# Patient Record
Sex: MALE | Race: WHITE | Employment: UNEMPLOYED | URBAN - METROPOLITAN AREA
[De-identification: names, ages, dates, MRNs, and addresses within clinical notes are randomized per-mention and may not be internally consistent; named-entity substitution may affect disease eponyms.]

---

## 2020-06-19 ENCOUNTER — OFFICE VISIT (OUTPATIENT)
Dept: UROLOGY | Age: 60
End: 2020-06-19
Payer: COMMERCIAL

## 2020-06-19 VITALS
WEIGHT: 178 LBS | SYSTOLIC BLOOD PRESSURE: 129 MMHG | DIASTOLIC BLOOD PRESSURE: 83 MMHG | HEART RATE: 95 BPM | TEMPERATURE: 98 F

## 2020-06-19 LAB
APPEARANCE FLUID: CLEAR
BILIRUBIN, POC: ABNORMAL
BLOOD URINE, POC: ABNORMAL
CLARITY, POC: CLEAR
COLOR, POC: YELLOW
GLUCOSE URINE, POC: ABNORMAL
KETONES, POC: ABNORMAL
LEUKOCYTE EST, POC: ABNORMAL
NITRITE, POC: ABNORMAL
PH, POC: 6
PROTEIN, POC: ABNORMAL
SPECIFIC GRAVITY, POC: 1.02
UROBILINOGEN, POC: 0.2

## 2020-06-19 PROCEDURE — 99204 OFFICE O/P NEW MOD 45 MIN: CPT | Performed by: UROLOGY

## 2020-06-19 PROCEDURE — 81002 URINALYSIS NONAUTO W/O SCOPE: CPT | Performed by: UROLOGY

## 2020-06-19 PROCEDURE — 99202 OFFICE O/P NEW SF 15 MIN: CPT | Performed by: UROLOGY

## 2020-06-19 RX ORDER — LEVOFLOXACIN 500 MG/1
500 TABLET, FILM COATED ORAL PRN
COMMUNITY
End: 2020-07-28 | Stop reason: ALTCHOICE

## 2020-06-19 RX ORDER — TAMSULOSIN HYDROCHLORIDE 0.4 MG/1
0.4 CAPSULE ORAL DAILY
COMMUNITY
End: 2020-06-19 | Stop reason: SDUPTHER

## 2020-06-19 RX ORDER — TAMSULOSIN HYDROCHLORIDE 0.4 MG/1
0.4 CAPSULE ORAL 2 TIMES DAILY
Qty: 60 CAPSULE | Refills: 5 | Status: ON HOLD | OUTPATIENT
Start: 2020-06-19 | End: 2020-09-15

## 2020-06-19 SDOH — HEALTH STABILITY: MENTAL HEALTH: HOW OFTEN DO YOU HAVE A DRINK CONTAINING ALCOHOL?: NEVER

## 2020-06-19 ASSESSMENT — ENCOUNTER SYMPTOMS
BACK PAIN: 1
SHORTNESS OF BREATH: 0
WHEEZING: 0
DIARRHEA: 0
ABDOMINAL PAIN: 0
EYE PAIN: 0
COUGH: 0
NAUSEA: 0
CONSTIPATION: 0
VOMITING: 0
EYE REDNESS: 0

## 2020-06-19 NOTE — PROGRESS NOTES
[facility] Doctor [address]  [address]  [address]  Dept: [phone]  Dept Fax: [phone] [facility], [address] [place] [address]  Urology Office Note - New Patient    Patient:  Payal Gonzalez  YOB: 1960  Date: 6/22/2020    The patient is a 63 y.o. male who presents today for evaluation of the following problems:   Chief Complaint   Patient presents with    New Patient     enlarged prostate     referred/consultation requested by No primary care provider on file. Ana Maria Yung HISTORY OF PRESENT ILLNESS:     BPH  Onset was  Years ago  Overall, the problem(s) are worse. Severity is described as moderate. Associated Symptoms: No dysuria, no gross hematuria. Current Pain Severity: [de-identified]     67-year-old male with obstructive lower urinary tract symptoms-thin stream, straining, incomplete emptying for the past 10 years. He was using a combination of dutasteride with tamsulosin and silodosin over the past 10 years. He is now taking tamsulosin 0.8 mg daily and levofloxacin for the past 10 days which helped improve his symptoms, after he had worsening difficulty with emptying the bladder. Wife is physician in [place], brother is urologist  [place] and reviewed today. Ultrasound from 2017-75 g prostate with prominent median lobe  He is agreeable to greenlight PVP of prostate  He takes aspirin 75 mg once daily  Prescribe today tamsulosin 0.4 mg twice daily        Requested/reviewed records from No primary care provider on file.  office and/or outside [de-identified]    (Patient's old records have been requested, reviewed and pertinent findings summarized in today's note.)    Last several PSA's:  No results found for: PSA    Last total testosterone:  No results found for: TESTOSTERONE    Urinalysis today:  Results for POC orders placed in visit on 06/19/20   POCT Urine Dipstick   Result Value Ref Range    Color, UA yellow     Clarity, UA clear     Glucose, UA POC neg     Bilirubin, UA neg     Ketones, UA neg     Spec Grav, UA 1.025     Blood, UA POC trace     pH, UA 6.0     Protein, UA POC neg     Urobilinogen, UA 0.2     Leukocytes, UA neg     Nitrite, UA neg     Appearance, Fluid Clear Clear, Slightly Cloudy       Last BUN and creatinine:  No results found for: BUN  No results found for: CREATININE    Additional Lab/Culture results: none    Imaging Reviewed during this Office Visit:   Mikey Kearney MD independently reviewed the images and verified the radiology reports from:    Patient was never admitted. PAST MEDICAL, FAMILY AND SOCIAL HISTORY:  No past medical history on file. No past surgical history on file. No family history on file. Outpatient Medications Marked as Taking for the 6/19/20 encounter (Office Visit) with Xavier Pal MD   Medication Sig Dispense Refill    levoFLOXacin (LEVAQUIN) 500 MG tablet Take 500 mg by mouth as needed      tamsulosin (FLOMAX) 0.4 MG capsule Take 1 capsule by mouth 2 times daily 60 capsule 5       Patient has no known allergies. Social History     Tobacco Use   Smoking Status Never Smoker   Smokeless Tobacco Never Used      (If patient a smoker, smoking cessation counseling offered)   Social History     Substance and Sexual Activity   Alcohol Use Never    Frequency: Never       REVIEW OF SYSTEMS:  Review of Systems   Constitutional: Negative for appetite change, chills and fever. Eyes: Negative for pain, redness and visual disturbance. Respiratory: Negative for cough, shortness of breath and wheezing. Cardiovascular: Negative for chest pain and leg swelling. Gastrointestinal: Negative for abdominal pain, constipation, diarrhea, nausea and vomiting. Genitourinary: Positive for difficulty urinating, dysuria, flank pain, frequency (mostly at night) and urgency. Negative for hematuria. Musculoskeletal: Positive for back pain (left side).  Negative for joint swelling and

## 2020-07-17 ENCOUNTER — TELEPHONE (OUTPATIENT)
Dept: UROLOGY | Age: 60
End: 2020-07-17

## 2020-07-27 ENCOUNTER — HOSPITAL ENCOUNTER (OUTPATIENT)
Dept: PREADMISSION TESTING | Age: 60
Discharge: HOME | End: 2020-07-31
Payer: COMMERCIAL

## 2020-07-27 PROCEDURE — U0003 INFECTIOUS AGENT DETECTION BY NUCLEIC ACID (DNA OR RNA); SEVERE ACUTE RESPIRATORY SYNDROME CORONAVIRUS 2 (SARS-COV-2) (CORONAVIRUS DISEASE [COVID-19]), AMPLIFIED PROBE TECHNIQUE, MAKING USE OF HIGH THROUGHPUT TECHNOLOGIES AS DESCRIBED BY CMS-2020-01-R: HCPCS

## 2020-07-28 RX ORDER — RAMIPRIL 5 MG/1
5 CAPSULE ORAL DAILY
COMMUNITY
End: 2020-08-28 | Stop reason: SDUPTHER

## 2020-07-28 RX ORDER — ROSUVASTATIN CALCIUM 10 MG/1
10 TABLET, COATED ORAL DAILY
COMMUNITY
End: 2020-08-28 | Stop reason: SDUPTHER

## 2020-07-30 ENCOUNTER — HOSPITAL ENCOUNTER (OUTPATIENT)
Age: 60
Setting detail: OUTPATIENT SURGERY
Discharge: HOME | End: 2020-07-30
Attending: UROLOGY | Admitting: UROLOGY
Payer: COMMERCIAL

## 2020-07-30 VITALS
DIASTOLIC BLOOD PRESSURE: 88 MMHG | RESPIRATION RATE: 16 BRPM | OXYGEN SATURATION: 100 % | HEART RATE: 65 BPM | SYSTOLIC BLOOD PRESSURE: 128 MMHG | TEMPERATURE: 97.9 F | WEIGHT: 178 LBS | HEIGHT: 66 IN | BODY MASS INDEX: 28.61 KG/M2

## 2020-07-30 LAB
SARS-COV-2, NAA: NOT DETECTED
SARS-COV-2, PCR: NORMAL
SARS-COV-2, RAPID: NOT DETECTED
SARS-COV-2: NORMAL
SOURCE: NORMAL

## 2020-07-30 PROCEDURE — 3600000002 HC SURGERY LEVEL 2 BASE: Performed by: UROLOGY

## 2020-07-30 PROCEDURE — 2709999900 HC NON-CHARGEABLE SUPPLY: Performed by: UROLOGY

## 2020-07-30 PROCEDURE — 2580000003 HC RX 258: Performed by: UROLOGY

## 2020-07-30 PROCEDURE — 7100000040 HC SPAR PHASE II RECOVERY - FIRST 15 MIN: Performed by: UROLOGY

## 2020-07-30 PROCEDURE — U0002 COVID-19 LAB TEST NON-CDC: HCPCS

## 2020-07-30 PROCEDURE — 3600000012 HC SURGERY LEVEL 2 ADDTL 15MIN: Performed by: UROLOGY

## 2020-07-30 RX ORDER — MAGNESIUM HYDROXIDE 1200 MG/15ML
LIQUID ORAL PRN
Status: DISCONTINUED | OUTPATIENT
Start: 2020-07-30 | End: 2020-07-30 | Stop reason: ALTCHOICE

## 2020-07-30 ASSESSMENT — PAIN SCALES - GENERAL: PAINLEVEL_OUTOF10: 0

## 2020-07-30 ASSESSMENT — PAIN - FUNCTIONAL ASSESSMENT: PAIN_FUNCTIONAL_ASSESSMENT: 0-10

## 2020-07-30 NOTE — H&P
Pre-op History and Physical  [address] Samra Oneil PA-C    Patient:  Melony Diehl  MRN: [id number]  YOB: 1960    HISTORY OF PRESENT ILLNESS:     The patient is a 63 y.o. male who presents with BPH/LUTS. Here for local cysto to evaluate need for greenlight. Patient's old records, notes and chart reviewed and summarized above. 5560 Samra Oneil PA-C independently reviewed the images and verified the radiology reports from:    No results found. Past Medical History:    Past Medical History:   Diagnosis Date    Hyperlipidemia     Hypertension     Wellness examination     patient states no PCP       Past Surgical History:    Past Surgical History:   Procedure Laterality Date    VASCULAR SURGERY      varicose vein of right leg       Medications Prior to Admission:    Prior to Admission medications    Medication Sig Start Date End Date Taking? Authorizing Provider   ramipril (ALTACE) 5 MG capsule Take 5 mg by mouth daily   Yes Historical Provider, MD   rosuvastatin (CRESTOR) 10 MG tablet Take 10 mg by mouth daily   Yes Historical Provider, MD   tamsulosin (FLOMAX) 0.4 MG capsule Take 1 capsule by mouth 2 times daily 6/19/20 7/28/20 Yes Kirt Quintero MD       Allergies:  Patient has no known allergies.     Social History:    Social History     Socioeconomic History    Marital status:      Spouse name: Not on file    Number of children: Not on file    Years of education: Not on file    Highest education level: Not on file   Occupational History    Not on file   Social Needs    Financial resource strain: Not on file    Food insecurity     Worry: Not on file     Inability: Not on file   [employer] needs     Medical: Not on file     Non-medical: Not on file   Tobacco Use    Smoking status: Never Smoker    Smokeless tobacco: Never Used   Substance and Sexual Activity    Alcohol use: Never     Frequency: Never    Drug use: Never    Sexual activity: Yes   Lifestyle    Physical activity     Days per week: Not on file     Minutes per session: Not on file    Stress: Not on file   Relationships    Social connections     Talks on phone: Not on file     Gets together: Not on file     Attends Roman Catholic service: Not on file     Active member of club or organization: Not on file     Attends meetings of clubs or organizations: Not on file     Relationship status: Not on file    Intimate partner violence     Fear of current or ex partner: Not on file     Emotionally abused: Not on file     Physically abused: Not on file     Forced sexual activity: Not on file   Other Topics Concern    Not on file   Social History Narrative    Not on file       Family History:  History reviewed. No pertinent family history. REVIEW OF SYSTEMS:  Constitutional: negative  Eyes: negative  Respiratory: negative  Cardiovascular: negative  Gastrointestinal: negative  Genitourinary: no acute issues  Musculoskeletal: negative  Skin: negative   Neurological: negative  Hematological/Lymphatic: negative  Psychological: negative    PHYSICAL EXAM:    No data found. Constitutional: Patient in NAD  Neuro: Alert and oriented to person, place, and time  Psych: Mood and affect normal  Skin: Clean, dry, intact   Lungs: Respiratory effort normal, CTA  Cardiovascular:  Normal peripheral pulses; no murmur. Normal rhythm  Abdomen: Soft, non-tender, non-distended, no hepatosplenomegaly or hernia, CVA tenderness none  Bladder: Non-tender and non-disdended   : Non-tender, skin intact, no lesions       LABS:   No results for input(s): WBC, HGB, HCT, MCV, PLT in the last 72 hours. No results for input(s): NA, K, CL, CO2, PHOS, BUN, CREATININE in the last 72 hours.     Invalid input(s): CA  No results found for: PSA      Urinalysis: No results for input(s): COLORU, PHUR, LABCAST, WBCUA, RBCUA, MUCUS, TRICHOMONAS, YEAST, BACTERIA, CLARITYU, SPECGRAV, LEUKOCYTESUR, UROBILINOGEN, Suzie Chani in the last 72

## 2020-08-04 ENCOUNTER — TELEPHONE (OUTPATIENT)
Dept: UROLOGY | Age: 60
End: 2020-08-04

## 2020-08-04 NOTE — TELEPHONE ENCOUNTER
----- Message from Brenda Emanuel PA-C sent at 7/30/2020  3:26 PM EDT -----  Hi ladies! Dr Tabitha Munoz would let to get this pt set up for cysto/greenlight on Aug 14th please! Thanks!   Sanjay Villalpando

## 2020-08-07 ENCOUNTER — TELEPHONE (OUTPATIENT)
Dept: UROLOGY | Age: 60
End: 2020-08-07

## 2020-08-07 NOTE — TELEPHONE ENCOUNTER
Patient is scheduled for surgery on 8/14 at 8:45AM and for covid testing at [facility] on 8/10 at 8:45AM.  Talked to patient and gave him the dates, times and instructions. Patient confirmed and verbalized understanding. Letter mailed out today.

## 2020-08-10 ENCOUNTER — HOSPITAL ENCOUNTER (OUTPATIENT)
Dept: PREADMISSION TESTING | Age: 60
Discharge: HOME | End: 2020-08-14
Payer: COMMERCIAL

## 2020-08-10 PROCEDURE — U0003 INFECTIOUS AGENT DETECTION BY NUCLEIC ACID (DNA OR RNA); SEVERE ACUTE RESPIRATORY SYNDROME CORONAVIRUS 2 (SARS-COV-2) (CORONAVIRUS DISEASE [COVID-19]), AMPLIFIED PROBE TECHNIQUE, MAKING USE OF HIGH THROUGHPUT TECHNOLOGIES AS DESCRIBED BY CMS-2020-01-R: HCPCS

## 2020-08-12 LAB — SARS-COV-2, NAA: NOT DETECTED

## 2020-08-14 ENCOUNTER — ANESTHESIA (OUTPATIENT)
Dept: OPERATING ROOM | Age: 60
End: 2020-08-14
Payer: COMMERCIAL

## 2020-08-14 ENCOUNTER — HOSPITAL ENCOUNTER (OUTPATIENT)
Age: 60
Setting detail: OBSERVATION
Discharge: HOME | End: 2020-08-16
Attending: UROLOGY | Admitting: UROLOGY
Payer: COMMERCIAL

## 2020-08-14 ENCOUNTER — ANESTHESIA EVENT (OUTPATIENT)
Dept: OPERATING ROOM | Age: 60
End: 2020-08-14
Payer: COMMERCIAL

## 2020-08-14 VITALS — DIASTOLIC BLOOD PRESSURE: 77 MMHG | OXYGEN SATURATION: 100 % | TEMPERATURE: 91.8 F | SYSTOLIC BLOOD PRESSURE: 104 MMHG

## 2020-08-14 PROBLEM — N40.1 BPH WITH URINARY OBSTRUCTION: Status: ACTIVE | Noted: 2020-08-14

## 2020-08-14 PROBLEM — N13.8 BPH WITH URINARY OBSTRUCTION: Status: ACTIVE | Noted: 2020-08-14

## 2020-08-14 PROCEDURE — 3700000001 HC ADD 15 MINUTES (ANESTHESIA): Performed by: UROLOGY

## 2020-08-14 PROCEDURE — 6360000002 HC RX W HCPCS: Performed by: ANESTHESIOLOGY

## 2020-08-14 PROCEDURE — 2580000003 HC RX 258: Performed by: NURSE ANESTHETIST, CERTIFIED REGISTERED

## 2020-08-14 PROCEDURE — 3700000000 HC ANESTHESIA ATTENDED CARE: Performed by: UROLOGY

## 2020-08-14 PROCEDURE — 7100000001 HC PACU RECOVERY - ADDTL 15 MIN: Performed by: UROLOGY

## 2020-08-14 PROCEDURE — 2720000010 HC SURG SUPPLY STERILE: Performed by: UROLOGY

## 2020-08-14 PROCEDURE — 3600000014 HC SURGERY LEVEL 4 ADDTL 15MIN: Performed by: UROLOGY

## 2020-08-14 PROCEDURE — 3600000004 HC SURGERY LEVEL 4 BASE: Performed by: UROLOGY

## 2020-08-14 PROCEDURE — 6370000000 HC RX 637 (ALT 250 FOR IP): Performed by: STUDENT IN AN ORGANIZED HEALTH CARE EDUCATION/TRAINING PROGRAM

## 2020-08-14 PROCEDURE — 6360000002 HC RX W HCPCS: Performed by: NURSE ANESTHETIST, CERTIFIED REGISTERED

## 2020-08-14 PROCEDURE — 2500000003 HC RX 250 WO HCPCS: Performed by: NURSE ANESTHETIST, CERTIFIED REGISTERED

## 2020-08-14 PROCEDURE — G0378 HOSPITAL OBSERVATION PER HR: HCPCS

## 2020-08-14 PROCEDURE — 6360000002 HC RX W HCPCS: Performed by: STUDENT IN AN ORGANIZED HEALTH CARE EDUCATION/TRAINING PROGRAM

## 2020-08-14 PROCEDURE — 7100000000 HC PACU RECOVERY - FIRST 15 MIN: Performed by: UROLOGY

## 2020-08-14 PROCEDURE — 2580000003 HC RX 258: Performed by: STUDENT IN AN ORGANIZED HEALTH CARE EDUCATION/TRAINING PROGRAM

## 2020-08-14 PROCEDURE — 6360000002 HC RX W HCPCS: Performed by: PHYSICIAN ASSISTANT

## 2020-08-14 PROCEDURE — 2709999900 HC NON-CHARGEABLE SUPPLY: Performed by: UROLOGY

## 2020-08-14 RX ORDER — SODIUM CHLORIDE 9 MG/ML
INJECTION, SOLUTION INTRAVENOUS CONTINUOUS
Status: DISCONTINUED | OUTPATIENT
Start: 2020-08-14 | End: 2020-08-16

## 2020-08-14 RX ORDER — OXYCODONE HYDROCHLORIDE AND ACETAMINOPHEN 5; 325 MG/1; MG/1
2 TABLET ORAL EVERY 4 HOURS PRN
Status: DISCONTINUED | OUTPATIENT
Start: 2020-08-14 | End: 2020-08-16 | Stop reason: HOSPADM

## 2020-08-14 RX ORDER — SODIUM CHLORIDE, SODIUM LACTATE, POTASSIUM CHLORIDE, CALCIUM CHLORIDE 600; 310; 30; 20 MG/100ML; MG/100ML; MG/100ML; MG/100ML
INJECTION, SOLUTION INTRAVENOUS CONTINUOUS PRN
Status: DISCONTINUED | OUTPATIENT
Start: 2020-08-14 | End: 2020-08-14 | Stop reason: SDUPTHER

## 2020-08-14 RX ORDER — ONDANSETRON 2 MG/ML
INJECTION INTRAMUSCULAR; INTRAVENOUS PRN
Status: DISCONTINUED | OUTPATIENT
Start: 2020-08-14 | End: 2020-08-14 | Stop reason: SDUPTHER

## 2020-08-14 RX ORDER — RAMIPRIL 5 MG/1
5 CAPSULE ORAL DAILY
Status: DISCONTINUED | OUTPATIENT
Start: 2020-08-15 | End: 2020-08-16 | Stop reason: HOSPADM

## 2020-08-14 RX ORDER — MIDAZOLAM HYDROCHLORIDE 1 MG/ML
INJECTION INTRAMUSCULAR; INTRAVENOUS PRN
Status: DISCONTINUED | OUTPATIENT
Start: 2020-08-14 | End: 2020-08-14 | Stop reason: SDUPTHER

## 2020-08-14 RX ORDER — ROSUVASTATIN CALCIUM 10 MG/1
10 TABLET, COATED ORAL DAILY
Status: DISCONTINUED | OUTPATIENT
Start: 2020-08-15 | End: 2020-08-16 | Stop reason: HOSPADM

## 2020-08-14 RX ORDER — SODIUM CHLORIDE 0.9 % (FLUSH) 0.9 %
10 SYRINGE (ML) INJECTION PRN
Status: DISCONTINUED | OUTPATIENT
Start: 2020-08-14 | End: 2020-08-16 | Stop reason: HOSPADM

## 2020-08-14 RX ORDER — PROPOFOL 10 MG/ML
INJECTION, EMULSION INTRAVENOUS PRN
Status: DISCONTINUED | OUTPATIENT
Start: 2020-08-14 | End: 2020-08-14 | Stop reason: SDUPTHER

## 2020-08-14 RX ORDER — LIDOCAINE HYDROCHLORIDE 20 MG/ML
JELLY TOPICAL CONTINUOUS PRN
Status: DISCONTINUED | OUTPATIENT
Start: 2020-08-14 | End: 2020-08-14 | Stop reason: SDUPTHER

## 2020-08-14 RX ORDER — OXYCODONE HYDROCHLORIDE AND ACETAMINOPHEN 5; 325 MG/1; MG/1
1 TABLET ORAL EVERY 4 HOURS PRN
Status: DISCONTINUED | OUTPATIENT
Start: 2020-08-14 | End: 2020-08-16 | Stop reason: HOSPADM

## 2020-08-14 RX ORDER — ACETAMINOPHEN 650 MG/1
650 SUPPOSITORY RECTAL EVERY 6 HOURS PRN
Status: DISCONTINUED | OUTPATIENT
Start: 2020-08-14 | End: 2020-08-16 | Stop reason: HOSPADM

## 2020-08-14 RX ORDER — ACETAMINOPHEN 325 MG/1
650 TABLET ORAL EVERY 6 HOURS PRN
Status: DISCONTINUED | OUTPATIENT
Start: 2020-08-14 | End: 2020-08-16 | Stop reason: HOSPADM

## 2020-08-14 RX ORDER — TRAMADOL HYDROCHLORIDE 50 MG/1
50 TABLET ORAL EVERY 8 HOURS PRN
Qty: 6 TABLET | Refills: 0 | Status: SHIPPED | OUTPATIENT
Start: 2020-08-14 | End: 2020-08-17

## 2020-08-14 RX ORDER — SODIUM CHLORIDE, SODIUM LACTATE, POTASSIUM CHLORIDE, CALCIUM CHLORIDE 600; 310; 30; 20 MG/100ML; MG/100ML; MG/100ML; MG/100ML
INJECTION, SOLUTION INTRAVENOUS CONTINUOUS
Status: CANCELLED | OUTPATIENT
Start: 2020-08-14

## 2020-08-14 RX ORDER — ONDANSETRON 2 MG/ML
4 INJECTION INTRAMUSCULAR; INTRAVENOUS EVERY 6 HOURS PRN
Status: DISCONTINUED | OUTPATIENT
Start: 2020-08-14 | End: 2020-08-16 | Stop reason: HOSPADM

## 2020-08-14 RX ORDER — POLYETHYLENE GLYCOL 3350 17 G/17G
17 POWDER, FOR SOLUTION ORAL DAILY
Status: DISCONTINUED | OUTPATIENT
Start: 2020-08-15 | End: 2020-08-16 | Stop reason: HOSPADM

## 2020-08-14 RX ORDER — TAMSULOSIN HYDROCHLORIDE 0.4 MG/1
0.4 CAPSULE ORAL 2 TIMES DAILY
Status: DISCONTINUED | OUTPATIENT
Start: 2020-08-14 | End: 2020-08-16 | Stop reason: HOSPADM

## 2020-08-14 RX ORDER — FENTANYL CITRATE 50 UG/ML
INJECTION, SOLUTION INTRAMUSCULAR; INTRAVENOUS PRN
Status: DISCONTINUED | OUTPATIENT
Start: 2020-08-14 | End: 2020-08-14 | Stop reason: SDUPTHER

## 2020-08-14 RX ORDER — FENTANYL CITRATE 50 UG/ML
50 INJECTION, SOLUTION INTRAMUSCULAR; INTRAVENOUS EVERY 5 MIN PRN
Status: DISCONTINUED | OUTPATIENT
Start: 2020-08-14 | End: 2020-08-14 | Stop reason: HOSPADM

## 2020-08-14 RX ORDER — ONDANSETRON 2 MG/ML
4 INJECTION INTRAMUSCULAR; INTRAVENOUS
Status: DISCONTINUED | OUTPATIENT
Start: 2020-08-14 | End: 2020-08-14 | Stop reason: HOSPADM

## 2020-08-14 RX ORDER — SODIUM CHLORIDE 0.9 % (FLUSH) 0.9 %
10 SYRINGE (ML) INJECTION EVERY 12 HOURS SCHEDULED
Status: DISCONTINUED | OUTPATIENT
Start: 2020-08-14 | End: 2020-08-16 | Stop reason: HOSPADM

## 2020-08-14 RX ORDER — GLYCOPYRROLATE 1 MG/5 ML
SYRINGE (ML) INTRAVENOUS PRN
Status: DISCONTINUED | OUTPATIENT
Start: 2020-08-14 | End: 2020-08-14 | Stop reason: SDUPTHER

## 2020-08-14 RX ORDER — FENTANYL CITRATE 50 UG/ML
25 INJECTION, SOLUTION INTRAMUSCULAR; INTRAVENOUS EVERY 5 MIN PRN
Status: DISCONTINUED | OUTPATIENT
Start: 2020-08-14 | End: 2020-08-14 | Stop reason: HOSPADM

## 2020-08-14 RX ORDER — MIDAZOLAM HYDROCHLORIDE 1 MG/ML
1 INJECTION INTRAMUSCULAR; INTRAVENOUS EVERY 10 MIN PRN
Status: CANCELLED | OUTPATIENT
Start: 2020-08-14

## 2020-08-14 RX ORDER — MEPERIDINE HYDROCHLORIDE 50 MG/ML
12.5 INJECTION INTRAMUSCULAR; INTRAVENOUS; SUBCUTANEOUS EVERY 5 MIN PRN
Status: DISCONTINUED | OUTPATIENT
Start: 2020-08-14 | End: 2020-08-14 | Stop reason: HOSPADM

## 2020-08-14 RX ORDER — DEXAMETHASONE SODIUM PHOSPHATE 10 MG/ML
INJECTION INTRAMUSCULAR; INTRAVENOUS PRN
Status: DISCONTINUED | OUTPATIENT
Start: 2020-08-14 | End: 2020-08-14 | Stop reason: SDUPTHER

## 2020-08-14 RX ORDER — LIDOCAINE HYDROCHLORIDE 10 MG/ML
INJECTION, SOLUTION EPIDURAL; INFILTRATION; INTRACAUDAL; PERINEURAL PRN
Status: DISCONTINUED | OUTPATIENT
Start: 2020-08-14 | End: 2020-08-14 | Stop reason: SDUPTHER

## 2020-08-14 RX ADMIN — LIDOCAINE HYDROCHLORIDE 5 MG: 10 INJECTION, SOLUTION EPIDURAL; INFILTRATION; INTRACAUDAL; PERINEURAL at 12:41

## 2020-08-14 RX ADMIN — SODIUM CHLORIDE, POTASSIUM CHLORIDE, SODIUM LACTATE AND CALCIUM CHLORIDE: 600; 310; 30; 20 INJECTION, SOLUTION INTRAVENOUS at 12:35

## 2020-08-14 RX ADMIN — SODIUM CHLORIDE: 9 INJECTION, SOLUTION INTRAVENOUS at 17:30

## 2020-08-14 RX ADMIN — LIDOCAINE HYDROCHLORIDE 2 %: 20 JELLY TOPICAL at 12:43

## 2020-08-14 RX ADMIN — FENTANYL CITRATE 25 MCG: 50 INJECTION, SOLUTION INTRAMUSCULAR; INTRAVENOUS at 14:47

## 2020-08-14 RX ADMIN — FENTANYL CITRATE 50 MCG: 50 INJECTION, SOLUTION INTRAMUSCULAR; INTRAVENOUS at 14:30

## 2020-08-14 RX ADMIN — ONDANSETRON 4 MG: 2 INJECTION, SOLUTION INTRAMUSCULAR; INTRAVENOUS at 13:41

## 2020-08-14 RX ADMIN — CEFAZOLIN 2 G: 10 INJECTION, POWDER, FOR SOLUTION INTRAVENOUS at 12:50

## 2020-08-14 RX ADMIN — FENTANYL CITRATE 50 MCG: 50 INJECTION INTRAMUSCULAR; INTRAVENOUS at 12:39

## 2020-08-14 RX ADMIN — DEXAMETHASONE SODIUM PHOSPHATE 10 MG: 10 INJECTION INTRAMUSCULAR; INTRAVENOUS at 12:46

## 2020-08-14 RX ADMIN — MIDAZOLAM HYDROCHLORIDE 2 MG: 1 INJECTION, SOLUTION INTRAMUSCULAR; INTRAVENOUS at 12:03

## 2020-08-14 RX ADMIN — Medication 0.2 MG: at 13:01

## 2020-08-14 RX ADMIN — CEFAZOLIN 1 G: 1 INJECTION, POWDER, FOR SOLUTION INTRAMUSCULAR; INTRAVENOUS at 21:40

## 2020-08-14 RX ADMIN — PROPOFOL 150 MG: 10 INJECTION, EMULSION INTRAVENOUS at 12:41

## 2020-08-14 RX ADMIN — FENTANYL CITRATE 50 MCG: 50 INJECTION INTRAMUSCULAR; INTRAVENOUS at 12:42

## 2020-08-14 RX ADMIN — HYOSCYAMINE SULFATE 125 MCG: 0.12 TABLET ORAL; SUBLINGUAL at 21:40

## 2020-08-14 RX ADMIN — TAMSULOSIN HYDROCHLORIDE 0.4 MG: 0.4 CAPSULE ORAL at 21:40

## 2020-08-14 RX ADMIN — FENTANYL CITRATE 25 MCG: 50 INJECTION, SOLUTION INTRAMUSCULAR; INTRAVENOUS at 15:15

## 2020-08-14 ASSESSMENT — PULMONARY FUNCTION TESTS
PIF_VALUE: 6
PIF_VALUE: 5
PIF_VALUE: 0
PIF_VALUE: 5
PIF_VALUE: 5
PIF_VALUE: 1
PIF_VALUE: 6
PIF_VALUE: 6
PIF_VALUE: 5
PIF_VALUE: 6
PIF_VALUE: 5
PIF_VALUE: 9
PIF_VALUE: 5
PIF_VALUE: 6
PIF_VALUE: 4
PIF_VALUE: 5
PIF_VALUE: 5
PIF_VALUE: 6
PIF_VALUE: 5
PIF_VALUE: 6
PIF_VALUE: 6
PIF_VALUE: 5
PIF_VALUE: 6
PIF_VALUE: 2
PIF_VALUE: 12
PIF_VALUE: 5
PIF_VALUE: 6
PIF_VALUE: 5
PIF_VALUE: 0
PIF_VALUE: 6
PIF_VALUE: 5
PIF_VALUE: 9
PIF_VALUE: 5
PIF_VALUE: 5
PIF_VALUE: 6
PIF_VALUE: 5
PIF_VALUE: 6
PIF_VALUE: 5
PIF_VALUE: 6
PIF_VALUE: 1
PIF_VALUE: 8
PIF_VALUE: 5
PIF_VALUE: 5
PIF_VALUE: 6
PIF_VALUE: 4
PIF_VALUE: 4
PIF_VALUE: 6
PIF_VALUE: 5
PIF_VALUE: 5
PIF_VALUE: 1
PIF_VALUE: 5
PIF_VALUE: 6
PIF_VALUE: 6
PIF_VALUE: 5
PIF_VALUE: 0
PIF_VALUE: 5
PIF_VALUE: 6
PIF_VALUE: 5
PIF_VALUE: 6
PIF_VALUE: 5
PIF_VALUE: 3
PIF_VALUE: 6
PIF_VALUE: 5
PIF_VALUE: 5
PIF_VALUE: 6
PIF_VALUE: 6

## 2020-08-14 ASSESSMENT — PAIN SCALES - GENERAL
PAINLEVEL_OUTOF10: 0
PAINLEVEL_OUTOF10: 6
PAINLEVEL_OUTOF10: 7
PAINLEVEL_OUTOF10: 6
PAINLEVEL_OUTOF10: 4
PAINLEVEL_OUTOF10: 6
PAINLEVEL_OUTOF10: 4
PAINLEVEL_OUTOF10: 6

## 2020-08-14 NOTE — LETTER
[facility]  [address]  Phone: [phone]    No name on file. August 16, 2020     Patient: Ling Farr   YOB: 1960   Date of Visit: 8/6/2020       To Whom It May Concern: It is my medical opinion that Ling Farr may return to work on 8/23/2020 with the following restrictions: lifting/carrying not to exceed 10 lbs. , pushing/pulling not to exceed 10 lbs. .    If you have any questions or concerns, please don't hesitate to call. Sincerely,    Milo Gil MD   Urology resident     No name on file.

## 2020-08-14 NOTE — OP NOTE
[address] [facility]. [place]  08/14/20    Patient:  Brodie Bro  MRN: [id number]  YOB: 1960    Surgeon: Danielle Snyder MD   Assistant: Kristin Gunderson MD    Pre-op Diagnosis: MICROSCOPIC HEMATURIA, BPH WITH LUTS  Post-op Diagnosis: Same     Procedure:   Procedure(s):  CYSTO, TUR PROSTATE GREENLIGHT XPS    Anesthesia: General  Complications: None  OR Blood Loss: 5 mL  Fluids: Per anesthesia records   Implants: 22F 3-way Tovar Catheter  Specimens: None    Narrative of the Procedure:    After informed consent was obtained in the preoperative area, the patient was taken back to the operating room and transferred to the operating table in supine position. EPC cuffs were placed. The machine was turned on. Anesthesia was induced and antibiotics were given. The patient was placed in modified dorsal lithotomy position and sterilely prepped and draped in a standard fashion. A timeout occurred. Two patient identifiers were used. The 25F rigid scope with the visual obturator was carefully advanced through the urethra. Once within the bladder the visual obturator was exchanged for the resection bridge. The ureteral orifices were located and noted to be away from the bladder neck. The prostate was surveyed and the lateral lobes were noted to be completely obstructing and protruding into the bladder. The resection was started proximal with a power setting of 80W at the bladder neck. Power was increased to 180 W for the lateral lobes. Both the left and right lateral lobes were vaporized in their entirety down to the level of the surgical capsule. With this complete, the apical dissection was carried out delicately. All of the obstructing adenoma was vaporized. Exquisite care was taken to ensure the integrity of the urinary sphincter. Once completed, the sphincter was evaluated and found to be clear of the resection bed, and completely intact.  The anterior adenoma was the last tissue to be addressed. The scope was rotated for ease of resection. One last evaluation of the prostatic urethra demonstrated complete vaporization of the gland to the surgical capsule circumferentially. The scope was advanced into the bladder. The ureteral orifices were re-surveyed and noted to be in pristine condition free of laser scatter. The cystoscopy was then removed, and a 22F 3-way Tovar catheter was placed into the bladder. When urine returned, the balloon was instilled with sterile water. The catheter was attached to gentle bladder irrigation using 0.9% normal saline. The catheter was fixed to the leg using a Deuce strap. The patient was then awakened, extubated, and discharged back to the PACU in good and stable condition. Follow-Up: The bladder will be irrigated in the PACU. The patient will be admitted for hematuria management. He will be offered a void trial tomorrow.      Verner Fuse  Electronically signed on 8/14/2020 at 1:35 PM

## 2020-08-14 NOTE — ANESTHESIA PRE PROCEDURE
Time of last solid consumption: 2330                        Date of last liquid consumption: 08/13/20                        Date of last solid food consumption: 08/14/20    BMI:   Wt Readings from Last 3 Encounters:   07/28/20 178 lb (80.7 kg)   06/19/20 178 lb (80.7 kg)     There is no height or weight on file to calculate BMI.    CBC: No results found for: WBC, RBC, HGB, HCT, MCV, RDW, PLT    CMP: No results found for: NA, K, CL, CO2, BUN, CREATININE, GFRAA, AGRATIO, LABGLOM, GLUCOSE, PROT, CALCIUM, BILITOT, ALKPHOS, AST, ALT    POC Tests: No results for input(s): POCGLU, POCNA, POCK, POCCL, POCBUN, POCHEMO, POCHCT in the last 72 hours. Coags: No results found for: PROTIME, INR, APTT    HCG (If Applicable): No results found for: PREGTESTUR, PREGSERUM, HCG, HCGQUANT     ABGs: No results found for: PHART, PO2ART, JVS1JSL, BGP6ERO, BEART, Z2VMMKOD     Type & Screen (If Applicable):  No results found for: LABABO, LABRH    Drug/Infectious Status (If Applicable):  No results found for: HIV, HEPCAB    COVID-19 Screening (If Applicable):   Lab Results   Component Value Date    COVID19 Not Detected 08/10/2020         Anesthesia Evaluation  Patient summary reviewed and Nursing notes reviewed no history of anesthetic complications:   Airway: Mallampati: III  TM distance: >3 FB   Neck ROM: full  Mouth opening: > = 3 FB Dental: normal exam         Pulmonary:Negative Pulmonary ROS and normal exam                               Cardiovascular:    (+) hypertension:,                   Neuro/Psych:   Negative Neuro/Psych ROS              GI/Hepatic/Renal: Neg GI/Hepatic/Renal ROS            Endo/Other: Negative Endo/Other ROS                    Abdominal:           Vascular: negative vascular ROS. Anesthesia Plan      general     ASA 2       Induction: intravenous. MIPS: Postoperative opioids intended. Anesthetic plan and risks discussed with patient and spouse.       Plan discussed with Morena Escalante MD   8/14/2020

## 2020-08-14 NOTE — ANESTHESIA POSTPROCEDURE EVALUATION
Department of Anesthesiology  Postprocedure Note    Patient: Randy Davis  MRN: [id number]  YOB: 1960  Date of evaluation: 8/14/2020  Time:  7:27 PM     Procedure Summary     Date:  08/14/20 Room / Location:  [address]    Anesthesia Start:  3861 Anesthesia Stop:  0485    Procedure:  CYSTO, TUR PROSTATE GREENLIGHT XPS (N/A ) Diagnosis:  (MICROSCOPIC HEMATURIA, BPH WITH LUTS)    Surgeon:  Ana Adams MD Responsible Provider:  Viktoria Alejandro MD    Anesthesia Type:  general ASA Status:  2          Anesthesia Type: general    Melissa Phase I: Melissa Score: 9    Melissa Phase II:      Last vitals: Reviewed and per EMR flowsheets.        Anesthesia Post Evaluation    Patient location during evaluation: PACU  Patient participation: complete - patient participated  Level of consciousness: awake and alert  Pain score: 0  Airway patency: patent  Nausea & Vomiting: no nausea and no vomiting  Complications: no  Cardiovascular status: hemodynamically stable  Respiratory status: room air  Hydration status: euvolemic

## 2020-08-14 NOTE — H&P
Pre-op History and Physical  Chelsea Stafford PA-C    Patient:  Vy Blanco  MRN: [id number]  YOB: 1960    HISTORY OF PRESENT ILLNESS:     The patient is a 63 y.o. male who presents with BPH. Cystoscopy last month showed trilobar hyperplasia with intravesical extension. Here for Cystoscopy, TUR Prostate - Greenlight XPS. Patient's old records, notes and chart reviewed and summarized above. Chelsea Stafford PA-C independently reviewed the images and verified the radiology reports from:    No results found. Past Medical History:    Past Medical History:   Diagnosis Date    Hyperlipidemia     Hypertension     Wellness examination     patient states no PCP       Past Surgical History:    Past Surgical History:   Procedure Laterality Date    CYSTOSCOPY N/A 7/30/2020    CYSTOSCOPY performed by Martinez Lamar MD at [facility]      varicose vein of right leg       Medications Prior to Admission:    Prior to Admission medications    Medication Sig Start Date End Date Taking? Authorizing Provider   ramipril (ALTACE) 5 MG capsule Take 5 mg by mouth daily    Historical Provider, MD   rosuvastatin (CRESTOR) 10 MG tablet Take 10 mg by mouth daily    Historical Provider, MD   tamsulosin (FLOMAX) 0.4 MG capsule Take 1 capsule by mouth 2 times daily 6/19/20 7/28/20  Martinez Lamar MD       Allergies:  Patient has no known allergies.     Social History:    Social History     Socioeconomic History    Marital status:      Spouse name: Not on file    Number of children: Not on file    Years of education: Not on file    Highest education level: Not on file   Occupational History    Not on file   Social Needs    Financial resource strain: Not on file    Food insecurity     Worry: Not on file     Inability: Not on file    Transportation needs     Medical: Not on file     Non-medical: Not on file   Tobacco Use    Smoking status: Never Smoker    Smokeless tobacco: Never Used Substance and Sexual Activity    Alcohol use: Never     Frequency: Never    Drug use: Never    Sexual activity: Yes   Lifestyle    Physical activity     Days per week: Not on file     Minutes per session: Not on file    Stress: Not on file   Relationships    Social connections     Talks on phone: Not on file     Gets together: Not on file     Attends Shinto service: Not on file     Active member of club or organization: Not on file     Attends meetings of clubs or organizations: Not on file     Relationship status: Not on file    Intimate partner violence     Fear of current or ex partner: Not on file     Emotionally abused: Not on file     Physically abused: Not on file     Forced sexual activity: Not on file   Other Topics Concern    Not on file   Social History Narrative    Not on file       Family History:  No family history on file. REVIEW OF SYSTEMS:  Constitutional: negative  Eyes: negative  Respiratory: negative  Cardiovascular: negative  Gastrointestinal: negative  Genitourinary: no acute issues  Musculoskeletal: negative  Skin: negative   Neurological: negative  Hematological/Lymphatic: negative  Psychological: negative    PHYSICAL EXAM:    No data found. Constitutional: Patient in NAD  Neuro: Alert and oriented to person, place, and time  Psych: Mood and affect normal  Skin: Clean, dry, intact   Lungs: Respiratory effort normal, CTA  Cardiovascular:  Normal peripheral pulses; no murmur. Normal rhythm  Abdomen: Soft, non-tender, non-distended, no hepatosplenomegaly or hernia, CVA tenderness none  Bladder: Non-tender and non-disdended   : Non-tender, skin intact, no lesions       LABS:   No results for input(s): WBC, HGB, HCT, MCV, PLT in the last 72 hours. No results for input(s): NA, K, CL, CO2, PHOS, BUN, CREATININE in the last 72 hours.     Invalid input(s): CA  No results found for: PSA      Urinalysis: No results for input(s): Natacha Muñoz, LABCAST, WBCUA, RBCUA, MUCUS,

## 2020-08-15 LAB
ANION GAP SERPL CALCULATED.3IONS-SCNC: 13 MMOL/L (ref 9–17)
BUN BLDV-MCNC: 14 MG/DL (ref 8–23)
BUN/CREAT BLD: ABNORMAL (ref 9–20)
CALCIUM SERPL-MCNC: 8.8 MG/DL (ref 8.6–10.4)
CHLORIDE BLD-SCNC: 103 MMOL/L (ref 98–107)
CO2: 22 MMOL/L (ref 20–31)
CREAT SERPL-MCNC: 0.7 MG/DL (ref 0.7–1.2)
GFR AFRICAN AMERICAN: >60 ML/MIN
GFR NON-AFRICAN AMERICAN: >60 ML/MIN
GFR SERPL CREATININE-BSD FRML MDRD: ABNORMAL ML/MIN/{1.73_M2}
GFR SERPL CREATININE-BSD FRML MDRD: ABNORMAL ML/MIN/{1.73_M2}
GLUCOSE BLD-MCNC: 132 MG/DL (ref 70–99)
HCT VFR BLD CALC: 42.5 % (ref 40.7–50.3)
HEMOGLOBIN: 13.6 G/DL (ref 13–17)
MCH RBC QN AUTO: 26.8 PG (ref 25.2–33.5)
MCHC RBC AUTO-ENTMCNC: 32 G/DL (ref 28.4–34.8)
MCV RBC AUTO: 83.8 FL (ref 82.6–102.9)
NRBC AUTOMATED: 0 PER 100 WBC
PDW BLD-RTO: 13.2 % (ref 11.8–14.4)
PLATELET # BLD: 240 K/UL (ref 138–453)
PMV BLD AUTO: 9.6 FL (ref 8.1–13.5)
POTASSIUM SERPL-SCNC: 4.3 MMOL/L (ref 3.7–5.3)
RBC # BLD: 5.07 M/UL (ref 4.21–5.77)
SODIUM BLD-SCNC: 138 MMOL/L (ref 135–144)
WBC # BLD: 11.8 K/UL (ref 3.5–11.3)

## 2020-08-15 PROCEDURE — 2580000003 HC RX 258: Performed by: STUDENT IN AN ORGANIZED HEALTH CARE EDUCATION/TRAINING PROGRAM

## 2020-08-15 PROCEDURE — 85027 COMPLETE CBC AUTOMATED: CPT

## 2020-08-15 PROCEDURE — 6370000000 HC RX 637 (ALT 250 FOR IP): Performed by: STUDENT IN AN ORGANIZED HEALTH CARE EDUCATION/TRAINING PROGRAM

## 2020-08-15 PROCEDURE — G0378 HOSPITAL OBSERVATION PER HR: HCPCS

## 2020-08-15 PROCEDURE — 36415 COLL VENOUS BLD VENIPUNCTURE: CPT

## 2020-08-15 PROCEDURE — 80048 BASIC METABOLIC PNL TOTAL CA: CPT

## 2020-08-15 PROCEDURE — 51798 US URINE CAPACITY MEASURE: CPT

## 2020-08-15 PROCEDURE — 6360000002 HC RX W HCPCS: Performed by: STUDENT IN AN ORGANIZED HEALTH CARE EDUCATION/TRAINING PROGRAM

## 2020-08-15 RX ORDER — LIDOCAINE HYDROCHLORIDE 20 MG/ML
JELLY TOPICAL PRN
Status: DISCONTINUED | OUTPATIENT
Start: 2020-08-15 | End: 2020-08-16 | Stop reason: HOSPADM

## 2020-08-15 RX ORDER — POLYVINYL ALCOHOL 14 MG/ML
1 SOLUTION/ DROPS OPHTHALMIC PRN
Status: DISCONTINUED | OUTPATIENT
Start: 2020-08-15 | End: 2020-08-16 | Stop reason: HOSPADM

## 2020-08-15 RX ADMIN — TAMSULOSIN HYDROCHLORIDE 0.4 MG: 0.4 CAPSULE ORAL at 08:55

## 2020-08-15 RX ADMIN — CEFAZOLIN 1 G: 1 INJECTION, POWDER, FOR SOLUTION INTRAMUSCULAR; INTRAVENOUS at 14:49

## 2020-08-15 RX ADMIN — ACETAMINOPHEN 650 MG: 325 TABLET ORAL at 08:55

## 2020-08-15 RX ADMIN — POLYETHYLENE GLYCOL 3350 17 G: 17 POWDER, FOR SOLUTION ORAL at 08:55

## 2020-08-15 RX ADMIN — HYOSCYAMINE SULFATE 125 MCG: 0.12 TABLET ORAL; SUBLINGUAL at 01:32

## 2020-08-15 RX ADMIN — CEFAZOLIN 1 G: 1 INJECTION, POWDER, FOR SOLUTION INTRAMUSCULAR; INTRAVENOUS at 05:53

## 2020-08-15 RX ADMIN — TAMSULOSIN HYDROCHLORIDE 0.4 MG: 0.4 CAPSULE ORAL at 21:16

## 2020-08-15 RX ADMIN — SODIUM CHLORIDE: 9 INJECTION, SOLUTION INTRAVENOUS at 00:55

## 2020-08-15 RX ADMIN — ROSUVASTATIN CALCIUM 10 MG: 10 TABLET, COATED ORAL at 08:55

## 2020-08-15 ASSESSMENT — PAIN SCALES - GENERAL
PAINLEVEL_OUTOF10: 4
PAINLEVEL_OUTOF10: 4

## 2020-08-15 NOTE — PROGRESS NOTES
Patient voided 550 mL light red clear urine, bladder scan showed a PVR of 371 mL. Dr. Laxmi Wong with urology informed in-person, no new orders at this time and writer to continue checking PVRs.     Electronically signed by Wilian Jenkins RN on 8/15/2020 at 11:20 AM

## 2020-08-15 NOTE — PROGRESS NOTES
Albaro Feliz, Jocelyn King  Urology Progress Note     Subjective:   Diet: General  No acute events overnight  Denies nausea, vomiting, fever and chills  Had bladder spasms, needed Levsin      Ambulation : In the hallways  Flatus/ BM : + flatus     Patient Vitals for the past 24 hrs:   BP Temp Temp src Pulse Resp SpO2 Height Weight   08/15/20 0815 105/63 98.3 °F (36.8 °C) Temporal 72 16 98 % -- --   08/15/20 0545 114/74 97.9 °F (36.6 °C) Oral 84 16 99 % -- --   08/15/20 0045 112/67 98.4 °F (36.9 °C) Oral 78 16 98 % -- --   08/14/20 2013 132/82 97.8 °F (36.6 °C) Oral 60 18 99 % -- --   08/14/20 1612 134/77 97.5 °F (36.4 °C) Oral 77 18 99 % -- --   08/14/20 1545 132/74 97 °F (36.1 °C) -- 73 13 100 % -- --   08/14/20 1535 132/76 97 °F (36.1 °C) -- 59 14 100 % -- --   08/14/20 1515 134/78 -- -- 64 15 100 % -- --   08/14/20 1500 123/75 -- -- 67 12 100 % -- --   08/14/20 1445 130/83 -- -- 51 16 100 % -- --   08/14/20 1430 (!) 130/99 96.8 °F (36 °C) Temporal 59 16 100 % -- --   08/14/20 1427 (!) 130/99 96.8 °F (36 °C) Temporal 53 19 100 % -- --   08/14/20 1116 122/74 97.7 °F (36.5 °C) Temporal 62 16 100 % 5' 6\" (1.676 m) 178 lb 3.2 oz (80.8 kg)       Intake/Output Summary (Last 24 hours) at 8/15/2020 0929  Last data filed at 8/14/2020 1545  Gross per 24 hour   Intake 2600 ml   Output 20 ml   Net 2580 ml       Recent Labs     08/15/20  0630   WBC 11.8*   HGB 13.6   HCT 42.5   MCV 83.8        Recent Labs     08/15/20  0630      K 4.3      CO2 22   BUN 14   CREATININE 0.70       No results for input(s): COLORU, PHUR, LABCAST, WBCUA, RBCUA, MUCUS, TRICHOMONAS, YEAST, BACTERIA, CLARITYU, SPECGRAV, LEUKOCYTESUR, UROBILINOGEN, Marisol Wausa in the last 72 hours.     Invalid input(s): NITRATE, GLUCOSEUKETONESUAMORPHOUS    Additional Lab/culture results:    Physical Exam:   AO X 3  Neck: Supple   Chest: bilateral symmetrical chest rise/ Non labored breathing   Circulatory: Peripheries warm , well perfused   P/A: soft, nondistended  Tovar 22 French three-way Tovar catheter in with clear yellow urine on slow drip CBI/ 2080 mL        Interval Imaging Findings:    Impression:    Patient Active Problem List   Diagnosis    BPH with urinary obstruction       Plan:   Diet: General  IV fluids: Stopped  Antibiotics: 24 hours of Ancef   pain control: Percocet as needed for pain control  Tovar out/check PVR  Ambulation / IS 10 times per hour   AM labs: Hemoglobin 13.6, creatinine baseline at 0.7  DC planning         Rishi Plummer  9:29 AM 8/15/2020

## 2020-08-15 NOTE — FLOWSHEET NOTE
08/15/20 1104   Encounter Summary   Services provided to: Patient   Referral/Consult From: Multi-disciplinary team;Rounding   Support System Spouse; Children   Place of [facility]   (Unknown)   Contact [place] No   Continue Visiting   (08/15/2020)   Complexity of Encounter Low   Length of Encounter 15 minutes   Spiritual Assessment Completed Yes   Routine   Type Initial   Assessment Calm; Approachable   Intervention Explored feelings, thoughts, concerns;Sustaining presence/ Ministry of presence   Outcome Expressed gratitude   Assessment: Patient is a 63year old male, his diagnosis states BPH with urinary obstruction. Intervention: Patient was up walking around the room at the time of [address] visit. Patient stated that he was fine and did not needed anything at the time of visit.  provided a presence of spiritual support. Outcome: Patient was thankful for visit.

## 2020-08-16 VITALS
DIASTOLIC BLOOD PRESSURE: 86 MMHG | HEART RATE: 94 BPM | OXYGEN SATURATION: 100 % | WEIGHT: 178.2 LBS | RESPIRATION RATE: 18 BRPM | BODY MASS INDEX: 28.64 KG/M2 | SYSTOLIC BLOOD PRESSURE: 135 MMHG | TEMPERATURE: 97.8 F | HEIGHT: 66 IN

## 2020-08-16 PROCEDURE — G0378 HOSPITAL OBSERVATION PER HR: HCPCS

## 2020-08-16 PROCEDURE — 6370000000 HC RX 637 (ALT 250 FOR IP): Performed by: STUDENT IN AN ORGANIZED HEALTH CARE EDUCATION/TRAINING PROGRAM

## 2020-08-16 PROCEDURE — 2580000003 HC RX 258: Performed by: STUDENT IN AN ORGANIZED HEALTH CARE EDUCATION/TRAINING PROGRAM

## 2020-08-16 PROCEDURE — 51798 US URINE CAPACITY MEASURE: CPT

## 2020-08-16 RX ADMIN — ROSUVASTATIN CALCIUM 10 MG: 10 TABLET, COATED ORAL at 08:13

## 2020-08-16 RX ADMIN — RAMIPRIL 5 MG: 5 CAPSULE ORAL at 08:13

## 2020-08-16 RX ADMIN — TAMSULOSIN HYDROCHLORIDE 0.4 MG: 0.4 CAPSULE ORAL at 08:13

## 2020-08-16 RX ADMIN — POLYETHYLENE GLYCOL 3350 17 G: 17 POWDER, FOR SOLUTION ORAL at 08:16

## 2020-08-16 RX ADMIN — SODIUM CHLORIDE: 9 INJECTION, SOLUTION INTRAVENOUS at 05:14

## 2020-08-16 NOTE — PROGRESS NOTES
Griffin Alvarez, Donavon Sullivan, Aditi Verdin, Phill Ardon  Urology Progress Note     Subjective:   Diet: General  No acute events overnight  Denies nausea, vomiting, fever and chills  Failed trial voiding yesterday stayed overnight for trial voiding today    Urine cleared off CBI      Patient Vitals for the past 24 hrs:   BP Temp Temp src Pulse Resp SpO2   08/16/20 0815 135/86 97.8 °F (36.6 °C) Oral 94 18 100 %   08/15/20 2015 (!) 110/57 98 °F (36.7 °C) -- 93 16 100 %   08/15/20 1633 136/64 97.4 °F (36.3 °C) -- 84 17 100 %       Intake/Output Summary (Last 24 hours) at 8/16/2020 0833  Last data filed at 8/16/2020 0533  Gross per 24 hour   Intake --   Output 5150 ml   Net -5150 ml       Recent Labs     08/15/20  0630   WBC 11.8*   HGB 13.6   HCT 42.5   MCV 83.8        Recent Labs     08/15/20  0630      K 4.3      CO2 22   BUN 14   CREATININE 0.70       No results for input(s): COLORU, PHUR, LABCAST, WBCUA, RBCUA, MUCUS, TRICHOMONAS, YEAST, BACTERIA, CLARITYU, SPECGRAV, LEUKOCYTESUR, UROBILINOGEN, Javan Boxer in the last 72 hours.     Invalid input(s): NITRATE, GLUCOSEUKETONESUAMORPHOUS    Additional Lab/culture results:    Physical Exam:   AO X 3  Neck: Supple   Chest: bilateral symmetrical chest rise/ Non labored breathing   Circulatory: Peripheries warm , well perfused   P/A: soft, nondistended  Tovar 16 French two-way Tovar with clear urine      Interval Imaging Findings:    Impression:    Patient Active Problem List   Diagnosis    BPH with urinary obstruction       Plan:   Diet: General  IV fluids: Stopped  Antibiotics: 24 hours of Ancef   pain control: Percocet as needed for pain control  Tovar removed today morning/check PVR  Ambulation / IS 10 times per hour   DC planning         Shireen Fees  8:33 AM 8/16/2020

## 2020-08-16 NOTE — DISCHARGE SUMMARY
DISCHARGE SUMMARY NOTE:      Patient Identification  PATIENT: Mainor Krishnan is a 63 y.o. male. MRN: [id number]  :  1960  Admit Date:  2020  Discharge date:   2020 11:47 AM                                   Disposition: home  Discharged Condition:  good  Discharge Diagnoses:   Patient Active Problem List   Diagnosis    BPH with urinary obstruction       Consults: none    Surgery: Cystoscopy and Green light PVP    Patient Instructions: Activity: As tolerated/ No lifting > 10 pounds for 4 weeks/ avoid constipation  Diet: As tolerated  Patient told to follow up with in staff clinic  in 1 week(s). Discharge Medications:    Pilo Guy   Home Medication Instructions OFX:[id number]    Printed on:20   Medication Information                      ramipril (ALTACE) 5 MG capsule  Take 5 mg by mouth daily             rosuvastatin (CRESTOR) 10 MG tablet  Take 10 mg by mouth daily             tamsulosin (FLOMAX) 0.4 MG capsule  Take 1 capsule by mouth 2 times daily             traMADol (ULTRAM) 50 MG tablet  Take 1 tablet by mouth every 8 hours as needed for Pain for up to 3 days. Intended supply: 3 days. Take lowest dose possible to manage pain                 Hospital course:   62 YO male with severe obstructive LUTS and elevated PVR at baseline 150 - 250cc, admitted after uncomplicated green light PVP on 2020. See op note for full details. He had a 22 Fr 3 way valaldares catheter and CBI running overnight, urine was light pink on slow drip CBI on the first post op day and stopped. He was afebrile during his hospital stay, WBC was 11.8 and Hb 13.4 on the first post op day. His valladares was removed on the first post op day and void trial attempted, however he had progressively increasing PVRs up to 420 ml and he was uncomfortable with bladder fullness and hence a 16 Fr valladares catheter was re inserted.  He had a void trial on the second post op day, when he voided 500 ml with 280 ml PVR and is discharged home. He will follow uo in staff clinic on Friday for PVR check.        Jett Rg  7:52 PM 8/16/2020

## 2020-08-21 ENCOUNTER — OFFICE VISIT (OUTPATIENT)
Dept: UROLOGY | Age: 60
End: 2020-08-21
Payer: COMMERCIAL

## 2020-08-21 ENCOUNTER — HOSPITAL ENCOUNTER (OUTPATIENT)
Age: 60
Setting detail: SPECIMEN
Discharge: HOME | End: 2020-08-21
Payer: COMMERCIAL

## 2020-08-21 VITALS
BODY MASS INDEX: 29.21 KG/M2 | DIASTOLIC BLOOD PRESSURE: 84 MMHG | SYSTOLIC BLOOD PRESSURE: 124 MMHG | HEART RATE: 100 BPM | WEIGHT: 181 LBS

## 2020-08-21 LAB
BILIRUBIN, POC: NEGATIVE
BLOOD URINE, POC: NORMAL
CLARITY, POC: CLEAR
COLOR, POC: YELLOW
GLUCOSE URINE, POC: NEGATIVE
KETONES, POC: NEGATIVE
LEUKOCYTE EST, POC: NORMAL
NITRITE, POC: NEGATIVE
PH, POC: >=300
PROTEIN, POC: 6.5
SPECIFIC GRAVITY, POC: 1.03
UROBILINOGEN, POC: >=300

## 2020-08-21 PROCEDURE — 81002 URINALYSIS NONAUTO W/O SCOPE: CPT | Performed by: UROLOGY

## 2020-08-21 PROCEDURE — 51798 US URINE CAPACITY MEASURE: CPT | Performed by: UROLOGY

## 2020-08-21 PROCEDURE — 99024 POSTOP FOLLOW-UP VISIT: CPT | Performed by: UROLOGY

## 2020-08-21 NOTE — LETTER
[address]  [facility] [address] [place] [address]  Phone: [phone]  Fax: [phone]    Wiliam Rasmussen MD        August 21, 2020     Patient: Laura Rebolledo   YOB: 1960   Date of Visit: 8/21/2020       To Whom It May Concern: It is my medical opinion that Laura Rebolledo may return to limited participation on 8/23/20 with the following restrictions: no lifting over 10 lbs for 6 weeks. If you have any questions or concerns, please don't hesitate to call. [phone].     Sincerely,        Wiliam Rasmussen MD

## 2020-08-22 LAB
CULTURE: NORMAL
Lab: NORMAL
SPECIMEN DESCRIPTION: NORMAL

## 2020-08-28 ENCOUNTER — VIRTUAL VISIT (OUTPATIENT)
Dept: INTERNAL MEDICINE | Age: 60
End: 2020-08-28
Payer: COMMERCIAL

## 2020-08-28 PROCEDURE — 99442 PR PHYS/QHP TELEPHONE EVALUATION 11-20 MIN: CPT | Performed by: INTERNAL MEDICINE

## 2020-08-28 RX ORDER — RAMIPRIL 5 MG/1
5 CAPSULE ORAL DAILY
Qty: 30 CAPSULE | Refills: 5 | Status: SHIPPED | OUTPATIENT
Start: 2020-08-28 | End: 2020-12-03

## 2020-08-28 RX ORDER — ASPIRIN 81 MG/1
81 TABLET ORAL DAILY
COMMUNITY
End: 2020-08-28 | Stop reason: SDUPTHER

## 2020-08-28 RX ORDER — HYDROCHLOROTHIAZIDE 25 MG/1
25 TABLET ORAL DAILY
Qty: 30 TABLET | Refills: 5 | Status: SHIPPED | OUTPATIENT
Start: 2020-08-28 | End: 2021-02-15

## 2020-08-28 RX ORDER — ASPIRIN 81 MG/1
81 TABLET ORAL DAILY
Qty: 30 TABLET | Refills: 5 | Status: ON HOLD | OUTPATIENT
Start: 2020-08-28 | End: 2020-09-15 | Stop reason: SDUPTHER

## 2020-08-28 RX ORDER — ROSUVASTATIN CALCIUM 10 MG/1
10 TABLET, COATED ORAL DAILY
Qty: 30 TABLET | Refills: 5 | Status: SHIPPED | OUTPATIENT
Start: 2020-08-28 | End: 2021-02-15

## 2020-08-28 RX ORDER — HYDROCHLOROTHIAZIDE 25 MG/1
25 TABLET ORAL DAILY
COMMUNITY
End: 2020-08-28 | Stop reason: SDUPTHER

## 2020-08-28 ASSESSMENT — PATIENT HEALTH QUESTIONNAIRE - PHQ9
SUM OF ALL RESPONSES TO PHQ QUESTIONS 1-9: 0
SUM OF ALL RESPONSES TO PHQ9 QUESTIONS 1 & 2: 0
2. FEELING DOWN, DEPRESSED OR HOPELESS: 0
SUM OF ALL RESPONSES TO PHQ QUESTIONS 1-9: 0
1. LITTLE INTEREST OR PLEASURE IN DOING THINGS: 0

## 2020-08-28 NOTE — PROGRESS NOTES
Melony Diehl is a 63 y.o. male evaluated via telephone on 8/28/2020. Consent:  He and/or health care decision maker is aware that that he may receive a bill for this telephone service, depending on his insurance coverage, and has provided verbal consent to proceed: Yes      Documentation:  Pleasant 63year old gentleman from [place] [facility]. He reports a hx of htn and dyslipidemia for which he has been treated for over 20 years on the medications listed without any issues. He reports good control of HTN and cholesterol. He was seen recently by urology for cystoscopy. Needs pcp in area. No acute issues today  Reports he had hepatitis and hiv testing before immigration and declines to retest.       Diagnosis Orders   1. Hyperlipidemia, unspecified hyperlipidemia type  rosuvastatin (CRESTOR) 10 MG tablet    Lipid Panel   2. Essential hypertension  hydroCHLOROthiazide (HYDRODIURIL) 25 MG tablet    aspirin 81 MG EC tablet    ramipril (ALTACE) 5 MG capsule   3. Colon cancer screening  Cologuard (For External Results Only)   4. Diabetes mellitus screening  Hemoglobin A1C           I affirm this is a Patient Initiated Episode with a Patient who has not had a related appointment within my department in the past 7 days or scheduled within the next 24 hours.     Patient identification was verified at the start of the visit: Yes    Total Time: minutes: 11-20 minutes    Note: not billable if this call serves to triage the patient into an appointment for the relevant concern      Marvin Wood

## 2020-08-28 NOTE — PATIENT INSTRUCTIONS
Medications e-scribe to pharmacy of pt's choice. Scripts for lab mailed to pt with fasting instructions, pt will get labs done as soon as possible. An order for cologuard was placed by your physician, the company will be contacting within the next 2 days and will mail the test. Please contact the clinic at [phone] if not heard from in 1 week   Patient to return to clinic 1 year (office will call and schedule appt). AVS reviewed and mailed to pt. It is very important for your care that you keep your appointment. If for some reason you are unable to keep your appointment it is equally important that you call our office at [phone] to cancel your appointment and reschedule. Failure to do so may result in your termination from our practice.   MB

## 2020-09-04 ENCOUNTER — HOSPITAL ENCOUNTER (OUTPATIENT)
Age: 60
Setting detail: SPECIMEN
Discharge: HOME | End: 2020-09-04
Payer: COMMERCIAL

## 2020-09-04 LAB
CHOLESTEROL/HDL RATIO: 3
CHOLESTEROL: 145 MG/DL
ESTIMATED AVERAGE GLUCOSE: 123 MG/DL
HBA1C MFR BLD: 5.9 % (ref 4–6)
HDLC SERPL-MCNC: 49 MG/DL
LDL CHOLESTEROL: 79 MG/DL (ref 0–130)
TRIGL SERPL-MCNC: 86 MG/DL
VLDLC SERPL CALC-MCNC: NORMAL MG/DL (ref 1–30)

## 2020-09-07 ENCOUNTER — APPOINTMENT (OUTPATIENT)
Dept: CT IMAGING | Age: 60
End: 2020-09-07
Payer: COMMERCIAL

## 2020-09-07 ENCOUNTER — APPOINTMENT (OUTPATIENT)
Dept: GENERAL RADIOLOGY | Age: 60
End: 2020-09-07
Payer: COMMERCIAL

## 2020-09-07 ENCOUNTER — HOSPITAL ENCOUNTER (EMERGENCY)
Age: 60
Discharge: HOME | End: 2020-09-07
Attending: EMERGENCY MEDICINE
Payer: COMMERCIAL

## 2020-09-07 ENCOUNTER — APPOINTMENT (OUTPATIENT)
Dept: MRI IMAGING | Age: 60
End: 2020-09-07
Payer: COMMERCIAL

## 2020-09-07 VITALS
WEIGHT: 180 LBS | SYSTOLIC BLOOD PRESSURE: 111 MMHG | TEMPERATURE: 97.9 F | DIASTOLIC BLOOD PRESSURE: 69 MMHG | BODY MASS INDEX: 28.93 KG/M2 | HEIGHT: 66 IN | OXYGEN SATURATION: 99 % | HEART RATE: 56 BPM | RESPIRATION RATE: 13 BRPM

## 2020-09-07 PROBLEM — G45.9 TIA (TRANSIENT ISCHEMIC ATTACK): Status: ACTIVE | Noted: 2020-09-07

## 2020-09-07 LAB
% CKMB: 3.4 % (ref 0–3.5)
-: ABNORMAL
ABSOLUTE EOS #: 0.28 K/UL (ref 0–0.44)
ABSOLUTE IMMATURE GRANULOCYTE: <0.03 K/UL (ref 0–0.3)
ABSOLUTE LYMPH #: 1.67 K/UL (ref 1.1–3.7)
ABSOLUTE MONO #: 0.52 K/UL (ref 0.1–1.2)
ALLEN TEST: ABNORMAL
AMORPHOUS: ABNORMAL
ANION GAP SERPL CALCULATED.3IONS-SCNC: 10 MMOL/L (ref 9–17)
ANION GAP: 10 MMOL/L (ref 7–16)
BACTERIA: ABNORMAL
BASOPHILS # BLD: 0 % (ref 0–2)
BASOPHILS ABSOLUTE: <0.03 K/UL (ref 0–0.2)
BILIRUBIN URINE: NEGATIVE
BUN BLDV-MCNC: 14 MG/DL (ref 8–23)
BUN/CREAT BLD: ABNORMAL (ref 9–20)
CALCIUM SERPL-MCNC: 8.8 MG/DL (ref 8.6–10.4)
CASTS UA: ABNORMAL /LPF (ref 0–2)
CHLORIDE BLD-SCNC: 99 MMOL/L (ref 98–107)
CHP ED QC CHECK: NORMAL
CK MB: 2.7 NG/ML
CKMB INTERPRETATION: ABNORMAL
CO2: 26 MMOL/L (ref 20–31)
COLOR: YELLOW
CREAT SERPL-MCNC: 0.72 MG/DL (ref 0.7–1.2)
CRYSTALS, UA: ABNORMAL /HPF
DIFFERENTIAL TYPE: ABNORMAL
EOSINOPHILS RELATIVE PERCENT: 6 % (ref 1–4)
EPITHELIAL CELLS UA: ABNORMAL /HPF (ref 0–5)
FIO2: ABNORMAL
GFR AFRICAN AMERICAN: >60 ML/MIN
GFR NON-AFRICAN AMERICAN: >60 ML/MIN
GFR NON-AFRICAN AMERICAN: >60 ML/MIN
GFR SERPL CREATININE-BSD FRML MDRD: >60 ML/MIN
GFR SERPL CREATININE-BSD FRML MDRD: ABNORMAL ML/MIN/{1.73_M2}
GFR SERPL CREATININE-BSD FRML MDRD: ABNORMAL ML/MIN/{1.73_M2}
GFR SERPL CREATININE-BSD FRML MDRD: NORMAL ML/MIN/{1.73_M2}
GLUCOSE BLD-MCNC: 106 MG/DL (ref 70–99)
GLUCOSE BLD-MCNC: 112 MG/DL
GLUCOSE BLD-MCNC: 112 MG/DL (ref 74–100)
GLUCOSE URINE: NEGATIVE
HCO3 VENOUS: 28.9 MMOL/L (ref 22–29)
HCT VFR BLD CALC: 44.1 % (ref 40.7–50.3)
HEMOGLOBIN: 14.4 G/DL (ref 13–17)
IMMATURE GRANULOCYTES: 0 %
INR BLD: 1
KETONES, URINE: NEGATIVE
LEUKOCYTE ESTERASE, URINE: ABNORMAL
LYMPHOCYTES # BLD: 33 % (ref 24–43)
MCH RBC QN AUTO: 27.3 PG (ref 25.2–33.5)
MCHC RBC AUTO-ENTMCNC: 32.7 G/DL (ref 28.4–34.8)
MCV RBC AUTO: 83.5 FL (ref 82.6–102.9)
MODE: ABNORMAL
MONOCYTES # BLD: 10 % (ref 3–12)
MUCUS: ABNORMAL
MYOGLOBIN: 28 NG/ML (ref 28–72)
NEGATIVE BASE EXCESS, VEN: ABNORMAL (ref 0–2)
NITRITE, URINE: NEGATIVE
NRBC AUTOMATED: 0 PER 100 WBC
O2 DEVICE/FLOW/%: ABNORMAL
O2 SAT, VEN: 79 % (ref 60–85)
OTHER OBSERVATIONS UA: ABNORMAL
PARTIAL THROMBOPLASTIN TIME: 28.4 SEC (ref 20.5–30.5)
PATIENT TEMP: ABNORMAL
PCO2, VEN: 43.5 MM HG (ref 41–51)
PDW BLD-RTO: 12.7 % (ref 11.8–14.4)
PH UA: 8.5 (ref 5–8)
PH VENOUS: 7.43 (ref 7.32–7.43)
PLATELET # BLD: 309 K/UL (ref 138–453)
PLATELET ESTIMATE: ABNORMAL
PMV BLD AUTO: 9.3 FL (ref 8.1–13.5)
PO2, VEN: 42.4 MM HG (ref 30–50)
POC CHLORIDE: 100 MMOL/L (ref 98–107)
POC CREATININE: 0.79 MG/DL (ref 0.51–1.19)
POC HEMATOCRIT: 47 % (ref 41–53)
POC HEMOGLOBIN: 15.8 G/DL (ref 13.5–17.5)
POC IONIZED CALCIUM: 1.07 MMOL/L (ref 1.15–1.33)
POC LACTIC ACID: 1.47 MMOL/L (ref 0.56–1.39)
POC PCO2 TEMP: ABNORMAL MM HG
POC PH TEMP: ABNORMAL
POC PO2 TEMP: ABNORMAL MM HG
POC POTASSIUM: 3.6 MMOL/L (ref 3.5–4.5)
POC SODIUM: 139 MMOL/L (ref 138–146)
POSITIVE BASE EXCESS, VEN: 4 (ref 0–3)
POTASSIUM SERPL-SCNC: 3.9 MMOL/L (ref 3.7–5.3)
PROTEIN UA: NEGATIVE
PROTHROMBIN TIME: 10 SEC (ref 9–12)
RBC # BLD: 5.28 M/UL (ref 4.21–5.77)
RBC # BLD: ABNORMAL 10*6/UL
RBC UA: ABNORMAL /HPF (ref 0–2)
RENAL EPITHELIAL, UA: ABNORMAL /HPF
SAMPLE SITE: ABNORMAL
SARS-COV-2, PCR: NORMAL
SARS-COV-2, RAPID: NOT DETECTED
SARS-COV-2: NORMAL
SEG NEUTROPHILS: 51 % (ref 36–65)
SEGMENTED NEUTROPHILS ABSOLUTE COUNT: 2.6 K/UL (ref 1.5–8.1)
SODIUM BLD-SCNC: 135 MMOL/L (ref 135–144)
SOURCE: NORMAL
SPECIFIC GRAVITY UA: 1.01 (ref 1–1.03)
TOTAL CK: 80 U/L (ref 39–308)
TOTAL CO2, VENOUS: 30 MMOL/L (ref 23–30)
TRICHOMONAS: ABNORMAL
TROPONIN INTERP: ABNORMAL
TROPONIN T: ABNORMAL NG/ML
TROPONIN, HIGH SENSITIVITY: <6 NG/L (ref 0–22)
TURBIDITY: CLEAR
URINE HGB: ABNORMAL
UROBILINOGEN, URINE: NORMAL
WBC # BLD: 5.1 K/UL (ref 3.5–11.3)
WBC # BLD: ABNORMAL 10*3/UL
WBC UA: ABNORMAL /HPF (ref 0–5)
YEAST: ABNORMAL

## 2020-09-07 PROCEDURE — 82435 ASSAY OF BLOOD CHLORIDE: CPT

## 2020-09-07 PROCEDURE — 82803 BLOOD GASES ANY COMBINATION: CPT

## 2020-09-07 PROCEDURE — 83605 ASSAY OF LACTIC ACID: CPT

## 2020-09-07 PROCEDURE — 82565 ASSAY OF CREATININE: CPT

## 2020-09-07 PROCEDURE — 70551 MRI BRAIN STEM W/O DYE: CPT

## 2020-09-07 PROCEDURE — U0002 COVID-19 LAB TEST NON-CDC: HCPCS

## 2020-09-07 PROCEDURE — 85730 THROMBOPLASTIN TIME PARTIAL: CPT

## 2020-09-07 PROCEDURE — 6370000000 HC RX 637 (ALT 250 FOR IP): Performed by: STUDENT IN AN ORGANIZED HEALTH CARE EDUCATION/TRAINING PROGRAM

## 2020-09-07 PROCEDURE — 71250 CT THORAX DX C-: CPT

## 2020-09-07 PROCEDURE — 84132 ASSAY OF SERUM POTASSIUM: CPT

## 2020-09-07 PROCEDURE — 99285 EMERGENCY DEPT VISIT HI MDM: CPT | Performed by: PSYCHIATRY & NEUROLOGY

## 2020-09-07 PROCEDURE — 82947 ASSAY GLUCOSE BLOOD QUANT: CPT

## 2020-09-07 PROCEDURE — 70450 CT HEAD/BRAIN W/O DYE: CPT

## 2020-09-07 PROCEDURE — 71045 X-RAY EXAM CHEST 1 VIEW: CPT

## 2020-09-07 PROCEDURE — 70498 CT ANGIOGRAPHY NECK: CPT

## 2020-09-07 PROCEDURE — 80048 BASIC METABOLIC PNL TOTAL CA: CPT

## 2020-09-07 PROCEDURE — 6360000002 HC RX W HCPCS: Performed by: STUDENT IN AN ORGANIZED HEALTH CARE EDUCATION/TRAINING PROGRAM

## 2020-09-07 PROCEDURE — 84295 ASSAY OF SERUM SODIUM: CPT

## 2020-09-07 PROCEDURE — 85025 COMPLETE CBC W/AUTO DIFF WBC: CPT

## 2020-09-07 PROCEDURE — 81001 URINALYSIS AUTO W/SCOPE: CPT

## 2020-09-07 PROCEDURE — 96374 THER/PROPH/DIAG INJ IV PUSH: CPT

## 2020-09-07 PROCEDURE — 82550 ASSAY OF CK (CPK): CPT

## 2020-09-07 PROCEDURE — 82330 ASSAY OF CALCIUM: CPT

## 2020-09-07 PROCEDURE — 85014 HEMATOCRIT: CPT

## 2020-09-07 PROCEDURE — 84484 ASSAY OF TROPONIN QUANT: CPT

## 2020-09-07 PROCEDURE — 93005 ELECTROCARDIOGRAM TRACING: CPT | Performed by: EMERGENCY MEDICINE

## 2020-09-07 PROCEDURE — 6360000004 HC RX CONTRAST MEDICATION: Performed by: EMERGENCY MEDICINE

## 2020-09-07 PROCEDURE — 82553 CREATINE MB FRACTION: CPT

## 2020-09-07 PROCEDURE — 83874 ASSAY OF MYOGLOBIN: CPT

## 2020-09-07 PROCEDURE — 85610 PROTHROMBIN TIME: CPT

## 2020-09-07 PROCEDURE — 99285 EMERGENCY DEPT VISIT HI MDM: CPT

## 2020-09-07 RX ORDER — CLOPIDOGREL BISULFATE 75 MG/1
75 TABLET ORAL DAILY
Status: CANCELLED | OUTPATIENT
Start: 2020-09-08

## 2020-09-07 RX ORDER — LABETALOL HYDROCHLORIDE 5 MG/ML
10 INJECTION, SOLUTION INTRAVENOUS EVERY 10 MIN PRN
Status: CANCELLED | OUTPATIENT
Start: 2020-09-07

## 2020-09-07 RX ORDER — PROMETHAZINE HYDROCHLORIDE 25 MG/1
12.5 TABLET ORAL EVERY 6 HOURS PRN
Status: CANCELLED | OUTPATIENT
Start: 2020-09-07

## 2020-09-07 RX ORDER — ASPIRIN 300 MG/1
300 SUPPOSITORY RECTAL DAILY
Status: CANCELLED | OUTPATIENT
Start: 2020-09-08

## 2020-09-07 RX ORDER — ASPIRIN 81 MG/1
81 TABLET ORAL DAILY
Status: CANCELLED | OUTPATIENT
Start: 2020-09-08

## 2020-09-07 RX ORDER — DIPHENHYDRAMINE HYDROCHLORIDE 50 MG/ML
25 INJECTION INTRAMUSCULAR; INTRAVENOUS EVERY 6 HOURS PRN
Status: DISCONTINUED | OUTPATIENT
Start: 2020-09-07 | End: 2020-09-07 | Stop reason: HOSPADM

## 2020-09-07 RX ORDER — CLOPIDOGREL 300 MG/1
300 TABLET, FILM COATED ORAL ONCE
Status: COMPLETED | OUTPATIENT
Start: 2020-09-07 | End: 2020-09-07

## 2020-09-07 RX ORDER — SODIUM CHLORIDE 0.9 % (FLUSH) 0.9 %
10 SYRINGE (ML) INJECTION EVERY 12 HOURS SCHEDULED
Status: CANCELLED | OUTPATIENT
Start: 2020-09-07

## 2020-09-07 RX ORDER — SODIUM CHLORIDE 9 MG/ML
INJECTION, SOLUTION INTRAVENOUS CONTINUOUS
Status: CANCELLED | OUTPATIENT
Start: 2020-09-07

## 2020-09-07 RX ORDER — CLOPIDOGREL BISULFATE 75 MG/1
75 TABLET ORAL DAILY
Qty: 21 TABLET | Refills: 0 | Status: ON HOLD | OUTPATIENT
Start: 2020-09-07 | End: 2020-09-15 | Stop reason: HOSPADM

## 2020-09-07 RX ORDER — TAMSULOSIN HYDROCHLORIDE 0.4 MG/1
0.4 CAPSULE ORAL ONCE
Status: COMPLETED | OUTPATIENT
Start: 2020-09-07 | End: 2020-09-07

## 2020-09-07 RX ORDER — POLYETHYLENE GLYCOL 3350 17 G/17G
17 POWDER, FOR SOLUTION ORAL DAILY PRN
Status: CANCELLED | OUTPATIENT
Start: 2020-09-07

## 2020-09-07 RX ORDER — ONDANSETRON 2 MG/ML
4 INJECTION INTRAMUSCULAR; INTRAVENOUS ONCE
Status: DISCONTINUED | OUTPATIENT
Start: 2020-09-07 | End: 2020-09-07

## 2020-09-07 RX ORDER — ASPIRIN 81 MG/1
81 TABLET ORAL DAILY
Qty: 21 TABLET | Refills: 0 | Status: ON HOLD | OUTPATIENT
Start: 2020-09-07 | End: 2020-09-15 | Stop reason: HOSPADM

## 2020-09-07 RX ORDER — SODIUM CHLORIDE 0.9 % (FLUSH) 0.9 %
10 SYRINGE (ML) INJECTION PRN
Status: CANCELLED | OUTPATIENT
Start: 2020-09-07

## 2020-09-07 RX ORDER — ACETAMINOPHEN 325 MG/1
650 TABLET ORAL ONCE
Status: COMPLETED | OUTPATIENT
Start: 2020-09-07 | End: 2020-09-07

## 2020-09-07 RX ORDER — ATORVASTATIN CALCIUM 80 MG/1
80 TABLET, FILM COATED ORAL NIGHTLY
Status: CANCELLED | OUTPATIENT
Start: 2020-09-07

## 2020-09-07 RX ORDER — ONDANSETRON 2 MG/ML
4 INJECTION INTRAMUSCULAR; INTRAVENOUS EVERY 6 HOURS PRN
Status: CANCELLED | OUTPATIENT
Start: 2020-09-07

## 2020-09-07 RX ORDER — PROCHLORPERAZINE EDISYLATE 5 MG/ML
10 INJECTION INTRAMUSCULAR; INTRAVENOUS ONCE
Status: COMPLETED | OUTPATIENT
Start: 2020-09-07 | End: 2020-09-07

## 2020-09-07 RX ADMIN — TAMSULOSIN HYDROCHLORIDE 0.4 MG: 0.4 CAPSULE ORAL at 10:06

## 2020-09-07 RX ADMIN — ASPIRIN 325 MG: 325 TABLET, COATED ORAL at 09:32

## 2020-09-07 RX ADMIN — IOHEXOL 90 ML: 350 INJECTION, SOLUTION INTRAVENOUS at 08:30

## 2020-09-07 RX ADMIN — CLOPIDOGREL BISULFATE 300 MG: 300 TABLET, FILM COATED ORAL at 09:32

## 2020-09-07 RX ADMIN — DIPHENHYDRAMINE HYDROCHLORIDE 25 MG: 50 INJECTION, SOLUTION INTRAMUSCULAR; INTRAVENOUS at 08:41

## 2020-09-07 RX ADMIN — ACETAMINOPHEN 650 MG: 325 TABLET ORAL at 08:40

## 2020-09-07 RX ADMIN — PROCHLORPERAZINE EDISYLATE 10 MG: 5 INJECTION INTRAMUSCULAR; INTRAVENOUS at 08:41

## 2020-09-07 ASSESSMENT — PAIN DESCRIPTION - DESCRIPTORS: DESCRIPTORS: SHARP

## 2020-09-07 ASSESSMENT — PAIN DESCRIPTION - PAIN TYPE: TYPE: ACUTE PAIN

## 2020-09-07 ASSESSMENT — ENCOUNTER SYMPTOMS
NAUSEA: 1
CONSTIPATION: 0
SORE THROAT: 0
APNEA: 0
ABDOMINAL PAIN: 0
ABDOMINAL DISTENTION: 0
DIARRHEA: 0
RHINORRHEA: 0
VOMITING: 0
WHEEZING: 0
COLOR CHANGE: 0
SHORTNESS OF BREATH: 1
BACK PAIN: 0
CHOKING: 0
COUGH: 0

## 2020-09-07 ASSESSMENT — PAIN DESCRIPTION - ONSET: ONSET: ON-GOING

## 2020-09-07 ASSESSMENT — PAIN SCALES - GENERAL: PAINLEVEL_OUTOF10: 10

## 2020-09-07 ASSESSMENT — PAIN DESCRIPTION - LOCATION: LOCATION: HEAD

## 2020-09-07 ASSESSMENT — PAIN DESCRIPTION - FREQUENCY: FREQUENCY: CONTINUOUS

## 2020-09-07 ASSESSMENT — PAIN DESCRIPTION - PROGRESSION: CLINICAL_PROGRESSION: NOT CHANGED

## 2020-09-07 NOTE — ED NOTES
Resident at bedside to update patient on plan of care. Pt denies needs, family remains at bedside.       Lashawn Quinones RN  09/07/20 [id number]

## 2020-09-07 NOTE — ED NOTES
Pt to ed from home with son. Pt woke up at 0300 with severe head pain, posterior. No falls/trauma/injury. Patient reports nausea, unable to vomit. Patient reports going to the bathroom but feeling very weak and ill due to dizziness. Patient reports he began having blurred vision and tried to go back to sleep with no relief. Patient reports shortness of breath, worse with the head pain. Patient denies coughing. Pt is aox3. Pt denies numbness or tingling.       Lenny Hartmann RN  09/07/20 7203

## 2020-09-07 NOTE — FLOWSHEET NOTE
[facility] CARE DEPARTMENT - [address]     Emergency/Trauma Note    PATIENT NAME: Ivanna Khoury    Shift date: 09-07-20  Shift day: Monday   Shift # 1    Room # 03/03   Name: Randy Davis            Age: 63 y.o. Gender: male          Hoahaoism: None   Place of Religious:     Trauma/Incident type: Stroke Alert  Admit Date & Time: 9/7/2020  7:18 AM    PATIENT/EVENT DESCRIPTION:  Randy Davis is a 63 y.o. male who was brought to the ED by his son Sameer Nix  from their home. Once admitted into the ED the patient became a Stroke Alert. The patient woke up this morning not feel himself. He woke up his son and told him to drive him to the hospital. The patient complained that his eyes were blurry. The patient said his left side of his body was heavy and weaker then the right. As the nurse was moving the bed to go to CT the patient held his head and asked her to go slowly his head was swimming. Pt to be admitted to 03/03. SPIRITUAL ASSESSMENT/INTERVENTION:  The  maintained ministerial presence with the patient and his son Gema Adams [phone]. The patient said he is not feeling himself. He was frustrated that he could not do what was being asked of him. When the nurse took the patient to CT the son was anxious and fearful. He expressed feeling thrown off by being woken up to something being wrong. He was worried about his father. The  nurtured hope and asked if the son would like an water. PATIENT BELONGINGS:  With patient    ANY BELONGINGS OF SIGNIFICANT VALUE NOTED:  NONE    REGISTRATION STAFF NOTIFIED? Yes      WHAT IS YOUR SPIRITUAL CARE PLAN FOR THIS PATIENT?:   Chaplains will remain available for the patients spiritual and emotional needs.       09/07/20 0824   Encounter Summary   Services provided to: Patient and family together   Referral/Consult From: Multi-disciplinary team   Support System Children   Continue Visiting   (09/07/20)   Complexity of Encounter High   Length of Encounter 45 minutes   Crisis   Type Stroke Alert   Assessment Anxious; Fearful; Approachable   Intervention Active listening;Explored feelings, thoughts, concerns;Explored coping resources;Nurtured hope;Sustaining presence/ Ministry of presence   Outcome Comfort;Engaged in conversation; Less anxious, less agitated     Electronically signed by Byron Cooley Resident, on 9/7/2020 at 8:28 AM.  [facility]  [phone]

## 2020-09-07 NOTE — ED NOTES
Labeled blood specimen collected and sent to lab via tube system.        Yaneth Cooper RN  09/07/20 9575

## 2020-09-07 NOTE — CONSULTS
Brief Endovascular Neurosurgery Note for  Stroke Alert @08: 00 AM  2020 9:19 AM  Pt Name: Melony Diehl  MRN: [id number]  [place]: 1960  Date of evaluation: 2020  Primary Care Physician: Ely Miller MD    Ivanna Khoury is a 63 y.o. male who presents with left lower extremity weakness greater than left upper extremity weakness and left sensory deficit, dizzy sensation since 3 AM. Symptom onset has been acute for a time period of continuous since 3 AM. Severity is described as mild-moderate. Patient states that he went to bed at midnight without any symptoms and has never had symptoms like this before in the past.  Patient normally takes baby aspirin 81 mg daily but stopped taking in mid August for cystoscopy and has not been taking since that procedure. Patient denies any other anticoagulation or antiplatelets. [facility] taken at [address] [place] [address].:    1a.  Level of consciousness:  0 - alert; keenly responsive  1b. Level of consciousness questions:  0 - answers both questions correctly  1c. Level of consciousness questions:  0 - performs both tasks correctly  2. Best Gaze:  0 - normal  3. Visual:  0 - no visual loss  4. Facial Palsy:  0 - normal symmetric movement  5a. Motor left arm: 0 -no drift, limb holds 90 (or 45) degrees for full 10 seconds  5b. Motor right arm:  0 - no drift, limb holds 90 (or 45) degrees for full 10 seconds  6a. Motor left le - some effort against gravity; leg falls to bed by 5 seconds but has some effort against gravity  6b. Motor right le - no drift; leg holds 30 degree position for full 5 seconds  7. Limb Ataxia:  0 - absent  8. Sensory:  1 - mild to moderate sensory loss; patient feels pinprick is less sharp or is dull on the affected side; there is a loss of superficial pain with pinprick but patient is aware of being touched   9. Best Language:  0 - no aphasia, normal  10. Dysarthria:  0 - normal  11.   Extinction and Inattention:  0 - no abnormality    TOTAL:  3    Allergies  has No Known Allergies. Medications  Prior to Admission medications    Medication Sig Start Date End Date Taking? Authorizing Provider   aspirin 81 MG EC tablet Take 1 tablet by mouth daily 8/28/20  Yes Jacqueline Thomas MD   hydroCHLOROthiazide (HYDRODIURIL) 25 MG tablet Take 1 tablet by mouth daily 8/28/20   Jacqueline Thomas MD   ramipril (ALTACE) 5 MG capsule Take 1 capsule by mouth daily 8/28/20   Jacqueline Thomas MD   rosuvastatin (CRESTOR) 10 MG tablet Take 1 tablet by mouth daily 8/28/20   Jacqueline Thomas MD   tamsulosin (FLOMAX) 0.4 MG capsule Take 1 capsule by mouth 2 times daily 6/19/20 8/28/20  Reilly Salvador MD    Scheduled Meds:  Luciana Langton aspirin  325 mg Oral Daily    clopidogrel  300 mg Oral Once     Continuous Infusions:  PRN Meds:.diphenhydrAMINE  Past Medical History   has a past medical history of Hyperlipidemia, Hypertension, and Wellness examination. OBJECTIVE  BP (!) 158/94   Pulse 69   Temp 97.9 °F (36.6 °C) (Oral)   Resp 16   Ht 5' 6\" (1.676 m)   Wt 180 lb (81.6 kg)   SpO2 99%   BMI 29.05 kg/m²     Imaging:  Images were personally reviewed including:  CT brain without contrast:   Impression    No acute intracranial abnormality.         Mild ventriculomegaly, disproportionate to the degree of volume loss,    possibly with mild communicating hydrocephalus. CTA/CTP imaging:   Impression    No acute abnormality or flow-limiting stenosis of the major arteries of the    head and neck.         8 mm ground-glass lung nodule at the left lung apex.  Solid lung nodules    measuring up to 6 mm.  Follow-up recommendation is listed below. MRI brain WO   Impression    1. No evidence of acute infarct. 2. Cerebral parenchymal volume loss with prominence of the ventricular    system, likely related to involutional change.  Normal pressure hydrocephalus    cannot entirely be excluded. Assessment    TIA with waxing and waning symptoms.   Would recommend admitting patient to observation unit for further stroke work-up and monitoring. Recommendations:  Would recommend stat MRI brain without contrast.  MRI tech was called and will come in this morning and do MRI. -Also recommend loading with aspirin 325 mg and Plavix 300 mg  -Would continue aspirin 81 mg and Plavix any 5 mg daily for 21 days  -Systolic blood pressure less than 180  -Neurology is consulted to see patient    I reviewed the residents note and agree with the documented findings and plan of care. Any areas of disagreement are noted on the chart. I agree with the chief complaint, past medical history, past surgical history, allergies, medications, ROS, social and family history as documented unless otherwise noted above.      Discussed with Dr Pineda Izaguirre MD  PGY-2 Neurology Resident  Electronically signed 9/7/2020 at 9:19 AM

## 2020-09-07 NOTE — CONSULTS
symptoms return. Past Medical History:     Past Medical History:   Diagnosis Date    Hyperlipidemia     Hypertension     Wellness examination     patient states no PCP        Past Surgical History:     Past Surgical History:   Procedure Laterality Date    CYSTOSCOPY N/A 7/30/2020    CYSTOSCOPY performed by Catina Baldwin MD at  [place] [address]  08/14/2020     CYSTO, TUR PROSTATE GREENLIGHT XPS     TURP N/A 8/14/2020    CYSTO, TUR PROSTATE GREENLIGHT XPS performed by Catina Baldwin MD at [address] [facility]      varicose vein of right leg        Medications Prior to Admission:     Prior to Admission medications    Medication Sig Start Date End Date Taking? Authorizing Provider   aspirin 81 MG EC tablet Take 1 tablet by mouth daily 8/28/20  Yes Celine Castro MD   hydroCHLOROthiazide (HYDRODIURIL) 25 MG tablet Take 1 tablet by mouth daily 8/28/20   Celine Castro MD   ramipril (ALTACE) 5 MG capsule Take 1 capsule by mouth daily 8/28/20   Celine Castro MD   rosuvastatin (CRESTOR) 10 MG tablet Take 1 tablet by mouth daily 8/28/20   Celine Castro MD   tamsulosin (FLOMAX) 0.4 MG capsule Take 1 capsule by mouth 2 times daily 6/19/20 8/28/20  Catina Baldwin MD        Allergies:     Patient has no known allergies. Social History:     Tobacco:    reports that he has never smoked. He has never used smokeless tobacco.  Alcohol:      reports no history of alcohol use. Drug Use:  reports no history of drug use. Family History:     History reviewed. No pertinent family history.     Review of Systems:     ROS:  Constitutional  Negative for fever and chills    HEENT  Negative for ear discharge, ear pain, nosebleed    Eyes  Negative for photophobia, pain and discharge    Respiratory  Negative for hemoptysis and sputum    Cardiovascular  Negative for orthopnea, claudication and PND    Gastrointestinal  Negative for abdominal pain, diarrhea, blood in stool    Musculoskeletal  Negative for joint pain, negative for 43.5 41.0 - 51.0 mm Hg    pO2, [place] 42.4 30.0 - 50.0 mm Hg    HCO3, Venous 28.9 22.0 - 29.0 mmol/L    Total CO2, Venous 30 23.0 - 30.0 mmol/L    Negative Base Excess, [place] NOT REPORTED 0.0 - 2.0    Positive Base Excess, [place] 4 (H) 0.0 - 3.0    O2 Sat, [place] 79 60.0 - 85.0 %    O2 Device/Flow/% NOT REPORTED     Bill Test NOT REPORTED     Sample Site NOT REPORTED     Mode NOT REPORTED     FIO2 NOT REPORTED     Pt Temp NOT REPORTED     POC pH Temp NOT REPORTED     POC pCO2 Temp NOT REPORTED mm Hg    POC pO2 Temp NOT REPORTED mm Hg   Hemoglobin and hematocrit, blood    Collection Time: 09/07/20  8:05 AM   Result Value Ref Range    POC Hemoglobin 15.8 13.5 - 17.5 g/dL    POC Hematocrit 47 41 - 53 %   Creatinine W/GFR Point of Care    Collection Time: 09/07/20  8:05 AM   Result Value Ref Range    POC Creatinine 0.79 0.51 - 1.19 mg/dL    GFR Comment >60 >60 mL/min    GFR Non-African American >60 >60 mL/min    GFR Comment         SODIUM (POC)    Collection Time: 09/07/20  8:05 AM   Result Value Ref Range    POC Sodium 139 138 - 146 mmol/L   POTASSIUM (POC)    Collection Time: 09/07/20  8:05 AM   Result Value Ref Range    POC Potassium 3.6 3.5 - 4.5 mmol/L   CHLORIDE (POC)    Collection Time: 09/07/20  8:05 AM   Result Value Ref Range    POC Chloride 100 98 - 107 mmol/L   CALCIUM, IONIC (POC)    Collection Time: 09/07/20  8:05 AM   Result Value Ref Range    POC Ionized Calcium 1.07 (L) 1.15 - 1.33 mmol/L   Lactic Acid, POC    Collection Time: 09/07/20  8:05 AM   Result Value Ref Range    POC Lactic Acid 1.47 (H) 0.56 - 1.39 mmol/L   POCT Glucose    Collection Time: 09/07/20  8:05 AM   Result Value Ref Range    POC Glucose 112 (H) 74 - 100 mg/dL   Anion Gap (Calc) POC    Collection Time: 09/07/20  8:05 AM   Result Value Ref Range    Anion Gap 10 7 - 16 mmol/L   POCT Glucose    Collection Time: 09/07/20  8:10 AM   Result Value Ref Range    Glucose 112 mg/dL    QC OK? ok    STROKE PANEL    Collection Time: 09/07/20  8:35 AM Result Value Ref Range    Glucose 106 (H) 70 - 99 mg/dL    BUN 14 8 - 23 mg/dL    CREATININE 0.72 0.70 - 1.20 mg/dL    Bun/Cre Ratio NOT REPORTED 9 - 20    Calcium 8.8 8.6 - 10.4 mg/dL    Sodium 135 135 - 144 mmol/L    Potassium 3.9 3.7 - 5.3 mmol/L    Chloride 99 98 - 107 mmol/L    CO2 26 20 - 31 mmol/L    Anion Gap 10 9 - 17 mmol/L    GFR Non-African American >60 >60 mL/min    GFR African American >60 >60 mL/min    GFR Comment          GFR Staging NOT REPORTED     WBC 5.1 3.5 - 11.3 k/uL    RBC 5.28 4.21 - 5.77 m/uL    Hemoglobin 14.4 13.0 - 17.0 g/dL    Hematocrit 44.1 40.7 - 50.3 %    MCV 83.5 82.6 - 102.9 fL    MCH 27.3 25.2 - 33.5 pg    MCHC 32.7 28.4 - 34.8 g/dL    RDW 12.7 11.8 - 14.4 %    Platelets 582 710 - 534 k/uL    MPV 9.3 8.1 - 13.5 fL    NRBC Automated 0.0 0.0 per 100 WBC    Total CK 80 39 - 308 U/L    CK-MB 2.7 <10.5 ng/mL    % CKMB 3.4 0.0 - 3.5 %    CKMB Interpretation NORMAL ISOENZYME PATTERN     Differential Type NOT REPORTED     Seg Neutrophils 51 36 - 65 %    Lymphocytes 33 24 - 43 %    Monocytes 10 3 - 12 %    Eosinophils % 6 (H) 1 - 4 %    Basophils 0 0 - 2 %    Immature Granulocytes 0 0 %    Segs Absolute 2.60 1.50 - 8.10 k/uL    Absolute Lymph # 1.67 1.10 - 3.70 k/uL    Absolute Mono # 0.52 0.10 - 1.20 k/uL    Absolute Eos # 0.28 0.00 - 0.44 k/uL    Basophils Absolute <0.03 0.00 - 0.20 k/uL    Absolute Immature Granulocyte <0.03 0.00 - 0.30 k/uL    WBC Morphology NOT REPORTED     RBC Morphology NOT REPORTED     Platelet Estimate NOT REPORTED     Myoglobin 28 28 - 72 ng/mL    Protime 10.0 9.0 - 12.0 sec    INR 1.0     PTT 28.4 20.5 - 30.5 sec    Troponin, High Sensitivity <6 0 - 22 ng/L    Troponin T NOT REPORTED <0.03 ng/mL    Troponin Interp NOT REPORTED    COVID-19    Collection Time: 09/07/20 11:01 AM    Specimen: Other   Result Value Ref Range    SARS-CoV-2          SARS-CoV-2, Rapid Not Detected Not Detected    Source . NASOPHARYNGEAL SWAB     SARS-CoV-2, PCR         URINALYSIS WITH MICROSCOPIC    Collection Time: 09/07/20 11:30 AM   Result Value Ref Range    Color, UA YELLOW YELLOW    Turbidity UA CLEAR CLEAR    Glucose, Ur NEGATIVE NEGATIVE    Bilirubin Urine NEGATIVE NEGATIVE    Ketones, Urine NEGATIVE NEGATIVE    Specific Gravity, UA 1.014 1.005 - 1.030    Urine Hgb LARGE (A) NEGATIVE    pH, UA 8.5 (H) 5.0 - 8.0    Protein, UA NEGATIVE NEGATIVE    Urobilinogen, Urine Normal Normal    Nitrite, Urine NEGATIVE NEGATIVE    Leukocyte Esterase, Urine SMALL (A) NEGATIVE    -          WBC, UA 2 TO 5 0 - 5 /HPF    RBC, UA 50  0 - 2 /HPF    Casts UA NOT REPORTED 0 - 2 /LPF    Crystals, UA NOT REPORTED None /HPF    Epithelial Cells UA 0 TO 2 0 - 5 /HPF    Renal Epithelial, UA NOT REPORTED 0 /HPF    Bacteria, UA NOT REPORTED None    Mucus, UA NOT REPORTED None    Trichomonas, UA NOT REPORTED None    Amorphous, UA NOT REPORTED None    Other Observations UA NOT REPORTED NOT REQ. Yeast, UA NOT REPORTED None         Assessment :      Primary Problem  TIA (transient ischemic attack)    Active Hospital Problems    Diagnosis Date Noted    TIA (transient ischemic attack) [G45.9] 09/07/2020       Plan:     Patient status Admit as observation in the  Observation unit     Patient presents with waxing and waning symptoms initial ED exam showing left upper greater than lower extremity weakness on my exam patient's left lower extremity was weaker than upper. Patient also has associated left face arm and leg sensory loss. NIH of 3. Last known well midnight 9/7/2020. Patient recently stopped taking aspirin and this is possibly the cause of his symptoms. Patient declined admission for observation hence further recommendations given to ED to optimize patient's discharge and outpatient follow-up. -Outpatient neurology follow-up.     Consultations:   PHARMACY TO CHANGE BASE FLUIDS      Follow-up further recommendations after discussing the case with attending  The plan was discussed with the patient, patient's family and the medical staff. Patient is admitted as inpatient status because of co-morbidities listed above, severity of signs and symptoms as outlined, requirement for current medical therapies and most importantly because of direct risk to patient if care not provided in a hospital setting.     Zahida Lamar MD  PGY-2 Neurology Resident  9/7/2020  2:14 PM    Copy sent to Dr. Meli Santana MD

## 2020-09-07 NOTE — ED PROVIDER NOTES
1.07 (*)     All other components within normal limits   URINALYSIS WITH MICROSCOPIC - Abnormal; Notable for the following components:    Urine Hgb LARGE (*)     pH, UA 8.5 (*)     Leukocyte Esterase, Urine SMALL (*)     All other components within normal limits   VENOUS BLOOD GAS, POINT OF CARE - Abnormal; Notable for the following components:    pH, Calixto 7.431 (*)     Positive Base Excess, Calixto 4 (*)     All other components within normal limits   LACTIC ACID,POINT OF CARE - Abnormal; Notable for the following components:    POC Lactic Acid 1.47 (*)     All other components within normal limits   POCT GLUCOSE - Abnormal; Notable for the following components:    POC Glucose 112 (*)     All other components within normal limits   POCT GLUCOSE - Normal   HGB/HCT   SODIUM (POC)   POTASSIUM (POC)   CHLORIDE (POC)   COVID-19   CREATININE W/GFR POINT OF CARE   ANION GAP (CALC) POC       Ct Head Wo Contrast    Result Date: 9/7/2020  EXAMINATION: CT OF THE HEAD WITHOUT CONTRAST  9/7/2020 8:13 am TECHNIQUE: CT of the head was performed without the administration of intravenous contrast. Dose modulation, iterative reconstruction, and/or weight based adjustment of the mA/kV was utilized to reduce the radiation dose to as low as reasonably achievable. COMPARISON: None. HISTORY: ORDERING SYSTEM PROVIDED HISTORY: weaknesss TECHNOLOGIST PROVIDED HISTORY: weaknesss Reason for Exam: stroke FINDINGS: BRAIN/VENTRICLES: There is mild ventriculomegaly, disproportionate to the degree of volume loss, possibly with mild communicating hydrocephalus. There is no acute intracranial hemorrhage, mass effect or midline shift. No abnormal extra-axial fluid collection. The gray-white differentiation is maintained without evidence of an acute infarct. ORBITS: The visualized portion of the orbits demonstrate no acute abnormality. SINUSES: There is partial opacification of the left maxillary sinus.   There is scattered minimal mucosal thickening in the remainder of the paranasal sinuses. The bilateral mastoid air cells are clear. SOFT TISSUES/SKULL:  No acute abnormality of the visualized skull or soft tissues. No acute intracranial abnormality. Mild ventriculomegaly, disproportionate to the degree of volume loss, possibly with mild communicating hydrocephalus. Results were sent to radiology results communication. Ct Chest Wo Contrast    Result Date: 9/7/2020  EXAMINATION: CT OF THE CHEST WITHOUT CONTRAST 9/7/2020 1:37 pm TECHNIQUE: CT of the chest was performed without the administration of intravenous contrast. Multiplanar reformatted images are provided for review. Dose modulation, iterative reconstruction, and/or weight based adjustment of the mA/kV was utilized to reduce the radiation dose to as low as reasonably achievable. COMPARISON: Chest radiograph done today HISTORY: ORDERING SYSTEM PROVIDED HISTORY: CT neck showed ground glass opacity that needs follow up TECHNOLOGIST PROVIDED HISTORY: CT neck showed ground glass opacity that needs follow up Reason for Exam: CT neck showed ground glass opacity that needs follow up FINDINGS: Mediastinum: No mediastinal or hilar masses. No pericardial effusion. Lungs/pleura: Multiple solid nodules and ground-glass nodule noted. The solid nodules measures 6-7 mm in diameter. Ground-glass nodule measures approximately 8 mm. Upper Abdomen: Bilateral renal cysts. Otherwise, unremarkable. Multiple solid nodules and a ground-glass nodule. Soft Tissues/Bones: No acute fracture. No lytic or blastic lesions. 1. No acute abnormalities seen in the chest 2. Multiple pulmonary nodules. The largest solid nodule measures 6-7 mm. The largest ground-glass nodule measures 8 mm.   Follow-up recommendations below RECOMMENDATIONS: Fleischner Society guidelines for follow-up and management of incidentally detected pulmonary nodules: Multiple Solid Nodules: Nodule size equals 6-8 mm In a low-risk patient, CT at 3-6 atelectasis at the bilateral posterior lungs. There is an 8 mm ground-glass lung nodule at the left lung apex (series 3 image 24). There is a 6 mm lung nodule at the right lung apex (series 3, image 22). There is a 3 mm lung nodule at the left lung apex (series 3, image 28). No cervical or superior mediastinal lymphadenopathy. The larynx and pharynx are unremarkable. No acute abnormality of the salivary and thyroid glands. BONES: No acute osseous abnormality. CTA HEAD: ANTERIOR CIRCULATION: No significant stenosis of the intracranial internal carotid, anterior cerebral, or middle cerebral arteries. No aneurysm. POSTERIOR CIRCULATION: No significant stenosis of the vertebral, basilar, or posterior cerebral arteries. No aneurysm. OTHER: No dural venous sinus thrombosis on this non-dedicated study. BRAIN: No mass effect or midline shift. No extra-axial fluid collection. The gray-white differentiation is maintained. No acute abnormality or flow-limiting stenosis of the major arteries of the head and neck. 8 mm ground-glass lung nodule at the left lung apex. Solid lung nodules measuring up to 6 mm. Follow-up recommendation is listed below. RECOMMENDATIONS: 2017 Fleischner Society Recommendations for Subsolid Lung Nodules Follow-Up base on size (average of long- and short-axis diameters). Use most suspicious nodule for followup. Single > or = 6 mm Ground glass: CT at 6-12 months to confirm persistence, then CT every 2 years until 5 years Fleischner Society guidelines for follow-up and management of incidentally detected pulmonary nodules: Multiple Solid Nodules: Nodule size less than 6 mm In a low-risk patient, no routine follow-up. In a high-risk patient, optional CT at 12 months. - Low risk patients include individuals with minimal or absent history of smoking and other known risk factors. - High risk patients include individuals with a history or smoking or known risk factors.  Radiology 2017 http://pubs. rsna.org/doi/full/10.1148/radiol. 0916833838     Mri Brain Wo Contrast    Result Date: 9/7/2020  EXAMINATION: MRI OF THE BRAIN WITHOUT CONTRAST  9/7/2020 12:15 pm TECHNIQUE: Multiplanar multisequence MRI of the brain was performed without the administration of intravenous contrast. COMPARISON: 09/07/2020 HISTORY: ORDERING SYSTEM PROVIDED HISTORY: left side weakness and numbness with dizzy spinning sensation TECHNOLOGIST PROVIDED HISTORY: left side weakness and numbness with dizzy spinning sensation Reason for Exam: left side weakness and numbness with dizzy spinning sensation. FINDINGS: INTRACRANIAL STRUCTURES/VENTRICLES: There are no areas of restricted diffusion to suggest an acute infarct. The cerebral and cerebellar parenchyma demonstrate volume loss. There are no abnormal extra-axial fluid collections. The ventricles are enlarged, likely related to involutional changes. Normal pressure hydrocephalus cannot entirely be excluded. There are no areas of blooming artifact noted on the gradient echo sequences to suggest sequela of acute or chronic hemorrhage. ORBITS: The visualized portion of the orbits demonstrate no acute abnormality. SINUSES: There is scattered paranasal sinus disease, with greatest involvement in the left maxillary sinus. The mastoid air cells are clear. BONES/SOFT TISSUES: The bone marrow signal intensity and craniocervical junction are unremarkable. Pituitary gland is normal in appearance. 1. No evidence of acute infarct. 2. Cerebral parenchymal volume loss with prominence of the ventricular system, likely related to involutional change. Normal pressure hydrocephalus cannot entirely be excluded. RECENT VITALS:     Temp: 97.9 °F (36.6 °C),  Pulse: 56, Resp: 13, BP: 111/69, SpO2: 99 %    This patient is a 63 y.o. Male with headache. Possible complex migraine. Vertiginous symptoms nausea and fluctuating extremity weakness based on multiple physician exams.   Stroke alert was called. 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 Neuro involved. Symptoms have essentially resolved at this time. Follow-up outpatient    OUTSTANDING TASKS / RECOMMENDATIONS:    1.  Discharge      Gail Johnston MD, [facility]  Attending Emergency Physician  [address]  ED       Gilbert Talley MD  09/07/20 [id number]

## 2020-09-07 NOTE — ED PROVIDER NOTES
[facility] ED  eMERGENCY dEPARTMENT eNCOUnter   Attending Attestation     Pt Name: Sara Parikh  MRN: [id number]  [place] 1960  Date of evaluation: 9/7/20       Ivanna Khoury is a 63 y.o. male who presents with Migraine; Nausea; Shortness of Breath; Dizziness; and Blurred Vision      History: Patient presents with migraine, nausea, shortness of breath, vertigo-like symptoms, blurred vision that started when he woke up. Patient said he woke up with a headache and when he opened his eyes the room was spinning. Patient says that his symptoms are worsened by laying down. Patient recently had a prostate surgery several months ago but no other issues. This history is primarily from resident given that he will not discuss his current symptoms with me. Exam: Heart rate and rhythm are regular, lungs are clear to auscultation bilaterally. Abdomen is soft, nontender. Patient is awake, alert, answers to questions appropriately when he wants to. Resident notes left upper extremity weakness in the proximal muscles. Plan for stroke alert, with patient's myriad of symptoms and left upper extremity weakness starting at approximately 4 AM we will get stroke team involved in order CT and CTA head and neck. EKG shows normal sinus rhythm with a rate of 75 bpm.  There is T wave inversion in lead III otherwise T waves are upright. Normal axis. No ST elevations or depressions. No blocks or arrhythmias. Nonspecific EKG. I performed a history and physical examination of the patient and discussed management with the resident. I reviewed the residents note and agree with the documented findings and plan of care. Any areas of disagreement are noted on the chart. I was personally present for the key portions of any procedures. I have documented in the chart those procedures where I was not present during the key portions.  I have personally reviewed all images and agree with the resident's interpretation. I have reviewed the emergency nurses triage note. I agree with the chief complaint, past medical history, past surgical history, allergies, medications, social and family history as documented unless otherwise noted below. Documentation of the HPI, Physical Exam and Medical Decision Making performed by medical students or scribes is based on my personal performance of the HPI, PE and MDM. For Phys Assistant/ Nurse Practitioner cases/documentation I have had a face to face evaluation of this patient and have completed at least one if not all key elements of the E/M (history, physical exam, and MDM). Additional findings are as noted. For APC cases I have personally evaluated and examined the patient in conjunction with the APC and agree with the treatment plan and disposition of the patient as recorded by the APC.     Shad Hernández MD  Attending Emergency  Physician        Carlos Lucero MD  09/07/20 0041

## 2020-09-07 NOTE — ED NOTES
Pt resting on cot, NAD noted, family at bedside. Will continue to monitor.       Anna Tavera RN  09/07/20 [id number]

## 2020-09-07 NOTE — ED NOTES
Pt to CT scanner with Sebastian Webster RN and [facility], [address] [place] [address]  09/07/20 [id number]

## 2020-09-07 NOTE — ED PROVIDER NOTES
[facility] ED  Emergency Department Encounter  EmergencyMedicine Resident     Pt Name:Ivanna Khoury  MRN: [id number]  [place] 1960  Date of evaluation: 9/7/20  PCP:  Sera Simpson MD    CHIEF COMPLAINT       Chief Complaint   Patient presents with    Migraine    Nausea    Shortness of Breath    Dizziness    Blurred Vision       HISTORY OF PRESENT ILLNESS  (Location/Symptom, Timing/Onset, Context/Setting, Quality, Duration, Modifying Factors, Severity.)      Ivanna Khoury is a 63 y.o. male with history of hypertension and BPH who presents with sudden onset headache, dizziness, shortness of breath, nausea, and heaviness in both arms. At 3:30 AM, he woke up due to sudden headache 10/10 in the occipital region. On opening his eyes he felt the room was spinning and became nauseous. As he got up to go to the bathroom to try to throw up, he felt he was going to be dizzy or fall down so he called out to his son. He tried to lie back down to feel better but felt his headache worsened when he lay down. He continued to have blurred vision and vertigo. He also complains of chest discomfort , left sided constant chest pain 6/10 (unable to qualify) and shortness of breath that started at the same time. No palpitations, diaphoresis, or pain down the arm reported, however he endorses heaviness in both arms. He was feeling completely well the day. Last known normal state was midnight when he went to sleep. [facility] No prior history of trauma, migraines, or other headaches. He has history of hypertension and BPH, currently on treatment for both. For past month he has been taking Ramipril and rosuvastatin for hypertension and flomax for BPH. He was holding his aspirin ; however took one this morning to see if it would help. PAST MEDICAL / SURGICAL / SOCIAL / FAMILY HISTORY      has a past medical history of Hyperlipidemia, Hypertension, and Wellness examination.      has a past surgical Take 1 tablet by mouth daily 8/28/20  Yes Tawnya Ram MD   hydroCHLOROthiazide (HYDRODIURIL) 25 MG tablet Take 1 tablet by mouth daily 8/28/20   Tawnya Ram MD   ramipril (ALTACE) 5 MG capsule Take 1 capsule by mouth daily 8/28/20   Tawnya Ram MD   rosuvastatin (CRESTOR) 10 MG tablet Take 1 tablet by mouth daily 8/28/20   Tawnya Ram MD   tamsulosin (FLOMAX) 0.4 MG capsule Take 1 capsule by mouth 2 times daily 6/19/20 8/28/20  Hilda Park MD       REVIEW OF SYSTEMS    (2-9 systems for level 4, 10 or more for level 5)      Review of Systems   Constitutional: Negative for activity change, appetite change, chills, diaphoresis, fatigue and fever. HENT: Negative for congestion, ear discharge, ear pain, rhinorrhea, sneezing and sore throat. Eyes: Positive for visual disturbance. Respiratory: Positive for shortness of breath. Negative for apnea, cough, choking and wheezing. Cardiovascular: Positive for chest pain. Gastrointestinal: Positive for nausea. Negative for abdominal distention, abdominal pain, constipation, diarrhea and vomiting. Genitourinary:        History of BPH   Musculoskeletal: Negative for back pain, myalgias, neck pain and neck stiffness. Skin: Negative for color change, pallor, rash and wound. Neurological: Positive for dizziness, weakness and headaches. Negative for tremors and seizures. Vertigo  Unable to keep eyes open because feels dizzy   Occipital headache   Psychiatric/Behavioral: Negative for agitation and confusion. PHYSICAL EXAM   (up to 7 for level 4, 8 or more for level 5)      INITIAL VITALS:   /69   Pulse 56   Temp 97.9 °F (36.6 °C) (Oral)   Resp 13   Ht 5' 6\" (1.676 m)   Wt 180 lb (81.6 kg)   SpO2 99%   BMI 29.05 kg/m²     Physical Exam  Vitals signs (hypertensive) reviewed. Constitutional:       General: He is not in acute distress. Appearance: He is ill-appearing. He is not toxic-appearing or diaphoretic.    HENT:      Head: Normocephalic and atraumatic. Mouth/Throat:      Mouth: Mucous membranes are moist.      Pharynx: Oropharynx is clear. Eyes:      Extraocular Movements: Extraocular movements intact. Pupils: Pupils are equal, round, and reactive to light. Comments: Inconsistent peripheral vision exam    Neck:      Musculoskeletal: Normal range of motion and neck supple. Cardiovascular:      Rate and Rhythm: Normal rate and regular rhythm. Pulses: Normal pulses. Pulmonary:      Effort: No accessory muscle usage or respiratory distress. Breath sounds: Normal breath sounds. No stridor. No decreased breath sounds, wheezing, rhonchi or rales. Comments: Increased effort   Chest:      Comments: Chest tenderness bilaterally   Abdominal:      General: Bowel sounds are normal.      Palpations: Abdomen is soft. There is no hepatomegaly, splenomegaly or mass. Musculoskeletal:      Right lower leg: He exhibits no tenderness. Left lower leg: He exhibits no tenderness. Skin:     General: Skin is warm and dry. Capillary Refill: Capillary refill takes less than 2 seconds. Neurological:      Mental Status: He is alert. Motor: Weakness present. Comments: H test normal  Unable to/ uncooperative with examination of peripheral vision    Sensation difference between right and left side of face    5/5 strength of right arm and right leg   Able to lift left leg and hold against pressure for at least 3 seconds ; left hand weaker than right     Sensation decreased on left arm and leg compared to right     Drift not noted in upper extremities   Drift not noted in lower extremities ;    Heel to shin appropriate for right leg but not left        DIFFERENTIAL  DIAGNOSIS     PLAN (LABS / IMAGING / EKG):  Orders Placed This Encounter   Procedures    XR CHEST PORTABLE    CT HEAD WO CONTRAST    CTA HEAD NECK W CONTRAST    MRI BRAIN WO CONTRAST    CT CHEST WO CONTRAST    STROKE PANEL    Hemoglobin and hematocrit, blood    SODIUM (POC)    POTASSIUM (POC)    CHLORIDE (POC)    CALCIUM, IONIC (POC)    URINALYSIS WITH MICROSCOPIC    COVID-19    Diet NPO Effective Now    Swallow screen by nursing before diet and oral medications started    NIH Stroke Scale (NIHSS)    Pharmacy to change base solutions.     Initiate Oxygen Therapy Protocol    POCT Glucose    Venous Blood Gas, POC    Creatinine W/GFR Point of Care    Lactic Acid, POC    POCT Glucose    Anion Gap (Calc) POC       MEDICATIONS ORDERED:  Orders Placed This Encounter   Medications    DISCONTD: ondansetron (ZOFRAN) injection 4 mg    prochlorperazine (COMPAZINE) injection 10 mg    diphenhydrAMINE (BENADRYL) injection 25 mg    acetaminophen (TYLENOL) tablet 650 mg    iohexol (OMNIPAQUE 350) solution 90 mL    aspirin EC tablet 325 mg    clopidogrel (PLAVIX) tablet 300 mg    tamsulosin (FLOMAX) capsule 0.4 mg    aspirin EC 81 MG EC tablet     Sig: Take 1 tablet by mouth daily for 21 days     Dispense:  21 tablet     Refill:  0    clopidogrel (PLAVIX) 75 MG tablet     Sig: Take 1 tablet by mouth daily for 21 days     Dispense:  21 tablet     Refill:  0       DDX:  Stroke   Migraine   Myocardial infarction    DIAGNOSTIC RESULTS / EMERGENCY DEPARTMENT COURSE / MDM     LABS:  Results for orders placed or performed during the hospital encounter of 09/07/20   STROKE PANEL   Result Value Ref Range    Glucose 106 (H) 70 - 99 mg/dL    BUN 14 8 - 23 mg/dL    CREATININE 0.72 0.70 - 1.20 mg/dL    Bun/Cre Ratio NOT REPORTED 9 - 20    Calcium 8.8 8.6 - 10.4 mg/dL    Sodium 135 135 - 144 mmol/L    Potassium 3.9 3.7 - 5.3 mmol/L    Chloride 99 98 - 107 mmol/L    CO2 26 20 - 31 mmol/L    Anion Gap 10 9 - 17 mmol/L    GFR Non-African American >60 >60 mL/min    GFR African American >60 >60 mL/min    GFR Comment          GFR Staging NOT REPORTED     WBC 5.1 3.5 - 11.3 k/uL    RBC 5.28 4.21 - 5.77 m/uL    Hemoglobin 14.4 13.0 - 17.0 g/dL    Hematocrit 44.1 Urobilinogen, Urine Normal Normal    Nitrite, Urine NEGATIVE NEGATIVE    Leukocyte Esterase, Urine SMALL (A) NEGATIVE    -          WBC, UA 2 TO 5 0 - 5 /HPF    RBC, UA 50  0 - 2 /HPF    Casts UA NOT REPORTED 0 - 2 /LPF    Crystals, UA NOT REPORTED None /HPF    Epithelial Cells UA 0 TO 2 0 - 5 /HPF    Renal Epithelial, UA NOT REPORTED 0 /HPF    Bacteria, UA NOT REPORTED None    Mucus, UA NOT REPORTED None    Trichomonas, UA NOT REPORTED None    Amorphous, UA NOT REPORTED None    Other Observations UA NOT REPORTED NOT REQ. Yeast, UA NOT REPORTED None   COVID-19    Specimen: Other   Result Value Ref Range    SARS-CoV-2          SARS-CoV-2, Rapid Not Detected Not Detected    Source . NASOPHARYNGEAL SWAB     SARS-CoV-2, PCR         POCT Glucose   Result Value Ref Range    Glucose 112 mg/dL    QC OK?  ok    Venous Blood Gas, POC   Result Value Ref Range    pH, [place] 7.431 (H) 7.320 - 7.430    pCO2, [place] 43.5 41.0 - 51.0 mm Hg    pO2, [place] 42.4 30.0 - 50.0 mm Hg    HCO3, Venous 28.9 22.0 - 29.0 mmol/L    Total CO2, Venous 30 23.0 - 30.0 mmol/L    Negative Base Excess, [place] NOT REPORTED 0.0 - 2.0    Positive Base Excess, [place] 4 (H) 0.0 - 3.0    O2 Sat, [place] 79 60.0 - 85.0 %    O2 Device/Flow/% NOT REPORTED     Bill Test NOT REPORTED     Sample Site NOT REPORTED     Mode NOT REPORTED     FIO2 NOT REPORTED     Pt Temp NOT REPORTED     POC pH Temp NOT REPORTED     POC pCO2 Temp NOT REPORTED mm Hg    POC pO2 Temp NOT REPORTED mm Hg   Creatinine W/GFR Point of Care   Result Value Ref Range    POC Creatinine 0.79 0.51 - 1.19 mg/dL    GFR Comment >60 >60 mL/min    GFR Non-African American >60 >60 mL/min    GFR Comment         Lactic Acid, POC   Result Value Ref Range    POC Lactic Acid 1.47 (H) 0.56 - 1.39 mmol/L   POCT Glucose   Result Value Ref Range    POC Glucose 112 (H) 74 - 100 mg/dL   Anion Gap (Calc) POC   Result Value Ref Range    Anion Gap 10 7 - 16 mmol/L       IMPRESSION:   Elevated lactic acid   Elevated pH on VBG  POC Calcium low   Cardiac labs wnl. Stroke labs wnl. RADIOLOGY:  Ct Head Wo Contrast    Result Date: 9/7/2020  EXAMINATION: CT OF THE HEAD WITHOUT CONTRAST  9/7/2020 8:13 am TECHNIQUE: CT of the head was performed without the administration of intravenous contrast. Dose modulation, iterative reconstruction, and/or weight based adjustment of the mA/kV was utilized to reduce the radiation dose to as low as reasonably achievable. COMPARISON: None. HISTORY: ORDERING SYSTEM PROVIDED HISTORY: weaknesss TECHNOLOGIST PROVIDED HISTORY: weaknesss Reason for Exam: stroke FINDINGS: BRAIN/VENTRICLES: There is mild ventriculomegaly, disproportionate to the degree of volume loss, possibly with mild communicating hydrocephalus. There is no acute intracranial hemorrhage, mass effect or midline shift. No abnormal extra-axial fluid collection. The gray-white differentiation is maintained without evidence of an acute infarct. ORBITS: The visualized portion of the orbits demonstrate no acute abnormality. SINUSES: There is partial opacification of the left maxillary sinus. There is scattered minimal mucosal thickening in the remainder of the paranasal sinuses. The bilateral mastoid air cells are clear. SOFT TISSUES/SKULL:  No acute abnormality of the visualized skull or soft tissues. No acute intracranial abnormality. Mild ventriculomegaly, disproportionate to the degree of volume loss, possibly with mild communicating hydrocephalus. Results were sent to radiology results communication. Xr Chest Portable    Result Date: 9/7/2020  EXAMINATION: ONE XRAY VIEW OF THE CHEST 9/7/2020 8:56 am COMPARISON: None. HISTORY: ORDERING SYSTEM PROVIDED HISTORY: stroke TECHNOLOGIST PROVIDED HISTORY: stroke FINDINGS: The cardiomediastinal silhouette is normal. No focal consolidation. The pulmonary vascularity is normal.  There is no pleural effusion or pneumothorax. Osseous structures grossly intact.      No significant abnormalities non-dedicated study. BRAIN: No mass effect or midline shift. No extra-axial fluid collection. The gray-white differentiation is maintained. No acute abnormality or flow-limiting stenosis of the major arteries of the head and neck. 8 mm ground-glass lung nodule at the left lung apex. Solid lung nodules measuring up to 6 mm. Follow-up recommendation is listed below. RECOMMENDATIONS: 2017 Fleischner Society Recommendations for Subsolid Lung Nodules Follow-Up base on size (average of long- and short-axis diameters). Use most suspicious nodule for followup. Single > or = 6 mm Ground glass: CT at 6-12 months to confirm persistence, then CT every 2 years until 5 years Fleischner Society guidelines for follow-up and management of incidentally detected pulmonary nodules: Multiple Solid Nodules: Nodule size less than 6 mm In a low-risk patient, no routine follow-up. In a high-risk patient, optional CT at 12 months. - Low risk patients include individuals with minimal or absent history of smoking and other known risk factors. - High risk patients include individuals with a history or smoking or known risk factors. Radiology 2017 http://pubs. rsna.org/doi/full/10.1148/radiol. 5543225079     Mri Brain Wo Contrast    Result Date: 9/7/2020  EXAMINATION: MRI OF THE BRAIN WITHOUT CONTRAST  9/7/2020 12:15 pm TECHNIQUE: Multiplanar multisequence MRI of the brain was performed without the administration of intravenous contrast. COMPARISON: 09/07/2020 HISTORY: ORDERING SYSTEM PROVIDED HISTORY: left side weakness and numbness with dizzy spinning sensation TECHNOLOGIST PROVIDED HISTORY: left side weakness and numbness with dizzy spinning sensation Reason for Exam: left side weakness and numbness with dizzy spinning sensation. FINDINGS: INTRACRANIAL STRUCTURES/VENTRICLES: There are no areas of restricted diffusion to suggest an acute infarct. The cerebral and cerebellar parenchyma demonstrate volume loss.   There are no abnormal extra-axial fluid collections. The ventricles are enlarged, likely related to involutional changes. Normal pressure hydrocephalus cannot entirely be excluded. There are no areas of blooming artifact noted on the gradient echo sequences to suggest sequela of acute or chronic hemorrhage. ORBITS: The visualized portion of the orbits demonstrate no acute abnormality. SINUSES: There is scattered paranasal sinus disease, with greatest involvement in the left maxillary sinus. The mastoid air cells are clear. BONES/SOFT TISSUES: The bone marrow signal intensity and craniocervical junction are unremarkable. Pituitary gland is normal in appearance. 1. No evidence of acute infarct. 2. Cerebral parenchymal volume loss with prominence of the ventricular system, likely related to involutional change. Normal pressure hydrocephalus cannot entirely be excluded. EKG  EKG: normal EKG, normal sinus rhythm. All EKG's are interpreted by the Emergency Department Physician who either signs or Co-signs this chart in the absence of a cardiologist.    EMERGENCY DEPARTMENT COURSE:  ED Course as of Sep 07 1649   Mon Sep 07, 2020   0807 Patient evaluated by Resident Yoon Wells)   Patient     [ZA]   5975 Stroke code called for concern for weakness and headache    [ZA]   0812 Migraine cocktail and Tylenol ordered  Stroke orders entered    [ZA]   0818 Discussed plan with son. Questions addressed. [ZA]   0900 Patient reassessed. Has decreased sensation on left arm and leg. Decreased motor strength (2/5) on left leg and left hand. MRI without contrast to be ordered. [ZA]   [id number] Patient laying down comfortably and trying to sleep. Continues to feel dizzy. Discussed plan for MRI with patient and his son. [ZA]   [id number] Patient unable to urinate   Requesting his daily dose of flomax.      [ZA]   1031 Patient noted to have ground glass opacity on CT neck ; will follow up with CT chest     [ZA]   1051 Rapid covid portions of this note were completed with a voice recognition program.  Efforts were made to edit the dictations but occasionally words are mis-transcribed.)       Daniel Ang MD  Resident  09/07/20 [id number]       Daniel Ang MD  Resident  09/07/20 3004

## 2020-09-07 NOTE — ED NOTES
Patient back from MRI with writer via cot. Patient requesting to stay off monitor and sit in chair. Patient is alert and oriented and ambulatory in room. NAD noted. Pt reports improvement in pain and dizziness.       Monika Britt RN  09/07/20 [id number]

## 2020-09-09 LAB
EKG ATRIAL RATE: 75 BPM
EKG P AXIS: 39 DEGREES
EKG P-R INTERVAL: 186 MS
EKG Q-T INTERVAL: 388 MS
EKG QRS DURATION: 98 MS
EKG QTC CALCULATION (BAZETT): 433 MS
EKG R AXIS: 33 DEGREES
EKG T AXIS: 19 DEGREES
EKG VENTRICULAR RATE: 75 BPM

## 2020-09-09 PROCEDURE — 93010 ELECTROCARDIOGRAM REPORT: CPT | Performed by: INTERNAL MEDICINE

## 2020-09-11 ENCOUNTER — VIRTUAL VISIT (OUTPATIENT)
Dept: INTERNAL MEDICINE | Age: 60
End: 2020-09-11
Payer: COMMERCIAL

## 2020-09-11 PROCEDURE — 99441 PR PHYS/QHP TELEPHONE EVALUATION 5-10 MIN: CPT | Performed by: INTERNAL MEDICINE

## 2020-09-11 RX ORDER — ZOSTER VACCINE RECOMBINANT, ADJUVANTED 50 MCG/0.5
0.5 KIT INTRAMUSCULAR SEE ADMIN INSTRUCTIONS
Qty: 0.5 ML | Refills: 0 | Status: ON HOLD | OUTPATIENT
Start: 2020-09-11 | End: 2020-09-15 | Stop reason: HOSPADM

## 2020-09-11 NOTE — PATIENT INSTRUCTIONS
Referral for Pulmonology sent to [facility]  they will call pt for appt, copy of referral with number and address given to pt. Pt should call referral number if not heard from within a couple of weeks. Patient to return to clinic 6 months (3/12/21). AVS reviewed and given to pt. It is very important for your care that you keep your appointment. If for some reason you are unable to keep your appointment it is equally important that you call our office at [phone] to cancel your appointment and reschedule. Failure to do so may result in your termination from our practice.   MB

## 2020-09-11 NOTE — PROGRESS NOTES
Luis Alberto Onofre is a 63 y.o. male evaluated via telephone on 9/11/2020. Consent:  He and/or health care decision maker is aware that that he may receive a bill for this telephone service, depending on his insurance coverage, and has provided verbal consent to proceed: Yes      Documentation:  Seen for migraine in ED on 9/7 with associated symptoms of chest pain, SOB, Nausea. Workup done there showed incidental findings of multiple lung nodules, some ground glass in appearance. Patient requesting further evaluation. Diagnosis Orders   1. Pulmonary nodules  Grisel Webster MD, Pulmonology, [place]         I affirm this is a Patient Initiated Episode with a Patient who has not had a related appointment within my department in the past 7 days or scheduled within the next 24 hours.     Patient identification was verified at the start of the visit: Yes    Total Time: minutes: 5-10 minutes    Note: not billable if this call serves to triage the patient into an appointment for the relevant concern      Samantha Gonzalez Declines

## 2020-09-12 ENCOUNTER — APPOINTMENT (OUTPATIENT)
Dept: GENERAL RADIOLOGY | Age: 60
DRG: 810 | End: 2020-09-12
Payer: COMMERCIAL

## 2020-09-12 ENCOUNTER — HOSPITAL ENCOUNTER (INPATIENT)
Age: 60
LOS: 3 days | Discharge: HOME | DRG: 810 | End: 2020-09-15
Attending: EMERGENCY MEDICINE | Admitting: INTERNAL MEDICINE
Payer: COMMERCIAL

## 2020-09-12 PROBLEM — N99.89 POSTPROCEDURAL URINARY RETENTION: Status: ACTIVE | Noted: 2020-09-12

## 2020-09-12 PROBLEM — U07.1 COVID-19: Status: ACTIVE | Noted: 2020-09-12

## 2020-09-12 PROBLEM — R55 VASOVAGAL SYNCOPE: Status: ACTIVE | Noted: 2020-09-12

## 2020-09-12 PROBLEM — R31.0 GROSS HEMATURIA: Status: ACTIVE | Noted: 2020-09-12

## 2020-09-12 PROBLEM — R31.9 HEMATURIA: Status: ACTIVE | Noted: 2020-09-12

## 2020-09-12 PROBLEM — R33.8 POSTPROCEDURAL URINARY RETENTION: Status: ACTIVE | Noted: 2020-09-12

## 2020-09-12 LAB
-: ABNORMAL
ABO/RH: NORMAL
ABSOLUTE EOS #: 0.24 K/UL (ref 0–0.44)
ABSOLUTE IMMATURE GRANULOCYTE: 0.1 K/UL (ref 0–0.3)
ABSOLUTE LYMPH #: 2.5 K/UL (ref 1.1–3.7)
ABSOLUTE MONO #: 0.59 K/UL (ref 0.1–1.2)
AMORPHOUS: ABNORMAL
ANION GAP SERPL CALCULATED.3IONS-SCNC: 10 MMOL/L (ref 9–17)
ANTIBODY SCREEN: NEGATIVE
ARM BAND NUMBER: NORMAL
BACTERIA: ABNORMAL
BASOPHILS # BLD: 0 % (ref 0–2)
BASOPHILS ABSOLUTE: 0.03 K/UL (ref 0–0.2)
BILIRUBIN URINE: NEGATIVE
BNP INTERPRETATION: NORMAL
BUN BLDV-MCNC: 14 MG/DL (ref 8–23)
BUN/CREAT BLD: ABNORMAL (ref 9–20)
CALCIUM SERPL-MCNC: 8 MG/DL (ref 8.6–10.4)
CASTS UA: ABNORMAL /LPF (ref 0–2)
CHLORIDE BLD-SCNC: 98 MMOL/L (ref 98–107)
CO2: 21 MMOL/L (ref 20–31)
COLOR: ABNORMAL
COMMENT UA: ABNORMAL
CREAT SERPL-MCNC: 0.63 MG/DL (ref 0.7–1.2)
CRYSTALS, UA: ABNORMAL /HPF
DIFFERENTIAL TYPE: ABNORMAL
EOSINOPHILS RELATIVE PERCENT: 3 % (ref 1–4)
EPITHELIAL CELLS UA: ABNORMAL /HPF (ref 0–5)
EXPIRATION DATE: NORMAL
GFR AFRICAN AMERICAN: >60 ML/MIN
GFR NON-AFRICAN AMERICAN: >60 ML/MIN
GFR SERPL CREATININE-BSD FRML MDRD: ABNORMAL ML/MIN/{1.73_M2}
GFR SERPL CREATININE-BSD FRML MDRD: ABNORMAL ML/MIN/{1.73_M2}
GLUCOSE BLD-MCNC: 168 MG/DL (ref 70–99)
GLUCOSE URINE: NEGATIVE
HCT VFR BLD CALC: 36.9 % (ref 40.7–50.3)
HCT VFR BLD CALC: 39.2 % (ref 40.7–50.3)
HEMOGLOBIN: 12 G/DL (ref 13–17)
HEMOGLOBIN: 12.4 G/DL (ref 13–17)
IMMATURE GRANULOCYTES: 1 %
KETONES, URINE: NEGATIVE
LEUKOCYTE ESTERASE, URINE: ABNORMAL
LYMPHOCYTES # BLD: 32 % (ref 24–43)
MAGNESIUM: 2 MG/DL (ref 1.6–2.6)
MCH RBC QN AUTO: 26.7 PG (ref 25.2–33.5)
MCH RBC QN AUTO: 26.8 PG (ref 25.2–33.5)
MCHC RBC AUTO-ENTMCNC: 31.6 G/DL (ref 28.4–34.8)
MCHC RBC AUTO-ENTMCNC: 32.5 G/DL (ref 28.4–34.8)
MCV RBC AUTO: 82.6 FL (ref 82.6–102.9)
MCV RBC AUTO: 84.3 FL (ref 82.6–102.9)
MONOCYTES # BLD: 8 % (ref 3–12)
MUCUS: ABNORMAL
NITRITE, URINE: NEGATIVE
NRBC AUTOMATED: 0 PER 100 WBC
NRBC AUTOMATED: 0 PER 100 WBC
OTHER OBSERVATIONS UA: ABNORMAL
PDW BLD-RTO: 12.6 % (ref 11.8–14.4)
PDW BLD-RTO: 12.6 % (ref 11.8–14.4)
PH UA: 7.5 (ref 5–8)
PLATELET # BLD: 285 K/UL (ref 138–453)
PLATELET # BLD: 299 K/UL (ref 138–453)
PLATELET ESTIMATE: ABNORMAL
PMV BLD AUTO: 9.2 FL (ref 8.1–13.5)
PMV BLD AUTO: 9.4 FL (ref 8.1–13.5)
POTASSIUM SERPL-SCNC: 3.3 MMOL/L (ref 3.7–5.3)
PRO-BNP: 32 PG/ML
PROTEIN UA: ABNORMAL
RBC # BLD: 4.47 M/UL (ref 4.21–5.77)
RBC # BLD: 4.65 M/UL (ref 4.21–5.77)
RBC # BLD: ABNORMAL 10*6/UL
RBC UA: ABNORMAL /HPF (ref 0–2)
RENAL EPITHELIAL, UA: ABNORMAL /HPF
SARS-COV-2, RAPID: DETECTED
SARS-COV-2: ABNORMAL
SARS-COV-2: ABNORMAL
SEG NEUTROPHILS: 56 % (ref 36–65)
SEGMENTED NEUTROPHILS ABSOLUTE COUNT: 4.36 K/UL (ref 1.5–8.1)
SODIUM BLD-SCNC: 129 MMOL/L (ref 135–144)
SOURCE: ABNORMAL
SPECIFIC GRAVITY UA: 1.02 (ref 1–1.03)
TRICHOMONAS: ABNORMAL
TROPONIN INTERP: NORMAL
TROPONIN INTERP: NORMAL
TROPONIN T: NORMAL NG/ML
TROPONIN T: NORMAL NG/ML
TROPONIN, HIGH SENSITIVITY: <6 NG/L (ref 0–22)
TROPONIN, HIGH SENSITIVITY: <6 NG/L (ref 0–22)
TSH SERPL DL<=0.05 MIU/L-ACNC: 0.89 MIU/L (ref 0.3–5)
TURBIDITY: ABNORMAL
URINE HGB: ABNORMAL
UROBILINOGEN, URINE: NORMAL
WBC # BLD: 7.8 K/UL (ref 3.5–11.3)
WBC # BLD: 9.7 K/UL (ref 3.5–11.3)
WBC # BLD: ABNORMAL 10*3/UL
WBC UA: ABNORMAL /HPF (ref 0–5)
YEAST: ABNORMAL

## 2020-09-12 PROCEDURE — 83880 ASSAY OF NATRIURETIC PEPTIDE: CPT

## 2020-09-12 PROCEDURE — 99222 1ST HOSP IP/OBS MODERATE 55: CPT | Performed by: FAMILY MEDICINE

## 2020-09-12 PROCEDURE — 6360000002 HC RX W HCPCS: Performed by: STUDENT IN AN ORGANIZED HEALTH CARE EDUCATION/TRAINING PROGRAM

## 2020-09-12 PROCEDURE — 80048 BASIC METABOLIC PNL TOTAL CA: CPT

## 2020-09-12 PROCEDURE — 84484 ASSAY OF TROPONIN QUANT: CPT

## 2020-09-12 PROCEDURE — 99223 1ST HOSP IP/OBS HIGH 75: CPT | Performed by: INTERNAL MEDICINE

## 2020-09-12 PROCEDURE — 1200000000 HC SEMI PRIVATE

## 2020-09-12 PROCEDURE — 81001 URINALYSIS AUTO W/SCOPE: CPT

## 2020-09-12 PROCEDURE — 6370000000 HC RX 637 (ALT 250 FOR IP): Performed by: EMERGENCY MEDICINE

## 2020-09-12 PROCEDURE — 85027 COMPLETE CBC AUTOMATED: CPT

## 2020-09-12 PROCEDURE — 2580000003 HC RX 258: Performed by: STUDENT IN AN ORGANIZED HEALTH CARE EDUCATION/TRAINING PROGRAM

## 2020-09-12 PROCEDURE — 6360000002 HC RX W HCPCS: Performed by: GENERAL PRACTICE

## 2020-09-12 PROCEDURE — 51700 IRRIGATION OF BLADDER: CPT

## 2020-09-12 PROCEDURE — 93005 ELECTROCARDIOGRAM TRACING: CPT | Performed by: GENERAL PRACTICE

## 2020-09-12 PROCEDURE — 86900 BLOOD TYPING SEROLOGIC ABO: CPT

## 2020-09-12 PROCEDURE — 87086 URINE CULTURE/COLONY COUNT: CPT

## 2020-09-12 PROCEDURE — 84443 ASSAY THYROID STIM HORMONE: CPT

## 2020-09-12 PROCEDURE — U0002 COVID-19 LAB TEST NON-CDC: HCPCS

## 2020-09-12 PROCEDURE — 83735 ASSAY OF MAGNESIUM: CPT

## 2020-09-12 PROCEDURE — 71045 X-RAY EXAM CHEST 1 VIEW: CPT

## 2020-09-12 PROCEDURE — 86901 BLOOD TYPING SEROLOGIC RH(D): CPT

## 2020-09-12 PROCEDURE — 99285 EMERGENCY DEPT VISIT HI MDM: CPT

## 2020-09-12 PROCEDURE — 85025 COMPLETE CBC W/AUTO DIFF WBC: CPT

## 2020-09-12 PROCEDURE — 86850 RBC ANTIBODY SCREEN: CPT

## 2020-09-12 RX ORDER — FENTANYL CITRATE 50 UG/ML
25 INJECTION, SOLUTION INTRAMUSCULAR; INTRAVENOUS ONCE
Status: COMPLETED | OUTPATIENT
Start: 2020-09-12 | End: 2020-09-12

## 2020-09-12 RX ORDER — SODIUM CHLORIDE 0.9 % (FLUSH) 0.9 %
10 SYRINGE (ML) INJECTION PRN
Status: DISCONTINUED | OUTPATIENT
Start: 2020-09-12 | End: 2020-09-12

## 2020-09-12 RX ORDER — ACETAMINOPHEN 325 MG/1
650 TABLET ORAL EVERY 6 HOURS PRN
Status: DISCONTINUED | OUTPATIENT
Start: 2020-09-12 | End: 2020-09-15 | Stop reason: HOSPADM

## 2020-09-12 RX ORDER — ONDANSETRON 2 MG/ML
4 INJECTION INTRAMUSCULAR; INTRAVENOUS EVERY 6 HOURS PRN
Status: DISCONTINUED | OUTPATIENT
Start: 2020-09-12 | End: 2020-09-15 | Stop reason: HOSPADM

## 2020-09-12 RX ORDER — HYOSCYAMINE SULFATE 0.125 MG
125 TABLET ORAL EVERY 6 HOURS PRN
Status: DISCONTINUED | OUTPATIENT
Start: 2020-09-12 | End: 2020-09-12

## 2020-09-12 RX ORDER — POLYETHYLENE GLYCOL 3350 17 G/17G
17 POWDER, FOR SOLUTION ORAL DAILY PRN
Status: DISCONTINUED | OUTPATIENT
Start: 2020-09-12 | End: 2020-09-15 | Stop reason: HOSPADM

## 2020-09-12 RX ORDER — SODIUM CHLORIDE 0.9 % (FLUSH) 0.9 %
10 SYRINGE (ML) INJECTION EVERY 12 HOURS SCHEDULED
Status: DISCONTINUED | OUTPATIENT
Start: 2020-09-12 | End: 2020-09-15 | Stop reason: HOSPADM

## 2020-09-12 RX ORDER — ONDANSETRON 2 MG/ML
4 INJECTION INTRAMUSCULAR; INTRAVENOUS ONCE
Status: COMPLETED | OUTPATIENT
Start: 2020-09-12 | End: 2020-09-12

## 2020-09-12 RX ORDER — SODIUM CHLORIDE 0.9 % (FLUSH) 0.9 %
10 SYRINGE (ML) INJECTION EVERY 12 HOURS SCHEDULED
Status: DISCONTINUED | OUTPATIENT
Start: 2020-09-12 | End: 2020-09-12

## 2020-09-12 RX ORDER — CEFAZOLIN SODIUM 1 G/50ML
INJECTION, SOLUTION INTRAVENOUS
Status: DISCONTINUED
Start: 2020-09-12 | End: 2020-09-13

## 2020-09-12 RX ORDER — SODIUM CHLORIDE 0.9 % (FLUSH) 0.9 %
10 SYRINGE (ML) INJECTION PRN
Status: DISCONTINUED | OUTPATIENT
Start: 2020-09-12 | End: 2020-09-15 | Stop reason: HOSPADM

## 2020-09-12 RX ORDER — ACETAMINOPHEN 325 MG/1
650 TABLET ORAL EVERY 4 HOURS PRN
Status: DISCONTINUED | OUTPATIENT
Start: 2020-09-12 | End: 2020-09-12

## 2020-09-12 RX ORDER — LIDOCAINE HYDROCHLORIDE 20 MG/ML
JELLY TOPICAL PRN
Status: DISCONTINUED | OUTPATIENT
Start: 2020-09-12 | End: 2020-09-15 | Stop reason: HOSPADM

## 2020-09-12 RX ORDER — FENTANYL CITRATE 50 UG/ML
50 INJECTION, SOLUTION INTRAMUSCULAR; INTRAVENOUS ONCE
Status: COMPLETED | OUTPATIENT
Start: 2020-09-12 | End: 2020-09-12

## 2020-09-12 RX ORDER — SODIUM CHLORIDE 9 MG/ML
INJECTION, SOLUTION INTRAVENOUS CONTINUOUS
Status: DISCONTINUED | OUTPATIENT
Start: 2020-09-12 | End: 2020-09-13

## 2020-09-12 RX ORDER — ACETAMINOPHEN 650 MG/1
650 SUPPOSITORY RECTAL EVERY 6 HOURS PRN
Status: DISCONTINUED | OUTPATIENT
Start: 2020-09-12 | End: 2020-09-15 | Stop reason: HOSPADM

## 2020-09-12 RX ORDER — MAGNESIUM HYDROXIDE 1200 MG/15ML
3000 LIQUID ORAL CONTINUOUS
Status: DISCONTINUED | OUTPATIENT
Start: 2020-09-12 | End: 2020-09-15 | Stop reason: HOSPADM

## 2020-09-12 RX ORDER — PROMETHAZINE HYDROCHLORIDE 12.5 MG/1
12.5 TABLET ORAL EVERY 6 HOURS PRN
Status: DISCONTINUED | OUTPATIENT
Start: 2020-09-12 | End: 2020-09-15 | Stop reason: HOSPADM

## 2020-09-12 RX ADMIN — FENTANYL CITRATE 50 MCG: 50 INJECTION, SOLUTION INTRAMUSCULAR; INTRAVENOUS at 12:01

## 2020-09-12 RX ADMIN — SODIUM CHLORIDE: 9 INJECTION, SOLUTION INTRAVENOUS at 19:10

## 2020-09-12 RX ADMIN — SODIUM CHLORIDE 3000 ML: 900 IRRIGANT IRRIGATION at 21:41

## 2020-09-12 RX ADMIN — SODIUM CHLORIDE 3000 ML: 900 IRRIGANT IRRIGATION at 20:24

## 2020-09-12 RX ADMIN — CEFAZOLIN 1 G: 1 INJECTION, POWDER, FOR SOLUTION INTRAMUSCULAR; INTRAVENOUS at 12:35

## 2020-09-12 RX ADMIN — SODIUM CHLORIDE, PRESERVATIVE FREE 10 ML: 5 INJECTION INTRAVENOUS at 20:43

## 2020-09-12 RX ADMIN — LIDOCAINE HYDROCHLORIDE: 20 JELLY TOPICAL at 11:00

## 2020-09-12 RX ADMIN — ONDANSETRON 4 MG: 2 INJECTION INTRAMUSCULAR; INTRAVENOUS at 12:01

## 2020-09-12 RX ADMIN — FENTANYL CITRATE 25 MCG: 50 INJECTION, SOLUTION INTRAMUSCULAR; INTRAVENOUS at 13:45

## 2020-09-12 RX ADMIN — CEFAZOLIN 1 G: 1 INJECTION, POWDER, FOR SOLUTION INTRAMUSCULAR; INTRAVENOUS at 21:46

## 2020-09-12 ASSESSMENT — PAIN SCALES - GENERAL
PAINLEVEL_OUTOF10: 10
PAINLEVEL_OUTOF10: 0
PAINLEVEL_OUTOF10: 10
PAINLEVEL_OUTOF10: 10

## 2020-09-12 ASSESSMENT — ENCOUNTER SYMPTOMS
CHOKING: 0
CONSTIPATION: 0
CHEST TIGHTNESS: 0
COUGH: 0
SHORTNESS OF BREATH: 0
VOICE CHANGE: 0
SINUS PRESSURE: 0
NAUSEA: 0
WHEEZING: 0
BACK PAIN: 0
VOMITING: 0
DIARRHEA: 0
RHINORRHEA: 0
ABDOMINAL PAIN: 1

## 2020-09-12 ASSESSMENT — PAIN DESCRIPTION - LOCATION: LOCATION: PELVIS

## 2020-09-12 NOTE — H&P
[facility]  Office: [address], DO, Isiah Bustamante, DO, Natasha Ashby, DO, Adriel Stovall Catherine, DO, Tarik Becerra MD, Albina Pérez MD, Arnulfo Lambert MD, Jahaira Chang MD, Davina Gordon MD, Porsha Tubbs MD, Odalys Stuart MD, Kar Cheema MD, Yordan Mclaughlin MD, Chica Denton, DO, Tati Mariscal MD, Dyan Curry MD, Brandie Neff, DO, Bud Aparicio MD,  Lacy Lagunas DO, Yani Emanuel MD, Ashley Barrientos MD, [facility], [facility] Jhontanvi, CNP, Adrianna Spence, CNP, Zhou Bahena, CNS, Donald Kelly, CNP, Marianna Lefort, CNP, Irma Osman, CNP, Sirisha Mclaughlin, CNP, Sobia Shaw, CNP, Clint Reynolds PA-C, Jessica Borjas, [facility], Mandeep Baxter, CNP, Baldo Willett, CNP, Kimberlee Gil, CNP, Tierney Laureano, CNP, Rylie Horn, [facility]      HISTORY AND PHYSICAL EXAMINATION            Date:   9/12/2020  Patient name:  Berlin Carlin  Date of admission:  9/12/2020 10:34 AM  MRN:   [id number]  Account:  [de-identified]  YOB: 1960  PCP:    Tonny Carmona MD  Room:   [address] [place] [address]  Code Status:    Full Code    Chief Complaint:     Chief Complaint   Patient presents with    Hematuria     had prostate procedure august 14th, has been urinating blood since 0300    Chest Pain       History Obtained From:     patient, electronic medical record    History of Present Illness: The patient is a 63 y.o. Non-/non  male who presents with Hematuria (had prostate procedure august 14th, has been urinating blood since 0300) and Chest Pain   and he is admitted to the hospital for the management of  Gross hematuria. Patient presented to emergency room with gross hematuria, blood clots in urine. He had history of greenlight laser on 8/14/2020 by Dr. Manjeet Willis. Postoperatively he had been having hematuria and was advised conservative monitoring as could be expected after laser procedure. Patient also reported intermittent blood clots in urine. He had stopped aspirin use since surgery. Patient was admitted recently in the hospital 5 days ago with episode of dizziness and possible syncope. Work-up at that time with CT head, CTA head and neck, MRI brain was negative for stroke. Patient was diagnosed with TIA and was put on aspirin and Plavix. Patient had worsening of his bleeding since starting medications. He had another episodes of severe bleeding this morning and passed out around 7 AM.  Patient denied any seizure-like activity, focal weakness, speech changes, headaches. Patient has underlying history of hypertension which is controlled, hyperlipidemia which is also controlled. He had recently moved from [facility] about 6 months ago where he worked as . Patient is independent on his ADLs. Patient is non-smoker and denies alcohol use. Initial lab evaluation showed hyponatremia 129, hypokalemia 3.3, normal kidney function, negative troponin, hemoglobin 12.4. Past Medical History:     Past Medical History:   Diagnosis Date    Hyperlipidemia     Hypertension     Wellness examination     patient states no PCP        Past Surgical History:     Past Surgical History:   Procedure Laterality Date    CYSTOSCOPY N/A 7/30/2020    CYSTOSCOPY performed by Dean Thomas MD at [place] [address]  08/14/2020     CYSTO, TUR PROSTATE GREENLIGHT XPS     TURP N/A 8/14/2020    CYSTO, TUR PROSTATE GREENLIGHT XPS performed by Dean Thomas MD at [address] [place] [address]      varicose vein of right leg        Medications Prior to Admission:     Prior to Admission medications    Medication Sig Start Date End Date Taking?  Authorizing Provider   zoster recombinant adjuvanted vaccine [facility]) 50 MCG/0.5ML SUSR injection Inject 0.5 mLs into the muscle See Admin Instructions 1 dose now and repeat in 2-6 months 9/11/20 3/10/21  Stacie You MD   aspirin EC 81 MG EC tablet Take 1 tablet by mouth daily for 21 days 9/7/20 9/28/20  Arslan Josué Branch MD   clopidogrel (PLAVIX) 75 MG tablet Take 1 tablet by mouth daily for 21 days 9/7/20 9/28/20  Jeanne Connor MD   hydroCHLOROthiazide (HYDRODIURIL) 25 MG tablet Take 1 tablet by mouth daily 8/28/20   Tawnya Ram MD   aspirin 81 MG EC tablet Take 1 tablet by mouth daily 8/28/20   Tawnya Ram MD   ramipril (ALTACE) 5 MG capsule Take 1 capsule by mouth daily 8/28/20   Tawnya Ram MD   rosuvastatin (CRESTOR) 10 MG tablet Take 1 tablet by mouth daily 8/28/20   Tawnya Ram MD   tamsulosin (FLOMAX) 0.4 MG capsule Take 1 capsule by mouth 2 times daily 6/19/20 9/11/20  Hilda Park MD        Allergies:     Patient has no known allergies. Social History:     Tobacco:    reports that he has never smoked. He has never used smokeless tobacco.  Alcohol:      reports no history of alcohol use. Drug Use:  reports no history of drug use. Family History:     History reviewed. No pertinent family history. Review of Systems:     Positive and Negative as described in HPI. Review of Systems   Constitutional: Negative for activity change, appetite change, fatigue, fever and unexpected weight change. HENT: Negative for congestion, nosebleeds, rhinorrhea, sinus pressure, sneezing and voice change. Respiratory: Negative for cough, choking, chest tightness, shortness of breath and wheezing. Cardiovascular: Negative for chest pain, palpitations and leg swelling. Gastrointestinal: Positive for abdominal pain. Negative for constipation, diarrhea, nausea and vomiting. Genitourinary: Positive for decreased urine volume and hematuria. Negative for difficulty urinating, discharge, dysuria, frequency and testicular pain. Musculoskeletal: Negative for back pain. Skin: Negative for rash. Neurological: Positive for dizziness and syncope. Negative for weakness, light-headedness and headaches. Hematological: Does not bruise/bleed easily.    Psychiatric/Behavioral: Negative for agitation, behavioral problems, 09/12/20  3:04 PM   Result Value Ref Range    Troponin, High Sensitivity <6 0 - 22 ng/L    Troponin T NOT REPORTED <0.03 ng/mL    Troponin Interp NOT REPORTED    COVID-19    Collection Time: 09/12/20  3:24 PM    Specimen: Other   Result Value Ref Range    SARS-CoV-2          SARS-CoV-2, Rapid DETECTED (A) Not Detected    Source . NASOPHARYNGEAL SWAB     SARS-CoV-2             Imaging/Diagonstics:    Ct Head Wo Contrast    Result Date: 9/7/2020  No acute intracranial abnormality. Mild ventriculomegaly, disproportionate to the degree of volume loss, possibly with mild communicating hydrocephalus. Results were sent to radiology results communication. Ct Chest Wo Contrast    Result Date: 9/7/2020  1. No acute abnormalities seen in the chest 2. Multiple pulmonary nodules. The largest solid nodule measures 6-7 mm. The largest ground-glass nodule measures 8 mm. Follow-up recommendations below RECOMMENDATIONS: Fleischner Society guidelines for follow-up and management of incidentally detected pulmonary nodules: Multiple Solid Nodules: Nodule size equals 6-8 mm In a low-risk patient, CT at 3-6 months, then consider CT at 18-24 months. In a high-risk patient, CT at 3-6 months, then CT at 18-24 months. Nodule size greater than 8 mm In a low-risk patient, CT at 3-6 months, then consider CT at 18-24 months. In a high-risk patient, CT at 3-6 months, then CT at 18-24 months. Radiology 2017 http://pubs. rsna.org/doi/full/10.1148/radiol. 5294014557     Xr Chest Portable    Result Date: 9/12/2020  No acute airspace disease identified. Xr Chest Portable    Result Date: 9/7/2020  No significant abnormalities detected. Cta Head Neck W Contrast    Result Date: 9/7/2020  No acute abnormality or flow-limiting stenosis of the major arteries of the head and neck. 8 mm ground-glass lung nodule at the left lung apex. Solid lung nodules measuring up to 6 mm. Follow-up recommendation is listed below.  RECOMMENDATIONS: 2017 Fleischner Society Recommendations for Subsolid Lung Nodules Follow-Up base on size (average of long- and short-axis diameters). Use most suspicious nodule for followup. Single > or = 6 mm Ground glass: CT at 6-12 months to confirm persistence, then CT every 2 years until 5 years Fleischner Society guidelines for follow-up and management of incidentally detected pulmonary nodules: Multiple Solid Nodules: Nodule size less than 6 mm In a low-risk patient, no routine follow-up. In a high-risk patient, optional CT at 12 months. - Low risk patients include individuals with minimal or absent history of smoking and other known risk factors. - High risk patients include individuals with a history or smoking or known risk factors. Radiology 2017 http://pubs. rsna.org/doi/full/10.1148/radiol. 3819236941     Mri Brain Wo Contrast    Result Date: 9/7/2020  1. No evidence of acute infarct. 2. Cerebral parenchymal volume loss with prominence of the ventricular system, likely related to involutional change. Normal pressure hydrocephalus cannot entirely be excluded. Assessment :      Primary Problem  Gross hematuria    Active Hospital Problems    Diagnosis Date Noted    Gross hematuria [R31.0] 09/12/2020    Hematuria [R31.9] 09/12/2020    COVID-19 [U07.1] 09/12/2020       Plan:     Patient status Admit as inpatient in the  Progressive Unit/Step down    1. Gross hematuria - likely from dual antiplatelets and recent Urological procedure - Hold aspirin and Plavix. 3 Way catheter placed, on CBI , urology following. Hand irrigate as needed. Unlikely will get procedure / cystoscopy due to COVID-19 infection. Continue prophylactic antibiotics. 2. Recent episode of loss of consciousness likely vasovagal syncope from bladder distention and urinary retention-unlikely TIA. Continue lipitor , hold antiplatelets   3. BPH - recent green light laser procedure a month ago urology consult.-   4.  Essential hypertension -controlled with medication. 5. Hyperlipidemia -on rosuvastatin. 6. COVID-19 infection. -No pneumonia on chest x-ray. Consultations:   IP CONSULT TO UROLOGY  IP CONSULT TO INTERNAL MEDICINE  IP CONSULT TO NEUROLOGY  IP CONSULT TO HOSPITALIST  IP CONSULT TO CASE MANAGEMENT    Patient is admitted as inpatient status because of co-morbidities listed above, severity of signs and symptoms as outlined, requirement for current medical therapies and most importantly because of direct risk to patient if care not provided in a hospital setting.     Kerri Zafar MD  9/12/2020    Copy sent to Dr. Nicolette Vargas MD    (Please note that portions of this note were completed with a voice recognition program. Efforts were made to edit the dictations but occasionally words are mis-transcribed.)

## 2020-09-12 NOTE — ED PROVIDER NOTES
[facility]  ED  Emergency Department Encounter  EmergencyMedicine Resident     Pt Name:Ivanna Khoury  MRN: [id number]  [place] 1960  Date of evaluation: 9/12/20  PCP:  Karina Patrick MD    [address] [place] [address]       Chief Complaint   Patient presents with    Hematuria     had prostate procedure august 14th, has been urinating blood since 0300    Chest Pain       HISTORY OF PRESENT ILLNESS  (Location/Symptom, Timing/Onset, Context/Setting, Quality, Duration, Modifying Factors, Severity.)      Ivanna Khoury is a 63 y.o. male who presents with gross hematuria, inability to void, complaint of abdominal pain, diaphoretic. With sternal chest pain, nonradiating, does have associated nausea. Patient states he has had fever and chills. Patient has history of BPH, underwent greenlight procedure on 8/14/2020, has been having intermittent gross hematuria since then is spoken to urology who directed patient come to the emergency department. Patient has recent TIAs, was started on Plavix and aspirin. Patient was scheduled to be admitted to the hospital on last visit on 9/7/2020 however did not want to be admitted and was discharged. Patient brought in via EMS today, stating that \"feels like he is going to die\"    PAST MEDICAL / SURGICAL / SOCIAL / FAMILY HISTORY      has a past medical history of Hyperlipidemia, Hypertension, and Wellness examination. has a past surgical history that includes vascular surgery; Cystoscopy (N/A, 7/30/2020); Prostate surgery (08/14/2020); and TURP (N/A, 8/14/2020).     Social History     Socioeconomic History    Marital status:      Spouse name: Not on file    Number of children: Not on file    Years of education: Not on file    Highest education level: Not on file   Occupational History    Not on file   Social Needs    Financial resource strain: Not on file    Food insecurity     Worry: Not on file     Inability: Not on file   SofGenie needs MD Tae   tamsulosin (FLOMAX) 0.4 MG capsule Take 1 capsule by mouth 2 times daily 6/19/20 9/11/20  Brandie Ardon MD       REVIEW OF SYSTEMS    (2-9 systems for level 4, 10 or more for level 5)      Review of Systems   Constitutional: Negative for activity change, appetite change, chills, diaphoresis, fatigue and fever. HENT: Negative for congestion, ear discharge, ear pain, rhinorrhea, sneezing and sore throat. Respiratory: Positive for shortness of breath. Negative for apnea, cough, choking and wheezing. Cardiovascular: Positive for chest pain. Gastrointestinal: Positive for nausea,r abdominal distention, abdominal pain,  negative constipation, diarrhea and vomiting. Genitourinary:        Gross hematuria with clots  Musculoskeletal: Negative for back pain, myalgias, neck pain and neck stiffness. Skin: Negative for color change, pallor, rash and wound. Neurological: Positive for dizziness, weakness and headaches. Negative for tremors and seizures. Vertigo  Unable to keep eyes open because feels dizzy   Occipital headache   Psychiatric/Behavioral: Negative for agitation and confusion    PHYSICAL EXAM   (up to 7 for level 4, 8 or more for level 5)      INITIAL VITALS:   /77   Pulse 71   Temp 98 °F (36.7 °C) (Oral)   Resp 16   Wt 180 lb (81.6 kg)   SpO2 99%   BMI 29.05 kg/m²     Physical Exam  Constitutional:       Appearance: He is ill-appearing, toxic-appearing and diaphoretic. HENT:      Head: Normocephalic and atraumatic. Eyes:      General:         Right eye: No discharge. Left eye: No discharge. Extraocular Movements: Extraocular movements intact. Pupils: Pupils are equal, round, and reactive to light. Cardiovascular:      Rate and Rhythm: Normal rate. Pulses: Normal pulses. Pulmonary:      Effort: Pulmonary effort is normal.      Breath sounds: Normal breath sounds. Abdominal:      Palpations: There is no mass. Tenderness:  There is abdominal tenderness. There is no right CVA tenderness, left CVA tenderness or rebound. Hernia: No hernia is present. Genitourinary:     Comments: Deejay Bench blood noted on patient's underwear, with clots present  Skin:     Capillary Refill: Capillary refill takes less than 2 seconds. Comments: Patient is diaphoretic   Neurological:      Mental Status: He is alert and oriented to person, place, and time. Cranial Nerves: No cranial nerve deficit. Sensory: No sensory deficit.    Psychiatric:      Comments: Patient is anxious         DIFFERENTIAL  DIAGNOSIS     PLAN (Bill High / Andree Ripa / EKG):  Orders Placed This Encounter   Procedures    Culture, Urine    XR CHEST PORTABLE    Troponin    CBC Auto Differential    Basic Metabolic Panel    Brain Natriuretic Peptide    TSH with Reflex    Magnesium    Urinalysis    Troponin    Microscopic Urinalysis    COVID-19    CBC    Basic Metabolic Panel    Diet NPO Effective Now    Insert indwelling urinary catheter    Bladder Irrigation    Vital signs per unit routine    Notify physician    Notify physician    Up as tolerated    Full Code    Inpatient consult to Urology    Inpatient consult to Internal Medicine    Inpatient Consult to Neurology    EKG 12 Lead    TYPE AND SCREEN    PATIENT STATUS (FROM ED OR OR/PROCEDURAL) Inpatient       MEDICATIONS ORDERED:  Orders Placed This Encounter   Medications    lidocaine (XYLOCAINE) 2 % uro-jet    ondansetron (ZOFRAN) injection 4 mg    fentaNYL (SUBLIMAZE) injection 50 mcg    ceFAZolin (ANCEF) 1 g in dextrose 5 % 50 mL IVPB (mini-bag)    ceFAZolin (ANCEF) 1-4 GM/50ML-% IVPB (premix)     JAYME MULLINS: cabinet override    fentaNYL (SUBLIMAZE) injection 25 mcg    sodium chloride 0.9 % irrigation 3,000 mL    hyoscyamine (ANASPAZ;LEVSIN) tablet 125 mcg    sodium chloride flush 0.9 % injection 10 mL    sodium chloride flush 0.9 % injection 10 mL    acetaminophen (TYLENOL) tablet 650 mg    enoxaparin (LOVENOX) injection 40 mg       DDX: Bladder laceration, mass, prostate infection    DIAGNOSTIC RESULTS / EMERGENCY DEPARTMENT COURSE / MDM   :  Results for orders placed or performed during the hospital encounter of 09/12/20   Troponin   Result Value Ref Range    Troponin, High Sensitivity <6 0 - 22 ng/L    Troponin T NOT REPORTED <0.03 ng/mL    Troponin Interp NOT REPORTED    CBC Auto Differential   Result Value Ref Range    WBC 7.8 3.5 - 11.3 k/uL    RBC 4.65 4.21 - 5.77 m/uL    Hemoglobin 12.4 (L) 13.0 - 17.0 g/dL    Hematocrit 39.2 (L) 40.7 - 50.3 %    MCV 84.3 82.6 - 102.9 fL    MCH 26.7 25.2 - 33.5 pg    MCHC 31.6 28.4 - 34.8 g/dL    RDW 12.6 11.8 - 14.4 %    Platelets 624 174 - 859 k/uL    MPV 9.4 8.1 - 13.5 fL    NRBC Automated 0.0 0.0 per 100 WBC    Differential Type NOT REPORTED     WBC Morphology NOT REPORTED     RBC Morphology NOT REPORTED     Platelet Estimate NOT REPORTED     Seg Neutrophils 56 36 - 65 %    Lymphocytes 32 24 - 43 %    Monocytes 8 3 - 12 %    Eosinophils % 3 1 - 4 %    Basophils 0 0 - 2 %    Immature Granulocytes 1 (H) 0 %    Segs Absolute 4.36 1.50 - 8.10 k/uL    Absolute Lymph # 2.50 1.10 - 3.70 k/uL    Absolute Mono # 0.59 0.10 - 1.20 k/uL    Absolute Eos # 0.24 0.00 - 0.44 k/uL    Basophils Absolute 0.03 0.00 - 0.20 k/uL    Absolute Immature Granulocyte 0.10 0.00 - 0.30 k/uL   Basic Metabolic Panel   Result Value Ref Range    Glucose 168 (H) 70 - 99 mg/dL    BUN 14 8 - 23 mg/dL    CREATININE 0.63 (L) 0.70 - 1.20 mg/dL    Bun/Cre Ratio NOT REPORTED 9 - 20    Calcium 8.0 (L) 8.6 - 10.4 mg/dL    Sodium 129 (L) 135 - 144 mmol/L    Potassium 3.3 (L) 3.7 - 5.3 mmol/L    Chloride 98 98 - 107 mmol/L    CO2 21 20 - 31 mmol/L    Anion Gap 10 9 - 17 mmol/L    GFR Non-African American >60 >60 mL/min    GFR African American >60 >60 mL/min    GFR Comment          GFR Staging NOT REPORTED    Brain Natriuretic Peptide   Result Value Ref Range    Pro-BNP 32 <300 pg/mL    BNP Interpretation Pro-BNP Reference Range:    TSH with Reflex   Result Value Ref Range    TSH 0.89 0.30 - 5.00 mIU/L   Magnesium   Result Value Ref Range    Magnesium 2.0 1.6 - 2.6 mg/dL   Urinalysis   Result Value Ref Range    Color, UA RED (A) YELLOW    Turbidity UA TURBID (A) CLEAR    Glucose, Ur NEGATIVE NEGATIVE    Bilirubin Urine NEGATIVE NEGATIVE    Ketones, Urine NEGATIVE NEGATIVE    Specific Gravity, UA 1.020 1.005 - 1.030    Urine Hgb LARGE (A) NEGATIVE    pH, UA 7.5 5.0 - 8.0    Protein, UA 3+ (A) NEGATIVE    Urobilinogen, Urine Normal Normal    Nitrite, Urine NEGATIVE NEGATIVE    Leukocyte Esterase, Urine SMALL (A) NEGATIVE    Urinalysis Comments NOT REPORTED    Troponin   Result Value Ref Range    Troponin, High Sensitivity <6 0 - 22 ng/L    Troponin T NOT REPORTED <0.03 ng/mL    Troponin Interp NOT REPORTED    Microscopic Urinalysis   Result Value Ref Range    -          WBC, UA 0 TO 2 0 - 5 /HPF    RBC, UA TOO NUMEROUS TO COUNT 0 - 2 /HPF    Casts UA NOT REPORTED 0 - 2 /LPF    Crystals, UA NOT REPORTED None /HPF    Epithelial Cells UA 0 TO 2 0 - 5 /HPF    Renal Epithelial, UA NOT REPORTED 0 /HPF    Bacteria, UA NOT REPORTED None    Mucus, UA NOT REPORTED None    Trichomonas, UA NOT REPORTED None    Amorphous, UA NOT REPORTED None    Other Observations UA NOT REPORTED (A) NOT REQ. Yeast, UA NOT REPORTED None   COVID-19    Specimen: Other   Result Value Ref Range    SARS-CoV-2          SARS-CoV-2, Rapid DETECTED (A) Not Detected    Source . NASOPHARYNGEAL SWAB     SARS-CoV-2         EKG 12 Lead   Result Value Ref Range    Ventricular Rate 69 BPM    Atrial Rate 69 BPM    P-R Interval 186 ms    QRS Duration 100 ms    Q-T Interval 428 ms    QTc Calculation (Bazett) 458 ms    P Axis 58 degrees    R Axis 58 degrees    T Axis 35 degrees   TYPE AND SCREEN   Result Value Ref Range    Expiration Date 09/15/2020,2359     Arm Band Number [id number]     ABO/Rh B POSITIVE     Antibody Screen NEGATIVE RADIOLOGY:  EXAMINATION:    ONE XRAY VIEW OF THE CHEST         9/12/2020 11:04 am         COMPARISON:    09/07/2020         HISTORY:    ORDERING SYSTEM PROVIDED HISTORY: CP    TECHNOLOGIST PROVIDED HISTORY:    CP    Reason for Exam: CP    Acuity: Unknown         FINDINGS:    The cardiomediastinal silhouette is within normal limits. There is no    consolidation, pneumothorax or evidence for edema. No evidence for effusion. Recently identified small pulmonary nodules with CT are not visible on this    exam.  No acute osseous abnormality is identified.              Impression    No acute airspace disease identified. EKG  Rhythm: normal sinus   Rate: normal  Axis: normal  Ectopy: none  Conduction: normal  ST Segments: no acute change  T Waves: no acute change  Q Waves: none     Clinical Impression: no acute changes and normal EKG    All EKG's are interpreted by the Emergency Department Physician who either signs or Co-signs this chart in the absence of a cardiologist.    EMERGENCY DEPARTMENT COURSE/IMPRESSION: 58-year-old male presented emergency department with gross hematuria, abdominal pain, diaphoretic chest pain, patient recently went urologic procedure for BPH, patient is on Plavix and aspirin for TIA symptoms. Patient was bladder scan which showed 700 cc urine in his bladder, Tovar catheter was placed which showed gross hematuria with clots. Tovar continued to be clogged up blood clots was attempted irrigation, discussed with urology who irrigated bladder, neurology was consulted at urology's request to discuss stopping Plavix and aspirin. Cardiac work-up was unremarkable. Patient did have episode of bradycardia into the 30s as well as hypotension was fluid responsive. Type and screen. H&H is stable, patient admitted to medicine service started antibiotics at request of urology. Patient did test positive for COVID-19.   Admitted to medicine service, however patient tested [facility] Medicine team was notified of patient's [phone]. Patient was signed out to Dr. Sherman Colbert awaiting bed placement. PROCEDURES:  None    CONSULTS:  IP CONSULT TO UROLOGY  IP CONSULT TO INTERNAL MEDICINE  IP CONSULT TO NEUROLOGY  IP CONSULT TO CASE MANAGEMENT    CRITICAL CARE:  None    FINAL IMPRESSION      1. Gross hematuria    2.  COVID-19          DISPOSITION / [id number] Admitted 09/12/2020 02:44:35 PM      PATIENT REFERRED TO:  Eleanor Batista MD  [address] [facility] [address]  [address] Estrada MD  [facility] [place] [address] [phone]            DISCHARGE MEDICATIONS:  New Prescriptions    No medications on file       Ashley Sanchez DO  Emergency Medicine Resident    (Please note that portions of thisnote were completed with a voice recognition program.  Efforts were made to edit the dictations but occasionally words are mis-transcribed.)     Ashley Sanchez DO  Resident  09/12/20 [address] [employer] [address] [facility], DO  Resident  09/12/20 [id number]

## 2020-09-12 NOTE — H&P
[address] [facility]     Department of Internal Medicine - Staff Internal Medicine Teaching Service          ADMISSION NOTE/HISTORY AND PHYSICAL EXAMINATION   Date: 9/12/2020  Patient Name: Ling Farr  Date of admission: 9/12/2020 10:34 AM  YOB: 1960  PCP: Vinita Rod MD  History Obtained From:  patient    CHIEF COMPLAINT     Chief complaint: hematuria    HISTORY OF PRESENTING ILLNESS     The patient is a pleasant 63 y.o. male with background history of hypertension and hyperlipidemia presents with a chief complaint of blood in the urine. Patient had a TUR prostate greenlight XPS 14 August 2020. Patient started to have hematuria afterwards but he was told that it is normal to have blood in the urine after the procedure. On 7 September patient came to the hospital with TIA-like symptoms he was started on aspirin and clopidogrel by the stroke team for TIA. Patient was discharged on the same day after loading with aspirin and clopidogrel. But the blood in the urine got worse and now he is having aziza blood in the urine,. Patient he would normally void about 250 mL of urine but now it is like 400 mL. Patient also have associated dysuria and hypogastric pain. According to patient sometimes there are clots of blood and he also have problems passing urine. He also have obstruction of the urine sometime. Patient was seen by the urology team and 24 Kinyarwanda indwelling three-way catheter was passed. Hand irrigation was started on CBI and the clots were removed. Patient started on prophylactic antibiotics by the Urology Team.  Now he is still having the pain and the fullness of the bladder although the U/S done by the Urology team showed only 20cc of urine. Patient was also checked for COVID-19 as per recommendation of the Urology team and he came positive. Therefore patient will go to the [facility].     Review of Systems:  Review of Systems   Constitutional: Positive for hydroCHLOROthiazide (HYDRODIURIL) 25 MG tablet Take 1 tablet by mouth daily 20   Kelly Schmitt MD   aspirin 81 MG EC tablet Take 1 tablet by mouth daily 20   Kelly Schmitt MD   ramipril (ALTACE) 5 MG capsule Take 1 capsule by mouth daily 20   Kelly Schmitt MD   rosuvastatin (CRESTOR) 10 MG tablet Take 1 tablet by mouth daily 20   Kelly Schmitt MD   tamsulosin (FLOMAX) 0.4 MG capsule Take 1 capsule by mouth 2 times daily 20  Joselin Celestin MD       SOCIAL HISTORY     Tobacco: Never smoked   Alcohol: Never drink  Illicits: None  Occupation:     FAMILY HISTORY     History reviewed. No pertinent family history. PHYSICAL EXAM     Vitals: /77   Pulse 71   Temp 98 °F (36.7 °C) (Oral)   Resp 16   Wt 180 lb (81.6 kg)   SpO2 99%   BMI 29.05 kg/m²   Tmax: Temp (24hrs), Av °F (36.7 °C), Min:98 °F (36.7 °C), Max:98 °F (36.7 °C)    Last Body weight:   Wt Readings from Last 3 Encounters:   20 180 lb (81.6 kg)   20 180 lb (81.6 kg)   20 181 lb (82.1 kg)     Body Mass Index : Body mass index is 29.05 kg/m². PHYSICAL EXAMINATION:  Constitutional: This is a well developed, well nourished, 25-29.9 - Overweight 63y.o. year old male who is alert, oriented, cooperative and in no apparent distress. Head:normocephalic and atraumatic. EENT:  PERRLA. No conjunctival injections. Septum was midline, mucosa was without erythema, exudates or cobblestoning. No thrush was noted. Neck: Supple without thyromegaly. No elevated JVP. Trachea was midline. Respiratory: Chest was symmetrical without dullness to percussion. Breath sounds bilaterally were clear to auscultation. There were no wheezes, rhonchi or rales. There is no intercostal retraction or use of accessory muscles. No egophony noted. Cardiovascular: Regular without murmur, clicks, gallops or rubs.    Abdomen:  Abdomen is soft , mildly tender in the hypogastrium with no rebound tenderness , guarding or rigidity. BS+  Lymphatic: No lymphadenopathy. Musculoskeletal: Normal curvature of the spine. No gross muscle weakness. Extremities:  No lower extremity edema, ulcerations, tenderness, varicosities or erythema. Muscle size, tone and strength are normal.  No involuntary movements are noted. Skin:  Warm and dry. Good color, turgor and pigmentation. No lesions or scars.   No cyanosis or clubbing  Neurological/Psychiatric: The patient's general behavior, level of consciousness, thought content and emotional status is normal.          INVESTIGATIONS     Laboratory Testing:     Recent Results (from the past 24 hour(s))   EKG 12 Lead    Collection Time: 09/12/20 10:37 AM   Result Value Ref Range    Ventricular Rate 69 BPM    Atrial Rate 69 BPM    P-R Interval 186 ms    QRS Duration 100 ms    Q-T Interval 428 ms    QTc Calculation (Bazett) 458 ms    P Axis 58 degrees    R Axis 58 degrees    T Axis 35 degrees   Urinalysis    Collection Time: 09/12/20 11:05 AM   Result Value Ref Range    Color, UA RED (A) YELLOW    Turbidity UA TURBID (A) CLEAR    Glucose, Ur NEGATIVE NEGATIVE    Bilirubin Urine NEGATIVE NEGATIVE    Ketones, Urine NEGATIVE NEGATIVE    Specific Gravity, UA 1.020 1.005 - 1.030    Urine Hgb LARGE (A) NEGATIVE    pH, UA 7.5 5.0 - 8.0    Protein, UA 3+ (A) NEGATIVE    Urobilinogen, Urine Normal Normal    Nitrite, Urine NEGATIVE NEGATIVE    Leukocyte Esterase, Urine SMALL (A) NEGATIVE    Urinalysis Comments NOT REPORTED    Microscopic Urinalysis    Collection Time: 09/12/20 11:05 AM   Result Value Ref Range    -          WBC, UA 0 TO 2 0 - 5 /HPF    RBC, UA TOO NUMEROUS TO COUNT 0 - 2 /HPF    Casts UA NOT REPORTED 0 - 2 /LPF    Crystals, UA NOT REPORTED None /HPF    Epithelial Cells UA 0 TO 2 0 - 5 /HPF    Renal Epithelial, UA NOT REPORTED 0 /HPF    Bacteria, UA NOT REPORTED None    Mucus, UA NOT REPORTED None    Trichomonas, UA NOT REPORTED None    Amorphous, UA NOT REPORTED None    Other Observations UA NOT REPORTED (A) NOT REQ.     Yeast, UA NOT REPORTED None   CBC Auto Differential    Collection Time: 09/12/20 11:06 AM   Result Value Ref Range    WBC 7.8 3.5 - 11.3 k/uL    RBC 4.65 4.21 - 5.77 m/uL    Hemoglobin 12.4 (L) 13.0 - 17.0 g/dL    Hematocrit 39.2 (L) 40.7 - 50.3 %    MCV 84.3 82.6 - 102.9 fL    MCH 26.7 25.2 - 33.5 pg    MCHC 31.6 28.4 - 34.8 g/dL    RDW 12.6 11.8 - 14.4 %    Platelets 227 258 - 296 k/uL    MPV 9.4 8.1 - 13.5 fL    NRBC Automated 0.0 0.0 per 100 WBC    Differential Type NOT REPORTED     WBC Morphology NOT REPORTED     RBC Morphology NOT REPORTED     Platelet Estimate NOT REPORTED     Seg Neutrophils 56 36 - 65 %    Lymphocytes 32 24 - 43 %    Monocytes 8 3 - 12 %    Eosinophils % 3 1 - 4 %    Basophils 0 0 - 2 %    Immature Granulocytes 1 (H) 0 %    Segs Absolute 4.36 1.50 - 8.10 k/uL    Absolute Lymph # 2.50 1.10 - 3.70 k/uL    Absolute Mono # 0.59 0.10 - 1.20 k/uL    Absolute Eos # 0.24 0.00 - 0.44 k/uL    Basophils Absolute 0.03 0.00 - 0.20 k/uL    Absolute Immature Granulocyte 0.10 0.00 - 0.30 k/uL   Basic Metabolic Panel    Collection Time: 09/12/20 11:06 AM   Result Value Ref Range    Glucose 168 (H) 70 - 99 mg/dL    BUN 14 8 - 23 mg/dL    CREATININE 0.63 (L) 0.70 - 1.20 mg/dL    Bun/Cre Ratio NOT REPORTED 9 - 20    Calcium 8.0 (L) 8.6 - 10.4 mg/dL    Sodium 129 (L) 135 - 144 mmol/L    Potassium 3.3 (L) 3.7 - 5.3 mmol/L    Chloride 98 98 - 107 mmol/L    CO2 21 20 - 31 mmol/L    Anion Gap 10 9 - 17 mmol/L    GFR Non-African American >60 >60 mL/min    GFR African American >60 >60 mL/min    GFR Comment          GFR Staging NOT REPORTED    Brain Natriuretic Peptide    Collection Time: 09/12/20 11:06 AM   Result Value Ref Range    Pro-BNP 32 <300 pg/mL    BNP Interpretation Pro-BNP Reference Range:    TSH with Reflex    Collection Time: 09/12/20 11:06 AM   Result Value Ref Range    TSH 0.89 0.30 - 5.00 mIU/L   Magnesium    Collection Time: 09/12/20 11:06 AM   Result Value Ref Range Magnesium 2.0 1.6 - 2.6 mg/dL   Troponin    Collection Time: 09/12/20 11:06 AM   Result Value Ref Range    Troponin, High Sensitivity <6 0 - 22 ng/L    Troponin T NOT REPORTED <0.03 ng/mL    Troponin Interp NOT REPORTED    TYPE AND SCREEN    Collection Time: 09/12/20 11:19 AM   Result Value Ref Range    Expiration Date 09/15/2020,2359     Arm Band Number [id number]     ABO/Rh B POSITIVE     Antibody Screen NEGATIVE    Troponin    Collection Time: 09/12/20  3:04 PM   Result Value Ref Range    Troponin, High Sensitivity <6 0 - 22 ng/L    Troponin T NOT REPORTED <0.03 ng/mL    Troponin Interp NOT REPORTED    COVID-19    Collection Time: 09/12/20  3:24 PM    Specimen: Other   Result Value Ref Range    SARS-CoV-2          SARS-CoV-2, Rapid DETECTED (A) Not Detected    Source . NASOPHARYNGEAL SWAB     SARS-CoV-2             Imaging:   Ct Head Wo Contrast    Result Date: 9/7/2020  No acute intracranial abnormality. Mild ventriculomegaly, disproportionate to the degree of volume loss, possibly with mild communicating hydrocephalus. Results were sent to radiology results communication. Ct Chest Wo Contrast    Result Date: 9/7/2020  1. No acute abnormalities seen in the chest 2. Multiple pulmonary nodules. The largest solid nodule measures 6-7 mm. The largest ground-glass nodule measures 8 mm. Follow-up recommendations below RECOMMENDATIONS: Fleischner Society guidelines for follow-up and management of incidentally detected pulmonary nodules: Multiple Solid Nodules: Nodule size equals 6-8 mm In a low-risk patient, CT at 3-6 months, then consider CT at 18-24 months. In a high-risk patient, CT at 3-6 months, then CT at 18-24 months. Nodule size greater than 8 mm In a low-risk patient, CT at 3-6 months, then consider CT at 18-24 months. In a high-risk patient, CT at 3-6 months, then CT at 18-24 months. Radiology 2017 http://pubs. rsna.org/doi/full/10.1148/radiol. 7700149310     Xr Chest Portable    Result Date: 9/12/2020  No acute airspace disease identified. Xr Chest Portable    Result Date: 9/7/2020  No significant abnormalities detected. Cta Head Neck W Contrast    Result Date: 9/7/2020  No acute abnormality or flow-limiting stenosis of the major arteries of the head and neck. 8 mm ground-glass lung nodule at the left lung apex. Solid lung nodules measuring up to 6 mm. Follow-up recommendation is listed below. RECOMMENDATIONS: 2017 Fleischner Society Recommendations for Subsolid Lung Nodules Follow-Up base on size (average of long- and short-axis diameters). Use most suspicious nodule for followup. Single > or = 6 mm Ground glass: CT at 6-12 months to confirm persistence, then CT every 2 years until 5 years Fleischner Society guidelines for follow-up and management of incidentally detected pulmonary nodules: Multiple Solid Nodules: Nodule size less than 6 mm In a low-risk patient, no routine follow-up. In a high-risk patient, optional CT at 12 months. - Low risk patients include individuals with minimal or absent history of smoking and other known risk factors. - High risk patients include individuals with a history or smoking or known risk factors. Radiology 2017 http://pubs. rsna.org/doi/full/10.1148/radiol. 4228065893     Mri Brain Wo Contrast    Result Date: 9/7/2020  1. No evidence of acute infarct. 2. Cerebral parenchymal volume loss with prominence of the ventricular system, likely related to involutional change. Normal pressure hydrocephalus cannot entirely be excluded. ASSESSMENT & PLAN     ASSESSMENT / PLAN:     IMPRESSION  This is a 63 y.o. male who presented with gross hematuria and had recent greenlight XPS and it is most probably related to the procedure and found to be COVID-19 positive. Active Problems:    Gross hematuria  Plan: History of recent procedure for BPH. Three-way catheter was passed and irrigation done. Do irrigation as needed. Neurology on board.   On hold clopidogrel and aspirin as per recommendation of neurology and urology. Continue monitoring H&H every 6 hours. TIA:  Patient has a history of recent TIA. Was started on aspirin and clopidogrel. Now aspirin and clopidogrel and held. Neurology team on board. Hyponatremia:  Sodium is 129. Probably related to irrigation. Monitor electrolytes. Normal saline effusion. Hypokalemia  potassium is 3.3. Potassium replacement. Monitor electrolytes. Blood loss anemia:  History of hematuria. Hemoglobin is 12.4. Monitor H & H 8 hourly. Transfusion if hemoglobin drops below 7. SARS-CoV-2:  covid-19 repeat test came positive but it was ordered by the allergy team          DVT ppx:EPC cuffs  GI ppx: pepsid   PT/OT/SW  Discharge Planning:    Anya Garcia MD  Internal Medicine Resident, PGY-1  [place], [place]  9/12/2020, 4:17 PM

## 2020-09-12 NOTE — LETTER
[facility]  Office: [address], DO, Lori Albertinarj, DO, Marie Nuñez, DO, Pablo Fontenot Blood, DO, Juju Figueroa MD, Suzy Guthrie MD, Lucero Mccallum MD, Jennifer Marshall MD, Adolfo Salazar MD, Brian Linares MD, Janeen Jamison MD, Anand Holcomb MD, Yordan Bañuelos MD, Joslyn Hodgson, DO, Mely Hdez MD, Erich Whiteside MD, Neil Belcher, DO, Severa Campi, MD,  Unknown Linsey, DO, Kurt Duval MD, Kavitha Carranza MD, Madeline Curling, [facility], [facility], CNP, Hugo Ramirez, CNP, Mahi Spivey, CNS, Curly Alamo, CNP, David Macias, CNP, Taco Kimble, CNP, Rakel Parikh, CNP, Reginald Palacios, CNP, Darling Blackman PA-C, Kishore Ureña, [facility], Heather Rocha, CNP, Antony Diaz, CNP, Chu Keating, CNP, Roni Salas, CNP, Shayne De La Cruz, [facility] [address]  [facility] [address]  Phone: [phone]        September 15, 2020     Patient: Reyes Connor   YOB: 1960   Date of Visit: 9/12/2020       To Whom It May Concern: It is my medical opinion that Reyes Connor may return to light duty immediately with the following restrictions: lifting/carrying not to exceed 10 lbs for 4 weeks. If you have any questions or concerns, please don't hesitate to call.     Sincerely,  Electronically signed by Brian Linares MD on 9/15/2020 at 1:57 PM

## 2020-09-12 NOTE — ED NOTES
Patients valladares flushed. 1500mL emptied. Patient notified he is COVID positive, plaed in droplet precautions.      Len Meigs, RN  09/12/20 1969 Physical Therapy Discharge Summary      Visit Count: 7 of 24 (visits) to annually (date)  Authorization Status: authorized  Next Referring Provider Visit: prn, none scheduled    Initial Evaluation Date: 1/25/2017  Referred by: Dr. Daisy Yang NP  Medical Diagnosis (from order):  843.9 (ICD-9-CM) - S76.012A (ICD-10-CM) - Strain of hip flexor, left, initial encounter  Primary Insurance: [employer]  Secondary Insurance: N/A    Date of Onset: new onset; left hip pain x 1 week PTA   Diagnosis Precautions: none  Relevant co-morbidities and medications: obesity, exercise induced asthma  Relevant Tests: None     SUBJECTIVE     Current Pain: 0/10.  \"I actually don't have any pain\"  Functional Change: Pt reports 100% improvement since onset therapy.  Pt continues to have mild LBP.      OBJECTIVE   Observation:  Increased right quad discomfort, not pain  Posterior weight shift with stance stance, knees locked out for stability      Strength: (out of 5)  [] Gross muscle strength within functional/normal limits except as noted.  [] Only muscle strength that was assessed are noted.  [] Uninvolved muscle strength within functional/normal limits.    Left Right Left Right Right Comments   Date Initial Initial 2- 2- 2-     HIP            Flexion 4-* 5 4- 5 5    Extension 3+ 3+ 4+ 4+ 5    Abduction 4- NT 5 5 5    Gluteus Medius            Adduction            Internal Rotation 4-* 5 4+ 5 5    External Rotation 4-* 5 4+ 5 5    KNEE            Flexion 5* 5 5 5 5    Extension 5* 5 5 5 5    ANKLE            Dorsiflexion 5 5        Plantar flexion 5 5        [standard testing positions unless otherwise noted]   Comments: * denotes pain    Treatment   Therapeutic Exercise:   - Figure four stretch, pt seated 3 x :20 seconds, bilaterally *  * Added to hep, handout given- reviewed parameters for continuation of home exercise program    - Measurement taken    Neuromuscular Re-education:   Static Stance on uneven surface  (arex) with eyes open:  - Wide base of support 3 x :45 seconds  - Feet together 3 x :45 seconds    Static Stance on uneven surface (arex) with eyes open + dynamic upper extremity challenge outside base of support:  - Wide base of support 3 x 10  - Feet together 3 x 10    - Reduced hyperextension, educated on positioning and unlocking the knees with mobility tasks- all balance exercises performed with knees unlocked- improved anterior weight shifting compared to previous session    Current Home Program (not performed this date except as noted above): quad set, glut set, isometric abduction/adduction, modified hip flexor stretch, calf stretch, balance exercises     ASSESSMENT   Pt reports a 100% improvement since onset of therapy. Progressed neuromuscular education on uneven surface to facilitate ankle strategies and reduce overcompensations with hip musculature and reduce LBP- updated hep. Pt stands in genu recurvatum with increased pain and stress through the proximal joints. Significant amount of time spent educating patient on postioning and unlocking of the joints to reduce tension.  To date the patient has made gains in strength/stability, posture, pain levels, and activity tolerance.      Pain after treatment: 0/10.    Compliant with therapy visits and home exercise program: Yes  Progress toward discharge/long term goals: excellent progress    Discharge from skilled therapy with instructions/recommendations: continue with hep   Result of above outlined education: Verbalizes understanding    Goals:       To be obtained by end of this plan of care:  1. Patient independent with modified and progressed home exercise program. - Goal Met  2. Patient will decrease involved hip pain to 2/10 to aid in age appropriate activities. - Goal Met  3. Patient will increase involved hip strength to 5/5 to aid in age appropriate activities. -Goal Met  4. Patient will be able to tolerate sitting activities for > or equal to 45  minutes with minimal pain/difficulty.  -Goal Met    PLAN   D/ C PT Services    THERAPY DAILY BILLING   Primary Insurance: [employer]  Secondary Insurance: N/A    Evaluation Procedures:  No evaluation codes were used on this date of service    Timed Procedures:  Neuromuscular Re-Education, 15 minutes  Therapeutic Exercise, 10 minutes    Untimed Procedures:  No untimed codes were used on this date of service    Total Treatment Time: 30 minutes

## 2020-09-12 NOTE — ED PROVIDER NOTES
FACULTY SIGN-OUT  ADDENDUM     Care of this patient was assumed from previous attending physician. The patient's initial evaluation and plan have been discussed with the prior provider who initially evaluated the patient. Attestation  I was available and discussed any additional care issues that arose and coordinated the management plans with the resident(s) caring for the patient during my duty period. Any areas of disagreement with resident's documentation of care or procedures are noted on the chart. I was personally present for the key portions of any/all procedures, during my duty period. I have documented in the chart those procedures where I was not present during the key portions. ED COURSE      The patient was given the following medications:  Orders Placed This Encounter   Medications    lidocaine (XYLOCAINE) 2 % uro-jet    ondansetron (ZOFRAN) injection 4 mg    fentaNYL (SUBLIMAZE) injection 50 mcg    ceFAZolin (ANCEF) 1 g in dextrose 5 % 50 mL IVPB (mini-bag)    ceFAZolin (ANCEF) 1-4 GM/50ML-% IVPB (premix)     JAYME MULLINS: cabinet override    fentaNYL (SUBLIMAZE) injection 25 mcg    sodium chloride 0.9 % irrigation 3,000 mL    hyoscyamine (ANASPAZ;LEVSIN) tablet 125 mcg    sodium chloride flush 0.9 % injection 10 mL    sodium chloride flush 0.9 % injection 10 mL    acetaminophen (TYLENOL) tablet 650 mg    enoxaparin (LOVENOX) injection 40 mg       RECENT VITALS:   Temp: 98 °F (36.7 °C), Pulse: 71, Resp: 16, BP: 129/77    MEDICAL DECISION MAKING        Ivanna Khoury is a 63 y.o. male who presents to the Emergency Department with complaints of chest pain   Lisset Henson MD, F.A.C.E.P.   Attending Emergency Physician    (Please note that portions of this note were completed with a voice recognition program.  Efforts were made to edit the dictations but occasionally words are mis-transcribed.)        Lisset Henson MD  09/12/20 1600

## 2020-09-12 NOTE — ED NOTES
Tovar irrigated. 900mL out, irrigation continuous to gravity per urology resident.      Jaxon Duke, ALFA  09/12/20 [id number]

## 2020-09-12 NOTE — ED NOTES
Bed: 14  Expected date:   Expected time:   Means of arrival:   Comments:  DOMO 613 Rosalina Ivy RN  09/12/20 103

## 2020-09-12 NOTE — ED NOTES
Patient repeatedly asking if his COVID test was mixed up. Asking to have valladares irrigated with syringe multiple times. Valladares still draining with continuous irrigation.           Brenda Chinchilla RN  09/12/20 8650

## 2020-09-12 NOTE — CONSULTS
[id number] [facility] Neurology   [facility]    HISTORY AND PHYSICAL EXAMINATION            Date:   9/12/2020  Patient name:  Adore Tafoya  Date of admission:  9/12/2020 10:34 AM  MRN:   [id number]  Account:  [de-identified]  YOB: 1960  PCP:    Lauren Ricketts MD  Room:   14/14  Code Status:    Full Code    Chief Complaint:     Chief Complaint   Patient presents with    Hematuria     had prostate procedure august 14th, has been urinating blood since 0300    Chest Pain       History Obtained From:     patient, family member - son, electronic medical record    History of Present Illness: The patient is a 63 y.o. Non-/non  male who presents with Hematuria (had prostate procedure august 14th, has been urinating blood since 0300) and Chest Pain and he is admitted to the hospital for the management of gross hematuria. Past medical history significant for hypertension, hyperlipidemia, recent prostate surgery with greenlight August 14, 2020 performed by Nida Sanchez. Patient was recently seen by me 9/7/2020 as a stroke consult and neurology consult. Patient had TIA-like symptoms including dizziness, blurred vision nausea and migraine. Patient received migraine cocktail and felt that his symptoms improved along with aspirin 325 and Plavix 300 mg load. Patient was offered admission for stroke work-up at this time but as he returned to his baseline he felt that he wanted to return home and denied being admitted. Patient went home and has had increased bleeding from his urethra with dark red blood and clots. Patient denied any bleeding specifically when seen by me 9/7/2020 prior to his being started on aspirin and Plavix. This bleeding is acute onset and progressive over 3 days. Recommend patient be admitted to medical service with urology consulted. No further neuro work-up at this time as patient is neurologically stable.   As patient had MRI brain without contrast that was negative for acute stroke would recommend holding aspirin and Plavix at this time. Patient would eventually benefit from continued therapy with baby aspirin daily once no longer bleeding but at this time the risk outweighs the benefit. Of note patient tested positive for COVID-19 in the emergency department after being seen by myself. Patient being admitted to [facility]. UA showing gross hematuria and 3+ protein. Hemoglobin stable 12.4, platelets 659, sodium 129, potassium 3.3, BNP 32, TSH 1.89, mag 2.0 and troponin less than 6. Chest x-ray no acute airspace disease identified. Past Medical History:     Past Medical History:   Diagnosis Date    Hyperlipidemia     Hypertension     Wellness examination     patient states no PCP        Past Surgical History:     Past Surgical History:   Procedure Laterality Date    CYSTOSCOPY N/A 7/30/2020    CYSTOSCOPY performed by Dean Thomas MD at [place] [address]  08/14/2020     CYSTO, TUR PROSTATE GREENLIGHT XPS     TURP N/A 8/14/2020    CYSTO, TUR PROSTATE GREENLIGHT XPS performed by Dean Thomas MD at [address] [place] [address]      varicose vein of right leg        Medications Prior to Admission:     Prior to Admission medications    Medication Sig Start Date End Date Taking?  Authorizing Provider   zoster recombinant adjuvanted vaccine [facility]) 50 MCG/0.5ML SUSR injection Inject 0.5 mLs into the muscle See Admin Instructions 1 dose now and repeat in 2-6 months 9/11/20 3/10/21  Stacie You MD   aspirin EC 81 MG EC tablet Take 1 tablet by mouth daily for 21 days 9/7/20 9/28/20  Ajay Garrido MD   clopidogrel (PLAVIX) 75 MG tablet Take 1 tablet by mouth daily for 21 days 9/7/20 9/28/20  Ajay Garrido MD   hydroCHLOROthiazide (HYDRODIURIL) 25 MG tablet Take 1 tablet by mouth daily 8/28/20   Stacie You MD   aspirin 81 MG EC tablet Take 1 tablet by mouth daily 8/28/20   Stacie You MD   ramipril (ALTACE) 5 MG capsule Take 1 capsule by mouth daily 20   Keila Herrera MD   rosuvastatin (CRESTOR) 10 MG tablet Take 1 tablet by mouth daily 20   Keila Herrera MD   tamsulosin (FLOMAX) 0.4 MG capsule Take 1 capsule by mouth 2 times daily 20  Delisa Willett MD        Allergies:     Patient has no known allergies. Social History:     Tobacco:    reports that he has never smoked. He has never used smokeless tobacco.  Alcohol:      reports no history of alcohol use. Drug Use:  reports no history of drug use. Family History:     History reviewed. No pertinent family history. Review of Systems:     ROS:  Constitutional  Negative for fever and chills    HEENT  Negative for ear discharge, ear pain, nosebleed    Eyes  Negative for photophobia, pain and discharge    Respiratory  Negative for hemoptysis and sputum    Cardiovascular  Negative for orthopnea, claudication and PND    Gastrointestinal  Negative for abdominal pain, diarrhea, blood in stool    Musculoskeletal  Negative for joint pain, negative for myalgia    Neurology Negative for seizures, loss of consciousness   Skin  Negative for rash or itching    Endo/heme/allergies  Negative for polydipsia, environmental allergy    Psychiatric/behavioral  Negative for suicidal ideation. Patient is not anxious        Physical Exam:   /77   Pulse 71   Temp 98 °F (36.7 °C) (Oral)   Resp 16   Wt 180 lb (81.6 kg)   SpO2 99%   BMI 29.05 kg/m²   Temp (24hrs), Av °F (36.7 °C), Min:98 °F (36.7 °C), Max:98 °F (36.7 °C)    No results for input(s): POCGLU in the last 72 hours.   No intake or output data in the 24 hours ending 20 1702      NEUROLOGIC EXAMINATION  GENERAL  Appears comfortable and in no distress   HEENT  NC/ AT   NECK  Supple and no bruits heard   MENTAL STATUS:  Alert, oriented, intact memory, no confusion, normal speech, normal language, no hallucination or delusion   CRANIAL NERVES: II     -      Visual fields intact to confrontation  III,IV,VI -  EOMs full, no afferent defect, no DOREEN, no ptosis  V     -     Normal facial sensation  VII    -     Normal facial symmetry  VIII   -     Intact hearing  IX,X -     Symmetrical palate  XI    -     Symmetrical shoulder shrug  XII   -     Midline tongue, no atrophy    MOTOR FUNCTION:  significant for good strength of grade 5/5 in bilateral proximal and distal muscle groups of both upper and lower extremities with normal bulk, normal tone and no involuntary movements, no tremor   SENSORY FUNCTION:  Normal touch, normal pin, normal vibration, normal proprioception   CEREBELLAR FUNCTION:  Intact fine motor control over upper limbs   REFLEX FUNCTION:  Symmetric, no perverted reflex, no Babinski sign   STATION and GAIT  Not tested       Investigations:      Laboratory Testing:  Recent Results (from the past 24 hour(s))   EKG 12 Lead    Collection Time: 09/12/20 10:37 AM   Result Value Ref Range    Ventricular Rate 69 BPM    Atrial Rate 69 BPM    P-R Interval 186 ms    QRS Duration 100 ms    Q-T Interval 428 ms    QTc Calculation (Bazett) 458 ms    P Axis 58 degrees    R Axis 58 degrees    T Axis 35 degrees   Urinalysis    Collection Time: 09/12/20 11:05 AM   Result Value Ref Range    Color, UA RED (A) YELLOW    Turbidity UA TURBID (A) CLEAR    Glucose, Ur NEGATIVE NEGATIVE    Bilirubin Urine NEGATIVE NEGATIVE    Ketones, Urine NEGATIVE NEGATIVE    Specific Gravity, UA 1.020 1.005 - 1.030    Urine Hgb LARGE (A) NEGATIVE    pH, UA 7.5 5.0 - 8.0    Protein, UA 3+ (A) NEGATIVE    Urobilinogen, Urine Normal Normal    Nitrite, Urine NEGATIVE NEGATIVE    Leukocyte Esterase, Urine SMALL (A) NEGATIVE    Urinalysis Comments NOT REPORTED    Microscopic Urinalysis    Collection Time: 09/12/20 11:05 AM   Result Value Ref Range    -          WBC, UA 0 TO 2 0 - 5 /HPF    RBC, UA TOO NUMEROUS TO COUNT 0 - 2 /HPF    Casts UA NOT REPORTED 0 - 2 /LPF    Crystals, UA NOT REPORTED None /HPF    Epithelial Cells UA 0 TO 2 0 - 5 /HPF    Renal Range: TSH with Reflex    Collection Time: 09/12/20 11:06 AM   Result Value Ref Range    TSH 0.89 0.30 - 5.00 mIU/L   Magnesium    Collection Time: 09/12/20 11:06 AM   Result Value Ref Range    Magnesium 2.0 1.6 - 2.6 mg/dL   Troponin    Collection Time: 09/12/20 11:06 AM   Result Value Ref Range    Troponin, High Sensitivity <6 0 - 22 ng/L    Troponin T NOT REPORTED <0.03 ng/mL    Troponin Interp NOT REPORTED    TYPE AND SCREEN    Collection Time: 09/12/20 11:19 AM   Result Value Ref Range    Expiration Date 09/15/2020,2359     Arm Band Number BE [id number]     ABO/Rh B POSITIVE     Antibody Screen NEGATIVE    Troponin    Collection Time: 09/12/20  3:04 PM   Result Value Ref Range    Troponin, High Sensitivity <6 0 - 22 ng/L    Troponin T NOT REPORTED <0.03 ng/mL    Troponin Interp NOT REPORTED    COVID-19    Collection Time: 09/12/20  3:24 PM    Specimen: Other   Result Value Ref Range    SARS-CoV-2          SARS-CoV-2, Rapid DETECTED (A) Not Detected    Source . NASOPHARYNGEAL SWAB     SARS-CoV-2               Assessment :      Primary Problem  Gross hematuria    Active Hospital Problems    Diagnosis Date Noted    Gross hematuria [R31.0] 09/12/2020    Hematuria [R31.9] 09/12/2020    COVID-19 [U07.1] 09/12/2020       Plan:     Patient status Admit as inpatient in the  COVID unit    Recommendations  -Stop aspirin and Plavix at this time risk outweighs benefit.  -Eventually once recovers and does not have urology bleeding would benefit from aspirin 81 mg daily.  -Neurologic monitoring as patient recently had TIA-like symptoms with left-sided weakness and dizziness 9/7/2020 and was started on aspirin and Plavix which are being discontinued at this time. With COVID positive and hypercoagulable state would be high risk off but at this point risk does not outweigh the benefit.   Hence discontinued    Patient neurologically stable NIH of 0 nonfocal neuro exam  Neurology signing off please contact us if you have any acute changes or questions      Consultations:   IP CONSULT TO UROLOGY  IP CONSULT TO INTERNAL MEDICINE  IP CONSULT TO NEUROLOGY  IP CONSULT TO HOSPITALIST  IP CONSULT TO CASE MANAGEMENT      Follow-up further recommendations after discussing the case with attending  The plan was discussed with the patient, patient's family and the medical staff. Patient is admitted as inpatient status because of co-morbidities listed above, severity of signs and symptoms as outlined, requirement for current medical therapies and most importantly because of direct risk to patient if care not provided in a hospital setting.     Hemant Valentino MD   PGY-2 Neurology Resident   9/12/2020  5:02 PM    Copy sent to Dr. Sumeet Acuña MD

## 2020-09-12 NOTE — CONSULTS
[facility]  Urology Consultation      Patient:  Ling Farr  MRN: [id number]  YOB: 1960    CHIEF COMPLAINT:  Gross hematuria    HISTORY OF PRESENT ILLNESS:   The patient is a 63 y.o. male who presents with gross hematuria. The patient had a greenlight photo vaporization of the prostate with Dr. Marlys Ayala on August 14. He reports having intermittent blood in his urine that he could see. There has been clots. He was in retention for 600 cc on bladder scan today. Was intermittently hypotensive but most recent blood pressure is 125/76 and hemoglobin is 12.4, and creatinine 0.63. He does take aspirin 81 mg daily and was recently started on Plavix for a TIA last week. This was approximately 4 days ago. He notices blood in his urine has become worse and since 3 AM has been aziza blood. He is seen clots and this morning has had difficulty urinating at all. He denies fever or chills. He denies flank pain. He reports severe lower abdominal and suprapubic fullness. He denies cough or shortness of breath. Patient's old records, notes and chart reviewed and summarized above.     Past Medical History:    Past Medical History:   Diagnosis Date    Hyperlipidemia     Hypertension     Wellness examination     patient states no PCP       Past Surgical History:    Past Surgical History:   Procedure Laterality Date    CYSTOSCOPY N/A 7/30/2020    CYSTOSCOPY performed by Lavell Randolph MD at [place] [address]  08/14/2020     CYSTO, TUR PROSTATE GREENLIGHT XPS     TURP N/A 8/14/2020    CYSTO, TUR PROSTATE GREENLIGHT XPS performed by Lavell Randolph MD at [address] [place] [address]      varicose vein of right leg       Medications:      Current Facility-Administered Medications:     lidocaine (XYLOCAINE) 2 % uro-jet, , Topical, PRN, Linh Wu MD    Current Outpatient Medications:     zoster recombinant adjuvanted vaccine [facility]) 50 MCG/0.5ML  Intimate partner violence     Fear of current or ex partner: Not on file     Emotionally abused: Not on file     Physically abused: Not on file     Forced sexual activity: Not on file   Other Topics Concern    Not on file   Social History Narrative    Not on file       Family History:  History reviewed. No pertinent family history. REVIEW OF SYSTEMS:  A comprehensive 14 point review of systems was obtained. Constitutional: No fatigue   Eyes: No blurry vision  Ears, nose, mouth, throat, face: No ringing in the ears; no facial droop. Respiratory: No cough or cold. Cardiovascular: No palpitations  Gastrointestinal: No diarrhea or constipation. Genitourinary: No burning with urination. Positive gross hematuria  Integument/Skin: No rashes  Hematologic/Lymphatic: No easy bruising  Musculoskeletal: No muscle pains  Neurologic: No weakness in the extremities. Psychiatric: No depression or suicidal thoughts. Endocrine: No heat or cold intolerances. Allergic/Immunologic: No current seasonal allergies; no skin hives. Physical Exam:    This a 63 y.o. male   Vitals:    09/12/20 1122   BP: 125/76   Pulse: 63   Resp: 19   Temp:    SpO2: 98%     Constitutional: Patient in no acute distress. Neuro: alert and oriented to person place and time. Head: Atraumatic and normocephalic. Neck: Trachea midline   Ext: 2+ radial pulses bilaterally. Psych: Mood and affect normal.  Skin: No rashes or bruising present. Lungs: Respiratory effort normal.  Cardiovascular:  Regular rhythm. Abdomen: Soft, mild suprapubic fullness, positive suprapubic tenderness, no costovertebral angle tenderness. No guarding  Lymphatics: no palpable lymphadenopathy. Penis normal and circumcised  Urethral meatus normal  Scrotal exam normal  Testicles normal bilaterally  Epididymis normal bilaterally  No evidence of inguinal hernia    The patient did have a catheter in place which I removed as there appeared to be aziza blood.   A 24 [employer] Leandra Tovar catheter was placed using sterile technique 40 cc of water placed in the balloon. I did hand irrigate at least 150 cc of clot and much more blood-tinged urine. I did this multiple times alternating using normal saline. This did clear up to the light pink and the catheter was placed to traction and slow drip CBI started. The patient did report some fullness with this however a bladder scan also revealed less than 20 cc of urine or fluid in the bladder. Labs:  Recent Labs     09/12/20  1106   WBC 7.8   HGB 12.4*   HCT 39.2*   MCV 84.3        Recent Labs     09/12/20  1106   *   K 3.3*   CL 98   CO2 21   BUN 14   CREATININE 0.63*       Recent Labs     09/12/20  1105   COLORU RED*   PHUR 7.5   WBCUA 0 TO 2   RBCUA TOO NUMEROUS TO COUNT   MUCUS NOT REPORTED   TRICHOMONAS NOT REPORTED   YEAST NOT REPORTED   BACTERIA NOT REPORTED   SPECGRAV 1.020   LEUKOCYTESUR SMALL*   UROBILINOGEN Normal   BILIRUBINUR NEGATIVE         No results found for: PSA      Culture Results:  UCx 8/21/20 with NG      -----------------------------------------------------------------  Imaging Results:  Xr Chest Portable    Result Date: 9/12/2020  EXAMINATION: ONE XRAY VIEW OF THE CHEST 9/12/2020 11:04 am COMPARISON: 09/07/2020 HISTORY: ORDERING SYSTEM PROVIDED HISTORY: CP TECHNOLOGIST PROVIDED HISTORY: CP Reason for Exam: CP Acuity: Unknown FINDINGS: The cardiomediastinal silhouette is within normal limits. There is no consolidation, pneumothorax or evidence for edema. No evidence for effusion. Recently identified small pulmonary nodules with CT are not visible on this exam.  No acute osseous abnormality is identified. No acute airspace disease identified.        Assessment and Plan   Impression:    Patient Active Problem List   Diagnosis    BPH with urinary obstruction    TIA (transient ischemic attack)     Adore Tafoya is a 63 y.o. male with   Problem(s):  - gross hematuria   - Coagulopathy on aspirin and plavix  -Lower urinary tract symptoms secondary to enlarged prostate status post TUR prostate greenlight August 14, 2020  - Cr 0.63  - Urinary retention (clot retention)  -Anemia acute blood loss anemia    Plan:   I did place a 24 French indwelling three-way catheter. Hand irrigated this was started on CBI. Maintain Tovar catheter for now  -Urinalysis with small leukocyte Estrace and many red blood cells  Urine culture  Ancef prophylactically  Monitor hemoglobin  Maintain traction to the Tovar  Appreciate neurology's recommendations on holding antiplatelets. I  recommend holding these if possible. Hand irrigate as needed. I did discuss this with the nurse. Transfuse if actively bleeding and hemoglobin less than 8.       Vlad Sims  12:16 PM 9/12/2020

## 2020-09-12 NOTE — ED NOTES
Tovar irrigated, 400mL out, still has some blood clots. Collection bag emptied. Patient transported to [address] [place] [address].      Addis Dennis RN  09/12/20 [id number]

## 2020-09-12 NOTE — ED PROVIDER NOTES
[facility] ED  Emergency Department  Emergency Medicine Resident Sign-out     Care of Ivanna Khoury was assumed from Dr. Nitza Lemus and is being seen for Hematuria (had prostate procedure august 14th, has been urinating blood since 0300) and Chest Pain  . The patient's initial evaluation and plan have been discussed with the prior provider who initially evaluated the patient.      EMERGENCY DEPARTMENT COURSE / MEDICAL DECISION MAKING:       MEDICATIONS GIVEN:  Orders Placed This Encounter   Medications    lidocaine (XYLOCAINE) 2 % uro-jet    ondansetron (ZOFRAN) injection 4 mg    fentaNYL (SUBLIMAZE) injection 50 mcg    ceFAZolin (ANCEF) 1 g in dextrose 5 % 50 mL IVPB (mini-bag)    ceFAZolin (ANCEF) 1-4 GM/50ML-% IVPB (premix)     JAYME MULLINS: cabinet override    fentaNYL (SUBLIMAZE) injection 25 mcg    sodium chloride 0.9 % irrigation 3,000 mL    hyoscyamine (ANASPAZ;LEVSIN) tablet 125 mcg    sodium chloride flush 0.9 % injection 10 mL    sodium chloride flush 0.9 % injection 10 mL    acetaminophen (TYLENOL) tablet 650 mg    enoxaparin (LOVENOX) injection 40 mg       LABS / RADIOLOGY:     Labs Reviewed   CBC WITH AUTO DIFFERENTIAL - Abnormal; Notable for the following components:       Result Value    Hemoglobin 12.4 (*)     Hematocrit 39.2 (*)     Immature Granulocytes 1 (*)     All other components within normal limits   BASIC METABOLIC PANEL - Abnormal; Notable for the following components:    Glucose 168 (*)     CREATININE 0.63 (*)     Calcium 8.0 (*)     Sodium 129 (*)     Potassium 3.3 (*)     All other components within normal limits   URINALYSIS - Abnormal; Notable for the following components:    Color, UA RED (*)     Turbidity UA TURBID (*)     Urine Hgb LARGE (*)     Protein, UA 3+ (*)     Leukocyte Esterase, Urine SMALL (*)     All other components within normal limits   MICROSCOPIC URINALYSIS - Abnormal; Notable for the following components:    Other Observations UA NOT REPORTED (*)     All other components within normal limits   COVID-19 - Abnormal; Notable for the following components:    SARS-CoV-2, Rapid DETECTED (*)     All other components within normal limits   CULTURE, URINE   TROPONIN   BRAIN NATRIURETIC PEPTIDE   TSH WITH REFLEX   MAGNESIUM   TROPONIN   TYPE AND SCREEN       Ct Head Wo Contrast    Result Date: 9/7/2020  EXAMINATION: CT OF THE HEAD WITHOUT CONTRAST  9/7/2020 8:13 am TECHNIQUE: CT of the head was performed without the administration of intravenous contrast. Dose modulation, iterative reconstruction, and/or weight based adjustment of the mA/kV was utilized to reduce the radiation dose to as low as reasonably achievable. COMPARISON: None. HISTORY: ORDERING SYSTEM PROVIDED HISTORY: weaknesss TECHNOLOGIST PROVIDED HISTORY: weaknesss Reason for Exam: stroke FINDINGS: BRAIN/VENTRICLES: There is mild ventriculomegaly, disproportionate to the degree of volume loss, possibly with mild communicating hydrocephalus. There is no acute intracranial hemorrhage, mass effect or midline shift. No abnormal extra-axial fluid collection. The gray-white differentiation is maintained without evidence of an acute infarct. ORBITS: The visualized portion of the orbits demonstrate no acute abnormality. SINUSES: There is partial opacification of the left maxillary sinus. There is scattered minimal mucosal thickening in the remainder of the paranasal sinuses. The bilateral mastoid air cells are clear. SOFT TISSUES/SKULL:  No acute abnormality of the visualized skull or soft tissues. No acute intracranial abnormality. Mild ventriculomegaly, disproportionate to the degree of volume loss, possibly with mild communicating hydrocephalus. Results were sent to radiology results communication.      Ct Chest Wo Contrast    Result Date: 9/7/2020  EXAMINATION: CT OF THE CHEST WITHOUT CONTRAST 9/7/2020 1:37 pm TECHNIQUE: CT of the chest was performed without the administration of intravenous contrast. Multiplanar reformatted images are provided for review. Dose modulation, iterative reconstruction, and/or weight based adjustment of the mA/kV was utilized to reduce the radiation dose to as low as reasonably achievable. COMPARISON: Chest radiograph done today HISTORY: ORDERING SYSTEM PROVIDED HISTORY: CT neck showed ground glass opacity that needs follow up TECHNOLOGIST PROVIDED HISTORY: CT neck showed ground glass opacity that needs follow up Reason for Exam: CT neck showed ground glass opacity that needs follow up FINDINGS: Mediastinum: No mediastinal or hilar masses. No pericardial effusion. Lungs/pleura: Multiple solid nodules and ground-glass nodule noted. The solid nodules measures 6-7 mm in diameter. Ground-glass nodule measures approximately 8 mm. Upper Abdomen: Bilateral renal cysts. Otherwise, unremarkable. Multiple solid nodules and a ground-glass nodule. Soft Tissues/Bones: No acute fracture. No lytic or blastic lesions. 1. No acute abnormalities seen in the chest 2. Multiple pulmonary nodules. The largest solid nodule measures 6-7 mm. The largest ground-glass nodule measures 8 mm. Follow-up recommendations below RECOMMENDATIONS: Fleischner Society guidelines for follow-up and management of incidentally detected pulmonary nodules: Multiple Solid Nodules: Nodule size equals 6-8 mm In a low-risk patient, CT at 3-6 months, then consider CT at 18-24 months. In a high-risk patient, CT at 3-6 months, then CT at 18-24 months. Nodule size greater than 8 mm In a low-risk patient, CT at 3-6 months, then consider CT at 18-24 months. In a high-risk patient, CT at 3-6 months, then CT at 18-24 months. Radiology 2017 http://pubs. rsna.org/doi/full/10.1148/radiol. 8418788615     Xr Chest Portable    Result Date: 9/12/2020  EXAMINATION: ONE XRAY VIEW OF THE CHEST 9/12/2020 11:04 am COMPARISON: 09/07/2020 HISTORY: ORDERING SYSTEM PROVIDED HISTORY: TIO TECHNOLOGIST PROVIDED HISTORY: TIO Reason for Exam: CP Acuity: Unknown FINDINGS: The cardiomediastinal silhouette is within normal limits. There is no consolidation, pneumothorax or evidence for edema. No evidence for effusion. Recently identified small pulmonary nodules with CT are not visible on this exam.  No acute osseous abnormality is identified. No acute airspace disease identified. Xr Chest Portable    Result Date: 9/7/2020  EXAMINATION: ONE XRAY VIEW OF THE CHEST 9/7/2020 8:56 am COMPARISON: None. HISTORY: ORDERING SYSTEM PROVIDED HISTORY: stroke TECHNOLOGIST PROVIDED HISTORY: stroke FINDINGS: The cardiomediastinal silhouette is normal. No focal consolidation. The pulmonary vascularity is normal.  There is no pleural effusion or pneumothorax. Osseous structures grossly intact. No significant abnormalities detected. Cta Head Neck W Contrast    Result Date: 9/7/2020  EXAMINATION: CTA OF THE HEAD AND NECK WITH CONTRAST 9/7/2020 8:13 am: TECHNIQUE: CTA of the head and neck was performed with the administration of intravenous contrast. Multiplanar reformatted images are provided for review. MIP images are provided for review. Stenosis of the internal carotid arteries measured using NASCET criteria. Dose modulation, iterative reconstruction, and/or weight based adjustment of the mA/kV was utilized to reduce the radiation dose to as low as reasonably achievable. COMPARISON: Noncontrast CT head from earlier today HISTORY: ORDERING SYSTEM PROVIDED HISTORY: weakness TECHNOLOGIST PROVIDED HISTORY: weakness Reason for Exam: poss stroke Acuity: Unknown Type of Exam: Unknown FINDINGS: CTA NECK: AORTIC ARCH/ARCH VESSELS: No dissection or arterial injury. No significant stenosis of the brachiocephalic or subclavian arteries. CAROTID ARTERIES: No dissection, arterial injury, or hemodynamically significant stenosis by NASCET criteria. VERTEBRAL ARTERIES: No dissection, arterial injury, or significant stenosis.  SOFT TISSUES: There is mild http://pubs. rsna.org/doi/full/10.1148/radiol. 8599322488     Mri Brain Wo Contrast    Result Date: 9/7/2020  EXAMINATION: MRI OF THE BRAIN WITHOUT CONTRAST  9/7/2020 12:15 pm TECHNIQUE: Multiplanar multisequence MRI of the brain was performed without the administration of intravenous contrast. COMPARISON: 09/07/2020 HISTORY: ORDERING SYSTEM PROVIDED HISTORY: left side weakness and numbness with dizzy spinning sensation TECHNOLOGIST PROVIDED HISTORY: left side weakness and numbness with dizzy spinning sensation Reason for Exam: left side weakness and numbness with dizzy spinning sensation. FINDINGS: INTRACRANIAL STRUCTURES/VENTRICLES: There are no areas of restricted diffusion to suggest an acute infarct. The cerebral and cerebellar parenchyma demonstrate volume loss. There are no abnormal extra-axial fluid collections. The ventricles are enlarged, likely related to involutional changes. Normal pressure hydrocephalus cannot entirely be excluded. There are no areas of blooming artifact noted on the gradient echo sequences to suggest sequela of acute or chronic hemorrhage. ORBITS: The visualized portion of the orbits demonstrate no acute abnormality. SINUSES: There is scattered paranasal sinus disease, with greatest involvement in the left maxillary sinus. The mastoid air cells are clear. BONES/SOFT TISSUES: The bone marrow signal intensity and craniocervical junction are unremarkable. Pituitary gland is normal in appearance. 1. No evidence of acute infarct. 2. Cerebral parenchymal volume loss with prominence of the ventricular system, likely related to involutional change. Normal pressure hydrocephalus cannot entirely be excluded. RECENT VITALS:     Temp: 98 °F (36.7 °C),  Pulse: 71, Resp: 16, BP: 129/77, SpO2: 99 %      This patient is a 63 y.o. Male with gross hematuria as well as urinary retention. Had a recent urological procedure. Urology has been consulted and irrigated his bladder.   Did have one hypotensive and bradycardic episode however work-up in this regard is unremarkable. He did test positive for COVID-19. Neurology has been consulted as he has history of TIAs and urology would like to take him off of Plavix and aspirin. He is awaiting transfer to the floor at this time. OUTSTANDING TASKS / RECOMMENDATIONS:    1. Awaiting transfer to floor     FINAL IMPRESSION:     1. Gross hematuria    2.  COVID-19        DISPOSITION:         DISPOSITION:  []  Discharge   []  Transfer -    [x]  Admission - intermed    []  Against Medical Advice   []  Eloped   FOLLOW-UP: Tawnya Ram MD  [address] [facility] [address]  [address], [address] [facility]                                                                             [address] [facility] [phone]           DISCHARGE MEDICATIONS: New Prescriptions    No medications on file          Davion Ramos DO  Emergency Medicine Resident  [facility]       Davion Ramos [place]  Resident  09/12/20 [id number]

## 2020-09-13 LAB
ABSOLUTE EOS #: 0.06 K/UL (ref 0–0.44)
ABSOLUTE EOS #: 0.11 K/UL (ref 0–0.44)
ABSOLUTE IMMATURE GRANULOCYTE: 0.04 K/UL (ref 0–0.3)
ABSOLUTE IMMATURE GRANULOCYTE: 0.05 K/UL (ref 0–0.3)
ABSOLUTE LYMPH #: 1.44 K/UL (ref 1.1–3.7)
ABSOLUTE LYMPH #: 1.79 K/UL (ref 1.1–3.7)
ABSOLUTE MONO #: 0.46 K/UL (ref 0.1–1.2)
ABSOLUTE MONO #: 0.58 K/UL (ref 0.1–1.2)
ALBUMIN SERPL-MCNC: 3.5 G/DL (ref 3.5–5.2)
ALBUMIN/GLOBULIN RATIO: 1.4 (ref 1–2.5)
ALP BLD-CCNC: 54 U/L (ref 40–129)
ALT SERPL-CCNC: 25 U/L (ref 5–41)
ANION GAP SERPL CALCULATED.3IONS-SCNC: 9 MMOL/L (ref 9–17)
AST SERPL-CCNC: 18 U/L
BASOPHILS # BLD: 0 % (ref 0–2)
BASOPHILS # BLD: 0 % (ref 0–2)
BASOPHILS ABSOLUTE: <0.03 K/UL (ref 0–0.2)
BASOPHILS ABSOLUTE: <0.03 K/UL (ref 0–0.2)
BILIRUB SERPL-MCNC: 0.17 MG/DL (ref 0.3–1.2)
BILIRUBIN DIRECT: <0.08 MG/DL
BILIRUBIN, INDIRECT: ABNORMAL MG/DL (ref 0–1)
BUN BLDV-MCNC: 9 MG/DL (ref 8–23)
BUN/CREAT BLD: ABNORMAL (ref 9–20)
CALCIUM SERPL-MCNC: 8.2 MG/DL (ref 8.6–10.4)
CHLORIDE BLD-SCNC: 102 MMOL/L (ref 98–107)
CO2: 25 MMOL/L (ref 20–31)
CREAT SERPL-MCNC: 0.6 MG/DL (ref 0.7–1.2)
CULTURE: NO GROWTH
D-DIMER QUANTITATIVE: 0.27 MG/L FEU
DIFFERENTIAL TYPE: ABNORMAL
DIFFERENTIAL TYPE: ABNORMAL
EKG ATRIAL RATE: 69 BPM
EKG P AXIS: 58 DEGREES
EKG P-R INTERVAL: 186 MS
EKG Q-T INTERVAL: 428 MS
EKG QRS DURATION: 100 MS
EKG QTC CALCULATION (BAZETT): 458 MS
EKG R AXIS: 58 DEGREES
EKG T AXIS: 35 DEGREES
EKG VENTRICULAR RATE: 69 BPM
EOSINOPHILS RELATIVE PERCENT: 1 % (ref 1–4)
EOSINOPHILS RELATIVE PERCENT: 2 % (ref 1–4)
FERRITIN: 106 UG/L (ref 30–400)
FIBRINOGEN: 407 MG/DL (ref 140–420)
GFR AFRICAN AMERICAN: >60 ML/MIN
GFR NON-AFRICAN AMERICAN: >60 ML/MIN
GFR SERPL CREATININE-BSD FRML MDRD: ABNORMAL ML/MIN/{1.73_M2}
GFR SERPL CREATININE-BSD FRML MDRD: ABNORMAL ML/MIN/{1.73_M2}
GLOBULIN: ABNORMAL G/DL (ref 1.5–3.8)
GLUCOSE BLD-MCNC: 100 MG/DL (ref 70–99)
HCT VFR BLD CALC: 33.1 % (ref 40.7–50.3)
HCT VFR BLD CALC: 34.6 % (ref 40.7–50.3)
HEMOGLOBIN: 10.8 G/DL (ref 13–17)
HEMOGLOBIN: 11.3 G/DL (ref 13–17)
IMMATURE GRANULOCYTES: 1 %
IMMATURE GRANULOCYTES: 1 %
INR BLD: 1
LACTATE DEHYDROGENASE: 101 U/L (ref 135–225)
LYMPHOCYTES # BLD: 21 % (ref 24–43)
LYMPHOCYTES # BLD: 22 % (ref 24–43)
Lab: NORMAL
MAGNESIUM: 2 MG/DL (ref 1.6–2.6)
MCH RBC QN AUTO: 26.8 PG (ref 25.2–33.5)
MCH RBC QN AUTO: 27.1 PG (ref 25.2–33.5)
MCHC RBC AUTO-ENTMCNC: 32.6 G/DL (ref 28.4–34.8)
MCHC RBC AUTO-ENTMCNC: 32.7 G/DL (ref 28.4–34.8)
MCV RBC AUTO: 82 FL (ref 82.6–102.9)
MCV RBC AUTO: 83.2 FL (ref 82.6–102.9)
MONOCYTES # BLD: 7 % (ref 3–12)
MONOCYTES # BLD: 7 % (ref 3–12)
NRBC AUTOMATED: 0 PER 100 WBC
NRBC AUTOMATED: 0 PER 100 WBC
PDW BLD-RTO: 12.8 % (ref 11.8–14.4)
PDW BLD-RTO: 12.9 % (ref 11.8–14.4)
PLATELET # BLD: 269 K/UL (ref 138–453)
PLATELET # BLD: 291 K/UL (ref 138–453)
PLATELET ESTIMATE: ABNORMAL
PLATELET ESTIMATE: ABNORMAL
PMV BLD AUTO: 9.1 FL (ref 8.1–13.5)
PMV BLD AUTO: 9.5 FL (ref 8.1–13.5)
POTASSIUM SERPL-SCNC: 3.4 MMOL/L (ref 3.7–5.3)
PROTHROMBIN TIME: 10.5 SEC (ref 9–12)
RBC # BLD: 3.98 M/UL (ref 4.21–5.77)
RBC # BLD: 4.22 M/UL (ref 4.21–5.77)
RBC # BLD: ABNORMAL 10*6/UL
RBC # BLD: ABNORMAL 10*6/UL
SEG NEUTROPHILS: 69 % (ref 36–65)
SEG NEUTROPHILS: 69 % (ref 36–65)
SEGMENTED NEUTROPHILS ABSOLUTE COUNT: 4.93 K/UL (ref 1.5–8.1)
SEGMENTED NEUTROPHILS ABSOLUTE COUNT: 5.58 K/UL (ref 1.5–8.1)
SODIUM BLD-SCNC: 136 MMOL/L (ref 135–144)
SPECIMEN DESCRIPTION: NORMAL
TOTAL PROTEIN: 6 G/DL (ref 6.4–8.3)
WBC # BLD: 7 K/UL (ref 3.5–11.3)
WBC # BLD: 8.1 K/UL (ref 3.5–11.3)
WBC # BLD: ABNORMAL 10*3/UL
WBC # BLD: ABNORMAL 10*3/UL

## 2020-09-13 PROCEDURE — 6370000000 HC RX 637 (ALT 250 FOR IP): Performed by: STUDENT IN AN ORGANIZED HEALTH CARE EDUCATION/TRAINING PROGRAM

## 2020-09-13 PROCEDURE — 99254 IP/OBS CNSLTJ NEW/EST MOD 60: CPT | Performed by: INTERNAL MEDICINE

## 2020-09-13 PROCEDURE — 2580000003 HC RX 258: Performed by: STUDENT IN AN ORGANIZED HEALTH CARE EDUCATION/TRAINING PROGRAM

## 2020-09-13 PROCEDURE — 85379 FIBRIN DEGRADATION QUANT: CPT

## 2020-09-13 PROCEDURE — 36415 COLL VENOUS BLD VENIPUNCTURE: CPT

## 2020-09-13 PROCEDURE — 99232 SBSQ HOSP IP/OBS MODERATE 35: CPT | Performed by: INTERNAL MEDICINE

## 2020-09-13 PROCEDURE — 85025 COMPLETE CBC W/AUTO DIFF WBC: CPT

## 2020-09-13 PROCEDURE — 51700 IRRIGATION OF BLADDER: CPT

## 2020-09-13 PROCEDURE — 6360000002 HC RX W HCPCS: Performed by: STUDENT IN AN ORGANIZED HEALTH CARE EDUCATION/TRAINING PROGRAM

## 2020-09-13 PROCEDURE — 6370000000 HC RX 637 (ALT 250 FOR IP): Performed by: NURSE PRACTITIONER

## 2020-09-13 PROCEDURE — 83615 LACTATE (LD) (LDH) ENZYME: CPT

## 2020-09-13 PROCEDURE — 80048 BASIC METABOLIC PNL TOTAL CA: CPT

## 2020-09-13 PROCEDURE — 93010 ELECTROCARDIOGRAM REPORT: CPT | Performed by: INTERNAL MEDICINE

## 2020-09-13 PROCEDURE — 85384 FIBRINOGEN ACTIVITY: CPT

## 2020-09-13 PROCEDURE — 80076 HEPATIC FUNCTION PANEL: CPT

## 2020-09-13 PROCEDURE — 85610 PROTHROMBIN TIME: CPT

## 2020-09-13 PROCEDURE — 82728 ASSAY OF FERRITIN: CPT

## 2020-09-13 PROCEDURE — 1200000000 HC SEMI PRIVATE

## 2020-09-13 PROCEDURE — 83735 ASSAY OF MAGNESIUM: CPT

## 2020-09-13 RX ORDER — POTASSIUM CHLORIDE 20 MEQ/1
40 TABLET, EXTENDED RELEASE ORAL PRN
Status: DISCONTINUED | OUTPATIENT
Start: 2020-09-13 | End: 2020-09-15 | Stop reason: HOSPADM

## 2020-09-13 RX ORDER — POTASSIUM CHLORIDE 7.45 MG/ML
10 INJECTION INTRAVENOUS PRN
Status: DISCONTINUED | OUTPATIENT
Start: 2020-09-13 | End: 2020-09-15 | Stop reason: HOSPADM

## 2020-09-13 RX ADMIN — POTASSIUM CHLORIDE 40 MEQ: 1500 TABLET, EXTENDED RELEASE ORAL at 03:27

## 2020-09-13 RX ADMIN — HYOSCYAMINE SULFATE 125 MCG: 0.12 TABLET ORAL; SUBLINGUAL at 20:05

## 2020-09-13 RX ADMIN — SODIUM CHLORIDE, PRESERVATIVE FREE 10 ML: 5 INJECTION INTRAVENOUS at 20:05

## 2020-09-13 RX ADMIN — SODIUM CHLORIDE 3000 ML: 900 IRRIGANT IRRIGATION at 03:22

## 2020-09-13 RX ADMIN — SODIUM CHLORIDE 3000 ML: 900 IRRIGANT IRRIGATION at 23:04

## 2020-09-13 RX ADMIN — SODIUM CHLORIDE 3000 ML: 900 IRRIGANT IRRIGATION at 21:24

## 2020-09-13 RX ADMIN — CEFAZOLIN 1 G: 1 INJECTION, POWDER, FOR SOLUTION INTRAMUSCULAR; INTRAVENOUS at 05:38

## 2020-09-13 RX ADMIN — SODIUM CHLORIDE 3000 ML: 900 IRRIGANT IRRIGATION at 01:24

## 2020-09-13 RX ADMIN — SODIUM CHLORIDE 3000 ML: 900 IRRIGANT IRRIGATION at 10:48

## 2020-09-13 RX ADMIN — SODIUM CHLORIDE: 9 INJECTION, SOLUTION INTRAVENOUS at 04:12

## 2020-09-13 RX ADMIN — SODIUM CHLORIDE, PRESERVATIVE FREE 10 ML: 5 INJECTION INTRAVENOUS at 09:00

## 2020-09-13 RX ADMIN — SODIUM CHLORIDE 3000 ML: 900 IRRIGANT IRRIGATION at 05:37

## 2020-09-13 RX ADMIN — SODIUM CHLORIDE 3000 ML: 900 IRRIGANT IRRIGATION at 17:00

## 2020-09-13 ASSESSMENT — PAIN SCALES - GENERAL
PAINLEVEL_OUTOF10: 0
PAINLEVEL_OUTOF10: 0

## 2020-09-13 NOTE — FLOWSHEET NOTE
VIA PHONE   Conversation with: Soo Goncalves   Phone Number: [phone]    Assessment: The patient said they were feeling fine. The patient did want to know if the  could help with getting the internal med. doctor to call and talked to him. He has a question. (Chetan Huynh remembers this patient from an ED encounter on 09-07-20. The patients son drove him to the hospital because when he woke up he was very dizzy.)     Intervention:  maintained ministerial presence while the actively listened.  explored the thoughts and feeling of the individual.  asked about the persons support system during this time. If needed the Spiritual Care office is open 24/7, if no one answers to leave and message and a  will address it as soon as they can.  called and talked to the patients nurse inquiring if there was a doctor that could talk to the patient. Outcome: They were thankful for the conversation. 09/13/20 3936   Encounter Summary   Services provided to: Patient   Referral/Consult From: [address] [place] [address] Road Visiting   (09/13/20)   Complexity of Encounter Moderate   Length of Encounter 15 minutes   Routine   Type Follow up   Assessment Calm; Approachable   Intervention Active listening;Explored feelings, thoughts, concerns;Nurtured hope   Outcome Expressed gratitude

## 2020-09-13 NOTE — CONSULTS
[facility] -   Infectious diseases evaluation  admission date 9/12/2020    reason for consultation:   COVID    Impression :   Current:  · COVID positive test-no COVID illness  · BPH post greenlight August 14, 2020,  · Postop persistent hematuria  · Massive hematuria  · Syncope related to massive hematuria  · Recent TIA on Plavix aspirin. Other:  ·   Discussion / summary of stay / plan of care   ·   Recommendations   · No need to treat the COVID since it is not associated with illness  · Okay to discharge anytime once okay with all  · The patient will need to evaluate for 10 days since the positive test when he leaves home,  · This was discussed with the patient and he was comforted  ·     Infection Control Recommendations   · [place] Precautions  · Contact Isolation   · Airborne isolation  · Droplet Isolation    Antimicrobial Stewardship Recommendations     · off therapy    Coordination ofOutpatient Care:   · Estimated Length of IV antimicrobials:  · Patient will need Midline / picc Catheter Insertion:   · Patient will need SNF:  · Patient will need outpatient wound care:     History of Present Illness:   Initial history:  Ivanna Khoury is a 63y.o.-year-old male presented mostly for the hematuria. Has had a history of hematuria and was planned for a greenlight laser for BPH on 8/14/2020 which went successfully. Continues to have on and off hematuria since then but recently had a TIA and was started on aspirin and Plavix. The morning of admission he had a massive hematuria and he had passed out, no seizure noticed no fever associated.   Admitted to the emergency room, he tested positive for COVID  He does not smoke or drink  M[facility] about 6 months ago  No known COVID exposure    Found to have a sodium of 129  Chest x-ray is negative for pneumonia    He had a CT scan on 9/7/2020 also negative for pneumonia    He denies a fever cough shortness of breath or any chest symptoms  He just feels that his abdomen is distended and full, he has a good appetite no diarrhea    Interval changes  9/13/2020   Due to the current efforts to prevent transmission of COVID-19 and also the need to preserve PPE for other caregivers, a face-to-face encounter with the patient was not performed. That being said, all relevant records and diagnostic tests were reviewed, including laboratory results and imaging. When appropriate, patient was evaluated from the window, called on the phone, and chart reviewed, disc w  involved healthcare workers. Summary of relevant labs:  Labs:    Micro:    Imaging:      I have personally reviewed the past medical history, past surgical history, medications, social history, and family history, and I haveupdated the database accordingly.   Past Medical History:     Past Medical History:   Diagnosis Date    Hyperlipidemia     Hypertension     Wellness examination     patient states no PCP       Past Surgical  History:     Past Surgical History:   Procedure Laterality Date    CYSTOSCOPY N/A 7/30/2020    CYSTOSCOPY performed by Harry Hough MD at [place] [address]  08/14/2020     CYSTO, TUR PROSTATE GREENLIGHT XPS     TURP N/A 8/14/2020    CYSTO, TUR PROSTATE GREENLIGHT XPS performed by Harry Hough MD at [address] [place] [address]      varicose vein of right leg       Medications:      sodium chloride flush  10 mL Intravenous 2 times per day       Social History:     Social History     Socioeconomic History    Marital status:      Spouse name: Not on file    Number of children: Not on file    Years of education: Not on file    Highest education level: Not on file   Occupational History    Not on file   Social Needs    Financial resource strain: Not on file    Food insecurity     Worry: Not on file     Inability: Not on file    Transportation needs     Medical: Not on file     Non-medical: Not on file   Tobacco Use    Smoking status: Never Smoker    Smokeless tobacco: Never Used   Substance and Sexual Activity    Alcohol use: Never     Frequency: Never    Drug use: Never    Sexual activity: Yes   Lifestyle    Physical activity     Days per week: Not on file     Minutes per session: Not on file    Stress: Not on file   Relationships    Social connections     Talks on phone: Not on file     Gets together: Not on file     Attends [employer] service: Not on file     Active member of club or organization: Not on file     Attends meetings of clubs or organizations: Not on file     Relationship status: Not on file    Intimate partner violence     Fear of current or ex partner: Not on file     Emotionally abused: Not on file     Physically abused: Not on file     Forced sexual activity: Not on file   Other Topics Concern    Not on file   Social History Narrative    Not on file       Family History:   History reviewed. No pertinent family history. Allergies:   Patient has no known allergies.      Review of Systems:     Review of Systems    Physical Examination :     Patient Vitals for the past 8 hrs:   BP Temp Temp src Pulse Resp SpO2   09/13/20 1700 110/66 98.5 °F (36.9 °C) Oral 93 18 99 %   09/13/20 1200 112/71 -- -- 73 16 --       Physical Exam      Medical Decision Making:   I have independently reviewed/ordered the following labs:    CBC with Differential:   Recent Labs     09/12/20  1106 09/12/20  2019 09/13/20  0610   WBC 7.8 9.7 7.0   HGB 12.4* 12.0* 11.3*   HCT 39.2* 36.9* 34.6*    285 269   LYMPHOPCT 32  --  21*   MONOPCT 8  --  7     BMP:  Recent Labs     09/12/20  1106 09/13/20  0610   * 136   K 3.3* 3.4*   CL 98 102   CO2 21 25   BUN 14 9   CREATININE 0.63* 0.60*   MG 2.0 2.0     Hepatic Function Panel:   Recent Labs     09/13/20  1624   PROT 6.0*   LABALBU 3.5   BILIDIR <0.08   IBILI CANNOT BE CALCULATED   BILITOT 0.17*   ALKPHOS 54   ALT 25   AST 18     No results for input(s): RPR in the last 72 hours. No results for input(s): HIV in the last 72 hours. No results for input(s): BC in the last 72 hours. Lab Results   Component Value Date    CREATININE 0.60 09/13/2020    GLUCOSE 100 09/13/2020       Detailed results: Thank you for allowing us to participate in the care of this patient. Please call with questions. This note is created with the assistance of a speech recognition program.  While intending to generate adocument that actually reflects the content of the visit, the document can still have some errors including those of syntax and sound a like substitutions which may escape proof reading. It such instances, actual meaningcan be extrapolated by contextual diversion.     Jennifer Bass MD  Office: [phone]  Perfect serve / office [phone]

## 2020-09-13 NOTE — PROGRESS NOTES
[facility]  Office: [address] [facility], DO, Ila [facility], DO, Graciela [place], DO, Joya Nis Blood, DO, Kimberly Romero MD, Yoli Fernández MD, Merrill Sanchez MD, Waldemar Oreilly MD, Lupe Sheehan MD, Michelle Grijalva MD, Laurent Al MD, Alonso Umaña MD, Yordan Lo MD, Bri Krishna, DO, Shubham Vasquez MD, Napoleon Hebert MD, Talita Ramirez, DO, Sandeep Lugo MD,  Venkatesh Perez, DO, Ela Hines MD, Magdalena Valentino MD, Gio Joseph, [facility], CNP, Sebastian Lowry, CNP, Tony Palacios, CNS, Bertha Linton, CNP, Gloria Polk, CNP, Williemae Lundborg, CNP, Charanjit Logan, CNP, Tio Shields, CNP, DONNA Eli AspC, Lex Bae, [facility], Ulises Room, CNP, Amado Burnette, CNP, Vick Huffman, CNP, Shwetha Amezcua, CNP, Miguel A Garcia, [address] [place] [address]    Progress Note    9/13/2020    11:46 AM    Name:   Vy Blanco  MRN:     [id number]     Acct:      [de-identified]   Room:   3027/3027-01   Day:  1  Admit Date:  9/12/2020 10:34 AM    PCP:   Eleanor Batista MD  Code Status:  Full Code    Subjective:     C/C:   Chief Complaint   Patient presents with    Hematuria     had prostate procedure august 14th, has been urinating blood since 0300    Chest Pain     Interval History Status: improved. Patient has no complaints currently except for abdominal fullness. He is hungry and eager to eat. He is surprised regarding his positive COVID test.  He states he has had no symptoms and no one around him has been sick. He eagerly awaits discharge and requested to go to the bathroom for his bowel movement rather than using the commode. Brief History:     The patient is a 63 y.o. Non-/non  male who presents with Hematuria (had prostate procedure august 14th, has been urinating blood since 0300) and Chest Pain   and he is admitted to the hospital for the management of  Gross hematuria.   Patient presented to emergency room with gross hematuria, blood clots in urine. He had history of greenlight laser on 8/14/2020 by Dr. Lynnette Young. Postoperatively he had been having hematuria and was advised conservative monitoring as could be expected after laser procedure. Patient also reported intermittent blood clots in urine. He had stopped aspirin use since surgery. Patient was admitted recently in the hospital 5 days ago with episode of dizziness and possible syncope. Work-up at that time with CT head, CTA head and neck, MRI brain was negative for stroke. Patient was diagnosed with TIA and was put on aspirin and Plavix. Patient had worsening of his bleeding since starting medications. He had another episodes of severe bleeding this morning and passed out around 7 AM.  Patient denied any seizure-like activity, focal weakness, speech changes, headaches. Patient has underlying history of hypertension which is controlled, hyperlipidemia which is also controlled. He had recently moved from [facility] about 6 months ago where he worked as . Patient is independent on his ADLs. Patient is non-smoker and denies alcohol use. Initial lab evaluation showed hyponatremia 129, hypokalemia 3.3, normal kidney function, negative troponin, hemoglobin 12.4. Review of Systems:     Constitutional:  negative for chills, fevers, sweats  Respiratory:  negative for cough, dyspnea on exertion, shortness of breath, wheezing  Cardiovascular:  negative for chest pain, chest pressure/discomfort, lower extremity edema, palpitations  Gastrointestinal:  negative for abdominal pain, constipation, diarrhea, nausea, vomiting  Neurological:  negative for dizziness, headache    Medications:      Allergies:  No Known Allergies    Current Meds:   Scheduled Meds:    sodium chloride flush  10 mL Intravenous 2 times per day     Continuous Infusions:    sodium chloride       PRN Meds: potassium chloride **OR** potassium alternative oral replacement **OR** potassium chloride, lidocaine, sodium chloride flush, acetaminophen **OR** acetaminophen, polyethylene glycol, promethazine **OR** ondansetron, hyoscyamine    Data:     Past Medical History:   has a past medical history of Hyperlipidemia, Hypertension, and Wellness examination. Social History:   reports that he has never smoked. He has never used smokeless tobacco. He reports that he does not drink alcohol or use drugs. Family History: History reviewed. No pertinent family history. Vitals:  /77   Pulse 83   Temp 98.1 °F (36.7 °C) (Oral)   Resp 19   Wt 180 lb (81.6 kg)   SpO2 98%   BMI 29.05 kg/m²   Temp (24hrs), Av.3 °F (36.8 °C), Min:98 °F (36.7 °C), Max:98.6 °F (37 °C)    No results for input(s): POCGLU in the last 72 hours. I/O (24Hr):     Intake/Output Summary (Last 24 hours) at 2020 1146  Last data filed at 2020 1052  Gross per 24 hour   Intake 1128 ml   Output -1150 ml   Net 2278 ml       Labs:  Hematology:  Recent Labs     20  1106 20  2019 20  0610   WBC 7.8 9.7 7.0   RBC 4.65 4.47 4.22   HGB 12.4* 12.0* 11.3*   HCT 39.2* 36.9* 34.6*   MCV 84.3 82.6 82.0*   MCH 26.7 26.8 26.8   MCHC 31.6 32.5 32.7   RDW 12.6 12.6 12.8    285 269   MPV 9.4 9.2 9.5     Chemistry:  Recent Labs     20  1106 20  1504 20  0610   *  --  136   K 3.3*  --  3.4*   CL 98  --  102   CO2 21  --  25   GLUCOSE 168*  --  100*   BUN 14  --  9   CREATININE 0.63*  --  0.60*   MG 2.0  --  2.0   ANIONGAP 10  --  9   LABGLOM >60  --  >60   GFRAA >60  --  >60   CALCIUM 8.0*  --  8.2*   PROBNP 32  --   --    TROPHS <6 <6  --      Recent Labs     20  1106   TSH 0.89     ABG:  Lab Results   Component Value Date    FIO2 NOT REPORTED 2020     Lab Results   Component Value Date/Time    SPECIAL NOT REPORTED 2020 12:30 PM     Lab Results   Component Value Date/Time    CULTURE NO GROWTH 2020 12:30 PM       Radiology:  Ct Head Wo

## 2020-09-13 NOTE — PROGRESS NOTES
Urology Progress Note  CC:  Gross hematuria  Subjective:   Ivanna Khoury is a 63 y.o. male. His/Her current Diet is: DIET GENERAL;.  Screening COVID test came back positive  No chest pain, shortness of breath, nausea, vomiting, fevers, chills  Did have any irrigations overnight. Was light pink this morning. On slow CBI drip. His sensation of a distended bladder has significantly improved.       Patient Vitals for the past 24 hrs:   BP Temp Temp src Pulse Resp SpO2 Weight   09/13/20 0700 112/74 -- -- 76 14 97 % --   09/13/20 0330 112/68 98 °F (36.7 °C) Oral 67 15 98 % --   09/13/20 0300 118/78 -- -- 67 14 98 % --   09/13/20 0230 116/75 -- -- 67 15 98 % --   09/13/20 0200 127/76 -- -- 71 15 100 % --   09/13/20 0130 121/72 -- -- 67 15 98 % --   09/13/20 0100 127/76 -- -- 64 15 99 % --   09/13/20 0030 134/73 -- -- 63 15 99 % --   09/13/20 0000 134/76 98.4 °F (36.9 °C) Oral 60 14 98 % --   09/12/20 2330 129/78 -- -- 63 15 99 % --   09/12/20 2300 132/72 -- -- 70 17 98 % --   09/12/20 2230 126/78 -- -- 67 14 99 % --   09/12/20 2200 116/80 -- -- 67 15 99 % --   09/12/20 2130 125/89 -- -- 70 16 98 % --   09/12/20 2100 124/83 -- -- 70 18 99 % --   09/12/20 2030 136/81 98.4 °F (36.9 °C) Oral 73 19 100 % --   09/12/20 2000 (!) 146/76 -- -- 82 15 100 % --   09/12/20 1930 (!) 130/91 -- -- 74 11 100 % --   09/12/20 1918 134/83 -- -- 75 16 100 % --   09/12/20 1838 (!) 140/83 98.6 °F (37 °C) Oral 85 13 100 % --   09/12/20 1832 -- -- -- -- -- 100 % --   09/12/20 1831 -- -- -- 79 15 100 % --   09/12/20 1830 -- -- -- 81 16 100 % --   09/12/20 1827 (!) 145/78 -- -- 81 15 -- --   09/12/20 1805 -- -- -- 71 18 -- --   09/12/20 1701 127/78 -- -- 64 15 100 % --   09/12/20 1601 129/77 -- -- 78 13 100 % --   09/12/20 1541 127/75 -- -- 66 14 100 % --   09/12/20 1536 129/80 -- -- 68 13 100 % --   09/12/20 1532 126/78 -- -- 67 15 100 % --   09/12/20 1527 129/77 -- -- 71 16 99 % --   09/12/20 1522 128/89 -- -- 77 17 100 % --   09/12/20 1516 136/82 -- -- 77 13 100 % --   09/12/20 1511 (!) 142/84 -- -- 80 14 100 % --   09/12/20 1506 133/86 -- -- 70 16 100 % --   09/12/20 1501 128/78 -- -- 70 14 100 % --   09/12/20 1456 122/81 -- -- 68 18 100 % --   09/12/20 1451 134/77 -- -- 70 17 100 % --   09/12/20 1448 100/82 -- -- 77 8 100 % --   09/12/20 1441 131/79 -- -- 73 14 99 % --   09/12/20 1436 116/72 -- -- 74 12 100 % --   09/12/20 1431 126/74 -- -- 65 16 100 % --   09/12/20 1426 132/80 -- -- 60 11 100 % --   09/12/20 1421 129/84 -- -- 63 14 100 % --   09/12/20 1416 127/84 -- -- 70 13 100 % --   09/12/20 1411 124/83 -- -- 64 15 100 % --   09/12/20 1406 101/80 -- -- 66 15 100 % --   09/12/20 1401 130/77 -- -- 66 13 100 % --   09/12/20 1356 113/84 -- -- 64 15 100 % --   09/12/20 1351 129/86 -- -- 72 16 99 % --   09/12/20 1346 138/87 -- -- 72 16 100 % --   09/12/20 1341 136/87 -- -- 71 18 100 % --   09/12/20 1336 (!) 143/87 -- -- 75 15 100 % --   09/12/20 1332 (!) 142/90 -- -- 71 18 100 % --   09/12/20 1327 131/82 -- -- 70 14 100 % --   09/12/20 1323 129/86 -- -- 70 17 100 % --   09/12/20 1316 134/86 -- -- 71 16 100 % --   09/12/20 1310 133/86 -- -- 70 18 100 % --   09/12/20 1306 123/84 -- -- 71 11 100 % --   09/12/20 1301 134/84 -- -- 73 15 100 % --   09/12/20 1256 121/82 -- -- 72 16 100 % --   09/12/20 1251 118/81 -- -- 56 14 100 % --   09/12/20 1246 121/74 -- -- 54 11 100 % --   09/12/20 1240 117/79 -- -- 58 14 100 % --   09/12/20 1237 116/77 -- -- 54 13 100 % --   09/12/20 1232 123/78 -- -- 55 14 100 % --   09/12/20 1227 119/75 -- -- 59 16 100 % --   09/12/20 1222 123/77 -- -- 59 16 100 % --   09/12/20 1217 120/81 -- -- 58 16 100 % --   09/12/20 1212 127/79 -- -- 63 16 100 % --   09/12/20 1207 126/80 -- -- 57 13 100 % --   09/12/20 1202 128/78 -- -- 64 17 100 % --   09/12/20 1157 124/77 -- -- 61 15 100 % --   09/12/20 1151 130/80 -- -- 66 19 100 % --   09/12/20 1146 129/78 -- -- 60 13 100 % --   09/12/20 1141 126/77 -- -- 64 17 100 % --   09/12/20 1136 125/74 -- -- 65 14 100 % --   09/12/20 1131 117/75 -- -- 63 17 100 % --   09/12/20 1126 123/76 -- -- 63 20 100 % --   09/12/20 1122 125/76 -- -- 63 19 98 % --   09/12/20 1117 99/72 -- -- 62 19 95 % --   09/12/20 1116 (!) 75/54 -- -- 53 14 96 % --   09/12/20 1101 121/81 -- -- 64 21 97 % --   09/12/20 1057 123/81 98 °F (36.7 °C) Oral 59 17 95 % 180 lb (81.6 kg)   09/12/20 1055 123/81 -- -- 64 16 95 % --   09/12/20 1046 (!) 122/98 -- -- 67 15 97 % --   09/12/20 1040 123/76 -- -- 66 19 98 % --       Intake/Output Summary (Last 24 hours) at 9/13/2020 0839  Last data filed at 9/13/2020 0500  Gross per 24 hour   Intake 1128 ml   Output -1450 ml   Net 2578 ml       Recent Labs     09/12/20  1106 09/12/20  2019 09/13/20  0610   WBC 7.8 9.7 7.0   HGB 12.4* 12.0* 11.3*   HCT 39.2* 36.9* 34.6*   MCV 84.3 82.6 82.0*    285 269     Recent Labs     09/12/20  1106 09/13/20  0610   * 136   K 3.3* 3.4*   CL 98 102   CO2 21 25   BUN 14 9   CREATININE 0.63* 0.60*       Recent Labs     09/12/20  1105   COLORU RED*   PHUR 7.5   WBCUA 0 TO 2   RBCUA TOO NUMEROUS TO COUNT   MUCUS NOT REPORTED   TRICHOMONAS NOT REPORTED   YEAST NOT REPORTED   BACTERIA NOT REPORTED   SPECGRAV 1.020   LEUKOCYTESUR SMALL*   UROBILINOGEN Normal   BILIRUBINUR NEGATIVE       Current Facility-Administered Medications   Medication Dose Route Frequency Provider Last Rate Last Dose    potassium chloride (KLOR-CON M) extended release tablet 40 mEq  40 mEq Oral PRN Larinda Cage, APRN - CNP   40 mEq at 09/13/20 0327    Or    potassium bicarb-citric acid (EFFER-K) effervescent tablet 40 mEq  40 mEq Oral PRN Larinda Cage, APRN - CNP        Or    potassium chloride 10 mEq/100 mL IVPB (Peripheral Line)  10 mEq Intravenous PRN Larinda Cage, APRN - CNP        lidocaine (XYLOCAINE) 2 % uro-jet   Topical PRN [place] Hey, DO        ceFAZolin (ANCEF) 1 g in dextrose 5 % 50 mL IVPB (mini-bag)  1 g Intravenous Q8H Leticia John, DO   Stopped at 09/13/20 9244    sodium chloride 0.9 % irrigation 3,000 mL  3,000 mL Irrigation Continuous Suleiman Big, DO   3,000 mL at 09/13/20 0537    sodium chloride flush 0.9 % injection 10 mL  10 mL Intravenous 2 times per day Suleiman Big, DO   10 mL at 09/12/20 2043    sodium chloride flush 0.9 % injection 10 mL  10 mL Intravenous PRN Suleiman Big, DO        acetaminophen (TYLENOL) tablet 650 mg  650 mg Oral Q6H PRN Suleiman Big, DO        Or    acetaminophen (TYLENOL) suppository 650 mg  650 mg Rectal Q6H PRN Suleiman Big, DO        polyethylene glycol (GLYCOLAX) packet 17 g  17 g Oral Daily PRN Suleiman Big, DO        promethazine (PHENERGAN) tablet 12.5 mg  12.5 mg Oral Q6H PRN Suleiman Big, DO        Or    ondansetron [facility]) injection 4 mg  4 mg Intravenous Q6H PRN Suleiman Big, DO        0.9 % sodium chloride infusion   Intravenous Continuous Suleiman Big,  mL/hr at 09/13/20 0412      hyoscyamine (LEVSIN/SL) sublingual tablet 125 mcg  125 mcg Sublingual Q6H PRN Cristin Sharp MD                Additional Lab/culture results:  UCx no growth    Physical Exam:   NAD  AOx3  Peripheral pulses palpable  Regular rate    Respirations nonlabored, symmetric chest rise bilaterally  Soft, NT, ND  Tovar with a light pink urine on a slow drip 2 drips per second. I did hand irrigate for about 15 cc of clot and placed back to tension.   No calf ttp bilaterally      Interval Imaging Findings:    Impression:    Patient Active Problem List   Diagnosis    BPH with urinary obstruction    TIA (transient ischemic attack)    Gross hematuria    Hematuria    COVID-19    Vasovagal syncope    Postprocedural urinary retention     Ivanna Khoury is a 63 y.o. male with   Problem(s):  - gross hematuria   - Coagulopathy on aspirin and plavix  -Lower urinary tract symptoms secondary to enlarged prostate status post TUR prostate greenlight August 14, 2020  - Cr 0.60  - Urinary retention (clot

## 2020-09-13 NOTE — CARE COORDINATION
Case Management Initial Discharge Plan  Ivanna Khoury,             Met with:patient over the phone to discuss discharge plans. Information verified: address, contacts, phone number, , insurance Yes    Emergency Contact/Next of Kin name & number: Diaz Grigsby (son) [phone]    PCP: Stiven Santos MD  Date of last visit:     Insurance Provider: [address]    Discharge Planning    Living Arrangements:  Alone   Support Systems:       Home is 6th floor apartment with elevator entry    Patient able to perform ADL's:Independent    Current Services (outpatient & in home) none  DME equipment: none  DME provider: none    Receiving oral anticoagulation therapy? No    If indicated:   Physician managing anticoagulation treatment:   Where does patient obtain lab work for ATC treatment? Potential Assistance Needed:  N/A    Patient agreeable to home care: No  [place] of choice provided:  no    Prior SNF/Rehab Placement and Facility: no  Agreeable to SNF/Rehab: No  [place] of choice provided: no     Evaluation: no    Expected Discharge date:       Patient expects to be discharged to:  home  Follow Up Appointment: Best Day/ Time:      Transportation provider: needs cab  Transportation arrangements needed for discharge: Yes    Readmission Risk              Risk of Unplanned Readmission:        11             Does patient have a readmission risk score greater than 14?: No  If yes, follow-up appointment must be made within 7 days of discharge.      Goals of Care: bleeding to stop      Discharge Plan: home independently, needs cab          Electronically signed by Charlotte Armstrong RN on 20 at 4:01 PM EDT

## 2020-09-14 LAB
ABSOLUTE EOS #: 0.15 K/UL (ref 0–0.44)
ABSOLUTE EOS #: 0.25 K/UL (ref 0–0.44)
ABSOLUTE IMMATURE GRANULOCYTE: 0.04 K/UL (ref 0–0.3)
ABSOLUTE IMMATURE GRANULOCYTE: <0.03 K/UL (ref 0–0.3)
ABSOLUTE LYMPH #: 1.61 K/UL (ref 1.1–3.7)
ABSOLUTE LYMPH #: 2.16 K/UL (ref 1.1–3.7)
ABSOLUTE MONO #: 0.47 K/UL (ref 0.1–1.2)
ABSOLUTE MONO #: 0.6 K/UL (ref 0.1–1.2)
ANION GAP SERPL CALCULATED.3IONS-SCNC: 7 MMOL/L (ref 9–17)
BASOPHILS # BLD: 0 % (ref 0–2)
BASOPHILS # BLD: 0 % (ref 0–2)
BASOPHILS ABSOLUTE: 0.03 K/UL (ref 0–0.2)
BASOPHILS ABSOLUTE: <0.03 K/UL (ref 0–0.2)
BUN BLDV-MCNC: 11 MG/DL (ref 8–23)
BUN/CREAT BLD: ABNORMAL (ref 9–20)
CALCIUM SERPL-MCNC: 8.4 MG/DL (ref 8.6–10.4)
CHLORIDE BLD-SCNC: 106 MMOL/L (ref 98–107)
CO2: 26 MMOL/L (ref 20–31)
CREAT SERPL-MCNC: 0.7 MG/DL (ref 0.7–1.2)
DIFFERENTIAL TYPE: ABNORMAL
DIFFERENTIAL TYPE: ABNORMAL
EOSINOPHILS RELATIVE PERCENT: 2 % (ref 1–4)
EOSINOPHILS RELATIVE PERCENT: 3 % (ref 1–4)
GFR AFRICAN AMERICAN: >60 ML/MIN
GFR NON-AFRICAN AMERICAN: >60 ML/MIN
GFR SERPL CREATININE-BSD FRML MDRD: ABNORMAL ML/MIN/{1.73_M2}
GFR SERPL CREATININE-BSD FRML MDRD: ABNORMAL ML/MIN/{1.73_M2}
GLUCOSE BLD-MCNC: 97 MG/DL (ref 70–99)
HCT VFR BLD CALC: 32 % (ref 40.7–50.3)
HCT VFR BLD CALC: 33.1 % (ref 40.7–50.3)
HEMOGLOBIN: 10.4 G/DL (ref 13–17)
HEMOGLOBIN: 10.6 G/DL (ref 13–17)
IMMATURE GRANULOCYTES: 0 %
IMMATURE GRANULOCYTES: 1 %
LYMPHOCYTES # BLD: 24 % (ref 24–43)
LYMPHOCYTES # BLD: 28 % (ref 24–43)
MAGNESIUM: 2.2 MG/DL (ref 1.6–2.6)
MCH RBC QN AUTO: 27.2 PG (ref 25.2–33.5)
MCH RBC QN AUTO: 27.3 PG (ref 25.2–33.5)
MCHC RBC AUTO-ENTMCNC: 32 G/DL (ref 28.4–34.8)
MCHC RBC AUTO-ENTMCNC: 32.5 G/DL (ref 28.4–34.8)
MCV RBC AUTO: 83.8 FL (ref 82.6–102.9)
MCV RBC AUTO: 85.3 FL (ref 82.6–102.9)
MONOCYTES # BLD: 7 % (ref 3–12)
MONOCYTES # BLD: 8 % (ref 3–12)
NRBC AUTOMATED: 0 PER 100 WBC
NRBC AUTOMATED: 0 PER 100 WBC
PDW BLD-RTO: 12.9 % (ref 11.8–14.4)
PDW BLD-RTO: 13 % (ref 11.8–14.4)
PLATELET # BLD: 266 K/UL (ref 138–453)
PLATELET # BLD: 290 K/UL (ref 138–453)
PLATELET ESTIMATE: ABNORMAL
PLATELET ESTIMATE: ABNORMAL
PMV BLD AUTO: 9.3 FL (ref 8.1–13.5)
PMV BLD AUTO: 9.6 FL (ref 8.1–13.5)
POTASSIUM SERPL-SCNC: 3.5 MMOL/L (ref 3.7–5.3)
RBC # BLD: 3.82 M/UL (ref 4.21–5.77)
RBC # BLD: 3.88 M/UL (ref 4.21–5.77)
RBC # BLD: ABNORMAL 10*6/UL
RBC # BLD: ABNORMAL 10*6/UL
SEG NEUTROPHILS: 60 % (ref 36–65)
SEG NEUTROPHILS: 67 % (ref 36–65)
SEGMENTED NEUTROPHILS ABSOLUTE COUNT: 4.41 K/UL (ref 1.5–8.1)
SEGMENTED NEUTROPHILS ABSOLUTE COUNT: 4.71 K/UL (ref 1.5–8.1)
SODIUM BLD-SCNC: 139 MMOL/L (ref 135–144)
WBC # BLD: 6.7 K/UL (ref 3.5–11.3)
WBC # BLD: 7.8 K/UL (ref 3.5–11.3)
WBC # BLD: ABNORMAL 10*3/UL
WBC # BLD: ABNORMAL 10*3/UL

## 2020-09-14 PROCEDURE — 51700 IRRIGATION OF BLADDER: CPT

## 2020-09-14 PROCEDURE — 99232 SBSQ HOSP IP/OBS MODERATE 35: CPT | Performed by: INTERNAL MEDICINE

## 2020-09-14 PROCEDURE — 85025 COMPLETE CBC W/AUTO DIFF WBC: CPT

## 2020-09-14 PROCEDURE — 51798 US URINE CAPACITY MEASURE: CPT

## 2020-09-14 PROCEDURE — 36415 COLL VENOUS BLD VENIPUNCTURE: CPT

## 2020-09-14 PROCEDURE — 1200000000 HC SEMI PRIVATE

## 2020-09-14 PROCEDURE — 6370000000 HC RX 637 (ALT 250 FOR IP): Performed by: NURSE PRACTITIONER

## 2020-09-14 PROCEDURE — 2580000003 HC RX 258: Performed by: STUDENT IN AN ORGANIZED HEALTH CARE EDUCATION/TRAINING PROGRAM

## 2020-09-14 PROCEDURE — 80048 BASIC METABOLIC PNL TOTAL CA: CPT

## 2020-09-14 PROCEDURE — 6370000000 HC RX 637 (ALT 250 FOR IP): Performed by: STUDENT IN AN ORGANIZED HEALTH CARE EDUCATION/TRAINING PROGRAM

## 2020-09-14 PROCEDURE — 83735 ASSAY OF MAGNESIUM: CPT

## 2020-09-14 PROCEDURE — 6370000000 HC RX 637 (ALT 250 FOR IP): Performed by: INTERNAL MEDICINE

## 2020-09-14 RX ORDER — ROSUVASTATIN CALCIUM 10 MG/1
10 TABLET, COATED ORAL DAILY
Status: DISCONTINUED | OUTPATIENT
Start: 2020-09-14 | End: 2020-09-15 | Stop reason: HOSPADM

## 2020-09-14 RX ADMIN — HYPROMELLOSE 2906 (4000 MPA.S) AND HYPROMELLOSE 2906 (50 MPA.S) 1 DROP: 25; 25 SOLUTION OPHTHALMIC at 12:06

## 2020-09-14 RX ADMIN — SODIUM CHLORIDE 3000 ML: 900 IRRIGANT IRRIGATION at 04:30

## 2020-09-14 RX ADMIN — SODIUM CHLORIDE 3000 ML: 900 IRRIGANT IRRIGATION at 02:47

## 2020-09-14 RX ADMIN — SODIUM CHLORIDE, PRESERVATIVE FREE 10 ML: 5 INJECTION INTRAVENOUS at 21:18

## 2020-09-14 RX ADMIN — ROSUVASTATIN CALCIUM 10 MG: 10 TABLET, COATED ORAL at 12:05

## 2020-09-14 RX ADMIN — POTASSIUM CHLORIDE 40 MEQ: 1500 TABLET, EXTENDED RELEASE ORAL at 12:06

## 2020-09-14 RX ADMIN — HYOSCYAMINE SULFATE 125 MCG: 0.12 TABLET ORAL; SUBLINGUAL at 20:20

## 2020-09-14 RX ADMIN — SODIUM CHLORIDE 3000 ML: 900 IRRIGANT IRRIGATION at 01:08

## 2020-09-14 RX ADMIN — HYOSCYAMINE SULFATE 125 MCG: 0.12 TABLET ORAL; SUBLINGUAL at 05:57

## 2020-09-14 RX ADMIN — SODIUM CHLORIDE, PRESERVATIVE FREE 10 ML: 5 INJECTION INTRAVENOUS at 12:12

## 2020-09-14 ASSESSMENT — PAIN SCALES - GENERAL: PAINLEVEL_OUTOF10: 0

## 2020-09-14 NOTE — PLAN OF CARE
Problem: Airway Clearance - Ineffective  Goal: Achieve or maintain patent airway  9/14/2020 1745 by Stefan Quan RN  Outcome: Ongoing  9/14/2020 0626 by Jenni Allan RN  Outcome: Ongoing     Problem: Gas Exchange - Impaired  Goal: Absence of hypoxia  9/14/2020 1745 by Stefan Quan RN  Outcome: Ongoing  9/14/2020 0626 by Jenni Allan RN  Outcome: Ongoing  Goal: Promote optimal lung function  9/14/2020 1745 by Stefan Quan RN  Outcome: Ongoing  9/14/2020 0626 by Jenni Allan RN  Outcome: Ongoing     Problem: Breathing Pattern - Ineffective  Goal: Ability to achieve and maintain a regular respiratory rate  9/14/2020 1745 by Stefan Quan RN  Outcome: Ongoing  9/14/2020 0626 by Jenni Allan RN  Outcome: Ongoing     Problem:  Body Temperature -  Risk of, Imbalanced  Goal: Ability to maintain a body temperature within defined limits  9/14/2020 1745 by Stefan Quan RN  Outcome: Ongoing  9/14/2020 0626 by Jenni Allan RN  Outcome: Ongoing  Goal: Will regain or maintain usual level of consciousness  9/14/2020 1745 by Stefan Quan RN  Outcome: Ongoing  9/14/2020 0626 by Jenni Allan RN  Outcome: Ongoing  Goal: Complications related to the disease process, condition or treatment will be avoided or minimized  9/14/2020 1745 by Stefan Quan RN  Outcome: Ongoing  9/14/2020 0626 by Jenni Allan RN  Outcome: Ongoing     Problem: Isolation Precautions - Risk of Spread of Infection  Goal: Prevent transmission of infection  9/14/2020 1745 by Stefan Quan RN  Outcome: Ongoing  9/14/2020 0626 by Jenni Allan RN  Outcome: Ongoing

## 2020-09-14 NOTE — PROGRESS NOTES
Urology Progress Note  CC:  Gross hematuria  Subjective:   Ivanna Khoury is a 63 y.o. male. His/Her current Diet is: Diet NPO, After Midnight. Screening COVID test  Is positive, but he reported no fever or symptoms of cough, etc prior to admission. No chest pain, shortness of breath, nausea, vomiting, fevers, chills  Required multiple hand irrigations overnight as patient felt catheter was not draining. Urine lightly pink on slow gtt CBI  His sensation of a distended bladder has significantly improved.       Patient Vitals for the past 24 hrs:   BP Temp Temp src Pulse Resp SpO2 Weight   09/14/20 0600 -- -- -- -- -- -- 175 lb (79.4 kg)   09/14/20 0430 127/63 97.6 °F (36.4 °C) Oral 83 20 97 % --   09/14/20 0135 -- -- -- 67 -- -- --   09/13/20 2000 109/68 99 °F (37.2 °C) Oral 95 24 98 % --   09/13/20 1700 110/66 98.5 °F (36.9 °C) Oral 93 18 99 % --   09/13/20 1200 112/71 -- -- 73 16 -- --   09/13/20 0930 119/77 -- -- 83 19 -- --   09/13/20 0800 113/70 98.1 °F (36.7 °C) Oral 70 15 98 % --       Intake/Output Summary (Last 24 hours) at 9/14/2020 0730  Last data filed at 9/14/2020 0615  Gross per 24 hour   Intake --   Output 1000 ml   Net -1000 ml       Recent Labs     09/13/20  0610 09/13/20  2000 09/14/20  0530   WBC 7.0 8.1 6.7   HGB 11.3* 10.8* 10.4*   HCT 34.6* 33.1* 32.0*   MCV 82.0* 83.2 83.8    291 266     Recent Labs     09/12/20  1106 09/13/20  0610 09/14/20  0530   * 136 139   K 3.3* 3.4* 3.5*   CL 98 102 106   CO2 21 25 26   BUN 14 9 11   CREATININE 0.63* 0.60* 0.70       Recent Labs     09/12/20  1105   COLORU RED*   PHUR 7.5   WBCUA 0 TO 2   RBCUA TOO NUMEROUS TO COUNT   MUCUS NOT REPORTED   TRICHOMONAS NOT REPORTED   YEAST NOT REPORTED   BACTERIA NOT REPORTED   SPECGRAV 1.020   LEUKOCYTESUR SMALL*   UROBILINOGEN Normal   BILIRUBINUR NEGATIVE       Current Facility-Administered Medications   Medication Dose Route Frequency Provider Last Rate Last Dose    rosuvastatin (CRESTOR) tablet 10 mg 10 mg Oral Daily Dannis Evelia, DO        potassium chloride (KLOR-CON M) extended release tablet 40 mEq  40 mEq Oral PRN Levander Pikes, APRN - CNP   40 mEq at 09/13/20 0327    Or    potassium bicarb-citric acid (EFFER-K) effervescent tablet 40 mEq  40 mEq Oral PRN Levander Pikes, APRN - CNP        Or    potassium chloride 10 mEq/100 mL IVPB (Peripheral Line)  10 mEq Intravenous PRN Levander Pikes, APRN - CNP        lidocaine (XYLOCAINE) 2 % uro-jet   Topical PRN Cindra Bernie, DO        sodium chloride 0.9 % irrigation 3,000 mL  3,000 mL Irrigation Continuous Cindra Bernie, DO   3,000 mL at 09/14/20 0430    sodium chloride flush 0.9 % injection 10 mL  10 mL Intravenous 2 times per day Cindra Bernie, DO   10 mL at 09/13/20 2005    sodium chloride flush 0.9 % injection 10 mL  10 mL Intravenous PRN Cindra Bernie, DO        acetaminophen (TYLENOL) tablet 650 mg  650 mg Oral Q6H PRN Cindra Bernie, DO        Or    acetaminophen (TYLENOL) suppository 650 mg  650 mg Rectal Q6H PRN Cindra Bernie, DO        polyethylene glycol (GLYCOLAX) packet 17 g  17 g Oral Daily PRN Cindra Bernie, DO        promethazine (PHENERGAN) tablet 12.5 mg  12.5 mg Oral Q6H PRN Cindra Bernie, DO        Or    ondansetron [facility] injection 4 mg  4 mg Intravenous Q6H PRN Cindra Bernie, DO        hyoscyamine (LEVSIN/SL) sublingual tablet 125 mcg  125 mcg Sublingual Q6H PRN Sim Steiner MD   125 mcg at 09/14/20 0557            Additional Lab/culture results:  UCx no growth    Physical Exam:   NAD  AOx3  Normal rate  Respirations nonlabored, symmetric chest rise bilaterally  Soft, NT, ND  Tovar with a light pink urine on a slow drip 2 drips per second. I did hand irrigate for about 2 cc of clot and placed back to gravity.  No significant clot burden upon hand irrigation   No calf ttp bilaterally      Interval Imaging Findings:  Xr Chest Portable    Result Date: 9/12/2020  EXAMINATION: ONE XRAY VIEW OF THE CHEST 9/12/2020 11:04 am COMPARISON: 09/07/2020 HISTORY: ORDERING SYSTEM PROVIDED HISTORY: CP TECHNOLOGIST PROVIDED HISTORY: CP Reason for Exam: CP Acuity: Unknown FINDINGS: The cardiomediastinal silhouette is within normal limits. There is no consolidation, pneumothorax or evidence for edema. No evidence for effusion. Recently identified small pulmonary nodules with CT are not visible on this exam.  No acute osseous abnormality is identified. No acute airspace disease identified. Impression:      Ivanna Adams Sa is a 63 y.o. male with   Problem(s):  - gross hematuria   - Coagulopathy on aspirin and plavix  - Lower urinary tract symptoms secondary to enlarged prostate status post TUR prostate [facility] August 14, 2020  - Cr 0.70  - Urinary retention (clot retention)  -Anemia acute blood loss anemia  - Co-Morbid Conditions: recent TIA, COVID-19, HTN, hyperlipidemia     Plan:   -Maintain 24 French indwelling three-way catheter. Hand irrigated this AM.  -CBI clamped. Possible void trial later today pending clamp trial   -Okay for general diet   -Urine culture - no growth stopped Ancef.  -levsin for bladder spasms PRN  -Monitor hemoglobin, stable at 10.4 from 10.8  -Maintain traction to the Tovar  -Appreciate neurology's recommendations, they are okay with holding aspirin and Plavix for the meantime.  -Will discuss with attending when it would be safe to restart.   -Hand irrigate as needed. I did discuss this with the nurse.         Paul Landon MD  Urology Resident, PGY-5  7:30 AM 9/14/2020

## 2020-09-14 NOTE — PROGRESS NOTES
blood clots in urine. He had history of greenlight laser on 8/14/2020 by Dr. Bipin Mishra. Postoperatively he had been having hematuria and was advised conservative monitoring as could be expected after laser procedure. Patient also reported intermittent blood clots in urine. He had stopped aspirin use since surgery. Patient was admitted recently in the hospital 5 days ago with episode of dizziness and possible syncope. Work-up at that time with CT head, CTA head and neck, MRI brain was negative for stroke. Patient was diagnosed with TIA and was put on aspirin and Plavix. Patient had worsening of his bleeding since starting medications. He had another episodes of severe bleeding this morning and passed out around 7 AM.  Patient denied any seizure-like activity, focal weakness, speech changes, headaches. Patient has underlying history of hypertension which is controlled, hyperlipidemia which is also controlled. He had recently moved from [facility] about 6 months ago where he worked as . Patient is independent on his ADLs. Patient is non-smoker and denies alcohol use. Initial lab evaluation showed hyponatremia 129, hypokalemia 3.3, normal kidney function, negative troponin, hemoglobin 12.4. Review of Systems:     Constitutional:  negative for chills, fevers, sweats  Respiratory:  negative for cough, dyspnea on exertion, shortness of breath, wheezing  Cardiovascular:  negative for chest pain, chest pressure/discomfort, lower extremity edema, palpitations  Gastrointestinal:  negative for abdominal pain, constipation, diarrhea, nausea, vomiting  Neurological:  negative for dizziness, headache    Medications:      Allergies:  No Known Allergies    Current Meds:   Scheduled Meds:    rosuvastatin  10 mg Oral Daily    sodium chloride flush  10 mL Intravenous 2 times per day     Continuous Infusions:    sodium chloride       PRN Meds: hydroxypropyl methylcellulose, potassium chloride **OR** potassium alternative oral replacement **OR** potassium chloride, lidocaine, sodium chloride flush, acetaminophen **OR** acetaminophen, polyethylene glycol, promethazine **OR** ondansetron, hyoscyamine    Data:     Past Medical History:   has a past medical history of Hyperlipidemia, Hypertension, and Wellness examination. Social History:   reports that he has never smoked. He has never used smokeless tobacco. He reports that he does not drink alcohol or use drugs. Family History: History reviewed. No pertinent family history. Vitals:  /66   Pulse 87   Temp 97.9 °F (36.6 °C) (Oral)   Resp 12   Wt 175 lb (79.4 kg)   SpO2 98%   BMI 28.25 kg/m²   Temp (24hrs), Av.3 °F (36.8 °C), Min:97.6 °F (36.4 °C), Max:99 °F (37.2 °C)    No results for input(s): POCGLU in the last 72 hours. I/O (24Hr):     Intake/Output Summary (Last 24 hours) at 2020 1020  Last data filed at 2020 0615  Gross per 24 hour   Intake --   Output 1000 ml   Net -1000 ml       Labs:  Hematology:  Recent Labs     20  0610 20  1624 20  0530   WBC 7.0  --  8.1 6.7   RBC 4.22  --  3.98* 3.82*   HGB 11.3*  --  10.8* 10.4*   HCT 34.6*  --  33.1* 32.0*   MCV 82.0*  --  83.2 83.8   MCH 26.8  --  27.1 27.2   MCHC 32.7  --  32.6 32.5   RDW 12.8  --  12.9 12.9     --  291 266   MPV 9.5  --  9.1 9.3   INR  --  1.0  --   --    DDIMER  --  0.27  --   --      Chemistry:  Recent Labs     20  1106 20  1504 20  0610 20  0530   *  --  136 139   K 3.3*  --  3.4* 3.5*   CL 98  --  102 106   CO2 21  --  25 26   GLUCOSE 168*  --  100* 97   BUN 14  --  9 11   CREATININE 0.63*  --  0.60* 0.70   MG 2.0  --  2.0 2.2   ANIONGAP 10  --  9 7*   LABGLOM >60  --  >60 >60   GFRAA >60  --  >60 >60   CALCIUM 8.0*  --  8.2* 8.4*   PROBNP 32  --   --   --    TROPHS <6 <6  --   --      Recent Labs     20  1106 20  1624   PROT  --  6.0*   LABALBU  --  3.5   TSH 0.89  --    AST --  18   ALT  --  25   LDH  --  101*   ALKPHOS  --  47   BILITOT  --  0.17*   BILIDIR  --  <0.08     ABG:  Lab Results   Component Value Date    FIO2 NOT REPORTED 09/07/2020     Lab Results   Component Value Date/Time    SPECIAL NOT REPORTED 09/12/2020 12:30 PM     Lab Results   Component Value Date/Time    CULTURE NO GROWTH 09/12/2020 12:30 PM       Radiology:  Ct Head Wo Contrast    Result Date: 9/7/2020  No acute intracranial abnormality. Mild ventriculomegaly, disproportionate to the degree of volume loss, possibly with mild communicating hydrocephalus. Results were sent to radiology results communication. Ct Chest Wo Contrast    Result Date: 9/7/2020  1. No acute abnormalities seen in the chest 2. Multiple pulmonary nodules. The largest solid nodule measures 6-7 mm. The largest ground-glass nodule measures 8 mm. Follow-up recommendations below RECOMMENDATIONS: Fleischner Society guidelines for follow-up and management of incidentally detected pulmonary nodules: Multiple Solid Nodules: Nodule size equals 6-8 mm In a low-risk patient, CT at 3-6 months, then consider CT at 18-24 months. In a high-risk patient, CT at 3-6 months, then CT at 18-24 months. Nodule size greater than 8 mm In a low-risk patient, CT at 3-6 months, then consider CT at 18-24 months. In a high-risk patient, CT at 3-6 months, then CT at 18-24 months. Radiology 2017 http://pubs. rsna.org/doi/full/10.1148/radiol. 7876340346     Xr Chest Portable    Result Date: 9/12/2020  No acute airspace disease identified. Xr Chest Portable    Result Date: 9/7/2020  No significant abnormalities detected. Cta Head Neck W Contrast    Result Date: 9/7/2020  No acute abnormality or flow-limiting stenosis of the major arteries of the head and neck. 8 mm ground-glass lung nodule at the left lung apex. Solid lung nodules measuring up to 6 mm. Follow-up recommendation is listed below.  RECOMMENDATIONS: 2017 Fleischner Society Recommendations for Subsolid Lung Nodules Follow-Up base on size (average of long- and short-axis diameters). Use most suspicious nodule for followup. Single > or = 6 mm Ground glass: CT at 6-12 months to confirm persistence, then CT every 2 years until 5 years Fleischner Society guidelines for follow-up and management of incidentally detected pulmonary nodules: Multiple Solid Nodules: Nodule size less than 6 mm In a low-risk patient, no routine follow-up. In a high-risk patient, optional CT at 12 months. - Low risk patients include individuals with minimal or absent history of smoking and other known risk factors. - High risk patients include individuals with a history or smoking or known risk factors. Radiology 2017 http://pubs. rsna.org/doi/full/10.1148/radiol. 4901417255     Mri Brain Wo Contrast    Result Date: 9/7/2020  1. No evidence of acute infarct. 2. Cerebral parenchymal volume loss with prominence of the ventricular system, likely related to involutional change. Normal pressure hydrocephalus cannot entirely be excluded. Physical Examination:        General appearance:  alert, cooperative and no distress  Mental Status:  oriented to person, place and time and normal affect  Lungs:  clear to auscultation bilaterally, normal effort  Heart:  regular rate and rhythm, no murmur  Abdomen:  soft, nontender, nondistended, normal bowel sounds, no masses, hepatomegaly, splenomegaly  Extremities:  no edema, redness, tenderness in the calves, valladares catheter in place  Skin:  no gross lesions, rashes, induration    Assessment:        Hospital Problems           Last Modified POA    * (Principal) Gross hematuria 9/12/2020 Yes    Hematuria 9/12/2020 Yes    COVID-19 9/12/2020 Yes    Vasovagal syncope 9/12/2020 Yes    Postprocedural urinary retention 9/12/2020 Yes          Plan:        1.  Gross hematuria -continues to have blood in urine today, irrigation was temporarily started this morning, no plan for surgery, discharge to hold

## 2020-09-14 NOTE — PROGRESS NOTES
Instructed per the Urology team to continue CBI at slow rate for the night. Will try to clamp again in the morning.

## 2020-09-15 VITALS
HEIGHT: 66 IN | BODY MASS INDEX: 28.12 KG/M2 | TEMPERATURE: 97.7 F | SYSTOLIC BLOOD PRESSURE: 134 MMHG | OXYGEN SATURATION: 99 % | WEIGHT: 175 LBS | RESPIRATION RATE: 15 BRPM | DIASTOLIC BLOOD PRESSURE: 74 MMHG | HEART RATE: 86 BPM

## 2020-09-15 LAB
ABSOLUTE EOS #: 0.36 K/UL (ref 0–0.44)
ABSOLUTE IMMATURE GRANULOCYTE: 0.04 K/UL (ref 0–0.3)
ABSOLUTE LYMPH #: 2.21 K/UL (ref 1.1–3.7)
ABSOLUTE MONO #: 0.44 K/UL (ref 0.1–1.2)
ANION GAP SERPL CALCULATED.3IONS-SCNC: 9 MMOL/L (ref 9–17)
BASOPHILS # BLD: 0 % (ref 0–2)
BASOPHILS ABSOLUTE: <0.03 K/UL (ref 0–0.2)
BUN BLDV-MCNC: 13 MG/DL (ref 8–23)
BUN/CREAT BLD: ABNORMAL (ref 9–20)
CALCIUM SERPL-MCNC: 8.6 MG/DL (ref 8.6–10.4)
CHLORIDE BLD-SCNC: 106 MMOL/L (ref 98–107)
CO2: 23 MMOL/L (ref 20–31)
CREAT SERPL-MCNC: 0.63 MG/DL (ref 0.7–1.2)
DIFFERENTIAL TYPE: ABNORMAL
EOSINOPHILS RELATIVE PERCENT: 5 % (ref 1–4)
GFR AFRICAN AMERICAN: >60 ML/MIN
GFR NON-AFRICAN AMERICAN: >60 ML/MIN
GFR SERPL CREATININE-BSD FRML MDRD: ABNORMAL ML/MIN/{1.73_M2}
GFR SERPL CREATININE-BSD FRML MDRD: ABNORMAL ML/MIN/{1.73_M2}
GLUCOSE BLD-MCNC: 106 MG/DL (ref 70–99)
HCT VFR BLD CALC: 35.1 % (ref 40.7–50.3)
HEMOGLOBIN: 10.9 G/DL (ref 13–17)
IMMATURE GRANULOCYTES: 1 %
LYMPHOCYTES # BLD: 30 % (ref 24–43)
MCH RBC QN AUTO: 27 PG (ref 25.2–33.5)
MCHC RBC AUTO-ENTMCNC: 31.1 G/DL (ref 28.4–34.8)
MCV RBC AUTO: 86.9 FL (ref 82.6–102.9)
MONOCYTES # BLD: 6 % (ref 3–12)
NRBC AUTOMATED: 0.3 PER 100 WBC
PDW BLD-RTO: 12.8 % (ref 11.8–14.4)
PLATELET # BLD: 281 K/UL (ref 138–453)
PLATELET ESTIMATE: ABNORMAL
PMV BLD AUTO: 9.1 FL (ref 8.1–13.5)
POTASSIUM SERPL-SCNC: 3.8 MMOL/L (ref 3.7–5.3)
RBC # BLD: 4.04 M/UL (ref 4.21–5.77)
RBC # BLD: ABNORMAL 10*6/UL
SEG NEUTROPHILS: 58 % (ref 36–65)
SEGMENTED NEUTROPHILS ABSOLUTE COUNT: 4.38 K/UL (ref 1.5–8.1)
SODIUM BLD-SCNC: 138 MMOL/L (ref 135–144)
WBC # BLD: 7.5 K/UL (ref 3.5–11.3)
WBC # BLD: ABNORMAL 10*3/UL

## 2020-09-15 PROCEDURE — 36415 COLL VENOUS BLD VENIPUNCTURE: CPT

## 2020-09-15 PROCEDURE — 51798 US URINE CAPACITY MEASURE: CPT

## 2020-09-15 PROCEDURE — 99238 HOSP IP/OBS DSCHRG MGMT 30/<: CPT | Performed by: INTERNAL MEDICINE

## 2020-09-15 PROCEDURE — 6370000000 HC RX 637 (ALT 250 FOR IP): Performed by: INTERNAL MEDICINE

## 2020-09-15 PROCEDURE — 6370000000 HC RX 637 (ALT 250 FOR IP): Performed by: STUDENT IN AN ORGANIZED HEALTH CARE EDUCATION/TRAINING PROGRAM

## 2020-09-15 PROCEDURE — 85025 COMPLETE CBC W/AUTO DIFF WBC: CPT

## 2020-09-15 PROCEDURE — 2580000003 HC RX 258: Performed by: STUDENT IN AN ORGANIZED HEALTH CARE EDUCATION/TRAINING PROGRAM

## 2020-09-15 PROCEDURE — 6370000000 HC RX 637 (ALT 250 FOR IP): Performed by: NURSE PRACTITIONER

## 2020-09-15 PROCEDURE — 80048 BASIC METABOLIC PNL TOTAL CA: CPT

## 2020-09-15 RX ORDER — ASPIRIN 81 MG/1
81 TABLET ORAL DAILY
Qty: 30 TABLET | Refills: 5 | Status: SHIPPED | OUTPATIENT
Start: 2020-09-15 | End: 2021-09-23 | Stop reason: SDUPTHER

## 2020-09-15 RX ORDER — CLOPIDOGREL BISULFATE 75 MG/1
75 TABLET ORAL DAILY
Status: DISCONTINUED | OUTPATIENT
Start: 2020-09-15 | End: 2020-09-15 | Stop reason: HOSPADM

## 2020-09-15 RX ORDER — TAMSULOSIN HYDROCHLORIDE 0.4 MG/1
0.4 CAPSULE ORAL 2 TIMES DAILY
Status: DISCONTINUED | OUTPATIENT
Start: 2020-09-15 | End: 2020-09-15 | Stop reason: HOSPADM

## 2020-09-15 RX ORDER — ASPIRIN 81 MG/1
81 TABLET ORAL DAILY
Status: DISCONTINUED | OUTPATIENT
Start: 2020-09-15 | End: 2020-09-15 | Stop reason: HOSPADM

## 2020-09-15 RX ORDER — RAMIPRIL 5 MG/1
5 CAPSULE ORAL DAILY
Status: DISCONTINUED | OUTPATIENT
Start: 2020-09-15 | End: 2020-09-15 | Stop reason: HOSPADM

## 2020-09-15 RX ORDER — TAMSULOSIN HYDROCHLORIDE 0.4 MG/1
0.4 CAPSULE ORAL 2 TIMES DAILY
Qty: 60 CAPSULE | Refills: 5 | Status: SHIPPED | OUTPATIENT
Start: 2020-09-15 | End: 2021-10-22 | Stop reason: SDUPTHER

## 2020-09-15 RX ORDER — HYDROCHLOROTHIAZIDE 25 MG/1
25 TABLET ORAL DAILY
Status: DISCONTINUED | OUTPATIENT
Start: 2020-09-15 | End: 2020-09-15 | Stop reason: HOSPADM

## 2020-09-15 RX ADMIN — RAMIPRIL 5 MG: 5 CAPSULE ORAL at 09:49

## 2020-09-15 RX ADMIN — SODIUM CHLORIDE, PRESERVATIVE FREE 10 ML: 5 INJECTION INTRAVENOUS at 08:23

## 2020-09-15 RX ADMIN — POLYETHYLENE GLYCOL 3350 17 G: 17 POWDER, FOR SOLUTION ORAL at 09:49

## 2020-09-15 RX ADMIN — HYDROCHLOROTHIAZIDE 25 MG: 25 TABLET ORAL at 09:49

## 2020-09-15 RX ADMIN — POTASSIUM CHLORIDE 40 MEQ: 1500 TABLET, EXTENDED RELEASE ORAL at 08:23

## 2020-09-15 RX ADMIN — TAMSULOSIN HYDROCHLORIDE 0.4 MG: 0.4 CAPSULE ORAL at 09:49

## 2020-09-15 RX ADMIN — ROSUVASTATIN CALCIUM 10 MG: 10 TABLET, COATED ORAL at 08:23

## 2020-09-15 ASSESSMENT — PAIN SCALES - GENERAL
PAINLEVEL_OUTOF10: 0
PAINLEVEL_OUTOF10: 0

## 2020-09-15 NOTE — PROGRESS NOTES
[facility]  Occupational Therapy Not Seen Note    DATE: 9/15/2020  Name: Maria C Cohen  : 1960  MRN: [id number]    Patient not available for Occupational Therapy due to:    [] Testing:    [] Hemodialysis    [] Blood Transfusion in Progress    []Refusal by Patient:    [] Surgery/Procedure:    [] Strict Bedrest    [] Sedation    [] Spine Precautions     [] Pt being transferred to palliative care at this time. Spoke with pt/family and OT services to be defered. [x] Spoke with RN, Pt independent with functional mobility and ADLs. Pt taking self to bathroom and up in room.  Pt with no OT acute care needs at this time, will defer OT eval. Re-consult if functional deficits arise    [] Other    Next Scheduled Treatment:    rTav Oliver, OTR/L

## 2020-09-15 NOTE — PLAN OF CARE
Problem: Airway Clearance - Ineffective  Goal: Achieve or maintain patent airway  9/15/2020 0343 by Marin Doyle RN  Outcome: Ongoing  9/14/2020 1745 by Vincent Diaz RN  Outcome: Ongoing     Problem: Gas Exchange - Impaired  Goal: Absence of hypoxia  9/15/2020 0343 by Marin Doyle RN  Outcome: Ongoing  9/14/2020 1745 by Vincent Diaz RN  Outcome: Ongoing  Goal: Promote optimal lung function  9/15/2020 0343 by Marin Doyle RN  Outcome: Ongoing  9/14/2020 1745 by Vincent Diaz RN  Outcome: Ongoing     Problem: Breathing Pattern - Ineffective  Goal: Ability to achieve and maintain a regular respiratory rate  9/15/2020 0343 by Marin Doyle RN  Outcome: Ongoing  9/14/2020 1745 by Vincent Diaz RN  Outcome: Ongoing     Problem:  Body Temperature -  Risk of, Imbalanced  Goal: Ability to maintain a body temperature within defined limits  9/15/2020 0343 by Marin Doyle RN  Outcome: Ongoing  9/14/2020 1745 by Vincent Diaz RN  Outcome: Ongoing  Goal: Will regain or maintain usual level of consciousness  9/15/2020 0343 by Marin Doyle RN  Outcome: Ongoing  9/14/2020 1745 by Vincent Diaz RN  Outcome: Ongoing  Goal: Complications related to the disease process, condition or treatment will be avoided or minimized  9/15/2020 0343 by Marin Doyle RN  Outcome: Ongoing  9/14/2020 1745 by Vincent Diaz RN  Outcome: Ongoing     Problem: Isolation Precautions - Risk of Spread of Infection  Goal: Prevent transmission of infection  9/15/2020 0343 by Marin Doyle RN  Outcome: Ongoing  9/14/2020 1745 by Vincent Diaz RN  Outcome: Ongoing     Problem: Nutrition Deficits  Goal: Optimize nutrtional status  9/15/2020 0343 by Marin Doyle RN  Outcome: Ongoing  9/14/2020 1745 by Vincent Diaz RN  Outcome: Ongoing     Problem: Risk for Fluid Volume Deficit  Goal: Maintain normal heart rhythm  9/15/2020 0343 by Marin Doyle RN  Outcome: Ongoing  9/14/2020 1745 by Vincent Diaz RN  Outcome: Ongoing  Goal: Maintain absence of muscle cramping  9/15/2020 0343 by Anrdez Salcedo RN  Outcome: Ongoing  9/14/2020 1745 by Lalit Hayden RN  Outcome: Ongoing  Goal: Maintain normal serum potassium, sodium, calcium, phosphorus, and pH  9/15/2020 0343 by Andrez Salcedo RN  Outcome: Ongoing  9/14/2020 1745 by Lalit Hayden RN  Outcome: Ongoing     Problem: Loneliness or Risk for Loneliness  Goal: Demonstrate positive use of time alone when socialization is not possible  Outcome: Ongoing     Problem: Fatigue  Goal: Verbalize increase energy and improved vitality  Outcome: Ongoing     Problem: Patient Education: Go to Patient Education Activity  Goal: Patient/Family Education  Outcome: Ongoing

## 2020-09-15 NOTE — DISCHARGE SUMMARY
[facility]  Office: [address] [facility], DO, Elizabeth Dines, DO, Beba Gris, DO, Aislinn Ricci Blood, DO, Drake Camacho MD, Tresa Bella MD, Jackelin Ramesh MD, Andrew Layton MD, Stephon Arriaza MD, Ashley Pedroza MD, Nette Hines MD, Tita Spear MD, Yordan gAuilar MD, Jane Chris, DO, Shubham Iniguez MD, Ruben Crain MD, Sierra Jay DO, Emilie Ambrosio MD,  Gerry Carlin DO, Laverne Spence MD, Millie Nichols MD, Rico Landrum, [facility], [facility], CNP, Poncho Amas, CNP, Denise Pulse, CNS, Kelvin Halsted, CNP, Sherice Ivy, CNP, Deanna Barton, CNP, Pilar Pulse, CNP, Christa Mo, CNP, Erica Simon PA-C, [place] , [facility], Jose Weldon, CNP, Mary Mcwilliams, CNP, Zoey Escoto, CNP, Ayan Gonzalez, CNP, Perla Morack, [facility]    Discharge Summary     Patient ID: Laura Rebolledo  :  1960   MRN: [id number]     ACCOUNT:  [de-identified]   Patient's PCP: Krysta Claudio MD  Admit Date: 2020   Discharge Date: 9/15/2020Length of Stay: 3  Code Status:  Full Code  Admitting Physician: Romi Wiggins MD  Discharge Physician: Ashley Pedroza MD     Active Discharge Diagnoses:     Hospital Problem Lists:  Principal Problem:    Gross hematuria  Active Problems:    Hematuria    COVID-19    Vasovagal syncope    Postprocedural urinary retention  Resolved Problems:    * No resolved hospital problems. *      Admission Condition:  fair   Discharged Condition: good    Hospital Stay:     Hospital Course: 70-year-old presented with gross hematuria, blood clots in urine, presyncope after a greenlight laser PVP on 2020 by Dr. Roark Klinefelter. Was recently started on aspirin and Plavix for possible TIA. ED work-up: COVID screen was positive with a negative chest x-ray. All inflammatory markers were negative.   ID recommended no treatment as he was asymptomatic.  -Patient was started on CBI per urology, aspirin and Plavix were held. Patient's hemoglobin remained stable and hematuria improved and is being discharged in stable condition. He is to stop Plavix completely as he developed TIA symptoms while off of aspirin. He is to continue aspirin which urology recommended restarting on 9/18/2020. He has a follow-up with urology as scheduled already.  -He was provided with a note for work restriction and is advised to follow-up with PCP for a repeat COVID19 swab for possible traveled back home to [place]. Radiology:  Xr Chest Portable    Result Date: 9/12/2020  No acute airspace disease identified. Consultations:    Consults:     Final Specialist Recommendations/Findings:   IP CONSULT TO UROLOGY  IP CONSULT TO NEUROLOGY  IP CONSULT TO CASE MANAGEMENT  IP CONSULT TO INFECTIOUS DISEASES      The patient was seen and examined on day of discharge and this discharge summary is in conjunction with any daily progress note from day of discharge. Discharge plan:     Disposition: Home    Physician Follow Up:   Mitra Farmer MD  [address] [facility] [address]  [address] [facility] [address] [phone]             Diet: regular diet  Activity: As tolerated  Discharge Medications:      Medication List      CHANGE how you take these medications    aspirin 81 MG EC tablet  Take 1 tablet by mouth daily Please start on 09/18/20  What changed:    · additional instructions  · Another medication with the same name was removed. Continue taking this medication, and follow the directions you see here.         CONTINUE taking these medications    hydroCHLOROthiazide 25 MG tablet  Commonly known as:  HYDRODIURIL  Take 1 tablet by mouth daily     ramipril 5 MG capsule  Commonly known as:  ALTACE  Take 1 capsule by mouth daily     rosuvastatin 10 MG tablet  Commonly known as:  CRESTOR  Take 1 tablet

## 2020-09-15 NOTE — PROGRESS NOTES
[facility] -   Infectious diseases evaluation  admission date 9/12/2020    reason for consultation:   COVID    Impression :   Current:  · COVID positive test-no COVID illness  · BPH post greenlight August 14, 2020,  · Postop persistent hematuria  · Massive hematuria  · Syncope related to massive hematuria  · Recent TIA on Plavix aspirin. Other:  ·   Discussion / summary of stay / plan of care   ·   Recommendations   · No need to treat the COVID since it is not associated with illness  · Okay to discharge anytime once okay with all  · The patient will need to evaluate for 10 days since the positive test when he leaves home,  · This was discussed with the patient and he was comforted  ·     Infection Control Recommendations   · [place] Precautions  · Contact Isolation   · Airborne isolation  · Droplet Isolation    Antimicrobial Stewardship Recommendations     · off therapy    Coordination ofOutpatient Care:   · Estimated Length of IV antimicrobials:  · Patient will need Midline / picc Catheter Insertion:   · Patient will need SNF:  · Patient will need outpatient wound care:     History of Present Illness:   Initial history:  Ivanna Khoury is a 63y.o.-year-old male presented mostly for the hematuria. Has had a history of hematuria and was planned for a greenlight laser for BPH on 8/14/2020 which went successfully. Continues to have on and off hematuria since then but recently had a TIA and was started on aspirin and Plavix. The morning of admission he had a massive hematuria and he had passed out, no seizure noticed no fever associated.   Admitted to the emergency room, he tested positive for COVID  He does not smoke or drink  Moved from [facility] about 6 months ago  No known COVID exposure    Found to have a sodium of 129  Chest x-ray is negative for pneumonia    He had a CT scan on 9/7/2020 also negative for pneumonia    He denies a fever cough shortness of breath or any chest symptoms  He just feels that his abdomen is distended and full, he has a good appetite no diarrhea    Interval changes    Due to the current efforts to prevent transmission of COVID-19 and also the need to preserve PPE for other caregivers, a face-to-face encounter with the patient was not performed. That being said, all relevant records and diagnostic tests were reviewed, including laboratory results and imaging. When appropriate, patient was evaluated from the window, called on the phone, and chart reviewed, disc w  involved healthcare workers. No fever chills, little anxious about his new diagnosis of COVID test.  Long explanation to the patient about his condition. He is positive but has not had the infection and hence does not need to be treated. Yet he need to stay in isolation. No value of repeating another test, says a positive test over rules the others. Labs otherwise reviewed    Summary of relevant labs:  Labs:    Micro:    Imaging:      I have personally reviewed the past medical history, past surgical history, medications, social history, and family history, and I haveupdated the database accordingly.   Past Medical History:     Past Medical History:   Diagnosis Date    Hyperlipidemia     Hypertension     Wellness examination     patient states no PCP       Past Surgical  History:     Past Surgical History:   Procedure Laterality Date    CYSTOSCOPY N/A 7/30/2020    CYSTOSCOPY performed by Tod Macedo MD at [place] [address]  08/14/2020     CYSTO, TUR PROSTATE GREENLIGHT XPS     TURP N/A 8/14/2020    CYSTO, TUR PROSTATE GREENLIGHT XPS performed by Tod Macedo MD at [address] [place] [address]      varicose vein of right leg       Medications:      rosuvastatin  10 mg Oral Daily    sodium chloride flush  10 mL Intravenous 2 times per day       Social History:     Social History     Socioeconomic History    Marital status:      Spouse name: Not on file  Number of children: Not on file    Years of education: Not on file    Highest education level: Not on file   Occupational History    Not on file   Social Needs    Financial resource strain: Not on file    Food insecurity     Worry: Not on file     Inability: Not on file    Transportation needs     Medical: Not on file     Non-medical: Not on file   Tobacco Use    Smoking status: Never Smoker    Smokeless tobacco: Never Used   Substance and Sexual Activity    Alcohol use: Never     Frequency: Never    Drug use: Never    Sexual activity: Yes   Lifestyle    Physical activity     Days per week: Not on file     Minutes per session: Not on file    Stress: Not on file   Relationships    Social connections     Talks on phone: Not on file     Gets together: Not on file     Attends Synagogue service: Not on file     Active member of club or organization: Not on file     Attends meetings of clubs or organizations: Not on file     Relationship status: Not on file    Intimate partner violence     Fear of current or ex partner: Not on file     Emotionally abused: Not on file     Physically abused: Not on file     Forced sexual activity: Not on file   Other Topics Concern    Not on file   Social History Narrative    Not on file       Family History:   History reviewed. No pertinent family history. Allergies:   Patient has no known allergies.      Review of Systems:     Review of Systems    Physical Examination :     Patient Vitals for the past 8 hrs:   BP Temp Temp src Pulse Resp SpO2   09/14/20 2017 115/71 96.8 °F (36 °C) Oral 86 15 99 %       Physical Exam      Medical Decision Making:   I have independently reviewed/ordered the following labs:    CBC with Differential:   Recent Labs     09/14/20  0530 09/14/20 2021   WBC 6.7 7.8   HGB 10.4* 10.6*   HCT 32.0* 33.1*    290   LYMPHOPCT 24 28   MONOPCT 7 8     BMP:  Recent Labs     09/13/20  0610 09/14/20  0530    139   K 3.4* 3.5*    106 CO2 25 26   BUN 9 11   CREATININE 0.60* 0.70   MG 2.0 2.2     Hepatic Function Panel:   Recent Labs     09/13/20  1624   PROT 6.0*   LABALBU 3.5   BILIDIR <0.08   IBILI CANNOT BE CALCULATED   BILITOT 0.17*   ALKPHOS 54   ALT 25   AST 18     No results for input(s): RPR in the last 72 hours. No results for input(s): HIV in the last 72 hours. No results for input(s): BC in the last 72 hours. Lab Results   Component Value Date    CREATININE 0.70 09/14/2020    GLUCOSE 97 09/14/2020       Detailed results: Thank you for allowing us to participate in the care of this patient. Please call with questions. This note is created with the assistance of a speech recognition program.  While intending to generate adocument that actually reflects the content of the visit, the document can still have some errors including those of syntax and sound a like substitutions which may escape proof reading. It such instances, actual meaningcan be extrapolated by contextual diversion.     Butch Romreo MD  Office: [phone]  Perfect serve / office [phone]

## 2020-09-15 NOTE — PROGRESS NOTES
Physical Therapy  DATE: 9/15/2020    NAME: Mainor Krishnan  MRN: [id number]   : 1960    Patient not seen this date for Physical Therapy due to:  [] Blood transfusion in progress  [] Hemodialysis  []  Patient Declined  [] Spine Precautions   [] Strict Bedrest  [] Surgery/ Procedure  [] Testing      [] Other        [x] PT being discontinued at this time. Patient independent. No further needs. [] PT being discontinued at this time as the patient has been transferred to palliative care. No further needs. Physical therapy to sign off.   Fransico Kang, PT

## 2020-09-15 NOTE — CARE COORDINATION
[place] [facility]  Written by: Artur Johnson RN    Patient Name: Adri Soliman  Attending Provider: Natalia Jacques MD  Admit Date: 2020 10:34 AM  MRN: [id number]  Account: [de-identified]                     : 1960  Discharge Date: 9/15/20      Disposition: home    Artur Johnson RN

## 2020-09-15 NOTE — PROGRESS NOTES
[facility]  Office: [address], DO, Keesha Elsie, DO, Jamie Lipoma, DO, Tracee Tobar Blood, DO, Germaine Stevens MD, James Edward MD, Charla Staley MD, Pattie Heimlich, MD, Evelyn Samuel MD, Yogesh Martinez MD, Michelle Whitehead MD, Gisselle Hickey MD, Yordan Raymond MD, Carlyle Salvador, DO, Lawyer Jose MD, Guerda Cary MD, Mei Rodriguez DO, Ginny Claudio MD,  Lynne Rodas DO, Brisa Mayorga MD, Eleonora Molina MD, Paulina Ayala, [facility], [facility] Doris, [facility], Ngoc Gomez, CNP, Yelena Fountain, CNS, Meenu Bedolla, CNP, Jadiel Zavala, CNP, Neil Gonzalez, CNP, Jeremias Alvarez, CNP, Eddie Hagen, CNP, Caro Estrada PA-C, Marilu Burgess, [facility], Yash Knapp, CNP, Larissa Guy, CNP, Todd Duff, CNP, Theodora Hernandez, [facility], [facility] Press, [address] [place] [address]    Progress Note    Name:   Sara Parikh  MRN:     [id number]     Acct:      [de-identified]   Room:   [address] [place] [address] Day:  3  Admit Date:  9/12/2020 10:34 AM    PCP:   Jacqueline Thomas MD  Code Status:  Full Code    Subjective:     C/C:   Chief Complaint   Patient presents with    Hematuria     had prostate procedure august 14th, has been urinating blood since 0300    Chest Pain     Interval History Status: improved. CBI clamped. Patient voided earlier this morning per RN. Urine appears light pink. No clots noted. Afebrile, hemodynamically stable. Labs CBC BMP reviewed. Hemoglobin 10.9 stable. Brief History:   18-year-old presented with gross hematuria, blood clots in urine after a greenlight laser PVP on 8/14/2020 by Dr. Didier Pantoja. He was advised conservative management as was expected after laser procedure and aspirin has been on hold preop. Patient was admitted 5 days ago with episode of dizziness and possible syncope. Work-up CT head CTA head and neck MRI negative was negative for stroke. Patient was diagnosed with TIA and started on aspirin and Plavix. Patient noted worsening bleeding since starting medication she wanted the ED visit. Hemoglobin upon admission 12.4. Was tested positive for COVID in ED, chest x-ray without pneumonia. Of note patient did have a CT scan done on 9/7/2020 which showed multiple lung nodules previously scheduled to see pulmonary. Comorbidities hypertension hyperlipidemia moved from [facility] 6 months ago. Non-smoker denies alcohol.  -Per Dr. Rosalie Mcdonald documentation on 9/14/2020: Patient's presentation was discussed with neurology and they are okay with aspirin monotherapy as patient experienced TIA off aspirin. Review of Systems:     Constitutional:  negative for chills, fevers, sweats  Respiratory:  negative for cough, dyspnea on exertion, shortness of breath, wheezing  Cardiovascular:  negative for chest pain, chest pressure/discomfort, lower extremity edema, palpitations  Gastrointestinal:  negative for abdominal pain, constipation, diarrhea, nausea, vomiting  Neurological:  negative for dizziness, headache    Medications: Allergies:  No Known Allergies    Current Meds:   Scheduled Meds:    [Held by provider] aspirin  81 mg Oral Daily    [Held by provider] clopidogrel  75 mg Oral Daily    hydroCHLOROthiazide  25 mg Oral Daily    ramipril  5 mg Oral Daily    tamsulosin  0.4 mg Oral BID    rosuvastatin  10 mg Oral Daily    sodium chloride flush  10 mL Intravenous 2 times per day     Continuous Infusions:    sodium chloride       PRN Meds: hydroxypropyl methylcellulose, potassium chloride **OR** potassium alternative oral replacement **OR** potassium chloride, lidocaine, sodium chloride flush, acetaminophen **OR** acetaminophen, polyethylene glycol, promethazine **OR** ondansetron, hyoscyamine    Data:     Past Medical History:   has a past medical history of Hyperlipidemia, Hypertension, and Wellness examination. Social History:   reports that he has never smoked.  He has never used smokeless tobacco. He reports that he does not drink alcohol or use drugs. Family History: History reviewed. No pertinent family history. Vitals:  /74   Pulse 86   Temp 97.7 °F (36.5 °C) (Oral)   Resp 15   Ht 5' 6\" (1.676 m)   Wt 175 lb (79.4 kg)   SpO2 99%   BMI 28.25 kg/m²   Temp (24hrs), Av.5 °F (36.4 °C), Min:96.8 °F (36 °C), Max:98 °F (36.7 °C)    No results for input(s): POCGLU in the last 72 hours. I/O (24Hr): Intake/Output Summary (Last 24 hours) at 9/15/2020 1359  Last data filed at 9/15/2020 1043  Gross per 24 hour   Intake 350 ml   Output 2550 ml   Net -2200 ml       Labs:  Hematology:  Recent Labs     20  1624  09/14/20  0530 09/14/20  2021 09/15/20  0652   WBC  --    < > 6.7 7.8 7.5   RBC  --    < > 3.82* 3.88* 4.04*   HGB  --    < > 10.4* 10.6* 10.9*   HCT  --    < > 32.0* 33.1* 35.1*   MCV  --    < > 83.8 85.3 86.9   MCH  --    < > 27.2 27.3 27.0   MCHC  --    < > 32.5 32.0 31.1   RDW  --    < > 12.9 13.0 12.8   PLT  --    < > 266 290 281   MPV  --    < > 9.3 9.6 9.1   INR 1.0  --   --   --   --    DDIMER 0.27  --   --   --   --     < > = values in this interval not displayed. Chemistry:  Recent Labs     20  1504 20  0610 09/14/20  0530 09/15/20  0652   NA  --  136 139 138   K  --  3.4* 3.5* 3.8   CL  --  102 106 106   CO2  --  25 26 23   GLUCOSE  --  100* 97 106*   BUN  --  9 11 13   CREATININE  --  0.60* 0.70 0.63*   MG  --  2.0 2.2  --    ANIONGAP  --  9 7* 9   LABGLOM  --  >60 >60 >60   GFRAA  --  >60 >60 >60   CALCIUM  --  8.2* 8.4* 8.6   TROPHS <6  --   --   --      Recent Labs     20  1624   PROT 6.0*   LABALBU 3.5   AST 18   ALT 25   *   ALKPHOS 47   BILITOT 0.17*   BILIDIR <0.08     Radiology:  Xr Chest Portable  Result Date: 2020  No acute airspace disease identified.      Physical Examination:        General appearance:  alert, cooperative and no distress  Mental Status:  oriented to person, place and time and normal affect  Lungs:  clear to auscultation bilaterally, normal effort  Heart:  regular rate and rhythm, no murmur  Abdomen:  soft, nontender, nondistended, normal bowel sounds, no palpable masses, hepatomegaly, splenomegaly  Extremities:  no edema, redness, tenderness in the calves  Skin:  no gross lesions, rashes, induration    Assessment:        Hospital Problems           Last Modified POA    * (Principal) Gross hematuria 9/12/2020 Yes    Hematuria 9/12/2020 Yes    COVID-19 9/12/2020 Yes    Vasovagal syncope 9/12/2020 Yes    Postprocedural urinary retention 9/12/2020 Yes          Plan:      Gross hematuria while on aspirin and Plavix post greenlight laser PVP. He is improved. Stable hemoglobin. Off aspirin and Plavix. Plan to DC Plavix. Will resume aspirin on Friday. Discussed with patient he understands instructions. Plan to d/c to home: patient had multiple questions regarding his positive COVID test, urology f/u, work restrictions/release.      Espinoza Meyer MD  9/15/2020

## 2020-09-15 NOTE — PROGRESS NOTES
Urology Progress Note  CC:  Gross hematuria  Subjective:   Ivanna Khoury is a 63 y.o. male. His/Her current Diet is: DIET GENERAL;.  No acute events overnight. CBI weaned overnight. No hand irrigations. Ambulating.      Patient Vitals for the past 24 hrs:   BP Temp Temp src Pulse Resp SpO2 Height   09/14/20 2017 115/71 96.8 °F (36 °C) Oral 86 15 99 % --   09/14/20 1600 -- -- -- 73 -- -- --   09/14/20 1446 107/78 98 °F (36.7 °C) Oral 87 16 99 % --   09/14/20 1030 -- -- -- -- -- -- 5' 6\" (1.676 m)   09/14/20 0929 125/66 97.9 °F (36.6 °C) Oral 87 12 98 % --       Intake/Output Summary (Last 24 hours) at 9/15/2020 0733  Last data filed at 9/15/2020 0600  Gross per 24 hour   Intake 350 ml   Output 850 ml   Net -500 ml       Recent Labs     09/14/20  0530 09/14/20  2021 09/15/20  0652   WBC 6.7 7.8 7.5   HGB 10.4* 10.6* 10.9*   HCT 32.0* 33.1* 35.1*   MCV 83.8 85.3 86.9    290 281     Recent Labs     09/12/20  1106 09/13/20  0610 09/14/20  0530   * 136 139   K 3.3* 3.4* 3.5*   CL 98 102 106   CO2 21 25 26   BUN 14 9 11   CREATININE 0.63* 0.60* 0.70       Recent Labs     09/12/20  1105   COLORU RED*   PHUR 7.5   WBCUA 0 TO 2   RBCUA TOO NUMEROUS TO COUNT   MUCUS NOT REPORTED   TRICHOMONAS NOT REPORTED   YEAST NOT REPORTED   BACTERIA NOT REPORTED   SPECGRAV 1.020   LEUKOCYTESUR SMALL*   UROBILINOGEN Normal   BILIRUBINUR NEGATIVE       Current Facility-Administered Medications   Medication Dose Route Frequency Provider Last Rate Last Dose    rosuvastatin (CRESTOR) tablet 10 mg  10 mg Oral Daily Janeen Leaven, DO   10 mg at 09/14/20 1205    hydroxypropyl methylcellulose (GONIOSOL) 2.5 % ophthalmic solution 1 drop  1 drop Both Eyes PRN Janeen Leaven, DO   1 drop at 09/14/20 1206    potassium chloride (KLOR-CON M) extended release tablet 40 mEq  40 mEq Oral PRN Lorretta Stable, APRN - CNP   40 mEq at 09/14/20 1206    Or    potassium bicarb-citric acid (EFFER-K) effervescent tablet 40 mEq  40 mEq Oral PRN Arland Denny CAMILLE Arauz - CNP        Or    potassium chloride 10 mEq/100 mL IVPB (Peripheral Line)  10 mEq Intravenous PRN CAMILLE Cintron - CNP        lidocaine (XYLOCAINE) 2 % uro-jet   Topical PRN Marisol Del Toron, DO        sodium chloride 0.9 % irrigation 3,000 mL  3,000 mL Irrigation Continuous Marisol Del Toron, DO        sodium chloride flush 0.9 % injection 10 mL  10 mL Intravenous 2 times per day Marisol Lieaxel, DO   10 mL at 09/14/20 2118    sodium chloride flush 0.9 % injection 10 mL  10 mL Intravenous PRN Marisol Lien, DO        acetaminophen (TYLENOL) tablet 650 mg  650 mg Oral Q6H PRN Marisol Lieaxel, DO        Or    acetaminophen (TYLENOL) suppository 650 mg  650 mg Rectal Q6H PRN Marisol Lien, DO        polyethylene glycol (GLYCOLAX) packet 17 g  17 g Oral Daily PRN Marisol Lien, DO        promethazine (PHENERGAN) tablet 12.5 mg  12.5 mg Oral Q6H PRN Marisol Maria, DO        Or    ondansetron [facility]) injection 4 mg  4 mg Intravenous Q6H PRN Marisol Lien, DO        hyoscyamine (LEVSIN/SL) sublingual tablet 125 mcg  125 mcg Sublingual Q6H PRN Tyron Dye MD   125 mcg at 09/14/20 2020            Additional Lab/culture results:  UCx no growth    Physical Exam:   NAD  AOx3  Normal rate  Respirations nonlabored, symmetric chest rise bilaterally  Soft, NT, ND  24 F [address] [place] with yellow urine with pink tinge on very slow gtt CBI      Interval Imaging Findings:  No results found.       Impression:      Berlin Carlin is a 63 y.o. male with   Problem(s):  - gross hematuria   - Coagulopathy on aspirin and plavix  - Lower urinary tract symptoms secondary to enlarged prostate status post TUR prostate greenlight August 14, 2020  - Cr 0.70  - Urinary retention (clot retention)  -Anemia acute blood loss anemia  - Co-Morbid Conditions: recent TIA, COVID-19, HTN, hyperlipidemia     Plan:   -CBI clamped this AM. Possible void trial later today pending clamp trial   -Okay for general diet   -Urine culture - no growth  -Hold ASA x5 days  -Monitor hemoglobin, stable   -Discharge planning pending void trial        Perry Veliz MD  Urology Resident, PGY-5  7:33 AM 9/15/2020

## 2020-09-16 ENCOUNTER — CARE COORDINATION (OUTPATIENT)
Dept: CASE MANAGEMENT | Age: 60
End: 2020-09-16

## 2020-09-17 ENCOUNTER — VIRTUAL VISIT (OUTPATIENT)
Dept: PULMONOLOGY | Age: 60
End: 2020-09-17
Payer: COMMERCIAL

## 2020-09-17 PROCEDURE — 99443 PR PHYS/QHP TELEPHONE EVALUATION 21-30 MIN: CPT | Performed by: INTERNAL MEDICINE

## 2020-09-17 NOTE — CARE COORDINATION
[address] 19 monitoring Initial Follow Up Call (positive)    Call within 2 business days of discharge: Yes    Patient: Jahaira Zepeda Patient : 1960   MRN: [id number]    Reason for Admission: hematuria  Care Transitions following for Covid +   Discharge Date: 9/15/20   RARS: Readmission Risk Score: 10      Last Discharge [facility]       Complaint Diagnosis Description Type Department Provider    20 Hematuria; Chest Pain Gross hematuria . .. ED to Hosp-Admission (Discharged) (ADMITTED) [facility] 3 Monika Gann MD; [facility]ingo. .. Spoke with: patient who tells me that he has absolutely no covid symptoms - he tested positive on  after he tested negative on  prior to urology procedure. No further hematuria  Plans to follow up with  pulmonary & also urology    Facility: [facility]       Patient contacted regarding OPHBX-25 diagnosis\". Discussed COVID-19 related testing which was available at this time. Test results were positive  - were negative on  & he is perplexed about this. Patient informed of results, if available? Yes  He states he is \"confused\" how he tested positive on  after negative testing & with no symptoms. Care Transition Nurse/ Ambulatory Care Manager contacted the patient by telephone to perform post discharge assessment. Call within 2 business days of discharge: Yes. Verified name and  with patient as identifiers. Provided introduction to self, and explanation of the CTN/ACM role, and reason for call due to risk factors for infection and/or exposure to COVID-19. Symptoms reviewed with patient who verbalized the following symptoms: no new symptoms. Due to no new or worsening symptoms encounter was not routed to provider for escalation. Discussed follow-up appointments.  If no appointment was previously scheduled, appointment scheduling offered: Yes  [id number] follow up appointment(s):   Future Appointments   Date Time Provider Mindy Erazo 9/17/2020  2:30 PM Ann Rodriguez MD Resp Spec [address] [facility]   9/18/2020 10:00 AM [place]EDULE, [facility] [place] [address] [facility] [facility]   10/5/2020  2:00  [address], MD [facility] [address]   3/12/2021  8:40 AM [facility]     [facility] follow up appointment(s):     Non-face-to-face services provided:  Obtained and reviewed discharge summary and/or continuity of care documents     Advance Care Planning:   Does patient have an Advance Directive:  reviewed and current. Patient has following risk factors of: no known risk factors. CTN/ACM reviewed discharge instructions, medical action plan and red flags such as increased shortness of breath, increasing fever and signs of decompensation with patient who verbalized understanding. Discussed exposure protocols and quarantine with CDC Guidelines What to do if you are sick with coronavirus disease 2019.  Patient was given an opportunity for questions and concerns. The patient agrees to contact the [facility] exposure line [phone], [facility]: ([phone]) and PCP office for questions related to their healthcare. CTN/ACM provided contact information for future needs. Reviewed and educated patient on any new and changed medications related to discharge diagnosis     Patient prefers calls over emails    Patient lives alone - [facility] not coming around for awhile. Plan for follow-up call in 5-7 days based on severity of symptoms and risk factors.     Follow Up  Future Appointments   Date Time Provider Mindy Erazo   9/17/2020  2:30 PM Ann Rodriguez MD Resp Spec [address]   9/18/2020 10:00 AM SCHEDULE, [facility] [place]note 2[facility] [address] [facility]   10/5/2020  2:00  [address], MD [facility] [address] [facility]   3/12/2021  8:40 AM Mirian Hinkle MD [facility]       Steps to help prevent the spread of COVID-19 if you are sick  SOURCE - https://mahendra-nathaniel.info/. html     Stay home except to get medical care   ; Stay home: People who are mildly ill with COVID-19 are able to isolate at home during their illness. You should restrict activities outside your home, except for getting medical care.   ; Avoid public areas: Do not go to work, school, or public areas.   ; Avoid public transportation: Avoid using public transportation, ride-sharing, or taxis.  ; Separate yourself from other people and animals in your home   ; Stay away from others: As much as possible, you should stay in a specific room and away from other people in your home. Also, you should use a separate bathroom, if available.   ; Limit contact with pets & animals: You should restrict contact with pets and other animals while you are sick with COVID-19, just like you would around other people. Although there have not been reports of pets or other animals becoming sick with COVID-19, it is still recommended that people sick with COVID-19 limit contact with animals until more information is known about the virus. ; When possible, have another member of your household care for your animals while you are sick. If you are sick with COVID-19, avoid contact with your pet, including petting, snuggling, being kissed or licked, and sharing food. If you must care for your pet or be around animals while you are sick, wash your hands before and after you interact with pets and wear a facemask. See COVID-19 and Animals for more information. Other considerations   The ill person should eat/be fed in their room if possible. Non-disposable  items used should be handled with gloves and washed with hot water or in a . Clean hands after handling used  items.  If possible, dedicate a lined trash can for the ill person. Use gloves when removing garbage bags, handling, and disposing of trash.  Wash hands after handling or disposing of trash.  Consider consulting with your local health department about trash disposal guidance if available. Information for Household Members and Caregivers of Someone who is Sick   Call ahead before visiting your doctor   Call ahead: If you have a medical appointment, call the healthcare provider and tell them that you have or may have COVID-19. This will help the healthcare provider's office take steps to keep other people from getting infected or exposed. Wear a facemask if you are sick   ; If you are sick: You should wear a facemask when you are around other people (e.g., sharing a room or vehicle) or pets and before you enter a healthcare provider's office. ; If you are caring for others: If the person who is sick is not able to wear a facemask (for example, because it causes trouble breathing), then people who live with the person who is sick should not stay in the same room with them, or they should wear a facemask if they enter a room with the person who is sick. Cover your coughs and sneezes   ; Cover: Cover your mouth and nose with a tissue when you cough or sneeze.   ; Dispose: Throw used tissues in a lined trash can.   ; Wash hands: Immediately wash your hands with soap and water for at least 20 seconds or, if soap and water are not available, clean your hands with an alcohol-based hand  that contains at least 60% alcohol. Clean your hands often   ;  Wash hands: Wash your hands often with soap and water for at least 20 seconds, especially after blowing your nose, coughing, or sneezing; going to the bathroom; and before eating or preparing food.   ; Hand : If soap and water are not readily available, use an alcohol-based hand  with at least 60% alcohol, covering all surfaces of your hands and rubbing them together until they feel dry.   ; Soap and water: Soap and water are the best option if hands are visibly dirty.   ; Avoid touching: Avoid touching your eyes, nose, and mouth with unwashed hands. Handwashing Tips   ; Wet your hands with clean, running water (warm or cold), turn off the tap, and apply soap.  ; Lather your hands by rubbing them together with the soap. Lather the backs of your hands, between your fingers, and under your nails. ; Scrub your hands for at least 20 seconds. Need a timer? Hum the [place] from beginning to end twice.  ; Rinse your hands well under clean, running water.  ; Dry your hands using a clean towel or air dry them. Avoid sharing personal household items   ; Do not share: You should not share dishes, drinking glasses, cups, eating utensils, towels, or bedding with other people or pets in your home.   ; Wash thoroughly after use: After using these items, they should be washed thoroughly with soap and water. Clean all high-touch surfaces everyday   ; Clean and disinfect: Practice routine cleaning of high touch surfaces.  ; High touch surfaces include counters, tabletops, doorknobs, bathroom fixtures, toilets, phones, keyboards, tablets, and bedside tables.  ; Disinfect areas with bodily fluids: Also, clean any surfaces that may have blood, stool, or body fluids on them.   ; Household : Use a household cleaning spray or wipe, according to the label instructions. Labels contain instructions for safe and effective use of the cleaning product including precautions you should take when applying the product, such as wearing gloves and making sure you have good ventilation during use of the product. Monitor your symptoms   Seek medical attention: Seek prompt medical attention if your illness is worsening     (e.g., difficulty breathing).   ; Call your doctor: Before seeking care, call your healthcare provider and tell them that you have, or are being evaluated for, COVID-19.   ; Wear a facemask when sick: Put on a facemask before you enter the facility.  These steps will help the healthcare provider's office to keep other people in the office or waiting room from getting infected or exposed. ; Alert health department: Ask your healthcare provider to call the local or state health department. Persons who are placed under active monitoring or facilitated self-monitoring should follow instructions provided by their local health department or occupational health professionals, as appropriate.  ; Call 911 if you have a medical emergency: If you have a medical emergency and need to call 911, notify the dispatch personnel that you have, or are being evaluated for COVID-19. If possible, put on a facemask before emergency medical services arrive.     Srini Raza RN

## 2020-09-17 NOTE — PATIENT INSTRUCTIONS
Left message for patient to call back so we can schedule his follow up and CT chest that needs done in December. He needs to tell us where he wants it done.  DLR

## 2020-09-18 ENCOUNTER — VIRTUAL VISIT (OUTPATIENT)
Dept: UROLOGY | Age: 60
End: 2020-09-18
Payer: COMMERCIAL

## 2020-09-18 PROCEDURE — 99024 POSTOP FOLLOW-UP VISIT: CPT | Performed by: UROLOGY

## 2020-09-18 RX ORDER — CIPROFLOXACIN 500 MG/1
500 TABLET, FILM COATED ORAL 2 TIMES DAILY
Qty: 60 TABLET | Refills: 0 | Status: SHIPPED | OUTPATIENT
Start: 2020-09-18 | End: 2020-10-18

## 2020-09-18 NOTE — PROGRESS NOTES
Melony Diehl is a 63 y.o. male evaluated via telephone on 9/18/2020. Consent:  He and/or health care decision maker is aware that that he may receive a bill for this telephone service, depending on his insurance coverage, and has provided verbal consent to proceed: Yes      Documentation:  I communicated with the patient and/or health care decision maker about BPH. Details of this discussion including any medical advice provided:     flomax  cipro one month  [facility] end of next month with [facility] aspirin tomorrow  D/c plavix  Light lifting for next three weeks  If urgency and freq not improved will need cysto    I affirm this is a Patient Initiated Episode with a Patient who has not had a related appointment within my department in the past 7 days or scheduled within the next 24 hours.     Patient identification was verified at the start of the visit: Yes    Total Time: minutes: 11-20 minutes    Note: not billable if this call serves to triage the patient into an appointment for the relevant concern      Ely Miller

## 2020-09-21 NOTE — PROGRESS NOTES
Diana Russell is a 63 y.o. male evaluated via telephone on 9/17/2020. Consent:  He and/or health care decision maker is aware that that he may receive a bill for this telephone service, depending on his insurance coverage, and has provided verbal consent to proceed: Yes      Documentation:  I communicated with the patient and/or health care decision maker about   1. Multiple nodules of lung    2. COVID-19    3. BPH with urinary obstruction      . Details of this discussion including any medical advice provided: The lung nodules became evident when the patient had a CT scan of the head and neck done in the emergency room for dizziness. Review of the past medical history, review of systems and occupational history does not reveal any identifiable risk factors for multiple lung nodules. Patient has never smoked  Patient worked as an  in [employer]  CT scan of September 7, 2020 was reviewed  Patient recently has been COVID 19 positive and hence could not be seen in the office  After reviewing the information and having talked to the patient on the telephone, recommend obtaining a CT scan of the chest in December 2020 to follow-up on the lung nodules and hopefully see me in the office at the time        I affirm this is a Patient Initiated Episode with a Patient who has not had a related appointment within my department in the past 7 days or scheduled within the next 24 hours.     Patient identification was verified at the start of the visit: Yes    Total Time: minutes: 21-30 minutes    Note: not billable if this call serves to triage the patient into an appointment for the relevant concern      Jef Fontenot

## 2020-09-21 NOTE — PROGRESS NOTES
[facility], [facility].  [address] [facility] [address]  Dept: [phone]  Dept Fax: [phone]      9/21/20    Patient: Sara Parikh  YOB: 1960    Dear Jacqueline Thomas MD,    I had the pleasure of seeing one of your patients, Fernando Christian today in the office today. Please find attached my note with the assessment and plan of care. Thank you for allowing me to participate in the care of this patient. I will keep you updated on this patient's follow up and I look forward to serving you and your patients again in the future.     Shirley Kelly MD  9/21/2020 6:20 AM

## 2020-09-25 ENCOUNTER — CARE COORDINATION (OUTPATIENT)
Dept: CASE MANAGEMENT | Age: 60
End: 2020-09-25

## 2020-09-25 NOTE — CARE COORDINATION
Patient contacted regarding COVID-19 risk and screening. Discussed COVID-19 related testing which was available at this time. Test results were positive. Patient informed of results, if available? Patient aware    Patient concerned that he was tested on 2020 and result was negative. On 2020 he was tested and was positive. He remains symptom free. He denies sob,chills, fever, fatigue,cough, dizziness, wheeze or palpitations. He has a small amount of burning when he urinates. No noticeable blood in his urine. Care Transition Nurse/ Ambulatory Care Manager contacted the patient by telephone to perform follow-up assessment. Verified name and  with patient as identifiers. Patient has following risk factors of: no known risk factors. Symptoms reviewed with patient who verbalized the following symptoms: no new symptoms and no worsening symptoms. Due to no new or worsening symptoms encounter was not routed to provider for escalation. Education provided regarding infection prevention, and signs and symptoms of COVID-19 and when to seek medical attention with patient who verbalized understanding. Discussed exposure protocols and [facility] at higher risk for severe illness  and given an opportunity for questions and concerns. The patient agrees to contact the COVID-19 hotline 978-881-8589 or PCP office for questions related to their healthcare. CTN/ACM provided contact information for future reference. From CDC: Are you at higher risk for severe illness?  Wash your hands often.  Avoid close contact (6 feet, which is about two arm lengths) with people who are sick.  Put distance between yourself and other people if COVID-19 is spreading in your community.  Clean and disinfect frequently touched surfaces.  Avoid all cruise travel and non-essential air travel.    Call your healthcare professional if you have concerns about COVID-19 and your underlying condition or if you are sick. For more information on steps you can take to protect yourself, see CDC's How to [place] for follow-up call in 7-14 days based on severity of symptoms and risk factors.

## 2020-10-05 ENCOUNTER — CARE COORDINATION (OUTPATIENT)
Dept: CASE MANAGEMENT | Age: 60
End: 2020-10-05

## 2020-10-05 ENCOUNTER — OFFICE VISIT (OUTPATIENT)
Dept: NEUROLOGY | Age: 60
End: 2020-10-05
Payer: COMMERCIAL

## 2020-10-05 VITALS — RESPIRATION RATE: 16 BRPM | SYSTOLIC BLOOD PRESSURE: 139 MMHG | DIASTOLIC BLOOD PRESSURE: 83 MMHG | HEART RATE: 81 BPM

## 2020-10-05 PROCEDURE — 1111F DSCHRG MED/CURRENT MED MERGE: CPT | Performed by: STUDENT IN AN ORGANIZED HEALTH CARE EDUCATION/TRAINING PROGRAM

## 2020-10-05 PROCEDURE — 1036F TOBACCO NON-USER: CPT | Performed by: STUDENT IN AN ORGANIZED HEALTH CARE EDUCATION/TRAINING PROGRAM

## 2020-10-05 PROCEDURE — G8419 CALC BMI OUT NRM PARAM NOF/U: HCPCS | Performed by: STUDENT IN AN ORGANIZED HEALTH CARE EDUCATION/TRAINING PROGRAM

## 2020-10-05 PROCEDURE — 99214 OFFICE O/P EST MOD 30 MIN: CPT | Performed by: STUDENT IN AN ORGANIZED HEALTH CARE EDUCATION/TRAINING PROGRAM

## 2020-10-05 PROCEDURE — 3017F COLORECTAL CA SCREEN DOC REV: CPT | Performed by: STUDENT IN AN ORGANIZED HEALTH CARE EDUCATION/TRAINING PROGRAM

## 2020-10-05 PROCEDURE — G8427 DOCREV CUR MEDS BY ELIG CLIN: HCPCS | Performed by: STUDENT IN AN ORGANIZED HEALTH CARE EDUCATION/TRAINING PROGRAM

## 2020-10-05 PROCEDURE — G8484 FLU IMMUNIZE NO ADMIN: HCPCS | Performed by: STUDENT IN AN ORGANIZED HEALTH CARE EDUCATION/TRAINING PROGRAM

## 2020-10-05 ASSESSMENT — ENCOUNTER SYMPTOMS
SHORTNESS OF BREATH: 0
CHEST TIGHTNESS: 0
BACK PAIN: 0
ABDOMINAL PAIN: 0
EYE REDNESS: 0
CHOKING: 0
PHOTOPHOBIA: 0

## 2020-10-05 NOTE — PROGRESS NOTES
[place] [address] [address]  Dept: [phone]  Dept Fax: [phone]    NEUROLOGY NEW PATIENT NOTE                                              PATIENT NAME: Luh Soliman   PATIENT MRN: [id number]  FOLLOW UP TODAY: 10/5/2020        INITIAL & INTERVAL HISTORY:     HPI  The patient mentioned above with past medical history of TIA on aspirin 81 mg daily, gross hematuria s/p green light surgery for BPH, COVID positive mid of September, migraine disorder, presented to the office for follow-up after ED visit for TIA symptoms. The patient stated that he was on aspirin 81 mg daily when he stopped that for cystoscopy procedure. He went to sleep and then he woke up at 3 AM feeling left-sided weakness in more in the left lower extremity than the upper extremity associated with left hemisensory changes. It was resolved before presenting to the ED. CT head, CTA of the head and neck with contrast and MRI of the brain were unremarkable. The patient was loaded with dual antiplatelets for 21 days then to continue aspirin 81 mg daily after that. He is on Crestor 10 mg daily. LDL was 79, hemoglobin A1c was 5.4. Echo was not done. Interval hx  The patient was seen and examined at bedside, he is vitally stable alert oriented x4. No further episodes of TIA-like symptoms. The patient is on aspirin 81 mg daily. Patient to get echo cardiogram as outpatient. To follow-up with neurology clinic in 6 months. PMH    has a past medical history of Hyperlipidemia, Hypertension, and Wellness examination. PSH/SH/FMH: Remain unchanged since last visit Visit date not found.     ALLERGIES:   No Known Allergies    MEDICATIONS:   Current Outpatient Medications   Medication Sig Dispense Refill    ciprofloxacin (CIPRO) 500 MG tablet Take 1 tablet by mouth 2 times daily 60 tablet 0    aspirin 81 MG EC tablet Take 1 tablet by mouth daily Please start on 09/18/20 30 tablet 5    tamsulosin (FLOMAX) 0.4 MG capsule Take 1 capsule by mouth 2 times daily 60 capsule 5    hydroCHLOROthiazide (HYDRODIURIL) 25 MG tablet Take 1 tablet by mouth daily 30 tablet 5    ramipril (ALTACE) 5 MG capsule Take 1 capsule by mouth daily 30 capsule 5    rosuvastatin (CRESTOR) 10 MG tablet Take 1 tablet by mouth daily 30 tablet 5     No current facility-administered medications for this visit. LABS & TESTS:      Lab Results   Component Value Date    WBC 7.5 09/15/2020    HGB 10.9 (L) 09/15/2020    HCT 35.1 (L) 09/15/2020    MCV 86.9 09/15/2020     09/15/2020       REVIEW OF SYSTEMS:     Review of Systems   Constitutional: Negative for fatigue and fever. HENT: Negative for congestion and drooling. Eyes: Negative for photophobia, redness and visual disturbance. Respiratory: Negative for choking, chest tightness and shortness of breath. Cardiovascular: Negative for chest pain. Gastrointestinal: Negative for abdominal pain. Genitourinary: Negative for dysuria. Musculoskeletal: Negative for arthralgias and back pain. Neurological: Negative for dizziness, tremors, seizures, syncope, facial asymmetry, speech difficulty, weakness, light-headedness, numbness and headaches. Hematological: Negative for adenopathy. Psychiatric/Behavioral: Negative for agitation. VITALS  BP (!) 153/80 (Site: Right Upper Arm, Position: Sitting, Cuff Size: Medium Adult)   Pulse 105   Resp 16     PHYSICAL EXAMINATION:     Physical Exam     General appearance: cooperative  Skin: no rash or skin lesions. HEENT: normocephalic  Optic Fundi: deferred  Neck: supple, no cervcical adenopathy or carotid bruit  Lungs: No respiratory distress  Heart: Regular rate and rhythm,clicks or gallops.   Peripheral pulses: radial pulses palpable  Abdominal: BS present, soft, NT, ND  Extremities: no edema    NEUROLOGICAL EXAMINATION:      GENERAL  Appears comfortable and in no distress   HEENT  NC/ AT   HEART  S1 and S2 heard; palpation of pulses: radial pulse    NECK  Supple and no bruits heard   MENTAL STATUS:  Alert, oriented, intact memory, no confusion, normal speech, normal language, no hallucination or delusion   CRANIAL NERVES: II     -      Visual fields intact to confrontation  III,IV,VI -  PERR, EOMs full, no ptosis  V     -     Normal facial sensation   VII    -     Normal facial symmetry  VIII   -     Intact hearing   IX,X -     Symmetrical palate  XI    -     Symmetrical shoulder shrug  XII   -     Midline tongue, no atrophy    MOTOR FUNCTION: RUE: Significant for good strength of grade 5/5 in proximal and distal muscle groups   LUE: Significant for good strength of grade 5/5 in proximal and distal muscle groups   RLE: Significant for good strength of grade 5/5 in proximal and distal muscle groups   LLE: Significant for good strength of grade 5/5 in proximal and distal muscle groups      Normal bulk, normal tone and no involuntary movements, mild postural tremor in the L hand   SENSORY FUNCTION:  Normal touch, normal pin, normal vibration, normal proprioception   CEREBELLAR FUNCTION:  Intact fine motor control over upper limbs and lower limbs   REFLEX FUNCTION:  Symmetric in upper and lower extremities, no Babinski sign   STATION and GAIT  Normal gait and tandem station, normal tip toes and heel walking     ASSESSMENT:       The patient mentioned above with past medical history of TIA on aspirin 81 mg daily, gross hematuria, COVID positive mid of September, migraine disorder, presented to the office for follow-up after ED visit for TIA symptoms. Negative CTA and MRI. On aspirin 81 mg daily and Crestor 10 mg. PLAN:      -Continue aspirin 81 mg daily  -Continue Crestor 10 mg daily  -To get echocardiogram as outpatient  -Follow-up with PCP for high blood pressure management  -Follow-up with neurology clinic in 6 months.        Marzena Roland received counseling on the following healthy behaviors: medical compliance, smoking cessation, blood pressure control, regular follow up with primary doctor.         Electronically signed by Serena Crystal MD on 10/5/2020 at 2:13 PM

## 2020-10-05 NOTE — CARE COORDINATION
Mattie 45 Transitions Follow Up Call - call attempt -Attempted to reach patient for subsequent transitional call #1. Patient's phone did not connect - no option for VM. I did speak with patient's son @ other contact # who took message for patient to return call to CTN  10/5/2020    Patient: Dasia Perez  Patient : 1960   MRN: [id number]    Reason for Admission: hematuria  Care Transitions following for Covid +   Discharge Date: 9/15/20         RARS: Readmission Risk Score: 10     Noted that patient was seen for neuro appt today, was seen  for pulmonary appt &  for urology appt - cipro for one month + flomax    Patient was previously contacted regarding [id number] diagnosis\". Test results were positive  - were negative on  - patient voiced confusion previously about this.   No prior covid risk factors    Follow Up  Future Appointments   Date Time Provider Mindy Erazo   12/3/2020 11:00 AM STV CT  [facility] CT SCAN [facility] Radiolog   12/10/2020  3:15 PM Sanaz Velasquez MD Resp Spec [facility]   3/12/2021  8:40 AM René Quinonez MD [facility] BETITO Ramos RN

## 2020-10-08 ENCOUNTER — TELEPHONE (OUTPATIENT)
Dept: INTERNAL MEDICINE | Age: 60
End: 2020-10-08

## 2020-10-08 ENCOUNTER — CARE COORDINATION (OUTPATIENT)
Dept: CASE MANAGEMENT | Age: 60
End: 2020-10-08

## 2020-10-09 ENCOUNTER — HOSPITAL ENCOUNTER (OUTPATIENT)
Age: 60
Setting detail: SPECIMEN
Discharge: HOME | End: 2020-10-09
Payer: COMMERCIAL

## 2020-10-10 LAB
SARS-COV-2, RAPID: NORMAL
SARS-COV-2: NORMAL
SARS-COV-2: NOT DETECTED
SOURCE: NORMAL

## 2020-11-24 ENCOUNTER — TELEPHONE (OUTPATIENT)
Dept: INTERNAL MEDICINE | Age: 60
End: 2020-11-24

## 2020-11-24 RX ORDER — RAMIPRIL 10 MG/1
10 CAPSULE ORAL DAILY
Qty: 30 CAPSULE | Refills: 3 | Status: SHIPPED | OUTPATIENT
Start: 2020-11-24 | End: 2021-03-16

## 2020-11-24 NOTE — TELEPHONE ENCOUNTER
160/105 pulse 114 pt is worried about his BP. Pt states it is always that high. It might go down a couple of numbers but is usually high. Pt is taking Hydrochlorothiazide 25 mg and ramipril 5 mg. Please advise. Pt schedule appt with you on 12/4 but he wants to know what to do hand in the mean time.

## 2020-11-24 NOTE — TELEPHONE ENCOUNTER
He can inc ramipril to 10mg but needs to be seen sooner - HR is high and should be seen F2F for further eval.

## 2020-11-30 ENCOUNTER — PATIENT MESSAGE (OUTPATIENT)
Dept: UROLOGY | Age: 60
End: 2020-11-30

## 2020-12-01 ENCOUNTER — TELEPHONE (OUTPATIENT)
Dept: UROLOGY | Age: 60
End: 2020-12-01

## 2020-12-01 NOTE — TELEPHONE ENCOUNTER
From: Ivanna Khoury  To: Kimberly Sotelo MD  Sent: 11/30/2020 8:35 PM EST  Subject: Visit Pauline Zuluaga and nice teamwork. I kindly ask to schedule a follow up appointment. when i was trying to do regular job yesterday,november 30 which included lifting and pushing lheavily packed boxes i noticed ,for the first time since more than a month ago ,a bleeding in the urine so i immediately stopped boxes lifting to avoid any further damage. will be grateful if you set a follow up appointment and also, if possible to give an extension of restriction    Not to lift heavy [place] for few more weeks to avoid bleeding complications which caused a severe suffering beforeThanks

## 2020-12-01 NOTE — TELEPHONE ENCOUNTER
Pt calling in to speak with clinical staff. Pt did not state for what reason, only that it is important and that he will be waiting for the call. Please return pt's phone call.

## 2020-12-01 NOTE — TELEPHONE ENCOUNTER
Put a message out to dr Izzy Wiggins. Called pt to let him know im waiting for a response.     Pt verbally understood

## 2020-12-03 ENCOUNTER — OFFICE VISIT (OUTPATIENT)
Dept: INTERNAL MEDICINE | Age: 60
End: 2020-12-03
Payer: COMMERCIAL

## 2020-12-03 ENCOUNTER — HOSPITAL ENCOUNTER (OUTPATIENT)
Age: 60
Setting detail: SPECIMEN
Discharge: HOME | End: 2020-12-03
Payer: COMMERCIAL

## 2020-12-03 ENCOUNTER — HOSPITAL ENCOUNTER (OUTPATIENT)
Dept: CT IMAGING | Age: 60
Discharge: HOME | End: 2020-12-05
Payer: COMMERCIAL

## 2020-12-03 VITALS
HEART RATE: 120 BPM | BODY MASS INDEX: 29.09 KG/M2 | WEIGHT: 181 LBS | HEIGHT: 66 IN | DIASTOLIC BLOOD PRESSURE: 84 MMHG | SYSTOLIC BLOOD PRESSURE: 123 MMHG

## 2020-12-03 LAB
ABSOLUTE EOS #: 0.08 K/UL (ref 0–0.44)
ABSOLUTE IMMATURE GRANULOCYTE: <0.03 K/UL (ref 0–0.3)
ABSOLUTE LYMPH #: 1.54 K/UL (ref 1.1–3.7)
ABSOLUTE MONO #: 0.45 K/UL (ref 0.1–1.2)
ALBUMIN SERPL-MCNC: 4.3 G/DL (ref 3.5–5.2)
ALBUMIN/GLOBULIN RATIO: 1.4 (ref 1–2.5)
ALP BLD-CCNC: 62 U/L (ref 40–129)
ALT SERPL-CCNC: 24 U/L (ref 5–41)
ANION GAP SERPL CALCULATED.3IONS-SCNC: 15 MMOL/L (ref 9–17)
AST SERPL-CCNC: 17 U/L
BASOPHILS # BLD: 1 % (ref 0–2)
BASOPHILS ABSOLUTE: 0.03 K/UL (ref 0–0.2)
BILIRUB SERPL-MCNC: 0.31 MG/DL (ref 0.3–1.2)
BUN BLDV-MCNC: 17 MG/DL (ref 8–23)
BUN/CREAT BLD: ABNORMAL (ref 9–20)
CALCIUM SERPL-MCNC: 9.5 MG/DL (ref 8.6–10.4)
CHLORIDE BLD-SCNC: 101 MMOL/L (ref 98–107)
CO2: 23 MMOL/L (ref 20–31)
CREAT SERPL-MCNC: 0.77 MG/DL (ref 0.7–1.2)
DIFFERENTIAL TYPE: ABNORMAL
EOSINOPHILS RELATIVE PERCENT: 1 % (ref 1–4)
GFR AFRICAN AMERICAN: >60 ML/MIN
GFR NON-AFRICAN AMERICAN: >60 ML/MIN
GFR SERPL CREATININE-BSD FRML MDRD: ABNORMAL ML/MIN/{1.73_M2}
GFR SERPL CREATININE-BSD FRML MDRD: ABNORMAL ML/MIN/{1.73_M2}
GLUCOSE BLD-MCNC: 121 MG/DL (ref 70–99)
HCT VFR BLD CALC: 45.4 % (ref 40.7–50.3)
HEMOGLOBIN: 14.2 G/DL (ref 13–17)
HEPATITIS C ANTIBODY: NONREACTIVE
HIV AG/AB: NONREACTIVE
IMMATURE GRANULOCYTES: 0 %
LYMPHOCYTES # BLD: 26 % (ref 24–43)
MCH RBC QN AUTO: 25.2 PG (ref 25.2–33.5)
MCHC RBC AUTO-ENTMCNC: 31.3 G/DL (ref 28.4–34.8)
MCV RBC AUTO: 80.6 FL (ref 82.6–102.9)
MONOCYTES # BLD: 8 % (ref 3–12)
NRBC AUTOMATED: 0 PER 100 WBC
PDW BLD-RTO: 13.1 % (ref 11.8–14.4)
PLATELET # BLD: 278 K/UL (ref 138–453)
PLATELET ESTIMATE: ABNORMAL
PMV BLD AUTO: 9.9 FL (ref 8.1–13.5)
POTASSIUM SERPL-SCNC: 3.5 MMOL/L (ref 3.7–5.3)
RBC # BLD: 5.63 M/UL (ref 4.21–5.77)
RBC # BLD: ABNORMAL 10*6/UL
SEG NEUTROPHILS: 64 % (ref 36–65)
SEGMENTED NEUTROPHILS ABSOLUTE COUNT: 3.87 K/UL (ref 1.5–8.1)
SODIUM BLD-SCNC: 139 MMOL/L (ref 135–144)
TOTAL PROTEIN: 7.3 G/DL (ref 6.4–8.3)
TSH SERPL DL<=0.05 MIU/L-ACNC: 0.86 MIU/L (ref 0.3–5)
WBC # BLD: 6 K/UL (ref 3.5–11.3)
WBC # BLD: ABNORMAL 10*3/UL

## 2020-12-03 PROCEDURE — 71250 CT THORAX DX C-: CPT

## 2020-12-03 PROCEDURE — 1036F TOBACCO NON-USER: CPT | Performed by: INTERNAL MEDICINE

## 2020-12-03 PROCEDURE — G8484 FLU IMMUNIZE NO ADMIN: HCPCS | Performed by: INTERNAL MEDICINE

## 2020-12-03 PROCEDURE — G8419 CALC BMI OUT NRM PARAM NOF/U: HCPCS | Performed by: INTERNAL MEDICINE

## 2020-12-03 PROCEDURE — G8427 DOCREV CUR MEDS BY ELIG CLIN: HCPCS | Performed by: INTERNAL MEDICINE

## 2020-12-03 PROCEDURE — 99213 OFFICE O/P EST LOW 20 MIN: CPT | Performed by: INTERNAL MEDICINE

## 2020-12-03 PROCEDURE — 3017F COLORECTAL CA SCREEN DOC REV: CPT | Performed by: INTERNAL MEDICINE

## 2020-12-03 ASSESSMENT — ENCOUNTER SYMPTOMS
GASTROINTESTINAL NEGATIVE: 1
RESPIRATORY NEGATIVE: 1
EYES NEGATIVE: 1
ALLERGIC/IMMUNOLOGIC NEGATIVE: 1

## 2020-12-03 NOTE — PROGRESS NOTES
[facility]   Progress Note        Date of patient's visit: 12/3/2020  Patient's Name:  Jai Martinez                   YOB: 1960        PCP:  Layo Lynn MD    Ivanna Khoury is a 63 y.o. male who presents for   Chief Complaint   Patient presents with    Hypertension    Health Maintenance     Pt due for Hep C-pended, HIV-pended, Tdap & Shingrix    and follow up of chronic medical problems. Patient Active Problem List   Diagnosis    BPH with urinary obstruction    TIA (transient ischemic attack)    Gross hematuria    Hematuria    COVID-19    Vasovagal syncope    Postprocedural urinary retention    Intractable migraine with aura without status migrainosus       HISTORY OF PRESENT ILLNESS:    History was obtained from the patient. Hypertension:  Home blood pressure monitoring: Yes - he has had episodes of elevated BP. He is adherent to a low sodium diet. Patient denies chest pain, shortness of breath and headache. Antihypertensive medication side effects: no medication side effects noted. Use of agents associated with hypertension: none. Sodium (mmol/L)   Date Value   09/15/2020 138    BUN (mg/dL)   Date Value   09/15/2020 13    Glucose (mg/dL)   Date Value   09/15/2020 106 (H)      Potassium (mmol/L)   Date Value   09/15/2020 3.8    CREATININE (mg/dL)   Date Value   09/15/2020 0.63 (L)         Tachycardia  Patient complains of fast heart rate with no symptoms. The symptoms are none   in severity. . Cardiac risk factors include: advanced age (older than 54 for men, 72 for women), hypertension and male gender. Aggravating factors: stress/anxiety. Alleviating factors: none. Associated symptoms: none. Patient denies: chest pressure/discomfort and dyspnea. Patient's allergies, medications, past medical, surgical, social and family histories were reviewed and updated as appropriate.     ALLERGIES    No Known Allergies MEDICATIONS:      Current Outpatient Medications   Medication Sig Dispense Refill    ramipril (ALTACE) 10 MG capsule Take 1 capsule by mouth daily 30 capsule 3    aspirin 81 MG EC tablet Take 1 tablet by mouth daily Please start on 09/18/20 30 tablet 5    tamsulosin (FLOMAX) 0.4 MG capsule Take 1 capsule by mouth 2 times daily 60 capsule 5    hydroCHLOROthiazide (HYDRODIURIL) 25 MG tablet Take 1 tablet by mouth daily 30 tablet 5    ramipril (ALTACE) 5 MG capsule Take 1 capsule by mouth daily 30 capsule 5    rosuvastatin (CRESTOR) 10 MG tablet Take 1 tablet by mouth daily 30 tablet 5     No current facility-administered medications for this visit. Patient Care Team:  Amanda Bass MD as PCP - General (Internal Medicine)  Amanda Bass MD as PCP - [facility]    PAST MEDICAL AND SURGICAL HISTORY:      Past Medical History:   Diagnosis Date    Hyperlipidemia     Hypertension     Wellness examination     patient states no PCP     Past Surgical History:   Procedure Laterality Date    CYSTOSCOPY N/A 7/30/2020    CYSTOSCOPY performed by Jailene Amaya MD at [place] [address]  08/14/2020     CYSTO, TUR PROSTATE GREENLIGHT XPS     TURP N/A 8/14/2020    CYSTO, TUR PROSTATE GREENLIGHT XPS performed by Jailene Amaya MD at [address] [place] [address]      varicose vein of right leg       SOCIAL HISTORY      Social History     Tobacco Use    Smoking status: Never Smoker    Smokeless tobacco: Never Used   Substance Use Topics    Alcohol use: Never     Frequency: Never     Ivanna  reports that he has never smoked. He has never used smokeless tobacco.    FAMILY HISTORY:    No family history on file. REVIEW OF SYSTEMS:    Review of Systems   Constitutional: Negative. HENT: Negative. Eyes: Negative. Respiratory: Negative. Cardiovascular: Negative. Gastrointestinal: Negative. Endocrine: Negative. Genitourinary: Negative. Musculoskeletal: Negative. Skin: Negative. Allergic/Immunologic: Negative. Neurological: Negative. Hematological: Negative. Psychiatric/Behavioral: Negative. PHYSICAL EXAM:      Vitals:    12/03/20 1316   BP: 123/84   Pulse: 120     BP Readings from Last 3 Encounters:   12/03/20 123/84   10/05/20 139/83   09/15/20 134/74       Physical Examination: General appearance - alert, well appearing, and in no distress  Chest - clear to auscultation, no wheezes, rales or rhonchi, symmetric air entry  Heart - tachycardia, normal - appears to be sinus     LABORATORY FINDINGS:    CBC:   Lab Results   Component Value Date    WBC 7.5 09/15/2020    HGB 10.9 09/15/2020     09/15/2020     BMP:    Lab Results   Component Value Date     09/15/2020    K 3.8 09/15/2020     09/15/2020    CO2 23 09/15/2020    BUN 13 09/15/2020    CREATININE 0.63 09/15/2020    GLUCOSE 106 09/15/2020     Hemoglobin A1C:   Lab Results   Component Value Date    LABA1C 5.9 09/04/2020     Microalbumin Urine: No results found for: MICROALBUR  Lipid profile:   Lab Results   Component Value Date    CHOL 145 09/04/2020    TRIG 86 09/04/2020    HDL 49 09/04/2020     Thyroid functions:   Lab Results   Component Value Date    TSH 0.89 09/12/2020      Hepatic functions:   Lab Results   Component Value Date    ALT 25 09/13/2020    AST 18 09/13/2020    PROT 6.0 09/13/2020    BILITOT 0.17 09/13/2020    BILIDIR <0.08 09/13/2020    LABALBU 3.5 09/13/2020     Urine Analysis: No results found for: [address] [place]:      Health Maintenance Due   Topic Date Due    Hepatitis C screen  1960    HIV screen  06/01/1974    DTaP/Tdap/Td vaccine (1 - Tdap) 06/01/1978    Shingles Vaccine (1 of 2) 06/01/2009       ASSESSMENT AND PLAN:       Diagnosis Orders   1. Tachycardia - etiology unclear. Obtain EKG to confirm it is sinus. Basic labs to rule out infection, volume issues, etc.   F/u in1 week. Pt denies any symptoms of ACS at this time.  Discussed need for immediate attention if anything changes. Comprehensive Metabolic Panel    TSH with Reflex    CBC Auto Differential    EKG 12 lead   2. Screening for human immunodeficiency virus  HIV-1 And HIV-2 Antibodies   3. Need for hepatitis C screening test  Hepatitis C Antibody   4. Essential hypertension - controlled Cont altace and HCTZ           FOLLOW UP:   1. Ivanna received counseling on the following healthy behaviors: nutrition and exercise    2. Reviewed prior labs and health maintenance. 3.  Discussed use, benefit, and side effects of prescribed medications. Barriers to medication compliance addressed. All patient questions answered. Pt voiced understanding. 4.  Continue current medications, diet and exercise. No orders of the defined types were placed in this encounter. Completed Refills               Requested Prescriptions      No prescriptions requested or ordered in this encounter       5. Patient given educational materials - see patient instructions    6. Was a self-tracking handout given in paper form or via [employer]? Yes  If yes, see orders or list here. Orders Placed This Encounter   Procedures    Hepatitis C Antibody     Please get this labwork done before your next visit. Standing Status:   Future     Standing Expiration Date:   12/3/2021    HIV-1 And HIV-2 Antibodies     Standing Status:   Future     Standing Expiration Date:   12/3/2021    Comprehensive Metabolic Panel     Please get this labwork done before your next visit. Standing Status:   Future     Standing Expiration Date:   12/2/2021    TSH with Reflex     Please get this labwork done before your next visit. Standing Status:   Future     Standing Expiration Date:   12/3/2021    CBC Auto Differential     Please get this labwork done before your next visit.      Standing Status:   Future     Standing Expiration Date:   12/3/2021    EKG 12 lead     Standing Status:   Future     Standing Expiration Date:   2/3/2021     Order Specific Question:   Reason for Exam?     Answer: Tachycardia       Return in about 2 weeks (around 12/17/2020). Patient voiced understanding and agreed to treatment plan. This note is created with the assistance of a speech-recognition program. While intending to generate a document that actually reflects the content of the visit, the document can still have some mistakes which may not have been identified and corrected by editing.       Dr Omar Henderson MD, [facility]  [facility] , Department of Internal Medicine  Resident Ambulatory Site Medical Director  [address] [place] [address] Internal Medicine  [facility]  Internal Medicine Clerkship - [place]lejo                   12/3/2020, 1:51 PM

## 2020-12-03 NOTE — PATIENT INSTRUCTIONS
Script for lab given to pt, no fasting required. Pt will get labs done as soon as possible. Order for EKG given to patient and instructed to go to the [facility] V[facility] to get it done. Patient to return to clinic 2 weeks (12/17/20). AVS reviewed and given to pt. It is very important for your care that you keep your appointment. If for some reason you are unable to keep your appointment it is equally important that you call our office at [phone] to cancel your appointment and reschedule. Failure to do so may result in your termination from our practice.   MB

## 2020-12-08 ENCOUNTER — HOSPITAL ENCOUNTER (OUTPATIENT)
Age: 60
Discharge: HOME | End: 2020-12-08
Payer: COMMERCIAL

## 2020-12-08 PROCEDURE — 93005 ELECTROCARDIOGRAM TRACING: CPT | Performed by: INTERNAL MEDICINE

## 2020-12-09 LAB
EKG ATRIAL RATE: 99 BPM
EKG P AXIS: 55 DEGREES
EKG P-R INTERVAL: 162 MS
EKG Q-T INTERVAL: 358 MS
EKG QRS DURATION: 96 MS
EKG QTC CALCULATION (BAZETT): 459 MS
EKG R AXIS: 58 DEGREES
EKG T AXIS: 30 DEGREES
EKG VENTRICULAR RATE: 99 BPM

## 2020-12-09 PROCEDURE — 93010 ELECTROCARDIOGRAM REPORT: CPT | Performed by: INTERNAL MEDICINE

## 2020-12-17 ENCOUNTER — VIRTUAL VISIT (OUTPATIENT)
Dept: INTERNAL MEDICINE | Age: 60
End: 2020-12-17
Payer: COMMERCIAL

## 2020-12-17 PROCEDURE — 99213 OFFICE O/P EST LOW 20 MIN: CPT | Performed by: INTERNAL MEDICINE

## 2020-12-17 RX ORDER — ALPHA-D-GALACTOSIDASE
1 TABLET ORAL
Qty: 90 TABLET | Refills: 1 | Status: SHIPPED | OUTPATIENT
Start: 2020-12-17

## 2020-12-17 NOTE — PATIENT INSTRUCTIONS
Medications e-scribe to pharmacy of pt's choice. Patient to return to clinic 1 year (office will call and schedule appt). AVS reviewed and mailedto pt. It is very important for your care that you keep your appointment. If for some reason you are unable to keep your appointment it is equally important that you call our office at [phone] to cancel your appointment and reschedule. Failure to do so may result in your termination from our practice.   MB

## 2020-12-17 NOTE — PROGRESS NOTES
Philippe Quijano is a 63 y.o. male evaluated via telephone on 12/17/2020. Consent:  He and/or health care decision maker is aware that that he may receive a bill for this telephone service, depending on his insurance coverage, and has provided verbal consent to proceed: Yes      Documentation:  Patients been taking 10 mg ramipril and 25mg HCTZ. His BP and HR are better controlled. Episodes of elevated BP and tachycardia he now reports coincide more with times of frustration. He also reports a lump in the muscle of his bicep area. He is stating it bothers him to lay on that arm. Not wanting any surgical intervention at this time      Patient complains of gas in stomach. Onset of symptoms was several months ago. Current symptoms: none at present. Patient denies constipation, diarrhea and nausea. Symptoms have progressed to a point and plateaued. Previous visits for this problem: none. Evaluation to date has been none. Treatment to date has been none. Diagnosis Orders   1. Essential hypertension     2. Tachycardia     3. Lipoma of upper extremity, unspecified laterality     4. Flatulence  alpha-galactosidase (BEANO) TABS chewable tablet         I affirm this is a Patient Initiated Episode with a Patient who has not had a related appointment within my department in the past 7 days or scheduled within the next 24 hours.     Patient identification was verified at the start of the visit: Yes    Total Time: minutes: 11-20 minutes    Note: not billable if this call serves to triage the patient into an appointment for the relevant concern      Dominique Sanchez

## 2020-12-18 ENCOUNTER — OFFICE VISIT (OUTPATIENT)
Dept: UROLOGY | Age: 60
End: 2020-12-18
Payer: COMMERCIAL

## 2020-12-18 VITALS
DIASTOLIC BLOOD PRESSURE: 89 MMHG | WEIGHT: 186 LBS | HEIGHT: 66 IN | HEART RATE: 99 BPM | SYSTOLIC BLOOD PRESSURE: 133 MMHG | BODY MASS INDEX: 29.89 KG/M2

## 2020-12-18 LAB
BILIRUBIN, POC: NEGATIVE
BLOOD URINE, POC: NORMAL
CLARITY, POC: CLEAR
COLOR, POC: YELLOW
GLUCOSE URINE, POC: NEGATIVE
KETONES, POC: NEGATIVE
LEUKOCYTE EST, POC: NEGATIVE
NITRITE, POC: NEGATIVE
PH, POC: 7
PROTEIN, POC: NEGATIVE
SPECIFIC GRAVITY, POC: 1.02
UROBILINOGEN, POC: 0.2

## 2020-12-18 PROCEDURE — 51798 US URINE CAPACITY MEASURE: CPT | Performed by: UROLOGY

## 2020-12-18 PROCEDURE — 81003 URINALYSIS AUTO W/O SCOPE: CPT | Performed by: UROLOGY

## 2020-12-18 PROCEDURE — 99213 OFFICE O/P EST LOW 20 MIN: CPT | Performed by: UROLOGY

## 2020-12-18 PROCEDURE — 99212 OFFICE O/P EST SF 10 MIN: CPT | Performed by: UROLOGY

## 2020-12-18 PROCEDURE — 1036F TOBACCO NON-USER: CPT | Performed by: UROLOGY

## 2020-12-18 PROCEDURE — 3017F COLORECTAL CA SCREEN DOC REV: CPT | Performed by: UROLOGY

## 2020-12-18 PROCEDURE — G8484 FLU IMMUNIZE NO ADMIN: HCPCS | Performed by: UROLOGY

## 2020-12-18 PROCEDURE — G8427 DOCREV CUR MEDS BY ELIG CLIN: HCPCS | Performed by: UROLOGY

## 2020-12-18 PROCEDURE — G8417 CALC BMI ABV UP PARAM F/U: HCPCS | Performed by: UROLOGY

## 2020-12-20 NOTE — PROGRESS NOTES
12/18/20   POCT Urinalysis No Micro (Auto)   Result Value Ref Range    Color, UA Yellow     Clarity, UA clear     Glucose, UA POC Negative     Bilirubin, UA Negative     Ketones, UA Negative     Spec Grav, UA 1.025     Blood, UA POC TRACE     pH, UA 7.0     Protein, UA POC Negative     Urobilinogen, UA 0.2     Leukocytes, UA Negative     Nitrite, UA Negative        Last BUN and creatinine:  Lab Results   Component Value Date    BUN 17 12/03/2020     Lab Results   Component Value Date    CREATININE 0.77 12/03/2020       Additional Lab/Culture results: none    Imaging Reviewed during this Office Visit:   Bro Mckoy MD independently reviewed the images and verified the radiology reports from:    Patient was never admitted. PAST MEDICAL, FAMILY AND SOCIAL HISTORY:  Past Medical History:   Diagnosis Date    Hyperlipidemia     Hypertension     Wellness examination     patient states no PCP     Past Surgical History:   Procedure Laterality Date    CYSTOSCOPY N/A 7/30/2020    CYSTOSCOPY performed by Ole Larios MD at [address] [place] [address]  08/14/2020     CYSTO, TUR PROSTATE GREENLIGHT XPS     TURP N/A 8/14/2020    CYSTO, TUR PROSTATE GREENLIGHT XPS performed by Ole Larios MD at [address] [place] [address]      varicose vein of right leg     No family history on file. Outpatient Medications Marked as Taking for the 12/18/20 encounter (Office Visit) with Ole Larios MD   Medication Sig Dispense Refill    alpha-galactosidase (BEANO) TABS chewable tablet Take 1 tablet by mouth 3 times daily (with meals) 90 tablet 1    ramipril (ALTACE) 10 MG capsule Take 1 capsule by mouth daily 30 capsule 3    aspirin 81 MG EC tablet Take 1 tablet by mouth daily Please start on 09/18/20 30 tablet 5    hydroCHLOROthiazide (HYDRODIURIL) 25 MG tablet Take 1 tablet by mouth daily 30 tablet 5    rosuvastatin (CRESTOR) 10 MG tablet Take 1 tablet by mouth daily 30 tablet 5       Patient has no known allergies.   Social History Tobacco Use   Smoking Status Never Smoker   Smokeless Tobacco Never Used      (If patient a smoker, smoking cessation counseling offered)   Social History     Substance and Sexual Activity   Alcohol Use Never    Frequency: Never       REVIEW OF SYSTEMS:  Review of Systems   Constitutional: Negative for appetite change, chills and fever. Eyes: Negative for pain, redness and visual disturbance. Respiratory: Negative for cough, shortness of breath and wheezing. Cardiovascular: Negative for chest pain and leg swelling. Gastrointestinal: Negative for abdominal pain, constipation, diarrhea, nausea and vomiting. Genitourinary: Positive for difficulty urinating, dysuria, flank pain, frequency (mostly at night) and urgency. Negative for hematuria. Musculoskeletal: Positive for back pain (left side). Negative for joint swelling and myalgias. Skin: Negative for rash and wound. Neurological: Negative for dizziness, tremors and numbness. Hematological: Does not bruise/bleed easily. Physical Exam:    This a 63 y.o. male  Vitals:    12/18/20 1110   BP: 133/89   Pulse: 99     Body mass index is 30.02 kg/m². Constitutional: Patient in no acute distress;         Assessment and Plan        1. BPH with urinary obstruction    2. Gross hematuria               Plan:      Decrease heavy lifting this month  Voiding well  Discussed with pt that post void residuals may never be 0 ml due to longstanding outlet obstruction  Follow up in six months      Prescriptions Ordered:  No orders of the defined types were placed in this encounter.      Orders Placed:  Orders Placed This Encounter   Procedures    POCT Urinalysis No Micro (Auto)    WA MEASUREMENT,POST-VOID RESIDUAL VOLUME BY US,NON-IMAGING            OMAR Loreta Leventhal, MD

## 2021-01-07 ENCOUNTER — HOSPITAL ENCOUNTER (OUTPATIENT)
Age: 61
Discharge: HOME | End: 2021-01-07
Payer: COMMERCIAL

## 2021-01-07 PROCEDURE — 93005 ELECTROCARDIOGRAM TRACING: CPT | Performed by: INTERNAL MEDICINE

## 2021-01-08 ENCOUNTER — TELEPHONE (OUTPATIENT)
Dept: INTERNAL MEDICINE | Age: 61
End: 2021-01-08

## 2021-01-08 LAB
EKG ATRIAL RATE: 84 BPM
EKG P AXIS: 60 DEGREES
EKG P-R INTERVAL: 166 MS
EKG Q-T INTERVAL: 386 MS
EKG QRS DURATION: 96 MS
EKG QTC CALCULATION (BAZETT): 456 MS
EKG R AXIS: 55 DEGREES
EKG T AXIS: 37 DEGREES
EKG VENTRICULAR RATE: 84 BPM

## 2021-01-08 NOTE — TELEPHONE ENCOUNTER
Pt calling about EKG results. He is not understanding about his Twave? Pt can see the results on [email or web address]. Pt said we can respond through [email or web address] or by phone.  Please advise

## 2021-01-29 ENCOUNTER — VIRTUAL VISIT (OUTPATIENT)
Dept: INTERNAL MEDICINE | Age: 61
End: 2021-01-29
Payer: COMMERCIAL

## 2021-01-29 DIAGNOSIS — L60.1 ONYCHOLYSIS OF TOENAIL: ICD-10-CM

## 2021-01-29 DIAGNOSIS — H93.19 TINNITUS, UNSPECIFIED LATERALITY: Primary | ICD-10-CM

## 2021-01-29 PROCEDURE — 99442 PR PHYS/QHP TELEPHONE EVALUATION 11-20 MIN: CPT | Performed by: INTERNAL MEDICINE

## 2021-01-29 ASSESSMENT — PATIENT HEALTH QUESTIONNAIRE - PHQ9
2. FEELING DOWN, DEPRESSED OR HOPELESS: 0
SUM OF ALL RESPONSES TO PHQ QUESTIONS 1-9: 0
SUM OF ALL RESPONSES TO PHQ9 QUESTIONS 1 & 2: 0

## 2021-01-29 NOTE — PROGRESS NOTES
Josh Coulter is a 63 y.o. male evaluated via telephone on 1/29/2021. Consent:  He and/or health care decision maker is aware that that he may receive a bill for this telephone service, depending on his insurance coverage, and has provided verbal consent to proceed: Yes      Documentation:  Tinnitus  Patient presents with tinnitus. Onset of symptoms was gradual several weeks ago ago with unchanged course since that time. Patient describes the tinnitus as recurrent located in the bilateral ear. The quality is described as medium range pitch that sounds like hissing. The pattern is nonpulsatile with an intensity that is very soft. Patient describes his level of annoyance as not annoying at this time. Associated symptoms include dizziness Family history is negative family history for tinnitus Patient has had no prior evaluation, treatment or surgery for tinnitus Patient does not have hearing aids at this time. Previous treatments include none. Onychomycosis  Patient complains of abnormal appearing toenails. Symptoms have been ongoing for about several months, and include increasing thickness, darkening color. Previous treatment has included has had this before in [place] and was treated, with good improvement. Known liver disease? no.     Diagnosis Orders   1. Tinnitus, unspecified laterality  WEND[facility] - Tommie Naranjo MD, Otolaryngology, [place]   2. Onycholysis of toenail  [facility] - Miranda Sanchez DPM, [address], [address] [place] [address] affirm this is a Patient Initiated Episode with a Patient who has not had a related appointment within my department in the past 7 days or scheduled within the next 24 hours.     Patient identification was verified at the start of the visit: Yes    Total Time: minutes: 11-20 minutes    Note: not billable if this call serves to triage the patient into an appointment for the relevant concern      Jeronimo Hogan

## 2021-01-29 NOTE — PATIENT INSTRUCTIONS
Left message on voicemail for patient to call back and she can schedule an appointment for a routine physical with Giovani in November. Told her in message she can call  and they can assist her with making an appointment.   Referral for ENT sent to [id number] [facility]  they will call pt for appt, copy of referral with number and address given to pt. Pt should call referral number if not heard from within a couple of weeks. Referral for Podiatry sent to Dr Griselda Rudolph  they will call pt for appt, copy of referral with number and address given to pt. Pt should call referral number if not heard from within a couple of weeks. Patient to return to clinic as scheuled (3/12/21). AVS reviewed and given to pt. It is very important for your care that you keep your appointment. If for some reason you are unable to keep your appointment it is equally important that you call our office at [phone] to cancel your appointment and reschedule. Failure to do so may result in your termination from our practice.   MB

## 2021-02-03 ENCOUNTER — HOSPITAL ENCOUNTER (OUTPATIENT)
Dept: NON INVASIVE DIAGNOSTICS | Age: 61
Discharge: HOME | End: 2021-02-03
Payer: COMMERCIAL

## 2021-02-03 DIAGNOSIS — G45.9 TIA (TRANSIENT ISCHEMIC ATTACK): ICD-10-CM

## 2021-02-03 LAB
LV EF: 55 %
LVEF MODALITY: NORMAL

## 2021-02-03 PROCEDURE — 93306 TTE W/DOPPLER COMPLETE: CPT

## 2021-02-04 ENCOUNTER — OFFICE VISIT (OUTPATIENT)
Dept: PODIATRY | Age: 61
End: 2021-02-04
Payer: COMMERCIAL

## 2021-02-04 VITALS — HEIGHT: 66 IN | BODY MASS INDEX: 29.89 KG/M2 | WEIGHT: 186 LBS

## 2021-02-04 DIAGNOSIS — B35.1 ONYCHOMYCOSIS OF TOENAIL: Primary | ICD-10-CM

## 2021-02-04 DIAGNOSIS — M79.675 PAIN OF TOES OF BOTH FEET: ICD-10-CM

## 2021-02-04 DIAGNOSIS — M79.674 PAIN OF TOES OF BOTH FEET: ICD-10-CM

## 2021-02-04 PROCEDURE — 11720 DEBRIDE NAIL 1-5: CPT | Performed by: PODIATRIST

## 2021-02-04 PROCEDURE — 99203 OFFICE O/P NEW LOW 30 MIN: CPT | Performed by: PODIATRIST

## 2021-02-04 PROCEDURE — 3017F COLORECTAL CA SCREEN DOC REV: CPT | Performed by: PODIATRIST

## 2021-02-04 PROCEDURE — G8484 FLU IMMUNIZE NO ADMIN: HCPCS | Performed by: PODIATRIST

## 2021-02-04 PROCEDURE — G8427 DOCREV CUR MEDS BY ELIG CLIN: HCPCS | Performed by: PODIATRIST

## 2021-02-04 PROCEDURE — G8417 CALC BMI ABV UP PARAM F/U: HCPCS | Performed by: PODIATRIST

## 2021-02-04 PROCEDURE — 1036F TOBACCO NON-USER: CPT | Performed by: PODIATRIST

## 2021-02-04 ASSESSMENT — ENCOUNTER SYMPTOMS
COLOR CHANGE: 0
BACK PAIN: 0
DIARRHEA: 0
SHORTNESS OF BREATH: 0
NAUSEA: 0

## 2021-02-04 NOTE — PROGRESS NOTES
Raul Panda is a 63 y.o. male who presents to the office today with chief complaint of thick, painful nails to both feet. Chief Complaint   Patient presents with    Consultation     Est new pt care    Nail Problem     bilat toe fungus   Symptoms began about 6 month(s) ago. Patient denies injury to the feet. Patient states that the nails are painful with shoe gear and ambulation. Pain is rated 5 out of 10 at it's worst and is described as intermittent. Treatments prior to today's visit include: Patient states that he tried oral Lamisil, but it did not help. No Known Allergies    Past Medical History:   Diagnosis Date    Hyperlipidemia     Hypertension     Wellness examination     patient states no PCP       Prior to Admission medications    Medication Sig Start Date End Date Taking? Authorizing Provider   ciclopirox (PENLAC) 8 % solution Apply topically nightly. Remove once weekly with alcohol or nail polish remover.  2/4/21  Yes Irina Victoria DPM   alpha-galactosidase (BEANO) TABS chewable tablet Take 1 tablet by mouth 3 times daily (with meals) 12/17/20  Yes Ben Bartlett MD   ramipril (ALTACE) 10 MG capsule Take 1 capsule by mouth daily 11/24/20  Yes Ben Bartlett MD   aspirin 81 MG EC tablet Take 1 tablet by mouth daily Please start on 09/18/20 9/15/20  Yes Carla Bamberger, MD   hydroCHLOROthiazide (HYDRODIURIL) 25 MG tablet Take 1 tablet by mouth daily 8/28/20  Yes Ben Bartlett MD   rosuvastatin (CRESTOR) 10 MG tablet Take 1 tablet by mouth daily 8/28/20  Yes Ben Bartlett MD   tamsulosin (FLOMAX) 0.4 MG capsule Take 1 capsule by mouth 2 times daily 9/15/20 1/29/21  Carla Bamberger, MD       Past Surgical History:   Procedure Laterality Date    CYSTOSCOPY N/A 7/30/2020    CYSTOSCOPY performed by Cristhian Ricci MD at [address]  08/14/2020     CYSTO, TUR PROSTATE GREENLIGHT XPS     TURP N/A 8/14/2020    CYSTO, TUR PROSTATE GREENLIGHT XPS performed by Cristhian Ricci MD at [place] [address]  VASCULAR SURGERY      varicose vein of right leg       No family history on file. Social History     Tobacco Use    Smoking status: Never Smoker    Smokeless tobacco: Never Used   Substance Use Topics    Alcohol use: Never     Frequency: Never       Review of Systems   Constitutional: Negative for activity change, appetite change, chills, diaphoresis, fatigue and fever. Respiratory: Negative for shortness of breath. Cardiovascular: Negative for leg swelling. Gastrointestinal: Negative for diarrhea and nausea. Endocrine: Negative for cold intolerance, heat intolerance and polyuria. Musculoskeletal: Positive for arthralgias. Negative for back pain, gait problem, joint swelling and myalgias. Skin: Negative for color change, pallor, rash and wound. Allergic/Immunologic: Negative for environmental allergies and food allergies. Neurological: Negative for dizziness, weakness, light-headedness and numbness. Hematological: Does not bruise/bleed easily. Psychiatric/Behavioral: Negative for behavioral problems, confusion and self-injury. The patient is not nervous/anxious. Vitals: There were no vitals filed for this visit. General: AAO x 3 in NAD. Integument: There are no rashes, ulcers, or breaks in the skin noted to the bilateral lower extremities. There is no induration, subcutaneous nodules, or tightening of the skin noted to the bilateral.     Toenails 1,4,5 of the right foot do present with thickness, elongation, discoloration, brittleness, subungual debris. Toenails 5 of the left foot do present with thickness, elongation, discoloration, brittleness, subungual debris. There is pain with palpation and debridement of toenails 1,4,5 of the right foot and 5 of the left foot. Interdigital maceration absent to web spaces 1-4, Bilateral.     There are no preulcerative lesions noted to the right foot. There are no preulcerative lesions noted to the left foot. The skin to the bilateral feet is not thin and shiny. The skin to the bilateral feet is  warm, supple, and dry. Vascular: DP pulse of the right foot is  palpable. DP pulse of the left foot is  palpable. PT pulse of the right foot is  palpable. PT pulse of the left foot is  palpable. CFT is less than 3 secs to the digits of the right foot. CFT is less than 3 secs to the digits of the left foot. There is no edema noted to the bilateral foot or ankle. There is hair growth noted to the digits of the bilateral feet. There are no varicosities noted to the right foot/ankle. There are no varicosities noted to the left foot/ankle. Erythema is absent to the bilateral feet. Neurological: Reflexes are present to the right plantar foot and to the Achilles tendon. Reflexes are present to the left plantar foot and to the Achilles tendon. Epicritic sensation is  intact to the right foot. Epicritic sensation is  intact to the left foot. Musculoskeletal:  Muscle strength is +5/5 to all four muscle groups of the right lower extremity and +5/5 to all four muscle groups of the left lower extremity. There are no areas of subluxation, dislocation, or laxity noted to either lower extremity. Range of motion to the right ankle is  free of pain or grinding. Range of motion to the left ankle is  free of pain or grinding. Range of motion to the right subtalar joint is  free of pain or grinding. Range of motion to the left subtalar joint is  free of pain or grinding. No abnormalities, asymmetries, or misalignments are seen between the extremities. Weightbearing evaluation does not reveal rearfoot eversion, medial prominence of the talar head, loss of the medial longitudinal arch height, and too many toes sign bilaterally. The lesser digits of the right foot are not contracted. The lesser digits of the left foot are not contracted. There is prominence noted to the first metatarsal head with abduction of the hallux of the right foot. There is prominence noted to the first metatarsal head with abduction of the hallux of the left foot. Shoe examination was performed. Biomechanical Exam: normal bilaterally. Asessment: Patient is a 63 y.o. male with:    Diagnosis Orders   1. Onychomycosis of toenail  NH DEBRIDEMENT OF NAIL(S), 1-5    ciclopirox (PENLAC) 8 % solution   2. Pain of toes of both feet  NH DEBRIDEMENT OF NAIL(S), 1-5    ciclopirox (PENLAC) 8 % solution       Plan:  1. Clinical evaluation of the patient. 2. Toenails 1-5 of the right foot and 1-5 of the left foot were debrided in length and thickness using a nail nipper and a . Patient informed that since he has already taken oral Lamisil and it did not help, he is not a candidate for this medication again. Patient given a prescription for Penlac. 3. Contact office with any questions/problems/concerns. Return in about 3 months (around 5/4/2021) for Painful fungal nails.    2/4/2021      Santa Tariq DPM

## 2021-02-12 DIAGNOSIS — E78.5 HYPERLIPIDEMIA, UNSPECIFIED HYPERLIPIDEMIA TYPE: ICD-10-CM

## 2021-02-12 DIAGNOSIS — I10 ESSENTIAL HYPERTENSION: ICD-10-CM

## 2021-02-12 NOTE — TELEPHONE ENCOUNTER
Request for Hydrochlorothiazide and Crestor.     Last Visit Date: 1/29/2021  Next Visit Date:  Future Appointments   Date Time Provider Mindy Erazo   3/12/2021  8:40 AM Romi Qureshi MD [facility] IM [facility]   4/1/2021  1:00 PM Lashanda Hopper MD Resp Spec [facility]   4/5/2021  1:00 PM [address], MD Neuro [facility]   4/29/2021 10:00 AM PEEWEE Barrera   6/18/2021 10:30 AM SCHEDULE, P [facility] UROLOGY [facility] [address] Maintenance   Topic Date Due    DTaP/Tdap/Td vaccine (1 - Tdap) 06/01/1978    Flu vaccine (1) 06/30/2021 (Originally 9/1/2020)    Shingles Vaccine (1 of 2) 01/28/2022 (Originally 6/1/2009)    A1C test (Diabetic or Prediabetic)  09/04/2021    Lipid screen  09/04/2021    Potassium monitoring  12/03/2021    Creatinine monitoring  12/03/2021    Colon cancer screen fecal DNA test (Cologuard)  09/08/2023    Hepatitis C screen  Completed    HIV screen  Completed    Hepatitis A vaccine  Aged Out    Hepatitis B vaccine  Aged Out    Hib vaccine  Aged Out    Meningococcal (ACWY) vaccine  Aged Out    Pneumococcal 0-64 years Vaccine  Aged Out       Hemoglobin A1C (%)   Date Value   09/04/2020 5.9             ( goal A1C is < 7)   No results found for: LABMICR  LDL Cholesterol (mg/dL)   Date Value   09/04/2020 79       (goal LDL is <100)   AST (U/L)   Date Value   12/03/2020 17     ALT (U/L)   Date Value   12/03/2020 24     BUN (mg/dL)   Date Value   12/03/2020 17     BP Readings from Last 3 Encounters:   12/18/20 133/89   12/03/20 123/84   10/05/20 139/83          (goal 120/80)    All Future Testing planned in [facility]  Lab Frequency Next Occurrence   EKG 12 lead Once 03/21/2021   EKG 12 lead Once 03/08/2021         Patient Active Problem List:     BPH with urinary obstruction     TIA (transient ischemic attack)     Gross hematuria     Hematuria     COVID-19     Vasovagal syncope     Postprocedural urinary retention     Intractable migraine with aura without status migrainosus

## 2021-02-15 RX ORDER — HYDROCHLOROTHIAZIDE 25 MG/1
25 TABLET ORAL DAILY
Qty: 30 TABLET | Refills: 5 | Status: SHIPPED | OUTPATIENT
Start: 2021-02-15 | End: 2021-08-03

## 2021-02-15 RX ORDER — ROSUVASTATIN CALCIUM 10 MG/1
10 TABLET, COATED ORAL DAILY
Qty: 30 TABLET | Refills: 5 | Status: SHIPPED | OUTPATIENT
Start: 2021-02-15 | End: 2021-08-03

## 2021-03-04 ENCOUNTER — TELEPHONE (OUTPATIENT)
Dept: PULMONOLOGY | Age: 61
End: 2021-03-04

## 2021-03-04 NOTE — TELEPHONE ENCOUNTER
Patient called wants to know if you want him to have another CT or CXR before his next appt on 4-1-21

## 2021-03-09 NOTE — TELEPHONE ENCOUNTER
I am covering Dr. Latina Osgood in [place]. He saw the patient in December and he said lung nodules are stable.   He did not mention anything about repeating CAT scan but he wanted to see him in 3 months please keep the appointment and Dr. Pattie Ramirez can decide if you want another CT scan

## 2021-03-12 ENCOUNTER — VIRTUAL VISIT (OUTPATIENT)
Dept: INTERNAL MEDICINE | Age: 61
End: 2021-03-12
Payer: COMMERCIAL

## 2021-03-12 ENCOUNTER — IMMUNIZATION (OUTPATIENT)
Dept: FAMILY MEDICINE CLINIC | Age: 61
End: 2021-03-12
Payer: COMMERCIAL

## 2021-03-12 DIAGNOSIS — R14.3 FLATULENCE: Primary | ICD-10-CM

## 2021-03-12 PROCEDURE — 0001A COVID-19, PFIZER VACCINE 30MCG/0.3ML DOSE: CPT | Performed by: INTERNAL MEDICINE

## 2021-03-12 PROCEDURE — 91300 COVID-19, PFIZER VACCINE 30MCG/0.3ML DOSE: CPT | Performed by: INTERNAL MEDICINE

## 2021-03-12 PROCEDURE — 99442 PR PHYS/QHP TELEPHONE EVALUATION 11-20 MIN: CPT | Performed by: INTERNAL MEDICINE

## 2021-03-12 NOTE — PATIENT INSTRUCTIONS
Referral for Gastroenterology sent to [address] [place] [address]  they will call pt for appt, copy of referral with number and address mailed to pt. Pt should call referral number if not heard from within a couple of weeks. Patient to return to clinic 1 year (office will call and schedule appt). AVS reviewed and mailed to pt. It is very important for your care that you keep your appointment. If for some reason you are unable to keep your appointment it is equally important that you call our office at [phone] to cancel your appointment and reschedule. Failure to do so may result in your termination from our practice.   MB

## 2021-03-12 NOTE — PROGRESS NOTES
Francine Monsalve is a 63 y.o. male evaluated via telephone on 3/12/2021. Consent:  He and/or health care decision maker is aware that that he may receive a bill for this telephone service, depending on his insurance coverage, and has provided verbal consent to proceed: Yes      Documentation:  63year old male with no acute issues today. Would like to discuss his echo results and what the 55% EF means. Discussed it and answered all questions. He also continues to have trouble with flatulence and bloating as well as reports his eating pattern is very varied and at both extremes where he is famished at one time and not hungry at other times. Would like further testing,     Diagnosis Orders   1. Hien Almonte MD, Gastroenterology, [facility]         I affirm this is a Patient Initiated Episode with a Patient who has not had a related appointment within my department in the past 7 days or scheduled within the next 24 hours. Patient identification was verified at the start of the visit: Yes    Total Time: minutes: 11-20 minutes    The visit was conducted pursuant to the emergency declaration under the [facility], [address] [place] [address] authority and the [employer] General Act. Patient identification was verified, and a caregiver was present when appropriate. The patient was located in a state where the provider was credentialed to provide care.     Note: not billable if this call serves to triage the patient into an appointment for the relevant concern      Ar Delgadillo

## 2021-03-15 NOTE — TELEPHONE ENCOUNTER
E-scribe request from [employer] for Ramipril 10 mg. Patient is on the waitlist for an appt in March 2022.       Health Maintenance   Topic Date Due    DTaP/Tdap/Td vaccine (1 - Tdap) Never done    Flu vaccine (1) 06/30/2021 (Originally 9/1/2020)    Shingles Vaccine (1 of 2) 01/28/2022 (Originally 6/1/2009)    COVID-19 Vaccine (2 - Pfizer 2-dose series) 04/02/2021    A1C test (Diabetic or Prediabetic)  09/04/2021    Lipid screen  09/04/2021    Potassium monitoring  12/03/2021    Creatinine monitoring  12/03/2021    Colon cancer screen fecal DNA test (Cologuard)  09/08/2023    Hepatitis C screen  Completed    HIV screen  Completed    Hepatitis A vaccine  Aged Out    Hepatitis B vaccine  Aged Out    Hib vaccine  Aged Out    Meningococcal (ACWY) vaccine  Aged Out    Pneumococcal 0-64 years Vaccine  Aged Out             (applicable per patient's age: Cancer Screenings, Depression Screening, Fall Risk Screening, Immunizations)    Hemoglobin A1C (%)   Date Value   09/04/2020 5.9     LDL Cholesterol (mg/dL)   Date Value   09/04/2020 79     AST (U/L)   Date Value   12/03/2020 17     ALT (U/L)   Date Value   12/03/2020 24     BUN (mg/dL)   Date Value   12/03/2020 17      (goal A1C is < 7)   (goal LDL is <100) need 30-50% reduction from baseline     BP Readings from Last 3 Encounters:   12/18/20 133/89   12/03/20 123/84   10/05/20 139/83    (goal /80)      All Future Testing planned in [facility]:  Lab Frequency Next Occurrence   EKG 12 lead Once 03/21/2021       Next Visit Date:  Future Appointments   Date Time Provider Mindy Erazo   4/1/2021  1:00 PM Daniela Gallego MD Resp Spec [facility]   4/2/2021  1:50 PM [facility] [place] [facility], [facility] 21 DAY SECOND DOSE [facility]   4/5/2021  1:00  Yuliya Oneil MD Neuro [facility]   4/29/2021 10:00 AM Magdalena Rojo DPM [place] [facility]   6/18/2021 10:30 AM SCHEDULE, [facility] [address] [facility] Urology [place]            Patient Active Problem List:     BPH with urinary obstruction     TIA (transient ischemic attack)     Gross hematuria     Hematuria     COVID-19     Vasovagal syncope     Postprocedural urinary retention     Intractable migraine with aura without status migrainosus

## 2021-03-16 RX ORDER — RAMIPRIL 10 MG/1
CAPSULE ORAL
Qty: 30 CAPSULE | Refills: 3 | Status: SHIPPED | OUTPATIENT
Start: 2021-03-16 | End: 2021-08-03

## 2021-03-19 ENCOUNTER — TELEPHONE (OUTPATIENT)
Dept: GASTROENTEROLOGY | Age: 61
End: 2021-03-19

## 2021-03-22 NOTE — TELEPHONE ENCOUNTER
Returned call to the patient and offered next available at [facility]. V's which was for 5/10 and or 4/13/21 3:15 [place] [facility] [place]. Patient was agreeable for the [place] location and new patient paperwork will be mailed. Writer thanked and call ended. Place on the cancellation list as well.

## 2021-04-01 ENCOUNTER — OFFICE VISIT (OUTPATIENT)
Dept: PULMONOLOGY | Age: 61
End: 2021-04-01
Payer: COMMERCIAL

## 2021-04-01 VITALS
TEMPERATURE: 98.5 F | DIASTOLIC BLOOD PRESSURE: 83 MMHG | HEART RATE: 91 BPM | BODY MASS INDEX: 29.05 KG/M2 | WEIGHT: 180 LBS | OXYGEN SATURATION: 98 % | SYSTOLIC BLOOD PRESSURE: 133 MMHG

## 2021-04-01 DIAGNOSIS — N40.1 BPH WITH URINARY OBSTRUCTION: ICD-10-CM

## 2021-04-01 DIAGNOSIS — R91.8 MULTIPLE NODULES OF LUNG: Primary | ICD-10-CM

## 2021-04-01 DIAGNOSIS — U07.1 COVID-19: ICD-10-CM

## 2021-04-01 DIAGNOSIS — N13.8 BPH WITH URINARY OBSTRUCTION: ICD-10-CM

## 2021-04-01 PROCEDURE — 3017F COLORECTAL CA SCREEN DOC REV: CPT | Performed by: INTERNAL MEDICINE

## 2021-04-01 PROCEDURE — G8417 CALC BMI ABV UP PARAM F/U: HCPCS | Performed by: INTERNAL MEDICINE

## 2021-04-01 PROCEDURE — 99214 OFFICE O/P EST MOD 30 MIN: CPT | Performed by: INTERNAL MEDICINE

## 2021-04-01 PROCEDURE — G8427 DOCREV CUR MEDS BY ELIG CLIN: HCPCS | Performed by: INTERNAL MEDICINE

## 2021-04-01 PROCEDURE — 1036F TOBACCO NON-USER: CPT | Performed by: INTERNAL MEDICINE

## 2021-04-02 ENCOUNTER — IMMUNIZATION (OUTPATIENT)
Dept: FAMILY MEDICINE CLINIC | Age: 61
End: 2021-04-02
Payer: COMMERCIAL

## 2021-04-02 PROCEDURE — 0002A COVID-19, PFIZER VACCINE 30MCG/0.3ML DOSE: CPT | Performed by: INTERNAL MEDICINE

## 2021-04-02 PROCEDURE — 91300 COVID-19, PFIZER VACCINE 30MCG/0.3ML DOSE: CPT | Performed by: INTERNAL MEDICINE

## 2021-04-05 ENCOUNTER — OFFICE VISIT (OUTPATIENT)
Dept: NEUROLOGY | Age: 61
End: 2021-04-05
Payer: COMMERCIAL

## 2021-04-05 VITALS
SYSTOLIC BLOOD PRESSURE: 123 MMHG | OXYGEN SATURATION: 98 % | HEART RATE: 117 BPM | WEIGHT: 180 LBS | DIASTOLIC BLOOD PRESSURE: 78 MMHG | BODY MASS INDEX: 28.93 KG/M2 | HEIGHT: 66 IN

## 2021-04-05 DIAGNOSIS — G45.9 TIA (TRANSIENT ISCHEMIC ATTACK): Primary | ICD-10-CM

## 2021-04-05 PROCEDURE — G8417 CALC BMI ABV UP PARAM F/U: HCPCS | Performed by: STUDENT IN AN ORGANIZED HEALTH CARE EDUCATION/TRAINING PROGRAM

## 2021-04-05 PROCEDURE — 99215 OFFICE O/P EST HI 40 MIN: CPT | Performed by: STUDENT IN AN ORGANIZED HEALTH CARE EDUCATION/TRAINING PROGRAM

## 2021-04-05 PROCEDURE — 3017F COLORECTAL CA SCREEN DOC REV: CPT | Performed by: STUDENT IN AN ORGANIZED HEALTH CARE EDUCATION/TRAINING PROGRAM

## 2021-04-05 PROCEDURE — 1036F TOBACCO NON-USER: CPT | Performed by: STUDENT IN AN ORGANIZED HEALTH CARE EDUCATION/TRAINING PROGRAM

## 2021-04-05 PROCEDURE — G8427 DOCREV CUR MEDS BY ELIG CLIN: HCPCS | Performed by: STUDENT IN AN ORGANIZED HEALTH CARE EDUCATION/TRAINING PROGRAM

## 2021-04-05 ASSESSMENT — ENCOUNTER SYMPTOMS
BACK PAIN: 0
CHOKING: 0
EYE REDNESS: 0
SHORTNESS OF BREATH: 0
ABDOMINAL PAIN: 0
CHEST TIGHTNESS: 0
PHOTOPHOBIA: 0

## 2021-04-05 NOTE — PROGRESS NOTES
[address] [place] [address], [address] [id number]  Dept: [phone]  Dept Fax: [phone]    NEUROLOGY FOLLOW UP NOTE                                              PATIENT NAME: Lico Clemons   PATIENT MRN: [id number]  FOLLOW UP TODAY: 4/5/2021        INITIAL & INTERVAL HISTORY:     HPI  The patient mentioned above R handed with past medical history of TIA on aspirin 81 mg daily, HTN, gross hematuria s/p green light surgery for BPH, COVID positive mid of September, migraine disorder, presented to the office for follow-up after ED visit for TIA symptoms. The patient stated that he was on aspirin 81 mg daily when he stopped that for cystoscopy procedure. He went to sleep and then he woke up at 3 AM feeling left-sided weakness in more in the left lower extremity than the upper extremity associated with left hemisensory changes. It was resolved before presenting to the ED. CT head, CTA of the head and neck with contrast and MRI of the brain were unremarkable. The patient was loaded with dual antiplatelets for 21 days then to continue aspirin 81 mg daily after that. He is on Crestor 10 mg daily. LDL was 79, hemoglobin A1c was 5.4. Echo was not done. ABCD2 Score  (Estimate Risk of Stroke after TIA)   POINTS   Age    < 60   ? 60     [] 0  [x] 1   BP:     SBP <140 or DBP < 90   SBP ? 140 or DBP ? 90       [] 0  [x] 1   Clinical Features of TIA     Other Symptoms                 Speech Disturbances W/O Weakness   Unilateral Weakness     [] 0  [] 1  [x] 2   Duration of symptoms     < 10 Minutes                                     10-59 Minutes   ?  61 Minutes     [] 0  [x] 1  [] 2   Diabetes     No                    Yes     [x] 0  [] 1   TOTAL  5   0-3 Points: Low Risk -> Work up could be done OPD   2-Day Stroke Risk: 1%   7- Day Stroke Risk: 1.2%   90 Days Stroke Risk: 3.1%    4-5 Points: Moderate Risk    2-Day Stroke Risk: 60 capsule 5     No current facility-administered medications for this visit. LABS & TESTS:      Lab Results   Component Value Date    WBC 6.0 12/03/2020    HGB 14.2 12/03/2020    HCT 45.4 12/03/2020    MCV 80.6 (L) 12/03/2020     12/03/2020       REVIEW OF SYSTEMS:     Review of Systems   Constitutional: Negative for fatigue and fever. HENT: Negative for congestion and drooling. Eyes: Negative for photophobia, redness and visual disturbance. Respiratory: Negative for choking, chest tightness and shortness of breath. Cardiovascular: Negative for chest pain. Gastrointestinal: Negative for abdominal pain. Genitourinary: Negative for dysuria. Musculoskeletal: Negative for arthralgias and back pain. Neurological: Positive for light-headedness. Negative for dizziness, tremors, seizures, syncope, facial asymmetry, speech difficulty, weakness, numbness and headaches. Hematological: Negative for adenopathy. Psychiatric/Behavioral: Negative for agitation. VITALS  /78   Pulse 117   Ht 5' 6\" (1.676 m)   Wt 180 lb (81.6 kg)   SpO2 98%   BMI 29.05 kg/m²     PHYSICAL EXAMINATION:     Physical Exam     General appearance: cooperative  Skin: no rash or skin lesions. HEENT: normocephalic  Optic Fundi: deferred  Neck: supple, no cervcical adenopathy or carotid bruit  Lungs: No respiratory distress  Heart: Regular rate and rhythm,clicks or gallops.   Peripheral pulses: radial pulses palpable  Abdominal: BS present, soft, NT, ND  Extremities: no edema    NEUROLOGICAL EXAMINATION:      GENERAL  Appears comfortable and in no distress   HEENT  NC/ AT   HEART  S1 and S2 heard; palpation of pulses: radial pulse    NECK  Supple and no bruits heard   MENTAL STATUS:  Alert, oriented, intact memory, no confusion, normal speech, normal language, no hallucination or delusion   CRANIAL NERVES: II     -      Visual fields intact to confrontation  III,IV,VI -  PERR, EOMs full, no ptosis  V     - Normal facial sensation   VII    -     Normal facial symmetry  VIII   -     Intact hearing   IX,X -     Symmetrical palate  XI    -     Symmetrical shoulder shrug  XII   -     Midline tongue, no atrophy    MOTOR FUNCTION: RUE: Significant for good strength of grade 5/5 in proximal and distal muscle groups   LUE: Significant for good strength of grade 5/5 in proximal and distal muscle groups   RLE: Significant for good strength of grade 5/5 in proximal and distal muscle groups   LLE: Significant for good strength of grade 5/5 in proximal and distal muscle groups      Normal bulk, normal tone and no involuntary movements, mild postural tremor in the L hand   SENSORY FUNCTION:  Normal touch, normal pin, normal vibration, normal proprioception   CEREBELLAR FUNCTION:  Intact fine motor control over upper limbs and lower limbs   REFLEX FUNCTION:  Symmetric in upper and lower extremities, no Babinski sign   STATION and GAIT  Normal gait and tandem station, normal tip toes and heel walking     ASSESSMENT:       The patient mentioned above with past medical history of TIA on aspirin 81 mg daily, gross hematuria, COVID positive mid of September, migraine disorder, presented to the office for follow-up after ED visit for TIA symptoms. Negative CTA and MRI. On aspirin 81 mg daily and Crestor 10 mg. PLAN:      -Continue aspirin 81 mg daily  -Continue Crestor 10 mg daily  -Echocardiogram is unremarkable. -Presyncope episodes, will get orthostatics and will refer for PCP for blood pressure management, blood pressure today is 123/78.  -Follow-up with neurology clinic as needed      Mr. Mukul Head received counseling on the following healthy behaviors: medical compliance, smoking cessation, blood pressure control, regular follow up with primary doctor.         Electronically signed by Aron Lo MD on 4/5/2021 at 1:10 PM

## 2021-04-05 NOTE — PROGRESS NOTES
tablet, take 1 tablet by mouth daily, Disp: 30 tablet, Rfl: 5    rosuvastatin (CRESTOR) 10 MG tablet, take 1 tablet by mouth daily, Disp: 30 tablet, Rfl: 5    ciclopirox (PENLAC) 8 % solution, Apply topically nightly. Remove once weekly with alcohol or nail polish remover. , Disp: 1 Bottle, Rfl: 3    alpha-galactosidase (BEANO) TABS chewable tablet, Take 1 tablet by mouth 3 times daily (with meals), Disp: 90 tablet, Rfl: 1    aspirin 81 MG EC tablet, Take 1 tablet by mouth daily Please start on 09/18/20, Disp: 30 tablet, Rfl: 5    tamsulosin (FLOMAX) 0.4 MG capsule, Take 1 capsule by mouth 2 times daily, Disp: 60 capsule, Rfl: 5      Objective:    Physical Exam:  Vitals: /83   Pulse 91   Temp 98.5 °F (36.9 °C)   Wt 180 lb (81.6 kg)   SpO2 98%   BMI 29.05 kg/m²   Last 3 weights: Wt Readings from Last 3 Encounters:   04/01/21 180 lb (81.6 kg)   02/04/21 186 lb (84.4 kg)   12/18/20 186 lb (84.4 kg)     Body mass index is 29.05 kg/m². Physical Examination:   PHYSICAL EXAMINATION:  Vitals:    04/01/21 1314   BP: 133/83   Pulse: 91   Temp: 98.5 °F (36.9 °C)   SpO2: 98%   Weight: 180 lb (81.6 kg)       Physical Exam    Constitutional:This is a well developed, well nourished, 25-29.9 - Overweight 63y.o. year old male who is alert, oriented, cooperative andin no apparent distress. Head:normocephalic and atraumatic. EENT:   RUSSELL. No conjunctival injections. Septum was midline, mucosa was without erythema, exudates or cobblestoning. No thrush was noted. Mallampati II (soft palate, uvula, fauces visible)  Neck: Supple without thyromegaly. No elevated JVP. Trachea was midline. Respiratory: Chest was symmetrical without dullness to percussion. Breath sounds bilaterally were clear to auscultation. There were no wheezes, rhonchi or rales. There isno intercostal retraction or use of accessory muscles. No egophony noted. Cardiovascular: Regular without murmur, clicks, gallops or rubs.    Abdomen: Slightly the patient has had were answered to his satisfaction. Home O2 evaluation was done. Supplemental oxygen was not needed. CT scan of thechest was reviewed  Ordered a CT scan of the chest to be done in August 2021  After reviewing the patient's smoking history and his age patient does not meet the criteria for lung cancer screening. We'll see the patient back in 6 months or earlier if needed. Patient will call us if he is sick, so he can be seen sooner. Thank youfor having us involved in the care of your patient. Please call us if you have any questions or concerns.         Xander Solano MD             4/5/2021, 9:48 AM

## 2021-04-29 ENCOUNTER — OFFICE VISIT (OUTPATIENT)
Dept: PODIATRY | Age: 61
End: 2021-04-29
Payer: COMMERCIAL

## 2021-04-29 ENCOUNTER — OFFICE VISIT (OUTPATIENT)
Dept: GASTROENTEROLOGY | Age: 61
End: 2021-04-29
Payer: COMMERCIAL

## 2021-04-29 VITALS — WEIGHT: 180 LBS | BODY MASS INDEX: 28.93 KG/M2 | HEIGHT: 66 IN

## 2021-04-29 VITALS — BODY MASS INDEX: 29.38 KG/M2 | WEIGHT: 182 LBS | SYSTOLIC BLOOD PRESSURE: 143 MMHG | DIASTOLIC BLOOD PRESSURE: 89 MMHG

## 2021-04-29 DIAGNOSIS — B35.1 ONYCHOMYCOSIS OF TOENAIL: Primary | ICD-10-CM

## 2021-04-29 DIAGNOSIS — M79.674 PAIN OF TOES OF BOTH FEET: ICD-10-CM

## 2021-04-29 DIAGNOSIS — R14.1 ABDOMINAL GAS PAIN: Primary | ICD-10-CM

## 2021-04-29 DIAGNOSIS — M79.675 PAIN OF TOES OF BOTH FEET: ICD-10-CM

## 2021-04-29 PROCEDURE — 3017F COLORECTAL CA SCREEN DOC REV: CPT | Performed by: INTERNAL MEDICINE

## 2021-04-29 PROCEDURE — 1036F TOBACCO NON-USER: CPT | Performed by: INTERNAL MEDICINE

## 2021-04-29 PROCEDURE — G8417 CALC BMI ABV UP PARAM F/U: HCPCS | Performed by: INTERNAL MEDICINE

## 2021-04-29 PROCEDURE — 11720 DEBRIDE NAIL 1-5: CPT | Performed by: PODIATRIST

## 2021-04-29 PROCEDURE — G8427 DOCREV CUR MEDS BY ELIG CLIN: HCPCS | Performed by: INTERNAL MEDICINE

## 2021-04-29 PROCEDURE — 99203 OFFICE O/P NEW LOW 30 MIN: CPT | Performed by: INTERNAL MEDICINE

## 2021-04-29 RX ORDER — SIMETHICONE 80 MG
80 TABLET,CHEWABLE ORAL 4 TIMES DAILY PRN
Qty: 180 TABLET | Refills: 3 | Status: SHIPPED | OUTPATIENT
Start: 2021-04-29

## 2021-04-29 ASSESSMENT — ENCOUNTER SYMPTOMS
ABDOMINAL DISTENTION: 1
DIARRHEA: 0
NAUSEA: 0
EYES NEGATIVE: 1
COLOR CHANGE: 0
RESPIRATORY NEGATIVE: 1
TROUBLE SWALLOWING: 1
SHORTNESS OF BREATH: 0
ALLERGIC/IMMUNOLOGIC NEGATIVE: 1
BACK PAIN: 0

## 2021-04-29 NOTE — PROGRESS NOTES
Reason for Referral:  Flatulence and increased borborygmi      Kathia Godinez MD  [address] [address]    Chief Complaint   Patient presents with   Delisa Lewis New Patient     referred for flatulence.  Gas     Patient thinks he has worms in his stomach and wants labs done to rule it out. States having gas daily. States when he is hungry he notices something \"not normal\"- possible gurgling.  Other     Patient states he has troubles swallowing his saliva at night time. States he has a dry mouth. States when he is awake he does not have problems swallowing- but when he is asleep he does. HISTORY OF PRESENT ILLNESS: Susan Hoffman is a 63 y.o. male with a past history remarkable for HTN, HL, prostate surgery for BPH,  referred for evaluation of increased bowel gas symptomology, flatulence and borborygmi with subacute progression over the last few weeks. Patient denies any diarrhea or changes in bowel frequency. Denies any appetite changes (currently fasting for the month of Ramadan). No prior history of similar presentation. Concern for parasitic infection given the patient's recent travels from [facility]. Denies any preceding infectious symptoms does report mild episodes of diarrhea remotely however not currently active. No recent antibiotic use. No sick contacts. No NSAID use. No change in medications or dietary habits. No obvious upper GI symptoms  Patient appears to be concerned for a parasitic infection. Smoker: none   Drinking history: None   Abdominal surgeries: Varcisoties   Prior Colonoscopy: Coloagurd-- negative 2020. Prior EGD: none  FH of GI issues: none       Past Medical,Family, and Social History reviewed and does contribute to the patient presentingcondition.     Patient's PMH/PSH,SH,PSYCH Hx, MEDs, ALLERGIES, and ROS were all reviewed and updated in the appropriate sections. PAST MEDICAL HISTORY:  Past Medical History:   Diagnosis Date    Hyperlipidemia     Hypertension     Wellness examination     patient states no PCP       Past Surgical History:   Procedure Laterality Date    CYSTOSCOPY N/A 7/30/2020    CYSTOSCOPY performed by Manfred Ryan MD at [place] [address]  08/14/2020     CYSTO, TUR PROSTATE GREENLIGHT XPS     TURP N/A 8/14/2020    CYSTO, TUR PROSTATE GREENLIGHT XPS performed by Manfred Ryan MD at [address] [facility]      varicose vein of right leg       CURRENT MEDICATIONS:    Current Outpatient Medications:     ciclopirox (PENLAC) 8 % solution, Apply topically nightly. Remove once weekly with alcohol or nail polish remover. , Disp: 1 Bottle, Rfl: 3    ramipril (ALTACE) 10 MG capsule, take 1 capsule by mouth once daily, Disp: 30 capsule, Rfl: 3    hydroCHLOROthiazide (HYDRODIURIL) 25 MG tablet, take 1 tablet by mouth daily, Disp: 30 tablet, Rfl: 5    rosuvastatin (CRESTOR) 10 MG tablet, take 1 tablet by mouth daily, Disp: 30 tablet, Rfl: 5    ciclopirox (PENLAC) 8 % solution, Apply topically nightly. Remove once weekly with alcohol or nail polish remover. , Disp: 1 Bottle, Rfl: 3    alpha-galactosidase (BEANO) TABS chewable tablet, Take 1 tablet by mouth 3 times daily (with meals), Disp: 90 tablet, Rfl: 1    aspirin 81 MG EC tablet, Take 1 tablet by mouth daily Please start on 09/18/20, Disp: 30 tablet, Rfl: 5    tamsulosin (FLOMAX) 0.4 MG capsule, Take 1 capsule by mouth 2 times daily, Disp: 60 capsule, Rfl: 5    ALLERGIES:   No Known Allergies    FAMILY HISTORY: No family history on file.       SOCIAL HISTORY:   Social History     Socioeconomic History    Marital status:      Spouse name: Not on file    Number of children: Not on file    Years of education: Not on file    Highest education level: Not on file   Occupational History    Not on file   Social Needs    Financial resource strain: Not on file    Food insecurity Genitourinary: Negative. Musculoskeletal: Negative. Skin: Negative. Allergic/Immunologic: Negative. Neurological: Negative. Hematological: Negative. Psychiatric/Behavioral: Positive for sleep disturbance. All other systems reviewed and are negative. PHYSICAL EXAMINATION: Vital signs reviewed per the nursing documentation. BP (!) 143/89   Wt 182 lb (82.6 kg)   BMI 29.38 kg/m²   Body mass index is 29.38 kg/m². Physical Exam    Physical Exam   Constitutional: Patient is oriented to person, place, and time. Patient appears well-developed and well-nourished. HENT:   Head: Normocephalic and atraumatic. Eyes: Pupils are equal, round, and reactive to light. EOM are normal.   Neck: Normal range of motion. Neck supple. No JVD present. No tracheal deviation present. No thyromegaly present. Cardiovascular: Normal rate, regular rhythm, normal heart sounds and intact distal pulses. Pulmonary/Chest: Effort normal and breath sounds normal. No stridor. No respiratory distress. He has no wheezes. He has no rales. He exhibits no tenderness. Abdominal: Soft. Bowel sounds are normal. He exhibits no distension and no mass. There is no tenderness. There is no rebound and no guarding. No hernia. Musculoskeletal: Normal range of motion. Lymphadenopathy:    Patient has no cervical adenopathy. Neurological: Patient is alert and oriented to person, place, and time. Psychiatric: Patient has a normal mood and affect.  Patient behavior is normal.       LABORATORY DATA: Reviewed  Lab Results   Component Value Date    WBC 6.0 12/03/2020    HGB 14.2 12/03/2020    HCT 45.4 12/03/2020    MCV 80.6 (L) 12/03/2020     12/03/2020     12/03/2020    K 3.5 (L) 12/03/2020     12/03/2020    CO2 23 12/03/2020    BUN 17 12/03/2020    CREATININE 0.77 12/03/2020    LABALBU 4.3 12/03/2020    BILITOT 0.31 12/03/2020    ALKPHOS 62 12/03/2020    AST 17 12/03/2020    ALT 24 12/03/2020    INR 1.0 09/13/2020         Lab Results   Component Value Date    RBC 5.63 12/03/2020    HGB 14.2 12/03/2020    MCV 80.6 (L) 12/03/2020    MCH 25.2 12/03/2020    MCHC 31.3 12/03/2020    RDW 13.1 12/03/2020    MPV 9.9 12/03/2020    BASOPCT 1 12/03/2020    LYMPHSABS 1.54 12/03/2020    MONOSABS 0.45 12/03/2020    NEUTROABS 3.87 12/03/2020    EOSABS 0.08 12/03/2020    BASOSABS 0.03 12/03/2020         DIAGNOSTIC TESTING:     No results found. IMPRESSION: Mr.Mohiedin Khoury is a 63 y.o. male with a past history remarkable for HTN, HL, prostate surgery for BPH,  referred for evaluation of increased bowel gas symptomology, flatulence and borborygmi with subacute progression over the last few weeks. Patient denies any diarrhea or changes in bowel frequency. Denies any appetite changes (currently fasting for the month of Ramadan). No prior history of similar presentation. Concern for parasitic infection given the patient's recent travels from [facility]. Denies any preceding infectious symptoms does report mild episodes of diarrhea remotely however not currently active. No recent antibiotic use. No sick contacts. No NSAID use. No change in medications or dietary habits. No obvious upper GI symptoms    PLAN:    1) change in bowel movements increase borborygmi and bowel habit patternsnot fully explained by dietary habits. Given travels in last year to outside country will send stool cultures. No obvious infectious symptoms identified during this encounter. 2) Simethicone as needed to be provided. Patient advised to avoid any gas producing foods. Maintain adequate hydration    3) RTC in 4 weeks. Thank you for allowing me to participate in the care of Mr. Buddy Espino. For any further questions please do not hesitate to contact me. I have reviewed and agree with the MA/LPN ROS please refer to their documentation from today's encounter on a separate note.      Della Velasquez MD, MPH   [facility]  Office #: [phone]          this note is created with the assistance of a speech recognition program.  While intending to generate a document that actually reflects the content of the visit, the document can still have some errors including those of syntax and sound a like substitutions which may escape proof reading. It such instances, actual meaning can be extrapolated by contextual diversion.

## 2021-04-29 NOTE — PROGRESS NOTES
SUBJECTIVE: Ivanna Khoury is a 63 y.o. male who returns to the office with chief complaint of painful fungal toenails. Patient relates toe nails are thickened/difficult to trim as well as painful with ambulation and with shoe gear. Chief Complaint   Patient presents with    Nail Problem     Bilat Nail Trim / Last seen Jimy Blanco MD 03/12/21     Review of Systems   Constitutional: Negative for activity change, appetite change, chills, diaphoresis, fatigue and fever. Respiratory: Negative for shortness of breath. Cardiovascular: Negative for leg swelling. Gastrointestinal: Negative for diarrhea and nausea. Endocrine: Negative for cold intolerance, heat intolerance and polyuria. Musculoskeletal: Positive for arthralgias. Negative for back pain, gait problem, joint swelling and myalgias. Skin: Negative for color change, pallor, rash and wound. Allergic/Immunologic: Negative for environmental allergies and food allergies. Neurological: Negative for dizziness, weakness, light-headedness and numbness. Hematological: Does not bruise/bleed easily. Psychiatric/Behavioral: Negative for behavioral problems, confusion and self-injury. The patient is not nervous/anxious. OBJECTIVE: Clinical evaluation of patient reveals nails 1,4,5 of the right foot and nails 5 of the left foot to present with thickness, elongation, discoloration, brittleness, and subungual debris. There was pain with palpation and debridement of the toenails of the bilateral feet. No open lesions noted to either foot today. Class A Findings (1 needed)   [] Non-traumatic amputation of foot or integral skeleton portion thereof. [] Q7.      Class B Findings (2 needed)   1. [] Absent posterior tibial pulse   2. [] Absent dorsalis pedis pulse   3.  [] Advanced trophic changes; three of the following are required:   ·         [] hair growth (decrease or absence)   ·         [] nail changes (thickening)   ·         [] pigmentary changes (discoloration)   ·         [] skin texture (thin, shiny)   ·         [] skin color (rubor or redness)   [] Q8.      Class C Findings (1 Class B, 2 Class C needed)   1. [] Claudication   2. [] Temperature changes   3. [] Edema   4. [] Paresthesia   5. [] Burning   [] Q9.     NO CLASS FINDINGS ARE NOTED    ASSESSMENT:    Diagnosis Orders   1. Onychomycosis of toenail  ciclopirox (PENLAC) 8 % solution    N[place] DEBRIDEMENT OF NAIL(S), 1-5   2. Pain of toes of both feet  ciclopirox (PENLAC) 8 % solution    [place] DEBRIDEMENT OF NAIL(S), 1-5     PLAN: Toenails 1,4,5 of the right foot and 5 of the left foot were debrided in length and thickness using a nail nipper and a . Patient given a prescription for Penlac. Return in about 3 months (around 7/29/2021) for Painful fungal nails.    4/29/2021      Jarethee PEEWEE Wilde

## 2021-05-24 NOTE — ED PROVIDER NOTES
[facility]  Emergency Department  Faculty Attestation     I performed a history and physical examination of the patient and discussed management with the resident. I reviewed the residents note and agree with the documented findings and plan of care. Any areas of disagreement are noted on the chart. I was personally present for the key portions of any procedures. I have documented in the chart those procedures where I was not present during the key portions. I have reviewed the emergency nurses triage note. I agree with the chief complaint, past medical history, past surgical history, allergies, medications, social and family history as documented unless otherwise noted below. For Physician Assistant/ Nurse Practitioner cases/documentation I have personally evaluated this patient and have completed at least one if not all key elements of the E/M (history, physical exam, and MDM). Additional findings are as noted. Primary Care Physician:  Krysta Claudio MD    Screenings:  [unfilled]    CHIEF COMPLAINT     No chief complaint on file. RECENT VITALS:    ,   ,  ,      LABS:  Labs Reviewed - No data to display    Radiology  No orders to display       CRITICAL CARE: There was a high probability of clinically significant/life threatening deterioration in this patient's condition which required my urgent intervention. Total critical care time was none minutes. This excludes any time for separately reportable procedures. EKG:   EKG Interpretation    Interpreted by me    Rhythm: normal sinus   Rate: normal  Axis: normal  Ectopy: none  Conduction: normal  ST Segments: no acute change  T Waves: no acute change  Q Waves: none    Clinical Impression: no acute changes and normal EKG    Attending Physician Additional  Notes    EMS was called for your hematuria and inability to void. He is focused on emptying his bladder but is unable to do it himself.   He also admits to chest pain and diaphoresis. No shortness of breath or nausea. He has had BPH and had greenlight procedure recently and then cystoscopy. Recent visit for migraine. On exam he is diaphoretic, uncomfortable, vital signs are normal.  Abdomen is soft and nontender, no distended bladder to palpation. Lungs are clear. Normal capillary refill. No edema. Impression is gross hematuria, rule out UTI, chest pain rule out ACS. Plan is aspirin nitroglycerin as needed, troponin, chest x-ray, screening labs including CBC, PT, PTT, urine and urine culture after Tovar placement. Gilford Reap. Mounika Ziegler MD, [address] [facility] [address]  Attending Emergency  Physician                Elsa Peña MD  09/12/20 1046    Tovar catheter inserted successfully, 700 mL's initially, now grossly bloody without clots. Patient then had a presumably vagal reaction where he got bradycardic heart rate in the 30s and his blood pressure in the 30-60 region, now his heart rate is 70 and his blood pressure is 99. He felt sweaty and nauseated. Will page urology regarding his gross hematuria with persistent bleeding, on Plavix. Anticipate admission for cardiac work-up.        Elsa Peña MD  09/12/20 [id number] patient seen and examined with PA student Joyce Sam,  I  was physically present for the key portions of the evaluation and management (E/M) service provided.  I agree with the above history, physical, and plan which I have reviewed and edited where appropriate.  - pt much more alert today.  AO x 3 on exam today  - psych renetta noted.  pt NOT cleared for dc by psych yet  - plan for dc on monday to rehab if cleared by psych. patient seen and examined with PA student Joyce Sam,  I  was physically present for the key portions of the evaluation and management (E/M) service provided.  I agree with the above history, physical, and plan which I have reviewed and edited where appropriate.  - pt febrile overnight and +UA.  continue rocpehin which she tolerated ydy.  PCN allergy noted but tolerating cephalosporins   - case d/w dr urena.  stop celexa.  decrease klonopin.  continue wellbutrin and abilify.  pt paranoid at time but cleared for dc.  needs outpt formal neuro psych eval after dc as most likely pt has dementia but re assess once abx completed as there could be component of delirium.     dispo - dc to rehab tomorrow if afebrile.

## 2021-06-08 ENCOUNTER — HOSPITAL ENCOUNTER (EMERGENCY)
Age: 61
Discharge: HOME | End: 2021-06-08
Attending: EMERGENCY MEDICINE
Payer: COMMERCIAL

## 2021-06-08 VITALS
WEIGHT: 185 LBS | RESPIRATION RATE: 16 BRPM | TEMPERATURE: 97 F | OXYGEN SATURATION: 100 % | BODY MASS INDEX: 29.73 KG/M2 | HEART RATE: 74 BPM | SYSTOLIC BLOOD PRESSURE: 139 MMHG | DIASTOLIC BLOOD PRESSURE: 81 MMHG | HEIGHT: 66 IN

## 2021-06-08 DIAGNOSIS — K40.90 UNILATERAL INGUINAL HERNIA WITHOUT OBSTRUCTION OR GANGRENE, RECURRENCE NOT SPECIFIED: Primary | ICD-10-CM

## 2021-06-08 PROCEDURE — 99282 EMERGENCY DEPT VISIT SF MDM: CPT

## 2021-06-08 ASSESSMENT — PAIN DESCRIPTION - ORIENTATION: ORIENTATION: RIGHT

## 2021-06-08 ASSESSMENT — PAIN DESCRIPTION - LOCATION: LOCATION: SCROTUM

## 2021-06-08 ASSESSMENT — PAIN SCALES - GENERAL: PAINLEVEL_OUTOF10: 6

## 2021-06-09 ASSESSMENT — ENCOUNTER SYMPTOMS
BACK PAIN: 0
WHEEZING: 0
ABDOMINAL PAIN: 0
RHINORRHEA: 0
NAUSEA: 0
PHOTOPHOBIA: 0
CONSTIPATION: 0
VOMITING: 0
SHORTNESS OF BREATH: 0

## 2021-06-09 NOTE — ED PROVIDER NOTES
Social History Narrative    Not on file     Social Determinants of Health     Financial Resource Strain:     Difficulty of Paying Living Expenses:    Food Insecurity:     Worried About Running Out of Food in the Last Year:     [address] in the Last Year:    Transportation Needs:     Lack of Transportation (Medical):  Lack of Transportation (Non-Medical):    Physical Activity:     Days of Exercise per Week:     Minutes of Exercise per Session:    Stress:     Feeling of Stress :    Social Connections:     Frequency of Communication with Friends and Family:     Frequency of Social Gatherings with Friends and Family:     Attends Rastafarian Services:     Active Member of Clubs or Organizations:     Attends Club or Organization Meetings:     Marital Status:    Intimate Partner Violence:     Fear of Current or Ex-Partner:     Emotionally Abused:     Physically Abused:     Sexually Abused:        History reviewed. No pertinent family history. Allergies:  Patient has no known allergies. Home Medications:  Prior to Admission medications    Medication Sig Start Date End Date Taking? Authorizing Provider   ciclopirox (PENLAC) 8 % solution Apply topically nightly. Remove once weekly with alcohol or nail polish remover. 21   Vignesh Woodward DPM   simethicone (MYLICON) 80 MG chewable tablet Take 1 tablet by mouth 4 times daily as needed for Flatulence 21   Stefani Tolbert MD   ramipril (ALTACE) 10 MG capsule take 1 capsule by mouth once daily 3/16/21   Francisco Delgado MD   hydroCHLOROthiazide (HYDRODIURIL) 25 MG tablet take 1 tablet by mouth daily 2/15/21   Francisco Delgado MD   rosuvastatin (CRESTOR) 10 MG tablet take 1 tablet by mouth daily 2/15/21   Francisco Delgado MD   ciclopirox (PENLAC) 8 % solution Apply topically nightly. Remove once weekly with alcohol or nail polish remover.  21   Vignesh Woodward DPM   alpha-galactosidase (BEANO) TABS chewable tablet Take 1 tablet by mouth 3 times daily (with meals) 12/17/20   Kari Barba MD   aspirin 81 MG EC tablet Take 1 tablet by mouth daily Please start on 09/18/20 9/15/20   Mallory Ness MD   tamsulosin (FLOMAX) 0.4 MG capsule Take 1 capsule by mouth 2 times daily 9/15/20 3/12/21  Mallory Ness MD       REVIEW OFSYSTEMS    (2-9 systems for level 4, 10 or more for level 5)      Review of Systems   Constitutional: Negative for chills and fever. HENT: Negative for congestion and rhinorrhea. Eyes: Negative for photophobia and visual disturbance. Respiratory: Negative for shortness of breath and wheezing. Cardiovascular: Negative for chest pain and palpitations. Gastrointestinal: Negative for abdominal pain, constipation, nausea and vomiting. Genitourinary: Positive for scrotal swelling. Negative for discharge, dysuria, hematuria and testicular pain. Musculoskeletal: Negative for back pain. Skin: Negative for rash. Neurological: Negative for dizziness and headaches. PHYSICAL EXAM   (up to 7 for level 4, 8 or more forlevel 5)      INITIAL VITALS:   ED Triage Vitals [06/08/21 2115]   BP Temp Temp Source Pulse Resp SpO2 Height Weight   139/81 97 °F (36.1 °C) Oral 74 16 100 % 5' 6\" (1.676 m) 185 lb (83.9 kg)       Physical Exam  Constitutional:       General: He is not in acute distress. Appearance: He is well-developed. He is not toxic-appearing. HENT:      Head: Normocephalic and atraumatic. Eyes:      Extraocular Movements: Extraocular movements intact. Conjunctiva/sclera: Conjunctivae normal.   Cardiovascular:      Rate and Rhythm: Normal rate. Heart sounds: Normal heart sounds. Pulmonary:      Effort: Pulmonary effort is normal. No respiratory distress. Abdominal:      Palpations: Abdomen is soft. Tenderness: There is no abdominal tenderness. There is no guarding or rebound. Genitourinary:     Comments: Right-sided testicular swelling.   There is no tenderness with palpation of either testicle. Penis is is within normal limits without discharge or rashes or lesions. The size of the right testicle does decrease when patient is lying down compared to when standing. Musculoskeletal:         General: No deformity. Normal range of motion. Cervical back: Normal range of motion and neck supple. Skin:     General: Skin is warm. Capillary Refill: Capillary refill takes less than 2 seconds. Neurological:      Mental Status: He is alert and oriented to person, place, and time. Mental status is at baseline. DIFFERENTIAL  DIAGNOSIS     PLAN (LABS / IMAGING / EKG):  No orders of the defined types were placed in this encounter. MEDICATIONS ORDERED:  No orders of the defined types were placed in this encounter. Initial MDM/Plan: 57 y.o. male who presents with right-sided testicular swelling x1 day. This is nontender to palpation. I have a low clinical suspicion for urinary tract infection or sexually transmitted infection given a discussion with patient regarding his lifestyle. Additionally, there is no tenderness with palpation and I have a very low clinical suspicion for epididymitis. Very low clinical suspicion for torsion given his presentation and do not feel that an ultrasound or surgical intervention is indicated emergently at this time. Clinically, his exam is most consistent with a hernia. Does reduce somewhat with lying flat and decreasing intraabdominal pressure. Did discuss with patient that he will need follow-up with his PCP. At this time, there are no signs of bowel obstruction, strangulation, or incarceration. Discharged with PCP follow-up and return precautions. DIAGNOSTIC RESULTS / EMERGENCYDEPARTMENT COURSE / MDM     LABS:  Labs Reviewed - No data to display      RADIOLOGY:  No results found.       EKG  none    All EKG's are interpreted by the Emergency Department Physicianwho either signs or Co-signs this chart in the absence of a cardiologist.    EMERGENCY DEPARTMENT COURSE:    19-year-old male with 1 day of right-sided testicular enlargement. Very low suspicion for torsion. No signs or symptoms of infection. Suspect inguinal hernia without signs of obstruction, incarceration, or strangulation. Discharged with PCP follow-up and return precautions. Patient is in agreement with plan. I did offer him an ultrasound for further evaluation at this time however he elects to be discharged and follow up with PCP, I believe this is reasonable. PROCEDURES:  None    CONSULTS:  None    CRITICAL CARE:  none    FINAL IMPRESSION      1.  Unilateral inguinal hernia without obstruction or gangrene, recurrence not specified          DISPOSITION / PLAN     DISPOSITION Decision To Discharge 06/08/2021 10:12:25 PM      PATIENT REFERRED TO:  Kathia Godinez MD  [address] [place] [address]  [phone]    Schedule an appointment as soon as possible for a visit in 2 days      [facility] ED  [address] [facility] [address]  [phone]    As needed      [address]:  Discharge Medication List as of 6/8/2021 10:13 PM          Cecilia Bolton DO  Emergency Medicine Resident    (Please note that portions of this note were completed with a voice recognition program.Efforts were made to edit the dictations but occasionally words are mis-transcribed.)        Cecilia Bolton DO  Resident  06/09/21 0411

## 2021-06-09 NOTE — ED PROVIDER NOTES
[address] ED     Emergency Department     Faculty Attestation        I performed a history and physical examination of the patient and discussed management with the resident. I reviewed the residents note and agree with the documented findings and plan of care. Any areas of disagreement are noted on the chart. I was personally present for the key portions of any procedures. I have documented in the chart those procedures where I was not present during the key portions. I have reviewed the emergency nurses triage note. I agree with the chief complaint, past medical history, past surgical history, allergies, medications, social and family history as documented unless otherwise noted below. For mid-level providers such as nurse practitioners as well as physicians assistants:    I have personally seen and evaluated the patient. I find the patient's history and physical exam are consistent with NP/PA documentation. I agree with the care provided, treatment rendered, disposition, & follow-up plan. Additional findings are as noted.     Vital Signs: /81   Pulse 74   Temp 97 °F (36.1 °C) (Oral)   Resp 16   Ht 5' 6\" (1.676 m)   Wt 185 lb (83.9 kg)   SpO2 100%   BMI 29.86 kg/m²   PCP:  Julio Mayer MD    Pertinent Comments:           Critical Care  None          Manan Winston MD    Attending Emergency Medicine Physician              Sandeep Monaco MD  06/08/21 5195

## 2021-06-09 NOTE — ED TRIAGE NOTES
Pt was in the shower today where he noticed he had swelling in his right testicle. Pt states it is uncomfortable but not painful. Pt had a prostate surgery in September but has had no complications or issues since. Pt is a&o x4 in NAD with all vitals stable.

## 2021-06-11 ENCOUNTER — TELEPHONE (OUTPATIENT)
Dept: INTERNAL MEDICINE | Age: 61
End: 2021-06-11

## 2021-06-11 ENCOUNTER — VIRTUAL VISIT (OUTPATIENT)
Dept: INTERNAL MEDICINE | Age: 61
End: 2021-06-11
Payer: COMMERCIAL

## 2021-06-11 DIAGNOSIS — K40.30 NON-RECURRENT INGUINAL HERNIA WITH OBSTRUCTION WITHOUT GANGRENE, UNSPECIFIED LATERALITY: ICD-10-CM

## 2021-06-11 DIAGNOSIS — K40.30 NON-RECURRENT INGUINAL HERNIA WITH OBSTRUCTION WITHOUT GANGRENE, UNSPECIFIED LATERALITY: Primary | ICD-10-CM

## 2021-06-11 PROCEDURE — 99213 OFFICE O/P EST LOW 20 MIN: CPT | Performed by: INTERNAL MEDICINE

## 2021-06-11 SDOH — ECONOMIC STABILITY: FOOD INSECURITY: WITHIN THE PAST 12 MONTHS, THE FOOD YOU BOUGHT JUST DIDN'T LAST AND YOU DIDN'T HAVE MONEY TO GET MORE.: NEVER TRUE

## 2021-06-11 SDOH — ECONOMIC STABILITY: TRANSPORTATION INSECURITY
IN THE PAST 12 MONTHS, HAS THE LACK OF TRANSPORTATION KEPT YOU FROM MEDICAL APPOINTMENTS OR FROM GETTING MEDICATIONS?: NO

## 2021-06-11 SDOH — ECONOMIC STABILITY: TRANSPORTATION INSECURITY
IN THE PAST 12 MONTHS, HAS LACK OF TRANSPORTATION KEPT YOU FROM MEETINGS, WORK, OR FROM GETTING THINGS NEEDED FOR DAILY LIVING?: NO

## 2021-06-11 SDOH — ECONOMIC STABILITY: FOOD INSECURITY: WITHIN THE PAST 12 MONTHS, YOU WORRIED THAT YOUR FOOD WOULD RUN OUT BEFORE YOU GOT MONEY TO BUY MORE.: NEVER TRUE

## 2021-06-11 ASSESSMENT — SOCIAL DETERMINANTS OF HEALTH (SDOH): HOW HARD IS IT FOR YOU TO PAY FOR THE VERY BASICS LIKE FOOD, HOUSING, MEDICAL CARE, AND HEATING?: NOT HARD AT ALL

## 2021-06-11 NOTE — PATIENT INSTRUCTIONS
[facility] with signed office notes. Referral for General Surgery sent to Dr Gena Isbell  they will call pt for appt, copy of referral with number and address given to pt. Pt should call referral number if not heard from within a couple of weeks. Patient to return to clinic in March of 2022 (office will call and schedule appt). AVS reviewed and mailedto pt. It is very important for your care that you keep your appointment. If for some reason you are unable to keep your appointment it is equally important that you call our office at [phone] to cancel your appointment and reschedule. Failure to do so may result in your termination from our practice.   MB

## 2021-06-11 NOTE — PROGRESS NOTES
Amelie Su is a 57 y.o. male evaluated via telephone on 6/11/2021. Consent:  He and/or health care decision maker is aware that that he may receive a bill for this telephone service, depending on his insurance coverage, and has provided verbal consent to proceed: Yes      Documentation:  76-year-old male seen in ED a few days ago with 1 day of right-sided testicular enlargement, uncomfortable sensation but not painful. they had a low suspicion for torsion and no signs or symptoms of infection. They suspect inguinal hernia without signs of obstruction, incarceration, or strangulation and asked patient to follow up with PCP. Diagnosis Orders   1. Non-recurrent inguinal hernia with obstruction without gangrene, unspecified laterality  Elastic Bandages & Supports (HERNIA BELT DOUBLE MEDIUM) MISC    [facility]) - Ronnie Olivera DO, General Surgery, [place]       I affirm this is a Patient Initiated Episode with a Patient who has not had a related appointment within my department in the past 7 days or scheduled within the next 24 hours. Patient identification was verified at the start of the visit: Yes    Total Time: minutes: 11-20 minutes    The visit was conducted pursuant to the emergency declaration under the [facility], [address] [place] [address] authority and the [employer] General Act. Patient identification was verified, and a caregiver was present when appropriate. The patient was located in a state where the provider was credentialed to provide care.     Note: not billable if this call serves to triage the patient into an appointment for the relevant concern      Chase Bernstein MD

## 2021-06-11 NOTE — TELEPHONE ENCOUNTER
Jaymie faxed to [facility] on [address] [facility]. Address is [address] [place] [address]. The phone number is [phone] and the fax is [phone]. Patient was notified.

## 2021-06-18 ENCOUNTER — OFFICE VISIT (OUTPATIENT)
Dept: UROLOGY | Age: 61
End: 2021-06-18
Payer: COMMERCIAL

## 2021-06-18 ENCOUNTER — HOSPITAL ENCOUNTER (OUTPATIENT)
Age: 61
Discharge: HOME | End: 2021-06-18
Payer: COMMERCIAL

## 2021-06-18 VITALS
BODY MASS INDEX: 29.41 KG/M2 | HEART RATE: 89 BPM | HEIGHT: 66 IN | DIASTOLIC BLOOD PRESSURE: 83 MMHG | SYSTOLIC BLOOD PRESSURE: 126 MMHG | WEIGHT: 183 LBS

## 2021-06-18 DIAGNOSIS — K40.90 UNILATERAL INGUINAL HERNIA WITHOUT OBSTRUCTION OR GANGRENE, RECURRENCE NOT SPECIFIED: ICD-10-CM

## 2021-06-18 DIAGNOSIS — R10.9 LEFT FLANK PAIN: ICD-10-CM

## 2021-06-18 DIAGNOSIS — R14.1 ABDOMINAL GAS PAIN: ICD-10-CM

## 2021-06-18 DIAGNOSIS — N40.1 BENIGN LOCALIZED PROSTATIC HYPERPLASIA WITH LOWER URINARY TRACT SYMPTOMS (LUTS): Primary | ICD-10-CM

## 2021-06-18 LAB
ABSOLUTE EOS #: 0.12 K/UL (ref 0–0.44)
ABSOLUTE IMMATURE GRANULOCYTE: <0.03 K/UL (ref 0–0.3)
ABSOLUTE LYMPH #: 1.59 K/UL (ref 1.1–3.7)
ABSOLUTE MONO #: 0.38 K/UL (ref 0.1–1.2)
ALBUMIN SERPL-MCNC: 4.4 G/DL (ref 3.5–5.2)
ALBUMIN/GLOBULIN RATIO: 1.6 (ref 1–2.5)
ALP BLD-CCNC: 51 U/L (ref 40–129)
ALT SERPL-CCNC: 21 U/L (ref 5–41)
ANION GAP SERPL CALCULATED.3IONS-SCNC: 11 MMOL/L (ref 9–17)
AST SERPL-CCNC: 20 U/L
BASOPHILS # BLD: 0 % (ref 0–2)
BASOPHILS ABSOLUTE: <0.03 K/UL (ref 0–0.2)
BILIRUB SERPL-MCNC: 0.34 MG/DL (ref 0.3–1.2)
BILIRUBIN DIRECT: 0.09 MG/DL
BILIRUBIN, INDIRECT: 0.25 MG/DL (ref 0–1)
BUN BLDV-MCNC: 16 MG/DL (ref 8–23)
BUN/CREAT BLD: ABNORMAL (ref 9–20)
CALCIUM SERPL-MCNC: 9.1 MG/DL (ref 8.6–10.4)
CHLORIDE BLD-SCNC: 101 MMOL/L (ref 98–107)
CO2: 23 MMOL/L (ref 20–31)
CREAT SERPL-MCNC: 0.97 MG/DL (ref 0.7–1.2)
DIFFERENTIAL TYPE: ABNORMAL
DIRECT EXAM: NORMAL
EOSINOPHILS RELATIVE PERCENT: 2 % (ref 1–4)
FOLATE: 15.9 NG/ML
GFR AFRICAN AMERICAN: >60 ML/MIN
GFR NON-AFRICAN AMERICAN: >60 ML/MIN
GFR SERPL CREATININE-BSD FRML MDRD: ABNORMAL ML/MIN/{1.73_M2}
GFR SERPL CREATININE-BSD FRML MDRD: ABNORMAL ML/MIN/{1.73_M2}
GLOBULIN: NORMAL G/DL (ref 1.5–3.8)
GLUCOSE BLD-MCNC: 106 MG/DL (ref 70–99)
HCT VFR BLD CALC: 46.6 % (ref 40.7–50.3)
HEMOGLOBIN: 14.7 G/DL (ref 13–17)
IMMATURE GRANULOCYTES: 0 %
LYMPHOCYTES # BLD: 32 % (ref 24–43)
Lab: NORMAL
MCH RBC QN AUTO: 25.9 PG (ref 25.2–33.5)
MCHC RBC AUTO-ENTMCNC: 31.5 G/DL (ref 28.4–34.8)
MCV RBC AUTO: 82 FL (ref 82.6–102.9)
MONOCYTES # BLD: 8 % (ref 3–12)
NRBC AUTOMATED: 0 PER 100 WBC
PDW BLD-RTO: 12.6 % (ref 11.8–14.4)
PLATELET # BLD: 213 K/UL (ref 138–453)
PLATELET ESTIMATE: ABNORMAL
PMV BLD AUTO: 9.6 FL (ref 8.1–13.5)
POTASSIUM SERPL-SCNC: 4.3 MMOL/L (ref 3.7–5.3)
RBC # BLD: 5.68 M/UL (ref 4.21–5.77)
RBC # BLD: ABNORMAL 10*6/UL
SEG NEUTROPHILS: 58 % (ref 36–65)
SEGMENTED NEUTROPHILS ABSOLUTE COUNT: 2.82 K/UL (ref 1.5–8.1)
SODIUM BLD-SCNC: 135 MMOL/L (ref 135–144)
SPECIMEN DESCRIPTION: NORMAL
TOTAL PROTEIN: 7.1 G/DL (ref 6.4–8.3)
VITAMIN B-12: 848 PG/ML (ref 232–1245)
WBC # BLD: 5 K/UL (ref 3.5–11.3)
WBC # BLD: ABNORMAL 10*3/UL

## 2021-06-18 PROCEDURE — 99214 OFFICE O/P EST MOD 30 MIN: CPT | Performed by: UROLOGY

## 2021-06-18 PROCEDURE — 3017F COLORECTAL CA SCREEN DOC REV: CPT | Performed by: UROLOGY

## 2021-06-18 PROCEDURE — 87329 GIARDIA AG IA: CPT

## 2021-06-18 PROCEDURE — 80048 BASIC METABOLIC PNL TOTAL CA: CPT

## 2021-06-18 PROCEDURE — 99212 OFFICE O/P EST SF 10 MIN: CPT

## 2021-06-18 PROCEDURE — G8417 CALC BMI ABV UP PARAM F/U: HCPCS | Performed by: UROLOGY

## 2021-06-18 PROCEDURE — 80076 HEPATIC FUNCTION PANEL: CPT

## 2021-06-18 PROCEDURE — 82746 ASSAY OF FOLIC ACID SERUM: CPT

## 2021-06-18 PROCEDURE — 85025 COMPLETE CBC W/AUTO DIFF WBC: CPT

## 2021-06-18 PROCEDURE — 82607 VITAMIN B-12: CPT

## 2021-06-18 PROCEDURE — 87506 IADNA-DNA/RNA PROBE TQ 6-11: CPT

## 2021-06-18 PROCEDURE — G8427 DOCREV CUR MEDS BY ELIG CLIN: HCPCS | Performed by: UROLOGY

## 2021-06-18 PROCEDURE — 36415 COLL VENOUS BLD VENIPUNCTURE: CPT

## 2021-06-18 PROCEDURE — 1036F TOBACCO NON-USER: CPT | Performed by: UROLOGY

## 2021-06-18 NOTE — PROGRESS NOTES
[facility] Doctor [address]  [address] [facility] [address]  [address]  Dept: [phone]  Dept Fax: [phone] 59 [facility], [address] [place] [address]  Urology Office Note - FU Patient    Patient:  Nate Thomas  YOB: 1960  Date: 6/18/2021    The patient is a 57 y.o. male who presents today for evaluation of the following problems:   Chief Complaint   Patient presents with    Follow-up    Benign Prostatic Hypertrophy    referred/consultation requested by Francisco Delgado MD.      HISTORY OF PRESENT ILLNESS:     BPH  Voiding better  Stronger stream  Not fully satisfied  On flomax    Left flank pain  Treated with abx. Resolved  Ten day onset      Summary of Previous Records:    62-year-old male with obstructive lower urinary tract symptoms-thin stream, straining, incomplete emptying for the past 10 years. He was using a combination of dutasteride with tamsulosin and silodosin over the past 10 years. He is now taking tamsulosin 0.8 mg daily and levofloxacin for the past 10 days which helped improve his symptoms, after he had worsening difficulty with emptying the bladder. Wife is physician in [place], brother is urologist  [place] and reviewed today. Ultrasound from 2017-75 g prostate with prominent median lobe  He is agreeable to greenlight PVP of prostate  He takes aspirin 75 mg once daily  Prescribe today tamsulosin 0.4 mg twice daily        Requested/reviewed records from Francisco Delgado MD office and/or outside physician/EMR    (Patient's old records have been requested, reviewed and pertinent findings summarized in today's note.)    Last several PSA's:  No results found for: PSA    Last total testosterone:  No results found for: TESTOSTERONE    Urinalysis today:  No results found for this visit on 06/18/21.     Last BUN and creatinine:  Lab Results   Component Value Date    BUN 17 12/03/2020     Lab Results smoker, smoking cessation counseling offered)   Social History     Substance and Sexual Activity   Alcohol Use Never       REVIEW OF SYSTEMS:  Review of Systems   Constitutional: Negative for appetite change, chills and fever. Eyes: Negative for pain, redness and visual disturbance. Respiratory: Negative for cough, shortness of breath and wheezing. Cardiovascular: Negative for chest pain and leg swelling. Gastrointestinal: Negative for abdominal pain, constipation, diarrhea, nausea and vomiting. Genitourinary: Positive for difficulty urinating, dysuria, flank pain, frequency (mostly at night) and urgency. Negative for hematuria. Musculoskeletal: Positive for back pain (left side). Negative for joint swelling and myalgias. Skin: Negative for rash and wound. Neurological: Negative for dizziness, tremors and numbness. Hematological: Does not bruise/bleed easily. Physical Exam:    This a 57 y.o. male  Vitals:    06/18/21 1020   BP: 126/83   Pulse: 89     Body mass index is 29.54 kg/m². Constitutional: Patient in no acute distress;         Assessment and Plan        1. Benign localized prostatic hyperplasia with lower urinary tract symptoms (LUTS)    2. Left flank pain    3. Unilateral inguinal hernia without obstruction or gangrene, recurrence not specified               Plan:      Pt voiding has improved greatly with greenlight. Still not at goal. Continue flomax  Discussed with pt that post void residuals may never be 0 ml due to longstanding outlet obstruction  Left flank pain resolved with abx. Poss pyelonephritis. If it occurs again, will get ultrasound. Referral Dr Duy Way for inguinal hernia  Follow up in six months      Prescriptions Ordered:  No orders of the defined types were placed in this encounter.      Orders Placed:  Orders Placed This Encounter   Procedures   Carlos Enrique Parikh DO, General Surgery, [facility]     Referral Priority:   Routine     Referral Type: Eval and Treat     Referral Reason:   Specialty Services Required     Referred to Provider:   Milla Campoverde IV, DO     Requested Specialty:   General Surgery     Number of Visits Requested:   Johnathan England MD

## 2021-06-20 LAB
CAMPYLOBACTER PCR: NORMAL
E COLI ENTEROTOXIGENIC PCR: NORMAL
PLESIOMONAS SHIGELLOIDES PCR: NORMAL
SALMONELLA PCR: NORMAL
SHIGATOXIN GENE PCR: NORMAL
SHIGELLA SP PCR: NORMAL
SPECIMEN DESCRIPTION: NORMAL
VIBRIO PCR: NORMAL
YERSINIA ENTEROCOLITICA PCR: NORMAL

## 2021-06-23 ENCOUNTER — OFFICE VISIT (OUTPATIENT)
Dept: SURGERY | Age: 61
End: 2021-06-23
Payer: COMMERCIAL

## 2021-06-23 VITALS
HEART RATE: 61 BPM | BODY MASS INDEX: 29.28 KG/M2 | DIASTOLIC BLOOD PRESSURE: 80 MMHG | SYSTOLIC BLOOD PRESSURE: 127 MMHG | WEIGHT: 181.4 LBS

## 2021-06-23 DIAGNOSIS — M62.89 HERNIA OF FASCIA: Primary | ICD-10-CM

## 2021-06-23 PROCEDURE — 3017F COLORECTAL CA SCREEN DOC REV: CPT | Performed by: SURGERY

## 2021-06-23 PROCEDURE — 1036F TOBACCO NON-USER: CPT | Performed by: SURGERY

## 2021-06-23 PROCEDURE — G8427 DOCREV CUR MEDS BY ELIG CLIN: HCPCS | Performed by: SURGERY

## 2021-06-23 PROCEDURE — G8417 CALC BMI ABV UP PARAM F/U: HCPCS | Performed by: SURGERY

## 2021-06-23 PROCEDURE — 99202 OFFICE O/P NEW SF 15 MIN: CPT | Performed by: SURGERY

## 2021-06-23 NOTE — PROGRESS NOTES
History and Physical  [facility]    Patient's Name/Date of Birth: Jahaira Zepeda / 1960 (57 y.o.)    Date: June 23, 2021     HPI: Pt is a 57 y.o. male who presents to [facility] for evaluation of R groin bulging. Denies any f/c, n/v, sob or chest pain. +flatus. Takes 81mg ASA daily, denies any use of any other AC or antiplatelets. No previous abdominal surgeries. Past Medical History:   Diagnosis Date    Hyperlipidemia     Hypertension     Wellness examination     patient states no PCP       Past Surgical History:   Procedure Laterality Date    CYSTOSCOPY N/A 7/30/2020    CYSTOSCOPY performed by Paul Rosado MD at [place] [address]  08/14/2020     CYSTO, TUR PROSTATE GREENLIGHT XPS     TURP N/A 8/14/2020    CYSTO, TUR PROSTATE GREENLIGHT XPS performed by Paul Rosado MD at [address] [place] [address]      varicose vein of right leg       Current Outpatient Medications   Medication Sig Dispense Refill    Elastic Bandages & Supports (HERNIA BELT DOUBLE MEDIUM) MISC Use as needed for support of hernia 1 each 0    ciclopirox (PENLAC) 8 % solution Apply topically nightly. Remove once weekly with alcohol or nail polish remover. 1 Bottle 3    simethicone (MYLICON) 80 MG chewable tablet Take 1 tablet by mouth 4 times daily as needed for Flatulence 180 tablet 3    ramipril (ALTACE) 10 MG capsule take 1 capsule by mouth once daily 30 capsule 3    hydroCHLOROthiazide (HYDRODIURIL) 25 MG tablet take 1 tablet by mouth daily 30 tablet 5    rosuvastatin (CRESTOR) 10 MG tablet take 1 tablet by mouth daily 30 tablet 5    alpha-galactosidase (BEANO) TABS chewable tablet Take 1 tablet by mouth 3 times daily (with meals) 90 tablet 1    aspirin 81 MG EC tablet Take 1 tablet by mouth daily Please start on 09/18/20 30 tablet 5    tamsulosin (FLOMAX) 0.4 MG capsule Take 1 capsule by mouth 2 times daily 60 capsule 5     No current facility-administered medications for this visit. No Known Allergies    No family history on file. Social History     Socioeconomic History    Marital status:      Spouse name: Not on file    Number of children: Not on file    Years of education: Not on file    Highest education level: Not on file   Occupational History    Not on file   Tobacco Use    Smoking status: Never Smoker    Smokeless tobacco: Never Used   Vaping Use    Vaping Use: Never used   Substance and Sexual Activity    Alcohol use: Never    Drug use: Never    Sexual activity: Yes   Other Topics Concern    Not on file   Social History Narrative    Not on file     Social Determinants of Health     Financial Resource Strain: Low Risk     Difficulty of Paying Living Expenses: Not hard at all   Food Insecurity: No Food Insecurity    Worried About Running Out of Food in the Last Year: Never true    Clarke of Food in the Last Year: Never true   Transportation Needs: No Transportation Needs    Lack of Transportation (Medical): No    Lack of Transportation (Non-Medical): No   Physical Activity:     Days of Exercise per Week:     Minutes of Exercise per Session:    Stress:     Feeling of Stress :    Social Connections:     Frequency of Communication with Friends and Family:     Frequency of Social Gatherings with Friends and Family:     Attends Restorationist Services:     Active Member of Clubs or Organizations:     Attends Club or Organization Meetings:     Marital Status:    Intimate Partner Violence:     Fear of Current or Ex-Partner:     Emotionally Abused:     Physically Abused:     Sexually Abused:        ROS:   GEN: Denies recent weight loss, fatigue, fevers, chills. HEENT: No rhinorrhea, dysphagia, odynphagia. CV: No history of MI, recent chest pain. RESP: Denies shortness of breath, COPD, asthma. GI: per hpi  : Denies increased frequency or dysuria. HEM[de-identified] Denies history of anemia or DVTs.   ENDO: Denies history of thyroid problems or diabetes. NEURO: Denies history of CVA, TIA. Physical Exam:  Vitals:    06/23/21 0829   BP: 127/80   Pulse: 61     General:A & O x3  HEENT:  NCAT, PERRL, EMOI, oral mucus membrane pink and moist, no mass palpated on neck exam  BREAST:Deferred  Heart: S1S2, no mumurs, RRR  Lungs: equal chest rise and fall  Abdomen: soft, nontender, no guarding, no rebound, R groin with no large appreciable hernia or mass   RECTAL: deferred   Extremity: negative  SKIN: Skin color, texture, turgor normal. No rashes or lesions. Neuro: No motor or sensory deficits appreciated. MK:normal throughout upper and lower extremities    Assessment      Diagnosis Orders   1. Hernia of fascia         Plan   1. Groin US ordered   2. F/U once US imaging completed     Pt seen and examined by Dr. Janis Espana     Thank you,  Jeniffer Garcia DO  6/23/2021    Attending Physician Statement  I have discussed the case with Dr Shira Josue, including pertinent history and exam findings with the resident. I have seen and examined the patient and the key elements of the encounter have been performed by me. I agree with the assessment, plan and orders as documented by the resident.       Electronically signed by Meng Sotelo IV, DO  on 6/23/2021 at 11:37 AM

## 2021-06-25 ENCOUNTER — OFFICE VISIT (OUTPATIENT)
Dept: GASTROENTEROLOGY | Age: 61
End: 2021-06-25
Payer: COMMERCIAL

## 2021-06-25 VITALS — DIASTOLIC BLOOD PRESSURE: 76 MMHG | SYSTOLIC BLOOD PRESSURE: 120 MMHG | BODY MASS INDEX: 29.05 KG/M2 | WEIGHT: 180 LBS

## 2021-06-25 DIAGNOSIS — R14.1 ABDOMINAL GAS PAIN: Primary | ICD-10-CM

## 2021-06-25 PROCEDURE — 3017F COLORECTAL CA SCREEN DOC REV: CPT | Performed by: INTERNAL MEDICINE

## 2021-06-25 PROCEDURE — 1036F TOBACCO NON-USER: CPT | Performed by: INTERNAL MEDICINE

## 2021-06-25 PROCEDURE — 99213 OFFICE O/P EST LOW 20 MIN: CPT | Performed by: INTERNAL MEDICINE

## 2021-06-25 PROCEDURE — G8417 CALC BMI ABV UP PARAM F/U: HCPCS | Performed by: INTERNAL MEDICINE

## 2021-06-25 PROCEDURE — G8427 DOCREV CUR MEDS BY ELIG CLIN: HCPCS | Performed by: INTERNAL MEDICINE

## 2021-06-25 ASSESSMENT — ENCOUNTER SYMPTOMS
TROUBLE SWALLOWING: 1
EYES NEGATIVE: 1
RESPIRATORY NEGATIVE: 1
ABDOMINAL DISTENTION: 1
ALLERGIC/IMMUNOLOGIC NEGATIVE: 1

## 2021-06-25 NOTE — PROGRESS NOTES
GI FOLLOW UP    INTERVAL HISTORY:     Negative stool studies  Stool O&P not processed  Labs unremarkable  Clinically patient is doing well aside from minimal bloating symptoms    Chief Complaint   Patient presents with    Follow-up     lab follow up    Gas     Patient states still having gas daily - writer will give patient information on gas producing foods today . Takes mylicon PRN and states it helps. 1. Abdominal gas pain          HISTORY OF PRESENT ILLNESS: Mr.Mohiedin Khoury is a 63 y.o. male with a past history remarkable for HTN, HL, prostate surgery for BPH,  referred for evaluation of increased bowel gas symptomology, flatulence and borborygmi with subacute progression over the last few weeks. Patient denies any diarrhea or changes in bowel frequency. Denies any appetite changes (currently fasting for the month of Ramadan). No prior history of similar presentation. Concern for parasitic infection given the patient's recent travels from [facility]. Denies any preceding infectious symptoms does report mild episodes of diarrhea remotely however not currently active. No recent antibiotic use. No sick contacts. No NSAID use. No change in medications or dietary habits.     No obvious upper GI symptoms  Patient appears to be concerned for a parasitic infection.     Smoker: none   Drinking history: None   Abdominal surgeries: Varcisoties   Prior Colonoscopy: Coloagurd-- negative 2020. Prior EGD: none  FH of GI issues: none     Past Medical,Family, and Social History reviewed and does contribute to the patient presenting condition. Patient's PMH/PSH,SH,PSYCH Hx, MEDs, ALLERGIES, and ROS were all reviewed and updated in the appropriate sections.     PAST MEDICAL HISTORY:  Past Medical History:   Diagnosis Date    Hyperlipidemia     Hypertension     Wellness examination     patient states no PCP       Past Surgical History:   Procedure Laterality Date    CYSTOSCOPY N/A 7/30/2020    CYSTOSCOPY performed by Kirsten Fernandez MD at [address] [facility]  08/14/2020     CYSTO, TUR PROSTATE GREENLIGHT XPS     TURP N/A 8/14/2020    CYSTO, TUR PROSTATE GREENLIGHT XPS performed by Kirsten Fernandez MD at [facility]      varicose vein of right leg       CURRENT MEDICATIONS:    Current Outpatient Medications:     Elastic Bandages & Supports (HERNIA BELT DOUBLE MEDIUM) MISC, Use as needed for support of hernia, Disp: 1 each, Rfl: 0    ciclopirox (PENLAC) 8 % solution, Apply topically nightly. Remove once weekly with alcohol or nail polish remover. , Disp: 1 Bottle, Rfl: 3    simethicone (MYLICON) 80 MG chewable tablet, Take 1 tablet by mouth 4 times daily as needed for Flatulence, Disp: 180 tablet, Rfl: 3    ramipril (ALTACE) 10 MG capsule, take 1 capsule by mouth once daily, Disp: 30 capsule, Rfl: 3    hydroCHLOROthiazide (HYDRODIURIL) 25 MG tablet, take 1 tablet by mouth daily, Disp: 30 tablet, Rfl: 5    rosuvastatin (CRESTOR) 10 MG tablet, take 1 tablet by mouth daily, Disp: 30 tablet, Rfl: 5    alpha-galactosidase (BEANO) TABS chewable tablet, Take 1 tablet by mouth 3 times daily (with meals), Disp: 90 tablet, Rfl: 1    aspirin 81 MG EC tablet, Take 1 tablet by mouth daily Please start on 09/18/20, Disp: 30 tablet, Rfl: 5    tamsulosin (FLOMAX) 0.4 MG capsule, Take 1 capsule by mouth 2 times daily, Disp: 60 capsule, Rfl: 5    ALLERGIES:   No Known Allergies    FAMILY HISTORY: No family history on file.       SOCIAL HISTORY:   Social History     Socioeconomic History    Marital status:      Spouse name: Not on file    Number of children: Not on file    Years of education: Not on file    Highest education level: Not on file   Occupational History    Not on file   Tobacco Use    Smoking status: Never Smoker    Smokeless tobacco: Never Used   Vaping Use    Vaping Use: Cardiovascular: Negative. Gastrointestinal: Positive for abdominal distention. Endocrine: Negative. Genitourinary: Negative. Musculoskeletal: Negative. Skin: Negative. Allergic/Immunologic: Negative. Neurological: Negative. Hematological: Negative. Psychiatric/Behavioral: Positive for sleep disturbance. All other systems reviewed and are negative. PHYSICAL EXAMINATION: Vital signs reviewed per the nursing documentation. /76   Wt 180 lb (81.6 kg)   BMI 29.05 kg/m²   Body mass index is 29.05 kg/m². Physical Exam    Physical Exam   Constitutional: Patient is oriented to person, place, and time. Patient appears well-developed and well-nourished. HENT:   Head: Normocephalic and atraumatic. Eyes: Pupils are equal, round, and reactive to light. EOM are normal.   Neck: Normal range of motion. Neck supple. No JVD present. No tracheal deviation present. No thyromegaly present. Cardiovascular: Normal rate, regular rhythm, normal heart sounds and intact distal pulses. Pulmonary/Chest: Effort normal and breath sounds normal. No stridor. No respiratory distress. He has no wheezes. He has no rales. He exhibits no tenderness. Abdominal: Soft. Bowel sounds are normal. He exhibits no distension and no mass. There is no tenderness. There is no rebound and no guarding. No hernia. Musculoskeletal: Normal range of motion. Lymphadenopathy:    Patient has no cervical adenopathy. Neurological: Patient is alert and oriented to person, place, and time. Psychiatric: Patient has a normal mood and affect.  Patient behavior is normal.       LABORATORY DATA: Reviewed  Lab Results   Component Value Date    WBC 5.0 06/18/2021    HGB 14.7 06/18/2021    HCT 46.6 06/18/2021    MCV 82.0 (L) 06/18/2021     06/18/2021     06/18/2021    K 4.3 06/18/2021     06/18/2021    CO2 23 06/18/2021    BUN 16 06/18/2021    CREATININE 0.97 06/18/2021    LABALBU 4.4 06/18/2021 BILITOT 0.34 06/18/2021    ALKPHOS 51 06/18/2021    AST 20 06/18/2021    ALT 21 06/18/2021    INR 1.0 09/13/2020         Lab Results   Component Value Date    RBC 5.68 06/18/2021    HGB 14.7 06/18/2021    MCV 82.0 (L) 06/18/2021    MCH 25.9 06/18/2021    MCHC 31.5 06/18/2021    RDW 12.6 06/18/2021    MPV 9.6 06/18/2021    BASOPCT 0 06/18/2021    LYMPHSABS 1.59 06/18/2021    MONOSABS 0.38 06/18/2021    NEUTROABS 2.82 06/18/2021    EOSABS 0.12 06/18/2021    BASOSABS <0.03 06/18/2021         DIAGNOSTIC TESTING:     No results found. IMPRESSION: Mr.Mohiedin Khoury is a 63 y.o. male with a past history remarkable for HTN, HL, prostate surgery for BPH,  referred for evaluation of increased bowel gas symptomology, flatulence and borborygmi with subacute progression over the last few weeks. Patient denies any diarrhea or changes in bowel frequency. Denies any appetite changes (currently fasting for the month of Ramadan). No prior history of similar presentation. Concern for parasitic infection given the patient's recent travels from [facility]. Denies any preceding infectious symptoms does report mild episodes of diarrhea remotely however not currently active. No recent antibiotic use. No sick contacts. No NSAID use. No change in medications or dietary habits.     No obvious upper GI symptoms       Clinically doing well  Infectious work-up negative      Assessment  1. Abdominal gas pain        PLAN:    1) change in bowel movements with borborygmi and increased bloatingnormal bowel movements currently with minimal bloating at this time. Recommended avoidance of gas producing foods. Stool culture negative. Serologic studies negative. Pending O&P, will follow results    RTC 2-3 months. Thank you for allowing me to participate in the care of Mr. Arizmendi Staff. For any further questions please do not hesitate to contact me.     I have reviewed and agree with the ROS entered by the MA/LPN from today's encounter

## 2021-06-27 ENCOUNTER — HOSPITAL ENCOUNTER (OUTPATIENT)
Dept: ULTRASOUND IMAGING | Age: 61
Discharge: HOME | End: 2021-06-29
Payer: COMMERCIAL

## 2021-06-27 DIAGNOSIS — M62.89 HERNIA OF FASCIA: ICD-10-CM

## 2021-06-27 PROCEDURE — 76882 US LMTD JT/FCL EVL NVASC XTR: CPT

## 2021-06-30 ENCOUNTER — OFFICE VISIT (OUTPATIENT)
Dept: SURGERY | Age: 61
End: 2021-06-30
Payer: COMMERCIAL

## 2021-06-30 VITALS
HEIGHT: 66 IN | BODY MASS INDEX: 29.09 KG/M2 | SYSTOLIC BLOOD PRESSURE: 126 MMHG | DIASTOLIC BLOOD PRESSURE: 83 MMHG | HEART RATE: 83 BPM | WEIGHT: 181 LBS

## 2021-06-30 DIAGNOSIS — K40.90 RIGHT INGUINAL HERNIA: Primary | ICD-10-CM

## 2021-06-30 DIAGNOSIS — M62.89 HERNIA OF FASCIA: ICD-10-CM

## 2021-06-30 PROCEDURE — G8417 CALC BMI ABV UP PARAM F/U: HCPCS | Performed by: STUDENT IN AN ORGANIZED HEALTH CARE EDUCATION/TRAINING PROGRAM

## 2021-06-30 PROCEDURE — 99213 OFFICE O/P EST LOW 20 MIN: CPT | Performed by: STUDENT IN AN ORGANIZED HEALTH CARE EDUCATION/TRAINING PROGRAM

## 2021-06-30 PROCEDURE — G8427 DOCREV CUR MEDS BY ELIG CLIN: HCPCS | Performed by: STUDENT IN AN ORGANIZED HEALTH CARE EDUCATION/TRAINING PROGRAM

## 2021-06-30 PROCEDURE — 1036F TOBACCO NON-USER: CPT | Performed by: STUDENT IN AN ORGANIZED HEALTH CARE EDUCATION/TRAINING PROGRAM

## 2021-06-30 PROCEDURE — 3017F COLORECTAL CA SCREEN DOC REV: CPT | Performed by: STUDENT IN AN ORGANIZED HEALTH CARE EDUCATION/TRAINING PROGRAM

## 2021-06-30 NOTE — PROGRESS NOTES
Visit Information    Have you changed or started any medications since your last visit including any over-the-counter medicines, vitamins, or herbal medicines? no   Are you having any side effects from any of your medications? -  no  Have you stopped taking any of your medications? Is so, why? -  no    Have you seen any other physician or provider since your last visit? No  Have you had any other diagnostic tests since your last visit? No  Have you been seen in the emergency room and/or had an admission to a hospital since we last saw you? No  Have you had your routine dental cleaning in the past 6 months? no    Have you activated your [employer] account? If not, what are your barriers?  Yes     Patient Care Team:  René Quinonez MD as PCP - General (Internal Medicine)  René Quinonez MD as PCP - [address] [place] [address]  [facility] Provider  Fito Duong MD as Consulting Physician (Gastroenterology)    Medical History Review  Past Medical, Family, and Social History reviewed and does not contribute to the patient presenting condition    Health Maintenance   Topic Date Due    DTaP/Tdap/Td vaccine (1 - Tdap) Never done    Shingles Vaccine (1 of 2) 01/28/2022 (Originally 6/1/2009)    Flu vaccine (Season Ended) 09/01/2021    A1C test (Diabetic or Prediabetic)  09/04/2021    Lipid screen  09/04/2021    Potassium monitoring  06/18/2022    Creatinine monitoring  06/18/2022    Colon cancer screen fecal DNA test (Cologuard)  09/08/2023    COVID-19 Vaccine  Completed    Hepatitis C screen  Completed    HIV screen  Completed    Hepatitis A vaccine  Aged Out    Hepatitis B vaccine  Aged Out    Hib vaccine  Aged Out    Meningococcal (ACWY) vaccine  Aged Out    Pneumococcal 0-64 years Vaccine  Aged Out
with mesh under general anesthesia.     Electronically signed by Silas Siddiqui DO on 6/30/21 at 9:41 AM EDT

## 2021-07-27 ENCOUNTER — HOSPITAL ENCOUNTER (OUTPATIENT)
Age: 61
Setting detail: OUTPATIENT SURGERY
Discharge: HOME | End: 2021-07-27
Attending: SURGERY | Admitting: SURGERY
Payer: COMMERCIAL

## 2021-07-27 ENCOUNTER — ANESTHESIA EVENT (OUTPATIENT)
Dept: OPERATING ROOM | Age: 61
End: 2021-07-27
Payer: COMMERCIAL

## 2021-07-27 ENCOUNTER — ANESTHESIA (OUTPATIENT)
Dept: OPERATING ROOM | Age: 61
End: 2021-07-27
Payer: COMMERCIAL

## 2021-07-27 VITALS — OXYGEN SATURATION: 100 % | DIASTOLIC BLOOD PRESSURE: 76 MMHG | TEMPERATURE: 95.8 F | SYSTOLIC BLOOD PRESSURE: 104 MMHG

## 2021-07-27 VITALS
BODY MASS INDEX: 29.41 KG/M2 | HEART RATE: 63 BPM | DIASTOLIC BLOOD PRESSURE: 77 MMHG | TEMPERATURE: 96.8 F | RESPIRATION RATE: 16 BRPM | SYSTOLIC BLOOD PRESSURE: 116 MMHG | WEIGHT: 183 LBS | OXYGEN SATURATION: 100 % | HEIGHT: 66 IN

## 2021-07-27 DIAGNOSIS — G89.18 POST-OP PAIN: Primary | ICD-10-CM

## 2021-07-27 LAB
GLUCOSE BLD-MCNC: 122 MG/DL (ref 74–100)
POC POTASSIUM: 3.9 MMOL/L (ref 3.5–4.5)

## 2021-07-27 PROCEDURE — 3600000009 HC SURGERY ROBOT BASE: Performed by: SURGERY

## 2021-07-27 PROCEDURE — 3600000019 HC SURGERY ROBOT ADDTL 15MIN: Performed by: SURGERY

## 2021-07-27 PROCEDURE — 7100000040 HC SPAR PHASE II RECOVERY - FIRST 15 MIN: Performed by: SURGERY

## 2021-07-27 PROCEDURE — 64488 TAP BLOCK BI INJECTION: CPT | Performed by: ANESTHESIOLOGY

## 2021-07-27 PROCEDURE — 2500000003 HC RX 250 WO HCPCS: Performed by: NURSE ANESTHETIST, CERTIFIED REGISTERED

## 2021-07-27 PROCEDURE — 2580000003 HC RX 258: Performed by: SURGERY

## 2021-07-27 PROCEDURE — 6360000002 HC RX W HCPCS: Performed by: SURGERY

## 2021-07-27 PROCEDURE — 3700000000 HC ANESTHESIA ATTENDED CARE: Performed by: SURGERY

## 2021-07-27 PROCEDURE — 6360000002 HC RX W HCPCS: Performed by: ANESTHESIOLOGY

## 2021-07-27 PROCEDURE — 84132 ASSAY OF SERUM POTASSIUM: CPT

## 2021-07-27 PROCEDURE — 3700000001 HC ADD 15 MINUTES (ANESTHESIA): Performed by: SURGERY

## 2021-07-27 PROCEDURE — 82947 ASSAY GLUCOSE BLOOD QUANT: CPT

## 2021-07-27 PROCEDURE — 2720000010 HC SURG SUPPLY STERILE: Performed by: SURGERY

## 2021-07-27 PROCEDURE — 7100000000 HC PACU RECOVERY - FIRST 15 MIN: Performed by: SURGERY

## 2021-07-27 PROCEDURE — 6360000002 HC RX W HCPCS: Performed by: NURSE ANESTHETIST, CERTIFIED REGISTERED

## 2021-07-27 PROCEDURE — 7100000001 HC PACU RECOVERY - ADDTL 15 MIN: Performed by: SURGERY

## 2021-07-27 PROCEDURE — S2900 ROBOTIC SURGICAL SYSTEM: HCPCS | Performed by: SURGERY

## 2021-07-27 PROCEDURE — C1781 MESH (IMPLANTABLE): HCPCS | Performed by: SURGERY

## 2021-07-27 PROCEDURE — 2709999900 HC NON-CHARGEABLE SUPPLY: Performed by: SURGERY

## 2021-07-27 PROCEDURE — 2580000003 HC RX 258: Performed by: ANESTHESIOLOGY

## 2021-07-27 PROCEDURE — 7100000041 HC SPAR PHASE II RECOVERY - ADDTL 15 MIN: Performed by: SURGERY

## 2021-07-27 DEVICE — MESH SURG W3.5XL6IN POLY SELF FIXATING RECT W/ RESRB PLA: Type: IMPLANTABLE DEVICE | Site: INGUINAL | Status: FUNCTIONAL

## 2021-07-27 RX ORDER — DIPHENHYDRAMINE HYDROCHLORIDE 50 MG/ML
12.5 INJECTION INTRAMUSCULAR; INTRAVENOUS
Status: DISCONTINUED | OUTPATIENT
Start: 2021-07-27 | End: 2021-07-27 | Stop reason: HOSPADM

## 2021-07-27 RX ORDER — DEXAMETHASONE SODIUM PHOSPHATE 10 MG/ML
INJECTION INTRAMUSCULAR; INTRAVENOUS PRN
Status: DISCONTINUED | OUTPATIENT
Start: 2021-07-27 | End: 2021-07-27 | Stop reason: SDUPTHER

## 2021-07-27 RX ORDER — FENTANYL CITRATE 50 UG/ML
25 INJECTION, SOLUTION INTRAMUSCULAR; INTRAVENOUS EVERY 5 MIN PRN
Status: DISCONTINUED | OUTPATIENT
Start: 2021-07-27 | End: 2021-07-27 | Stop reason: HOSPADM

## 2021-07-27 RX ORDER — LABETALOL HYDROCHLORIDE 5 MG/ML
5 INJECTION, SOLUTION INTRAVENOUS EVERY 10 MIN PRN
Status: DISCONTINUED | OUTPATIENT
Start: 2021-07-27 | End: 2021-07-27 | Stop reason: HOSPADM

## 2021-07-27 RX ORDER — ONDANSETRON 2 MG/ML
4 INJECTION INTRAMUSCULAR; INTRAVENOUS
Status: DISCONTINUED | OUTPATIENT
Start: 2021-07-27 | End: 2021-07-27 | Stop reason: HOSPADM

## 2021-07-27 RX ORDER — PROMETHAZINE HYDROCHLORIDE 25 MG/ML
6.25 INJECTION, SOLUTION INTRAMUSCULAR; INTRAVENOUS
Status: DISCONTINUED | OUTPATIENT
Start: 2021-07-27 | End: 2021-07-27 | Stop reason: HOSPADM

## 2021-07-27 RX ORDER — HYDRALAZINE HYDROCHLORIDE 20 MG/ML
5 INJECTION INTRAMUSCULAR; INTRAVENOUS EVERY 10 MIN PRN
Status: DISCONTINUED | OUTPATIENT
Start: 2021-07-27 | End: 2021-07-27 | Stop reason: HOSPADM

## 2021-07-27 RX ORDER — CYCLOBENZAPRINE HCL 10 MG
10 TABLET ORAL 2 TIMES DAILY PRN
Qty: 20 TABLET | Refills: 0 | Status: SHIPPED | OUTPATIENT
Start: 2021-07-27 | End: 2021-08-06

## 2021-07-27 RX ORDER — SODIUM CHLORIDE, SODIUM LACTATE, POTASSIUM CHLORIDE, CALCIUM CHLORIDE 600; 310; 30; 20 MG/100ML; MG/100ML; MG/100ML; MG/100ML
INJECTION, SOLUTION INTRAVENOUS CONTINUOUS
Status: DISCONTINUED | OUTPATIENT
Start: 2021-07-27 | End: 2021-07-27 | Stop reason: HOSPADM

## 2021-07-27 RX ORDER — ONDANSETRON 2 MG/ML
INJECTION INTRAMUSCULAR; INTRAVENOUS PRN
Status: DISCONTINUED | OUTPATIENT
Start: 2021-07-27 | End: 2021-07-27 | Stop reason: SDUPTHER

## 2021-07-27 RX ORDER — FENTANYL CITRATE 50 UG/ML
50 INJECTION, SOLUTION INTRAMUSCULAR; INTRAVENOUS EVERY 5 MIN PRN
Status: DISCONTINUED | OUTPATIENT
Start: 2021-07-27 | End: 2021-07-27 | Stop reason: HOSPADM

## 2021-07-27 RX ORDER — OXYCODONE HYDROCHLORIDE AND ACETAMINOPHEN 5; 325 MG/1; MG/1
1 TABLET ORAL EVERY 4 HOURS PRN
Status: DISCONTINUED | OUTPATIENT
Start: 2021-07-27 | End: 2021-07-27 | Stop reason: HOSPADM

## 2021-07-27 RX ORDER — LIDOCAINE HYDROCHLORIDE 10 MG/ML
INJECTION, SOLUTION EPIDURAL; INFILTRATION; INTRACAUDAL; PERINEURAL PRN
Status: DISCONTINUED | OUTPATIENT
Start: 2021-07-27 | End: 2021-07-27 | Stop reason: SDUPTHER

## 2021-07-27 RX ORDER — ONDANSETRON 2 MG/ML
4 INJECTION INTRAMUSCULAR; INTRAVENOUS DAILY PRN
Status: DISCONTINUED | OUTPATIENT
Start: 2021-07-27 | End: 2021-07-27 | Stop reason: HOSPADM

## 2021-07-27 RX ORDER — 0.9 % SODIUM CHLORIDE 0.9 %
500 INTRAVENOUS SOLUTION INTRAVENOUS
Status: DISCONTINUED | OUTPATIENT
Start: 2021-07-27 | End: 2021-07-27 | Stop reason: HOSPADM

## 2021-07-27 RX ORDER — DOCUSATE SODIUM 100 MG/1
100 CAPSULE, LIQUID FILLED ORAL 2 TIMES DAILY
Qty: 20 CAPSULE | Refills: 0 | Status: SHIPPED | OUTPATIENT
Start: 2021-07-27 | End: 2021-08-26

## 2021-07-27 RX ORDER — GLYCOPYRROLATE 1 MG/5 ML
SYRINGE (ML) INTRAVENOUS PRN
Status: DISCONTINUED | OUTPATIENT
Start: 2021-07-27 | End: 2021-07-27 | Stop reason: SDUPTHER

## 2021-07-27 RX ORDER — PROPOFOL 10 MG/ML
INJECTION, EMULSION INTRAVENOUS PRN
Status: DISCONTINUED | OUTPATIENT
Start: 2021-07-27 | End: 2021-07-27 | Stop reason: SDUPTHER

## 2021-07-27 RX ORDER — MAGNESIUM HYDROXIDE 1200 MG/15ML
LIQUID ORAL CONTINUOUS PRN
Status: COMPLETED | OUTPATIENT
Start: 2021-07-27 | End: 2021-07-27

## 2021-07-27 RX ORDER — MIDAZOLAM HYDROCHLORIDE 2 MG/2ML
1 INJECTION, SOLUTION INTRAMUSCULAR; INTRAVENOUS EVERY 10 MIN PRN
Status: DISCONTINUED | OUTPATIENT
Start: 2021-07-27 | End: 2021-07-27 | Stop reason: HOSPADM

## 2021-07-27 RX ORDER — MIDAZOLAM HYDROCHLORIDE 1 MG/ML
INJECTION INTRAMUSCULAR; INTRAVENOUS PRN
Status: DISCONTINUED | OUTPATIENT
Start: 2021-07-27 | End: 2021-07-27 | Stop reason: SDUPTHER

## 2021-07-27 RX ORDER — SCOLOPAMINE TRANSDERMAL SYSTEM 1 MG/1
1 PATCH, EXTENDED RELEASE TRANSDERMAL ONCE
Status: DISCONTINUED | OUTPATIENT
Start: 2021-07-27 | End: 2021-07-27 | Stop reason: HOSPADM

## 2021-07-27 RX ORDER — LIDOCAINE HYDROCHLORIDE 10 MG/ML
1 INJECTION, SOLUTION EPIDURAL; INFILTRATION; INTRACAUDAL; PERINEURAL
Status: DISCONTINUED | OUTPATIENT
Start: 2021-07-27 | End: 2021-07-27 | Stop reason: HOSPADM

## 2021-07-27 RX ORDER — OXYCODONE HYDROCHLORIDE AND ACETAMINOPHEN 5; 325 MG/1; MG/1
1 TABLET ORAL EVERY 6 HOURS PRN
Qty: 18 TABLET | Refills: 0 | Status: SHIPPED | OUTPATIENT
Start: 2021-07-27 | End: 2021-07-30

## 2021-07-27 RX ORDER — FENTANYL CITRATE 50 UG/ML
INJECTION, SOLUTION INTRAMUSCULAR; INTRAVENOUS PRN
Status: DISCONTINUED | OUTPATIENT
Start: 2021-07-27 | End: 2021-07-27 | Stop reason: SDUPTHER

## 2021-07-27 RX ORDER — ROCURONIUM BROMIDE 10 MG/ML
INJECTION, SOLUTION INTRAVENOUS PRN
Status: DISCONTINUED | OUTPATIENT
Start: 2021-07-27 | End: 2021-07-27 | Stop reason: SDUPTHER

## 2021-07-27 RX ADMIN — CEFAZOLIN 2000 MG: 10 INJECTION, POWDER, FOR SOLUTION INTRAVENOUS at 10:40

## 2021-07-27 RX ADMIN — MIDAZOLAM HYDROCHLORIDE 2 MG: 1 INJECTION, SOLUTION INTRAMUSCULAR; INTRAVENOUS at 10:29

## 2021-07-27 RX ADMIN — SODIUM CHLORIDE, POTASSIUM CHLORIDE, SODIUM LACTATE AND CALCIUM CHLORIDE: 600; 310; 30; 20 INJECTION, SOLUTION INTRAVENOUS at 09:22

## 2021-07-27 RX ADMIN — MIDAZOLAM HYDROCHLORIDE 1 MG: 1 INJECTION, SOLUTION INTRAMUSCULAR; INTRAVENOUS at 10:01

## 2021-07-27 RX ADMIN — Medication 0.2 MG: at 10:57

## 2021-07-27 RX ADMIN — FENTANYL CITRATE 50 MCG: 50 INJECTION, SOLUTION INTRAMUSCULAR; INTRAVENOUS at 10:29

## 2021-07-27 RX ADMIN — ROCURONIUM BROMIDE 50 MG: 10 INJECTION INTRAVENOUS at 10:29

## 2021-07-27 RX ADMIN — SODIUM CHLORIDE, POTASSIUM CHLORIDE, SODIUM LACTATE AND CALCIUM CHLORIDE: 600; 310; 30; 20 INJECTION, SOLUTION INTRAVENOUS at 11:09

## 2021-07-27 RX ADMIN — FENTANYL CITRATE 50 MCG: 50 INJECTION, SOLUTION INTRAMUSCULAR; INTRAVENOUS at 12:05

## 2021-07-27 RX ADMIN — LIDOCAINE HYDROCHLORIDE 50 MG: 10 INJECTION, SOLUTION EPIDURAL; INFILTRATION; INTRACAUDAL; PERINEURAL at 10:29

## 2021-07-27 RX ADMIN — PROPOFOL INJECTABLE EMULSION 150 MG: 10 INJECTION, EMULSION INTRAVENOUS at 10:29

## 2021-07-27 RX ADMIN — FENTANYL CITRATE 50 MCG: 50 INJECTION, SOLUTION INTRAMUSCULAR; INTRAVENOUS at 10:01

## 2021-07-27 RX ADMIN — DEXAMETHASONE SODIUM PHOSPHATE 10 MG: 10 INJECTION INTRAMUSCULAR; INTRAVENOUS at 10:29

## 2021-07-27 RX ADMIN — ONDANSETRON 4 MG: 2 INJECTION, SOLUTION INTRAMUSCULAR; INTRAVENOUS at 10:29

## 2021-07-27 ASSESSMENT — PULMONARY FUNCTION TESTS
PIF_VALUE: 23
PIF_VALUE: 21
PIF_VALUE: 26
PIF_VALUE: 25
PIF_VALUE: 26
PIF_VALUE: 1
PIF_VALUE: 25
PIF_VALUE: 26
PIF_VALUE: 27
PIF_VALUE: 25
PIF_VALUE: 23
PIF_VALUE: 12
PIF_VALUE: 23
PIF_VALUE: 12
PIF_VALUE: 26
PIF_VALUE: 3
PIF_VALUE: 26
PIF_VALUE: 23
PIF_VALUE: 12
PIF_VALUE: 12
PIF_VALUE: 0
PIF_VALUE: 3
PIF_VALUE: 3
PIF_VALUE: 25
PIF_VALUE: 27
PIF_VALUE: 25
PIF_VALUE: 21
PIF_VALUE: 24
PIF_VALUE: 26
PIF_VALUE: 25
PIF_VALUE: 12
PIF_VALUE: 18
PIF_VALUE: 26
PIF_VALUE: 27
PIF_VALUE: 5
PIF_VALUE: 32
PIF_VALUE: 25
PIF_VALUE: 27
PIF_VALUE: 12
PIF_VALUE: 25
PIF_VALUE: 25
PIF_VALUE: 12
PIF_VALUE: 12
PIF_VALUE: 16
PIF_VALUE: 26
PIF_VALUE: 5
PIF_VALUE: 23
PIF_VALUE: 25
PIF_VALUE: 12
PIF_VALUE: 26
PIF_VALUE: 25
PIF_VALUE: 21
PIF_VALUE: 12
PIF_VALUE: 3
PIF_VALUE: 25
PIF_VALUE: 13
PIF_VALUE: 17
PIF_VALUE: 25
PIF_VALUE: 25
PIF_VALUE: 26
PIF_VALUE: 12
PIF_VALUE: 22
PIF_VALUE: 0
PIF_VALUE: 26
PIF_VALUE: 26
PIF_VALUE: 14
PIF_VALUE: 27
PIF_VALUE: 25
PIF_VALUE: 25
PIF_VALUE: 22
PIF_VALUE: 25
PIF_VALUE: 12
PIF_VALUE: 27
PIF_VALUE: 26
PIF_VALUE: 25
PIF_VALUE: 26
PIF_VALUE: 1
PIF_VALUE: 14
PIF_VALUE: 21
PIF_VALUE: 22
PIF_VALUE: 26
PIF_VALUE: 13
PIF_VALUE: 14
PIF_VALUE: 12
PIF_VALUE: 25
PIF_VALUE: 26
PIF_VALUE: 25
PIF_VALUE: 3
PIF_VALUE: 25
PIF_VALUE: 3
PIF_VALUE: 25
PIF_VALUE: 26
PIF_VALUE: 29
PIF_VALUE: 25
PIF_VALUE: 26
PIF_VALUE: 26
PIF_VALUE: 3
PIF_VALUE: 26
PIF_VALUE: 5
PIF_VALUE: 12
PIF_VALUE: 3
PIF_VALUE: 25
PIF_VALUE: 0
PIF_VALUE: 5
PIF_VALUE: 23
PIF_VALUE: 0
PIF_VALUE: 13
PIF_VALUE: 5
PIF_VALUE: 12
PIF_VALUE: 25
PIF_VALUE: 12

## 2021-07-27 ASSESSMENT — PAIN DESCRIPTION - ORIENTATION: ORIENTATION: RIGHT

## 2021-07-27 ASSESSMENT — PAIN SCALES - GENERAL
PAINLEVEL_OUTOF10: 0
PAINLEVEL_OUTOF10: 4

## 2021-07-27 ASSESSMENT — PAIN DESCRIPTION - LOCATION
LOCATION: ABDOMEN
LOCATION: GROIN

## 2021-07-27 ASSESSMENT — PAIN - FUNCTIONAL ASSESSMENT: PAIN_FUNCTIONAL_ASSESSMENT: 0-10

## 2021-07-27 ASSESSMENT — PAIN DESCRIPTION - PAIN TYPE
TYPE: SURGICAL PAIN

## 2021-07-27 NOTE — H&P
H&P  General Surgery        Pt Name: Kathy Garcia  MRN: [id number]  YOB: 1960  Date of evaluation: 7/27/2021      [x] I have examined the patient and reviewed the H&P/Consult and there are no changes to the patient or plans. [] I have examined the patient and reviewed the H&P/Consult and have noted the following changes:     I have included the previous office H&P below:    Electronically signed by Madhu March DO on 7/27/2021 at 7:48 AM        Show:Clear all  [x]Manual[x]Template[x]Copied    Added by:  [x]Kermit Mayer IV, DO    []Hover for details                                                        Reason for visit: hernia evaluation     Subjective:  58 y/o male here to review results of ultrasound. Patient still with right groin discomfort. Patient denies right groin bulge. Ultrasound identifies a right inguinal hernia. Patient with no new complaints.           Vitals:     06/30/21 0925   BP: 126/83   Pulse: 83         Exam  General: patient resting comfortably in no acute distress. AAOx3  Abdomen: soft, nontender, and nondistended. No right inguinal hernia appreciated on exam.       EXAMINATION:   NONVASCULAR ULTRASOUND OF THE RIGHT EXTREMITY       6/27/2021 9:09 am       COMPARISON:   None.       HISTORY:   ORDERING SYSTEM PROVIDED HISTORY: Hernia of fascia   TECHNOLOGIST PROVIDED HISTORY:   This procedure can be scheduled via [facility].  Access your [employer] account by   visiting [email or web address].   R groin/inguinal hernia       FINDINGS:   Focused sonographic evaluation was performed in the right inguinal region.    There is a suspected small fat-containing right inguinal hernia, best seen on   the cine clips.  Benign-appearing lymph nodes seen in the right inguinal   region.           Impression   Suspected small fat-containing right inguinal hernia, best seen on the cine   clips.       If there is clinical concern for fat strangulation, this can be further

## 2021-07-27 NOTE — H&P
HxCC:  56 y/o male presents for evaluation of right groin pain. Denies any f/c, n/v, sob or chest pain. +flatus. Takes 81mg ASA daily, denies any use of any other AC or antiplatelets. No previous abdominal surgeries.      Vitals:    07/27/21 0901   BP: (!) 141/85   Pulse: 82   Resp: 18   Temp: 97.7 °F (36.5 °C)   SpO2: 100%       Patient Active Problem List   Diagnosis    BPH with urinary obstruction    TIA (transient ischemic attack)    Gross hematuria    Hematuria    COVID-19    Vasovagal syncope    Postprocedural urinary retention    Intractable migraine with aura without status migrainosus       Current Facility-Administered Medications   Medication Dose Route Frequency Provider Last Rate Last Admin    ceFAZolin (ANCEF) 2000 mg in dextrose 5 % 50 mL IVPB  2,000 mg Intravenous Once [employer] IV, DO        lactated ringers infusion   Intravenous Continuous Kermit Keo Canos IV, DO        fentaNYL (SUBLIMAZE) injection 25 mcg  25 mcg Intravenous Q5 Min PRN Adrianna Jarrell MD        lactated ringers infusion   Intravenous Continuous Adrianna Jarrell  mL/hr at 07/27/21 0922 New Bag at 07/27/21 0922    ondansetron (ZOFRAN) injection 4 mg  4 mg Intravenous Daily PRN Adrianna Jarrell MD        scopolamine (TRANSDERM-SCOP) transdermal patch 1 patch  1 patch Transdermal Once Adrianna Jarrell MD        lidocaine PF 1 % injection 1 mL  1 mL Intradermal Once PRN Adrianna Jarrell MD        midazolam PF (VERSED) injection 1 mg  1 mg Intravenous Q10 Min PRN Adrianna Jarrell MD           No Known Allergies    Past Medical History:   Diagnosis Date    Hyperlipidemia     Hypertension     Wellness examination     Dr. Dominic Mcguire last seen 7/2021       Past Surgical History:   Procedure Laterality Date    CYSTOSCOPY N/A 7/30/2020    CYSTOSCOPY performed by Mahesh Kent MD at [address] [place] [address]  08/14/2020     CYSTO, TUR PROSTATE GREENLIGHT XPS     TURP N/A 8/14/2020    CYSTO, TUR PROSTATE GREENLIGHT XPS performed by John Galicia MD at [address]      varicose vein of right leg    VASCULAR SURGERY      right leg       History reviewed. No pertinent family history. Social History     Socioeconomic History    Marital status:      Spouse name: Not on file    Number of children: Not on file    Years of education: Not on file    Highest education level: Not on file   Occupational History    Not on file   Tobacco Use    Smoking status: Never Smoker    Smokeless tobacco: Never Used   Vaping Use    Vaping Use: Never used   Substance and Sexual Activity    Alcohol use: Never    Drug use: Never    Sexual activity: Yes   Other Topics Concern    Not on file   Social History Narrative    Not on file     Social Determinants of Health     Financial Resource Strain: Low Risk     Difficulty of Paying Living Expenses: Not hard at all   Food Insecurity: No Food Insecurity    Worried About Running Out of Food in the Last Year: Never true    Clarke of Food in the Last Year: Never true   Transportation Needs: No Transportation Needs    Lack of Transportation (Medical): No    Lack of Transportation (Non-Medical): No   Physical Activity:     Days of Exercise per Week:     Minutes of Exercise per Session:    Stress:     Feeling of Stress :    Social Connections:     Frequency of Communication with Friends and Family:     Frequency of Social Gatherings with Friends and Family:     Attends Quaker Services:     Active Member of Clubs or Organizations:     Attends Club or Organization Meetings:     Marital Status:    Intimate Partner Violence:     Fear of Current or Ex-Partner:     Emotionally Abused:     Physically Abused:     Sexually Abused:        Review of Systems:  14 systems were reviewed. Positives noted above. Remainder are negative per CMS guidelines. Exam  General: AAOx3, NAD  Head: atraumatic normocephalic  Neck: trachea midline.   No masses or lymphadenopathy  Heart: RRR with no murmurs  Lungs: BCTA  Abdomen: soft, nontender, and nondistended.  No right inguinal hernia appreciated on exam.   Ext: motor 5/5 all extremities with no gross deformities    Impression:  Right inguinal hernia    Plan:  Patient with occult right inguinal hernia on ultrasound.  Patient will be scheduled for elective surgery.  Patient informed of the risks of surgery which include but not limited to bleeding, infection, seroma, hematoma, injury to bladder, injury to cord structures, ischemic orchitis, loss of testicle, sterility, injury to nerves, chronic pain, recurrence of hernia, need for open operation, and risks of anesthesia.  Patient understood risks and signed informed consent for laparoscopic robotic assisted right possible bilateral inguinal hernia repair with mesh under general anesthesia.     Electronically signed by Stefani Sotelo IV, DO on 7/27/21 at 9:47 AM EDT

## 2021-07-27 NOTE — FLOWSHEET NOTE
Dr Daniel Courtney to the bedside, time out performed @ 1000 , Pt monitored, 02, TAP nerve block completed using 40 mL 0.5% Bupivacaine, 20 mL 0.5% Bupivacaine given per side,   pt tolerated procedure well,  Site CDI, (see charting)  Versed Given: 1 mg  Fentanyl 50 mcg  Procedure Start: 1001  Procedure End: 1005

## 2021-07-27 NOTE — ANESTHESIA PROCEDURE NOTES
Peripheral Block    Patient location during procedure: pre-op  Staffing  Performed: anesthesiologist   Anesthesiologist: Brionna Ross MD  Preanesthetic Checklist  Completed: patient identified, IV checked, site marked, risks and benefits discussed, surgical consent, monitors and equipment checked, pre-op evaluation, timeout performed, anesthesia consent given, oxygen available and patient being monitored  Peripheral Block  Patient position: supine  Prep: ChloraPrep  Patient monitoring: cardiac monitor, continuous pulse ox, frequent blood pressure checks and IV access  Block type: TAP  Laterality: bilateral  Injection technique: single-shot  Guidance: ultrasound guided  Local infiltration: lidocaine  Infiltration strength: 1 %  Dose: 3 mL  Provider prep: mask and sterile gloves  Local infiltration: lidocaine  Needle  Needle gauge: 21 G  Needle length: 10 cm  Needle localization: ultrasound guidance  Assessment  Injection assessment: negative aspiration for heme, no paresthesia on injection and local visualized surrounding nerve on ultrasound  Paresthesia pain: none  Slow fractionated injection: yes  Hemodynamics: stable  Additional Notes  U/S [id number].  (1) Under ultrasound guidance, a 21 gauge needle was inserted and placed in close proximity to the IO.  (2) Ultrasound was also used to visualize the spread of the anesthetic in close proximity to the nerve being blocked. (3) The nerve appeared anatomically normal, and (4 there were no apparent abnormal pathological findings on the image that were readily visible and related to the nerve being blocked. (5) A permanent ultrasound image was saved in the patient's record.           Reason for block: post-op pain management and at surgeon's request

## 2021-07-27 NOTE — OP NOTE
Operative Note      Patient: Ivanna Khoury  YOB: 1960  MRN: [id number]    Date of Procedure: 7/27/2021    Pre-Op Diagnosis: RIGHT INGUINAL HERNIA    Post-Op Diagnosis: Right inguinal cord lipoma        Procedure(s):  XI ROBOTIC LAPAROSCOPIC INGUINAL HERNIA REPAIR WITH MESH    Surgeon(s):  John Putnma IV, DO    Assistant:   Resident: Gal Santiago MD; Wayne Brandon DO    Anesthesia: General    Estimated Blood Loss (mL): 10 mL    Fluids: 1300 mL Crystalloid     Complications: None    Specimens:   * No specimens in log *    Implants:  Implant Name Type Inv. Item Serial No.  Lot No. LRB No. Used Action   MESH SURG W3.5XL6IN POLY SELF FIXATING RECT W/ RESRB FRANCI  MESH SURG W3.5XL6IN POLY SELF FIXATING RECT W/ RESRB FRANCI  [employer]  SURGICAL-WD [id number] Right 1 Implanted         Drains:   [REMOVED] Urethral Catheter Double-lumen;Non-latex;Straight-tip 16 fr (Removed)       Findings: right inguinal cord lipoma and Right indirect hernia, 15 x 9 cm mesh placed, wound class 1    Detailed Description of Procedure:     Patient taken to operative suite. Placed in supine position. General anesthesia with endotracheal tube was induced. Preoperative antibiotics were given in accordance with SCIP protocol. SCDs placed for DVT prophylaxis. A bear hugger was placed to keep patient euthermic. Abdomen and groin region was shaved with clippers and prepped and draped in usual sterile fashion. An appropriate time out was performed prior to skin incision. Verese needle was placed in the left upper quadrant at Cheng's point. A saline drop test was used to confirm entrance into the abdomen. Pneumoperitoneum was created with insufflation of carbon dioxide to 15 mmHg. An 8 mm Airseal port was placed in the left upper quadrant using optiview. Remaining ports ports under direct visualization.  An 8 mm camera port was placed supraumbilical.  The two other 8mm ports were placed 10cm right and left lateral to the umbilical camera port. Robot was docked without complication. A 0 degree scope was placed into the abdomen. Both inguinal regions were inspected and the median umbilical ligament, medial umbilical ligaments, and lateral umbilical folds were identified. An indirect hernia with cord lipoma was noted. There was no left inguinal hernia on inspection. The peritoneum on the right side was incised with scissor electrocautery along a line 2 cm above the superior edge of the hernia defect, extending from the medial umbilical ligament to the anterior superior iliac spine. The peritoneal flap was mobilized inferiorly using blunt dissection. Blunt dissection was down from the ASIS to the lateral edge of the internal ring. The inferior epigastric vessels were exposed and kept superior to the dissection planes at all times during the operation. Medial dissection was done until Raúl's ligament was exposed. The cord structures and hernia sac were identified. Cord structures were preserved during the dissection and reduction of hernia sac. The cord structures were dissected free from the hernia sac circumferentially. The hernia sac containing fat was reduced. Hemostasis was maintained. Once dissection was completed, a 15 cm x 9 cm piece progrip mesh was rolled double scroll and placed within the peritoneum flap. The mesh was centered over the deep inguinal ring and unrolled to cover direct and indirect hernia spaces. It was noted to lay flat, in good position and without crimpage. The peritoneum was closed with running 2-0 V lock suture. Robot was undocked without complications. B/l TAP block was done in pre op. The 8mm robotic ports were removed under direct visualization. No bleeding was noted. The pneumoperitoneum was evacuated through the Airseal port. Airseal port was removed. Skin incisions were closed with subcuticular 4-0 Monocryl suture.  Dermabond was applied over the incisions. Patient tolerated the procedure well. There were no complications. All instruments and sponges were accounted for. Patient was extubated and taken to recovery room in stable condition. Dr. Dolores Lebron was in the operating room for the entirety of case.       Electronically signed by Kane Ordoñez DO on 7/27/2021 at 12:26 PM

## 2021-07-27 NOTE — ANESTHESIA PRE PROCEDURE
Department of Anesthesiology  Preprocedure Note       Name:  Katherine Ross   Age:  57 y.o.  :  1960                                          MRN:  [id number]         Date:  2021      Surgeon: Nubia Gibbs):  Kimberley Sotelo IV, DO    Procedure: Procedure(s):  XI ROBOTIC LAPAROSCOPIC INGUINAL HERNIA REPAIR, POSSIBLE BILATERAL WITH MESH, POSSIBLE OPEN    Medications prior to admission:   Prior to Admission medications    Medication Sig Start Date End Date Taking? Authorizing Provider   simethicone (MYLICON) 80 MG chewable tablet Take 1 tablet by mouth 4 times daily as needed for Flatulence 21  Yes Niki García MD   ramipril (ALTACE) 10 MG capsule take 1 capsule by mouth once daily 3/16/21  Yes Joseline Karimi MD   hydroCHLOROthiazide (HYDRODIURIL) 25 MG tablet take 1 tablet by mouth daily 2/15/21  Yes Joseline Karimi MD   rosuvastatin (CRESTOR) 10 MG tablet take 1 tablet by mouth daily 2/15/21  Yes Joseline Karimi MD   alpha-galactosidase (BEANO) TABS chewable tablet Take 1 tablet by mouth 3 times daily (with meals) 20  Yes Joseline Karimi MD   aspirin 81 MG EC tablet Take 1 tablet by mouth daily Please start on 20  Patient taking differently: Take 81 mg by mouth daily Last dose 2021   Prophylactically 9/15/20  Yes Romana Castro MD   tamsulosin (FLOMAX) 0.4 MG capsule Take 1 capsule by mouth 2 times daily 9/15/20 7/22/21 Yes Romana Castro MD   Elastic Bandages & Supports (HERNIA BELT DOUBLE MEDIUM) MISC Use as needed for support of hernia 21   Joseline Karimi MD   ciclopirox (PENLAC) 8 % solution Apply topically nightly. Remove once weekly with alcohol or nail polish remover. 21   Jorge Bass DPM       Current medications:    No current facility-administered medications for this encounter.      Current Outpatient Medications   Medication Sig Dispense Refill    simethicone (MYLICON) 80 MG chewable tablet Take 1 tablet by mouth 4 times daily as needed for Flatulence 180 tablet 3    ramipril (ALTACE) 10 MG capsule take 1 capsule by mouth once daily 30 capsule 3    hydroCHLOROthiazide (HYDRODIURIL) 25 MG tablet take 1 tablet by mouth daily 30 tablet 5    rosuvastatin (CRESTOR) 10 MG tablet take 1 tablet by mouth daily 30 tablet 5    alpha-galactosidase (BEANO) TABS chewable tablet Take 1 tablet by mouth 3 times daily (with meals) 90 tablet 1    aspirin 81 MG EC tablet Take 1 tablet by mouth daily Please start on 09/18/20 (Patient taking differently: Take 81 mg by mouth daily Last dose 7/19/2021   Prophylactically) 30 tablet 5    tamsulosin (FLOMAX) 0.4 MG capsule Take 1 capsule by mouth 2 times daily 60 capsule 5    Elastic Bandages & Supports (HERNIA BELT DOUBLE MEDIUM) MISC Use as needed for support of hernia 1 each 0    ciclopirox (PENLAC) 8 % solution Apply topically nightly. Remove once weekly with alcohol or nail polish remover.  1 Bottle 3       Allergies:  No Known Allergies    Problem List:    Patient Active Problem List   Diagnosis Code    BPH with urinary obstruction N40.1, N13.8    TIA (transient ischemic attack) G45.9    Gross hematuria R31.0    Hematuria R31.9    COVID-19 U07.1    Vasovagal syncope R55    Postprocedural urinary retention N99.89, R33.8    Intractable migraine with aura without status migrainosus G43.119       Past Medical History:        Diagnosis Date    Hyperlipidemia     Hypertension     Wellness examination     Dr. Kimberley Hannah last seen 7/2021       Past Surgical History:        Procedure Laterality Date    CYSTOSCOPY N/A 7/30/2020    CYSTOSCOPY performed by Keila Joe MD at [facility].  08/14/2020     CYSTO, TUR PROSTATE GREENLIGHT XPS     TURP N/A 8/14/2020    CYSTO, TUR PROSTATE GREENLIGHT XPS performed by Keila Joe MD at [facility]      varicose vein of right leg       Social History:    Social History     Tobacco Use    Smoking status: Never Smoker    Smokeless tobacco: Never Used   Substance Use Topics    Alcohol use: Never                                Counseling given: Not Answered      Vital Signs (Current): There were no vitals filed for this visit. BP Readings from Last 3 Encounters:   06/30/21 126/83   06/25/21 120/76   06/23/21 127/80       NPO Status:                                                                                 BMI:   Wt Readings from Last 3 Encounters:   06/30/21 181 lb (82.1 kg)   06/25/21 180 lb (81.6 kg)   06/23/21 181 lb 6.4 oz (82.3 kg)     There is no height or weight on file to calculate BMI.    CBC:   Lab Results   Component Value Date    WBC 5.0 06/18/2021    RBC 5.68 06/18/2021    HGB 14.7 06/18/2021    HCT 46.6 06/18/2021    MCV 82.0 06/18/2021    RDW 12.6 06/18/2021     06/18/2021       CMP:   Lab Results   Component Value Date     06/18/2021    K 4.3 06/18/2021     06/18/2021    CO2 23 06/18/2021    BUN 16 06/18/2021    CREATININE 0.97 06/18/2021    GFRAA >60 06/18/2021    LABGLOM >60 06/18/2021    GLUCOSE 106 06/18/2021    PROT 7.1 06/18/2021    CALCIUM 9.1 06/18/2021    BILITOT 0.34 06/18/2021    ALKPHOS 51 06/18/2021    AST 20 06/18/2021    ALT 21 06/18/2021       POC Tests: No results for input(s): POCGLU, POCNA, POCK, POCCL, POCBUN, POCHEMO, POCHCT in the last 72 hours.     Coags:   Lab Results   Component Value Date    PROTIME 10.5 09/13/2020    INR 1.0 09/13/2020    APTT 28.4 09/07/2020       HCG (If Applicable): No results found for: PREGTESTUR, PREGSERUM, HCG, HCGQUANT     ABGs: No results found for: PHART, PO2ART, BSX5TPH, MPV0LNR, BEART, O6QKUMBV     Type & Screen (If Applicable):  No results found for: LABABO, LABRH    Drug/Infectious Status (If Applicable):  Lab Results   Component Value Date    HEPCAB NONREACTIVE 12/03/2020       COVID-19 Screening (If Applicable):   Lab Results   Component Value Date    COVID19 Not Detected 10/09/2020    COVID19 Not Detected 08/10/2020 Anesthesia Evaluation  Patient summary reviewed no history of anesthetic complications:   Airway: Mallampati: II        Dental:          Pulmonary:Negative Pulmonary ROS and normal exam  breath sounds clear to auscultation                             Cardiovascular:  Exercise tolerance: good (>4 METS),   (+) hypertension:,         Rhythm: regular  Rate: normal                 ROS comment: Vasovagal syncope     Neuro/Psych:   (+) TIA, headaches:,             GI/Hepatic/Renal: Neg GI/Hepatic/Renal ROS            Endo/Other: Negative Endo/Other ROS                    Abdominal:             Vascular: negative vascular ROS. Other Findings:             Anesthesia Plan      general     ASA 2       Induction: intravenous. MIPS: Postoperative opioids intended, Prophylactic antiemetics administered and Postoperative trial extubation. Anesthetic plan and risks discussed with patient. Plan discussed with CRNA. Normal sinus rhythm  Normal ECG  When compared with ECG of 08-DEC-2020 13:13,  No significant change was found      Specimen Collected: 01/07/21 11:15          CONCLUSIONS     Summary  Normal LV size and wall thickness. No obvious wall motion abnormality seen. Normal LV systolic function with LVEF 55%. Normal RV size and function. LA and RA appears normal in size. No obvious significant structural valvular abnormality noted. No significant valvular stenosis or regurgitation noted. Normal aortic root dimension. No significant pericardial effusion noted. No obvious intra-cardiac mass or shunt noted.   IVC normal diameter and inspiratory collapse indicating normal RA pressure.         Suly Chen MD   7/27/2021

## 2021-07-27 NOTE — ANESTHESIA POSTPROCEDURE EVALUATION
Department of Anesthesiology  Postprocedure Note    Patient: Katherine Ross  MRN: [id number]  YOB: 1960  Date of evaluation: 7/27/2021  Time:  1:27 PM     Procedure Summary     Date: 07/27/21 Room / Location: [address]    Anesthesia Start: 1025 Anesthesia Stop: 0657    Procedure: XI ROBOTIC LAPAROSCOPIC INGUINAL HERNIA REPAIR WITH MESH (Right ) Diagnosis: (RIGHT INGUINAL HERNIA)    Surgeons: Yazmin Jacobs DO Responsible Provider: Isaiah Rodriguez MD    Anesthesia Type: general ASA Status: 2          Anesthesia Type: general    Melissa Phase I: Melissa Score: 9    Melissa Phase II: Melissa Score: 9    Last vitals: Reviewed and per EMR flowsheets.        Anesthesia Post Evaluation    Patient location during evaluation: PACU  Patient participation: complete - patient participated  Level of consciousness: awake  Pain score: 1  Airway patency: patent  Nausea & Vomiting: no nausea and no vomiting  Complications: no  Cardiovascular status: blood pressure returned to baseline and hemodynamically stable  Respiratory status: acceptable  Hydration status: euvolemic

## 2021-07-27 NOTE — PROGRESS NOTES
Patient and patient's son, daughter and wife given prescriptions and D/C instructions, all verbalized understanding, patient's son went down to outpatient pharmacy to pick patient's prescriptions up ( already faxed on PACU)

## 2021-07-28 ENCOUNTER — APPOINTMENT (OUTPATIENT)
Dept: CT IMAGING | Age: 61
End: 2021-07-28
Payer: COMMERCIAL

## 2021-07-28 ENCOUNTER — HOSPITAL ENCOUNTER (OUTPATIENT)
Age: 61
Setting detail: OBSERVATION
Discharge: HOME | End: 2021-07-29
Attending: EMERGENCY MEDICINE | Admitting: SURGERY
Payer: COMMERCIAL

## 2021-07-28 DIAGNOSIS — G89.18 POST-OP PAIN: ICD-10-CM

## 2021-07-28 DIAGNOSIS — R10.9 ABDOMINAL PAIN, UNSPECIFIED ABDOMINAL LOCATION: Primary | ICD-10-CM

## 2021-07-28 LAB
ABSOLUTE EOS #: 0.05 K/UL (ref 0–0.44)
ABSOLUTE IMMATURE GRANULOCYTE: 0.03 K/UL (ref 0–0.3)
ABSOLUTE LYMPH #: 2.65 K/UL (ref 1.1–3.7)
ABSOLUTE MONO #: 0.58 K/UL (ref 0.1–1.2)
ALBUMIN SERPL-MCNC: 4.5 G/DL (ref 3.5–5.2)
ALBUMIN/GLOBULIN RATIO: 1.6 (ref 1–2.5)
ALLEN TEST: ABNORMAL
ALP BLD-CCNC: 53 U/L (ref 40–129)
ALT SERPL-CCNC: 18 U/L (ref 5–41)
ANION GAP SERPL CALCULATED.3IONS-SCNC: 12 MMOL/L (ref 9–17)
ANION GAP: 15 MMOL/L (ref 7–16)
AST SERPL-CCNC: 17 U/L
BASOPHILS # BLD: 0 % (ref 0–2)
BASOPHILS ABSOLUTE: <0.03 K/UL (ref 0–0.2)
BILIRUB SERPL-MCNC: 0.56 MG/DL (ref 0.3–1.2)
BUN BLDV-MCNC: 15 MG/DL (ref 8–23)
BUN/CREAT BLD: ABNORMAL (ref 9–20)
CALCIUM SERPL-MCNC: 9.8 MG/DL (ref 8.6–10.4)
CHLORIDE BLD-SCNC: 97 MMOL/L (ref 98–107)
CO2: 26 MMOL/L (ref 20–31)
CREAT SERPL-MCNC: 0.78 MG/DL (ref 0.7–1.2)
DIFFERENTIAL TYPE: ABNORMAL
EOSINOPHILS RELATIVE PERCENT: 0 % (ref 1–4)
FIO2: ABNORMAL
GFR AFRICAN AMERICAN: >60 ML/MIN
GFR NON-AFRICAN AMERICAN: >60 ML/MIN
GFR NON-AFRICAN AMERICAN: >60 ML/MIN
GFR SERPL CREATININE-BSD FRML MDRD: >60 ML/MIN
GFR SERPL CREATININE-BSD FRML MDRD: ABNORMAL ML/MIN/{1.73_M2}
GFR SERPL CREATININE-BSD FRML MDRD: ABNORMAL ML/MIN/{1.73_M2}
GFR SERPL CREATININE-BSD FRML MDRD: NORMAL ML/MIN/{1.73_M2}
GLUCOSE BLD-MCNC: 132 MG/DL (ref 70–99)
GLUCOSE BLD-MCNC: 142 MG/DL (ref 74–100)
HCO3 VENOUS: 27 MMOL/L (ref 22–29)
HCT VFR BLD CALC: 48.6 % (ref 40.7–50.3)
HEMOGLOBIN: 15.6 G/DL (ref 13–17)
IMMATURE GRANULOCYTES: 0 %
LACTIC ACID, SEPSIS WHOLE BLOOD: 1.1 MMOL/L (ref 0.5–1.9)
LACTIC ACID, SEPSIS: NORMAL MMOL/L (ref 0.5–1.9)
LIPASE: 31 U/L (ref 13–60)
LYMPHOCYTES # BLD: 24 % (ref 24–43)
MCH RBC QN AUTO: 25.8 PG (ref 25.2–33.5)
MCHC RBC AUTO-ENTMCNC: 32.1 G/DL (ref 28.4–34.8)
MCV RBC AUTO: 80.3 FL (ref 82.6–102.9)
MODE: ABNORMAL
MONOCYTES # BLD: 5 % (ref 3–12)
NEGATIVE BASE EXCESS, VEN: ABNORMAL (ref 0–2)
NRBC AUTOMATED: 0 PER 100 WBC
O2 DEVICE/FLOW/%: ABNORMAL
O2 SAT, VEN: 93 % (ref 60–85)
PATIENT TEMP: ABNORMAL
PCO2, VEN: 42 MM HG (ref 41–51)
PDW BLD-RTO: 13.5 % (ref 11.8–14.4)
PH VENOUS: 7.42 (ref 7.32–7.43)
PLATELET # BLD: ABNORMAL K/UL (ref 138–453)
PLATELET ESTIMATE: ABNORMAL
PLATELET, FLUORESCENCE: NORMAL K/UL (ref 138–453)
PMV BLD AUTO: ABNORMAL FL (ref 8.1–13.5)
PO2, VEN: 65.3 MM HG (ref 30–50)
POC BUN: 15 MG/DL (ref 8–26)
POC CHLORIDE: 99 MMOL/L (ref 98–107)
POC CREATININE: 0.93 MG/DL (ref 0.51–1.19)
POC HEMATOCRIT: 52 % (ref 41–53)
POC HEMOGLOBIN: 17.7 G/DL (ref 13.5–17.5)
POC IONIZED CALCIUM: 1.21 MMOL/L (ref 1.15–1.33)
POC LACTIC ACID: 0.93 MMOL/L (ref 0.56–1.39)
POC PCO2 TEMP: ABNORMAL MM HG
POC PH TEMP: ABNORMAL
POC PO2 TEMP: ABNORMAL MM HG
POC POTASSIUM: 2.9 MMOL/L (ref 3.5–4.5)
POC SODIUM: 140 MMOL/L (ref 138–146)
POC TCO2: 27 MMOL/L (ref 22–30)
POSITIVE BASE EXCESS, VEN: 2 (ref 0–3)
POTASSIUM SERPL-SCNC: 3.1 MMOL/L (ref 3.7–5.3)
RBC # BLD: 6.05 M/UL (ref 4.21–5.77)
RBC # BLD: ABNORMAL 10*6/UL
SAMPLE SITE: ABNORMAL
SEG NEUTROPHILS: 70 % (ref 36–65)
SEGMENTED NEUTROPHILS ABSOLUTE COUNT: 7.9 K/UL (ref 1.5–8.1)
SODIUM BLD-SCNC: 135 MMOL/L (ref 135–144)
TOTAL CO2, VENOUS: ABNORMAL MMOL/L (ref 23–30)
TOTAL PROTEIN: 7.3 G/DL (ref 6.4–8.3)
WBC # BLD: 11.2 K/UL (ref 3.5–11.3)
WBC # BLD: ABNORMAL 10*3/UL

## 2021-07-28 PROCEDURE — 82803 BLOOD GASES ANY COMBINATION: CPT

## 2021-07-28 PROCEDURE — 6360000004 HC RX CONTRAST MEDICATION: Performed by: STUDENT IN AN ORGANIZED HEALTH CARE EDUCATION/TRAINING PROGRAM

## 2021-07-28 PROCEDURE — 83690 ASSAY OF LIPASE: CPT

## 2021-07-28 PROCEDURE — 93005 ELECTROCARDIOGRAM TRACING: CPT | Performed by: STUDENT IN AN ORGANIZED HEALTH CARE EDUCATION/TRAINING PROGRAM

## 2021-07-28 PROCEDURE — 84520 ASSAY OF UREA NITROGEN: CPT

## 2021-07-28 PROCEDURE — 80053 COMPREHEN METABOLIC PANEL: CPT

## 2021-07-28 PROCEDURE — 6360000002 HC RX W HCPCS: Performed by: STUDENT IN AN ORGANIZED HEALTH CARE EDUCATION/TRAINING PROGRAM

## 2021-07-28 PROCEDURE — 85014 HEMATOCRIT: CPT

## 2021-07-28 PROCEDURE — 2580000003 HC RX 258: Performed by: STUDENT IN AN ORGANIZED HEALTH CARE EDUCATION/TRAINING PROGRAM

## 2021-07-28 PROCEDURE — 82947 ASSAY GLUCOSE BLOOD QUANT: CPT

## 2021-07-28 PROCEDURE — 80051 ELECTROLYTE PANEL: CPT

## 2021-07-28 PROCEDURE — 99285 EMERGENCY DEPT VISIT HI MDM: CPT

## 2021-07-28 PROCEDURE — 96365 THER/PROPH/DIAG IV INF INIT: CPT

## 2021-07-28 PROCEDURE — 82565 ASSAY OF CREATININE: CPT

## 2021-07-28 PROCEDURE — 96375 TX/PRO/DX INJ NEW DRUG ADDON: CPT

## 2021-07-28 PROCEDURE — 85025 COMPLETE CBC W/AUTO DIFF WBC: CPT

## 2021-07-28 PROCEDURE — 71260 CT THORAX DX C+: CPT

## 2021-07-28 PROCEDURE — 82330 ASSAY OF CALCIUM: CPT

## 2021-07-28 PROCEDURE — 74177 CT ABD & PELVIS W/CONTRAST: CPT

## 2021-07-28 PROCEDURE — 83605 ASSAY OF LACTIC ACID: CPT

## 2021-07-28 PROCEDURE — 85055 RETICULATED PLATELET ASSAY: CPT

## 2021-07-28 RX ORDER — ONDANSETRON 2 MG/ML
4 INJECTION INTRAMUSCULAR; INTRAVENOUS ONCE
Status: COMPLETED | OUTPATIENT
Start: 2021-07-28 | End: 2021-07-28

## 2021-07-28 RX ORDER — MORPHINE SULFATE 4 MG/ML
4 INJECTION, SOLUTION INTRAMUSCULAR; INTRAVENOUS ONCE
Status: COMPLETED | OUTPATIENT
Start: 2021-07-28 | End: 2021-07-28

## 2021-07-28 RX ORDER — SODIUM CHLORIDE, SODIUM LACTATE, POTASSIUM CHLORIDE, AND CALCIUM CHLORIDE .6; .31; .03; .02 G/100ML; G/100ML; G/100ML; G/100ML
1000 INJECTION, SOLUTION INTRAVENOUS ONCE
Status: COMPLETED | OUTPATIENT
Start: 2021-07-28 | End: 2021-07-28

## 2021-07-28 RX ORDER — POTASSIUM CHLORIDE 7.45 MG/ML
10 INJECTION INTRAVENOUS ONCE
Status: COMPLETED | OUTPATIENT
Start: 2021-07-28 | End: 2021-07-29

## 2021-07-28 RX ADMIN — IOPAMIDOL 75 ML: 755 INJECTION, SOLUTION INTRAVENOUS at 23:17

## 2021-07-28 RX ADMIN — SODIUM CHLORIDE, POTASSIUM CHLORIDE, SODIUM LACTATE AND CALCIUM CHLORIDE 1000 ML: 600; 310; 30; 20 INJECTION, SOLUTION INTRAVENOUS at 22:21

## 2021-07-28 RX ADMIN — ONDANSETRON 4 MG: 2 INJECTION INTRAMUSCULAR; INTRAVENOUS at 22:18

## 2021-07-28 RX ADMIN — MORPHINE SULFATE 4 MG: 4 INJECTION INTRAVENOUS at 22:17

## 2021-07-28 ASSESSMENT — PAIN DESCRIPTION - LOCATION
LOCATION: ABDOMEN;CHEST
LOCATION: ABDOMEN

## 2021-07-28 ASSESSMENT — ENCOUNTER SYMPTOMS: TACHYPNEA: 1

## 2021-07-28 ASSESSMENT — PAIN SCALES - GENERAL
PAINLEVEL_OUTOF10: 10
PAINLEVEL_OUTOF10: 10
PAINLEVEL_OUTOF10: 3

## 2021-07-28 NOTE — LETTER
[address] Med Surg  [address] [place] [address]  Phone: [phone]    No name on file. July 29, 2021     Patient: Clifton Kumari   YOB: 1960   Date of Visit: 7/28/2021       To Whom It May Concern: It is my medical opinion that Clifton Kumari May return to work as tolerated with the following restrictions:  - Do not lift greater than 10 pounds for a minimum of 2 weeks    If you have any questions or concerns, please don't hesitate to call.     Sincerely,  Arden Barrett, DO

## 2021-07-29 VITALS
WEIGHT: 183 LBS | BODY MASS INDEX: 29.41 KG/M2 | RESPIRATION RATE: 16 BRPM | HEART RATE: 75 BPM | HEIGHT: 66 IN | TEMPERATURE: 98.1 F | DIASTOLIC BLOOD PRESSURE: 75 MMHG | OXYGEN SATURATION: 95 % | SYSTOLIC BLOOD PRESSURE: 117 MMHG

## 2021-07-29 PROBLEM — K56.7 ILEUS (HCC): Status: ACTIVE | Noted: 2021-07-29

## 2021-07-29 PROBLEM — G89.18 POST-OP PAIN: Status: ACTIVE | Noted: 2021-07-29

## 2021-07-29 LAB
EKG ATRIAL RATE: 86 BPM
EKG P AXIS: 52 DEGREES
EKG P-R INTERVAL: 164 MS
EKG Q-T INTERVAL: 372 MS
EKG QRS DURATION: 98 MS
EKG QTC CALCULATION (BAZETT): 445 MS
EKG R AXIS: 13 DEGREES
EKG T AXIS: 6 DEGREES
EKG VENTRICULAR RATE: 86 BPM

## 2021-07-29 PROCEDURE — 6370000000 HC RX 637 (ALT 250 FOR IP): Performed by: STUDENT IN AN ORGANIZED HEALTH CARE EDUCATION/TRAINING PROGRAM

## 2021-07-29 PROCEDURE — G0378 HOSPITAL OBSERVATION PER HR: HCPCS

## 2021-07-29 PROCEDURE — 6360000002 HC RX W HCPCS: Performed by: STUDENT IN AN ORGANIZED HEALTH CARE EDUCATION/TRAINING PROGRAM

## 2021-07-29 PROCEDURE — 93010 ELECTROCARDIOGRAM REPORT: CPT | Performed by: INTERNAL MEDICINE

## 2021-07-29 PROCEDURE — 2580000003 HC RX 258: Performed by: STUDENT IN AN ORGANIZED HEALTH CARE EDUCATION/TRAINING PROGRAM

## 2021-07-29 PROCEDURE — 96376 TX/PRO/DX INJ SAME DRUG ADON: CPT

## 2021-07-29 RX ORDER — DOCUSATE SODIUM 100 MG/1
100 CAPSULE, LIQUID FILLED ORAL 2 TIMES DAILY
Qty: 60 CAPSULE | Refills: 0 | Status: CANCELLED | OUTPATIENT
Start: 2021-07-29 | End: 2021-08-28

## 2021-07-29 RX ORDER — OXYCODONE HYDROCHLORIDE 5 MG/1
5 TABLET ORAL EVERY 4 HOURS PRN
Status: DISCONTINUED | OUTPATIENT
Start: 2021-07-29 | End: 2021-07-29

## 2021-07-29 RX ORDER — OXYCODONE HYDROCHLORIDE 5 MG/1
10 TABLET ORAL EVERY 4 HOURS PRN
Status: DISCONTINUED | OUTPATIENT
Start: 2021-07-29 | End: 2021-07-29

## 2021-07-29 RX ORDER — SODIUM CHLORIDE 9 MG/ML
25 INJECTION, SOLUTION INTRAVENOUS PRN
Status: DISCONTINUED | OUTPATIENT
Start: 2021-07-29 | End: 2021-07-29 | Stop reason: HOSPADM

## 2021-07-29 RX ORDER — POLYETHYLENE GLYCOL 3350 17 G/17G
17 POWDER, FOR SOLUTION ORAL 2 TIMES DAILY
Status: DISCONTINUED | OUTPATIENT
Start: 2021-07-29 | End: 2021-07-29

## 2021-07-29 RX ORDER — CYCLOBENZAPRINE HCL 10 MG
10 TABLET ORAL 3 TIMES DAILY PRN
Status: DISCONTINUED | OUTPATIENT
Start: 2021-07-29 | End: 2021-07-29 | Stop reason: HOSPADM

## 2021-07-29 RX ORDER — POLYETHYLENE GLYCOL 3350 17 G/17G
17 POWDER, FOR SOLUTION ORAL DAILY
Status: DISCONTINUED | OUTPATIENT
Start: 2021-07-29 | End: 2021-07-29 | Stop reason: HOSPADM

## 2021-07-29 RX ORDER — OXYCODONE HYDROCHLORIDE AND ACETAMINOPHEN 5; 325 MG/1; MG/1
1 TABLET ORAL EVERY 6 HOURS PRN
Qty: 12 TABLET | Refills: 0 | Status: CANCELLED | OUTPATIENT
Start: 2021-07-29 | End: 2021-08-01

## 2021-07-29 RX ORDER — ONDANSETRON 2 MG/ML
4 INJECTION INTRAMUSCULAR; INTRAVENOUS EVERY 6 HOURS PRN
Status: DISCONTINUED | OUTPATIENT
Start: 2021-07-29 | End: 2021-07-29 | Stop reason: HOSPADM

## 2021-07-29 RX ORDER — SODIUM CHLORIDE 0.9 % (FLUSH) 0.9 %
5-40 SYRINGE (ML) INJECTION EVERY 12 HOURS SCHEDULED
Status: DISCONTINUED | OUTPATIENT
Start: 2021-07-29 | End: 2021-07-29 | Stop reason: HOSPADM

## 2021-07-29 RX ORDER — OXYCODONE HYDROCHLORIDE AND ACETAMINOPHEN 5; 325 MG/1; MG/1
1 TABLET ORAL EVERY 6 HOURS PRN
Status: DISCONTINUED | OUTPATIENT
Start: 2021-07-29 | End: 2021-07-29 | Stop reason: HOSPADM

## 2021-07-29 RX ORDER — CYCLOBENZAPRINE HCL 10 MG
10 TABLET ORAL 3 TIMES DAILY PRN
Qty: 15 TABLET | Refills: 0 | Status: CANCELLED | OUTPATIENT
Start: 2021-07-29 | End: 2021-08-03

## 2021-07-29 RX ORDER — BISACODYL 10 MG
10 SUPPOSITORY, RECTAL RECTAL DAILY
Status: COMPLETED | OUTPATIENT
Start: 2021-07-29 | End: 2021-07-29

## 2021-07-29 RX ORDER — SODIUM CHLORIDE 0.9 % (FLUSH) 0.9 %
5-40 SYRINGE (ML) INJECTION PRN
Status: DISCONTINUED | OUTPATIENT
Start: 2021-07-29 | End: 2021-07-29 | Stop reason: HOSPADM

## 2021-07-29 RX ORDER — POTASSIUM CHLORIDE 7.45 MG/ML
30 INJECTION INTRAVENOUS ONCE
Status: COMPLETED | OUTPATIENT
Start: 2021-07-29 | End: 2021-07-29

## 2021-07-29 RX ORDER — ONDANSETRON 4 MG/1
4 TABLET, ORALLY DISINTEGRATING ORAL EVERY 8 HOURS PRN
Status: DISCONTINUED | OUTPATIENT
Start: 2021-07-29 | End: 2021-07-29 | Stop reason: HOSPADM

## 2021-07-29 RX ADMIN — POTASSIUM CHLORIDE 30 MEQ: 7.46 INJECTION, SOLUTION INTRAVENOUS at 04:15

## 2021-07-29 RX ADMIN — OXYCODONE HYDROCHLORIDE AND ACETAMINOPHEN 1 TABLET: 5; 325 TABLET ORAL at 15:09

## 2021-07-29 RX ADMIN — CYCLOBENZAPRINE 10 MG: 10 TABLET, FILM COATED ORAL at 15:09

## 2021-07-29 RX ADMIN — SODIUM CHLORIDE, PRESERVATIVE FREE 10 ML: 5 INJECTION INTRAVENOUS at 08:22

## 2021-07-29 RX ADMIN — POTASSIUM CHLORIDE 10 MEQ: 7.46 INJECTION, SOLUTION INTRAVENOUS at 00:02

## 2021-07-29 RX ADMIN — BISACODYL 10 MG: 10 SUPPOSITORY RECTAL at 05:05

## 2021-07-29 RX ADMIN — POLYETHYLENE GLYCOL 3350 17 G: 17 POWDER, FOR SOLUTION ORAL at 08:21

## 2021-07-29 RX ADMIN — OXYCODONE HYDROCHLORIDE AND ACETAMINOPHEN 1 TABLET: 5; 325 TABLET ORAL at 04:15

## 2021-07-29 RX ADMIN — POTASSIUM BICARBONATE 40 MEQ: 782 TABLET, EFFERVESCENT ORAL at 11:31

## 2021-07-29 ASSESSMENT — PAIN SCALES - GENERAL
PAINLEVEL_OUTOF10: 5
PAINLEVEL_OUTOF10: 5
PAINLEVEL_OUTOF10: 7
PAINLEVEL_OUTOF10: 3

## 2021-07-29 ASSESSMENT — PAIN DESCRIPTION - FREQUENCY: FREQUENCY: CONTINUOUS

## 2021-07-29 ASSESSMENT — PAIN DESCRIPTION - LOCATION: LOCATION: ABDOMEN

## 2021-07-29 ASSESSMENT — PAIN DESCRIPTION - PROGRESSION: CLINICAL_PROGRESSION: NOT CHANGED

## 2021-07-29 ASSESSMENT — PAIN - FUNCTIONAL ASSESSMENT: PAIN_FUNCTIONAL_ASSESSMENT: PREVENTS OR INTERFERES SOME ACTIVE ACTIVITIES AND ADLS

## 2021-07-29 ASSESSMENT — PAIN DESCRIPTION - ONSET: ONSET: ON-GOING

## 2021-07-29 ASSESSMENT — PAIN DESCRIPTION - DESCRIPTORS: DESCRIPTORS: STABBING

## 2021-07-29 ASSESSMENT — PAIN DESCRIPTION - ORIENTATION: ORIENTATION: RIGHT

## 2021-07-29 ASSESSMENT — PAIN DESCRIPTION - PAIN TYPE: TYPE: SURGICAL PAIN

## 2021-07-29 NOTE — ED NOTES
Dr Herzog Lias at bedside, [address] discussed with pt & family     \[facility] [place] [facility]\""  07/29/21 0831

## 2021-07-29 NOTE — ED PROVIDER NOTES
A user error has taken place: encounter opened in error, closed for administrative reasons.   [facility] ED  Emergency Department  Emergency Medicine Resident Sign-out     Care of Ivanna Khoury was assumed from Dr. Lauren Joseph and is being seen for Abdominal Pain  . The patient's initial evaluation and plan have been discussed with the prior provider who initially evaluated the patient.      EMERGENCY DEPARTMENT COURSE / MEDICAL DECISION MAKING:       MEDICATIONS GIVEN:  Orders Placed This Encounter   Medications    lactated ringers bolus    morphine injection 4 mg    ondansetron (ZOFRAN) injection 4 mg    potassium chloride 10 mEq/100 mL IVPB (Peripheral Line)    iopamidol (ISOVUE-370) 76 % injection 75 mL       LABS / RADIOLOGY:     Labs Reviewed   CBC WITH AUTO DIFFERENTIAL - Abnormal; Notable for the following components:       Result Value    RBC 6.05 (*)     MCV 80.3 (*)     Seg Neutrophils 70 (*)     Eosinophils % 0 (*)     All other components within normal limits   COMPREHENSIVE METABOLIC PANEL - Abnormal; Notable for the following components:    Glucose 132 (*)     Potassium 3.1 (*)     Chloride 97 (*)     All other components within normal limits   ELECTROLYTES PLUS - Abnormal; Notable for the following components:    POC Potassium 2.9 (*)     All other components within normal limits   HGB/HCT - Abnormal; Notable for the following components:    POC Hemoglobin 17.7 (*)     All other components within normal limits   VENOUS BLOOD GAS, POINT OF CARE - Abnormal; Notable for the following components:    pO2, [place] 65.3 (*)     O2 Sat, [place] 93 (*)     All other components within normal limits   POCT GLUCOSE - Abnormal; Notable for the following components:    POC Glucose 142 (*)     All other components within normal limits   LACTATE, SEPSIS   LIPASE   IMMATURE PLATELET FRACTION   CALCIUM, IONIC (POC)   URINE RT REFLEX TO CULTURE   CREATININE W/GFR POINT OF CARE   UREA N (POC)   LACTIC ACID,POINT OF CARE   POC BLOOD GAS AND CHEMISTRY       CT ABDOMEN PELVIS W IV CONTRAST Additional Contrast? None    Result Date: 7/29/2021  EXAMINATION: CTA OF THE CHEST; CT OF THE ABDOMEN AND PELVIS WITH CONTRAST 7/28/2021 11:16 pm TECHNIQUE: CTA of the chest was performed after the administration of intravenous contrast.  Multiplanar reformatted images are provided for review. MIP images are provided for review. Dose modulation, iterative reconstruction, and/or weight based adjustment of the mA/kV was utilized to reduce the radiation dose to as low as reasonably achievable.; CT of the abdomen and pelvis was performed with the administration of intravenous contrast. Multiplanar reformatted images are provided for review. Dose modulation, iterative reconstruction, and/or weight based adjustment of the mA/kV was utilized to reduce the radiation dose to as low as reasonably achievable. COMPARISON: CT chest without contrast December 3, 2020. HISTORY: ORDERING SYSTEM PROVIDED HISTORY: assess for PE TECHNOLOGIST PROVIDED HISTORY: assess for PE Decision Support Exception - unselect if not a suspected or confirmed emergency medical condition->Emergency Medical Condition (MA) Reason for Exam: through the scrotum, concern for generalized abdominal pain following recent hernia repair, bowel. ..; ORDERING SYSTEM PROVIDED HISTORY: through the scrotum, concern for generalized abdominal pain following recent hernia repair, bowel is the scrotum TECHNOLOGIST PROVIDED HISTORY: through the scrotum, concern for generalized abdominal pain following recent hernia repair, bowel is the scrotum Decision Support Exception - unselect if not a suspected or confirmed emergency medical condition->Emergency Medical Condition (MA) FINDINGS: Chest pulmonary angiogram: Pulmonary Arteries: Pulmonary arteries are adequately opacified for evaluation. No evidence of intraluminal filling defect to suggest pulmonary embolism. Main pulmonary artery is normal in caliber. Mediastinum: No evidence of mediastinal lymphadenopathy.   The heart and pericardium demonstrate no acute abnormality. There is no acute abnormality of the thoracic aorta. Lungs/pleura: The lungs are without acute process. Right upper lung lobe multifocal subsegmental atelectasis within the lower lung lobes is present. Redemonstration of scattered stable solid pulmonary nodules are seen measuring up to 6 in diameter unchanged from prior study. Bilateral upper lobe sub solid pulmonary nodules are stable measuring up to 8 mm in diameter within the left upper. No focal consolidation or pulmonary edema. No evidence of pleural effusion or pneumothorax. Upper Abdomen: Limited images of the upper abdomen are unremarkable. Soft Tissues/Bones: No acute bone or soft tissue abnormality. Abdomen/Pelvis: Organs: The liver, gallbladder, spleen, pancreas, adrenal glands, kidneys, are unremarkable in appearance. GI/Bowel: Multifocal bilateral renal simple cysts are seen. Mild bilateral diffuse fatty infiltration is identified. The stomach is unremarkable without wall thickening or distention. Mild diffuse small bowel dilatation is seen with fluid loops measuring up to 3.5 cm in diameter. Bowel loops are unremarkable in appearance without evidence of obstruction, distension or mucosal thickening. The appendix is normal. Pelvis: The urinary bladder is well distended and unremarkable in appearance. No evidence of pelvic free fluid is seen. The prostate gland is enlarged measuring up to 5.1 cm in transverse dimension. Peritoneum/Retroperitoneum: Mild scattered intraperitoneal free air is noted, scattered within the omentum and adjacent to the liver. No evidence of retroperitoneal or intraperitoneal lymphadenopathy is identified. No evidence of intraperitoneal free fluid is seen. Bones/Soft Tissues: Scattered ventral abdominal wall, inguinal canal fascial and subcutaneous tissue air is noted with periumbilical air suggesting recent trocar placement.   The bones, skeletal muscle bundles, fascial planes and subcutaneous soft tissues are unremarkable in appearance. 1. No evidence of acute pulmonary embolism. 2. Recent postoperative findings associated with patient history of recent hernia repair with mild scattered abdominal wall fascial and subcutaneous emphysema and mild intraperitoneal free air, as discussed above. 3. Mild small bowel postop ileus. 4. Mild diffuse hepatic steatosis. 5. Bilateral renal simple cysts. 6. Prostatomegaly. Recommend clinical correlation. 7. Stable bilateral lung multifocal solid and subsolid pulmonary nodules, as discussed above. Recommend adherence to follow-up as per prior CT chest without contrast examination December 3, 2020. CT CHEST PULMONARY EMBOLISM W CONTRAST    Result Date: 7/29/2021  EXAMINATION: CTA OF THE CHEST; CT OF THE ABDOMEN AND PELVIS WITH CONTRAST 7/28/2021 11:16 pm TECHNIQUE: CTA of the chest was performed after the administration of intravenous contrast.  Multiplanar reformatted images are provided for review. MIP images are provided for review. Dose modulation, iterative reconstruction, and/or weight based adjustment of the mA/kV was utilized to reduce the radiation dose to as low as reasonably achievable.; CT of the abdomen and pelvis was performed with the administration of intravenous contrast. Multiplanar reformatted images are provided for review. Dose modulation, iterative reconstruction, and/or weight based adjustment of the mA/kV was utilized to reduce the radiation dose to as low as reasonably achievable. COMPARISON: CT chest without contrast December 3, 2020. HISTORY: ORDERING SYSTEM PROVIDED HISTORY: assess for PE TECHNOLOGIST PROVIDED HISTORY: assess for PE Decision Support Exception - unselect if not a suspected or confirmed emergency medical condition->Emergency Medical Condition (MA) Reason for Exam: through the scrotum, concern for generalized abdominal pain following recent hernia repair, bowel. ..; ORDERING SYSTEM PROVIDED HISTORY: through the scrotum, concern for generalized abdominal pain following recent hernia repair, bowel is the scrotum TECHNOLOGIST PROVIDED HISTORY: through the scrotum, concern for generalized abdominal pain following recent hernia repair, bowel is the scrotum Decision Support Exception - unselect if not a suspected or confirmed emergency medical condition->Emergency Medical Condition (MA) FINDINGS: Chest pulmonary angiogram: Pulmonary Arteries: Pulmonary arteries are adequately opacified for evaluation. No evidence of intraluminal filling defect to suggest pulmonary embolism. Main pulmonary artery is normal in caliber. Mediastinum: No evidence of mediastinal lymphadenopathy. The heart and pericardium demonstrate no acute abnormality. There is no acute abnormality of the thoracic aorta. Lungs/pleura: The lungs are without acute process. Right upper lung lobe multifocal subsegmental atelectasis within the lower lung lobes is present. Redemonstration of scattered stable solid pulmonary nodules are seen measuring up to 6 in diameter unchanged from prior study. Bilateral upper lobe sub solid pulmonary nodules are stable measuring up to 8 mm in diameter within the left upper. No focal consolidation or pulmonary edema. No evidence of pleural effusion or pneumothorax. Upper Abdomen: Limited images of the upper abdomen are unremarkable. Soft Tissues/Bones: No acute bone or soft tissue abnormality. Abdomen/Pelvis: Organs: The liver, gallbladder, spleen, pancreas, adrenal glands, kidneys, are unremarkable in appearance. GI/Bowel: Multifocal bilateral renal simple cysts are seen. Mild bilateral diffuse fatty infiltration is identified. The stomach is unremarkable without wall thickening or distention. Mild diffuse small bowel dilatation is seen with fluid loops measuring up to 3.5 cm in diameter.   Bowel loops are unremarkable in appearance without evidence of obstruction, distension or mucosal bed     FINAL IMPRESSION:     1. Abdominal pain, unspecified abdominal location        DISPOSITION:         DISPOSITION:  []  Discharge   []  Transfer -    [x]  Admission - general surgery     []  Against Medical Advice   []  Eloped   FOLLOW-UP: No follow-up provider specified.    DISCHARGE MEDICATIONS: New Prescriptions    No medications on file           Adriel Duenas DO  Emergency Medicine Resident  [facility]       Adriel Duenas, [address] [place] [address]  Resident  07/29/21 2792

## 2021-07-29 NOTE — ED PROVIDER NOTES
[address] ED     Emergency Department     Faculty Attestation        I performed a history and physical examination of the patient and discussed management with the resident. I reviewed the residents note and agree with the documented findings and plan of care. Any areas of disagreement are noted on the chart. I was personally present for the key portions of any procedures. I have documented in the chart those procedures where I was not present during the key portions. I have reviewed the emergency nurses triage note. I agree with the chief complaint, past medical history, past surgical history, allergies, medications, social and family history as documented unless otherwise noted below. For Physician Assistant/ Nurse Practitioner cases/documentation I have personally evaluated this patient and have completed at least one if not all key elements of the E/M (history, physical exam, and MDM). Additional findings are as noted. PCP:  Katie Willis MD    Pertinent Comments:     Patient is a 79-year-old male with history of hypertension who had relatively recent hernia repair 2 days ago in the right inguinal region. Patient yesterday had some chest pain or shortness of breath with diaphoresis that resolved but then today while attempting of the bathroom had sudden onset lower and diffuse abdominal pain with some testicular pain. On exam patient clearly has breakdown of hernia repair with bowel in the scrotum. Assessment/plan: Appears to be surgical complication but also given chest pain or shortness of breath and diaphoresis yesterday we will obtain brief cardiac work-up as well as CT chest/abdomen/pelvis given the risk for PE. Stat consult to surgery as well    Stat CTs with no PE and actually no recurrence of hernia or dehiscence. Extensive subQ air instead apparent that appears to have mimmicked hernia.         EKG Interpretation    Interpreted by emergency department physician    Rhythm: normal sinus   Rate: normal at 86 bpm  Axis: normal  Conduction: normal  ST Segments: no acute change  T Waves: no acute change  Q Waves: no acute change    Clinical Impression:  nonspecific EKG. Critical Care  None    This patient was evaluated in the Emergency Department for symptoms described in the history of present illness. He/she was evaluated in the context of the global COVID-19 pandemic, which necessitated consideration that the patient might be at risk for infection with the SARS-CoV-2 virus that causes COVID-19. Institutional protocols and algorithms that pertain to the evaluation of patients at risk for COVID-19 are in a state of rapid change based on information released by regulatory bodies including the CDC and federal and state organizations. These policies and algorithms were followed during the patient's care in the ED. (Please note that portions of this note were completed with a voice recognition program. Efforts were made to edit the dictations but occasionally words are mis-transcribed.  Whenever words are used in this note in any gender, they shall be construed as though they were used in the gender appropriate to the circumstances; and whenever words are used in this note in the singular or plural form, they shall be construed as though they were used in the form appropriate to the circumstances.)    Davonna Basil, MD Amie Phalen  Attending Emergency Medicine Physician             Devika Leahy MD  07/28/21 [id number]       Devika Leahy MD  07/28/21 9898       Devika Leahy MD  08/02/21 0770

## 2021-07-29 NOTE — PLAN OF CARE
Problem: Pain:  Goal: Pain level will decrease  Description: Pain level will decrease  Outcome: Ongoing  Goal: Control of acute pain  Description: Control of acute pain  Outcome: Ongoing  Goal: Control of chronic pain  Description: Control of chronic pain  Outcome: Ongoing  Goal: Patient's pain/discomfort is manageable  Description: Patient's pain/discomfort is manageable  Outcome: Ongoing     Problem: Infection:  Goal: Will remain free from infection  Description: Will remain free from infection  Outcome: Ongoing     Problem: Safety:  Goal: Free from accidental physical injury  Description: Free from accidental physical injury  Outcome: Ongoing  Goal: Free from intentional harm  Description: Free from intentional harm  Outcome: Ongoing     Problem: Daily Care:  Goal: Daily care needs are met  Description: Daily care needs are met  Outcome: Ongoing     Problem: Skin Integrity:  Goal: Skin integrity will stabilize  Description: Skin integrity will stabilize  Outcome: Ongoing     Problem: Discharge Planning:  Goal: Patients continuum of care needs are met  Description: Patients continuum of care needs are met  Outcome: Ongoing     Problem: Pain:  Goal: Pain level will decrease  Description: Pain level will decrease  Outcome: Ongoing  Goal: Control of acute pain  Description: Control of acute pain  Outcome: Ongoing  Goal: Control of chronic pain  Description: Control of chronic pain  Outcome: Ongoing  Goal: Patient's pain/discomfort is manageable  Description: Patient's pain/discomfort is manageable  Outcome: Ongoing     Problem: Infection:  Goal: Will remain free from infection  Description: Will remain free from infection  Outcome: Ongoing     Problem: Safety:  Goal: Free from accidental physical injury  Description: Free from accidental physical injury  Outcome: Ongoing  Goal: Free from intentional harm  Description: Free from intentional harm  Outcome: Ongoing     Problem: Daily Care:  Goal: Daily care needs are met  Description: Daily care needs are met  Outcome: Ongoing     Problem: Skin Integrity:  Goal: Skin integrity will stabilize  Description: Skin integrity will stabilize  Outcome: Ongoing     Problem: Discharge Planning:  Goal: Patients continuum of care needs are met  Description: Patients continuum of care needs are met  Outcome: Ongoing

## 2021-07-29 NOTE — ED NOTES
Pt back from CT, Dr Eric Goodman at bedside, [address] [facility] [address] & results discussed with pt and family     hospitals  07/28/21 2412

## 2021-07-29 NOTE — ED NOTES
Pt to ct at this time     [facility], [facility]  07/28/21 4348

## 2021-07-29 NOTE — ED NOTES
Report called to Atilio RN, pt & family updated with room number, pt refusing suppository and glycolax at this time, states that he will take them when he gets up to his room     \[facility]\""  07/29/21 9962

## 2021-07-29 NOTE — ED NOTES
Patient does not want suppository or miralax until he goes to the floor and has his own bathroom     Yadi Toney RN  07/29/21 9498

## 2021-07-29 NOTE — PROGRESS NOTES
Pt seen and examined at bedside this afternoon on rounds. Discussed pt's labs and imaging with him at bedside with his wife and son. All questions and concerns answered. Pt with complaints of some chest pain and sweating at times when he has abdominal pain. Pt reports that this sxs resolve when his pain is gone. Discussed with pt that he may have some anxiety. We did offer a pulmonology consultation due to his sxs but the pt politely declined and would like to F/U in the office next week. We discussed his CT scans, labs, and current physical exam. Pt is medically stable for discharge home. No recurrence of hernia noted on PE or CT scans. Pt agrees to go home.      Electronically signed by Wayne Brandon DO on 7/29/2021 at 5:12 PM

## 2021-07-29 NOTE — H&P
General Surgery:  H&P        PATIENT NAME: Ivanna Khoury   YOB: 1960    ADMISSION DATE: 7/28/2021 10:14 PM     Admitting Provider: [unfilled]    TODAY'S DATE: 7/29/2021    Chief Complaint:  Abdominal pain, dizziness    HISTORY OF PRESENT ILLNESS:  The patient is a 57 y.o. male  s/p robotic laparoscopic inguinal hernia repair with Dr. Bartolo Rivera on 7/27. He reports diffuse abdominal pain, 10/10 and unrelieved with the prescription pain meds he was given post-operatively. Currently he reports 3/10 pain. He has not had a bowel movement and is not yet passing gas. He thinks the hernia is still there as he has swelling in his scrotum, as well as pain. He reports nausea with vomiting for the past day. Of note he also reports an episode of dizziness earlier in which he needed help from his family to stand, and had concurrent chest pain. That is now resolved and EKG in the ER shows normal sinus rhythm. Dr. Panfilo France operative note describes an uncomplicated surgery with minimal blood loss. Pneumoperitoneum was established for the operation. Mesh was applied to the hernia site after it had been reduced. Past Medical History:        Diagnosis Date    Hyperlipidemia     Hypertension     Wellness examination     Dr. Carolina Santana last seen 7/2021       Past Surgical History:        Procedure Laterality Date    CYSTOSCOPY N/A 07/30/2020    CYSTOSCOPY performed by Arsalan Jackson MD at [address] [facility] [address] Right 7/27/2021    XI ROBOTIC LAPAROSCOPIC INGUINAL HERNIA REPAIR WITH MESH performed by Nitza Sotelo IV, DO at [address] [place] [address] Right 07/27/2021    XI ROBOTIC LAPAROSCOPIC INGUINAL HERNIA REPAIR WITH MESH     TURP N/A 08/14/2020    CYSTO, TUR PROSTATE GREENLIGHT XPS performed by Arsalan Jackson MD at [address]      varicose vein of right leg    VASCULAR SURGERY      right leg       Medications Prior to Admission:   Not in a hospital admission.     Allergies: Patient has no known allergies. Social History:   Social History     Socioeconomic History    Marital status:      Spouse name: Not on file    Number of children: Not on file    Years of education: Not on file    Highest education level: Not on file   Occupational History    Not on file   Tobacco Use    Smoking status: Never Smoker    Smokeless tobacco: Never Used   Vaping Use    Vaping Use: Never used   Substance and Sexual Activity    Alcohol use: Never    Drug use: Never    Sexual activity: Yes   Other Topics Concern    Not on file   Social History Narrative    Not on file     Social Determinants of Health     Financial Resource Strain: Low Risk     Difficulty of Paying Living Expenses: Not hard at all   Food Insecurity: No Food Insecurity    Worried About Running Out of Food in the Last Year: Never true    Clarke of Food in the Last Year: Never true   Transportation Needs: No Transportation Needs    Lack of Transportation (Medical): No    Lack of Transportation (Non-Medical): No   Physical Activity:     Days of Exercise per Week:     Minutes of Exercise per Session:    Stress:     Feeling of Stress :    Social Connections:     Frequency of Communication with Friends and Family:     Frequency of Social Gatherings with Friends and Family:     Attends Religion Services:     Active Member of Clubs or Organizations:     Attends Club or Organization Meetings:     Marital Status:    Intimate Partner Violence:     Fear of Current or Ex-Partner:     Emotionally Abused:     Physically Abused:     Sexually Abused:        Family History:   History reviewed. No pertinent family history. REVIEW OF SYSTEMS:    CONSTITUTIONAL: Reports fever and chills  HEENT: No rhinorrhea, dysphagia, odynphagia. CARDIOVASCULAR: No history of MI, recent chest pain. RESPIRATORY: Denies shortness of breath, COPD, asthma.   GASTROINTESTINAL: diffuse pain, nausea, vomiting, obstipation  GENITOURINARY: Denies increased frequency or dysuria. HEMATOLOGIC/LYMPHATIC: Denies history of anemia or DVTs. ENDOCRINE: Denies history of thyroid problems or diabetes. NEUROLOGIC: Denies history of CVA, TIA. PHYSICAL EXAM:    VITALS:  /83   Pulse 80   Temp 98.1 °F (36.7 °C) (Oral)   Resp 21   SpO2 97%   INTAKE/OUTPUT:     Intake/Output Summary (Last 24 hours) at 7/29/2021 0154  Last data filed at 7/28/2021 2345  Gross per 24 hour   Intake 1000 ml   Output    Net 1000 ml       CONSTITUTIONAL:  awake, alert, no apparent distress, not distressed and normal weight  NECK:  supple, symmetrical, trachea midline   LUNGS:  CTA bilaterally  CARDIOVASCULAR:  regular rate and rhythm and No Murmur  ABDOMEN: soft, mildly tender, non-distended without rigidity. Port incision sites clean, dry and intact with dermabond on top. GENITOURINARY: Right scrotum with mild edema, nothing to reduce, no tenderness to palpation  MUSCULOSKELETAL: Muscle strength intact in all extremities bilaterally. NEUROLOGIC: CN II- XII intact. Gross motor intact without focal weakness. SKIN: No cyanosis, rashes, or edema noted. CBC:   Lab Results   Component Value Date    WBC 11.2 07/28/2021    RBC 6.05 07/28/2021    HGB 15.6 07/28/2021    HCT 48.6 07/28/2021    MCV 80.3 07/28/2021    MCH 25.8 07/28/2021    MCHC 32.1 07/28/2021    RDW 13.5 07/28/2021    PLT See Reflexed IPF Result 07/28/2021    MPV NOT REPORTED 07/28/2021       Pertinent Radiology:   CT ABDOMEN PELVIS W IV CONTRAST Additional Contrast? None    Result Date: 7/29/2021  EXAMINATION: CTA OF THE CHEST; CT OF THE ABDOMEN AND PELVIS WITH CONTRAST 7/28/2021 11:16 pm TECHNIQUE: CTA of the chest was performed after the administration of intravenous contrast.  Multiplanar reformatted images are provided for review. MIP images are provided for review.  Dose modulation, iterative reconstruction, and/or weight based adjustment of the mA/kV was utilized to reduce the radiation dose to as low as reasonably achievable.; CT of the abdomen and pelvis was performed with the administration of intravenous contrast. Multiplanar reformatted images are provided for review. Dose modulation, iterative reconstruction, and/or weight based adjustment of the mA/kV was utilized to reduce the radiation dose to as low as reasonably achievable. COMPARISON: CT chest without contrast December 3, 2020. HISTORY: ORDERING SYSTEM PROVIDED HISTORY: assess for PE TECHNOLOGIST PROVIDED HISTORY: assess for PE Decision Support Exception - unselect if not a suspected or confirmed emergency medical condition->Emergency Medical Condition (MA) Reason for Exam: through the scrotum, concern for generalized abdominal pain following recent hernia repair, bowel. ..; ORDERING SYSTEM PROVIDED HISTORY: through the scrotum, concern for generalized abdominal pain following recent hernia repair, bowel is the scrotum TECHNOLOGIST PROVIDED HISTORY: through the scrotum, concern for generalized abdominal pain following recent hernia repair, bowel is the scrotum Decision Support Exception - unselect if not a suspected or confirmed emergency medical condition->Emergency Medical Condition (MA) FINDINGS: Chest pulmonary angiogram: Pulmonary Arteries: Pulmonary arteries are adequately opacified for evaluation. No evidence of intraluminal filling defect to suggest pulmonary embolism. Main pulmonary artery is normal in caliber. Mediastinum: No evidence of mediastinal lymphadenopathy. The heart and pericardium demonstrate no acute abnormality. There is no acute abnormality of the thoracic aorta. Lungs/pleura: The lungs are without acute process. Right upper lung lobe multifocal subsegmental atelectasis within the lower lung lobes is present. Redemonstration of scattered stable solid pulmonary nodules are seen measuring up to 6 in diameter unchanged from prior study.   Bilateral upper lobe sub solid pulmonary nodules are stable measuring up to 8 mm in diameter within the left upper. No focal consolidation or pulmonary edema. No evidence of pleural effusion or pneumothorax. Upper Abdomen: Limited images of the upper abdomen are unremarkable. Soft Tissues/Bones: No acute bone or soft tissue abnormality. Abdomen/Pelvis: Organs: The liver, gallbladder, spleen, pancreas, adrenal glands, kidneys, are unremarkable in appearance. GI/Bowel: Multifocal bilateral renal simple cysts are seen. Mild bilateral diffuse fatty infiltration is identified. The stomach is unremarkable without wall thickening or distention. Mild diffuse small bowel dilatation is seen with fluid loops measuring up to 3.5 cm in diameter. Bowel loops are unremarkable in appearance without evidence of obstruction, distension or mucosal thickening. The appendix is normal. Pelvis: The urinary bladder is well distended and unremarkable in appearance. No evidence of pelvic free fluid is seen. The prostate gland is enlarged measuring up to 5.1 cm in transverse dimension. Peritoneum/Retroperitoneum: Mild scattered intraperitoneal free air is noted, scattered within the omentum and adjacent to the liver. No evidence of retroperitoneal or intraperitoneal lymphadenopathy is identified. No evidence of intraperitoneal free fluid is seen. Bones/Soft Tissues: Scattered ventral abdominal wall, inguinal canal fascial and subcutaneous tissue air is noted with periumbilical air suggesting recent trocar placement. The bones, skeletal muscle bundles, fascial planes and subcutaneous soft tissues are unremarkable in appearance. 1. No evidence of acute pulmonary embolism. 2. Recent postoperative findings associated with patient history of recent hernia repair with mild scattered abdominal wall fascial and subcutaneous emphysema and mild intraperitoneal free air, as discussed above. 3. Mild small bowel postop ileus. 4. Mild diffuse hepatic steatosis. 5. Bilateral renal simple cysts. 6. Prostatomegaly.   Recommend clinical correlation. 7. Stable bilateral lung multifocal solid and subsolid pulmonary nodules, as discussed above. Recommend adherence to follow-up as per prior CT chest without contrast examination December 3, 2020. CT CHEST PULMONARY EMBOLISM W CONTRAST    Result Date: 7/29/2021  EXAMINATION: CTA OF THE CHEST; CT OF THE ABDOMEN AND PELVIS WITH CONTRAST 7/28/2021 11:16 pm TECHNIQUE: CTA of the chest was performed after the administration of intravenous contrast.  Multiplanar reformatted images are provided for review. MIP images are provided for review. Dose modulation, iterative reconstruction, and/or weight based adjustment of the mA/kV was utilized to reduce the radiation dose to as low as reasonably achievable.; CT of the abdomen and pelvis was performed with the administration of intravenous contrast. Multiplanar reformatted images are provided for review. Dose modulation, iterative reconstruction, and/or weight based adjustment of the mA/kV was utilized to reduce the radiation dose to as low as reasonably achievable. COMPARISON: CT chest without contrast December 3, 2020. HISTORY: ORDERING SYSTEM PROVIDED HISTORY: assess for PE TECHNOLOGIST PROVIDED HISTORY: assess for PE Decision Support Exception - unselect if not a suspected or confirmed emergency medical condition->Emergency Medical Condition (MA) Reason for Exam: through the scrotum, concern for generalized abdominal pain following recent hernia repair, bowel. ..; ORDERING SYSTEM PROVIDED HISTORY: through the scrotum, concern for generalized abdominal pain following recent hernia repair, bowel is the scrotum TECHNOLOGIST PROVIDED HISTORY: through the scrotum, concern for generalized abdominal pain following recent hernia repair, bowel is the scrotum Decision Support Exception - unselect if not a suspected or confirmed emergency medical condition->Emergency Medical Condition (MA) FINDINGS: Chest pulmonary angiogram: Pulmonary Arteries: Pulmonary arteries are adequately opacified for evaluation. No evidence of intraluminal filling defect to suggest pulmonary embolism. Main pulmonary artery is normal in caliber. Mediastinum: No evidence of mediastinal lymphadenopathy. The heart and pericardium demonstrate no acute abnormality. There is no acute abnormality of the thoracic aorta. Lungs/pleura: The lungs are without acute process. Right upper lung lobe multifocal subsegmental atelectasis within the lower lung lobes is present. Redemonstration of scattered stable solid pulmonary nodules are seen measuring up to 6 in diameter unchanged from prior study. Bilateral upper lobe sub solid pulmonary nodules are stable measuring up to 8 mm in diameter within the left upper. No focal consolidation or pulmonary edema. No evidence of pleural effusion or pneumothorax. Upper Abdomen: Limited images of the upper abdomen are unremarkable. Soft Tissues/Bones: No acute bone or soft tissue abnormality. Abdomen/Pelvis: Organs: The liver, gallbladder, spleen, pancreas, adrenal glands, kidneys, are unremarkable in appearance. GI/Bowel: Multifocal bilateral renal simple cysts are seen. Mild bilateral diffuse fatty infiltration is identified. The stomach is unremarkable without wall thickening or distention. Mild diffuse small bowel dilatation is seen with fluid loops measuring up to 3.5 cm in diameter. Bowel loops are unremarkable in appearance without evidence of obstruction, distension or mucosal thickening. The appendix is normal. Pelvis: The urinary bladder is well distended and unremarkable in appearance. No evidence of pelvic free fluid is seen. The prostate gland is enlarged measuring up to 5.1 cm in transverse dimension. Peritoneum/Retroperitoneum: Mild scattered intraperitoneal free air is noted, scattered within the omentum and adjacent to the liver. No evidence of retroperitoneal or intraperitoneal lymphadenopathy is identified.   No evidence of intraperitoneal free fluid is seen. Bones/Soft Tissues: Scattered ventral abdominal wall, inguinal canal fascial and subcutaneous tissue air is noted with periumbilical air suggesting recent trocar placement. The bones, skeletal muscle bundles, fascial planes and subcutaneous soft tissues are unremarkable in appearance. 1. No evidence of acute pulmonary embolism. 2. Rth mild scattered abdominal wall fascial and subcutaneous emphysema and mild intraperitoneal free air, as discussed above.ecent postoperative findings associated with patient history of recent hernia repair wi 3. Mild small bowel postop ileus. 4. Mild diffuse hepatic steatosis. 5. Bilateral renal simple cysts. 6. Prostatomegaly. Recommend clinical correlation. 7. Stable bilateral lung multifocal solid and subsolid pulmonary nodules, as discussed above. Recommend adherence to follow-up as per prior CT chest without contrast examination December 3, 2020. ASSESSMENT:  Active Hospital Problems    Diagnosis Date Noted    Post-op pain [G89.18] 07/29/2021    Ileus ([address] [K56.7] 07/29/2021         Plan:  1. Admit for observation overnight  2. NPO for now, with sips of water with meds ok  3. Miralax scheduled, Dulcolax suppository for ileus  4.  Oxycodone and Flexeril for pain      Electronically signed by Kane Trinidad DO  on 7/29/2021 at 1:54 AM

## 2021-07-29 NOTE — ED PROVIDER NOTES
[facility] ED  Emergency Department Encounter  Emergency Medicine Resident     Pt Name: Rebecca Bach  MRN: [id number]  [place] 1960  Date of evaluation: 7/28/21  PCP:  Ana Pagan MD    CHIEF COMPLAINT       Chief Complaint   Patient presents with    Abdominal Pain       HISTORY Maeve  (Location/Symptom, Timing/Onset, Context/Setting, Quality, Duration, Modifying Jaypola Mahan.)      Rebecca Bach is a 57 y.o. male with past medical history of hyperlipidemia, hypertension with recent right-sided inguinal repair yesterday on 7/27 4/2021 who presents with crushing substernal chest pain, diaphoresis, generalized abdominal tenderness, nausea, vomiting, lack of a bowel movement with patient having one episode of passing flatus prior to discharge. Patient endorses generalized abdominal pain, right-sided scrotal pain with scrotal enlargement. Patient was recently discharged after laparoscopic inguinal hernia repair. Patient denies bilateral or unilateral calf tenderness, swelling, does endorse shortness of breath, crushing substernal chest pain as well as generalized abdominal pain. Patient upon initial evaluation appears in acute distress, diaphoretic, endorsing generalized abdominal pain. PAST MEDICAL / SURGICAL / SOCIAL / FAMILY HISTORY      has a past medical history of Hyperlipidemia, Hypertension, and Wellness examination. has a past surgical history that includes vascular surgery; Cystoscopy (N/A, 07/30/2020); TURP (N/A, 08/14/2020); vascular surgery; Inguinal hernia repair (Right, 07/27/2021); and hernia repair (Right, 7/27/2021).      Social History     Socioeconomic History    Marital status:      Spouse name: Not on file    Number of children: Not on file    Years of education: Not on file    Highest education level: Not on file   Occupational History    Not on file   Tobacco Use    Smoking status: Never Smoker    Smokeless tobacco: Never Used Vaping Use    Vaping Use: Never used   Substance and Sexual Activity    Alcohol use: Never    Drug use: Never    Sexual activity: Yes   Other Topics Concern    Not on file   Social History Narrative    Not on file     Social Determinants of Health     Financial Resource Strain: Low Risk     Difficulty of Paying Living Expenses: Not hard at all   Food Insecurity: No Food Insecurity    Worried About Running Out of Food in the Last Year: Never true    [address] in the Last Year: Never true   Transportation Needs: No Transportation Needs    Lack of Transportation (Medical): No    Lack of Transportation (Non-Medical): No   Physical Activity:     Days of Exercise per Week:     Minutes of Exercise per Session:    Stress:     Feeling of Stress :    Social Connections:     Frequency of Communication with Friends and Family:     Frequency of Social Gatherings with Friends and Family:     Attends Buddhist Services:     Active Member of Clubs or Organizations:     Attends Club or Organization Meetings:     Marital Status:    Intimate Partner Violence:     Fear of Current or Ex-Partner:     Emotionally Abused:     Physically Abused:     Sexually Abused:        History reviewed. No pertinent family history. Allergies:  Patient has no known allergies. Home Medications:  Prior to Admission medications    Medication Sig Start Date End Date Taking? Authorizing Provider   oxyCODONE-acetaminophen (PERCOCET) 5-325 MG per tablet Take 1 tablet by mouth every 6 hours as needed for Pain for up to 3 days. Intended supply: 3 days.  Take lowest dose possible to manage pain 7/27/21 7/30/21 Yes Tim Allan DO   cyclobenzaprine (FLEXERIL) 10 MG tablet Take 1 tablet by mouth 2 times daily as needed for Muscle spasms 7/27/21 8/6/21 Yes Edin Allan DO   docusate sodium (COLACE) 100 MG capsule Take 1 capsule by mouth 2 times daily 7/27/21 8/26/21 Yes Edin Allan DO   ciclopirox (PENLAC) 8 % solution Apply topically nightly. Remove once weekly with alcohol or nail polish remover. 4/29/21  Yes Chase Goodwin DPM   ramipril (ALTACE) 10 MG capsule take 1 capsule by mouth once daily 3/16/21  Yes Lorena Sullivan MD   hydroCHLOROthiazide (HYDRODIURIL) 25 MG tablet take 1 tablet by mouth daily 2/15/21  Yes Lorena Sullivan MD   rosuvastatin (CRESTOR) 10 MG tablet take 1 tablet by mouth daily 2/15/21  Yes Lorena Sullivan MD   aspirin 81 MG EC tablet Take 1 tablet by mouth daily Please start on 09/18/20  Patient taking differently: Take 81 mg by mouth daily Last dose 7/19/2021   Prophylactically 9/15/20  Yes Codie Dickens MD   tamsulosin (FLOMAX) 0.4 MG capsule Take 1 capsule by mouth 2 times daily 9/15/20 7/29/21 Yes Codie Dickens MD   Elastic Bandages & Supports (HERNIA BELT DOUBLE MEDIUM) MISC Use as needed for support of hernia 6/11/21   Lorena Sullivan MD   simethicone (MYLICON) 80 MG chewable tablet Take 1 tablet by mouth 4 times daily as needed for Flatulence 4/29/21   Jackson Tang MD   alpha-galactosidase [facility]) TABS chewable tablet Take 1 tablet by mouth 3 times daily (with meals) 12/17/20   Lorena Sullivan MD       REVIEW OFSYSTEMS    (2-9 systems for level 4, 10 or more for level 5)      Review of Systems   Constitutional: Positive for diaphoresis and fatigue. Negative for chills and fever. HENT: Negative for rhinorrhea, sore throat, tinnitus and trouble swallowing. Eyes: Negative for visual disturbance. Respiratory: Negative for cough, chest tightness, shortness of breath and wheezing. Cardiovascular: Negative for chest pain and leg swelling. Gastrointestinal: Positive for abdominal distention, abdominal pain (Generalized abdominal tenderness to palpation, concerns for swelling of the right aspect of the scrotum, right inguinal canal), nausea and vomiting. Negative for constipation and diarrhea. Endocrine: Negative for polyuria.    Genitourinary: Negative for dysuria, flank pain and frequency. Musculoskeletal: Negative for arthralgias, back pain, joint swelling and myalgias. Neurological: Negative for dizziness, tremors, seizures, weakness, light-headedness, numbness and headaches. PHYSICAL EXAM   (up to 7 for level 4, 8 or more forlevel 5)      INITIAL VITALS:   ED Triage Vitals [07/28/21 2213]   BP Temp Temp Source Pulse Resp SpO2 Height Weight   -- 98.1 °F (36.7 °C) Oral -- -- -- -- --       Physical Exam  Constitutional:       General: He is not in acute distress. Appearance: He is not ill-appearing. HENT:      Head: Normocephalic and atraumatic. Right Ear: External ear normal.      Left Ear: External ear normal.   Eyes:      Extraocular Movements: Extraocular movements intact. Cardiovascular:      Rate and Rhythm: Normal rate and regular rhythm. Pulmonary:      Effort: Pulmonary effort is normal.   Abdominal:      General: Abdomen is flat. Tenderness: There is abdominal tenderness in the epigastric area. Hernia: A hernia is present. Hernia is present in the right femoral area (Concerns for swelling of the right scrotum with tenderness palpation within the inguinal canal, with concerns for mass palpable). Musculoskeletal:         General: No deformity or signs of injury. Skin:     General: Skin is warm. Capillary Refill: Capillary refill takes less than 2 seconds. Neurological:      Mental Status: He is oriented to person, place, and time. Mental status is at baseline.    Psychiatric:         Mood and Affect: Mood normal.          DIFFERENTIAL  DIAGNOSIS     PLAN (LABS / IMAGING / EKG):  Orders Placed This Encounter   Procedures    CT CHEST PULMONARY EMBOLISM W CONTRAST    CT ABDOMEN PELVIS W IV CONTRAST Additional Contrast? None    CBC WITH AUTO DIFFERENTIAL    COMPREHENSIVE METABOLIC PANEL    Lactate, Sepsis    LIPASE    Immature Platelet Fraction    ELECTROLYTES PLUS    Hemoglobin and hematocrit, blood    CALCIUM, IONIC (POC)    Inpatient consult to General Surgery    POC Blood Gas and Chemistry    Venous Blood Gas, POC    Creatinine W/GFR Point of Care    POCT urea (BUN)    Lactic Acid, POC    POCT Glucose    EKG 12 Lead    PATIENT STATUS (FROM ED OR OR/PROCEDURAL) Observation    Discharge patient       MEDICATIONS ORDERED:  Orders Placed This Encounter   Medications    lactated ringers bolus    morphine injection 4 mg    ondansetron (ZOFRAN) injection 4 mg    potassium chloride 10 mEq/100 mL IVPB (Peripheral Line)    iopamidol (ISOVUE-370) 76 % injection 75 mL    DISCONTD: sodium chloride flush 0.9 % injection 5-40 mL    DISCONTD: sodium chloride flush 0.9 % injection 5-40 mL    DISCONTD: 0.9 % sodium chloride infusion    DISCONTD: ondansetron (ZOFRAN-ODT) disintegrating tablet 4 mg    DISCONTD: ondansetron (ZOFRAN) injection 4 mg    DISCONTD: oxyCODONE (ROXICODONE) immediate release tablet 5 mg    DISCONTD: oxyCODONE (ROXICODONE) immediate release tablet 10 mg    DISCONTD: polyethylene glycol (GLYCOLAX) packet 17 g    DISCONTD: cyclobenzaprine (FLEXERIL) tablet 10 mg    bisacodyl (DULCOLAX) suppository 10 mg    potassium chloride 10 mEq/100 mL IVPB (Peripheral Line)    DISCONTD: oxyCODONE-acetaminophen (PERCOCET) 5-325 MG per tablet 1 tablet    DISCONTD: potassium bicarb-citric acid (EFFER-K) effervescent tablet 40 mEq    DISCONTD: bisacodyl (DULCOLAX) EC tablet 5 mg    DISCONTD: polyethylene glycol (GLYCOLAX) packet 17 g       DDX: Bowel perforation, bowel rupture, free air in the abdomen, pneumoperitoneum, postoperative pain, insufflation pain, sepsis, urosepsis    Initial MDM/Plan/ED COURSE:    57 y.o. male who presents with concerns for generalized abdominal tenderness, swelling of the right scrotum as well as tenderness palpation of the right inguinal canal with concerns for potential mass.   Patient does appear in acute distress, pending a septic labs including lactic acid, and plan to speak with CT scanner for emergent CT scan in order to assess for acute postoperative pain, perforated bowel, hernia. Plan treat patient with morphine, Zofran secondary to the nausea and vomiting, plan to check labs including electrolytes, lactic acidosis, lipase as well as urinalysis. ED Course as of Jul 30 0147 Wed Jul 28, 2021   2220 Spoke with CT scanner with concerns for perforated bowel, getting point-of-care blood glucose, given point-of-care chemistry emergency department. Creatinine with concerns for potential perforated bowel, need renal clearance secondary to patient's age. Attempted to call and speak to family in the waiting room however family was not in waiting room when I got there. [GP]   5123 EKG shows normal sinus, normal axis, concerns for left atrial enlargement based on lead to P waves,    [GP]   2221  no ST elevation or depression, ventricular rate of 86, CA interval of 164, QRS 98, QTc 445, nonspecific EKG. [GP]   2308 Potassium(!): 3.1 [GP]   2344 CT abdomen pelvis as read by a third emergency medicine resident shows free air within the abdomen, plan to do an urgent general surgery consult at this time. It also shows free air within the scrotum with concerns for incarcerated bowel. [GP]      ED Course User Index  [GP] Keesha Jamison MD   CT scan concerning for postoperative ileus, does not show signs of bowel perforation as read by radiologist, does show some concerns for air in the scrotum consistent with insufflation secondary to intra-abdominal surgery.   Patient care handoff to Dr. Samir Razo, patient resting comfortably, general surgery consulted for likely admission of postoperative pain.:     DIAGNOSTIC RESULTS / EMERGENCYDEPARTMENT COURSE / MDM     LABS:  Labs Reviewed   CBC WITH AUTO DIFFERENTIAL - Abnormal; Notable for the following components:       Result Value    RBC 6.05 (*)     MCV 80.3 (*)     Seg Neutrophils 70 (*)     Eosinophils % 0 (*)     All other components within normal limits   COMPREHENSIVE METABOLIC PANEL - Abnormal; Notable for the following components:    Glucose 132 (*)     Potassium 3.1 (*)     Chloride 97 (*)     All other components within normal limits   ELECTROLYTES PLUS - Abnormal; Notable for the following components:    POC Potassium 2.9 (*)     All other components within normal limits   HGB/HCT - Abnormal; Notable for the following components:    POC Hemoglobin 17.7 (*)     All other components within normal limits   VENOUS BLOOD GAS, POINT OF CARE - Abnormal; Notable for the following components:    pO2, Cali[place]o 65.3 (*)     O2 Sat, Calixto 93 (*)     All other components within normal limits   POCT GLUCOSE - Abnormal; Notable for the following components:    POC Glucose 142 (*)     All other components within normal limits   LACTATE, SEPSIS   LIPASE   IMMATURE PLATELET FRACTION   CALCIUM, IONIC (POC)   CREATININE W/GFR POINT OF CARE   UREA N (POC)   LACTIC ACID,POINT OF CARE   POC BLOOD GAS AND CHEMISTRY           No results found. PROCEDURES:  None    CONSULTS:  IP CONSULT TO GENERAL SURGERY    CRITICAL CARE:  Please see attending note    FINAL IMPRESSION      1. Abdominal pain, unspecified abdominal location    2.  Post-op pain          DISPOSITION / PLAN     DISPOSITION Admitted 07/29/2021 01:42:30 AM      PATIENT REFERRED TO:  [place] [id number] [phone]  In 1 week  For wound re-check      DISCHARGE MEDICATIONS:  Discharge Medication List as of 7/29/2021  1:46 PM          Vidhi Smith MD  Emergency Medicine Resident    (Please note that portions of this note were completed with a voice recognition program.Efforts were made to edit the dictations but occasionally words are mis-transcribed.)        Vidhi Smith MD  Resident  07/30/21 3392

## 2021-07-30 ASSESSMENT — ENCOUNTER SYMPTOMS
BACK PAIN: 0
RHINORRHEA: 0
ABDOMINAL DISTENTION: 1
ABDOMINAL PAIN: 1
TROUBLE SWALLOWING: 0
WHEEZING: 0
SHORTNESS OF BREATH: 0
COUGH: 0
NAUSEA: 1
DIARRHEA: 0
SORE THROAT: 0
CONSTIPATION: 0
VOMITING: 1
CHEST TIGHTNESS: 0

## 2021-08-02 ENCOUNTER — TELEPHONE (OUTPATIENT)
Dept: PULMONOLOGY | Age: 61
End: 2021-08-02

## 2021-08-02 NOTE — TELEPHONE ENCOUNTER
Patient wants to know if he still needs CT low dose he had a CT recently when he was admitted. Please advise. Thanks!

## 2021-08-03 DIAGNOSIS — I10 ESSENTIAL HYPERTENSION: ICD-10-CM

## 2021-08-03 DIAGNOSIS — E78.5 HYPERLIPIDEMIA, UNSPECIFIED HYPERLIPIDEMIA TYPE: ICD-10-CM

## 2021-08-03 RX ORDER — HYDROCHLOROTHIAZIDE 25 MG/1
25 TABLET ORAL DAILY
Qty: 30 TABLET | Refills: 5 | Status: SHIPPED | OUTPATIENT
Start: 2021-08-03 | End: 2021-11-26 | Stop reason: SDUPTHER

## 2021-08-03 RX ORDER — RAMIPRIL 10 MG/1
CAPSULE ORAL
Qty: 30 CAPSULE | Refills: 3 | Status: SHIPPED | OUTPATIENT
Start: 2021-08-03 | End: 2021-11-26 | Stop reason: SDUPTHER

## 2021-08-03 RX ORDER — ROSUVASTATIN CALCIUM 10 MG/1
TABLET, COATED ORAL
Qty: 30 TABLET | Refills: 5 | Status: SHIPPED | OUTPATIENT
Start: 2021-08-03 | End: 2021-11-26 | Stop reason: SDUPTHER

## 2021-08-03 NOTE — TELEPHONE ENCOUNTER
E-scribe request from [facility] for Ramipril 10 mg, Hydrochlorothiazide 25 mg, and Rosuvastatin 10 mg . Patient is on the waitlist for an appt.       Health Maintenance   Topic Date Due    DTaP/Tdap/Td vaccine (1 - Tdap) Never done    Shingles Vaccine (1 of 2) 01/28/2022 (Originally 6/1/2009)    Flu vaccine (1) 09/01/2021    A1C test (Diabetic or Prediabetic)  09/04/2021    Lipid screen  09/04/2021    Potassium monitoring  07/28/2022    Creatinine monitoring  07/28/2022    Colon cancer screen fecal DNA test (Cologuard)  09/08/2023    COVID-19 Vaccine  Completed    Hepatitis C screen  Completed    HIV screen  Completed    Hepatitis A vaccine  Aged Out    Hepatitis B vaccine  Aged Out    Hib vaccine  Aged Out    Meningococcal (ACWY) vaccine  Aged Out    Pneumococcal 0-64 years Vaccine  Aged Out             (applicable per patient's age: Cancer Screenings, Depression Screening, Fall Risk Screening, Immunizations)    Hemoglobin A1C (%)   Date Value   09/04/2020 5.9     LDL Cholesterol (mg/dL)   Date Value   09/04/2020 79     AST (U/L)   Date Value   07/28/2021 17     ALT (U/L)   Date Value   07/28/2021 18     BUN (mg/dL)   Date Value   07/28/2021 15      (goal A1C is < 7)   (goal LDL is <100) need 30-50% reduction from baseline     BP Readings from Last 3 Encounters:   07/29/21 117/75   07/27/21 116/77   07/27/21 104/76    (goal /80)      All Future Testing planned in [facility]:  Lab Frequency Next Occurrence   CT CHEST LOW DOSE (LDCT) Once 08/18/2021   Lipid Panel Once 10/21/2021   US ABDOMEN LIMITED Once 06/23/2022   O&P PANEL ([facility]) #1 Once 06/25/2021       Next Visit Date:  Future Appointments   Date Time Provider Mindy Erazo   8/4/2021 10:00 AM BASILIA Harris [address] [place] [address]   8/27/2021  9:15 AM Maria M Bolanos DPM [facility]   10/4/2021  3:30 PM Soledad Thorne MD [facility]   10/7/2021  1:30 PM Janette Goode MD Resp Spec [facility]   12/17/2021 10:30 AM SCHEDULE, [facility] UROLOGY [facility]            Patient Active Problem List:     BPH with urinary obstruction     TIA (transient ischemic attack)     Gross hematuria     Hematuria     COVID-19     Vasovagal syncope     Postprocedural urinary retention     Intractable migraine with aura without status migrainosus     Post-op pain     Ileus ([address]

## 2021-08-04 ENCOUNTER — OFFICE VISIT (OUTPATIENT)
Dept: SURGERY | Age: 61
End: 2021-08-04
Payer: COMMERCIAL

## 2021-08-04 VITALS
DIASTOLIC BLOOD PRESSURE: 69 MMHG | WEIGHT: 180.6 LBS | SYSTOLIC BLOOD PRESSURE: 115 MMHG | BODY MASS INDEX: 29.02 KG/M2 | TEMPERATURE: 97.3 F | HEIGHT: 66 IN | HEART RATE: 87 BPM

## 2021-08-04 DIAGNOSIS — Z48.89 ENCOUNTER FOR POSTOPERATIVE CARE: Primary | ICD-10-CM

## 2021-08-04 DIAGNOSIS — Z87.19 S/P INGUINAL HERNIA REPAIR: ICD-10-CM

## 2021-08-04 DIAGNOSIS — Z98.890 S/P INGUINAL HERNIA REPAIR: ICD-10-CM

## 2021-08-04 PROCEDURE — 3017F COLORECTAL CA SCREEN DOC REV: CPT | Performed by: STUDENT IN AN ORGANIZED HEALTH CARE EDUCATION/TRAINING PROGRAM

## 2021-08-04 PROCEDURE — G8427 DOCREV CUR MEDS BY ELIG CLIN: HCPCS | Performed by: STUDENT IN AN ORGANIZED HEALTH CARE EDUCATION/TRAINING PROGRAM

## 2021-08-04 PROCEDURE — 99024 POSTOP FOLLOW-UP VISIT: CPT | Performed by: STUDENT IN AN ORGANIZED HEALTH CARE EDUCATION/TRAINING PROGRAM

## 2021-08-04 PROCEDURE — 1036F TOBACCO NON-USER: CPT | Performed by: STUDENT IN AN ORGANIZED HEALTH CARE EDUCATION/TRAINING PROGRAM

## 2021-08-04 PROCEDURE — G8417 CALC BMI ABV UP PARAM F/U: HCPCS | Performed by: STUDENT IN AN ORGANIZED HEALTH CARE EDUCATION/TRAINING PROGRAM

## 2021-08-04 NOTE — PROGRESS NOTES
[facility] SURGERY CLINIC   PROGRESS NOTE    DATE: August 4, 2021     SUBJECTIVE:  Mayito Wharton is a 57 y.o. male who returns to the [facility] surgery clinic s/p robotic right inguinal hernia repair on 7/27/2021. Patient was seen in the ED for postoperative pain on 7/29. Patient reports he has been doing well, pain is now controlled on pain medications. Tender to palpation, reports some residual edema. Urinating and having bowel movements without difficulty. Tolerating a general diet. Reports some serous discharge from the left upper quadrant port site. Otherwise denies CP, S OB, N/V, C/F.       Past Medical History:   Diagnosis Date    Hyperlipidemia     Hypertension     Wellness examination     Dr. Jake Hood last seen 7/2021       Past Surgical History:   Procedure Laterality Date    CYSTOSCOPY N/A 07/30/2020    CYSTOSCOPY performed by Brayan Davies MD at [address] Right 7/27/2021    XI ROBOTIC LAPAROSCOPIC INGUINAL HERNIA REPAIR WITH MESH performed by Jennifer Sotelo IV, DO at [address] Right 07/27/2021    XI ROBOTIC LAPAROSCOPIC INGUINAL HERNIA REPAIR WITH MESH     TURP N/A 08/14/2020    CYSTO, TUR PROSTATE GREENLIGHT XPS performed by Brayan Davies MD at [facility]      varicose vein of right leg    VASCULAR SURGERY      right leg       Current Outpatient Medications   Medication Sig Dispense Refill    rosuvastatin (CRESTOR) 10 MG tablet take 1 tablet by mouth once daily 30 tablet 5    hydroCHLOROthiazide (HYDRODIURIL) 25 MG tablet take 1 tablet by mouth daily 30 tablet 5    ramipril (ALTACE) 10 MG capsule take 1 capsule by mouth once daily 30 capsule 3    cyclobenzaprine (FLEXERIL) 10 MG tablet Take 1 tablet by mouth 2 times daily as needed for Muscle spasms 20 tablet 0    docusate sodium (COLACE) 100 MG capsule Take 1 capsule by mouth 2 times daily 20 capsule 0    Elastic Bandages & Supports (HERNIA BELT DOUBLE MEDIUM) MISC Use as needed for support of hernia 1 each 0    ciclopirox (PENLAC) 8 % solution Apply topically nightly. Remove once weekly with alcohol or nail polish remover. 1 Bottle 3    simethicone (MYLICON) 80 MG chewable tablet Take 1 tablet by mouth 4 times daily as needed for Flatulence 180 tablet 3    alpha-galactosidase (BEANO) TABS chewable tablet Take 1 tablet by mouth 3 times daily (with meals) 90 tablet 1    aspirin 81 MG EC tablet Take 1 tablet by mouth daily Please start on 09/18/20 (Patient taking differently: Take 81 mg by mouth daily Last dose 7/19/2021   Prophylactically) 30 tablet 5    tamsulosin (FLOMAX) 0.4 MG capsule Take 1 capsule by mouth 2 times daily 60 capsule 5     No current facility-administered medications for this visit. No Known Allergies    No family history on file. Social History     Socioeconomic History    Marital status:      Spouse name: Not on file    Number of children: Not on file    Years of education: Not on file    Highest education level: Not on file   Occupational History    Not on file   Tobacco Use    Smoking status: Never Smoker    Smokeless tobacco: Never Used   Vaping Use    Vaping Use: Never used   Substance and Sexual Activity    Alcohol use: Never    Drug use: Never    Sexual activity: Yes   Other Topics Concern    Not on file   Social History Narrative    Not on file     Social Determinants of Health     Financial Resource Strain: Low Risk     Difficulty of Paying Living Expenses: Not hard at all   Food Insecurity: No Food Insecurity    Worried About Running Out of Food in the Last Year: Never true    Clarke of Food in the Last Year: Never true   Transportation Needs: No Transportation Needs    Lack of Transportation (Medical): No    Lack of Transportation (Non-Medical):  No   Physical Activity:     Days of Exercise per Week:     Minutes of Exercise per Session:    Stress:     Feeling of Stress :    Social Connections:     Frequency of renal simple cysts. 6. Prostatomegaly. Recommend clinical correlation. 7. Stable bilateral lung multifocal solid and subsolid pulmonary nodules, as discussed above. Recommend adherence to follow-up as per prior CT chest without contrast examination December 3, 2020. CT CHEST PULMONARY EMBOLISM W CONTRAST    Result Date: 7/29/2021  1. No evidence of acute pulmonary embolism. 2. Recent postoperative findings associated with patient history of recent hernia repair with mild scattered abdominal wall fascial and subcutaneous emphysema and mild intraperitoneal free air, as discussed above. 3. Mild small bowel postop ileus. 4. Mild diffuse hepatic steatosis. 5. Bilateral renal simple cysts. 6. Prostatomegaly. Recommend clinical correlation. 7. Stable bilateral lung multifocal solid and subsolid pulmonary nodules, as discussed above. Recommend adherence to follow-up as per prior CT chest without contrast examination December 3, 2020. ASSESSMENT:  61M s/p right inguinal hernia repair    PLAN:  1. No heavy lifting more than 10 pounds for 6 weeks after surgery  2. Surgical incisions healing well, allow surgical glue to peel off on its own  3. Keep surgical incision clean and dry  4. Follow-up in [address] in 2 month        [place], [place]  8/4/2021    Attending Physician Statement  I have discussed the case with Dr Lisa Gibson, including pertinent history and exam findings with the resident. I have seen and examined the patient and the key elements of the encounter have been performed by me. I agree with the assessment, plan and orders as documented by the resident.       Electronically signed by Lucila Sotelo IV, DO  on 8/11/2021 at 10:18 AM

## 2021-08-04 NOTE — PATIENT INSTRUCTIONS
Thank you letting us take care of you today. We hope that all your questions were addressed. If a question was overlooked or something else comes to mind after you return home, please call our office at [phone]. Follow-up in clinic in 1 month. If you need to cancel or change an appointment, surgery or procedure, please contact the office at [phone].

## 2021-08-04 NOTE — PROGRESS NOTES
Visit Information    Have you changed or started any medications since your last visit including any over-the-counter medicines, vitamins, or herbal medicines? no   Have you stopped taking any of your medications? Is so, why? -  no  Are you having any side effects from any of your medications? - no    Have you seen any other physician or provider since your last visit?  no   Have you had any other diagnostic tests since your last visit?  no   Have you been seen in the emergency room and/or had an admission in a hospital since we last saw you?  yes - Yas Talbotcarlitos 7/28-7/29   Have you had your routine dental cleaning in the past 6 months?  no     Do you have an active MyChart account? If no, what is the barrier?   Yes    Patient Care Team:  Bret Tan MD as PCP - General (Internal Medicine)  Bret Tan MD as PCP - [address] [place] [address] [facility]  Janet Nolasco MD as Consulting Physician (Gastroenterology)    Medical History Review  Past Medical, Family, and Social History reviewed and does not contribute to the patient presenting condition    Health Maintenance   Topic Date Due    DTaP/Tdap/Td vaccine (1 - Tdap) Never done    Shingles Vaccine (1 of 2) 01/28/2022 (Originally 6/1/2009)    Flu vaccine (1) 09/01/2021    A1C test (Diabetic or Prediabetic)  09/04/2021    Lipid screen  09/04/2021    Potassium monitoring  07/28/2022    Creatinine monitoring  07/28/2022    Colon cancer screen fecal DNA test (Cologuard)  09/08/2023    COVID-19 Vaccine  Completed    Hepatitis C screen  Completed    HIV screen  Completed    Hepatitis A vaccine  Aged Out    Hepatitis B vaccine  Aged Out    Hib vaccine  Aged Out    Meningococcal (ACWY) vaccine  Aged Out    Pneumococcal 0-64 years Vaccine  Aged Out

## 2021-08-07 NOTE — DISCHARGE SUMMARY
hernia. He returned on 7/28 after an episode of dizziness in which he needed help from his family to stand, and had concurrent chest pain. That is now resolved and EKG in the ER shows normal sinus rhythm. He was concerned about swelling of his scrotum, and was informed that is expected and his hernia had not recurred. Hospital course was unremarkable. On day of discharge pt was tolerating regular diet, pain controlled with oral medications and ambulating without difficulty.       Electronically signed by Monica Barrett DO on 8/7/2021 at 3:09 PM

## 2021-08-27 ENCOUNTER — OFFICE VISIT (OUTPATIENT)
Dept: PODIATRY | Age: 61
End: 2021-08-27
Payer: COMMERCIAL

## 2021-08-27 VITALS — HEIGHT: 66 IN | WEIGHT: 180 LBS | BODY MASS INDEX: 28.93 KG/M2

## 2021-08-27 DIAGNOSIS — M79.674 PAIN OF TOES OF BOTH FEET: ICD-10-CM

## 2021-08-27 DIAGNOSIS — B35.1 ONYCHOMYCOSIS OF TOENAIL: Primary | ICD-10-CM

## 2021-08-27 DIAGNOSIS — M79.675 PAIN OF TOES OF BOTH FEET: ICD-10-CM

## 2021-08-27 PROCEDURE — 11721 DEBRIDE NAIL 6 OR MORE: CPT | Performed by: PODIATRIST

## 2021-08-27 ASSESSMENT — ENCOUNTER SYMPTOMS
BACK PAIN: 0
NAUSEA: 0
DIARRHEA: 0
COLOR CHANGE: 0
SHORTNESS OF BREATH: 0

## 2021-08-27 NOTE — PROGRESS NOTES
SUBJECTIVE: Ivanna Khoury is a 57 y.o. male who returns to the office with chief complaint of painful fungal toenails. Patient relates toe nails are thickened/difficult to trim as well as painful with ambulation and with shoe gear. Chief Complaint   Patient presents with    Nail Problem     b/l nail trim, last seen Mike David MD 6/11/21     Review of Systems   Constitutional: Negative for activity change, appetite change, chills, diaphoresis, fatigue and fever. Respiratory: Negative for shortness of breath. Cardiovascular: Negative for leg swelling. Gastrointestinal: Negative for diarrhea and nausea. Endocrine: Negative for cold intolerance, heat intolerance and polyuria. Musculoskeletal: Positive for arthralgias. Negative for back pain, gait problem, joint swelling and myalgias. Skin: Negative for color change, pallor, rash and wound. Allergic/Immunologic: Negative for environmental allergies and food allergies. Neurological: Negative for dizziness, weakness, light-headedness and numbness. Hematological: Does not bruise/bleed easily. Psychiatric/Behavioral: Negative for behavioral problems, confusion and self-injury. The patient is not nervous/anxious. OBJECTIVE: Clinical evaluation of patient reveals nails 1,4,5 of the right foot and nails 1,4,5 of the left foot to present with thickness, elongation, discoloration, brittleness, and subungual debris. There was pain with palpation and debridement of the toenails of the bilateral feet. No open lesions noted to either foot today. Class A Findings (1 needed)   [] Non-traumatic amputation of foot or integral skeleton portion thereof. [] Q7.      Class B Findings (2 needed)   1. [] Absent posterior tibial pulse   2. [] Absent dorsalis pedis pulse   3.  [] Advanced trophic changes; three of the following are required:   ·         [] hair growth (decrease or absence)   ·         [] nail changes (thickening)   ·         [] pigmentary changes (discoloration)   ·         [] skin texture (thin, shiny)   ·         [] skin color (rubor or redness)   [] Q8.      Class C Findings (1 Class B, 2 Class C needed)   1. [] Claudication   2. [] Temperature changes   3. [] Edema   4. [] Paresthesia   5. [] Burning   [] Q9.     NO CLASS FINDINGS ARE NOTED    ASSESSMENT:    Diagnosis Orders   1. Onychomycosis of toenail  ciclopirox (PENLAC) 8 % solution    VA DEBRIDEMENT OF NAILS, 6 OR MORE   2. Pain of toes of both feet  ciclopirox (PENLAC) 8 % solution    VA DEBRIDEMENT OF NAILS, 6 OR MORE     PLAN: Toenails 1,2,3,4,5 of the right foot and 1,2,3,4,5 of the left foot were debrided in length and thickness using a nail nipper and a . Patient given a refill for Penlac. Return in about 3 months (around 11/27/2021) for Painful fungal nails.    8/27/2021      Malcolm Grubbs DPM

## 2021-09-01 ENCOUNTER — OFFICE VISIT (OUTPATIENT)
Dept: SURGERY | Age: 61
End: 2021-09-01
Payer: COMMERCIAL

## 2021-09-01 VITALS
HEART RATE: 74 BPM | DIASTOLIC BLOOD PRESSURE: 74 MMHG | TEMPERATURE: 96.6 F | BODY MASS INDEX: 28.9 KG/M2 | HEIGHT: 66 IN | SYSTOLIC BLOOD PRESSURE: 114 MMHG | WEIGHT: 179.8 LBS

## 2021-09-01 DIAGNOSIS — Z48.89 ENCOUNTER FOR POSTOPERATIVE CARE: Primary | ICD-10-CM

## 2021-09-01 PROCEDURE — G8417 CALC BMI ABV UP PARAM F/U: HCPCS

## 2021-09-01 PROCEDURE — 1036F TOBACCO NON-USER: CPT

## 2021-09-01 PROCEDURE — G8427 DOCREV CUR MEDS BY ELIG CLIN: HCPCS

## 2021-09-01 PROCEDURE — 3017F COLORECTAL CA SCREEN DOC REV: CPT

## 2021-09-01 PROCEDURE — 99212 OFFICE O/P EST SF 10 MIN: CPT

## 2021-09-01 NOTE — PROGRESS NOTES
Visit Information    Have you changed or started any medications since your last visit including any over-the-counter medicines, vitamins, or herbal medicines? no   Are you having any side effects from any of your medications? -  no  Have you stopped taking any of your medications? Is so, why? -  no    Have you seen any other physician or provider since your last visit? No  Have you had any other diagnostic tests since your last visit? No  Have you been seen in the emergency room and/or had an admission to a hospital since we last saw you? No  Have you had your routine dental cleaning in the past 6 months? no    Have you activated your [employer] account? If not, what are your barriers?  Yes     Patient Care Team:  Ike Myers MD as PCP - General (Internal Medicine)  Ike Myers MD as PCP - [facility] Provider  Donato Anderson MD as Consulting Physician (Gastroenterology)    Medical History Review  Past Medical, Family, and Social History reviewed and does not contribute to the patient presenting condition    Health Maintenance   Topic Date Due    DTaP/Tdap/Td vaccine (1 - Tdap) Never done    Flu vaccine (1) Never done    A1C test (Diabetic or Prediabetic)  09/04/2021    Lipid screen  09/04/2021    Shingles Vaccine (1 of 2) 01/28/2022 (Originally 6/1/2009)    Potassium monitoring  07/28/2022    Creatinine monitoring  07/28/2022    Colon cancer screen fecal DNA test (Cologuard)  09/08/2023    COVID-19 Vaccine  Completed    Hepatitis C screen  Completed    HIV screen  Completed    Hepatitis A vaccine  Aged Out    Hepatitis B vaccine  Aged Out    Hib vaccine  Aged Out    Meningococcal (ACWY) vaccine  Aged Out    Pneumococcal 0-64 years Vaccine  Aged Out

## 2021-09-01 NOTE — LETTER
[facility]  [facility] [address]  Phone: [phone]  Fax: [phone]    Abeba Salinas DO        September 1, 2021     Patient: Ro Coto   YOB: 1960   Date of Visit: 9/1/2021       To Whom It May Concern: It is my medical opinion that Ro Coto may return to work on 9/6/2021 with no activity restrictions. He is to advance his activity as tolerated. If you have any questions or concerns, please don't hesitate to call.     Sincerely,        Abeba Salinas DO

## 2021-09-01 NOTE — PROGRESS NOTES
[facility]   PROGRESS NOTE    DATE: September 1, 2021     SUBJECTIVE:  Tre Harry is a 57 y.o. male who returns to the [facility] surgery clinic s/p  Right robotic inguinal hernia repair 7/27. He is tolerating regular diet, having normal bowel movements, urinating appropriately, and ambulating. He admits to intermittent \"pulling\" pain upon standing up, but it is tolerable and controlled without pain medications. He is taking flomax for BPH. Otherwise denies chest px, shortness of breath, nausea, vomiting. Past Medical History:   Diagnosis Date    Hyperlipidemia     Hypertension     Wellness examination     Dr. Dominic Mcguire last seen 7/2021       Past Surgical History:   Procedure Laterality Date    CYSTOSCOPY N/A 07/30/2020    CYSTOSCOPY performed by Mahesh Kent MD at [address] [facility] Right 7/27/2021    XI ROBOTIC LAPAROSCOPIC INGUINAL HERNIA REPAIR WITH MESH performed by Ginny Sotelo IV, DO at [address] [facility] Right 07/27/2021    XI ROBOTIC LAPAROSCOPIC INGUINAL HERNIA REPAIR WITH MESH     TURP N/A 08/14/2020    CYSTO, TUR PROSTATE GREENLIGHT XPS performed by Mahesh Kent MD at [facility]      varicose vein of right leg    VASCULAR SURGERY      right leg       Current Outpatient Medications   Medication Sig Dispense Refill    ciclopirox (PENLAC) 8 % solution Apply topically nightly. Remove once weekly with alcohol or nail polish remover. 1 Bottle 3    rosuvastatin (CRESTOR) 10 MG tablet take 1 tablet by mouth once daily 30 tablet 5    hydroCHLOROthiazide (HYDRODIURIL) 25 MG tablet take 1 tablet by mouth daily 30 tablet 5    ramipril (ALTACE) 10 MG capsule take 1 capsule by mouth once daily 30 capsule 3    Elastic Bandages & Supports (HERNIA BELT DOUBLE MEDIUM) MISC Use as needed for support of hernia 1 each 0    ciclopirox (PENLAC) 8 % solution Apply topically nightly. Remove once weekly with alcohol or nail polish remover.  1 Bottle 3    simethicone (MYLICON) 80 MG chewable tablet Take 1 tablet by mouth 4 times daily as needed for Flatulence 180 tablet 3    alpha-galactosidase (BEANO) TABS chewable tablet Take 1 tablet by mouth 3 times daily (with meals) 90 tablet 1    aspirin 81 MG EC tablet Take 1 tablet by mouth daily Please start on 09/18/20 (Patient taking differently: Take 81 mg by mouth daily Last dose 7/19/2021   Prophylactically) 30 tablet 5    tamsulosin (FLOMAX) 0.4 MG capsule Take 1 capsule by mouth 2 times daily 60 capsule 5     No current facility-administered medications for this visit. No Known Allergies    No family history on file. Social History     Socioeconomic History    Marital status:      Spouse name: Not on file    Number of children: Not on file    Years of education: Not on file    Highest education level: Not on file   Occupational History    Not on file   Tobacco Use    Smoking status: Never Smoker    Smokeless tobacco: Never Used   Vaping Use    Vaping Use: Never used   Substance and Sexual Activity    Alcohol use: Never    Drug use: Never    Sexual activity: Yes   Other Topics Concern    Not on file   Social History Narrative    Not on file     Social Determinants of Health     Financial Resource Strain: Low Risk     Difficulty of Paying Living Expenses: Not hard at all   Food Insecurity: No Food Insecurity    Worried About Running Out of Food in the Last Year: Never true    Clarke of Food in the Last Year: Never true   Transportation Needs: No Transportation Needs    Lack of Transportation (Medical): No    Lack of Transportation (Non-Medical):  No   Physical Activity:     Days of Exercise per Week:     Minutes of Exercise per Session:    Stress:     Feeling of Stress :    Social Connections:     Frequency of Communication with Friends and Family:     Frequency of Social Gatherings with Friends and Family:     Attends Mandaen Services:     Active Member of Clubs or Organizations:     Attends Club or Organization Meetings:     Marital Status:    Intimate Partner Violence:     Fear of Current or Ex-Partner:     Emotionally Abused:     Physically Abused:     Sexually Abused:        ROS:  GEN: Denies recent weight loss, fatigue, fevers, chills. HEENT: No rhinorrhea, dysphagia, odynphagia. CV: No history of MI, recent chest pain. RESP: Denies shortness of breath, COPD, asthma. GI: Denies, nausea, vomiting, diarrhea  : Denies dysuria or hematuria. HEM[de-identified] Denies history of anemia or DVTs. ENDO: Denies history of thyroid problems or diabetes. NEURO: Denies history of CVA, TIA. Notes reviewed, and agree with documentation in pt's chart. PHYSICAL EXAM:  Vitals:    09/01/21 0819   BP: 114/74   Pulse: 74   Temp: 96.6 °F (35.9 °C)     GEN: Alert and oriented x 3. In no acute distress. Appears stated age. HEENT: PERRLA, EOMI  NECK: trachea midline  HEART: Regular rate and rhythm    LUNGS: symmetrical chest rise bilaterally  ABDOMEN: soft, nontender, nondistended, incisions healing well without drainage, warmth or erythema, small amount of skin glue remains  EXT: no cyanosis, clubbing or edema present    NEURO: no focal deficits, gross motor intact. SKIN: No rashes or nodules noted. ASSESSMENT:   Diagnosis Orders   1. Encounter for postoperative care       2. S/p right robotic inguinal hernia repair    PLAN:  1. Follow-up as needed   2. Continue to allow skin glue to peel off on its own, keep incisions clean and dry  3. No heavy lifting for 1 more week, advance activity as tolerated      Catie Aparicio DO  9/1/2021    Attending Physician Statement  I have discussed the case with Dr David Mckenna, including pertinent history and exam findings with the resident. I have seen and examined the patient and the key elements of the encounter have been performed by me. I agree with the assessment, plan and orders as documented by the resident.       Electronically signed by Cuco Ayon

## 2021-09-23 ENCOUNTER — VIRTUAL VISIT (OUTPATIENT)
Dept: INTERNAL MEDICINE | Age: 61
End: 2021-09-23
Payer: COMMERCIAL

## 2021-09-23 DIAGNOSIS — G45.9 TIA (TRANSIENT ISCHEMIC ATTACK): Primary | ICD-10-CM

## 2021-09-23 DIAGNOSIS — R42 VERTIGO: ICD-10-CM

## 2021-09-23 PROCEDURE — 99442 PR PHYS/QHP TELEPHONE EVALUATION 11-20 MIN: CPT | Performed by: INTERNAL MEDICINE

## 2021-09-23 RX ORDER — ASPIRIN 81 MG/1
81 TABLET ORAL DAILY
Qty: 30 TABLET | Refills: 5 | Status: SHIPPED | OUTPATIENT
Start: 2021-09-23 | End: 2021-11-26 | Stop reason: SDUPTHER

## 2021-09-23 NOTE — PATIENT INSTRUCTIONS
Medications e-scribe to pharmacy of pt's choice. Disability letter mailed to patient. Patient to return to clinic 1 year (ofice will call and schedule appt). AVS reviewed and given to pt. It is very important for your care that you keep your appointment. If for some reason you are unable to keep your appointment it is equally important that you call our office at [phone] to cancel your appointment and reschedule. Failure to do so may result in your termination from our practice.   MB

## 2021-09-23 NOTE — PROGRESS NOTES
Clint Lord is a 57 y.o. male evaluated via telephone on 9/23/2021. Consent:  He and/or health care decision maker is aware that that he may receive a bill for this telephone service, depending on his insurance coverage, and has provided verbal consent to proceed: Yes      Documentation:  57year old gentleman calling in today for a letter stating he is no longer able to work safely given his health conditions. This is a gentleman who has a past medical history of a TIA on aspirin and Plavix who ended up with hematuria in the fall 2020 status post greenlight surgery for BPH. Patient had significant lightheadedness and weakness when he presented to the emergency department. Neurological work-up which included CT head CTA head and neck and MRI was unremarkable. Patient's hematuria did resolve and patient was sent home on only Crestor and aspirin at that time. He continues to experience dizziness and vertigo even some tinnitus. He was then sent to ENT who after evaluation recommended that he avoid repetitive motions. I do not have progress notes as to what testing was completed. At this time patient reports that his employer is unable to modify his work and this causes him significant distress. Diagnosis Orders   1. TIA (transient ischemic attack)     2. Vertigo               I affirm this is a Patient Initiated Episode with a Patient who has not had a related appointment within my department in the past 7 days or scheduled within the next 24 hours. Patient identification was verified at the start of the visit: Yes    Total Time: minutes: 11-20 minutes    The visit was conducted pursuant to the emergency declaration under the [address] [place] [address] authority and the [employer] and [employer] General Act. Patient identification was verified, and a caregiver was present when appropriate.  The patient was located in a state where the provider was credentialed to provide care.     Note: not billable if this call serves to triage the patient into an appointment for the relevant concern      Whitney Riggs MD

## 2021-09-23 NOTE — LETTER
[facility]  [address] [address]  Phone: [phone]  Fax: [phone]    Johana Wheeler MD         September 23, 2021     Patient: Merlinda Gosling   YOB: 1960   Date of Visit: 9/23/2021       To Whom It May Concern: It is my medical opinion that Merlinda Gosling requires a disability for the following reasons: This is a gentleman who has a past medical history of a TIA on aspirin and Plavix who ended up with hematuria in the fall 2020 status post greenlight surgery for BPH. Patient had significant lightheadedness and weakness when he presented to the emergency department. Neurological work-up which included CT head CTA head and neck and MRI was unremarkable. Patient's hematuria did resolve and patient was sent home on only Crestor and aspirin at that time. He continues to experience dizziness and vertigo even some tinnitus. He was then sent to ENT who after evaluation recommended that he avoid repetitive motions. I do not have progress notes as to what testing was completed. His employer is unable to modify his work and this causes him significant distress. Duration of need: permanent    If you have any questions or concerns, please don't hesitate to call.     Sincerely,        Johana Wheeler MD

## 2021-09-27 ENCOUNTER — TELEPHONE (OUTPATIENT)
Dept: GASTROENTEROLOGY | Age: 61
End: 2021-09-27

## 2021-09-27 NOTE — TELEPHONE ENCOUNTER
Patient LVM wanting to delay appointment that was for 10/4/21. LVM appointment cancelled and to call the office to r/s. The patient wanting mornings and at this time we do not have anything.

## 2021-10-07 ENCOUNTER — HOSPITAL ENCOUNTER (OUTPATIENT)
Age: 61
Discharge: HOME | End: 2021-10-07
Payer: COMMERCIAL

## 2021-10-07 ENCOUNTER — OFFICE VISIT (OUTPATIENT)
Dept: PULMONOLOGY | Age: 61
End: 2021-10-07
Payer: COMMERCIAL

## 2021-10-07 VITALS
SYSTOLIC BLOOD PRESSURE: 123 MMHG | RESPIRATION RATE: 16 BRPM | BODY MASS INDEX: 25.62 KG/M2 | HEART RATE: 97 BPM | DIASTOLIC BLOOD PRESSURE: 87 MMHG | OXYGEN SATURATION: 98 % | WEIGHT: 179 LBS | TEMPERATURE: 98.4 F | HEIGHT: 70 IN

## 2021-10-07 DIAGNOSIS — Z86.16 HISTORY OF COVID-19: ICD-10-CM

## 2021-10-07 DIAGNOSIS — N40.1 BPH WITH URINARY OBSTRUCTION: ICD-10-CM

## 2021-10-07 DIAGNOSIS — N13.8 BPH WITH URINARY OBSTRUCTION: ICD-10-CM

## 2021-10-07 DIAGNOSIS — R09.82 POST-NASAL DRIP: ICD-10-CM

## 2021-10-07 DIAGNOSIS — R14.1 ABDOMINAL GAS PAIN: ICD-10-CM

## 2021-10-07 DIAGNOSIS — R91.8 MULTIPLE NODULES OF LUNG: Primary | ICD-10-CM

## 2021-10-07 LAB
Lab: NORMAL
MICRO OVA & PARASITES: NORMAL
SPECIMEN DESCRIPTION: NORMAL

## 2021-10-07 PROCEDURE — 3017F COLORECTAL CA SCREEN DOC REV: CPT | Performed by: INTERNAL MEDICINE

## 2021-10-07 PROCEDURE — 87328 CRYPTOSPORIDIUM AG IA: CPT

## 2021-10-07 PROCEDURE — 99214 OFFICE O/P EST MOD 30 MIN: CPT | Performed by: INTERNAL MEDICINE

## 2021-10-07 PROCEDURE — 1036F TOBACCO NON-USER: CPT | Performed by: INTERNAL MEDICINE

## 2021-10-07 PROCEDURE — G8427 DOCREV CUR MEDS BY ELIG CLIN: HCPCS | Performed by: INTERNAL MEDICINE

## 2021-10-07 PROCEDURE — 87329 GIARDIA AG IA: CPT

## 2021-10-07 PROCEDURE — 87209 SMEAR COMPLEX STAIN: CPT

## 2021-10-07 PROCEDURE — G8482 FLU IMMUNIZE ORDER/ADMIN: HCPCS | Performed by: INTERNAL MEDICINE

## 2021-10-07 PROCEDURE — G8417 CALC BMI ABV UP PARAM F/U: HCPCS | Performed by: INTERNAL MEDICINE

## 2021-10-07 PROCEDURE — 87210 SMEAR WET MOUNT SALINE/INK: CPT

## 2021-10-07 PROCEDURE — 87015 SPECIMEN INFECT AGNT CONCNTJ: CPT

## 2021-10-07 RX ORDER — FLUTICASONE PROPIONATE 50 MCG
2 SPRAY, SUSPENSION (ML) NASAL DAILY
Qty: 16 G | Refills: 11 | Status: SHIPPED | OUTPATIENT
Start: 2021-10-07 | End: 2022-10-07

## 2021-10-07 NOTE — PATIENT INSTRUCTIONS
Unable to scheduled CT - rohit not open. Pt will call SV to schedule if he doesn't get a call.    LS

## 2021-10-08 LAB
DIRECT EXAM: NORMAL
DIRECT EXAM: NORMAL
Lab: NORMAL
SPECIMEN DESCRIPTION: NORMAL

## 2021-10-12 NOTE — PROGRESS NOTES
PULMONARY OP  PROGRESS NOTE      Patient:  Ivanna Khoury  YOB: 1960    MRN: [id number]     Acct:        Pt seen and Chart reviewed. Mr. Toya Wakefield is here in followup for   1. Multiple nodules of lung    2. BPH with urinary obstruction    3. History of COVID-19    4. Post-nasal drip          Pt has not been hospitalizedor been to er since last visit. Has been using meds as recommended.   Denied any shortness of breath or wheezing  No weight loss or loss of appetite  No headaches or abdominal pain or bone pain  No fever chills or night sweats  No cough or sputum production  No hemoptysis no orthopnea PND  Having some postnasal discharge  No acid reflux symptoms    Subjective:  Review of Systems    Review of Systems -   General ROS: Completed and except as mentioned above were negative   Psychological ROS:  Completed and except as mentioned above were negative  Ophthalmic ROS:  Completed and except as mentioned above were negative  ENT ROS:  Completed and except as mentioned above were negative  Allergy and Immunology ROS:  Completed and except as mentioned above werenegative  Hematological and Lymphatic ROS:  Completed and except as mentioned above were negative  Endocrine ROS: Completed and except as mentioned above were negative  Respiratory ROS:  Completed and except asmentioned above were negative  Cardiovascular ROS:  Completed and except as mentioned above were negative  Gastrointestinal ROS: Completed and except as mentioned above were negative  Genito-Urinary ROS:  Completedand except as mentioned above were negative  Musculoskeletal ROS:  Completed and except as mentioned above were negative  Neurological ROS:  Completed and except as mentioned above were negative  Dermatological ROS:Completed and except as mentioned above were negative            Allergies:  No Known Allergies    Medications:    Current Outpatient Medications:     fluticasone (FLONASE) 50 MCG/ACT nasal spray, 2 sprays by Nasal route daily, Disp: 16 g, Rfl: 11    aspirin 81 MG EC tablet, Take 1 tablet by mouth daily, Disp: 30 tablet, Rfl: 5    ciclopirox (PENLAC) 8 % solution, Apply topically nightly. Remove once weekly with alcohol or nail polish remover. , Disp: 1 Bottle, Rfl: 3    rosuvastatin (CRESTOR) 10 MG tablet, take 1 tablet by mouth once daily, Disp: 30 tablet, Rfl: 5    hydroCHLOROthiazide (HYDRODIURIL) 25 MG tablet, take 1 tablet by mouth daily, Disp: 30 tablet, Rfl: 5    ramipril (ALTACE) 10 MG capsule, take 1 capsule by mouth once daily, Disp: 30 capsule, Rfl: 3    Elastic Bandages & Supports (HERNIA BELT DOUBLE MEDIUM) MISC, Use as needed for support of hernia, Disp: 1 each, Rfl: 0    ciclopirox (PENLAC) 8 % solution, Apply topically nightly. Remove once weekly with alcohol or nail polish remover. , Disp: 1 Bottle, Rfl: 3    simethicone (MYLICON) 80 MG chewable tablet, Take 1 tablet by mouth 4 times daily as needed for Flatulence, Disp: 180 tablet, Rfl: 3    alpha-galactosidase (BEANO) TABS chewable tablet, Take 1 tablet by mouth 3 times daily (with meals), Disp: 90 tablet, Rfl: 1    tamsulosin (FLOMAX) 0.4 MG capsule, Take 1 capsule by mouth 2 times daily, Disp: 60 capsule, Rfl: 5      Objective:    Physical Exam:  Vitals: /87 (Site: Right Upper Arm, Position: Sitting, Cuff Size: Medium Adult)   Pulse 97   Temp 98.4 °F (36.9 °C) (Temporal)   Resp 16   Ht 5' 9.8\" (1.773 m)   Wt 179 lb (81.2 kg)   SpO2 98% Comment: ROOM AIR AT REST  BMI 25.83 kg/m²   Last 3 weights: Wt Readings from Last 3 Encounters:   10/07/21 179 lb (81.2 kg)   09/01/21 179 lb 12.8 oz (81.6 kg)   08/27/21 180 lb (81.6 kg)     Body mass index is 25.83 kg/m².   Physical Examination:   PHYSICAL EXAMINATION:  Vitals:    10/07/21 1326   BP: 123/87   Site: Right Upper Arm   Position: Sitting   Cuff Size: Medium Adult   Pulse: 97   Resp: 16   Temp: 98.4 °F (36.9 °C)   TempSrc: Temporal   SpO2: 98%   Weight: 179 lb (81.2 kg) Height: 5' 9.8\" (1.773 m)       Physical Exam    Constitutional:This is a well developed, well nourished, 25-29.9 - Overweight 57y.o. year old male who is alert, oriented, cooperative andin no apparent distress. Head:normocephalic and atraumatic. EENT:   RUSSELL. No conjunctival injections. Septum was midline, mucosa was without erythema, exudates or cobblestoning. No thrush was noted. Mallampati II (soft palate, uvula, fauces visible)  Neck: Supple without thyromegaly. No elevated JVP. Trachea was midline. Respiratory: Chest was symmetrical without dullness to percussion. Breath sounds bilaterally were clear to auscultation. There were no wheezes, rhonchi or rales. There isno intercostal retraction or use of accessory muscles. No egophony noted. Cardiovascular: Regular without murmur, clicks, gallops or rubs. Abdomen: Slightly rounded and soft without organomegaly. No rebound tenderness, rigidity or guarding wasappreciated. Lymphatic: Nolymphadenopathy. Musculoskeletal:Normal curvature of the spine. No gross muscle weakness. Extremities:  No lower extremity edema, ulcerations, tenderness, varicosities or erythema. Muscle size, tone andstrength are normal.  No involuntary movements are noted. Skin:  Warm and dry. Good color, turgor and pigmentation. No lesions or scars. Neurological/Psychiatric: The patient's general behavior, level of consciousness, thought content and emotional status is normal.       Labs:    CT scan of the chest without contrast was done on December 3 of 2020 and it showed-    1. Sub solid pulmonary nodules measuring up to 8 mm, unchanged from the   previous exam.  Follow-up imaging is recommended as per the recommendations   below. 2. Multiple stable solid pulmonary nodules measuring up to 6 mm, likely   benign.  Attention to these on future studies, recommended below is suggested.       RECOMMENDATIONS:   8.0 mm ground glass pulmonary nodule within the upper lobe. Recommend a   non-contrast Chest CT at 6-12 months to evaluate for persistence, then   additional non-contrast Chest CTs every 2 years until 5 years. CT scan of the chest for pulmonary embolism was done on July 28 of 2021 and it showed-  . No evidence of acute pulmonary embolism. 2. Recent postoperative findings associated with patient history of recent   hernia repair with mild scattered abdominal wall fascial and subcutaneous   emphysema and mild intraperitoneal free air, as discussed above. 3. Mild small bowel postop ileus. 4. Mild diffuse hepatic steatosis. 5. Bilateral renal simple cysts. 6. Prostatomegaly.  Recommend clinical correlation. 7. Stable bilateral lung multifocal solid and subsolid pulmonary nodules, as   discussed above.  Recommend adherence to follow-up as per prior CT chest   without contrast examination December 3, 2020. Assessment:    1. Multiple nodules of lung    2. BPH with urinary obstruction    3. History of COVID-19    4. Post-nasal drip      Because of one of the nodule being a groundglass lung nodule it may need a longer follow-up for up to 5 years or resolution whichever is earlier    PLAN:    Reviewed the CT scan of the chest done in July 2021  Refills were provided-Jo Khoury received counseling on the following healthy behaviors: nutrition, exercise and medication adherence  Recommend flu vaccination in the fall annually. Patient had the flu vaccination  Recommendations given regarding pneumococcal vaccinations. Patient had the COVID-19 vaccination  Patient is up-to-date with vaccinations from pulmonary perspective. Maintain an active lifestyle. All the questions that the patient has had were answered to his satisfaction. Home O2 evaluation was done. Supplemental oxygen was not needed.   CT scan of thechest was reviewed  Ordered a CT scan of the chest to be done in August 2022  After reviewing the patient's smoking history and his age patient does not meet the criteria for lung cancer screening. We'll see the patient back in 12 months or earlier if needed. Patient will call us if he is sick, so he can be seen sooner. Thank youfor having us involved in the care of your patient. Please call us if you have any questions or concerns.         Leona Butler MD, MD             10/11/2021, 9:01 PM

## 2021-10-22 ENCOUNTER — OFFICE VISIT (OUTPATIENT)
Dept: UROLOGY | Age: 61
End: 2021-10-22
Payer: COMMERCIAL

## 2021-10-22 VITALS
WEIGHT: 181 LBS | BODY MASS INDEX: 25.91 KG/M2 | SYSTOLIC BLOOD PRESSURE: 123 MMHG | HEIGHT: 70 IN | DIASTOLIC BLOOD PRESSURE: 82 MMHG | HEART RATE: 71 BPM

## 2021-10-22 DIAGNOSIS — R39.12 WEAK URINARY STREAM: ICD-10-CM

## 2021-10-22 DIAGNOSIS — N40.1 BENIGN LOCALIZED PROSTATIC HYPERPLASIA WITH LOWER URINARY TRACT SYMPTOMS (LUTS): Primary | ICD-10-CM

## 2021-10-22 DIAGNOSIS — R33.9 INCOMPLETE BLADDER EMPTYING: ICD-10-CM

## 2021-10-22 DIAGNOSIS — K40.90 UNILATERAL INGUINAL HERNIA WITHOUT OBSTRUCTION OR GANGRENE, RECURRENCE NOT SPECIFIED: ICD-10-CM

## 2021-10-22 LAB
BILIRUBIN, POC: NORMAL
BLOOD URINE, POC: NORMAL
CLARITY, POC: CLEAR
COLOR, POC: YELLOW
GLUCOSE URINE, POC: NORMAL
KETONES, POC: NORMAL
LEUKOCYTE EST, POC: NORMAL
NITRITE, POC: NORMAL
PH, POC: 6.5
PROTEIN, POC: NORMAL
SPECIFIC GRAVITY, POC: 1.01
UROBILINOGEN, POC: 0.2

## 2021-10-22 PROCEDURE — G8427 DOCREV CUR MEDS BY ELIG CLIN: HCPCS | Performed by: UROLOGY

## 2021-10-22 PROCEDURE — 1036F TOBACCO NON-USER: CPT | Performed by: UROLOGY

## 2021-10-22 PROCEDURE — G8482 FLU IMMUNIZE ORDER/ADMIN: HCPCS | Performed by: UROLOGY

## 2021-10-22 PROCEDURE — 3017F COLORECTAL CA SCREEN DOC REV: CPT | Performed by: UROLOGY

## 2021-10-22 PROCEDURE — 99212 OFFICE O/P EST SF 10 MIN: CPT

## 2021-10-22 PROCEDURE — G8417 CALC BMI ABV UP PARAM F/U: HCPCS | Performed by: UROLOGY

## 2021-10-22 PROCEDURE — 81002 URINALYSIS NONAUTO W/O SCOPE: CPT | Performed by: UROLOGY

## 2021-10-22 PROCEDURE — 99214 OFFICE O/P EST MOD 30 MIN: CPT | Performed by: UROLOGY

## 2021-10-22 RX ORDER — CIPROFLOXACIN 500 MG/1
500 TABLET, FILM COATED ORAL 2 TIMES DAILY
Qty: 14 TABLET | Refills: 1 | Status: SHIPPED | OUTPATIENT
Start: 2021-10-22 | End: 2021-12-02 | Stop reason: ALTCHOICE

## 2021-10-22 RX ORDER — TAMSULOSIN HYDROCHLORIDE 0.4 MG/1
0.4 CAPSULE ORAL 2 TIMES DAILY
Qty: 60 CAPSULE | Refills: 5 | Status: SHIPPED | OUTPATIENT
Start: 2021-10-22 | End: 2021-11-08 | Stop reason: SDUPTHER

## 2021-10-22 NOTE — PROGRESS NOTES
[facility] Doctor [facility]  [address] [address] [facility] [address]  Dept: [phone]  Dept Fax: [phone]    Dr. Cherri Hanks MD  [address] Urology Office Note - FU Patient    Patient:  Lita Dorantes  YOB: 1960  Date: 10/22/2021    The patient is a 57 y.o. male who presents today for evaluation of the following problems:   Chief Complaint   Patient presents with    Follow-up    Urinary Retention    referred/consultation requested by Nina Tian MD.      HISTORY OF PRESENT ILLNESS:     BPH        Wife is physician in [place], brother is urologist  Records obtained from overseas and reviewed today. Ultrasound from 2017-75 g prostate with prominent median lobe  Patient had a greenlight in 2020. He reports that recently has had slowing of the stream and hesitancy and inability to urinate. He is now restarted his Flomax twice a day. He has taken some antibiotics on and off with the symptoms as well. His urinalysis today does not show infection    Summary of Previous Records:    69-year-old male with obstructive lower urinary tract symptoms-thin stream, straining, incomplete emptying for the past 10 years.   Patient had a greenlight in 2020      Requested/reviewed records from Nina Tian MD office and/or outside physician/EMR    (Patient's old records have been requested, reviewed and pertinent findings summarized in today's note.)    Last several PSA's:  No results found for: PSA    Last total testosterone:  No results found for: TESTOSTERONE    Urinalysis today:  Results for POC orders placed in visit on 10/22/21   POCT Urinalysis no Micro   Result Value Ref Range    Color, UA yellow     Clarity, UA clear     Glucose, UA POC neg     Bilirubin, UA neg     Ketones, UA neg     Spec Grav, UA 1.015     Blood, UA POC neg     pH, UA 6.5     Protein, UA POC neg     Urobilinogen, UA 0.2     Leukocytes, UA neg     Nitrite, UA neg Negative for chest pain and leg swelling. Gastrointestinal: Negative for abdominal pain, constipation, diarrhea, nausea and vomiting. Genitourinary: Positive for difficulty urinating, dysuria, flank pain, frequency (mostly at night) and urgency. Negative for hematuria. Musculoskeletal: Positive for back pain (left side). Negative for joint swelling and myalgias. Skin: Negative for rash and wound. Neurological: Negative for dizziness, tremors and numbness. Hematological: Does not bruise/bleed easily. Physical Exam:    This a 57 y.o. male  Vitals:    10/22/21 0757   BP: 123/82   Pulse: 71     Body mass index is 25.97 kg/m². Constitutional: Patient in no acute distress;         Assessment and Plan        1. Benign localized prostatic hyperplasia with lower urinary tract symptoms (LUTS)    2. Unilateral inguinal hernia without obstruction or gangrene, recurrence not specified    3. Weak urinary stream    4. Incomplete bladder emptying               Plan:      Restart Flomax twice a day. Increase urinary symptoms that are bothersome. We will arrange cystoscopy with Dr. Telma Tello prescription as needed per patient request      Prescriptions Ordered:  Orders Placed This Encounter   Medications    ciprofloxacin (CIPRO) 500 MG tablet     Sig: Take 1 tablet by mouth 2 times daily     Dispense:  14 tablet     Refill:  1      Orders Placed:  Orders Placed This Encounter   Procedures    POCT Urinalysis no Onelia Yan M.D, MD     I have discussed the care of this patient including pertinent history and exam findings, with the resident. I have seen and examined the patient and the key elements of all parts of the encounter have been performed by me. I agree with the assessment, plan and orders as documented by the resident.     Johnna Biggs M.D, MD, MD

## 2021-11-08 RX ORDER — TAMSULOSIN HYDROCHLORIDE 0.4 MG/1
0.4 CAPSULE ORAL 2 TIMES DAILY
Qty: 60 CAPSULE | Refills: 5 | Status: SHIPPED | OUTPATIENT
Start: 2021-11-08 | End: 2022-02-07 | Stop reason: SDUPTHER

## 2021-11-26 DIAGNOSIS — I10 ESSENTIAL HYPERTENSION: ICD-10-CM

## 2021-11-26 DIAGNOSIS — E78.5 HYPERLIPIDEMIA, UNSPECIFIED HYPERLIPIDEMIA TYPE: ICD-10-CM

## 2021-11-29 NOTE — TELEPHONE ENCOUNTER
Patient requesting a refill on Hydrochlorothiazide 25 mg. Patient on the waitlist for an appt in September 2022.       Health Maintenance   Topic Date Due    DTaP/Tdap/Td vaccine (1 - Tdap) Never done    A1C test (Diabetic or Prediabetic)  09/04/2021    Lipid screen  09/04/2021    COVID-19 Vaccine (3 - Booster for Pfizer series) 10/02/2021    Shingles Vaccine (1 of 2) 01/28/2022 (Originally 6/1/2009)    Potassium monitoring  07/28/2022    Creatinine monitoring  07/28/2022    Colon cancer screen fecal DNA test (Cologuard)  09/08/2023    Flu vaccine  Completed    Hepatitis C screen  Completed    HIV screen  Completed    Hepatitis A vaccine  Aged Out    Hepatitis B vaccine  Aged Out    Hib vaccine  Aged Out    Meningococcal (ACWY) vaccine  Aged Out    Pneumococcal 0-64 years Vaccine  Aged Out             (applicable per patient's age: Cancer Screenings, Depression Screening, Fall Risk Screening, Immunizations)    Hemoglobin A1C (%)   Date Value   09/04/2020 5.9     LDL Cholesterol (mg/dL)   Date Value   09/04/2020 79     AST (U/L)   Date Value   07/28/2021 17     ALT (U/L)   Date Value   07/28/2021 18     BUN (mg/dL)   Date Value   07/28/2021 15      (goal A1C is < 7)   (goal LDL is <100) need 30-50% reduction from baseline     BP Readings from Last 3 Encounters:   10/22/21 123/82   10/07/21 123/87   09/01/21 114/74    (goal /80)      All Future Testing planned in [facility]:  Lab Frequency Next Occurrence   Lipid Panel Once 02/28/2022   US ABDOMEN LIMITED Once 06/23/2022   CT CHEST LOW DOSE (LDCT) Once 08/01/2022       Next Visit Date:  Future Appointments   Date Time Provider [facility] Kacey   12/1/2021  9:30 AM BASILIA Harris [address] [place] [address]   12/2/2021  9:45 AM Chiki Florian DPM [facility]   12/10/2021  2:00 PM Lee Goodpasture, MD sv gr lks [facility]   12/17/2021 10:30 AM SCHEDULE, PARTHP [facility] UROLOGY [facility] U[facility]   10/6/2022  1:30 PM Ramirez Maria MD

## 2021-11-29 NOTE — TELEPHONE ENCOUNTER
Patient requesting a refill on Rosuvastatin 10 mg. Patient on the waitlist for an appt in September 2022.       Health Maintenance   Topic Date Due    DTaP/Tdap/Td vaccine (1 - Tdap) Never done    A1C test (Diabetic or Prediabetic)  09/04/2021    Lipid screen  09/04/2021    COVID-19 Vaccine (3 - Booster for Pfizer series) 10/02/2021    Shingles Vaccine (1 of 2) 01/28/2022 (Originally 6/1/2009)    Potassium monitoring  07/28/2022    Creatinine monitoring  07/28/2022    Colon cancer screen fecal DNA test (Cologuard)  09/08/2023    Flu vaccine  Completed    Hepatitis C screen  Completed    HIV screen  Completed    Hepatitis A vaccine  Aged Out    Hepatitis B vaccine  Aged Out    Hib vaccine  Aged Out    Meningococcal (ACWY) vaccine  Aged Out    Pneumococcal 0-64 years Vaccine  Aged Out             (applicable per patient's age: Cancer Screenings, Depression Screening, Fall Risk Screening, Immunizations)    Hemoglobin A1C (%)   Date Value   09/04/2020 5.9     LDL Cholesterol (mg/dL)   Date Value   09/04/2020 79     AST (U/L)   Date Value   07/28/2021 17     ALT (U/L)   Date Value   07/28/2021 18     BUN (mg/dL)   Date Value   07/28/2021 15      (goal A1C is < 7)   (goal LDL is <100) need 30-50% reduction from baseline     BP Readings from Last 3 Encounters:   10/22/21 123/82   10/07/21 123/87   09/01/21 114/74    (goal /80)      All Future Testing planned in [facility]:  Lab Frequency Next Occurrence   Lipid Panel Once 02/28/2022   US ABDOMEN LIMITED Once 06/23/2022   CT CHEST LOW DOSE (LDCT) Once 08/01/2022       Next Visit Date:  Future Appointments   Date Time Provider Mindy Rhodesisti   12/1/2021  9:30 AM BASILIA Harris [address] [place] [address]   12/2/2021  9:45 AM Leta Tavera DPM [facility]   12/10/2021  2:00 PM Karsten Ames MD sv gr lks [facility]   12/17/2021 10:30 AM SCHEDULE, P [facility] UROLOGY [facility] Urology [facility]   10/6/2022  1:30 PM Kristina Cooley MD Resp Spec [email or web address]            Patient Active Problem List:     BPH with urinary obstruction     TIA (transient ischemic attack)     Gross hematuria     Hematuria     COVID-19     Vasovagal syncope     Postprocedural urinary retention     Intractable migraine with aura without status migrainosus     Post-op pain     Ileus ([address]

## 2021-11-29 NOTE — TELEPHONE ENCOUNTER
Patient requesting a refill on Ramipril 10 mg. Patient is on the waitlist for an appt for  September 2022.       Health Maintenance   Topic Date Due    DTaP/Tdap/Td vaccine (1 - Tdap) Never done    A1C test (Diabetic or Prediabetic)  09/04/2021    Lipid screen  09/04/2021    COVID-19 Vaccine (3 - Booster for Pfizer series) 10/02/2021    Shingles Vaccine (1 of 2) 01/28/2022 (Originally 6/1/2009)    Potassium monitoring  07/28/2022    Creatinine monitoring  07/28/2022    Colon cancer screen fecal DNA test (Cologuard)  09/08/2023    Flu vaccine  Completed    Hepatitis C screen  Completed    HIV screen  Completed    Hepatitis A vaccine  Aged Out    Hepatitis B vaccine  Aged Out    Hib vaccine  Aged Out    Meningococcal (ACWY) vaccine  Aged Out    Pneumococcal 0-64 years Vaccine  Aged Out             (applicable per patient's age: Cancer Screenings, Depression Screening, Fall Risk Screening, Immunizations)    Hemoglobin A1C (%)   Date Value   09/04/2020 5.9     LDL Cholesterol (mg/dL)   Date Value   09/04/2020 79     AST (U/L)   Date Value   07/28/2021 17     ALT (U/L)   Date Value   07/28/2021 18     BUN (mg/dL)   Date Value   07/28/2021 15      (goal A1C is < 7)   (goal LDL is <100) need 30-50% reduction from baseline     BP Readings from Last 3 Encounters:   10/22/21 123/82   10/07/21 123/87   09/01/21 114/74    (goal /80)      All Future Testing planned in [facility]:  Lab Frequency Next Occurrence   Lipid Panel Once 02/28/2022   US ABDOMEN LIMITED Once 06/23/2022   CT CHEST LOW DOSE (LDCT) Once 08/01/2022       Next Visit Date:  Future Appointments   Date Time Provider Mindy Trinidadi   12/1/2021  9:30 AM BASILIA Harris [address] [place] [address]   12/2/2021  9:45 AM Justin Mata DPM [facility]   12/10/2021  2:00 PM Lilian Alexis MD sv gr lks [facility]   12/17/2021 10:30 AM SCHEDULE, BASILIA [facility] UROLOGY [facility] Urology [facility]   10/6/2022  1:30 PM Martin Tovar MD Resp Spec [email or web address]            Patient Active Problem List:     BPH with urinary obstruction     TIA (transient ischemic attack)     Gross hematuria     Hematuria     COVID-19     Vasovagal syncope     Postprocedural urinary retention     Intractable migraine with aura without status migrainosus     Post-op pain     Ileus ([address]

## 2021-11-29 NOTE — TELEPHONE ENCOUNTER
Patient Active Problem List:     BPH with urinary obstruction     TIA (transient ischemic attack)     Gross hematuria     Hematuria     COVID-19     Vasovagal syncope     Postprocedural urinary retention     Intractable migraine with aura without status migrainosus     Post-op pain     Ileus ([facility] [address]

## 2021-11-30 RX ORDER — ASPIRIN 81 MG/1
81 TABLET ORAL DAILY
Qty: 30 TABLET | Refills: 5 | Status: SHIPPED | OUTPATIENT
Start: 2021-11-30 | End: 2022-02-07 | Stop reason: SDUPTHER

## 2021-11-30 RX ORDER — ROSUVASTATIN CALCIUM 10 MG/1
10 TABLET, COATED ORAL DAILY
Qty: 30 TABLET | Refills: 5 | Status: SHIPPED | OUTPATIENT
Start: 2021-11-30 | End: 2022-02-07 | Stop reason: SDUPTHER

## 2021-11-30 RX ORDER — HYDROCHLOROTHIAZIDE 25 MG/1
25 TABLET ORAL DAILY
Qty: 30 TABLET | Refills: 5 | Status: SHIPPED | OUTPATIENT
Start: 2021-11-30 | End: 2022-02-07 | Stop reason: SDUPTHER

## 2021-11-30 RX ORDER — RAMIPRIL 10 MG/1
10 CAPSULE ORAL DAILY
Qty: 30 CAPSULE | Refills: 3 | Status: SHIPPED | OUTPATIENT
Start: 2021-11-30 | End: 2022-02-07 | Stop reason: SDUPTHER

## 2021-12-02 ENCOUNTER — OFFICE VISIT (OUTPATIENT)
Dept: PODIATRY | Age: 61
End: 2021-12-02
Payer: COMMERCIAL

## 2021-12-02 VITALS — WEIGHT: 181 LBS | HEIGHT: 66 IN | BODY MASS INDEX: 29.09 KG/M2

## 2021-12-02 DIAGNOSIS — M79.675 PAIN OF TOES OF BOTH FEET: ICD-10-CM

## 2021-12-02 DIAGNOSIS — B35.1 ONYCHOMYCOSIS OF TOENAIL: Primary | ICD-10-CM

## 2021-12-02 DIAGNOSIS — M79.674 PAIN OF TOES OF BOTH FEET: ICD-10-CM

## 2021-12-02 PROCEDURE — 11721 DEBRIDE NAIL 6 OR MORE: CPT | Performed by: PODIATRIST

## 2021-12-02 ASSESSMENT — ENCOUNTER SYMPTOMS
NAUSEA: 0
DIARRHEA: 0
COLOR CHANGE: 0
SHORTNESS OF BREATH: 0
BACK PAIN: 0

## 2021-12-02 NOTE — PROGRESS NOTES
SUBJECTIVE: Ivanna Khoury is a 57 y.o. male who returns to the office with chief complaint of painful fungal toenails. Patient relates toe nails are thickened/difficult to trim as well as painful with ambulation and with shoe gear. Chief Complaint   Patient presents with    Nail Problem     nail trim/last saw Tori Held 9/23/21    Other     [facility] follow up      Review of Systems   Constitutional: Negative for activity change, appetite change, chills, diaphoresis, fatigue and fever. Respiratory: Negative for shortness of breath. Cardiovascular: Negative for leg swelling. Gastrointestinal: Negative for diarrhea and nausea. Endocrine: Negative for cold intolerance, heat intolerance and polyuria. Musculoskeletal: Positive for arthralgias. Negative for back pain, gait problem, joint swelling and myalgias. Skin: Negative for color change, pallor, rash and wound. Allergic/Immunologic: Negative for environmental allergies and food allergies. Neurological: Negative for dizziness, weakness, light-headedness and numbness. Hematological: Does not bruise/bleed easily. Psychiatric/Behavioral: Negative for behavioral problems, confusion and self-injury. The patient is not nervous/anxious. OBJECTIVE: Clinical evaluation of patient reveals nails 1,2,3,4,5 of the right foot and nails 1,2,3,4,5, of the left foot to present with thickness, elongation, discoloration, brittleness, and subungual debris. There was pain with palpation and debridement of the toenails of the bilateral feet. No open lesions noted to either foot today. Class A Findings (1 needed)   [] Non-traumatic amputation of foot or integral skeleton portion thereof. [] Q7.      Class B Findings (2 needed)   1. [] Absent posterior tibial pulse   2. [] Absent dorsalis pedis pulse   3.  [] Advanced trophic changes; three of the following are required:   ·         [] hair growth (decrease or absence)   ·         [] nail changes (thickening)   ·         [] pigmentary changes (discoloration)   ·         [] skin texture (thin, shiny)   ·         [] skin color (rubor or redness)   [] Q8.      Class C Findings (1 Class B, 2 Class C needed)   1. [] Claudication   2. [] Temperature changes   3. [] Edema   4. [] Paresthesia   5. [] Burning   [] Q9.     NO CLASS FINDINGS ARE NOTED    ASSESSMENT:    Diagnosis Orders   1. Onychomycosis of toenail  KY DEBRIDEMENT OF NAILS, 6 OR MORE    ciclopirox (PENLAC) 8 % solution   2. Pain of toes of both feet  KY DEBRIDEMENT OF NAILS, 6 OR MORE    ciclopirox (PENLAC) 8 % solution     PLAN: Toenails 1,2,3,4,5 of the right foot and 1,2,3,4,5 of the left foot were debrided in length and thickness using a nail nipper and a . Patient given a prescription for additional Penlac. Return in about 9 weeks (around 2/3/2022) for Painful fungal nails.    12/2/2021      Bhargavi Check, PEEWEE

## 2021-12-08 ENCOUNTER — OFFICE VISIT (OUTPATIENT)
Dept: SURGERY | Age: 61
End: 2021-12-08
Payer: COMMERCIAL

## 2021-12-08 VITALS
BODY MASS INDEX: 29.44 KG/M2 | HEART RATE: 70 BPM | DIASTOLIC BLOOD PRESSURE: 69 MMHG | WEIGHT: 183.2 LBS | SYSTOLIC BLOOD PRESSURE: 115 MMHG | HEIGHT: 66 IN | TEMPERATURE: 96.9 F

## 2021-12-08 DIAGNOSIS — Z98.890 S/P HERNIA REPAIR: Primary | ICD-10-CM

## 2021-12-08 DIAGNOSIS — Z87.19 S/P HERNIA REPAIR: Primary | ICD-10-CM

## 2021-12-08 PROCEDURE — 3017F COLORECTAL CA SCREEN DOC REV: CPT | Performed by: STUDENT IN AN ORGANIZED HEALTH CARE EDUCATION/TRAINING PROGRAM

## 2021-12-08 PROCEDURE — 99212 OFFICE O/P EST SF 10 MIN: CPT | Performed by: STUDENT IN AN ORGANIZED HEALTH CARE EDUCATION/TRAINING PROGRAM

## 2021-12-08 PROCEDURE — G8427 DOCREV CUR MEDS BY ELIG CLIN: HCPCS | Performed by: STUDENT IN AN ORGANIZED HEALTH CARE EDUCATION/TRAINING PROGRAM

## 2021-12-08 PROCEDURE — G8482 FLU IMMUNIZE ORDER/ADMIN: HCPCS | Performed by: STUDENT IN AN ORGANIZED HEALTH CARE EDUCATION/TRAINING PROGRAM

## 2021-12-08 PROCEDURE — 1036F TOBACCO NON-USER: CPT | Performed by: STUDENT IN AN ORGANIZED HEALTH CARE EDUCATION/TRAINING PROGRAM

## 2021-12-08 PROCEDURE — G8417 CALC BMI ABV UP PARAM F/U: HCPCS | Performed by: STUDENT IN AN ORGANIZED HEALTH CARE EDUCATION/TRAINING PROGRAM

## 2021-12-08 NOTE — PROGRESS NOTES
[facility] SURGERY CLINIC   PROGRESS NOTE    DATE: December 8, 2021     SUBJECTIVE:  Otoniel Fox is a 57 y.o. male who returns to the [facility] surgery clinic for evaluation of right groin pain. Patient underwent right robotic inguinal hernia repair 7/27/2021. He states his pain was present prior to, and after surgery. Patient states pain is sharp and located in the right inguinal area. It sometimes radiates into his right testicle. Pain is usually worse with straining. Patient states he also feels as if his right testicle is larger than the other. Patient has been tolerating a regular diet without any complaints of nausea or vomiting. He reports intermittent abdominal bloating, but states his bowel movements have been regular and denies any constipation or diarrhea. He denies any recent illness, fever, or chills. Past Medical History:   Diagnosis Date    Hyperlipidemia     Hypertension     Wellness examination     Dr. Nahun Canseco last seen 7/2021       Past Surgical History:   Procedure Laterality Date    CYSTOSCOPY N/A 07/30/2020    CYSTOSCOPY performed by Brandy Hoffman MD at [facility]. Right 7/27/2021    XI ROBOTIC LAPAROSCOPIC INGUINAL HERNIA REPAIR WITH MESH performed by Peter Sotelo IV, DO at [address] [facility] Right 07/27/2021    XI ROBOTIC LAPAROSCOPIC INGUINAL HERNIA REPAIR WITH MESH     TURP N/A 08/14/2020    CYSTO, TUR PROSTATE GREENLIGHT XPS performed by Brandy Hoffman MD at [address] [place] [address]      varicose vein of right leg    VASCULAR SURGERY      right leg       Current Outpatient Medications   Medication Sig Dispense Refill    ciclopirox (PENLAC) 8 % solution Apply topically nightly. Remove once weekly with alcohol or nail polish remover.  6 mL 3    aspirin 81 MG EC tablet Take 1 tablet by mouth daily 30 tablet 5    rosuvastatin (CRESTOR) 10 MG tablet Take 1 tablet by mouth daily 30 tablet 5    hydroCHLOROthiazide (HYDRODIURIL) 25 MG tablet Take 1 tablet by mouth daily 30 tablet 5    ramipril (ALTACE) 10 MG capsule Take 1 capsule by mouth daily 30 capsule 3    tamsulosin (FLOMAX) 0.4 MG capsule Take 1 capsule by mouth 2 times daily 60 capsule 5    fluticasone (FLONASE) 50 MCG/ACT nasal spray 2 sprays by Nasal route daily 16 g 11    ciclopirox (PENLAC) 8 % solution Apply topically nightly. Remove once weekly with alcohol or nail polish remover. 1 Bottle 3    Elastic Bandages & Supports (HERNIA BELT DOUBLE MEDIUM) MISC Use as needed for support of hernia 1 each 0    ciclopirox (PENLAC) 8 % solution Apply topically nightly. Remove once weekly with alcohol or nail polish remover. 1 Bottle 3    simethicone (MYLICON) 80 MG chewable tablet Take 1 tablet by mouth 4 times daily as needed for Flatulence 180 tablet 3    alpha-galactosidase (BEANO) TABS chewable tablet Take 1 tablet by mouth 3 times daily (with meals) 90 tablet 1     No current facility-administered medications for this visit. No Known Allergies    No family history on file. Social History     Socioeconomic History    Marital status:      Spouse name: Not on file    Number of children: Not on file    Years of education: Not on file    Highest education level: Not on file   Occupational History    Not on file   Tobacco Use    Smoking status: Never Smoker    Smokeless tobacco: Never Used   Vaping Use    Vaping Use: Never used   Substance and Sexual Activity    Alcohol use: Never    Drug use: Never    Sexual activity: Yes   Other Topics Concern    Not on file   Social History Narrative    Not on file     Social Determinants of Health     Financial Resource Strain: Low Risk     Difficulty of Paying Living Expenses: Not hard at all   Food Insecurity: No Food Insecurity    Worried About Running Out of Food in the Last Year: Never true    Clarke of Food in the Last Year: Never true   Transportation Needs: No Transportation Needs    Lack of Transportation (Medical):  No    Lack of Transportation (Non-Medical): No   Physical Activity:     Days of Exercise per Week: Not on file    Minutes of Exercise per Session: Not on file   Stress:     Feeling of Stress : Not on file   Social Connections:     Frequency of Communication with Friends and Family: Not on file    Frequency of Social Gatherings with Friends and Family: Not on file    Attends Faith Services: Not on file    Active Member of [address] [place] [address] or Organizations: Not on file    Attends Club or Organization Meetings: Not on file    Marital Status: Not on file   Intimate Partner Violence:     Fear of Current or Ex-Partner: Not on file    Emotionally Abused: Not on file    Physically Abused: Not on file    Sexually Abused: Not on file   Housing Stability:     Unable to Pay for Housing in the Last Year: Not on file    Number of [place] in the Last Year: Not on file    Unstable Housing in the Last Year: Not on file       ROS:  GEN: Denies recent weight loss, fatigue, fevers, chills. HEENT: No rhinorrhea, dysphagia, odynphagia. CV: No history of MI, recent chest pain. RESP: Denies shortness of breath, COPD, asthma. GI: Positive for right groin pain, denies nausea, vomiting, diarrhea. : Denies increased frequency or dysuria. HEM[de-identified] Denies history of anemia or DVTs. ENDO: Denies history of thyroid problems or diabetes. NEURO: Denies history of CVA, TIA. Notes reviewed, and agree with documentation in pt's chart. PHYSICAL EXAM:  Vitals:    12/08/21 0818   BP: 115/69   Pulse: 70   Temp: 96.9 °F (36.1 °C)     GEN: Alert and oriented x 3. In no acute distress. Appears stated age. HEENT: PERRLA, EOMI  NECK: trachea midline  HEART: Regular rate   LUNGS: symmetrical chest rise bilaterally  ABDOMEN: soft, non-distended, R testicle slightly larger than left - no masses palpated or overlying skin changes.  No inguinal hernia palpated bilaterally   EXT: no cyanosis, clubbing or edema present    NEURO: no focal deficits, gross

## 2021-12-08 NOTE — PATIENT INSTRUCTIONS
Thank you letting us take care of you today. We hope that all your questions were addressed. If a question was overlooked or something else comes to mind after you return home, please call our office at [phone]. If you need to cancel or change an appointment, surgery or procedure, please contact the office at [phone].

## 2022-02-07 DIAGNOSIS — B35.1 ONYCHOMYCOSIS OF TOENAIL: ICD-10-CM

## 2022-02-07 DIAGNOSIS — E78.5 HYPERLIPIDEMIA, UNSPECIFIED HYPERLIPIDEMIA TYPE: ICD-10-CM

## 2022-02-07 DIAGNOSIS — M79.674 PAIN OF TOES OF BOTH FEET: ICD-10-CM

## 2022-02-07 DIAGNOSIS — M79.675 PAIN OF TOES OF BOTH FEET: ICD-10-CM

## 2022-02-07 DIAGNOSIS — I10 ESSENTIAL HYPERTENSION: ICD-10-CM

## 2022-02-08 RX ORDER — HYDROCHLOROTHIAZIDE 25 MG/1
25 TABLET ORAL DAILY
Qty: 30 TABLET | Refills: 5 | Status: SHIPPED | OUTPATIENT
Start: 2022-02-08

## 2022-02-08 RX ORDER — RAMIPRIL 10 MG/1
10 CAPSULE ORAL DAILY
Qty: 30 CAPSULE | Refills: 3 | Status: SHIPPED | OUTPATIENT
Start: 2022-02-08 | End: 2022-08-18

## 2022-02-08 RX ORDER — TAMSULOSIN HYDROCHLORIDE 0.4 MG/1
0.4 CAPSULE ORAL 2 TIMES DAILY
Qty: 60 CAPSULE | Refills: 5 | Status: SHIPPED | OUTPATIENT
Start: 2022-02-08 | End: 2022-09-16 | Stop reason: SDUPTHER

## 2022-02-08 RX ORDER — ROSUVASTATIN CALCIUM 10 MG/1
10 TABLET, COATED ORAL DAILY
Qty: 30 TABLET | Refills: 5 | Status: SHIPPED | OUTPATIENT
Start: 2022-02-08

## 2022-02-08 RX ORDER — ASPIRIN 81 MG/1
81 TABLET ORAL DAILY
Qty: 30 TABLET | Refills: 5 | Status: SHIPPED | OUTPATIENT
Start: 2022-02-08

## 2022-02-08 NOTE — TELEPHONE ENCOUNTER
Patient requesting a refill on Rosuvastatin 10 mg. Patient is on the waitlist for an appt in September.       Health Maintenance   Topic Date Due    Depression Screen  Never done    DTaP/Tdap/Td vaccine (1 - Tdap) Never done    Shingles Vaccine (1 of 2) Never done    A1C test (Diabetic or Prediabetic)  09/04/2021    Lipid screen  09/04/2021    Potassium monitoring  07/28/2022    Creatinine monitoring  07/28/2022    Colon cancer screen fecal DNA test (Cologuard)  09/08/2023    Flu vaccine  Completed    COVID-19 Vaccine  Completed    Hepatitis C screen  Completed    HIV screen  Completed    Hepatitis A vaccine  Aged Out    Hepatitis B vaccine  Aged Out    Hib vaccine  Aged Out    Meningococcal (ACWY) vaccine  Aged Out    Pneumococcal 0-64 years Vaccine  Aged Out             (applicable per patient's age: Cancer Screenings, Depression Screening, Fall Risk Screening, Immunizations)    Hemoglobin A1C (%)   Date Value   09/04/2020 5.9     LDL Cholesterol (mg/dL)   Date Value   09/04/2020 79     AST (U/L)   Date Value   07/28/2021 17     ALT (U/L)   Date Value   07/28/2021 18     BUN (mg/dL)   Date Value   07/28/2021 15      (goal A1C is < 7)   (goal LDL is <100) need 30-50% reduction from baseline     BP Readings from Last 3 Encounters:   12/08/21 115/69   10/22/21 123/82   10/07/21 123/87    (goal /80)      All Future Testing planned in [facility]:  Lab Frequency Next Occurrence   Lipid Panel Once 02/28/2022   CT CHEST LOW DOSE (LDCT) Once 08/01/2022   US ABDOMEN LIMITED Once 06/08/2022   US EXTREMITY RIGHT NON VASC LIMITED Once 06/08/2022       Next Visit Date:  Future Appointments   Date Time Provider Mindy Erazo   2/24/2022 10:00 AM Catalino Posada DPM [address]            Patient Active Problem List:     BPH with urinary obstruction     TIA (transient ischemic attack)     Gross hematuria     Hematuria     COVID-19     Vasovagal syncope     Postprocedural urinary retention Intractable migraine with aura without status migrainosus     Post-op pain     Ileus ([address]

## 2022-02-08 NOTE — TELEPHONE ENCOUNTER
Patient requesting a refill on Aspirin 81 mg. Patient is on the waitlist for an appt in September.       Health Maintenance   Topic Date Due    Depression Screen  Never done    DTaP/Tdap/Td vaccine (1 - Tdap) Never done    Shingles Vaccine (1 of 2) Never done    A1C test (Diabetic or Prediabetic)  09/04/2021    Lipid screen  09/04/2021    Potassium monitoring  07/28/2022    Creatinine monitoring  07/28/2022    Colon cancer screen fecal DNA test (Cologuard)  09/08/2023    Flu vaccine  Completed    COVID-19 Vaccine  Completed    Hepatitis C screen  Completed    HIV screen  Completed    Hepatitis A vaccine  Aged Out    Hepatitis B vaccine  Aged Out    Hib vaccine  Aged Out    Meningococcal (ACWY) vaccine  Aged Out    Pneumococcal 0-64 years Vaccine  Aged Out             (applicable per patient's age: Cancer Screenings, Depression Screening, Fall Risk Screening, Immunizations)    Hemoglobin A1C (%)   Date Value   09/04/2020 5.9     LDL Cholesterol (mg/dL)   Date Value   09/04/2020 79     AST (U/L)   Date Value   07/28/2021 17     ALT (U/L)   Date Value   07/28/2021 18     BUN (mg/dL)   Date Value   07/28/2021 15      (goal A1C is < 7)   (goal LDL is <100) need 30-50% reduction from baseline     BP Readings from Last 3 Encounters:   12/08/21 115/69   10/22/21 123/82   10/07/21 123/87    (goal /80)      All Future Testing planned in [facility]:  Lab Frequency Next Occurrence   Lipid Panel Once 02/28/2022   CT CHEST LOW DOSE (LDCT) Once 08/01/2022   US ABDOMEN LIMITED Once 06/08/2022   US EXTREMITY RIGHT NON VASC LIMITED Once 06/08/2022       Next Visit Date:  Future Appointments   Date Time Provider Mindy Erazo   2/24/2022 10:00 AM Diamond Arauz, DPM [address]            Patient Active Problem List:     BPH with urinary obstruction     TIA (transient ischemic attack)     Gross hematuria     Hematuria     COVID-19     Vasovagal syncope     Postprocedural urinary retention Intractable migraine with aura without status migrainosus     Post-op pain     Ileus ([address]

## 2022-02-08 NOTE — TELEPHONE ENCOUNTER
Please Approve or Refuse.        Next Visit Date:  Visit date not found   Last Visit Date: 12/2/2021    Hemoglobin A1C (%)   Date Value   09/04/2020 5.9             ( goal A1C is < 7)   BP Readings from Last 3 Encounters:   12/08/21 115/69   10/22/21 123/82   10/07/21 123/87          (goal 120/80)  BUN   Date Value Ref Range Status   07/28/2021 15 8 - 23 mg/dL Final     CREATININE   Date Value Ref Range Status   07/28/2021 0.78 0.70 - 1.20 mg/dL Final     POC Creatinine   Date Value Ref Range Status   07/28/2021 0.93 0.51 - 1.19 mg/dL Final     Potassium   Date Value Ref Range Status   07/28/2021 3.1 (L) 3.7 - 5.3 mmol/L Final

## 2022-02-08 NOTE — TELEPHONE ENCOUNTER
Patient requesting a refill on Ramipril 10 mg. Patient is on the waitlist for an appt in September.       Health Maintenance   Topic Date Due    Depression Screen  Never done    DTaP/Tdap/Td vaccine (1 - Tdap) Never done    Shingles Vaccine (1 of 2) Never done    A1C test (Diabetic or Prediabetic)  09/04/2021    Lipid screen  09/04/2021    Potassium monitoring  07/28/2022    Creatinine monitoring  07/28/2022    Colon cancer screen fecal DNA test (Cologuard)  09/08/2023    Flu vaccine  Completed    COVID-19 Vaccine  Completed    Hepatitis C screen  Completed    HIV screen  Completed    Hepatitis A vaccine  Aged Out    Hepatitis B vaccine  Aged Out    Hib vaccine  Aged Out    Meningococcal (ACWY) vaccine  Aged Out    Pneumococcal 0-64 years Vaccine  Aged Out             (applicable per patient's age: Cancer Screenings, Depression Screening, Fall Risk Screening, Immunizations)    Hemoglobin A1C (%)   Date Value   09/04/2020 5.9     LDL Cholesterol (mg/dL)   Date Value   09/04/2020 79     AST (U/L)   Date Value   07/28/2021 17     ALT (U/L)   Date Value   07/28/2021 18     BUN (mg/dL)   Date Value   07/28/2021 15      (goal A1C is < 7)   (goal LDL is <100) need 30-50% reduction from baseline     BP Readings from Last 3 Encounters:   12/08/21 115/69   10/22/21 123/82   10/07/21 123/87    (goal /80)      All Future Testing planned in [facility]:  Lab Frequency Next Occurrence   Lipid Panel Once 02/28/2022   CT CHEST LOW DOSE (LDCT) Once 08/01/2022   US ABDOMEN LIMITED Once 06/08/2022   US EXTREMITY RIGHT NON VASC LIMITED Once 06/08/2022       Next Visit Date:  Future Appointments   Date Time Provider Mindy Erazo   2/24/2022 10:00 AM Adamaris Persaud DPM [address]            Patient Active Problem List:     BPH with urinary obstruction     TIA (transient ischemic attack)     Gross hematuria     Hematuria     COVID-19     Vasovagal syncope     Postprocedural urinary retention Intractable migraine with aura without status migrainosus     Post-op pain     Ileus ([address]

## 2022-02-08 NOTE — TELEPHONE ENCOUNTER
Patient requesting a refill on Hydrochlorothiazide 25 mg. Patient is on the waitlist for an appt in September.         Health Maintenance   Topic Date Due    Depression Screen  Never done    DTaP/Tdap/Td vaccine (1 - Tdap) Never done    Shingles Vaccine (1 of 2) Never done    A1C test (Diabetic or Prediabetic)  09/04/2021    Lipid screen  09/04/2021    Potassium monitoring  07/28/2022    Creatinine monitoring  07/28/2022    Colon cancer screen fecal DNA test (Cologuard)  09/08/2023    Flu vaccine  Completed    COVID-19 Vaccine  Completed    Hepatitis C screen  Completed    HIV screen  Completed    Hepatitis A vaccine  Aged Out    Hepatitis B vaccine  Aged Out    Hib vaccine  Aged Out    Meningococcal (ACWY) vaccine  Aged Out    Pneumococcal 0-64 years Vaccine  Aged Out             (applicable per patient's age: Cancer Screenings, Depression Screening, Fall Risk Screening, Immunizations)    Hemoglobin A1C (%)   Date Value   09/04/2020 5.9     LDL Cholesterol (mg/dL)   Date Value   09/04/2020 79     AST (U/L)   Date Value   07/28/2021 17     ALT (U/L)   Date Value   07/28/2021 18     BUN (mg/dL)   Date Value   07/28/2021 15      (goal A1C is < 7)   (goal LDL is <100) need 30-50% reduction from baseline     BP Readings from Last 3 Encounters:   12/08/21 115/69   10/22/21 123/82   10/07/21 123/87    (goal /80)      All Future Testing planned in [facility]:  Lab Frequency Next Occurrence   Lipid Panel Once 02/28/2022   CT CHEST LOW DOSE (LDCT) Once 08/01/2022   US ABDOMEN LIMITED Once 06/08/2022   US EXTREMITY RIGHT NON VASC LIMITED Once 06/08/2022       Next Visit Date:  Future Appointments   Date Time Provider Mindy Erazo   2/24/2022 10:00 AM Sarah Oliveira DPM [address]            Patient Active Problem List:     BPH with urinary obstruction     TIA (transient ischemic attack)     Gross hematuria     Hematuria     COVID-19     Vasovagal syncope     Postprocedural urinary retention     Intractable migraine with aura without status migrainosus     Post-op pain     Ileus ([address]

## 2022-02-24 ENCOUNTER — OFFICE VISIT (OUTPATIENT)
Dept: PODIATRY | Age: 62
End: 2022-02-24
Payer: COMMERCIAL

## 2022-02-24 VITALS — HEIGHT: 66 IN | WEIGHT: 180 LBS | BODY MASS INDEX: 28.93 KG/M2

## 2022-02-24 DIAGNOSIS — M79.674 PAIN OF TOES OF BOTH FEET: ICD-10-CM

## 2022-02-24 DIAGNOSIS — M79.675 PAIN OF TOES OF BOTH FEET: ICD-10-CM

## 2022-02-24 DIAGNOSIS — B35.1 ONYCHOMYCOSIS OF TOENAIL: Primary | ICD-10-CM

## 2022-02-24 PROCEDURE — 99999 PR OFFICE/OUTPT VISIT,PROCEDURE ONLY: CPT | Performed by: PODIATRIST

## 2022-02-24 PROCEDURE — 11721 DEBRIDE NAIL 6 OR MORE: CPT | Performed by: PODIATRIST

## 2022-02-24 NOTE — PROGRESS NOTES
SUBJECTIVE: Ivanna Khoury is a 57 y.o. male who returns to the office with chief complaint of painful fungal toenails. Patient relates toe nails are thickened/difficult to trim as well as painful with ambulation and with shoe gear. Patient states that he continues to use Penlac nail solution daily on his toenails. Chief Complaint   Patient presents with    Nail Problem     b/l nail trim/ last seen Dr. Dulce Valenzuela 9/23/2021     Review of Systems   Constitutional: Negative for activity change, appetite change, chills, diaphoresis, fatigue and fever. Respiratory: Negative for shortness of breath. Cardiovascular: Negative for leg swelling. Gastrointestinal: Negative for diarrhea and nausea. Endocrine: Negative for cold intolerance, heat intolerance and polyuria. Musculoskeletal: Positive for arthralgias. Negative for back pain, gait problem, joint swelling and myalgias. Skin: Negative for color change, pallor, rash and wound. Allergic/Immunologic: Negative for environmental allergies and food allergies. Neurological: Negative for dizziness, weakness, light-headedness and numbness. Hematological: Does not bruise/bleed easily. Psychiatric/Behavioral: Negative for behavioral problems, confusion and self-injury. The patient is not nervous/anxious. OBJECTIVE: Clinical evaluation of patient reveals nails 1,2,3,4,5 of the right foot and nails 1,2,3,4,5 of the left foot to present with thickness, elongation, discoloration, brittleness, and subungual debris. There was pain with palpation and debridement of the toenails of the bilateral feet. No open lesions noted to either foot today. Class A Findings (1 needed)   [] Non-traumatic amputation of foot or integral skeleton portion thereof. [] Q7.      Class B Findings (2 needed)   1. [] Absent posterior tibial pulse   2. [] Absent dorsalis pedis pulse   3.  [] Advanced trophic changes; three of the following are required:   ·         [] hair growth (decrease or absence)   ·         [] nail changes (thickening)   ·         [] pigmentary changes (discoloration)   ·         [] skin texture (thin, shiny)   ·         [] skin color (rubor or redness)   [] Q8.      Class C Findings (1 Class B, 2 Class C needed)   1. [] Claudication   2. [] Temperature changes   3. [] Edema   4. [] Paresthesia   5. [] Burning   [] Q9.     NO CLASS FINDINGS ARE NOTED    ASSESSMENT:    Diagnosis Orders   1. Onychomycosis of toenail  ciclopirox (PENLAC) 8 % solution    IA DEBRIDEMENT OF NAILS, 6 OR MORE   2. Pain of toes of both feet  ciclopirox (PENLAC) 8 % solution    IA DEBRIDEMENT OF NAILS, 6 OR MORE     PLAN: Toenails 1,2,3,4,5 of the right foot and 1,2,3,4,5 of the left foot were debrided in length and thickness using a nail nipper and a . Return in about 9 weeks (around 4/28/2022) for Painful fungal nails.    2/24/2022      Luan Tate DPM

## 2022-02-25 ASSESSMENT — ENCOUNTER SYMPTOMS
DIARRHEA: 0
SHORTNESS OF BREATH: 0
COLOR CHANGE: 0
NAUSEA: 0
BACK PAIN: 0

## 2022-05-08 NOTE — LETTER
[facility]Med [facility]  [address] [place] [address]  Phone: [phone]         August 16, 2020     Patient: Adri Soliman   YOB: 1960   Date of Visit: 8/14/20       To Whom It May Concern:    Ivanna Khoury was seen and treated in our emergency department on 8/14/20. The following work duties are recommended. He may return to work on 8/23/2020 with the following restrictions: lifting/carrying not to exceed 10 lbs. , pushing/pulling not to exceed 10 lbs.       *If the company does not have an occupational health provider, follow up should be at Harry Hough MD  [address]   [phone]    In 1 week            Sincerely,        Ana Steiner RN        Signature:__________________________________ intubated

## 2022-05-19 ENCOUNTER — OFFICE VISIT (OUTPATIENT)
Dept: INTERNAL MEDICINE | Age: 62
End: 2022-05-19
Payer: COMMERCIAL

## 2022-05-19 VITALS
HEART RATE: 83 BPM | HEIGHT: 66 IN | BODY MASS INDEX: 29.35 KG/M2 | DIASTOLIC BLOOD PRESSURE: 88 MMHG | WEIGHT: 182.6 LBS | SYSTOLIC BLOOD PRESSURE: 135 MMHG

## 2022-05-19 DIAGNOSIS — I73.9 PERIPHERAL ARTERIAL DISEASE (HCC): Primary | ICD-10-CM

## 2022-05-19 PROCEDURE — G8417 CALC BMI ABV UP PARAM F/U: HCPCS | Performed by: INTERNAL MEDICINE

## 2022-05-19 PROCEDURE — 3017F COLORECTAL CA SCREEN DOC REV: CPT | Performed by: INTERNAL MEDICINE

## 2022-05-19 PROCEDURE — 1036F TOBACCO NON-USER: CPT | Performed by: INTERNAL MEDICINE

## 2022-05-19 PROCEDURE — 99213 OFFICE O/P EST LOW 20 MIN: CPT | Performed by: INTERNAL MEDICINE

## 2022-05-19 PROCEDURE — G8427 DOCREV CUR MEDS BY ELIG CLIN: HCPCS | Performed by: INTERNAL MEDICINE

## 2022-05-19 ASSESSMENT — PATIENT HEALTH QUESTIONNAIRE - PHQ9
2. FEELING DOWN, DEPRESSED OR HOPELESS: 0
SUM OF ALL RESPONSES TO PHQ QUESTIONS 1-9: 0
SUM OF ALL RESPONSES TO PHQ9 QUESTIONS 1 & 2: 0
SUM OF ALL RESPONSES TO PHQ QUESTIONS 1-9: 0
1. LITTLE INTEREST OR PLEASURE IN DOING THINGS: 0

## 2022-05-19 ASSESSMENT — ENCOUNTER SYMPTOMS
ALLERGIC/IMMUNOLOGIC NEGATIVE: 1
RESPIRATORY NEGATIVE: 1
GASTROINTESTINAL NEGATIVE: 1
EYES NEGATIVE: 1

## 2022-05-19 NOTE — PROGRESS NOTES
[facility]   Progress Note      Date of patient's visit: 5/19/2022  Patient's Name:  Clint Lord                   YOB: 1960        PCP:  Paulina Diehl MD    Ivanna Khoury is a 57 y.o. male who presents for   Chief Complaint   Patient presents with    Foot Pain     rt foot pain     and follow up of chronic medical problems. Patient Active Problem List   Diagnosis    BPH with urinary obstruction    TIA (transient ischemic attack)    Gross hematuria    Hematuria    COVID-19    Vasovagal syncope    Postprocedural urinary retention    Intractable migraine with aura without status migrainosus    Post-op pain    Ileus (HCC)       HISTORY OF PRESENT ILLNESS:    History was obtained from the patient. Claudication  Patient presents for evaluation of right calf claudication that becomes symptomatic at 1-2 blocks. It has been present for the past a few weeks. Rest pain is not present. Ulceration is not present. Trental has not been tried. He was previously reporting treatment with stenting from [facility] in 2015. Had no problems since but now pain with ambulation is recurring. He is not a smoker. He is on ASA and Crestor. Patient's allergies, medications, past medical, surgical, social and family histories were reviewed and updated as appropriate. ALLERGIES    No Known Allergies      MEDICATIONS:      Current Outpatient Medications   Medication Sig Dispense Refill    ciclopirox (PENLAC) 8 % solution Apply topically nightly. Remove once weekly with alcohol or nail polish remover. 1 each 5    ciclopirox (PENLAC) 8 % solution Apply topically nightly. Remove once weekly with alcohol or nail polish remover.  6 mL 3    aspirin 81 MG EC tablet Take 1 tablet by mouth daily 30 tablet 5    rosuvastatin (CRESTOR) 10 MG tablet Take 1 tablet by mouth daily 30 tablet 5    hydroCHLOROthiazide (HYDRODIURIL) 25 MG tablet Take 1 tablet by mouth daily 30 tablet 5    ramipril (ALTACE) 10 MG capsule Take 1 capsule by mouth daily 30 capsule 3    tamsulosin (FLOMAX) 0.4 MG capsule Take 1 capsule by mouth 2 times daily 60 capsule 5    fluticasone (FLONASE) 50 MCG/ACT nasal spray 2 sprays by Nasal route daily 16 g 11    ciclopirox (PENLAC) 8 % solution Apply topically nightly. Remove once weekly with alcohol or nail polish remover. 1 Bottle 3    Elastic Bandages & Supports (HERNIA BELT DOUBLE MEDIUM) MISC Use as needed for support of hernia 1 each 0    ciclopirox (PENLAC) 8 % solution Apply topically nightly. Remove once weekly with alcohol or nail polish remover. 1 Bottle 3    simethicone (MYLICON) 80 MG chewable tablet Take 1 tablet by mouth 4 times daily as needed for Flatulence 180 tablet 3    alpha-galactosidase (BEANO) TABS chewable tablet Take 1 tablet by mouth 3 times daily (with meals) 90 tablet 1     No current facility-administered medications for this visit.        Patient Care Team:  Lee Ann Zamora MD as PCP - General (Internal Medicine)  Lee Ann Zamora MD as PCP - [facility] Provider  Malcahi Castellanos MD as Consulting Physician (Gastroenterology)    PAST MEDICAL AND SURGICAL HISTORY:      Past Medical History:   Diagnosis Date    Hyperlipidemia     Hypertension     Wellness examination     Dr. Walton last seen 7/2021     Past Surgical History:   Procedure Laterality Date    CYSTOSCOPY N/A 07/30/2020    CYSTOSCOPY performed by Gloria Gibson MD at [place] [address] Right 7/27/2021    XI ROBOTIC [place] performed by Pratibha Sotelo IV, DO at [address] Right 07/27/2021    XI ROBOTIC LAPAROSCOPIC INGUINAL HERNIA REPAIR WITH MESH     TURP N/A 08/14/2020    CYSTO, TUR PROSTATE GREENLIGHT XPS performed by Gloria Gibson MD at [facility]      varicose vein of right leg    VASCULAR SURGERY      right leg       SOCIAL HISTORY      Social History     Tobacco Use    Smoking status: Never Smoker TRIG 86 09/04/2020    HDL 49 09/04/2020     Thyroid functions:   Lab Results   Component Value Date    TSH 0.86 12/03/2020      Hepatic functions:   Lab Results   Component Value Date    ALT 18 07/28/2021    AST 17 07/28/2021    PROT 7.3 07/28/2021    BILITOT 0.56 07/28/2021    BILIDIR 0.09 06/18/2021    LABALBU 4.5 07/28/2021     Urine Analysis: No results found for: [address] [address]:      Health Maintenance Due   Topic Date Due    DTaP/Tdap/Td vaccine (1 - Tdap) Never done    A1C test (Diabetic or Prediabetic)  09/04/2021    Lipids  09/04/2021    Depression Screen  01/29/2022       ASSESSMENT AND PLAN:       Diagnosis Orders   1. Peripheral arterial disease (HCC)  Deejay Carrasco MD, Vascular Surgery, [address] [place] [address] ([address] [place] [address]) [facility]   patient to monitor BP at home. Continue meds            FOLLOW UP:   1. Ivanna received counseling on the following healthy behaviors: nutrition and exercise    2. Reviewed prior labs and health maintenance. 3.  Discussed use, benefit, and side effects of prescribed medications. Barriers to medication compliance addressed. All patient questions answered. Pt voiced understanding. 4.  Continue current medications, diet and exercise. No orders of the defined types were placed in this encounter. Completed Refills               Requested Prescriptions      No prescriptions requested or ordered in this encounter       5. Patient given educational materials - see patient instructions    6. Was a self-tracking handout given in paper form or via [employer]? Yes  If yes, see orders or list here.       Orders Placed This Encounter   Procedures   Deejay Carrasco MD, Vascular Surgery, [facility]     Referral Priority:   Routine     Referral Type:   Eval and Treat     Referral Reason:   Specialty Services Required     Referred to Provider:   Helena Al MD     Requested Specialty:   Vascular Surgery     Number of Visits Requested:   1       No follow-ups on file. Patient voiced understanding and agreed to treatment plan. This note is created with the assistance of a speech-recognition program. While intending to generate a document that actually reflects the content of the visit, the document can still have some mistakes which may not have been identified and corrected by editing.       Dr Savannah Waterman MD, [facility] , Department of Internal Medicine  Resident Ambulatory Site Medical Director  [facility] Internal Medicine  [address] [place] [address],[address]  Internal Medicine Clerkship - [facility]                   5/19/2022, 2:15 PM

## 2022-05-20 ENCOUNTER — HOSPITAL ENCOUNTER (OUTPATIENT)
Dept: ULTRASOUND IMAGING | Age: 62
Discharge: HOME | End: 2022-05-22
Payer: COMMERCIAL

## 2022-05-20 DIAGNOSIS — Z98.890 S/P HERNIA REPAIR: ICD-10-CM

## 2022-05-20 DIAGNOSIS — I73.9 PAD (PERIPHERAL ARTERY DISEASE) (HCC): Primary | ICD-10-CM

## 2022-05-20 DIAGNOSIS — Z87.19 S/P HERNIA REPAIR: ICD-10-CM

## 2022-05-20 PROCEDURE — 76882 US LMTD JT/FCL EVL NVASC XTR: CPT

## 2022-05-23 ENCOUNTER — TELEPHONE (OUTPATIENT)
Dept: INTERNAL MEDICINE | Age: 62
End: 2022-05-23

## 2022-05-23 DIAGNOSIS — I10 ESSENTIAL HYPERTENSION: Primary | ICD-10-CM

## 2022-05-23 NOTE — TELEPHONE ENCOUNTER
PC from patient requesting an DME order for blood pressure cuff patient stated he has an history of HTN for x17 years he would like order faxed to [facility] when placed. As of patient medical problem list HTN isn't noted.

## 2022-05-24 RX ORDER — MEDICAL SUPPLY, MISCELLANEOUS
EACH MISCELLANEOUS
Qty: 1 EACH | Refills: 0 | Status: SHIPPED | OUTPATIENT
Start: 2022-05-24

## 2022-05-26 NOTE — TELEPHONE ENCOUNTER
Script for blood pressure kit faxed to [facility] along with office notes. Confirmation received. Paperwork scanned into patients chart.

## 2022-06-03 ENCOUNTER — HOSPITAL ENCOUNTER (OUTPATIENT)
Dept: VASCULAR LAB | Age: 62
Discharge: HOME | End: 2022-06-03
Payer: COMMERCIAL

## 2022-06-03 DIAGNOSIS — I73.9 PAD (PERIPHERAL ARTERY DISEASE) (HCC): ICD-10-CM

## 2022-06-03 PROCEDURE — 93923 UPR/LXTR ART STDY 3+ LVLS: CPT

## 2022-07-07 ENCOUNTER — INITIAL CONSULT (OUTPATIENT)
Dept: VASCULAR SURGERY | Age: 62
End: 2022-07-07
Payer: COMMERCIAL

## 2022-07-07 VITALS
SYSTOLIC BLOOD PRESSURE: 120 MMHG | OXYGEN SATURATION: 99 % | BODY MASS INDEX: 29.05 KG/M2 | HEART RATE: 82 BPM | WEIGHT: 180.78 LBS | RESPIRATION RATE: 18 BRPM | DIASTOLIC BLOOD PRESSURE: 84 MMHG | HEIGHT: 66 IN | TEMPERATURE: 97.8 F

## 2022-07-07 DIAGNOSIS — I87.2 VENOUS INSUFFICIENCY OF RIGHT LEG: ICD-10-CM

## 2022-07-07 DIAGNOSIS — I87.2 CHRONIC VENOUS INSUFFICIENCY OF LOWER EXTREMITY: Primary | ICD-10-CM

## 2022-07-07 PROCEDURE — 3017F COLORECTAL CA SCREEN DOC REV: CPT | Performed by: SURGERY

## 2022-07-07 PROCEDURE — 1036F TOBACCO NON-USER: CPT | Performed by: SURGERY

## 2022-07-07 PROCEDURE — G8417 CALC BMI ABV UP PARAM F/U: HCPCS | Performed by: SURGERY

## 2022-07-07 PROCEDURE — 99204 OFFICE O/P NEW MOD 45 MIN: CPT | Performed by: SURGERY

## 2022-07-07 PROCEDURE — G8427 DOCREV CUR MEDS BY ELIG CLIN: HCPCS | Performed by: SURGERY

## 2022-07-07 ASSESSMENT — ENCOUNTER SYMPTOMS
ABDOMINAL PAIN: 0
EYE ITCHING: 0
VOMITING: 0
CHEST TIGHTNESS: 0
COLOR CHANGE: 0
SHORTNESS OF BREATH: 0
FACIAL SWELLING: 0
NAUSEA: 0

## 2022-07-07 NOTE — PROGRESS NOTES
Division of Vascular Surgery        New Consult      Physician Requesting Consult: Dr. Bettie Art    Reason for Consult:   Right leg pain, previous vascular procedure    Chief Complaint:      Some right leg pain    History of Present Illness:      Ivanna Khoury is a 60 y.o. gentleman who presents with right sided leg pain which he states is present more so after a long day of walking or standing. He gets some pain in his right hip as well as sometimes in his right calf. He states he is abl to walk long distances without pain. He states he had a vascular procedure done in [facility] many years ago after he was having pain in his right leg. That procedure appeared to be a vein stripping based on the location of his surgical scars and history. Patient states that he has gotten some relief from his pain from supportive socks. Denies prior DVT or pulmonary embolism. No weakness,numbness/tingling or wound son his legs to suggest ischemic rest pain. Medical History:     Past Medical History:   Diagnosis Date    Hyperlipidemia     Hypertension     Wellness examination     Dr. Bettie Art last seen 7/2021       Surgical History:     Past Surgical History:   Procedure Laterality Date    CYSTOSCOPY N/A 07/30/2020    CYSTOSCOPY performed by Darrell Kimble MD at [address] Right 7/27/2021    XI ROBOTIC LAPAROSCOPIC INGUINAL HERNIA REPAIR WITH MESH performed by Julio Sotelo IV, DO at [address] Right 07/27/2021    XI ROBOTIC LAPAROSCOPIC INGUINAL HERNIA REPAIR WITH MESH     TURP N/A 08/14/2020    CYSTO, TUR PROSTATE GREENLIGHT XPS performed by Darrell Kimble MD at [address] [facility]      varicose vein of right leg    VASCULAR SURGERY      right leg       Family History:     No family history on file. Allergies:       Patient has no known allergies.     Medications:      Current Outpatient Medications   Medication Sig Dispense Refill    Elastic Bandages & Supports (JOBST FOR MEN KNEE HIGH/LG) MISC Wear daily, remove in the evenings 2 each 3    Blood Pressure Monitoring (B-D ASSURE BPM/AUTO ARM CUFF) MISC Use to monitor BP routinely 1 each 0    Elastic Bandages & Supports (MEDICAL COMPRESSION STOCKINGS) MISC Compression stockings, thigh high. 20-30 mm Hg 1 each 0    ciclopirox (PENLAC) 8 % solution Apply topically nightly. Remove once weekly with alcohol or nail polish remover. 6 mL 3    aspirin 81 MG EC tablet Take 1 tablet by mouth daily 30 tablet 5    rosuvastatin (CRESTOR) 10 MG tablet Take 1 tablet by mouth daily 30 tablet 5    hydroCHLOROthiazide (HYDRODIURIL) 25 MG tablet Take 1 tablet by mouth daily 30 tablet 5    ramipril (ALTACE) 10 MG capsule Take 1 capsule by mouth daily 30 capsule 3    fluticasone (FLONASE) 50 MCG/ACT nasal spray 2 sprays by Nasal route daily 16 g 11    ciclopirox (PENLAC) 8 % solution Apply topically nightly. Remove once weekly with alcohol or nail polish remover. 1 Bottle 3    simethicone (MYLICON) 80 MG chewable tablet Take 1 tablet by mouth 4 times daily as needed for Flatulence 180 tablet 3    alpha-galactosidase (BEANO) TABS chewable tablet Take 1 tablet by mouth 3 times daily (with meals) 90 tablet 1    tamsulosin (FLOMAX) 0.4 MG capsule Take 1 capsule by mouth 2 times daily 60 capsule 5     No current facility-administered medications for this visit. Social History:     Tobacco:    reports that he has never smoked. He has never used smokeless tobacco.  Alcohol:      reports no history of alcohol use. Drug Use:  reports no history of drug use. Review of Systems:     Review of Systems   Constitutional: Negative for activity change, chills and fever. HENT: Negative for drooling, ear discharge and facial swelling. Eyes: Negative for itching. Respiratory: Negative for chest tightness and shortness of breath. Cardiovascular: Negative for chest pain and leg swelling.    Gastrointestinal: Negative for abdominal pain, nausea and vomiting. Genitourinary: Negative for difficulty urinating and flank pain. Musculoskeletal: Negative for gait problem and joint swelling. Skin: Negative for color change and wound. Allergic/Immunologic: Negative for immunocompromised state. Neurological: Negative for facial asymmetry and speech difficulty. Hematological: Does not bruise/bleed easily. Psychiatric/Behavioral: Negative for agitation and behavioral problems. Physical Exam:     Vitals:  /84 (Site: Right Upper Arm, Position: Sitting, Cuff Size: Large Adult)   Pulse 82   Temp 97.8 °F (36.6 °C) (Temporal)   Resp 18   Ht 5' 6\" (1.676 m)   Wt 180 lb 12.4 oz (82 kg)   SpO2 99%   BMI 29.18 kg/m²     Physical Exam  Constitutional:       General: He is not in acute distress. Appearance: He is not toxic-appearing. HENT:      Head: Normocephalic and atraumatic. Right Ear: External ear normal.      Left Ear: External ear normal.      Nose: Nose normal.      Mouth/Throat:      Mouth: Mucous membranes are moist.      Pharynx: Oropharynx is clear. Eyes:      Extraocular Movements: Extraocular movements intact. Conjunctiva/sclera: Conjunctivae normal.      Pupils: Pupils are equal, round, and reactive to light. Neck:      Vascular: No carotid bruit. Cardiovascular:      Rate and Rhythm: Normal rate and regular rhythm. Pulses: Normal pulses. Dorsalis pedis pulses are 2+ on the right side and 2+ on the left side. Posterior tibial pulses are 2+ on the right side and 2+ on the left side. Pulmonary:      Effort: Pulmonary effort is normal. No respiratory distress. Chest:      Chest wall: No tenderness. Abdominal:      General: There is no distension. Palpations: Abdomen is soft. Tenderness: There is no abdominal tenderness. Musculoskeletal:         General: No tenderness. Normal range of motion. Cervical back: Full passive range of motion without pain.       Right lower leg: No swelling or tenderness. No edema. Left lower leg: No swelling or tenderness. No edema. Right foot: Normal capillary refill. No swelling or tenderness. Left foot: Normal capillary refill. No swelling or tenderness. Feet:      Right foot:      Skin integrity: No ulcer or skin breakdown. Left foot:      Skin integrity: No ulcer or skin breakdown. Skin:     General: Skin is warm and dry. Capillary Refill: Capillary refill takes less than 2 seconds. Neurological:      General: No focal deficit present. Mental Status: He is alert and oriented to person, place, and time. GCS: GCS eye subscore is 4. GCS verbal subscore is 5. GCS motor subscore is 6. Sensory: Sensation is intact. Motor: Motor function is intact. Psychiatric:         Mood and Affect: Mood normal.         Speech: Speech normal.         Behavior: Behavior normal.         Thought Content: Thought content normal.       Imaging/Labs:     Normal PVRs and MICKI of bilateral lower extremities        Assessment and Plan:     59 yo male with venous insufficiency  · Recommend compression stockings, prescription given and recommendation to obtain some online as well  · Wear daily remove in the evenings  · Not concerned regarding any arterial insufficiency or concerns for DVT currently  · Follow up as needed    Electronically signed by Adam Posadas MD on 7/11/2022 at 4:48 PM      [address]  Office: [phone]  Cell: [phone]  Email: [email or web address]

## 2022-07-11 PROBLEM — I87.2 CHRONIC VENOUS INSUFFICIENCY OF LOWER EXTREMITY: Status: ACTIVE | Noted: 2022-07-11

## 2022-07-11 PROBLEM — I87.2 VENOUS INSUFFICIENCY OF RIGHT LEG: Status: ACTIVE | Noted: 2022-07-11

## 2022-08-17 NOTE — TELEPHONE ENCOUNTER
E-scribe request from [address] [place] [address] for Ramipril 10 mg. Patient is on the waitlist for an appt. Health Maintenance   Topic Date Due    DTaP/Tdap/Td vaccine (1 - Tdap) Never done    A1C test (Diabetic or Prediabetic)  09/04/2021    Lipids  09/04/2021    COVID-19 Vaccine (4 - Booster for Pfizer series) 02/14/2022    Shingles vaccine (1 of 2) 05/19/2023 (Originally 6/1/2009)    Flu vaccine (1) 09/01/2022    Depression Screen  05/19/2023    Colorectal Cancer Screen  09/08/2023    Hepatitis C screen  Completed    HIV screen  Completed    Hepatitis A vaccine  Aged Out    Hepatitis B vaccine  Aged Out    Hib vaccine  Aged Out    Meningococcal (ACWY) vaccine  Aged Out    Pneumococcal 0-64 years Vaccine  Aged Out             (applicable per patient's age: Cancer Screenings, Depression Screening, Fall Risk Screening, Immunizations)    Hemoglobin A1C (%)   Date Value   09/04/2020 5.9     LDL Cholesterol (mg/dL)   Date Value   09/04/2020 79     AST (U/L)   Date Value   07/28/2021 17     ALT (U/L)   Date Value   07/28/2021 18     BUN (mg/dL)   Date Value   07/28/2021 15      (goal A1C is < 7)   (goal LDL is <100) need 30-50% reduction from baseline     BP Readings from Last 3 Encounters:   07/07/22 120/84   05/19/22 135/88   12/08/21 115/69    (goal /80)      All Future Testing planned in [facility]:  Lab Frequency Next Occurrence   Lipid Panel Once 04/05/2023   CT CHEST LOW DOSE (LDCT) Once 08/01/2022       Next Visit Date:  No future appointments.          Patient Active Problem List:     BPH with urinary obstruction     TIA (transient ischemic attack)     Gross hematuria     Hematuria     COVID-19     Vasovagal syncope     Postprocedural urinary retention     Intractable migraine with aura without status migrainosus     Post-op pain     Ileus (HCC)     Chronic venous insufficiency of lower extremity     Venous insufficiency of right leg

## 2022-08-18 RX ORDER — RAMIPRIL 10 MG/1
CAPSULE ORAL
Qty: 30 CAPSULE | Refills: 3 | Status: SHIPPED | OUTPATIENT
Start: 2022-08-18

## 2022-09-08 ENCOUNTER — HOSPITAL ENCOUNTER (EMERGENCY)
Age: 62
Discharge: HOME | End: 2022-09-08
Attending: EMERGENCY MEDICINE
Payer: COMMERCIAL

## 2022-09-08 ENCOUNTER — APPOINTMENT (OUTPATIENT)
Dept: ULTRASOUND IMAGING | Age: 62
End: 2022-09-08
Payer: COMMERCIAL

## 2022-09-08 VITALS
TEMPERATURE: 97.2 F | RESPIRATION RATE: 14 BRPM | HEART RATE: 78 BPM | DIASTOLIC BLOOD PRESSURE: 91 MMHG | OXYGEN SATURATION: 98 % | SYSTOLIC BLOOD PRESSURE: 150 MMHG

## 2022-09-08 DIAGNOSIS — N45.1 EPIDIDYMITIS: Primary | ICD-10-CM

## 2022-09-08 LAB
BILIRUBIN URINE: NEGATIVE
COLOR: YELLOW
EPITHELIAL CELLS UA: NORMAL /HPF (ref 0–5)
GLUCOSE URINE: NEGATIVE
KETONES, URINE: ABNORMAL
LEUKOCYTE ESTERASE, URINE: NEGATIVE
NITRITE, URINE: NEGATIVE
PH UA: 6 (ref 5–8)
PROTEIN UA: NEGATIVE
RBC UA: NORMAL /HPF (ref 0–4)
SPECIFIC GRAVITY UA: 1.02 (ref 1–1.03)
TURBIDITY: CLEAR
URINE HGB: ABNORMAL
UROBILINOGEN, URINE: NORMAL
WBC UA: NORMAL /HPF (ref 0–5)

## 2022-09-08 PROCEDURE — 81001 URINALYSIS AUTO W/SCOPE: CPT

## 2022-09-08 PROCEDURE — 99284 EMERGENCY DEPT VISIT MOD MDM: CPT

## 2022-09-08 PROCEDURE — 76870 US EXAM SCROTUM: CPT

## 2022-09-08 RX ORDER — ACETAMINOPHEN 500 MG
500 TABLET ORAL 4 TIMES DAILY PRN
Qty: 30 TABLET | Refills: 0 | Status: SHIPPED | OUTPATIENT
Start: 2022-09-08

## 2022-09-08 RX ORDER — IBUPROFEN 600 MG/1
600 TABLET ORAL 4 TIMES DAILY PRN
Qty: 30 TABLET | Refills: 0 | Status: SHIPPED | OUTPATIENT
Start: 2022-09-08

## 2022-09-08 RX ORDER — LEVOFLOXACIN 500 MG/1
500 TABLET, FILM COATED ORAL DAILY
Qty: 10 TABLET | Refills: 0 | Status: SHIPPED | OUTPATIENT
Start: 2022-09-08 | End: 2022-09-18

## 2022-09-08 RX ORDER — MORPHINE SULFATE 4 MG/ML
4 INJECTION, SOLUTION INTRAMUSCULAR; INTRAVENOUS
Status: DISCONTINUED | OUTPATIENT
Start: 2022-09-08 | End: 2022-09-08

## 2022-09-08 RX ORDER — ACETAMINOPHEN 500 MG
1000 TABLET ORAL ONCE
Status: DISCONTINUED | OUTPATIENT
Start: 2022-09-08 | End: 2022-09-08 | Stop reason: HOSPADM

## 2022-09-08 RX ORDER — IBUPROFEN 400 MG/1
600 TABLET ORAL ONCE
Status: DISCONTINUED | OUTPATIENT
Start: 2022-09-08 | End: 2022-09-08 | Stop reason: HOSPADM

## 2022-09-08 NOTE — ED PROVIDER NOTES
Alcohol use: Never    Drug use: Never    Sexual activity: Yes   Other Topics Concern    Not on file   Social History Narrative    Not on file     Social Determinants of Health     Financial Resource Strain: Not on file   Food Insecurity: Not on file   Transportation Needs: Not on file   Physical Activity: Not on file   Stress: Not on file   Social Connections: Not on file   Intimate Partner Violence: Not on file   Housing Stability: Not on file       No family history on file. Allergies:  Patient has no known allergies. Home Medications:  Prior to Admission medications    Medication Sig Start Date End Date Taking? Authorizing Provider   levoFLOXacin (LEVAQUIN) 500 MG tablet Take 1 tablet by mouth daily for 10 days 9/8/22 9/18/22 Yes Minnette Kayser, MD   acetaminophen (TYLENOL) 500 MG tablet Take 1 tablet by mouth 4 times daily as needed for Pain 9/8/22  Yes Minnette Kayser, MD   ibuprofen (ADVIL;MOTRIN) 600 MG tablet Take 1 tablet by mouth 4 times daily as needed for Pain 9/8/22  Yes Minnette Kayser, MD   ramipril (ALTACE) 10 MG capsule take 1 capsule by mouth once daily 8/18/22   Kristne Aaron MD   Elastic Bandages & Supports (JOBST FOR MEN KNEE HIGH/LG) MISC Wear daily, remove in the evenings 7/7/22   Anand Davis MD   Blood Pressure Monitoring (B-D ASSURE BPM/AUTO ARM CUFF) MISC Use to monitor BP routinely 5/24/22   Kristen Aaron MD   Elastic Bandages & Supports (MEDICAL COMPRESSION STOCKINGS) MISC Compression stockings, thigh high. 20-30 mm Hg 5/19/22   Kristen Aaron MD   ciclopirox (PENLAC) 8 % solution Apply topically nightly. Remove once weekly with alcohol or nail polish remover.  2/8/22   Bruno Boyce DPM   aspirin 81 MG EC tablet Take 1 tablet by mouth daily 2/8/22   Kristen Aaron MD   rosuvastatin (CRESTOR) 10 MG tablet Take 1 tablet by mouth daily 2/8/22   Kristen Aaron MD   hydroCHLOROthiazide (HYDRODIURIL) 25 MG tablet Take 1 tablet by mouth daily 2/8/22   Kristen Aaron MD atraumatic. Right Ear: External ear normal.      Left Ear: External ear normal.      Nose: Nose normal.      Mouth/Throat:      Mouth: Mucous membranes are moist.      Pharynx: Oropharynx is clear. Eyes:      Conjunctiva/sclera: Conjunctivae normal.      Pupils: Pupils are equal, round, and reactive to light. Cardiovascular:      Rate and Rhythm: Normal rate and regular rhythm. Pulses: Normal pulses. Heart sounds: Normal heart sounds. Pulmonary:      Effort: Pulmonary effort is normal.      Breath sounds: Normal breath sounds. Abdominal:      General: Abdomen is flat. Palpations: Abdomen is soft. Tenderness: There is abdominal tenderness (right lower quadrant). There is no right CVA tenderness, left CVA tenderness, guarding or rebound. Genitourinary:     Penis: Normal.       Comments: Right scrotal swelling and mild tenderness, no erythema, no palpable hernia present BL but mild inguinal swelling on right, tenderness to palpation from right testicle tracking up to right pelvic region  Musculoskeletal:         General: Normal range of motion. Cervical back: Normal range of motion and neck supple. Skin:     General: Skin is warm and dry. Capillary Refill: Capillary refill takes less than 2 seconds. Neurological:      General: No focal deficit present. Mental Status: He is alert and oriented to person, place, and time.        DIFFERENTIAL  DIAGNOSIS     PLAN (LABS / IMAGING / EKG):  Orders Placed This Encounter   Procedures    US SCROTUM W LIMITED DUPLEX    Urinalysis with Reflex to Culture    Microscopic Urinalysis    Saline lock IV       MEDICATIONS ORDERED:  Orders Placed This Encounter   Medications    DISCONTD: morphine injection 4 mg    DISCONTD: acetaminophen (TYLENOL) tablet 1,000 mg    DISCONTD: ibuprofen (ADVIL;MOTRIN) tablet 600 mg    levoFLOXacin (LEVAQUIN) 500 MG tablet     Sig: Take 1 tablet by mouth daily for 10 days     Dispense:  10 tablet Refill:  0    acetaminophen (TYLENOL) 500 MG tablet     Sig: Take 1 tablet by mouth 4 times daily as needed for Pain     Dispense:  30 tablet     Refill:  0    ibuprofen (ADVIL;MOTRIN) 600 MG tablet     Sig: Take 1 tablet by mouth 4 times daily as needed for Pain     Dispense:  30 tablet     Refill:  0       DDX: Testicular torsion, inguinal hernia, hydrocele    DIAGNOSTIC RESULTS / EMERGENCY DEPARTMENT COURSE / MDM   LAB RESULTS:  Results for orders placed or performed during the hospital encounter of 09/08/22   Urinalysis with Reflex to Culture    Specimen: Urine   Result Value Ref Range    Color, UA Yellow Yellow    Turbidity UA Clear Clear    Glucose, Ur NEGATIVE NEGATIVE    Bilirubin Urine NEGATIVE NEGATIVE    Ketones, Urine SMALL (A) NEGATIVE    Specific Gravity, UA 1.016 1.005 - 1.030    Urine Hgb TRACE (A) NEGATIVE    pH, UA 6.0 5.0 - 8.0    Protein, UA NEGATIVE NEGATIVE    Urobilinogen, Urine Normal Normal    Nitrite, Urine NEGATIVE NEGATIVE    Leukocyte Esterase, Urine NEGATIVE NEGATIVE   Microscopic Urinalysis   Result Value Ref Range    WBC, UA None 0 - 5 /HPF    RBC, UA 2 TO 5 0 - 4 /HPF    Epithelial Cells UA 0 TO 2 0 - 5 /HPF       IMPRESSION: 40-year-old male, right inguinal hernia repair a year ago with mesh, now with acute onset pain rating from right lower quadrant through groin into right testicle, vitals within normal limits, exam notable for right testicular swelling and tenderness without palpable cord extending through the inguinal ligament, however palpable fullness extending from right testicle to right lower quadrant, concerning for hydrocele versus inguinal hernia. Cannot fully rule out testicular torsion so we will plan to get ultrasound. Also give Tylenol and Motrin for pain. May end up consulting general surgery for further evaluation after torsion is ruled out. RADIOLOGY:  US SCROTUM W LIMITED DUPLEX   Final Result   1.   Mildly edematous and hypervascular appearance of right epididymis, for   which acute epididymitis should be considered. 2.  No evidence for torsion or hernia. EMERGENCY DEPARTMENT COURSE:    ED Course as of 09/10/22 0849   Thu Sep 08, 2022   1535 Ultrasound without evidence of torsion. Concern for epididymitis. [DH]   1550 We will send patient home on 10-day course of Levaquin as he had recent procedure on prostate. He will need to follow-up with urology in the next week for ongoing management of this. Although he does not have evidence of a hernia on exam or ultrasound, he is still concerned for recurrence of a hernia. Encouraged him to follow-up with his surgeon if there is ongoing concern after leaving the emergency department. We will plan to send patient home with Tylenol and Motrin for pain and follow-up with urology. Patient is amenable to this plan. Medically stable time of discharge. [DH]      ED Course User Index  [DH] Trish Maldonado MD       FINAL IMPRESSION      1.  Epididymitis          DISPOSITION / PLAN     DISPOSITION Decision To Discharge 09/08/2022 03:50:34 PM      PATIENT REFERRED TO:  [address] [address]  [phone]  Schedule an appointment as soon as possible for a visit in 3 days      [facility] ED  [address] [address]  [phone]  Go to   If symptoms worsen    [address] ([facility])  [phone]    Schedule an appointment as soon as possible for a visit   As needed    DISCHARGE MEDICATIONS:  Discharge Medication List as of 9/8/2022  4:04 PM        START taking these medications    Details   levoFLOXacin (LEVAQUIN) 500 MG tablet Take 1 tablet by mouth daily for 10 days, Disp-10 tablet, R-0Print      acetaminophen (TYLENOL) 500 MG tablet Take 1 tablet by mouth 4 times daily as needed for Pain, Disp-30 tablet, R-0Print      ibuprofen (ADVIL;MOTRIN) 600 MG tablet Take 1 tablet by mouth 4 times daily as needed for Pain, Disp-30 tablet, R-0Print             Shalom Fischer MD  Emergency Medicine Resident    (Please note that portions of thisnote were completed with a voice recognition program.  Efforts were made to edit the dictations but occasionally words are mis-transcribed.)        Shalom Fischer MD  Resident  09/10/22 [id number]

## 2022-09-08 NOTE — ED NOTES
Patient back in room after US. Nurse to room to give meds for pain. Patient refused both tylenol and motrin stating he is okay and can handle the pain.      April Alexandra LPN  04/07/49 [id number]

## 2022-09-08 NOTE — ED NOTES
Nurse to room to insert IV for medication administration. Patient had many questions about the IV and all questions answered. Patient refused IV and asking for oral pain medication instead.  Dr Violeta Mcguire notified     Quinton Voss, KRYSTINA  05/30/42 1241

## 2022-09-08 NOTE — ED PROVIDER NOTES
[facility]     Emergency Department     Faculty Attestation    I performed a history and physical examination of the patient and discussed management with the resident. I reviewed the residents note and agree with the documented findings and plan of care. Any areas of disagreement are noted on the chart. I was personally present for the key portions of any procedures. I have documented in the chart those procedures where I was not present during the key portions. I have reviewed the emergency nurses triage note. I agree with the chief complaint, past medical history, past surgical history, allergies, medications, social and family history as documented unless otherwise noted below. For Physician Assistant/ Nurse Practitioner cases/documentation I have personally evaluated this patient and have completed at least one if not all key elements of the E/M (history, physical exam, and MDM). Additional findings are as noted. I have personally seen and evaluated the patient. I find the patient's history and physical exam are consistent with the NP/PA documentation. I agree with the care provided, treatment rendered, disposition and follow-up plan. 27-year-old male with a history of inguinal hernia requiring repair approximately 1 year ago presenting with abdominal and groin pain. Pain is not present for a few days, worsening. Patient stopped eating, as he felt that this would make the pain worse. He denies any difficulty urinating. Feels similarly to his initial hernia. No recent heavy lifting or twisting. No other trauma. No abdominal pain on the left side of the abdomen or the upper abdomen. Intermittent discomfort in the right lower quadrant, but most pain is in his testicle. No hematuria.     Exam:  General: Laying on the bed, awake, alert, and in no acute distress  CV: normal rate and regular rhythm  Lungs: Breathing comfortably on room air with no tachypnea, hypoxia, or increased work of breathing  Abdomen: soft, non-tender, non-distended  : Right-sided enlargement of the testicle, mildly tender to palpation. Cremasteric reflex intact. No abnormalities of the glans penis or shaft of the penis. No swelling over the mons pubis or in the inguinal canal.    Plan:  Ultrasound to rule out testicular abnormality such as torsion, evaluate for possibility of recurrent inguinal hernia  If no torsion, will likely discussed with general surgery for follow-up on his recurrent symptoms.     Clearance MD Sarah   Attending Emergency Physician    (Please note that portions of this note were completed with a voice recognition program. Efforts were made to edit the dictations but occasionally words are mis-transcribed.)           Clearance MD Sarah  09/08/22 0833

## 2022-09-08 NOTE — DISCHARGE INSTRUCTIONS
You were seen and evaluated in the emergency department for right groin pain. You were found to have an infection called epididymitis. This is irritation and inflammation of the epididymis which is a part of your testicle. Please ensure you take the antibiotics to completion even if you start to feel better. You can use Tylenol and Motrin for pain. You can also use briefs that provide good support to your testicles for enhanced relief. You can find these typically at [address] [place] [address] or [facility]. You will need to follow-up with urology within the next week for evaluation and ongoing management. Please continue to monitor yourself for urinary symptoms. You can follow-up with your surgeon if there is ongoing concern for a hernia, however you were found to have no evidence of a hernia on evaluation in the emergency department today. See attached instructions. Return to the emergency department if you have any symptoms indicating immediate medical care.

## 2022-09-10 ASSESSMENT — ENCOUNTER SYMPTOMS
ABDOMINAL PAIN: 1
EYE PAIN: 0
SHORTNESS OF BREATH: 0
VOMITING: 0
CONSTIPATION: 0
SORE THROAT: 0
RHINORRHEA: 0
COUGH: 0
NAUSEA: 0
DIARRHEA: 0

## 2022-09-14 ENCOUNTER — OFFICE VISIT (OUTPATIENT)
Dept: SURGERY | Age: 62
End: 2022-09-14
Payer: COMMERCIAL

## 2022-09-14 VITALS
BODY MASS INDEX: 30.05 KG/M2 | HEIGHT: 66 IN | WEIGHT: 187 LBS | SYSTOLIC BLOOD PRESSURE: 114 MMHG | HEART RATE: 72 BPM | OXYGEN SATURATION: 97 % | TEMPERATURE: 98.2 F | DIASTOLIC BLOOD PRESSURE: 74 MMHG

## 2022-09-14 DIAGNOSIS — N50.811 PAIN IN RIGHT TESTICLE: Primary | ICD-10-CM

## 2022-09-14 PROCEDURE — 1036F TOBACCO NON-USER: CPT | Performed by: STUDENT IN AN ORGANIZED HEALTH CARE EDUCATION/TRAINING PROGRAM

## 2022-09-14 PROCEDURE — 99213 OFFICE O/P EST LOW 20 MIN: CPT | Performed by: STUDENT IN AN ORGANIZED HEALTH CARE EDUCATION/TRAINING PROGRAM

## 2022-09-14 PROCEDURE — G8427 DOCREV CUR MEDS BY ELIG CLIN: HCPCS | Performed by: STUDENT IN AN ORGANIZED HEALTH CARE EDUCATION/TRAINING PROGRAM

## 2022-09-14 PROCEDURE — G8417 CALC BMI ABV UP PARAM F/U: HCPCS | Performed by: STUDENT IN AN ORGANIZED HEALTH CARE EDUCATION/TRAINING PROGRAM

## 2022-09-14 PROCEDURE — 3017F COLORECTAL CA SCREEN DOC REV: CPT | Performed by: STUDENT IN AN ORGANIZED HEALTH CARE EDUCATION/TRAINING PROGRAM

## 2022-09-14 NOTE — PROGRESS NOTES
Reason for visit: right testicle pain    Subjective:  61 y/o male presents for ED follow during episode of right testicle pain. Patient with hx right laparoscopic robotic assisted inguinal hernia repair in July 2021. Patient with hx of chronic right groin discomfort. Patient denies recurrence of right groin bulge. Patient with sharp right testicle pain. Denies nausea or vomiting. Denies fevers, chills, or sweats. Vitals:    09/14/22 0813   BP: 114/74   Pulse: 72   Temp: 98.2 °F (36.8 °C)   SpO2: 97%       Exam  General: patient is resting comfortably in no acute distress. AAOx3  Abdomen: soft, nontender, and nondistended. There is no recurrence of right inguinal hernia. : penis with no lesions. Mild right testicle tenderness to palpation. EXAMINATION:   ULTRASOUND OF THE SCROTUM/TESTICLES WITH COLOR DOPPLER FLOW EVALUATION       9/8/2022       TECHNIQUE:   Duplex ultrasound using B-mode/gray scaled imaging, Doppler spectral analysis   and color flow Doppler was obtained of the testicles. COMPARISON:   CT abdomen and pelvis 07/28/2021       HISTORY:   ORDERING SYSTEM PROVIDED HISTORY: torsion vs hernia   TECHNOLOGIST PROVIDED HISTORY:   torsion vs hernia       FINDINGS:       Measurements:       Right Testicle: 5.4 x 2.2 x 3.2 cm       Left Testicle: 4.1 x 2.7 x 3.0 cm           Right:       Grey Scale: The right testicle demonstrates normal homogeneous echotexture   without suspicious lesion. 2 cm cyst near the rete testes. No evidence of   testicular microlithiasis. Doppler Evaluation: There is normal arterial and venous Doppler flow within   the testicle. Scrotal Sac: No evidence of hydrocele. Epididymis: Mildly edematous and hypervascular appearance. Left:       Grey Scale: The left testicle demonstrates normal homogeneous echotexture   without suspicious lesion. Small cysts are noted near the rete testis.   No   evidence of testicular microlithiasis. Doppler Evaluation: There is normal arterial and venous Doppler flow within   the testicle. Scrotal Sac: No evidence of hydrocele. Epididymis: No acute abnormality. Impression   1. Mildly edematous and hypervascular appearance of right epididymis, for   which acute epididymitis should be considered. 2.  No evidence for torsion or hernia. Assessment:   Diagnosis Orders   1. Pain in right testicle            Plan:  Patient with chronic right testicle pain that was not resolved with surgery. Patient reassured there is no recurrence of right inguinal hernia. Findings of epididymitis on ultrasound. Recommend follow up with urology. Patient understands and agrees with plan.       Electronically signed by Kayla Sotelo IV, DO on 9/14/22 at 9:12 AM EDT

## 2022-09-14 NOTE — PATIENT INSTRUCTIONS
Thank you letting us take care of you today. We hope that all your questions were addressed. If a question was overlooked or something else comes to mind after you return home, please call our office at [phone]. If you need to cancel or change an appointment, surgery or procedure, please contact the office at [phone].

## 2022-09-16 ENCOUNTER — OFFICE VISIT (OUTPATIENT)
Dept: UROLOGY | Age: 62
End: 2022-09-16
Payer: COMMERCIAL

## 2022-09-16 VITALS
HEIGHT: 66 IN | SYSTOLIC BLOOD PRESSURE: 124 MMHG | WEIGHT: 187 LBS | HEART RATE: 82 BPM | DIASTOLIC BLOOD PRESSURE: 82 MMHG | BODY MASS INDEX: 30.05 KG/M2

## 2022-09-16 DIAGNOSIS — N40.1 BENIGN LOCALIZED PROSTATIC HYPERPLASIA WITH LOWER URINARY TRACT SYMPTOMS (LUTS): Primary | ICD-10-CM

## 2022-09-16 PROCEDURE — 99214 OFFICE O/P EST MOD 30 MIN: CPT | Performed by: UROLOGY

## 2022-09-16 PROCEDURE — 1036F TOBACCO NON-USER: CPT | Performed by: UROLOGY

## 2022-09-16 PROCEDURE — G8417 CALC BMI ABV UP PARAM F/U: HCPCS | Performed by: UROLOGY

## 2022-09-16 PROCEDURE — G8427 DOCREV CUR MEDS BY ELIG CLIN: HCPCS | Performed by: UROLOGY

## 2022-09-16 PROCEDURE — 3017F COLORECTAL CA SCREEN DOC REV: CPT | Performed by: UROLOGY

## 2022-09-16 RX ORDER — TAMSULOSIN HYDROCHLORIDE 0.4 MG/1
0.4 CAPSULE ORAL 2 TIMES DAILY
Qty: 180 CAPSULE | Refills: 11 | Status: SHIPPED | OUTPATIENT
Start: 2022-09-16 | End: 2022-12-15

## 2022-09-16 RX ORDER — DOXYCYCLINE HYCLATE 100 MG
100 TABLET ORAL 2 TIMES DAILY
Qty: 28 TABLET | Refills: 0 | Status: SHIPPED | OUTPATIENT
Start: 2022-09-16 | End: 2022-09-30

## 2022-09-16 NOTE — PROGRESS NOTES
[address] [place] [address] [address] [place] [address] [address]  Dept: [phone]  Dept Fax: [phone] 23 [facility], [address] [place] [address]  Urology Office Note - FU Patient    Patient:  Lisette Marcum  YOB: 1960  Date: 9/16/2022    The patient is a 60 y.o. male who presents today for evaluation of the following problems:   Chief Complaint   Patient presents with    Follow-up     ER f/u right epididymus    referred/consultation requested by Dionisio Bolanos MD.      HISTORY OF PRESENT ILLNESS:     BPH  Voiding better  Stronger stream  Not fully satisfied  On flomax    Right epididymorchitis  New problem  Had hernia on this side  Finished abx  Still some discomfort      Summary of Previous Records:    79-year-old male with obstructive lower urinary tract symptoms-thin stream, straining, incomplete emptying for the past 10 years. He was using a combination of dutasteride with tamsulosin and silodosin over the past 10 years. He is now taking tamsulosin 0.8 mg daily and levofloxacin for the past 10 days which helped improve his symptoms, after he had worsening difficulty with emptying the bladder. Wife is physician in [place], brother is urologist  [place] and reviewed today. Ultrasound from 2017-75 g prostate with prominent median lobe  He is agreeable to greenlight PVP of prostate  He takes aspirin 75 mg once daily  Prescribe today tamsulosin 0.4 mg twice daily        Requested/reviewed records from Dionisio Bolanos MD office and/or outside physician/EMR    (Patient's old records have been requested, reviewed and pertinent findings summarized in today's note.)    Last several PSA's:  No results found for: PSA    Last total testosterone:  No results found for: TESTOSTERONE    Urinalysis today:  No results found for this visit on 09/16/22.     Last BUN and creatinine:  Lab Results   Component Value Date    BUN 15 Benign localized prostatic hyperplasia with lower urinary tract symptoms (LUTS)               Plan:      Pt voiding has improved greatly with greenlight. Still not at goal. Continue flomax  Discussed with pt that post void residuals may never be 0 ml due to longstanding outlet obstruction  Epididymitis- new problem. Finished levaquin. Give two weeks doxy        Prescriptions Ordered:  Orders Placed This Encounter   Medications    doxycycline hyclate (VIBRA-TABS) 100 MG tablet     Sig: Take 1 tablet by mouth 2 times daily for 14 days     Dispense:  28 tablet     Refill:  0    tamsulosin (FLOMAX) 0.4 MG capsule     Sig: Take 1 capsule by mouth 2 times daily     Dispense:  180 capsule     Refill:  11      Orders Placed:  No orders of the defined types were placed in this encounter.            Yael Whitmore MD

## 2022-09-21 ENCOUNTER — OFFICE VISIT (OUTPATIENT)
Dept: SURGERY | Age: 62
End: 2022-09-21
Payer: COMMERCIAL

## 2022-09-21 VITALS
HEART RATE: 85 BPM | DIASTOLIC BLOOD PRESSURE: 72 MMHG | TEMPERATURE: 98.1 F | HEIGHT: 66 IN | WEIGHT: 188.8 LBS | BODY MASS INDEX: 30.34 KG/M2 | SYSTOLIC BLOOD PRESSURE: 120 MMHG

## 2022-09-21 DIAGNOSIS — N50.811 PAIN IN RIGHT TESTICLE: Primary | ICD-10-CM

## 2022-09-21 PROCEDURE — G8417 CALC BMI ABV UP PARAM F/U: HCPCS | Performed by: SURGERY

## 2022-09-21 PROCEDURE — G8427 DOCREV CUR MEDS BY ELIG CLIN: HCPCS | Performed by: SURGERY

## 2022-09-21 PROCEDURE — 1036F TOBACCO NON-USER: CPT | Performed by: SURGERY

## 2022-09-21 PROCEDURE — 99213 OFFICE O/P EST LOW 20 MIN: CPT | Performed by: SURGERY

## 2022-09-21 PROCEDURE — 3017F COLORECTAL CA SCREEN DOC REV: CPT | Performed by: SURGERY

## 2022-09-21 NOTE — PATIENT INSTRUCTIONS
Thank you letting us take care of you today. We hope that all your questions were addressed. If a question was overlooked or something else comes to mind after you return home, please call our office at [phone]. If you need to cancel or change an appointment, surgery or procedure, please contact the office at [phone].

## 2022-09-21 NOTE — PROGRESS NOTES
Visit Information    Have you changed or started any medications since your last visit including any over-the-counter medicines, vitamins, or herbal medicines? no   Have you stopped taking any of your medications? Is so, why? -  no  Are you having any side effects from any of your medications? - no    Have you seen any other physician or provider since your last visit?  no   Have you had any other diagnostic tests since your last visit?  no   Have you been seen in the emergency room and/or had an admission in a hospital since we last saw you?  no   Have you had your routine dental cleaning in the past 6 months?  no     Do you have an active MyChart account? If no, what is the barrier?   Yes    Patient Care Team:  Jessica Swan MD as PCP - General (Internal Medicine)  Jessica Swan MD as PCP - [facility] Provider  Renuka Del Rio MD as Consulting Physician (Gastroenterology)    Medical History Review  Past Medical, Family, and Social History reviewed and does not contribute to the patient presenting condition    Health Maintenance   Topic Date Due    DTaP/Tdap/Td vaccine (1 - Tdap) Never done    A1C test (Diabetic or Prediabetic)  09/04/2021    Lipids  09/04/2021    COVID-19 Vaccine (4 - Booster for [place] Peter series) 02/14/2022    Flu vaccine (1) 09/01/2022    Shingles vaccine (1 of 2) 05/19/2023 (Originally 6/1/2009)    Depression Screen  05/19/2023    Colorectal Cancer Screen  09/08/2023    Hepatitis C screen  Completed    HIV screen  Completed    Hepatitis A vaccine  Aged Out    Hepatitis B vaccine  Aged Out    Hib vaccine  Aged Out    Meningococcal (ACWY) vaccine  Aged Out    Pneumococcal 0-64 years Vaccine  Aged Out

## 2022-09-28 NOTE — PROGRESS NOTES
Reason for visit: right testicle pain    Subjective:  61 y/o male with hx of right laparoscopic robotic assisted inguinal hernia repair with mesh here for follow up visit. Right testicle pain is slowly improving. Pain is better compared to last visit. Patient is being followed by urology also for testicle pain. Patient denies recurrence of right groin bulge. Vitals:    09/21/22 0859   BP: 120/72   Pulse: 85   Temp: 98.1 °F (36.7 °C)       Exam  General: patient is resting comfortably in no acute distress. AAOx3  Abdomen: soft, nontender, and nondistended. There is no recurrence of right inguinal hernia. : penis with no lesions. Mild right testicle tenderness to palpation. Assessment:   Diagnosis Orders   1. Pain in right testicle             Plan:  Patient reassured there is no recurrence of hernia. Patient instructed to follow up as needed. Patient understands and agrees with plan.       Electronically signed by Mina Sotelo IV, DO on 9/28/22 at 9:21 AM EDT

## 2022-10-11 DIAGNOSIS — R09.82 POST-NASAL DRIP: ICD-10-CM

## 2022-10-11 RX ORDER — FLUTICASONE PROPIONATE 50 MCG
SPRAY, SUSPENSION (ML) NASAL
Qty: 16 G | Refills: 11 | OUTPATIENT
Start: 2022-10-11

## 2022-10-11 NOTE — TELEPHONE ENCOUNTER
LAST VISIT: 10/7/21  No follow up scheduled. Medication refused with note to pharmacy that patient needs an appointment. Medication is also OTC. Please make any changes needed. Thank you.

## 2022-10-14 NOTE — TELEPHONE ENCOUNTER
Writer called patient on mobile number and left a message on machine informing that he would need to have a visit with our office before any further refills could be given. Office phone number was provided to call back to schedule.

## 2022-11-18 ENCOUNTER — TELEPHONE (OUTPATIENT)
Dept: INTERNAL MEDICINE | Age: 62
End: 2022-11-18

## 2022-11-18 DIAGNOSIS — Z12.11 COLON CANCER SCREENING: Primary | ICD-10-CM

## 2022-11-18 NOTE — TELEPHONE ENCOUNTER
PC to patient - patient states that it is time for his Cologuard. Patient states it has been 3 years since last screening. Please sign pended order.

## 2022-12-17 DIAGNOSIS — I10 ESSENTIAL HYPERTENSION: ICD-10-CM

## 2022-12-17 DIAGNOSIS — E78.5 HYPERLIPIDEMIA, UNSPECIFIED HYPERLIPIDEMIA TYPE: ICD-10-CM

## 2022-12-19 NOTE — TELEPHONE ENCOUNTER
Multiple meds pended    Pt last seen 05/22    Health Maintenance   Topic Date Due    DTaP/Tdap/Td vaccine (1 - Tdap) Never done    A1C test (Diabetic or Prediabetic)  09/04/2021    Lipids  09/04/2021    COVID-19 Vaccine (4 - Booster for Pfizer series) 12/09/2021    Shingles vaccine (1 of 2) 05/19/2023 (Originally 6/1/2009)    Depression Screen  05/19/2023    Colorectal Cancer Screen  09/08/2023    Flu vaccine  Completed    Hepatitis C screen  Completed    HIV screen  Completed    Hepatitis A vaccine  Aged Out    Hib vaccine  Aged Out    Meningococcal (ACWY) vaccine  Aged Out    Pneumococcal 0-64 years Vaccine  Aged Out             (applicable per patient's age: Cancer Screenings, Depression Screening, Fall Risk Screening, Immunizations)    Hemoglobin A1C (%)   Date Value   09/04/2020 5.9     LDL Cholesterol (mg/dL)   Date Value   09/04/2020 79     AST (U/L)   Date Value   07/28/2021 17     ALT (U/L)   Date Value   07/28/2021 18     BUN (mg/dL)   Date Value   07/28/2021 15      (goal A1C is < 7)   (goal LDL is <100) need 30-50% reduction from baseline     BP Readings from Last 3 Encounters:   09/21/22 120/72   09/16/22 124/82   09/14/22 114/74    (goal /80)      All Future Testing planned in [facility]:  Lab Frequency Next Occurrence   Lipid Panel Once 04/05/2023       Next Visit Date:  Future Appointments   Date Time Provider Mindy Erazo   3/17/2023 10:30 AM SCHEDULE, [facility] Navin Dukes            Patient Active Problem List:     BPH with urinary obstruction     TIA (transient ischemic attack)     Gross hematuria     Hematuria     COVID-19     Vasovagal syncope     Postprocedural urinary retention     Intractable migraine with aura without status migrainosus     Post-op pain     Ileus (HCC)     Chronic venous insufficiency of lower extremity     Venous insufficiency of right leg

## 2022-12-21 RX ORDER — ROSUVASTATIN CALCIUM 10 MG/1
TABLET, COATED ORAL
Qty: 30 TABLET | Refills: 5 | Status: SHIPPED | OUTPATIENT
Start: 2022-12-21

## 2022-12-21 RX ORDER — HYDROCHLOROTHIAZIDE 25 MG/1
TABLET ORAL
Qty: 30 TABLET | Refills: 5 | Status: SHIPPED | OUTPATIENT
Start: 2022-12-21

## 2022-12-21 RX ORDER — ASPIRIN 81 MG/1
TABLET, COATED ORAL
Qty: 30 TABLET | Refills: 5 | Status: SHIPPED | OUTPATIENT
Start: 2022-12-21

## 2022-12-21 RX ORDER — RAMIPRIL 10 MG/1
CAPSULE ORAL
Qty: 30 CAPSULE | Refills: 3 | Status: SHIPPED | OUTPATIENT
Start: 2022-12-21

## 2023-04-20 RX ORDER — RAMIPRIL 10 MG/1
CAPSULE ORAL
Qty: 30 CAPSULE | Refills: 3 | OUTPATIENT
Start: 2023-04-20

## 2023-04-24 RX ORDER — RAMIPRIL 10 MG/1
10 CAPSULE ORAL DAILY
Qty: 30 CAPSULE | Refills: 3 | OUTPATIENT
Start: 2023-04-24

## 2023-04-26 RX ORDER — RAMIPRIL 10 MG/1
CAPSULE ORAL
Qty: 30 CAPSULE | Refills: 3 | OUTPATIENT
Start: 2023-04-26

## 2023-04-26 NOTE — TELEPHONE ENCOUNTER
Tried calling patient several times the last several days and patient continues to hang up. LVM today for patient to call and schedule an appt.

## 2023-05-25 ENCOUNTER — TELEPHONE (OUTPATIENT)
Dept: INTERNAL MEDICINE | Age: 63
End: 2023-05-25

## 2023-06-13 DIAGNOSIS — E78.5 HYPERLIPIDEMIA, UNSPECIFIED HYPERLIPIDEMIA TYPE: ICD-10-CM

## 2023-06-13 DIAGNOSIS — I10 ESSENTIAL HYPERTENSION: ICD-10-CM

## 2023-06-13 RX ORDER — HYDROCHLOROTHIAZIDE 25 MG/1
TABLET ORAL
Qty: 30 TABLET | Refills: 5 | OUTPATIENT
Start: 2023-06-13

## 2023-06-13 RX ORDER — ROSUVASTATIN CALCIUM 10 MG/1
TABLET, COATED ORAL
Qty: 30 TABLET | Refills: 5 | OUTPATIENT
Start: 2023-06-13

## 2023-06-13 RX ORDER — ASPIRIN 81 MG/1
TABLET, COATED ORAL
Qty: 30 TABLET | Refills: 5 | OUTPATIENT
Start: 2023-06-13

## 2023-07-07 DIAGNOSIS — E78.5 HYPERLIPIDEMIA, UNSPECIFIED HYPERLIPIDEMIA TYPE: ICD-10-CM

## 2023-07-07 DIAGNOSIS — I10 ESSENTIAL HYPERTENSION: ICD-10-CM

## 2023-07-07 RX ORDER — RAMIPRIL 10 MG/1
CAPSULE ORAL
Qty: 30 CAPSULE | Refills: 0 | OUTPATIENT
Start: 2023-07-07

## 2023-07-07 RX ORDER — HYDROCHLOROTHIAZIDE 25 MG/1
TABLET ORAL
Qty: 30 TABLET | Refills: 0 | OUTPATIENT
Start: 2023-07-07

## 2023-07-07 RX ORDER — ROSUVASTATIN CALCIUM 10 MG/1
TABLET, COATED ORAL
Qty: 30 TABLET | Refills: 0 | OUTPATIENT
Start: 2023-07-07

## 2023-07-07 RX ORDER — ASPIRIN 81 MG/1
TABLET, COATED ORAL
Qty: 30 TABLET | Refills: 0 | OUTPATIENT
Start: 2023-07-07

## 2023-07-07 NOTE — TELEPHONE ENCOUNTER
Unable to reach letter sent. Number not working and alt contact did not have number for pt. Last visit: 5/19/22  Last Med refill:   Does patient have enough medication for 72 hours: No:     Next Visit Date:  No future appointments.     Health Maintenance   Topic Date Due    DTaP/Tdap/Td vaccine (1 - Tdap) Never done    Shingles vaccine (1 of 2) Never done    A1C test (Diabetic or Prediabetic)  09/04/2021    Lipids  09/04/2021    COVID-19 Vaccine (4 - Booster for Pfizer series) 12/09/2021    Depression Screen  05/19/2023    Flu vaccine (1) 08/01/2023    Colorectal Cancer Screen  09/08/2023    Hepatitis C screen  Completed    HIV screen  Completed    Hepatitis A vaccine  Aged Out    Hib vaccine  Aged Out    Meningococcal (ACWY) vaccine  Aged Out    Pneumococcal 0-64 years Vaccine  Aged Out    Diabetes screen  Discontinued       Hemoglobin A1C (%)   Date Value   09/04/2020 5.9             ( goal A1C is < 7)   No results found for: LABMICR  LDL Cholesterol (mg/dL)   Date Value   09/04/2020 79       (goal LDL is <100)   AST (U/L)   Date Value   07/28/2021 17     ALT (U/L)   Date Value   07/28/2021 18     BUN (mg/dL)   Date Value   07/28/2021 15     BP Readings from Last 3 Encounters:   09/21/22 120/72   09/16/22 124/82   09/14/22 114/74          (goal 120/80)    All Future Testing planned in [facility]  Lab Frequency Next Occurrence               Patient Active Problem List:     BPH with urinary obstruction     TIA (transient ischemic attack)     Gross hematuria     Hematuria     COVID-19     Vasovagal syncope     Postprocedural urinary retention     Intractable migraine with aura without status migrainosus     Post-op pain     Ileus (HCC)     Chronic venous insufficiency of lower extremity     Venous insufficiency of right leg

## 2023-08-10 ENCOUNTER — OFFICE VISIT (OUTPATIENT)
Dept: INTERNAL MEDICINE | Age: 63
End: 2023-08-10
Payer: COMMERCIAL

## 2023-08-10 ENCOUNTER — HOSPITAL ENCOUNTER (OUTPATIENT)
Age: 63
Setting detail: SPECIMEN
Discharge: HOME | End: 2023-08-10

## 2023-08-10 VITALS
OXYGEN SATURATION: 97 % | SYSTOLIC BLOOD PRESSURE: 128 MMHG | WEIGHT: 187.6 LBS | HEART RATE: 64 BPM | BODY MASS INDEX: 30.15 KG/M2 | TEMPERATURE: 98.9 F | HEIGHT: 66 IN | DIASTOLIC BLOOD PRESSURE: 89 MMHG

## 2023-08-10 DIAGNOSIS — E78.5 HYPERLIPIDEMIA, UNSPECIFIED HYPERLIPIDEMIA TYPE: ICD-10-CM

## 2023-08-10 DIAGNOSIS — I10 ESSENTIAL HYPERTENSION: Primary | ICD-10-CM

## 2023-08-10 DIAGNOSIS — R09.82 POST-NASAL DRIP: ICD-10-CM

## 2023-08-10 DIAGNOSIS — I10 ESSENTIAL HYPERTENSION: ICD-10-CM

## 2023-08-10 DIAGNOSIS — B35.1 ONYCHOMYCOSIS OF TOENAIL: ICD-10-CM

## 2023-08-10 DIAGNOSIS — Z13.1 SCREENING FOR DIABETES MELLITUS: ICD-10-CM

## 2023-08-10 DIAGNOSIS — M79.675 PAIN OF TOES OF BOTH FEET: ICD-10-CM

## 2023-08-10 DIAGNOSIS — M79.674 PAIN OF TOES OF BOTH FEET: ICD-10-CM

## 2023-08-10 LAB
ALBUMIN SERPL-MCNC: 4.7 G/DL (ref 3.5–5.2)
ALBUMIN/GLOB SERPL: 1.7 {RATIO} (ref 1–2.5)
ALP SERPL-CCNC: 56 U/L (ref 40–129)
ALT SERPL-CCNC: 21 U/L (ref 5–41)
ANION GAP SERPL CALCULATED.3IONS-SCNC: 11 MMOL/L (ref 9–17)
AST SERPL-CCNC: 20 U/L
BASOPHILS # BLD: <0.03 K/UL (ref 0–0.2)
BASOPHILS NFR BLD: 0 % (ref 0–2)
BILIRUB SERPL-MCNC: 0.4 MG/DL (ref 0.3–1.2)
BUN SERPL-MCNC: 18 MG/DL (ref 8–23)
CALCIUM SERPL-MCNC: 9.3 MG/DL (ref 8.6–10.4)
CHLORIDE SERPL-SCNC: 100 MMOL/L (ref 98–107)
CHOLEST SERPL-MCNC: 164 MG/DL
CHOLESTEROL/HDL RATIO: 3.4
CO2 SERPL-SCNC: 26 MMOL/L (ref 20–31)
CREAT SERPL-MCNC: 0.9 MG/DL (ref 0.7–1.2)
EOSINOPHIL # BLD: 0.24 K/UL (ref 0–0.44)
EOSINOPHILS RELATIVE PERCENT: 5 % (ref 1–4)
ERYTHROCYTE [DISTWIDTH] IN BLOOD BY AUTOMATED COUNT: 12.5 % (ref 11.8–14.4)
GFR SERPL CREATININE-BSD FRML MDRD: >60 ML/MIN/1.73M2
GLUCOSE SERPL-MCNC: 108 MG/DL (ref 70–99)
HBA1C MFR BLD: 5.5 %
HCT VFR BLD AUTO: 47.8 % (ref 40.7–50.3)
HDLC SERPL-MCNC: 48 MG/DL
HGB BLD-MCNC: 15.1 G/DL (ref 13–17)
IMM GRANULOCYTES # BLD AUTO: 0.03 K/UL (ref 0–0.3)
IMM GRANULOCYTES NFR BLD: 1 %
LDLC SERPL CALC-MCNC: 97 MG/DL (ref 0–130)
LYMPHOCYTES NFR BLD: 1.68 K/UL (ref 1.1–3.7)
LYMPHOCYTES RELATIVE PERCENT: 34 % (ref 24–43)
MCH RBC QN AUTO: 26.4 PG (ref 25.2–33.5)
MCHC RBC AUTO-ENTMCNC: 31.6 G/DL (ref 28.4–34.8)
MCV RBC AUTO: 83.4 FL (ref 82.6–102.9)
MONOCYTES NFR BLD: 0.42 K/UL (ref 0.1–1.2)
MONOCYTES NFR BLD: 8 % (ref 3–12)
NEUTROPHILS NFR BLD: 52 % (ref 36–65)
NEUTS SEG NFR BLD: 2.59 K/UL (ref 1.5–8.1)
NRBC BLD-RTO: 0 PER 100 WBC
PLATELET # BLD AUTO: 251 K/UL (ref 138–453)
PMV BLD AUTO: 9.7 FL (ref 8.1–13.5)
POTASSIUM SERPL-SCNC: 3.9 MMOL/L (ref 3.7–5.3)
PROT SERPL-MCNC: 7.4 G/DL (ref 6.4–8.3)
RBC # BLD AUTO: 5.73 M/UL (ref 4.21–5.77)
SODIUM SERPL-SCNC: 137 MMOL/L (ref 135–144)
TRIGL SERPL-MCNC: 96 MG/DL
TSH SERPL DL<=0.05 MIU/L-ACNC: 1.9 UIU/ML (ref 0.3–5)
WBC OTHER # BLD: 5 K/UL (ref 3.5–11.3)

## 2023-08-10 PROCEDURE — G8427 DOCREV CUR MEDS BY ELIG CLIN: HCPCS | Performed by: INTERNAL MEDICINE

## 2023-08-10 PROCEDURE — G8417 CALC BMI ABV UP PARAM F/U: HCPCS | Performed by: INTERNAL MEDICINE

## 2023-08-10 PROCEDURE — 1036F TOBACCO NON-USER: CPT | Performed by: INTERNAL MEDICINE

## 2023-08-10 PROCEDURE — 3079F DIAST BP 80-89 MM HG: CPT | Performed by: INTERNAL MEDICINE

## 2023-08-10 PROCEDURE — 3017F COLORECTAL CA SCREEN DOC REV: CPT | Performed by: INTERNAL MEDICINE

## 2023-08-10 PROCEDURE — 3074F SYST BP LT 130 MM HG: CPT | Performed by: INTERNAL MEDICINE

## 2023-08-10 PROCEDURE — 99213 OFFICE O/P EST LOW 20 MIN: CPT | Performed by: INTERNAL MEDICINE

## 2023-08-10 PROCEDURE — 83037 HB GLYCOSYLATED A1C HOME DEV: CPT | Performed by: INTERNAL MEDICINE

## 2023-08-10 RX ORDER — ASPIRIN 81 MG/1
81 TABLET ORAL DAILY
Qty: 30 TABLET | Refills: 11 | Status: SHIPPED | OUTPATIENT
Start: 2023-08-10

## 2023-08-10 RX ORDER — HYDROCHLOROTHIAZIDE 25 MG/1
25 TABLET ORAL 2 TIMES DAILY
Qty: 60 TABLET | Refills: 11 | Status: SHIPPED | OUTPATIENT
Start: 2023-08-10

## 2023-08-10 RX ORDER — RAMIPRIL 10 MG/1
10 CAPSULE ORAL 2 TIMES DAILY
Qty: 60 CAPSULE | Refills: 3 | Status: SHIPPED | OUTPATIENT
Start: 2023-08-10

## 2023-08-10 RX ORDER — ROSUVASTATIN CALCIUM 10 MG/1
10 TABLET, COATED ORAL 2 TIMES DAILY
Qty: 60 TABLET | Refills: 11 | Status: SHIPPED | OUTPATIENT
Start: 2023-08-10

## 2023-08-10 RX ORDER — FLUTICASONE PROPIONATE 50 MCG
2 SPRAY, SUSPENSION (ML) NASAL DAILY
Qty: 1 EACH | Refills: 11 | Status: SHIPPED | OUTPATIENT
Start: 2023-08-10 | End: 2023-09-09

## 2023-08-10 SDOH — ECONOMIC STABILITY: HOUSING INSECURITY
IN THE LAST 12 MONTHS, WAS THERE A TIME WHEN YOU DID NOT HAVE A STEADY PLACE TO SLEEP OR SLEPT IN A SHELTER (INCLUDING NOW)?: NO

## 2023-08-10 SDOH — ECONOMIC STABILITY: INCOME INSECURITY: HOW HARD IS IT FOR YOU TO PAY FOR THE VERY BASICS LIKE FOOD, HOUSING, MEDICAL CARE, AND HEATING?: VERY HARD

## 2023-08-10 SDOH — ECONOMIC STABILITY: FOOD INSECURITY: WITHIN THE PAST 12 MONTHS, THE FOOD YOU BOUGHT JUST DIDN'T LAST AND YOU DIDN'T HAVE MONEY TO GET MORE.: NEVER TRUE

## 2023-08-10 SDOH — ECONOMIC STABILITY: FOOD INSECURITY: WITHIN THE PAST 12 MONTHS, YOU WORRIED THAT YOUR FOOD WOULD RUN OUT BEFORE YOU GOT MONEY TO BUY MORE.: NEVER TRUE

## 2023-08-10 ASSESSMENT — ENCOUNTER SYMPTOMS
EYES NEGATIVE: 1
COUGH: 1
ALLERGIC/IMMUNOLOGIC NEGATIVE: 1
GASTROINTESTINAL NEGATIVE: 1

## 2023-08-10 ASSESSMENT — PATIENT HEALTH QUESTIONNAIRE - PHQ9
SUM OF ALL RESPONSES TO PHQ9 QUESTIONS 1 & 2: 0
1. LITTLE INTEREST OR PLEASURE IN DOING THINGS: 0
SUM OF ALL RESPONSES TO PHQ QUESTIONS 1-9: 0
2. FEELING DOWN, DEPRESSED OR HOPELESS: 0

## 2023-08-10 NOTE — PROGRESS NOTES
[facility]   Progress Note        Date of patient's visit: 8/10/2023  Patient's Name:  Brooklyn Deleon                   YOB: 1960        PCP:  Hai Camarena MD    Ivanna Khoury is a 58 y.o. male who presents for   Chief Complaint   Patient presents with    Hypertension    and follow up of chronic medical problems. Patient Active Problem List   Diagnosis    BPH with urinary obstruction    TIA (transient ischemic attack)    Gross hematuria    Hematuria    COVID-19    Vasovagal syncope    Postprocedural urinary retention    Intractable migraine with aura without status migrainosus    Post-op pain    Ileus (HCC)    Chronic venous insufficiency of lower extremity    Venous insufficiency of right leg       HISTORY OF PRESENT ILLNESS:    History was obtained from the patient. Hypertension:  Home blood pressure monitoring- was seeing a physician in his [place] while helping his wife who has breast cancer. He is adherent to a low sodium diet. Patient denies chest pain and shortness of breath. Antihypertensive medication side effects: no medication side effects noted. Use of agents associated with hypertension: none. His blood pressure medication which include ACE inhibitor and HCTZ were doubled which is controlling his blood pressure better. Given the situation of him being caregiver for his wife who underwent a double mastectomy and some radiation for breast cancer he is concerned about his own risk for cancer and is requesting age-appropriate screening. Patient does follow with pulmonology and urology with whom he will discuss scans and PSA levels.                                       Sodium (mmol/L)   Date Value   07/28/2021 135    BUN (mg/dL)   Date Value   07/28/2021 15    Glucose (mg/dL)   Date Value   07/28/2021 132 (H)      Potassium (mmol/L)   Date Value   07/28/2021 3.1 (L)    Creatinine (mg/dL)   Date Value   07/28/2021 0.78     POC Creatinine (mg/dL)   Date

## 2023-08-16 ENCOUNTER — CARE COORDINATION (OUTPATIENT)
Dept: CARE COORDINATION | Age: 63
End: 2023-08-16

## 2023-08-16 NOTE — CARE COORDINATION
Patient was presented to  by Carolyn Rojas, who states that this   patient is experiencing financial difficulties, and needs help. HC phoned the patient   made introductions and stated the reason for the call. Patient explained his situation   with having experienced COVID and now has limitations and unable to work. Patient   stated that he needs assistance with rent and his utilities. HC provided that patient with  the contact information for [place] [facility] to inquire of services that can assist him with  his rent and gave him the phone number for [facility]'s appointment line for help with utilities. Patient was appreciative, [facility], [facility]. provided her contact information for future concerns.     Plan of Care  [facility], [facility]. will follow up with patient regarding information that was shared

## 2023-08-17 ENCOUNTER — CARE COORDINATION (OUTPATIENT)
Dept: CARE COORDINATION | Age: 63
End: 2023-08-17

## 2023-08-17 ENCOUNTER — OFFICE VISIT (OUTPATIENT)
Dept: PULMONOLOGY | Age: 63
End: 2023-08-17
Payer: COMMERCIAL

## 2023-08-17 VITALS
DIASTOLIC BLOOD PRESSURE: 85 MMHG | HEIGHT: 66 IN | BODY MASS INDEX: 29.89 KG/M2 | HEART RATE: 82 BPM | SYSTOLIC BLOOD PRESSURE: 129 MMHG | RESPIRATION RATE: 16 BRPM | WEIGHT: 186 LBS | OXYGEN SATURATION: 99 %

## 2023-08-17 DIAGNOSIS — N40.1 BPH WITH URINARY OBSTRUCTION: ICD-10-CM

## 2023-08-17 DIAGNOSIS — R09.82 POST-NASAL DRIP: ICD-10-CM

## 2023-08-17 DIAGNOSIS — N13.8 BPH WITH URINARY OBSTRUCTION: ICD-10-CM

## 2023-08-17 DIAGNOSIS — Z86.16 HISTORY OF COVID-19: ICD-10-CM

## 2023-08-17 DIAGNOSIS — R91.8 MULTIPLE NODULES OF LUNG: Primary | ICD-10-CM

## 2023-08-17 PROCEDURE — 3017F COLORECTAL CA SCREEN DOC REV: CPT | Performed by: INTERNAL MEDICINE

## 2023-08-17 PROCEDURE — 1036F TOBACCO NON-USER: CPT | Performed by: INTERNAL MEDICINE

## 2023-08-17 PROCEDURE — 99214 OFFICE O/P EST MOD 30 MIN: CPT | Performed by: INTERNAL MEDICINE

## 2023-08-17 PROCEDURE — G8417 CALC BMI ABV UP PARAM F/U: HCPCS | Performed by: INTERNAL MEDICINE

## 2023-08-17 PROCEDURE — G8427 DOCREV CUR MEDS BY ELIG CLIN: HCPCS | Performed by: INTERNAL MEDICINE

## 2023-08-17 RX ORDER — ECHINACEA PURPUREA EXTRACT 125 MG
1 TABLET ORAL PRN
Qty: 1 EACH | Refills: 3 | Status: SHIPPED | OUTPATIENT
Start: 2023-08-17

## 2023-08-17 NOTE — CARE COORDINATION
Spoke with patient to assess for nurse care management. He declined, stated no needs. He has all of his medications and takes them every day, checks his blood pressure at home which is good, he has no symptoms or concerns. Encouraged he call writer in future for any concerns. He is being followed by [facility] health  for financial concerns.     Future Appointments   Date Time Provider [address]00  [address]   8/17/2023  3:45 PM Alexandria Sosa MD Resp Spec Ayaan Alberts

## 2023-08-17 NOTE — PROGRESS NOTES
PULMONARY OP  PROGRESS NOTE      Patient:  Ivanna Khoury  YOB: 1960    MRN: [id number]     Acct:        Pt seen and Chart reviewed. Mr. Liam Pierson is here in followup for   1. Multiple nodules of lung    2. Post-nasal drip    3. History of COVID-19    4. BPH with urinary obstruction          Pt has not been hospitalizedor been to er since last visit. Has been using meds as recommended.   Denied any shortness of breath or wheezing  No weight loss or loss of appetite  No headaches or abdominal pain or bone pain  No fever chills or night sweats  No cough or sputum production  No hemoptysis no orthopnea PND  Having some postnasal discharge  Does not use much of nasal corticosteroids  He is looking for other medications to help and afraid of using corticosteroids  No acid reflux symptoms    Subjective:  Review of Systems    Review of Systems -   General ROS: Completed and except as mentioned above were negative   Psychological ROS:  Completed and except as mentioned above were negative  Ophthalmic ROS:  Completed and except as mentioned above were negative  ENT ROS:  Completed and except as mentioned above were negative  Allergy and Immunology ROS:  Completed and except as mentioned above werenegative  Hematological and Lymphatic ROS:  Completed and except as mentioned above were negative  Endocrine ROS: Completed and except as mentioned above were negative  Respiratory ROS:  Completed and except asmentioned above were negative  Cardiovascular ROS:  Completed and except as mentioned above were negative  Gastrointestinal ROS: Completed and except as mentioned above were negative  Genito-Urinary ROS:  Completedand except as mentioned above were negative  Musculoskeletal ROS:  Completed and except as mentioned above were negative  Neurological ROS:  Completed and except as mentioned above were negative  Dermatological ROS:Completed and except as mentioned above were

## 2023-08-31 ENCOUNTER — HOSPITAL ENCOUNTER (OUTPATIENT)
Dept: CT IMAGING | Age: 63
Discharge: HOME | End: 2023-09-02
Attending: INTERNAL MEDICINE
Payer: COMMERCIAL

## 2023-08-31 DIAGNOSIS — R91.8 MULTIPLE NODULES OF LUNG: ICD-10-CM

## 2023-08-31 PROCEDURE — 71250 CT THORAX DX C-: CPT

## 2023-09-05 ENCOUNTER — TELEPHONE (OUTPATIENT)
Dept: PULMONOLOGY | Age: 63
End: 2023-09-05

## 2023-09-05 NOTE — TELEPHONE ENCOUNTER
Patient called and wanted to apologize to whomever he spoke to this morning he stated he was in a bad mood. It was Ty Lopez here in our office and she was informed.

## 2023-09-20 LAB — NONINV COLON CA DNA+OCC BLD SCRN STL QL: NEGATIVE

## 2023-09-21 ENCOUNTER — TELEPHONE (OUTPATIENT)
Dept: PULMONOLOGY | Age: 63
End: 2023-09-21

## 2023-09-21 ENCOUNTER — OFFICE VISIT (OUTPATIENT)
Dept: PULMONOLOGY | Age: 63
End: 2023-09-21
Payer: COMMERCIAL

## 2023-09-21 VITALS
OXYGEN SATURATION: 100 % | WEIGHT: 188 LBS | DIASTOLIC BLOOD PRESSURE: 87 MMHG | RESPIRATION RATE: 16 BRPM | HEIGHT: 66 IN | SYSTOLIC BLOOD PRESSURE: 136 MMHG | HEART RATE: 103 BPM | BODY MASS INDEX: 30.22 KG/M2

## 2023-09-21 DIAGNOSIS — Z86.16 HISTORY OF COVID-19: ICD-10-CM

## 2023-09-21 DIAGNOSIS — R09.82 POST-NASAL DRIP: ICD-10-CM

## 2023-09-21 DIAGNOSIS — N40.1 BPH WITH URINARY OBSTRUCTION: ICD-10-CM

## 2023-09-21 DIAGNOSIS — N13.8 BPH WITH URINARY OBSTRUCTION: ICD-10-CM

## 2023-09-21 DIAGNOSIS — R91.8 MULTIPLE NODULES OF LUNG: Primary | ICD-10-CM

## 2023-09-21 PROCEDURE — G8427 DOCREV CUR MEDS BY ELIG CLIN: HCPCS | Performed by: INTERNAL MEDICINE

## 2023-09-21 PROCEDURE — 1036F TOBACCO NON-USER: CPT | Performed by: INTERNAL MEDICINE

## 2023-09-21 PROCEDURE — 3017F COLORECTAL CA SCREEN DOC REV: CPT | Performed by: INTERNAL MEDICINE

## 2023-09-21 PROCEDURE — 99214 OFFICE O/P EST MOD 30 MIN: CPT | Performed by: INTERNAL MEDICINE

## 2023-09-21 PROCEDURE — G8417 CALC BMI ABV UP PARAM F/U: HCPCS | Performed by: INTERNAL MEDICINE

## 2023-09-21 RX ORDER — ECHINACEA PURPUREA EXTRACT 125 MG
1 TABLET ORAL PRN
Qty: 1 EACH | Refills: 11 | Status: SHIPPED | OUTPATIENT
Start: 2023-09-21

## 2023-09-21 NOTE — TELEPHONE ENCOUNTER
Patient was concerned because he thought that he was supposed to have another CT scheduled for next year but 1 was not ordered.   Please advise if patient needs a 1yr follow up CT

## 2023-09-21 NOTE — PROGRESS NOTES
criteria for lung cancer screening as the patient never smoked. We'll see the patient back in 12 months or earlier if needed. Patient will call us if he is sick, so he can be seen sooner. Thank youfor having us involved in the care of your patient. Please call us if you have any questions or concerns.         Celia Perez MD, MD             9/21/2023, 3:17 PM

## 2023-10-27 ENCOUNTER — HOSPITAL ENCOUNTER (OUTPATIENT)
Dept: CT IMAGING | Age: 63
End: 2023-10-27
Attending: SURGERY
Payer: COMMERCIAL

## 2023-10-27 ENCOUNTER — HOSPITAL ENCOUNTER (OUTPATIENT)
Dept: ULTRASOUND IMAGING | Age: 63
End: 2023-10-27
Attending: SURGERY
Payer: COMMERCIAL

## 2023-10-27 ENCOUNTER — HOSPITAL ENCOUNTER (OUTPATIENT)
Age: 63
Discharge: HOME | End: 2023-10-27
Attending: SURGERY
Payer: COMMERCIAL

## 2023-10-27 DIAGNOSIS — N50.811 PAIN IN RIGHT TESTICLE: ICD-10-CM

## 2023-10-27 DIAGNOSIS — N50.811 PAIN IN RIGHT TESTICLE: Primary | ICD-10-CM

## 2023-10-27 LAB
CREAT SERPL-MCNC: 0.8 MG/DL (ref 0.7–1.2)
GFR SERPL CREATININE-BSD FRML MDRD: >60 ML/MIN/1.73M2

## 2023-10-27 PROCEDURE — 6360000004 HC RX CONTRAST MEDICATION: Performed by: SURGERY

## 2023-10-27 PROCEDURE — 36415 COLL VENOUS BLD VENIPUNCTURE: CPT

## 2023-10-27 PROCEDURE — 82565 ASSAY OF CREATININE: CPT

## 2023-10-27 PROCEDURE — 74177 CT ABD & PELVIS W/CONTRAST: CPT

## 2023-10-27 PROCEDURE — 93976 VASCULAR STUDY: CPT

## 2023-10-27 RX ADMIN — IOPAMIDOL 75 ML: 755 INJECTION, SOLUTION INTRAVENOUS at 12:59

## 2023-11-01 ENCOUNTER — OFFICE VISIT (OUTPATIENT)
Dept: SURGERY | Age: 63
End: 2023-11-01
Payer: COMMERCIAL

## 2023-11-01 VITALS
BODY MASS INDEX: 30.31 KG/M2 | HEIGHT: 66 IN | OXYGEN SATURATION: 96 % | SYSTOLIC BLOOD PRESSURE: 119 MMHG | DIASTOLIC BLOOD PRESSURE: 73 MMHG | TEMPERATURE: 96.3 F | HEART RATE: 84 BPM | WEIGHT: 188.6 LBS

## 2023-11-01 DIAGNOSIS — N43.3 RIGHT HYDROCELE: Primary | ICD-10-CM

## 2023-11-01 PROCEDURE — G8484 FLU IMMUNIZE NO ADMIN: HCPCS | Performed by: STUDENT IN AN ORGANIZED HEALTH CARE EDUCATION/TRAINING PROGRAM

## 2023-11-01 PROCEDURE — G8427 DOCREV CUR MEDS BY ELIG CLIN: HCPCS | Performed by: STUDENT IN AN ORGANIZED HEALTH CARE EDUCATION/TRAINING PROGRAM

## 2023-11-01 PROCEDURE — 1036F TOBACCO NON-USER: CPT | Performed by: STUDENT IN AN ORGANIZED HEALTH CARE EDUCATION/TRAINING PROGRAM

## 2023-11-01 PROCEDURE — 3017F COLORECTAL CA SCREEN DOC REV: CPT | Performed by: STUDENT IN AN ORGANIZED HEALTH CARE EDUCATION/TRAINING PROGRAM

## 2023-11-01 PROCEDURE — G8417 CALC BMI ABV UP PARAM F/U: HCPCS | Performed by: STUDENT IN AN ORGANIZED HEALTH CARE EDUCATION/TRAINING PROGRAM

## 2023-11-01 PROCEDURE — 99212 OFFICE O/P EST SF 10 MIN: CPT | Performed by: STUDENT IN AN ORGANIZED HEALTH CARE EDUCATION/TRAINING PROGRAM

## 2023-11-01 NOTE — PROGRESS NOTES
Visit Information    Have you changed or started any medications since your last visit including any over-the-counter medicines, vitamins, or herbal medicines? no   Have you stopped taking any of your medications? Is so, why? -  no  Are you having any side effects from any of your medications? - no    Have you seen any other physician or provider since your last visit?  no   Have you had any other diagnostic tests since your last visit?  no   Have you been seen in the emergency room and/or had an admission in a hospital since we last saw you?  no   Have you had your routine dental cleaning in the past 6 months?  no     Do you have an active MyChart account? If no, what is the barrier?   Yes    Patient Care Team:  Jonah Saint, MD as PCP - General (Internal Medicine)  Jonah Saint, MD as PCP - Empaneled Provider  Virgil Bowden MD as Consulting Physician (Gastroenterology)  Ohiopyle Catia as Health   Teofilo Jaimes MD as Consulting Physician (Pulmonary Disease)    Medical History Review  Past Medical, Family, and Social History reviewed and does not contribute to the patient presenting condition    Health Maintenance   Topic Date Due    DTaP/Tdap/Td vaccine (1 - Tdap) Never done    COVID-19 Vaccine (4 - Pfizer series) 12/09/2021    Flu vaccine (1) 08/01/2023    Shingles vaccine (1 of 2) 08/09/2024 (Originally 6/1/2009)    Lipids  08/10/2024    Depression Screen  08/10/2024    Diabetes screen  08/10/2026    Colorectal Cancer Screen  09/14/2026    Hepatitis C screen  Completed    HIV screen  Completed    Hepatitis A vaccine  Aged Out    Hepatitis B vaccine  Aged Out    Hib vaccine  Aged Out    Meningococcal (ACWY) vaccine  Aged Out    Pneumococcal 0-64 years Vaccine  Aged Out    A1C test (Diabetic or Prediabetic)  Discontinued

## 2023-11-01 NOTE — PROGRESS NOTES
[facility] SURGERY CLINIC   PROGRESS NOTE    DATE: November 1, 2023     SUBJECTIVE:  Diamond Meyer is a 58 y.o. male who returns to the [facility] surgery clinic for follow up CT and ultrasound results after noting swelling of the right testicle. Patient states no fevers, sweats, chills, CP or SOB, N/V/D. Patient does still endorse some right testicle swelling and mild chronic pain worse on prolonged standing but improved with recumbent position. Patient denies any changes since prior evaluation on 10/26/23        Past Medical History:   Diagnosis Date    Hyperlipidemia     Hypertension     Wellness examination     Dr. Puma Will last seen 7/2021       Past Surgical History:   Procedure Laterality Date    CYSTOSCOPY N/A 07/30/2020    CYSTOSCOPY performed by Annabel Dooley MD at [facility] Right 7/27/2021    [address] [place] performed by Chana Sotelo IV, DO at [address] Right 07/27/2021    XI ROBOTIC LAPAROSCOPIC INGUINAL HERNIA REPAIR WITH MESH     TURP N/A 08/14/2020    CYSTO, TUR PROSTATE GREENLIGHT XPS performed by Annabel Dooley MD at [facility]      varicose vein of right leg    VASCULAR SURGERY      right leg       Current Outpatient Medications   Medication Sig Dispense Refill    sodium chloride (ALTAMIST SPRAY) 0.65 % nasal spray 1 spray by Nasal route as needed for Congestion 1 each 11    hydroCHLOROthiazide (HYDRODIURIL) 25 MG tablet Take 1 tablet by mouth 2 times daily 60 tablet 11    rosuvastatin (CRESTOR) 10 MG tablet Take 1 tablet by mouth 2 times daily 60 tablet 11    aspirin (ASPIRIN LOW DOSE) 81 MG EC tablet Take 1 tablet by mouth daily 30 tablet 11    ramipril (ALTACE) 10 MG capsule Take 1 capsule by mouth 2 times daily 60 capsule 3    ciclopirox (PENLAC) 8 % solution Apply topically nightly. Remove once weekly with alcohol or nail polish remover.  6 mL 3    Blood Pressure Monitoring (B-D ASSURE BPM/AUTO ARM CUFF)

## 2023-12-11 RX ORDER — TAMSULOSIN HYDROCHLORIDE 0.4 MG/1
0.4 CAPSULE ORAL 2 TIMES DAILY
Qty: 180 CAPSULE | Refills: 11 | Status: SHIPPED | OUTPATIENT
Start: 2023-12-11

## 2024-01-02 RX ORDER — RAMIPRIL 10 MG/1
10 CAPSULE ORAL 2 TIMES DAILY
Qty: 90 CAPSULE | Refills: 1 | Status: SHIPPED | OUTPATIENT
Start: 2024-01-02

## 2024-01-02 NOTE — TELEPHONE ENCOUNTER
Last visit: 8/10/23  Last Med refill: 8/10/23  Does patient have enough medication for 72 hours: No:     Next Visit Date: Patient is on the waitlist for an appt in February  Future Appointments   Date Time Provider Department Center   8/15/2024  2:00 PM Joe Galaviz MD Resp [facility]   9/19/2024  2:30 PM Joe Galaviz MD Resp Spec [facility]       Health Maintenance   Topic Date Due    DTaP/Tdap/Td vaccine (1 - Tdap) Never done    Respiratory Syncytial Virus (RSV) Pregnant or age 60 yrs+ (1 - 1-dose 60+ series) Never done    Flu vaccine (1) 08/01/2023    COVID-19 Vaccine (4 - 2023-24 season) 09/01/2023    Shingles vaccine (1 of 2) 08/09/2024 (Originally 6/1/2009)    Lipids  08/10/2024    Depression Screen  08/10/2024    Diabetes screen  08/10/2026    Colorectal Cancer Screen  09/14/2026    Hepatitis C screen  Completed    HIV screen  Completed    Hepatitis A vaccine  Aged Out    Hepatitis B vaccine  Aged Out    Hib vaccine  Aged Out    Polio vaccine  Aged Out    Meningococcal (ACWY) vaccine  Aged Out    Pneumococcal 0-64 years Vaccine  Aged Out    A1C test (Diabetic or Prediabetic)  Discontinued       Hemoglobin A1C (%)   Date Value   08/10/2023 5.5   09/04/2020 5.9             ( goal A1C is < 7)   No components found for: \"LABMICR\"  LDL Cholesterol (mg/dL)   Date Value   08/10/2023 97   09/04/2020 79       (goal LDL is <100)   AST (U/L)   Date Value   08/10/2023 20     ALT (U/L)   Date Value   08/10/2023 21     BUN (mg/dL)   Date Value   08/10/2023 18     BP Readings from Last 3 Encounters:   11/01/23 119/73   09/21/23 136/87   08/17/23 129/85          (goal 120/80)    All Future Testing planned in [facility]  Lab Frequency Next Occurrence   CT ABDOMEN PELVIS W IV CONTRAST Additional Contrast? Oral Once 10/26/2023               Patient Active Problem List:     BPH with urinary obstruction     TIA (transient ischemic attack)     Gross hematuria     Hematuria     COVID-19     Vasovagal syncope

## 2024-01-24 ENCOUNTER — TELEPHONE (OUTPATIENT)
Dept: INTERNAL MEDICINE | Age: 64
End: 2024-01-24

## 2024-01-25 ENCOUNTER — OFFICE VISIT (OUTPATIENT)
Dept: INTERNAL MEDICINE | Age: 64
End: 2024-01-25
Payer: COMMERCIAL

## 2024-01-25 VITALS
OXYGEN SATURATION: 99 % | SYSTOLIC BLOOD PRESSURE: 130 MMHG | BODY MASS INDEX: 28.53 KG/M2 | DIASTOLIC BLOOD PRESSURE: 78 MMHG | WEIGHT: 192.6 LBS | HEART RATE: 84 BPM | HEIGHT: 69 IN | TEMPERATURE: 98.4 F

## 2024-01-25 DIAGNOSIS — G43.119 INTRACTABLE MIGRAINE WITH AURA WITHOUT STATUS MIGRAINOSUS: ICD-10-CM

## 2024-01-25 DIAGNOSIS — M79.674 PAIN OF TOES OF BOTH FEET: Primary | ICD-10-CM

## 2024-01-25 DIAGNOSIS — M79.675 PAIN OF TOES OF BOTH FEET: Primary | ICD-10-CM

## 2024-01-25 DIAGNOSIS — I10 ESSENTIAL HYPERTENSION: ICD-10-CM

## 2024-01-25 PROCEDURE — 99213 OFFICE O/P EST LOW 20 MIN: CPT | Performed by: INTERNAL MEDICINE

## 2024-01-25 PROCEDURE — 3078F DIAST BP <80 MM HG: CPT | Performed by: INTERNAL MEDICINE

## 2024-01-25 PROCEDURE — 1036F TOBACCO NON-USER: CPT | Performed by: INTERNAL MEDICINE

## 2024-01-25 PROCEDURE — G8427 DOCREV CUR MEDS BY ELIG CLIN: HCPCS | Performed by: INTERNAL MEDICINE

## 2024-01-25 PROCEDURE — 99212 OFFICE O/P EST SF 10 MIN: CPT | Performed by: INTERNAL MEDICINE

## 2024-01-25 PROCEDURE — 3075F SYST BP GE 130 - 139MM HG: CPT | Performed by: INTERNAL MEDICINE

## 2024-01-25 PROCEDURE — 3017F COLORECTAL CA SCREEN DOC REV: CPT | Performed by: INTERNAL MEDICINE

## 2024-01-25 PROCEDURE — G8417 CALC BMI ABV UP PARAM F/U: HCPCS | Performed by: INTERNAL MEDICINE

## 2024-01-25 PROCEDURE — G8484 FLU IMMUNIZE NO ADMIN: HCPCS | Performed by: INTERNAL MEDICINE

## 2024-01-25 ASSESSMENT — ENCOUNTER SYMPTOMS
EYES NEGATIVE: 1
ALLERGIC/IMMUNOLOGIC NEGATIVE: 1
RESPIRATORY NEGATIVE: 1
GASTROINTESTINAL NEGATIVE: 1

## 2024-01-25 NOTE — PROGRESS NOTES
[facility]   Progress Note        Date of patient's visit: 1/25/2024  Patient's Name:  Ivanna Khoury                   YOB: 1960        PCP:  Lisette Strong MD    Ivanna Khoury is a 63 y.o. male who presents for   Chief Complaint   Patient presents with    Numbness     Patient complains of numbness in his fingers on both hands    and follow up of chronic medical problems.  Patient Active Problem List   Diagnosis    BPH with urinary obstruction    TIA (transient ischemic attack)    Gross hematuria    Hematuria    COVID-19    Vasovagal syncope    Postprocedural urinary retention    Intractable migraine with aura without status migrainosus    Post-op pain    Ileus (HCC)    Chronic venous insufficiency of lower extremity    Venous insufficiency of right leg       HISTORY OF PRESENT ILLNESS:    History was obtained from the patient.     Neuropathy  He describes symptoms of burning and ache. Onset of symptoms was gradual, starting about several months ago. Symptoms are currently of moderate severity. Symptoms occur intermittently and last hours. The patient denies lancinating pain and hypersensitivity. Symptoms are symmetric. Previous treatment has included diclofenac, which has improved symptoms.  BP is good  No other acute issues  Continues [place] ( stage 4 [place])  Patient's allergies, medications, past medical, surgical, social and family histories were reviewed and updated as appropriate.    ALLERGIES    No Known Allergies      MEDICATIONS:      Current Outpatient Medications   Medication Sig Dispense Refill    ramipril (ALTACE) 10 MG capsule take 1 capsule by mouth twice a day 90 capsule 1    tamsulosin (FLOMAX) 0.4 MG capsule take 1 capsule by mouth twice a day 180 capsule 11    sodium chloride (ALTAMIST SPRAY) 0.65 % nasal spray 1 spray by Nasal route as needed for Congestion 1 each 11    hydroCHLOROthiazide (HYDRODIURIL) 25 MG tablet Take 1

## 2024-02-01 ENCOUNTER — HOSPITAL ENCOUNTER (EMERGENCY)
Age: 64
Discharge: HOME | End: 2024-02-01
Attending: EMERGENCY MEDICINE
Payer: COMMERCIAL

## 2024-02-01 VITALS
HEART RATE: 87 BPM | TEMPERATURE: 97.2 F | DIASTOLIC BLOOD PRESSURE: 87 MMHG | OXYGEN SATURATION: 98 % | RESPIRATION RATE: 18 BRPM | SYSTOLIC BLOOD PRESSURE: 142 MMHG

## 2024-02-01 DIAGNOSIS — S05.02XA ABRASION OF LEFT CORNEA, INITIAL ENCOUNTER: Primary | ICD-10-CM

## 2024-02-01 PROCEDURE — 6370000000 HC RX 637 (ALT 250 FOR IP): Performed by: EMERGENCY MEDICINE

## 2024-02-01 PROCEDURE — 99283 EMERGENCY DEPT VISIT LOW MDM: CPT

## 2024-02-01 RX ORDER — ERYTHROMYCIN 5 MG/G
OINTMENT OPHTHALMIC
Qty: 1 EACH | Refills: 0 | Status: SHIPPED | OUTPATIENT
Start: 2024-02-01 | End: 2024-02-11

## 2024-02-01 RX ORDER — ACETAMINOPHEN 500 MG
1000 TABLET ORAL ONCE
Status: DISCONTINUED | OUTPATIENT
Start: 2024-02-01 | End: 2024-02-01 | Stop reason: HOSPADM

## 2024-02-01 RX ORDER — IBUPROFEN 800 MG/1
800 TABLET ORAL ONCE
Status: DISCONTINUED | OUTPATIENT
Start: 2024-02-01 | End: 2024-02-01 | Stop reason: HOSPADM

## 2024-02-01 RX ORDER — TETRACAINE HYDROCHLORIDE 5 MG/ML
1 SOLUTION OPHTHALMIC ONCE
Status: COMPLETED | OUTPATIENT
Start: 2024-02-01 | End: 2024-02-01

## 2024-02-01 RX ADMIN — TETRACAINE HYDROCHLORIDE 1 DROP: 5 SOLUTION OPHTHALMIC at 12:40

## 2024-02-01 RX ADMIN — FLUORESCEIN SODIUM 1 MG: 1 STRIP OPHTHALMIC at 12:39

## 2024-02-01 ASSESSMENT — ENCOUNTER SYMPTOMS
EYE PAIN: 1
ABDOMINAL PAIN: 0
SHORTNESS OF BREATH: 0

## 2024-02-01 NOTE — ED PROVIDER NOTES
[facility] ED  Emergency Department Encounter  Emergency Medicine Resident     Pt Name:Ivanna Khoury  MRN: [id number]  Birthdate 1960  Date of evaluation: 2/1/24  PCP:  Lisette Strong MD  Note Started: 12:28 PM EST      CHIEF COMPLAINT       Chief Complaint   Patient presents with    Eye Injury       HISTORY OF PRESENT ILLNESS  (Location/Symptom, Timing/Onset, Context/Setting, Quality, Duration, Modifying Factors, Severity.)      Ivanna Khoury is a 63 y.o. male who presents with pain in the left eye.  Patient stated last night he was cooking fries and put the fries in the oil but the oil was hot and splattered causing oil to get into the left eye.  Patient stated that he wash the oil out well however this morning he was still having eye pain and some blurry vision therefore he came to the emergency department.    PAST MEDICAL / SURGICAL / SOCIAL / FAMILY HISTORY      has a past medical history of Hyperlipidemia, Hypertension, and Wellness examination.     has a past surgical history that includes vascular surgery; Cystoscopy (N/A, 07/30/2020); TURP (N/A, 08/14/2020); vascular surgery; Inguinal hernia repair (Right, 07/27/2021); and hernia repair (Right, 7/27/2021).    Social History     Socioeconomic History    Marital status:      Spouse name: Not on file    Number of children: Not on file    Years of education: Not on file    Highest education level: Not on file   Occupational History    Not on file   Tobacco Use    Smoking status: Never    Smokeless tobacco: Never   Vaping Use    Vaping Use: Never used   Substance and Sexual Activity    Alcohol use: Never    Drug use: Never    Sexual activity: Yes   Other Topics Concern    Not on file   Social History Narrative    Not on file     Social Determinants of Health     Financial Resource Strain: High Risk (8/10/2023)    Overall Financial Resource Strain (CARDIA)     Difficulty of Paying Living Expenses: Very hard   Food Insecurity: Not  on file (8/10/2023)   Transportation Needs: Unknown (8/10/2023)    PRAPARE - Transportation     Lack of Transportation (Medical): Not on file     Lack of Transportation (Non-Medical): No   Physical Activity: Not on file   Stress: Not on file   Social Connections: Not on file   Intimate Partner Violence: Not on file   Housing Stability: Unknown (8/10/2023)    Housing Stability Vital Sign     Unable to Pay for Housing in the Last Year: Not on file     Number of Places Lived in the Last Year: Not on file     Unstable Housing in the Last Year: No       No family history on file.    Allergies:  Patient has no known allergies.    Home Medications:  Prior to Admission medications    Medication Sig Start Date End Date Taking? Authorizing Provider   erythromycin (ROMYCIN) 5 MG/GM ophthalmic ointment Apply to left eye four times a day. 2/1/24 2/11/24 Yes Edin Pederson MD   diclofenac (VOLTAREN) 50 MG EC tablet Take 1 tablet by mouth 2 times daily 1/25/24   Lisette Strong MD   ramipril (ALTACE) 10 MG capsule take 1 capsule by mouth twice a day 1/2/24   Shirin Zimmer MD   tamsulosin (FLOMAX) 0.4 MG capsule take 1 capsule by mouth twice a day 12/11/23   Jayesh Roca MD   sodium chloride (ALTAMIST SPRAY) 0.65 % nasal spray 1 spray by Nasal route as needed for Congestion 9/21/23   Joe Galaviz MD   hydroCHLOROthiazide (HYDRODIURIL) 25 MG tablet Take 1 tablet by mouth 2 times daily 8/10/23   Lisette Strong MD   rosuvastatin (CRESTOR) 10 MG tablet Take 1 tablet by mouth 2 times daily 8/10/23   Lisette Strong MD   aspirin (ASPIRIN LOW DOSE) 81 MG EC tablet Take 1 tablet by mouth daily 8/10/23   Lisette Strong MD   ciclopirox (PENLAC) 8 % solution Apply topically nightly. Remove once weekly with alcohol or nail polish remover. 8/10/23   Lisette Strong MD   fluticasone (FLONASE) 50 MCG/ACT nasal spray 2 sprays by Nasal route daily 8/10/23 1/25/24  Lisette Strong MD   Blood Pressure Monitoring (B-D ASSURE BPM/AUTO ARM CUFF)

## 2024-02-01 NOTE — DISCHARGE INSTRUCTIONS
You were seen here for hot oil in the left eye.  Your eye was evaluated under the slit-lamp.  You had corneal abrasions noted to the left eye.  You were given a prescription for erythromycin ointment.  Apply this medication 4 times a day into the left eye for the next 5 to 7 days.    You need to call and schedule follow-up appointment with a primary care provider for soon as possible.    Return to the emergency department immediately if you experience worsening symptoms, develop any new symptoms, or if you have any other concerns.

## 2024-02-01 NOTE — ED PROVIDER NOTES
[facility] ED     Emergency Department     Faculty Attestation    I performed a history and physical examination of the patient and discussed management with the resident. I reviewed the resident’s note and agree with the documented findings and plan of care. Any areas of disagreement are noted on the chart. I was personally present for the key portions of any procedures. I have documented in the chart those procedures where I was not present during the key portions. I have reviewed the emergency nurses triage note. I agree with the chief complaint, past medical history, past surgical history, allergies, medications, social and family history as documented unless otherwise noted below. For Physician Assistant/ Nurse Practitioner cases/documentation I have personally evaluated this patient and have completed at least one if not all key elements of the E/M (history, physical exam, and MDM). Additional findings are as noted.    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  Washed out immediately last night.  Increasing pain today wanted to be checked out.  Does not wear contacts.  On exam normal pupils flexor ocular movements no skin burns.  On fifth slit-lamp exam anterior chamber is empty angle is wide open.  On fluorescein staining multiple small punctate lesions consistent with a splash burn Chalo negative for all.  No foreign body visualized.  Will discharge home with antibiotic drops discharge      Critical Care     none    Edin Pederson MD, FACEP, FAAEM  Attending Emergency  Physician           Edin Pederson MD  02/01/24 [id number]

## 2024-02-01 NOTE — ED TRIAGE NOTES
Pt. Arrives to ED via private auto for c/o eye injury.  Pt states he was frying fries last night when grease splashed into his left eye.  Pt instantly began to feel pain and used an OTC eye [place] from [place] and stated that it has helped with the pain.  Upon arrival pt is ambulatory to room 5, A&O X4, and speaking in clear sentences.

## 2024-02-02 ENCOUNTER — TELEPHONE (OUTPATIENT)
Dept: INTERNAL MEDICINE | Age: 64
End: 2024-02-02

## 2024-02-02 ENCOUNTER — OFFICE VISIT (OUTPATIENT)
Dept: INTERNAL MEDICINE | Age: 64
End: 2024-02-02
Payer: COMMERCIAL

## 2024-02-02 VITALS
DIASTOLIC BLOOD PRESSURE: 80 MMHG | SYSTOLIC BLOOD PRESSURE: 120 MMHG | OXYGEN SATURATION: 98 % | HEART RATE: 71 BPM | HEIGHT: 66 IN | WEIGHT: 191 LBS | TEMPERATURE: 97.3 F | BODY MASS INDEX: 30.7 KG/M2

## 2024-02-02 DIAGNOSIS — S05.8X2A ABRASION OF LEFT EYE, INITIAL ENCOUNTER: Primary | ICD-10-CM

## 2024-02-02 DIAGNOSIS — S05.02XS ABRASION OF LEFT CORNEA, SEQUELA: Primary | ICD-10-CM

## 2024-02-02 PROCEDURE — G8484 FLU IMMUNIZE NO ADMIN: HCPCS

## 2024-02-02 PROCEDURE — G8417 CALC BMI ABV UP PARAM F/U: HCPCS

## 2024-02-02 PROCEDURE — 1036F TOBACCO NON-USER: CPT

## 2024-02-02 PROCEDURE — 3017F COLORECTAL CA SCREEN DOC REV: CPT

## 2024-02-02 PROCEDURE — G8427 DOCREV CUR MEDS BY ELIG CLIN: HCPCS

## 2024-02-02 PROCEDURE — 99213 OFFICE O/P EST LOW 20 MIN: CPT

## 2024-02-02 RX ORDER — IBUPROFEN 200 MG
200 TABLET ORAL EVERY 6 HOURS PRN
Qty: 120 TABLET | Refills: 3 | Status: SHIPPED | OUTPATIENT
Start: 2024-02-02

## 2024-02-02 RX ORDER — KETOTIFEN FUMARATE 0.35 MG/ML
1 SOLUTION/ DROPS OPHTHALMIC 2 TIMES DAILY
Qty: 1 ML | Refills: 1 | Status: SHIPPED | OUTPATIENT
Start: 2024-02-02 | End: 2024-03-03

## 2024-02-02 NOTE — TELEPHONE ENCOUNTER
----- Message from Shey Colorado sent at 2/2/2024 10:35 AM EST -----  Subject: Referral Request    Reason for referral request? Patient needs a referral for eye doctor,   because of left eye 5 abrasions.   Provider patient wants to be referred to(if known): Brandin Hunter    Provider Phone Number(if known):[phone]    Additional Information for Provider?   ---------------------------------------------------------------------------  --------------  CALL BACK INFO    [phone]; OK to leave message on voicemail,OK to respond with electronic   message via [employer] portal (only for patients who have registered [employer]   account)  ---------------------------------------------------------------------------  --------------

## 2024-02-02 NOTE — PROGRESS NOTES
[facility]  [facility]  [address] [place] [address]  Dept: [phone]  Dept Fax: [phone]    Office Progress/Follow Up Note  Date ofpatient's visit: 2/2/2024  Patient's Name:  Ivanna Khoury YOB: 1960            Patient Care Team:  Lisette Strong MD as PCP - General (Internal Medicine)  Lisette Strong MD as PCP - Empaneled Provider  Jorge Eng MD as Consulting Physician (Gastroenterology)  Stacy Lyles as Health   Joe Galaviz MD as Consulting Physician (Pulmonary Disease)  ================================================================    REASON FOR VISIT/CHIEF COMPLAINT:  Follow-up (Has a corneal abrasion- given eye drops/Blurry vision/Has pain in the left eye)    HISTORY OF PRESENTING ILLNESS:  History was obtained from: patient. Ivanna Jones a 63 y.o. is here for a ED follow-up.  Past medical history significant for BPH with urinary obstruction, TIA, postprocedure urinary retention, migraine with RLS, ileus, chronic venous insufficiency of lower extremity, venous insufficiency of right leg, neuropathy corneal abrasion, multiple nodules of the was given eyedrops he has follow-up with Dr. Talbert.  Patient has been taking diclofenac with the same.      Patient was recently seen in ED for left eye pain.  He accidentally put burning hot oil on his left eye.  He was able to wash the oil out well, but was still having some eye pain and blurry vision.  Abrasion of the left cornea was noted on initial encounter.  Patient was prescribed erythromycin and discharged.    Patient continues to report 1 body sensation, increased watery discharge from the eye.  Patient reports his pain has improved.  He denies any visual changes, or any discoloration.  Patient will be prescribed ibuprofen, antihistaminic to help with eye discharge.  Referral sent for ophthalmology    Outpatient medications Flomax 0.4, Crestor 10, ramipril 10,

## 2024-02-02 NOTE — PROGRESS NOTES
Attending Physician Statement  I have discussed the care of Ivanna Khoury including pertinent history and exam findings,  with the resident. I have reviewed the key elements of all parts of the encounter with the resident.  I agree with the assessment, plan and orders as documented by the resident.  (GE Modifier)    MD REYES Ryan  Attending Physician, [facility]   Faculty, Internal Medicine Residency Program  [facility] Physician [facility]  2/2/2024, 9:06 AM

## 2024-02-11 DIAGNOSIS — M79.675 PAIN OF TOES OF BOTH FEET: ICD-10-CM

## 2024-02-11 DIAGNOSIS — B35.1 ONYCHOMYCOSIS OF TOENAIL: ICD-10-CM

## 2024-02-11 DIAGNOSIS — M79.674 PAIN OF TOES OF BOTH FEET: ICD-10-CM

## 2024-02-12 NOTE — TELEPHONE ENCOUNTER
Escribe refill request from [email or web address] for Ciclopirox 8% solution.      Last visit: 2/2/24  Last Med refill: 8/10/23  Does patient have enough medication for 72 hours: No:     Next Visit Date:4/2/24  Future Appointments   Date Time Provider Department Center   4/2/2024  2:00 PM iLsette Strong MD [facility]   8/15/2024  2:00 PM Joe Galaviz MD Resp Spec [facility]   9/19/2024  2:30 PM Joe Galaviz MD Resp Spec [facility]       Health Maintenance   Topic Date Due    DTaP/Tdap/Td vaccine (1 - Tdap) Never done    Respiratory Syncytial Virus (RSV) Pregnant or age 60 yrs+ (1 - 1-dose 60+ series) Never done    Flu vaccine (1) 08/01/2023    COVID-19 Vaccine (4 - 2023-24 season) 09/01/2023    Shingles vaccine (1 of 2) 08/09/2024 (Originally 6/1/2009)    Lipids  08/10/2024    Depression Screen  01/25/2025    Diabetes screen  08/10/2026    Colorectal Cancer Screen  09/14/2026    Hepatitis C screen  Completed    HIV screen  Completed    Hepatitis A vaccine  Aged Out    Hepatitis B vaccine  Aged Out    Hib vaccine  Aged Out    Polio vaccine  Aged Out    Meningococcal (ACWY) vaccine  Aged Out    Pneumococcal 0-64 years Vaccine  Aged Out    A1C test (Diabetic or Prediabetic)  Discontinued       Hemoglobin A1C (%)   Date Value   08/10/2023 5.5   09/04/2020 5.9             ( goal A1C is < 7)   No components found for: \"LABMICR\"  LDL Cholesterol (mg/dL)   Date Value   08/10/2023 97   09/04/2020 79       (goal LDL is <100)   AST (U/L)   Date Value   08/10/2023 20     ALT (U/L)   Date Value   08/10/2023 21     BUN (mg/dL)   Date Value   08/10/2023 18     BP Readings from Last 3 Encounters:   02/02/24 120/80   02/01/24 (!) 142/87   01/25/24 130/78          (goal 120/80)    All Future Testing planned in [facility]  Lab Frequency Next Occurrence   CT ABDOMEN PELVIS W IV CONTRAST Additional Contrast? Oral Once 10/26/2023               Patient Active Problem List:     BPH with urinary obstruction     TIA (transient ischemic

## 2024-02-19 NOTE — TELEPHONE ENCOUNTER
ischemic attack)     Gross hematuria     Hematuria     COVID-19     Vasovagal syncope     Postprocedural urinary retention     Intractable migraine with aura without status migrainosus     Post-op pain     Ileus (HCC)     Chronic venous insufficiency of lower extremity     Venous insufficiency of right leg

## 2024-02-20 RX ORDER — KETOTIFEN FUMARATE 0.35 MG/ML
1 SOLUTION/ DROPS OPHTHALMIC 2 TIMES DAILY
Qty: 1 ML | Refills: 1 | Status: SHIPPED | OUTPATIENT
Start: 2024-02-20 | End: 2024-03-21

## 2024-04-07 DIAGNOSIS — B35.1 ONYCHOMYCOSIS OF TOENAIL: ICD-10-CM

## 2024-04-07 DIAGNOSIS — M79.674 PAIN OF TOES OF BOTH FEET: ICD-10-CM

## 2024-04-07 DIAGNOSIS — M79.675 PAIN OF TOES OF BOTH FEET: ICD-10-CM

## 2024-04-08 NOTE — TELEPHONE ENCOUNTER
Escribe refill request from [facility] for Ciclopirox 8% solution.      Last visit: 2/2/24  Last Med refill: 2/13/24  Does patient have enough medication for 72 hours: No:     Next Visit Date: 4/11/24  Future Appointments   Date Time Provider Department Center   4/11/2024  2:00 PM Lisette Strong MD [facility]   8/8/2024  2:00 PM Joe Galaviz MD Resp Spec [facility]   9/12/2024  1:15 PM Joe Galaviz MD Resp Spec [facility]       Health Maintenance   Topic Date Due    DTaP/Tdap/Td vaccine (1 - Tdap) Never done    Respiratory Syncytial Virus (RSV) Pregnant or age 60 yrs+ (1 - 1-dose 60+ series) Never done    COVID-19 Vaccine (4 - 2023-24 season) 09/01/2023    Shingles vaccine (1 of 2) 08/09/2024 (Originally 6/1/2009)    Flu vaccine (Season Ended) 08/01/2024    Lipids  08/10/2024    Depression Screen  01/25/2025    Diabetes screen  08/10/2026    Colorectal Cancer Screen  09/14/2026    Hepatitis C screen  Completed    HIV screen  Completed    Hepatitis A vaccine  Aged Out    Hepatitis B vaccine  Aged Out    Hib vaccine  Aged Out    Polio vaccine  Aged Out    Meningococcal (ACWY) vaccine  Aged Out    Pneumococcal 0-64 years Vaccine  Aged Out    A1C test (Diabetic or Prediabetic)  Discontinued       Hemoglobin A1C (%)   Date Value   08/10/2023 5.5   09/04/2020 5.9             ( goal A1C is < 7)   No components found for: \"LABMICR\"  LDL Cholesterol (mg/dL)   Date Value   08/10/2023 97   09/04/2020 79       (goal LDL is <100)   AST (U/L)   Date Value   08/10/2023 20     ALT (U/L)   Date Value   08/10/2023 21     BUN (mg/dL)   Date Value   08/10/2023 18     BP Readings from Last 3 Encounters:   02/02/24 120/80   02/01/24 (!) 142/87   01/25/24 130/78          (goal 120/80)    All Future Testing planned in [facility]  Lab Frequency Next Occurrence   CT ABDOMEN PELVIS W IV CONTRAST Additional Contrast? Oral Once 10/26/2023               Patient Active Problem List:     BPH with urinary obstruction     TIA (transient

## 2024-04-10 RX ORDER — CICLOPIROX 80 MG/ML
SOLUTION TOPICAL
Qty: 6.6 ML | Refills: 1 | Status: SHIPPED | OUTPATIENT
Start: 2024-04-10

## 2024-04-25 ENCOUNTER — OFFICE VISIT (OUTPATIENT)
Dept: INTERNAL MEDICINE | Age: 64
End: 2024-04-25
Payer: COMMERCIAL

## 2024-04-25 VITALS
SYSTOLIC BLOOD PRESSURE: 124 MMHG | BODY MASS INDEX: 28.29 KG/M2 | DIASTOLIC BLOOD PRESSURE: 84 MMHG | TEMPERATURE: 98.1 F | HEART RATE: 94 BPM | OXYGEN SATURATION: 99 % | HEIGHT: 69 IN | WEIGHT: 191 LBS

## 2024-04-25 DIAGNOSIS — G45.9 TIA (TRANSIENT ISCHEMIC ATTACK): ICD-10-CM

## 2024-04-25 DIAGNOSIS — B35.1 ONYCHOMYCOSIS OF TOENAIL: Primary | ICD-10-CM

## 2024-04-25 DIAGNOSIS — M79.602 LEFT ARM PAIN: ICD-10-CM

## 2024-04-25 PROCEDURE — 3017F COLORECTAL CA SCREEN DOC REV: CPT | Performed by: INTERNAL MEDICINE

## 2024-04-25 PROCEDURE — G8417 CALC BMI ABV UP PARAM F/U: HCPCS | Performed by: INTERNAL MEDICINE

## 2024-04-25 PROCEDURE — G8427 DOCREV CUR MEDS BY ELIG CLIN: HCPCS | Performed by: INTERNAL MEDICINE

## 2024-04-25 PROCEDURE — 99214 OFFICE O/P EST MOD 30 MIN: CPT | Performed by: INTERNAL MEDICINE

## 2024-04-25 PROCEDURE — 99213 OFFICE O/P EST LOW 20 MIN: CPT | Performed by: INTERNAL MEDICINE

## 2024-04-25 PROCEDURE — 1036F TOBACCO NON-USER: CPT | Performed by: INTERNAL MEDICINE

## 2024-04-25 RX ORDER — TIZANIDINE 4 MG/1
4 TABLET ORAL NIGHTLY PRN
Qty: 10 TABLET | Refills: 0 | Status: SHIPPED | OUTPATIENT
Start: 2024-04-25

## 2024-04-25 RX ORDER — KETOTIFEN FUMARATE 0.25 MG/ML
1 SOLUTION/ DROPS OPHTHALMIC 2 TIMES DAILY
COMMUNITY
Start: 2024-02-21

## 2024-04-25 ASSESSMENT — ENCOUNTER SYMPTOMS
ALLERGIC/IMMUNOLOGIC NEGATIVE: 1
EYES NEGATIVE: 1
GASTROINTESTINAL NEGATIVE: 1
TROUBLE SWALLOWING: 1
RESPIRATORY NEGATIVE: 1

## 2024-04-25 NOTE — PROGRESS NOTES
Dermatology     Number of Visits Requested:   1       Return in about 3 months (around 7/25/2024).    Patient voiced understanding and agreed to treatment plan.     This note is created with the assistance of a speech-recognition program. While intending to generate a document that actually reflects the content of the visit, the document can still have some mistakes which may not have been identified and corrected by editing.      Dr Lisette Strong MD, FACP  Associate , Department of Internal Medicine  Resident Ambulatory Site Medical Director  [facility] Internal Medicine  [facility]  Internal Medicine Clerkship - [facility]                   4/25/2024, 2:41 PM

## 2024-04-26 ENCOUNTER — HOSPITAL ENCOUNTER (OUTPATIENT)
Age: 64
Setting detail: SPECIMEN
Discharge: HOME | End: 2024-04-26

## 2024-04-26 DIAGNOSIS — B35.1 ONYCHOMYCOSIS OF TOENAIL: ICD-10-CM

## 2024-04-26 DIAGNOSIS — M79.602 LEFT ARM PAIN: ICD-10-CM

## 2024-04-26 DIAGNOSIS — G45.9 TIA (TRANSIENT ISCHEMIC ATTACK): ICD-10-CM

## 2024-04-26 LAB
ALBUMIN SERPL-MCNC: 4.8 G/DL (ref 3.5–5.2)
ALBUMIN/GLOB SERPL: 2 {RATIO} (ref 1–2.5)
ALP SERPL-CCNC: 51 U/L (ref 40–129)
ALT SERPL-CCNC: 17 U/L (ref 10–50)
ANION GAP SERPL CALCULATED.3IONS-SCNC: 12 MMOL/L (ref 9–16)
AST SERPL-CCNC: 23 U/L (ref 10–50)
BASOPHILS # BLD: 0.03 K/UL (ref 0–0.2)
BASOPHILS NFR BLD: 1 % (ref 0–2)
BILIRUB SERPL-MCNC: 0.3 MG/DL (ref 0–1.2)
BUN SERPL-MCNC: 19 MG/DL (ref 8–23)
CALCIUM SERPL-MCNC: 9.5 MG/DL (ref 8.6–10.4)
CHLORIDE SERPL-SCNC: 103 MMOL/L (ref 98–107)
CHOLEST SERPL-MCNC: 155 MG/DL (ref 0–199)
CHOLESTEROL/HDL RATIO: 3
CO2 SERPL-SCNC: 25 MMOL/L (ref 20–31)
CREAT SERPL-MCNC: 0.9 MG/DL (ref 0.7–1.2)
EOSINOPHIL # BLD: 0.34 K/UL (ref 0–0.44)
EOSINOPHILS RELATIVE PERCENT: 6 % (ref 1–4)
ERYTHROCYTE [DISTWIDTH] IN BLOOD BY AUTOMATED COUNT: 13.2 % (ref 11.8–14.4)
EST. AVERAGE GLUCOSE BLD GHB EST-MCNC: 120 MG/DL
GFR SERPL CREATININE-BSD FRML MDRD: >90 ML/MIN/1.73M2
GLUCOSE SERPL-MCNC: 82 MG/DL (ref 74–99)
HBA1C MFR BLD: 5.8 % (ref 4–6)
HCT VFR BLD AUTO: 47.1 % (ref 40.7–50.3)
HDLC SERPL-MCNC: 53 MG/DL
HGB BLD-MCNC: 14.6 G/DL (ref 13–17)
IMM GRANULOCYTES # BLD AUTO: <0.03 K/UL (ref 0–0.3)
IMM GRANULOCYTES NFR BLD: 0 %
LDLC SERPL CALC-MCNC: 85 MG/DL (ref 0–100)
LYMPHOCYTES NFR BLD: 2.18 K/UL (ref 1.1–3.7)
LYMPHOCYTES RELATIVE PERCENT: 40 % (ref 24–43)
MCH RBC QN AUTO: 25.8 PG (ref 25.2–33.5)
MCHC RBC AUTO-ENTMCNC: 31 G/DL (ref 28.4–34.8)
MCV RBC AUTO: 83.4 FL (ref 82.6–102.9)
MONOCYTES NFR BLD: 0.37 K/UL (ref 0.1–1.2)
MONOCYTES NFR BLD: 7 % (ref 3–12)
NEUTROPHILS NFR BLD: 46 % (ref 36–65)
NEUTS SEG NFR BLD: 2.58 K/UL (ref 1.5–8.1)
NRBC BLD-RTO: 0 PER 100 WBC
PLATELET # BLD AUTO: 256 K/UL (ref 138–453)
PMV BLD AUTO: 9.8 FL (ref 8.1–13.5)
POTASSIUM SERPL-SCNC: 4 MMOL/L (ref 3.7–5.3)
PROT SERPL-MCNC: 7.1 G/DL (ref 6.6–8.7)
RBC # BLD AUTO: 5.65 M/UL (ref 4.21–5.77)
SODIUM SERPL-SCNC: 140 MMOL/L (ref 136–145)
TRIGL SERPL-MCNC: 81 MG/DL
TSH SERPL DL<=0.05 MIU/L-ACNC: 1.4 UIU/ML (ref 0.27–4.2)
VLDLC SERPL CALC-MCNC: 16 MG/DL
WBC OTHER # BLD: 5.5 K/UL (ref 3.5–11.3)

## 2024-05-01 ENCOUNTER — TELEPHONE (OUTPATIENT)
Dept: INTERNAL MEDICINE | Age: 64
End: 2024-05-01

## 2024-05-01 DIAGNOSIS — B35.1 ONYCHOMYCOSIS OF TOENAIL: Primary | ICD-10-CM

## 2024-05-01 NOTE — TELEPHONE ENCOUNTER
----- Message from Eleni David sent at 4/30/2024 10:58 AM EDT -----  Subject: Message to Provider    QUESTIONS  Information for Provider? Pt called in stating that the provider referred   him to a dermatologist that is not on his [employer] insurance. If the   practice could give the pt a call to advise.  ---------------------------------------------------------------------------  --------------  CALL BACK INFO  [phone]; OK to leave message on voicemail  ---------------------------------------------------------------------------  --------------  SCRIPT ANSWERS  Relationship to Patient? Self

## 2024-05-08 NOTE — TELEPHONE ENCOUNTER
[facility] Refill Request  Patient: Ivanna Khoury  Date: 5/8/2024  PCP: Lisette Strong MD  Last Department Visit: 4/25/2024  Next Department Visit: Visit date not found    Medication  Requested Prescriptions     Pending Prescriptions Disp Refills    ramipril (ALTACE) 10 MG capsule [Pharmacy Med Name: RAMIPRIL 10 MG CAPSULE] 90 capsule 1     Sig: take 1 capsule by mouth twice a day     Current Medication List  Outpatient Encounter Medications as of 5/8/2024   Medication Sig Dispense Refill    ketotifen (ZADITOR) 0.025 % ophthalmic solution Place 1 drop into both eyes 2 times daily      tiZANidine (ZANAFLEX) 4 MG tablet Take 1 tablet by mouth nightly as needed (neck spasm) 10 tablet 0    ciclopirox (PENLAC) 8 % solution apply topically nightly REMOVE ONCE weekly with ALCOHOL or NAIL POLISH REMOVER (Patient not taking: Reported on 4/25/2024) 6.6 mL 1    ibuprofen (ADVIL) 200 MG tablet Take 1 tablet by mouth every 6 hours as needed for Pain 120 tablet 3    diclofenac (VOLTAREN) 50 MG EC tablet Take 1 tablet by mouth 2 times daily 60 tablet 3    ramipril (ALTACE) 10 MG capsule take 1 capsule by mouth twice a day 90 capsule 1    tamsulosin (FLOMAX) 0.4 MG capsule take 1 capsule by mouth twice a day 180 capsule 11    sodium chloride (ALTAMIST SPRAY) 0.65 % nasal spray 1 spray by Nasal route as needed for Congestion 1 each 11    hydroCHLOROthiazide (HYDRODIURIL) 25 MG tablet Take 1 tablet by mouth 2 times daily 60 tablet 11    rosuvastatin (CRESTOR) 10 MG tablet Take 1 tablet by mouth 2 times daily 60 tablet 11    aspirin (ASPIRIN LOW DOSE) 81 MG EC tablet Take 1 tablet by mouth daily 30 tablet 11    fluticasone (FLONASE) 50 MCG/ACT nasal spray 2 sprays by Nasal route daily 1 each 11    Blood Pressure Monitoring (B-D ASSURE BPM/AUTO ARM CUFF) MISC Use to monitor BP routinely 1 each 0     No facility-administered encounter medications on file as of 5/8/2024.

## 2024-05-09 RX ORDER — RAMIPRIL 10 MG/1
10 CAPSULE ORAL 2 TIMES DAILY
Qty: 90 CAPSULE | Refills: 1 | Status: SHIPPED | OUTPATIENT
Start: 2024-05-09

## 2024-05-09 RX ORDER — RAMIPRIL 10 MG/1
10 CAPSULE ORAL 2 TIMES DAILY
Qty: 90 CAPSULE | Refills: 1 | OUTPATIENT
Start: 2024-05-09

## 2024-05-16 ENCOUNTER — HOSPITAL ENCOUNTER (OUTPATIENT)
Dept: MRI IMAGING | Age: 64
Discharge: HOME | End: 2024-05-18
Attending: INTERNAL MEDICINE
Payer: COMMERCIAL

## 2024-05-16 DIAGNOSIS — M79.602 LEFT ARM PAIN: ICD-10-CM

## 2024-05-16 PROCEDURE — 6360000004 HC RX CONTRAST MEDICATION: Performed by: INTERNAL MEDICINE

## 2024-05-16 PROCEDURE — A9579 GAD-BASE MR CONTRAST NOS,1ML: HCPCS | Performed by: INTERNAL MEDICINE

## 2024-05-16 PROCEDURE — 72156 MRI NECK SPINE W/O & W/DYE: CPT

## 2024-05-16 RX ADMIN — GADOTERIDOL 17 ML: 279.3 INJECTION, SOLUTION INTRAVENOUS at 10:39

## 2024-05-31 ENCOUNTER — TELEPHONE (OUTPATIENT)
Dept: INTERNAL MEDICINE | Age: 64
End: 2024-05-31

## 2024-05-31 NOTE — TELEPHONE ENCOUNTER
----- Message from Kia Alissonpola Barnes sent at 5/30/2024  2:04 PM EDT -----  Regarding: ECC Message to Provider  ECC Message to Provider    Relationship to Patient: Self     Additional Information :The patient have a question regarding on his test result on his Spine MRI.   --------------------------------------------------------------------------------------------------------------------------    Call Back Information: OK to leave message on voicemail  Preferred Call Back Number: Phone [phone]

## 2024-06-20 DIAGNOSIS — G45.9 TIA (TRANSIENT ISCHEMIC ATTACK): Primary | ICD-10-CM

## 2024-06-20 DIAGNOSIS — E78.5 HYPERLIPIDEMIA, UNSPECIFIED HYPERLIPIDEMIA TYPE: ICD-10-CM

## 2024-06-20 NOTE — TELEPHONE ENCOUNTER
Received notification that pt was provided a 30-day supply of rosuvastatin. Per fax, the medication has quantity limits.    Review of chart shows 10 mg rosuvastatin is to be taken one tab twice daily.    Dr. Strong, I have pended a script for 20 mg rosuvastatin to be taken once daily to be within the quantity limitations imposed by insurance.     Please advise, is there a particular reason pt takes 10 mg BID instead? If so, I will need clinical documentation to pursue a prior authorization for next month showing the need for medication to be taken this way.

## 2024-06-21 RX ORDER — ROSUVASTATIN CALCIUM 20 MG/1
20 TABLET, COATED ORAL DAILY
Qty: 90 TABLET | Refills: 1 | Status: SHIPPED | OUTPATIENT
Start: 2024-06-21

## 2024-07-23 ENCOUNTER — OFFICE VISIT (OUTPATIENT)
Dept: INTERNAL MEDICINE | Age: 64
End: 2024-07-23
Payer: COMMERCIAL

## 2024-07-23 VITALS
HEART RATE: 96 BPM | OXYGEN SATURATION: 99 % | DIASTOLIC BLOOD PRESSURE: 70 MMHG | HEIGHT: 69 IN | WEIGHT: 195.8 LBS | SYSTOLIC BLOOD PRESSURE: 120 MMHG | TEMPERATURE: 98.1 F | BODY MASS INDEX: 29 KG/M2

## 2024-07-23 DIAGNOSIS — I87.2 VENOUS INSUFFICIENCY OF RIGHT LEG: ICD-10-CM

## 2024-07-23 DIAGNOSIS — E78.5 HYPERLIPIDEMIA, UNSPECIFIED HYPERLIPIDEMIA TYPE: ICD-10-CM

## 2024-07-23 DIAGNOSIS — I10 ESSENTIAL HYPERTENSION: Primary | ICD-10-CM

## 2024-07-23 DIAGNOSIS — R55 VASOVAGAL SYNCOPE: ICD-10-CM

## 2024-07-23 PROCEDURE — 3074F SYST BP LT 130 MM HG: CPT | Performed by: INTERNAL MEDICINE

## 2024-07-23 PROCEDURE — 99212 OFFICE O/P EST SF 10 MIN: CPT | Performed by: INTERNAL MEDICINE

## 2024-07-23 PROCEDURE — 3078F DIAST BP <80 MM HG: CPT | Performed by: INTERNAL MEDICINE

## 2024-07-23 PROCEDURE — 1123F ACP DISCUSS/DSCN MKR DOCD: CPT | Performed by: INTERNAL MEDICINE

## 2024-07-23 PROCEDURE — G8417 CALC BMI ABV UP PARAM F/U: HCPCS | Performed by: INTERNAL MEDICINE

## 2024-07-23 PROCEDURE — 3017F COLORECTAL CA SCREEN DOC REV: CPT | Performed by: INTERNAL MEDICINE

## 2024-07-23 PROCEDURE — G8427 DOCREV CUR MEDS BY ELIG CLIN: HCPCS | Performed by: INTERNAL MEDICINE

## 2024-07-23 PROCEDURE — 1036F TOBACCO NON-USER: CPT | Performed by: INTERNAL MEDICINE

## 2024-07-23 PROCEDURE — 99213 OFFICE O/P EST LOW 20 MIN: CPT | Performed by: INTERNAL MEDICINE

## 2024-07-23 RX ORDER — ADHESIVE BANDAGE 3/4"
BANDAGE TOPICAL
Qty: 1 EACH | Refills: 0 | Status: SHIPPED | OUTPATIENT
Start: 2024-07-23

## 2024-07-23 ASSESSMENT — ENCOUNTER SYMPTOMS
ALLERGIC/IMMUNOLOGIC NEGATIVE: 1
RESPIRATORY NEGATIVE: 1
CONSTIPATION: 1

## 2024-07-23 NOTE — PROGRESS NOTES
[facility]   Progress Note        Date of patient's visit: 7/23/2024  Patient's Name:  Ivanna Khoury                   YOB: 1960        PCP:  Lisette Strong MD    Ivanna Khoury is a 64 y.o. male who presents for   Chief Complaint   Patient presents with    Hypertension     3 month follow up     and follow up of chronic medical problems.  Patient Active Problem List   Diagnosis    BPH with urinary obstruction    TIA (transient ischemic attack)    Gross hematuria    Hematuria    COVID-19    Vasovagal syncope    Postprocedural urinary retention    Intractable migraine with aura without status migrainosus    Post-op pain    Ileus (HCC)    Chronic venous insufficiency of lower extremity    Venous insufficiency of right leg       HISTORY OF PRESENT ILLNESS:    History was obtained from the patient.   Here for routine follow-up with no acute issues.  Patient has hypertension and is taking ramipril 10 mg once a day with adequate control of his blood pressure readings along with hydrochlorothiazide 25 mg twice a day.  He has a blood pressure cuff at home that has torn and needs a new one.  He reports that he has had no vasovagal symptoms since last visit.  Patient also on Crestor 10 mg twice a day and feels more comfortable taking it as a twice daily dose rather than twice a day      Patient's allergies, medications, past medical, surgical, social and family histories were reviewed and updated as appropriate.    ALLERGIES    No Known Allergies      MEDICATIONS:      Current Outpatient Medications   Medication Sig Dispense Refill    ramipril (ALTACE) 10 MG capsule take 1 capsule by mouth twice a day 90 capsule 1    rosuvastatin (CRESTOR) 20 MG tablet Take 1 tablet by mouth daily PLEASE NOTE strength of medication changed to 20 mg once daily from 10 mg twice daily 90 tablet 1    ketotifen (ZADITOR) 0.025 % ophthalmic solution Place 1 drop into both eyes 2 times daily      tiZANidine

## 2024-08-08 ENCOUNTER — OFFICE VISIT (OUTPATIENT)
Dept: PULMONOLOGY | Age: 64
End: 2024-08-08
Payer: MEDICARE

## 2024-08-08 VITALS
HEIGHT: 69 IN | WEIGHT: 195 LBS | OXYGEN SATURATION: 99 % | BODY MASS INDEX: 28.88 KG/M2 | RESPIRATION RATE: 18 BRPM | DIASTOLIC BLOOD PRESSURE: 82 MMHG | HEART RATE: 81 BPM | SYSTOLIC BLOOD PRESSURE: 129 MMHG

## 2024-08-08 DIAGNOSIS — R91.8 MULTIPLE NODULES OF LUNG: Primary | ICD-10-CM

## 2024-08-08 DIAGNOSIS — N13.8 BPH WITH URINARY OBSTRUCTION: ICD-10-CM

## 2024-08-08 DIAGNOSIS — R09.82 POST-NASAL DRIP: ICD-10-CM

## 2024-08-08 DIAGNOSIS — N40.1 BPH WITH URINARY OBSTRUCTION: ICD-10-CM

## 2024-08-08 DIAGNOSIS — Z86.16 HISTORY OF COVID-19: ICD-10-CM

## 2024-08-08 PROCEDURE — G8427 DOCREV CUR MEDS BY ELIG CLIN: HCPCS | Performed by: INTERNAL MEDICINE

## 2024-08-08 PROCEDURE — 1123F ACP DISCUSS/DSCN MKR DOCD: CPT | Performed by: INTERNAL MEDICINE

## 2024-08-08 PROCEDURE — 99213 OFFICE O/P EST LOW 20 MIN: CPT | Performed by: INTERNAL MEDICINE

## 2024-08-08 PROCEDURE — G8417 CALC BMI ABV UP PARAM F/U: HCPCS | Performed by: INTERNAL MEDICINE

## 2024-08-08 PROCEDURE — 3017F COLORECTAL CA SCREEN DOC REV: CPT | Performed by: INTERNAL MEDICINE

## 2024-08-08 PROCEDURE — 1036F TOBACCO NON-USER: CPT | Performed by: INTERNAL MEDICINE

## 2024-08-08 NOTE — PROGRESS NOTES
PULMONARY OP  PROGRESS NOTE      Patient:  Ivanna Khoury  YOB: 1960    MRN: [id number]     Acct:        Pt seen and Chart reviewed. Mr. Ivanna Khoury is here in followup for   1. Multiple nodules of lung    2. Post-nasal drip    3. History of COVID-19    4. BPH with urinary obstruction          Pt has not been hospitalizedor been to er since last visit.    Has been using meds as recommended.  Denied any shortness of breath or wheezing  No weight loss or loss of appetite  No headaches or abdominal pain or bone pain  No fever chills or night sweats  No cough or sputum production  No hemoptysis no orthopnea PND  Having some postnasal discharge  Does not use much of nasal corticosteroids  He is looking for other medications to help and afraid of using corticosteroids  No acid reflux symptoms    Subjective:  Review of Systems    Review of Systems -   General ROS: Completed and except as mentioned above were negative   Psychological ROS:  Completed and except as mentioned above were negative  Ophthalmic ROS:  Completed and except as mentioned above were negative  ENT ROS:  Completed and except as mentioned above were negative  Allergy and Immunology ROS:  Completed and except as mentioned above werenegative  Hematological and Lymphatic ROS:  Completed and except as mentioned above were negative  Endocrine ROS: Completed and except as mentioned above were negative  Respiratory ROS:  Completed and except asmentioned above were negative  Cardiovascular ROS:  Completed and except as mentioned above were negative  Gastrointestinal ROS: Completed and except as mentioned above were negative  Genito-Urinary ROS:  Completedand except as mentioned above were negative  Musculoskeletal ROS:  Completed and except as mentioned above were negative  Neurological ROS:  Completed and except as mentioned above were negative  Dermatological ROS:Completed and except as mentioned above were

## 2024-08-09 NOTE — TELEPHONE ENCOUNTER
Last visit: 7/23/24  Last Med refill: 5/9/24  Does patient have enough medication for 72 hours: No:     Next Visit Date:  Patient is on the waitlist for an appt in January  Future Appointments   Date Time Provider Department Center   9/5/2024  9:00 AM ST[facility] CT ROOM 1 [facility] SCAN [facility]   9/4/2025  1:30 PM Joe Galaviz MD Resp Spec [id number]       Health Maintenance   Topic Date Due    Respiratory Syncytial Virus (RSV) Pregnant or age 60 yrs+ (1 - 1-dose 60+ series) Never done    COVID-19 Vaccine (4 - 2023-24 season) 09/01/2023    Pneumococcal 65+ years Vaccine (1 of 1 - PCV) Never done    Flu vaccine (1) 08/01/2024    Annual Wellness Visit (Medicare)  Never done    Shingles vaccine (1 of 2) 08/09/2024 (Originally 6/1/2009)    DTaP/Tdap/Td vaccine (1 - Tdap) 08/22/2024 (Originally 6/1/1978)    Depression Screen  01/25/2025    A1C test (Diabetic or Prediabetic)  04/26/2025    Lipids  04/26/2025    Colorectal Cancer Screen  09/14/2026    Hepatitis C screen  Completed    HIV screen  Completed    Hepatitis A vaccine  Aged Out    Hepatitis B vaccine  Aged Out    Hib vaccine  Aged Out    Polio vaccine  Aged Out    Meningococcal (ACWY) vaccine  Aged Out    Diabetes screen  Discontinued       Hemoglobin A1C (%)   Date Value   04/26/2024 5.8   08/10/2023 5.5   09/04/2020 5.9             ( goal A1C is < 7)   No components found for: \"LABMICR\"  No components found for: \"LDLCHOLESTEROL\", \"LDLCALC\"    (goal LDL is <100)   AST (U/L)   Date Value   04/26/2024 23     ALT (U/L)   Date Value   04/26/2024 17     BUN (mg/dL)   Date Value   04/26/2024 19     BP Readings from Last 3 Encounters:   08/08/24 129/82   07/23/24 120/70   04/25/24 124/84          (goal 120/80)    All Future Testing planned in [facility]  Lab Frequency Next Occurrence   CT ABDOMEN PELVIS W IV CONTRAST Additional Contrast? Oral Once 10/26/2023   CT CHEST LOW DOSE (LDCT) Once 09/02/2024               Patient Active Problem List:     BPH with urinary

## 2024-08-12 RX ORDER — RAMIPRIL 10 MG/1
10 CAPSULE ORAL DAILY
Qty: 60 CAPSULE | Refills: 4 | Status: SHIPPED | OUTPATIENT
Start: 2024-08-12

## 2024-09-05 ENCOUNTER — HOSPITAL ENCOUNTER (OUTPATIENT)
Dept: CT IMAGING | Age: 64
Discharge: HOME | End: 2024-09-07
Attending: INTERNAL MEDICINE
Payer: MEDICARE

## 2024-09-05 DIAGNOSIS — R91.8 MULTIPLE NODULES OF LUNG: ICD-10-CM

## 2024-09-05 PROCEDURE — 71250 CT THORAX DX C-: CPT

## 2024-09-11 DIAGNOSIS — I10 ESSENTIAL HYPERTENSION: ICD-10-CM

## 2024-09-11 DIAGNOSIS — E78.5 HYPERLIPIDEMIA, UNSPECIFIED HYPERLIPIDEMIA TYPE: ICD-10-CM

## 2024-09-11 RX ORDER — TAMSULOSIN HYDROCHLORIDE 0.4 MG/1
0.4 CAPSULE ORAL 2 TIMES DAILY
Qty: 180 CAPSULE | Refills: 11 | OUTPATIENT
Start: 2024-09-11

## 2024-09-11 RX ORDER — ASPIRIN 81 MG/1
81 TABLET ORAL DAILY
Qty: 30 TABLET | Refills: 11 | Status: SHIPPED | OUTPATIENT
Start: 2024-09-11

## 2024-09-11 RX ORDER — RAMIPRIL 10 MG/1
10 CAPSULE ORAL DAILY
Qty: 60 CAPSULE | Refills: 4 | Status: SHIPPED | OUTPATIENT
Start: 2024-09-11

## 2024-09-11 RX ORDER — HYDROCHLOROTHIAZIDE 25 MG/1
25 TABLET ORAL 2 TIMES DAILY
Qty: 60 TABLET | Refills: 11 | Status: SHIPPED | OUTPATIENT
Start: 2024-09-11

## 2024-09-11 RX ORDER — ECHINACEA PURPUREA EXTRACT 125 MG
1 TABLET ORAL PRN
Qty: 1 EACH | Refills: 11 | Status: SHIPPED | OUTPATIENT
Start: 2024-09-11

## 2024-09-11 RX ORDER — ROSUVASTATIN CALCIUM 10 MG/1
10 TABLET, COATED ORAL 2 TIMES DAILY
Qty: 60 TABLET | Refills: 11 | Status: SHIPPED | OUTPATIENT
Start: 2024-09-11

## 2024-09-20 ENCOUNTER — PATIENT MESSAGE (OUTPATIENT)
Dept: INTERNAL MEDICINE | Age: 64
End: 2024-09-20

## 2024-09-25 DIAGNOSIS — I71.21 ANEURYSM OF ASCENDING AORTA WITHOUT RUPTURE (HCC): Primary | ICD-10-CM

## 2024-10-02 ENCOUNTER — INITIAL CONSULT (OUTPATIENT)
Dept: CARDIOTHORACIC SURGERY | Age: 64
End: 2024-10-02
Payer: MEDICARE

## 2024-10-02 VITALS
HEIGHT: 69 IN | RESPIRATION RATE: 98 BRPM | SYSTOLIC BLOOD PRESSURE: 157 MMHG | BODY MASS INDEX: 28.11 KG/M2 | DIASTOLIC BLOOD PRESSURE: 88 MMHG | WEIGHT: 189.8 LBS | HEART RATE: 74 BPM | TEMPERATURE: 97.4 F

## 2024-10-02 DIAGNOSIS — I71.21 ANEURYSM OF ASCENDING AORTA WITHOUT RUPTURE (HCC): Primary | ICD-10-CM

## 2024-10-02 PROCEDURE — G8427 DOCREV CUR MEDS BY ELIG CLIN: HCPCS | Performed by: NURSE PRACTITIONER

## 2024-10-02 PROCEDURE — 99203 OFFICE O/P NEW LOW 30 MIN: CPT | Performed by: NURSE PRACTITIONER

## 2024-10-02 PROCEDURE — 1036F TOBACCO NON-USER: CPT | Performed by: NURSE PRACTITIONER

## 2024-10-02 PROCEDURE — G8417 CALC BMI ABV UP PARAM F/U: HCPCS | Performed by: NURSE PRACTITIONER

## 2024-10-02 PROCEDURE — 1123F ACP DISCUSS/DSCN MKR DOCD: CPT | Performed by: NURSE PRACTITIONER

## 2024-10-02 PROCEDURE — G8484 FLU IMMUNIZE NO ADMIN: HCPCS | Performed by: NURSE PRACTITIONER

## 2024-10-02 PROCEDURE — 3017F COLORECTAL CA SCREEN DOC REV: CPT | Performed by: NURSE PRACTITIONER

## 2024-10-02 NOTE — PROGRESS NOTES
[facility] Cardiothoracic Surgery  Consult    Patient's Name/Date of Birth: Ivanna Khoury / 1960 (64 y.o.)    Date: October 2, 2024     Chief Complaint: Ascending aorta dilation    HPI: Ivanna Khoury is a 64 y.o.  Mediterranean male who presented to cardiothoracic surgery with an incidental finding of ascending aorta dilation.  Patient denies any chest pain shortness of breath nausea vomiting diarrhea fever chills.  Blood pressure is well-controlled at home.  Right now is whitecoat syndrome.  Past echocardiogram no concern noted.  Overall patient doing well.  Does not drink smoke or use any drugs.  No family history of ascending aorta dilation or aneurysm.  No family history of unexplained sudden death.    ROS:   CONSTITUTIONAL: Alert and oriented x 4  Respiratory: negative  Cardiovascular: negative  Gastrointestinal: negative  Genitourinary:negative  Hematologic/lymphatic: negative  Musculoskeletal:negative  Neurological: negative  Endocrine: negative  Psychiatric: negative  Past Medical History:   Diagnosis Date    Hyperlipidemia     Hypertension     Wellness examination     Dr. Strong last seen 7/2021     Past Surgical History:   Procedure Laterality Date    CYSTOSCOPY N/A 07/30/2020    CYSTOSCOPY performed by Jayesh Roca MD at [facility] OR    HERNIA REPAIR Right 7/27/2021    XI ROBOTIC LAPAROSCOPIC INGUINAL HERNIA REPAIR WITH MESH performed by Kermit Sotelo IV, DO at [facility] OR    INGUINAL HERNIA REPAIR Right 07/27/2021    XI ROBOTIC LAPAROSCOPIC INGUINAL HERNIA REPAIR WITH MESH     TURP N/A 08/14/2020    CYSTO, TUR PROSTATE GREENLIGHT XPS performed by Jayesh Roca MD at [facility] OR    VASCULAR SURGERY      varicose vein of right leg    VASCULAR SURGERY      right leg     No Known Allergies  No family history on file.  Social History     Socioeconomic History    Marital status:      Spouse name: Not on file    Number of children: Not on file    Years of education: Not on file    Highest education level: Not

## 2024-10-26 ENCOUNTER — HOSPITAL ENCOUNTER (EMERGENCY)
Age: 64
Discharge: HOME | End: 2024-10-26
Attending: EMERGENCY MEDICINE
Payer: MEDICARE

## 2024-10-26 VITALS
DIASTOLIC BLOOD PRESSURE: 94 MMHG | OXYGEN SATURATION: 99 % | WEIGHT: 185 LBS | RESPIRATION RATE: 22 BRPM | SYSTOLIC BLOOD PRESSURE: 148 MMHG | TEMPERATURE: 97 F | BODY MASS INDEX: 29.73 KG/M2 | HEART RATE: 85 BPM | HEIGHT: 66 IN

## 2024-10-26 DIAGNOSIS — M79.604 RIGHT LEG PAIN: Primary | ICD-10-CM

## 2024-10-26 LAB — D DIMER PPP FEU-MCNC: <0.27 UG/ML FEU (ref 0–0.57)

## 2024-10-26 PROCEDURE — 85379 FIBRIN DEGRADATION QUANT: CPT

## 2024-10-26 PROCEDURE — 6370000000 HC RX 637 (ALT 250 FOR IP)

## 2024-10-26 PROCEDURE — 99283 EMERGENCY DEPT VISIT LOW MDM: CPT

## 2024-10-26 RX ORDER — LIDOCAINE 4 G/G
1 PATCH TOPICAL ONCE
Status: DISCONTINUED | OUTPATIENT
Start: 2024-10-26 | End: 2024-10-26 | Stop reason: HOSPADM

## 2024-10-26 ASSESSMENT — PAIN - FUNCTIONAL ASSESSMENT: PAIN_FUNCTIONAL_ASSESSMENT: 0-10

## 2024-10-26 ASSESSMENT — PAIN SCALES - GENERAL: PAINLEVEL_OUTOF10: 7

## 2024-10-26 NOTE — ED PROVIDER NOTES
[facility] ED  Emergency Department Encounter  Emergency Medicine Resident       Pt Name: Ivanna Khoury  MRN: [id number]  Birthdate 1960  Date of evaluation: 10/26/24    Chief Complaint     Chief Complaint   Patient presents with    Leg Pain       HISTORY OF PRESENT ILLNESS     Ivanna Khoury is a 64 y.o. male who presents with right lower leg discomfort.  Reports 10 years ago had surgery on that leg in a vascular capacity.  Unsure exactly what surgery it was.  Reports taking baby aspirin every day.  Denies any history of DVTs or blood clots of any kind.  Denies any recent surgeries.  Denies any recent long travels.  No hemoptysis.  Patient primarily concerned for blood clot.    REVIEW OF SYSTEMS       See HPI    PAST MEDICAL/SURGICAL/FAMILY HISTORY     PMH:  has a past medical history of Hyperlipidemia, Hypertension, and Wellness examination.  Surgical History:  has a past surgical history that includes vascular surgery; Cystoscopy (N/A, 07/30/2020); TURP (N/A, 08/14/2020); vascular surgery; Inguinal hernia repair (Right, 07/27/2021); and hernia repair (Right, 7/27/2021).  Social History:  reports that he has never smoked. He has never used smokeless tobacco. He reports that he does not drink alcohol and does not use drugs.      Allergies:has No Known Allergies.    PHYSICAL EXAM       INITIAL VITALS: BP (!) 148/94   Pulse 85   Temp 97 °F (36.1 °C)   Resp 22   Ht 1.676 m (5' 6\")   Wt 83.9 kg (185 lb)   SpO2 99%   BMI 29.86 kg/m²     CONSTITUTIONAL: Vital signs reviewed, Alert and oriented X 3.   HEAD: Atraumatic, Normocephalic.   EYES: Eyes are normal to inspection, Pupils equal, round and reactive to light.   NECK: Normal ROM, No jugular venous distention, No meningeal signs.  RESPIRATORY CHEST: No respiratory distress.   ABDOMEN: Abdomen is nontender, No distension. No pulsatile masses palpated.    BACK:  No midline bony tenderness to palpation.  UPPER EXTREMITY: LUE: normal exam,

## 2024-10-26 NOTE — ED NOTES
Pt to ed from home reports right lower leg pain into calf, radiates from right sided lower back. Patient is aox4, non labored RR, had vascular surgery on that leg in the past, pulse, motor, sensation all intact.

## 2024-10-26 NOTE — DISCHARGE INSTRUCTIONS
You were seen and evaluated at [facility] emergency department for right leg pain.  You had a D-dimer done which was less than 0.27 which is the lowest our machine goes.  We can stay with high confidence that you do not have a blood clot.  This could be nerve pain.  Please follow-up with your primary care provider.  Please return to the ED with any new or worsening symptoms or concerns.

## 2024-10-26 NOTE — ED PROVIDER NOTES
[facility]  Emergency Department  Faculty Attestation     I performed a history and physical examination of the patient and discussed management with the resident. I reviewed the resident’s note and agree with the documented findings and plan of care. Any areas of disagreement are noted on the chart. I was personally present for the key portions of any procedures. I have documented in the chart those procedures where I was not present during the key portions. I have reviewed the emergency nurses triage note. I agree with the chief complaint, past medical history, past surgical history, allergies, medications, social and family history as documented unless otherwise noted below.    For Physician Assistant/ Nurse Practitioner cases/documentation I have personally evaluated this patient and have completed at least one if not all key elements of the E/M (history, physical exam, and MDM). Additional findings are as noted.    Preliminary note started at 8:44 AM EDT    Primary Care Physician:  Lisette Strong MD    Screenings:  [unfilled]    CHIEF COMPLAINT       Chief Complaint   Patient presents with    Leg Pain       RECENT VITALS:   BP (!) 148/94   Pulse 85   Temp 97 °F (36.1 °C)   Resp 22   Ht 1.676 m (5' 6\")   Wt 83.9 kg (185 lb)   SpO2 99%   BMI 29.86 kg/m²     LABS:  Labs Reviewed - No data to display    Radiology  No orders to display       Attending Physician Additional  Notes    Patient has pain in his right greater trochanter radiating to the medial thigh popliteal fossa and medial aspect of the right leg.  It is unrelated to change in position.  No associated weakness.  No trauma.  No numbness.  No pain in the foot or toes.  No claudication symptoms.  No low back pain.  History of venous insufficiency, varicose veins, TIA, probable right femoropopliteal bypass, he also mentions a 3.9 cm vessel, however his abdominal CT from 10/27/2023 did not show aneurysm.  No

## 2024-11-11 ENCOUNTER — HOSPITAL ENCOUNTER (EMERGENCY)
Age: 64
Discharge: HOME | End: 2024-11-11
Attending: EMERGENCY MEDICINE
Payer: MEDICARE

## 2024-11-11 ENCOUNTER — APPOINTMENT (OUTPATIENT)
Dept: MRI IMAGING | Age: 64
End: 2024-11-11
Payer: MEDICARE

## 2024-11-11 ENCOUNTER — APPOINTMENT (OUTPATIENT)
Dept: CT IMAGING | Age: 64
End: 2024-11-11
Payer: MEDICARE

## 2024-11-11 ENCOUNTER — ANCILLARY PROCEDURE (OUTPATIENT)
Dept: EMERGENCY DEPT | Age: 64
End: 2024-11-11
Attending: EMERGENCY MEDICINE
Payer: MEDICARE

## 2024-11-11 VITALS
WEIGHT: 197.97 LBS | RESPIRATION RATE: 23 BRPM | OXYGEN SATURATION: 97 % | HEIGHT: 66 IN | SYSTOLIC BLOOD PRESSURE: 149 MMHG | TEMPERATURE: 98.2 F | HEART RATE: 117 BPM | DIASTOLIC BLOOD PRESSURE: 89 MMHG | BODY MASS INDEX: 31.82 KG/M2

## 2024-11-11 DIAGNOSIS — H81.13 BENIGN PAROXYSMAL POSITIONAL VERTIGO DUE TO BILATERAL VESTIBULAR DISORDER: Primary | ICD-10-CM

## 2024-11-11 LAB
ANION GAP SERPL CALCULATED.3IONS-SCNC: 13 MMOL/L (ref 9–16)
BASOPHILS # BLD: <0.03 K/UL (ref 0–0.2)
BASOPHILS NFR BLD: 0 % (ref 0–2)
BUN SERPL-MCNC: 17 MG/DL (ref 8–23)
CALCIUM SERPL-MCNC: 9.1 MG/DL (ref 8.6–10.4)
CHLORIDE SERPL-SCNC: 97 MMOL/L (ref 98–107)
CK SERPL-CCNC: 95 U/L (ref 39–308)
CO2 SERPL-SCNC: 24 MMOL/L (ref 20–31)
CREAT SERPL-MCNC: 0.7 MG/DL (ref 0.7–1.2)
EOSINOPHIL # BLD: 0.05 K/UL (ref 0–0.44)
EOSINOPHILS RELATIVE PERCENT: 1 % (ref 1–4)
ERYTHROCYTE [DISTWIDTH] IN BLOOD BY AUTOMATED COUNT: 13.2 % (ref 11.8–14.4)
GFR, ESTIMATED: >90 ML/MIN/1.73M2
GLUCOSE SERPL-MCNC: 147 MG/DL (ref 74–99)
HCT VFR BLD AUTO: 47.1 % (ref 40.7–50.3)
HGB BLD-MCNC: 14.8 G/DL (ref 13–17)
IMM GRANULOCYTES # BLD AUTO: 0.03 K/UL (ref 0–0.3)
IMM GRANULOCYTES NFR BLD: 0 %
INR PPP: 1.1
LYMPHOCYTES NFR BLD: 1.49 K/UL (ref 1.1–3.7)
LYMPHOCYTES RELATIVE PERCENT: 21 % (ref 24–43)
MCH RBC QN AUTO: 25.4 PG (ref 25.2–33.5)
MCHC RBC AUTO-ENTMCNC: 31.4 G/DL (ref 28.4–34.8)
MCV RBC AUTO: 80.9 FL (ref 82.6–102.9)
MONOCYTES NFR BLD: 0.34 K/UL (ref 0.1–1.2)
MONOCYTES NFR BLD: 5 % (ref 3–12)
MYOGLOBIN SERPL-MCNC: 24 NG/ML (ref 28–72)
NEUTROPHILS NFR BLD: 73 % (ref 36–65)
NEUTS SEG NFR BLD: 5.26 K/UL (ref 1.5–8.1)
NRBC BLD-RTO: 0 PER 100 WBC
PARTIAL THROMBOPLASTIN TIME: 29.9 SEC (ref 23–36.5)
PLATELET # BLD AUTO: 264 K/UL (ref 138–453)
PMV BLD AUTO: 9.3 FL (ref 8.1–13.5)
POTASSIUM SERPL-SCNC: 3.6 MMOL/L (ref 3.7–5.3)
PROTHROMBIN TIME: 13.6 SEC (ref 11.7–14.9)
RBC # BLD AUTO: 5.82 M/UL (ref 4.21–5.77)
RBC # BLD: ABNORMAL 10*6/UL
SODIUM SERPL-SCNC: 134 MMOL/L (ref 136–145)
TROPONIN I SERPL HS-MCNC: <6 NG/L (ref 0–22)
TROPONIN I SERPL HS-MCNC: <6 NG/L (ref 0–22)
WBC OTHER # BLD: 7.2 K/UL (ref 3.5–11.3)

## 2024-11-11 PROCEDURE — 99284 EMERGENCY DEPT VISIT MOD MDM: CPT | Performed by: PSYCHIATRY & NEUROLOGY

## 2024-11-11 PROCEDURE — 82550 ASSAY OF CK (CPK): CPT

## 2024-11-11 PROCEDURE — 70450 CT HEAD/BRAIN W/O DYE: CPT

## 2024-11-11 PROCEDURE — 6360000004 HC RX CONTRAST MEDICATION: Performed by: STUDENT IN AN ORGANIZED HEALTH CARE EDUCATION/TRAINING PROGRAM

## 2024-11-11 PROCEDURE — 85730 THROMBOPLASTIN TIME PARTIAL: CPT

## 2024-11-11 PROCEDURE — 70551 MRI BRAIN STEM W/O DYE: CPT

## 2024-11-11 PROCEDURE — 74174 CTA ABD&PLVS W/CONTRAST: CPT

## 2024-11-11 PROCEDURE — 71275 CT ANGIOGRAPHY CHEST: CPT

## 2024-11-11 PROCEDURE — 85025 COMPLETE CBC W/AUTO DIFF WBC: CPT

## 2024-11-11 PROCEDURE — 93005 ELECTROCARDIOGRAM TRACING: CPT | Performed by: STUDENT IN AN ORGANIZED HEALTH CARE EDUCATION/TRAINING PROGRAM

## 2024-11-11 PROCEDURE — 70498 CT ANGIOGRAPHY NECK: CPT

## 2024-11-11 PROCEDURE — 83874 ASSAY OF MYOGLOBIN: CPT

## 2024-11-11 PROCEDURE — 6370000000 HC RX 637 (ALT 250 FOR IP)

## 2024-11-11 PROCEDURE — 80048 BASIC METABOLIC PNL TOTAL CA: CPT

## 2024-11-11 PROCEDURE — 84484 ASSAY OF TROPONIN QUANT: CPT

## 2024-11-11 PROCEDURE — 85610 PROTHROMBIN TIME: CPT

## 2024-11-11 PROCEDURE — 99285 EMERGENCY DEPT VISIT HI MDM: CPT | Performed by: EMERGENCY MEDICINE

## 2024-11-11 PROCEDURE — 93308 TTE F-UP OR LMTD: CPT

## 2024-11-11 PROCEDURE — 82553 CREATINE MB FRACTION: CPT

## 2024-11-11 RX ORDER — MECLIZINE HCL 12.5 MG 12.5 MG/1
12.5 TABLET ORAL 3 TIMES DAILY PRN
Status: DISCONTINUED | OUTPATIENT
Start: 2024-11-11 | End: 2024-11-11 | Stop reason: HOSPADM

## 2024-11-11 RX ORDER — MECLIZINE HYDROCHLORIDE 25 MG/1
25 TABLET ORAL 3 TIMES DAILY PRN
Qty: 12 TABLET | Refills: 0 | Status: SHIPPED | OUTPATIENT
Start: 2024-11-11 | End: 2024-11-21

## 2024-11-11 RX ORDER — IOPAMIDOL 755 MG/ML
150 INJECTION, SOLUTION INTRAVASCULAR
Status: COMPLETED | OUTPATIENT
Start: 2024-11-11 | End: 2024-11-11

## 2024-11-11 RX ADMIN — IOPAMIDOL 150 ML: 755 INJECTION, SOLUTION INTRAVENOUS at 16:21

## 2024-11-11 RX ADMIN — MECLIZINE 12.5 MG: 12.5 TABLET ORAL at 18:03

## 2024-11-11 ASSESSMENT — ENCOUNTER SYMPTOMS
STRIDOR: 0
NAUSEA: 0
SHORTNESS OF BREATH: 1
CHEST TIGHTNESS: 0
COUGH: 0
SHORTNESS OF BREATH: 0
BACK PAIN: 0
ABDOMINAL DISTENTION: 0
ABDOMINAL PAIN: 0
WHEEZING: 0
TROUBLE SWALLOWING: 0
VOMITING: 0
DIARRHEA: 0

## 2024-11-11 ASSESSMENT — PAIN DESCRIPTION - DESCRIPTORS: DESCRIPTORS: PRESSURE

## 2024-11-11 ASSESSMENT — LIFESTYLE VARIABLES
HOW OFTEN DO YOU HAVE A DRINK CONTAINING ALCOHOL: NEVER
HOW MANY STANDARD DRINKS CONTAINING ALCOHOL DO YOU HAVE ON A TYPICAL DAY: PATIENT DOES NOT DRINK

## 2024-11-11 ASSESSMENT — PAIN DESCRIPTION - PAIN TYPE: TYPE: ACUTE PAIN

## 2024-11-11 ASSESSMENT — PAIN DESCRIPTION - LOCATION: LOCATION: CHEST

## 2024-11-11 ASSESSMENT — PAIN SCALES - GENERAL: PAINLEVEL_OUTOF10: 5

## 2024-11-11 ASSESSMENT — PAIN DESCRIPTION - FREQUENCY: FREQUENCY: INTERMITTENT

## 2024-11-11 NOTE — ED NOTES
The following labs were labeled with appropriate pt sticker and tubed to lab:     [] Blue     [] Lavender   [] on ice  [x] Green/yellow  [] Green/black [] on ice  [] Grey  [] on ice  [] Yellow  [] Red  [] Pink  [] Type/ Screen  [] ABG  [] VBG    [] COVID-19 swab    [] Rapid  [] PCR  [] Flu swab  [] Peds Viral Panel     [x] Urine Sample  [] Fecal Sample  [] Pelvic Cultures  [] Blood Cultures  [] X 2  [] STREP Cultures  [] Wound Cultures

## 2024-11-11 NOTE — ED TRIAGE NOTES
Patient reports to ER for complaints of dizziness, shortness of breath, vomiting  Symptoms began last night while lying down  Reports the last time he vomited was 2 times today   Patient reports that he feels he is spinning    And reports his left side feels weaker than his right

## 2024-11-11 NOTE — ED NOTES
The following labs were labeled with appropriate pt sticker and tubed to lab:     [x] Blue     [x] Lavender   [] on ice  [x] Green/yellow  [x] Green/black [] on ice  [] Murrell  [] on ice  [x] Yellow  [] Red  [] Pink  [] Type/ Screen  [] ABG  [] VBG    [] COVID-19 swab    [] Rapid  [] PCR  [] Flu swab  [] Peds Viral Panel     [] Urine Sample  [] Fecal Sample  [] Pelvic Cultures  [] Blood Cultures  [] X 2  [] STREP Cultures  [] Wound Cultures

## 2024-11-11 NOTE — PROGRESS NOTES
[facility] [place] [facility]     Emergency/Trauma Note    PATIENT NAME: Ivanna Khoury    Shift date: 11/11/2024  Shift day: Monday   Shift # 2    Room # 27/27   Name: Ivanna Khoury            Age: 64 y.o.  Gender: male          Mormon: Yazidism   Place of Mosque: unk    Trauma/Incident type: Stroke Alert  Admit Date & Time: 11/11/2024  3:17 PM  TRAUMA NAME: n/a    ADVANCE DIRECTIVES IN CHART?  No    NAME OF DECISION MAKER: unk.    RELATIONSHIP OF DECISION MAKER TO PATIENT: n/a    PATIENT/EVENT DESCRIPTION:  Ivanna Khoury is a 64 y.o. male referred to spiritual care via [facility] as a stroke consult.  Pt to be admitted to 27/27.         SPIRITUAL ASSESSMENT-INTERVENTION-OUTCOME:   introduced self to patient and patient's spouse who was present in the room.   offered ministry of presence as atient expressed doing okay and declining need for any spiritual/emotional support at this time.   offered hospitality to Patient's wife, however patient indicated they are fasting at this time.      PATIENT BELONGINGS:  With patient    ANY BELONGINGS OF SIGNIFICANT VALUE NOTED:  N/a    REGISTRATION STAFF NOTIFIED?  No      WHAT IS YOUR SPIRITUAL CARE PLAN FOR THIS PATIENT?:   Respond as requested.     Electronically signed by MIKE FERNANDEZ,Chaplain Resident, on 11/11/2024 at 6:32 PM.  [facility]  [phone]    11/11/24 1831   Encounter Summary   Encounter Overview/Reason Crisis   Service Provided For Patient   Referral/Consult From Multi-disciplinary team   Support System Spouse   Last Encounter  11/11/24   Complexity of Encounter Low   Begin Time 1810   End Time  1820   Total Time Calculated 10 min   Crisis   Type Trauma   Assessment/Intervention/Outcome   Assessment Calm;Coping   Intervention Prayer (assurance of)/[place];Sustaining Presence/Ministry of presence   Outcome Receptive

## 2024-11-11 NOTE — ED PROVIDER NOTES
[facility]     Emergency Department     Faculty Note/ Attestation      Pt Name: Ivanna Khoury                                       MRN: [id number]  Birthdate 1960  Date of evaluation: 11/11/2024  Note Started: 3:32 PM EST    Patients PCP:    Lisette Strong MD    Attestation  I performed a history and physical examination of the patient and discussed management with the resident. I reviewed the resident’s note and agree with the documented findings and plan of care. Any areas of disagreement are noted on the chart. I was personally present for the key portions of any procedures. I have documented in the chart those procedures where I was not present during the key portions. I have reviewed the emergency nurses triage note. I agree with the chief complaint, past medical history, past surgical history, allergies, medications, social and family history as documented unless otherwise noted below.    For Physician Assistant/ Nurse Practitioner cases/documentation I have personally evaluated this patient and have completed at least one if not all key elements of the E/M (history, physical exam, and MDM). Additional findings are as noted.    Initial Screens:  NIH Stroke Scale  Interval: Baseline  Level of Consciousness (1a): Alert  LOC Questions (1b): Answers both correctly  LOC Commands (1c): Performs both tasks correctly  Best Gaze (2): Normal  Visual (3): No visual loss  Facial Palsy (4): Normal symmetrical movement  Motor Arm, Left (5a): No drift  Motor Arm, Right (5b): No drift  Motor Leg, Left (6a): No drift  Motor Leg, Right (6b): No drift  Limb Ataxia (7): Absent  Sensory (8): Normal  Best Language (9): No aphasia  Dysarthria (10): Normal  Extinction and Inattention (11): No abnormality  Total: 0     Wagner Coma Scale  Eye Opening: Spontaneous  Best Verbal Response: Oriented  Best Motor Response: Obeys commands  Wagner Coma Scale Score: 15    Vitals:    Vitals:    11/11/24 1510 
with chest pain, shortness of breath, vertigo, left-sided face and left upper extremity numbness and tingling since last night.  He tells me that he did vomit 1 time due to the vertigo.  See HPI and physical exam for further detail.  Patient arrives in obvious discomfort and ill-appearing however nontoxic.  He has a heart rate of 98 and a blood pressure 156/102.  Satting 99% on room air and breathing 23 times per minute.  Afebrile with a temperature of 98.2 °F.  Protecting his airway well.  Given his presentation, I have concerns for ACS, posterior stroke, dissection.  Plan for stroke workup, cardiac workup, and dissection protocol imaging.  Outside window for TNK so holding off on stroke alert.  Anticipate neurology consult if dissection protocol is unremarkable.  Plan for bedside ultrasound here in the emergency department to evaluate for potential cardiac and pulmonary pathology    Amount and/or Complexity of Data Reviewed  Labs: ordered. Decision-making details documented in ED Course.  Radiology: ordered. Decision-making details documented in ED Course.  ECG/medicine tests: ordered.    Risk  Prescription drug management.        EKG  Sinus rhythm.  Ventricular rate 86 bpm.  Normal axis.  Unremarkable ST segment T waves throughout.  Unremarkable EKG.  QTc 442 ms    All EKG's are interpreted by the Emergency Department Physician who either signs or Co-signs this chart in the absence of a cardiologist.    EMERGENCY DEPARTMENT COURSE:    ED Course as of 11/11/24 1914 Mon Nov 11, 2024   1520 EKG Interpretation   Interpreted by Best Livingston DO    Rhythm: normal sinus   Rate: normal  Axis: normal  Ectopy: none  Conduction: normal  ST Segments: normal  T Waves: normal  Q Waves: none    Clinical Impression: no acute changes normal EKG     [WK]   1549 POCUS shows that there is normal EPSS, Normal SV based on VTIs and contractility.  No Effusion [WK]   1706 Stroke lab panel with mild hyponatremia and mild hypokalemia.

## 2024-11-11 NOTE — CONSULTS
6%)  Continuous cardiac telemetry to monitor for arrhythmia  Head of bed > 30 degrees for aspiration prevention and aspiration precautions ordered.  Patient/family given stroke educational materials and education that includes: Personal Risk Factors for Stroke, Stroke Warning Signs/Symptoms, Emergency Actions/Activation of the EMS, Follow-Up after discharge; Medications Prescribed, Smoking Cessation/Risks.   Physical therapy, occupational therapy, speech therapy consults  Consulted social work and case management for help with discharge      Discussed with Eliseo Goldberg MD .    Stefano Cole MD   11/11/2024  5:23 PM

## 2024-11-12 LAB
EKG ATRIAL RATE: 86 BPM
EKG P AXIS: 36 DEGREES
EKG P-R INTERVAL: 198 MS
EKG Q-T INTERVAL: 370 MS
EKG QRS DURATION: 110 MS
EKG QTC CALCULATION (BAZETT): 442 MS
EKG R AXIS: 37 DEGREES
EKG T AXIS: 0 DEGREES
EKG VENTRICULAR RATE: 86 BPM

## 2024-11-12 PROCEDURE — 93010 ELECTROCARDIOGRAM REPORT: CPT | Performed by: INTERNAL MEDICINE

## 2024-11-12 NOTE — DISCHARGE INSTRUCTIONS
Seen in the emergency department for chest pain, shortness of breath, vertigo.  Thorough workup was performed which include stroke workup and aortic dissection workup as well.  Imaging unremarkable.  No stroke seen on CT imaging and MRI.  Neurology involved.  They state that your symptoms are likely secondary to benign paroxysmal positional vertigo.  This will require vestibular rehab in the outpatient setting.  This order was placed for vestibular rehab.  Also given prescription for meclizine which treats the vertigo symptoms.  If you develop worsening symptoms, episodes of passing out, falls, chest pain, shortness of breath, further numbness and tingling, visual changes, or any other concerning symptoms please return to the emergency room for reevaluation.  Please follow-up with your primary care within the next 1 to 2 days.

## 2024-11-14 ENCOUNTER — TELEPHONE (OUTPATIENT)
Dept: INTERNAL MEDICINE | Age: 64
End: 2024-11-14

## 2024-11-14 ENCOUNTER — OFFICE VISIT (OUTPATIENT)
Dept: VASCULAR SURGERY | Age: 64
End: 2024-11-14
Payer: MEDICARE

## 2024-11-14 VITALS
BODY MASS INDEX: 30.37 KG/M2 | SYSTOLIC BLOOD PRESSURE: 141 MMHG | OXYGEN SATURATION: 98 % | RESPIRATION RATE: 16 BRPM | HEIGHT: 66 IN | HEART RATE: 91 BPM | WEIGHT: 189 LBS | DIASTOLIC BLOOD PRESSURE: 80 MMHG

## 2024-11-14 DIAGNOSIS — M79.604 PAIN OF RIGHT LOWER EXTREMITY: Primary | ICD-10-CM

## 2024-11-14 PROCEDURE — 1036F TOBACCO NON-USER: CPT | Performed by: SURGERY

## 2024-11-14 PROCEDURE — G8427 DOCREV CUR MEDS BY ELIG CLIN: HCPCS | Performed by: SURGERY

## 2024-11-14 PROCEDURE — 99213 OFFICE O/P EST LOW 20 MIN: CPT | Performed by: SURGERY

## 2024-11-14 PROCEDURE — G8484 FLU IMMUNIZE NO ADMIN: HCPCS | Performed by: SURGERY

## 2024-11-14 PROCEDURE — G8417 CALC BMI ABV UP PARAM F/U: HCPCS | Performed by: SURGERY

## 2024-11-14 PROCEDURE — 3017F COLORECTAL CA SCREEN DOC REV: CPT | Performed by: SURGERY

## 2024-11-14 PROCEDURE — 1123F ACP DISCUSS/DSCN MKR DOCD: CPT | Performed by: SURGERY

## 2024-11-14 ASSESSMENT — ENCOUNTER SYMPTOMS
CHEST TIGHTNESS: 0
ABDOMINAL PAIN: 0
FACIAL SWELLING: 0
NAUSEA: 0
VOMITING: 0
COLOR CHANGE: 0
EYE ITCHING: 0
SHORTNESS OF BREATH: 0

## 2024-11-14 NOTE — TELEPHONE ENCOUNTER
Patient is calling stating needs referral for PT Vestibular Rehab per Emergency room orders.  Office will not accept referral from Emergency room.  Please advise

## 2024-11-14 NOTE — PROGRESS NOTES
Division of Vascular Surgery        Follow Up    Chief Complaint:      Right leg pain and cramping    History of Present Illness:      Ivanna Khoury is a 64 y.o. gentleman who presents with right leg pain that started at end of October, acute onset.  Pain started in lower back and then ran down the inner aspect of his leg.  Followed by cramping in his calf.  He was evaluated in ER at the time due to concern for possible DVT, he had normal D-dimer and duplex was not done.  Recall that he had likely venous ablation and stab phlebectomy in [place].  He continues to have excellent pedal pulses, no swelling in his lower extremities.  He does not wear compression stockings.     (7/7/22) Ivanna Khoury is a 62 y.o. gentleman who presents with right sided leg pain which he states is present more so after a long day of walking or standing. He gets some pain in his right hip as well as sometimes in his right calf. He states he is abl to walk long distances without pain. He states he had a vascular procedure done in [place] many years ago after he was having pain in his right leg. That procedure appeared to be a vein stripping based on the location of his surgical scars and history. Patient states that he has gotten some relief from his pain from supportive socks.  Denies prior DVT or pulmonary embolism.  No weakness,numbness/tingling or wound son his legs to suggest ischemic rest pain.     Medical History:     Past Medical History:   Diagnosis Date    Hyperlipidemia     Hypertension     Wellness examination     Dr. Strong last seen 7/2021       Surgical History:     Past Surgical History:   Procedure Laterality Date    CYSTOSCOPY N/A 07/30/2020    CYSTOSCOPY performed by Jayesh Roca MD at [facility] OR    HERNIA REPAIR Right 7/27/2021    XI ROBOTIC LAPAROSCOPIC INGUINAL HERNIA REPAIR WITH MESH performed by Kermit Sotelo IV, DO at [facility] OR    INGUINAL HERNIA REPAIR Right 07/27/2021    XI ROBOTIC LAPAROSCOPIC INGUINAL

## 2024-12-05 ENCOUNTER — OFFICE VISIT (OUTPATIENT)
Dept: NEUROLOGY | Age: 64
End: 2024-12-05
Payer: MEDICARE

## 2024-12-05 VITALS
SYSTOLIC BLOOD PRESSURE: 155 MMHG | WEIGHT: 189.6 LBS | BODY MASS INDEX: 30.47 KG/M2 | HEART RATE: 76 BPM | DIASTOLIC BLOOD PRESSURE: 85 MMHG | HEIGHT: 66 IN

## 2024-12-05 DIAGNOSIS — M50.30 DDD (DEGENERATIVE DISC DISEASE), CERVICAL: ICD-10-CM

## 2024-12-05 DIAGNOSIS — R20.0 NUMBNESS AND TINGLING IN LEFT ARM: Primary | ICD-10-CM

## 2024-12-05 DIAGNOSIS — R20.2 NUMBNESS AND TINGLING IN LEFT ARM: Primary | ICD-10-CM

## 2024-12-05 DIAGNOSIS — H81.13 BENIGN PAROXYSMAL POSITIONAL VERTIGO DUE TO BILATERAL VESTIBULAR DISORDER: ICD-10-CM

## 2024-12-05 PROCEDURE — 1123F ACP DISCUSS/DSCN MKR DOCD: CPT

## 2024-12-05 PROCEDURE — 3017F COLORECTAL CA SCREEN DOC REV: CPT

## 2024-12-05 PROCEDURE — G8427 DOCREV CUR MEDS BY ELIG CLIN: HCPCS

## 2024-12-05 PROCEDURE — 99204 OFFICE O/P NEW MOD 45 MIN: CPT

## 2024-12-05 PROCEDURE — G8417 CALC BMI ABV UP PARAM F/U: HCPCS

## 2024-12-05 PROCEDURE — 1036F TOBACCO NON-USER: CPT

## 2024-12-05 PROCEDURE — G8484 FLU IMMUNIZE NO ADMIN: HCPCS

## 2024-12-05 RX ORDER — MECLIZINE HYDROCHLORIDE 25 MG/1
25 TABLET ORAL 3 TIMES DAILY PRN
Qty: 30 TABLET | Refills: 2 | Status: SHIPPED | OUTPATIENT
Start: 2024-12-05 | End: 2025-01-04

## 2024-12-05 ASSESSMENT — ENCOUNTER SYMPTOMS
VOMITING: 0
CONSTIPATION: 0
ABDOMINAL DISTENTION: 0
SHORTNESS OF BREATH: 0
STRIDOR: 0
NAUSEA: 0
DIARRHEA: 0
WHEEZING: 0
ABDOMINAL PAIN: 0
CHEST TIGHTNESS: 0
COUGH: 0
BLOOD IN STOOL: 0

## 2024-12-05 NOTE — PROGRESS NOTES
[address] [facility] [address]  [facility] [address]  Dept: [phone]  Dept Fax: [phone]    NEUROLOGY NEW PATIENT NOTE       PATIENT NAME: Ivanna Khoury  PATIENT MRN: [id number]  PRIMARY CARE PHYSICIAN: Lisette Strong MD      HPI:      Ivanna Khoury is a 64 y.o. male with a history of HTN, HLD, cervical degenerative disc disease, and ascending thoracic aortic aneurysm who was seen in the ED on 11/11/24 as a stroke alert for subacute onset vertigo. Patient stated that his vertigo started on the morning of 11/11/24 after getting out of bed, described as the room spinning around him. It was brought on and exacerbated by head movement and position changes. MRI brain was negative for acute stroke. CTA head and neck also negative for any LVO or flow limiting stenosis. Patient was discharged on PRN meclizine and prescribed vestibular therapy.    TODAY:  Patient states that he is doing well with as needed meclizine, and that he is to start vestibular rehab tomorrow, 12/6/24. He still has intermittent vertigo, most notably when he is laying in bed. It will often improve if he stands up and sits back down.  He also continues to report intermittent left arm numbness with associated neck/upper back pain. This has been going on for over a year according to the patient. He wants to do physical therapy for this which is a good plan.    PREVIOUS WORKUP:     Lab Results   Component Value Date    WBC 7.2 11/11/2024    HGB 14.8 11/11/2024    HCT 47.1 11/11/2024    MCV 80.9 (L) 11/11/2024     11/11/2024       Past Medical History:   Diagnosis Date    Hyperlipidemia     Hypertension     Wellness examination     Dr. Strong last seen 7/2021        Past Surgical History:   Procedure Laterality Date    CYSTOSCOPY N/A 07/30/2020    CYSTOSCOPY performed by Jayesh Roca MD at [facility] OR    HERNIA REPAIR Right 7/27/2021    XI ROBOTIC LAPAROSCOPIC INGUINAL HERNIA REPAIR WITH

## 2024-12-05 NOTE — PROGRESS NOTES
Attending Physician Statement  I have discussed the case of Ivanna Khoury including pertinent history and exam findings with the resident physician. I reviewed medications, clinical labs, x-rays and other diagnostic tests with the resident physician.    I have seen and examined the patient and the key elements of the encounter have been performed by me. I agree with the assessment, plan and orders as documented by the resident physician.        New patient visit  Briefly, this is a  64 y.o. male with recent ER visit for vertigo was evaluated by stroke team in ER with CT head, CTA head and neck, MRI brain and those studies were unremarkable.  Felt to be peripheral vertigo for which he was advised to take meclizine on as-needed basis along with vestibular rehab therapy.  He also c/o left upper extremity numbness occurring intermittently starting from back of the neck extending to digits.  Denies associated weakness.  Denies radicular pain involving left upper extremity.  His symptoms occur intermittently.  Denies nocturnal awakenings with pain and paresthesias.    On exam: Blood pressure (!) 155/85, pulse 76, height 1.676 m (5' 6\"), weight 86 kg (189 lb 9.6 oz).  Alert oriented x 3; speech fluent and coherent.  Cranial nerve exam unremarkable.  Motor exam reveals 5/5 in both upper and lower extremities.  Spurling sign negative.  Tinel's sign and Phalen's maneuver negative.  Sensory exam reveals intact light touch and pinprick and temperature in all 4 extremities.    CT head 11/11/2024: No acute intracranial abnormality.  CT head and neck 11/11/2024: No large vessel occlusion.  Limited brain MRI 11/11/2024: Unremarkable.  Cervical spine MRI 4/25/2024: Multilevel degenerative change with mild canal stenosis at C6-7.     Impression and Plan: Mr. Ivanna Khoury is a 64 y.o. male with   Intermittent left upper extremity numbness without any associated weakness; mild cervical spinal stenosis; with associated neck

## 2024-12-06 ENCOUNTER — HOSPITAL ENCOUNTER (OUTPATIENT)
Age: 64
Setting detail: SPECIMEN
Discharge: HOME | End: 2024-12-06

## 2024-12-06 ENCOUNTER — HOSPITAL ENCOUNTER (OUTPATIENT)
Dept: PHYSICAL THERAPY | Age: 64
Setting detail: THERAPIES SERIES
Discharge: HOME | End: 2024-12-06
Attending: INTERNAL MEDICINE
Payer: MEDICARE

## 2024-12-06 DIAGNOSIS — R20.0 NUMBNESS AND TINGLING IN LEFT ARM: ICD-10-CM

## 2024-12-06 DIAGNOSIS — R20.2 NUMBNESS AND TINGLING IN LEFT ARM: ICD-10-CM

## 2024-12-06 LAB
EST. AVERAGE GLUCOSE BLD GHB EST-MCNC: 111 MG/DL
FOLATE SERPL-MCNC: 8.5 NG/ML (ref 4.8–24.2)
HBA1C MFR BLD: 5.5 % (ref 4–6)
TSH SERPL DL<=0.05 MIU/L-ACNC: 1.33 UIU/ML (ref 0.27–4.2)
VIT B12 SERPL-MCNC: 555 PG/ML (ref 232–1245)

## 2024-12-06 PROCEDURE — 97161 PT EVAL LOW COMPLEX 20 MIN: CPT

## 2024-12-06 NOTE — CONSULTS
[facility] Fall Risk Assessment    Risk Factor Scale  Score   History of Falls [] Yes  [x] No 25  0 0   Secondary Diagnosis [] Yes  [x] No 15  0 0   Ambulatory Aid [] Furniture  [] Crutches/cane/walker  [] None/bedrest/wheelchair/nurse 30  15  0 0   IV/Heparin Lock [] Yes  [x] No 20  0 0   Gait/Transferring [] Impaired  [] Weak  [x] Normal/bedrest/immobile 20  10  0 0   Mental Status [] Forgets limitations  [x] Oriented to own ability 15  0 0      Total:0     Based on the Assessment score: check the appropriate box.    [x]  No intervention needed   Low =   Score of 0-24    []  Use standard prevention interventions Moderate =  Score of 24-44   [] Give patient handout and discuss fall prevention strategies   [] Establish goal of education for patient/family RE: fall prevention strategies    []  Use high risk prevention interventions High = Score of 45 and higher   [] Give patient handout and discuss fall prevention strategies   [] Establish goal of education for patient/family Re: fall prevention strategies   [] Discuss lifeline / other resources    Electronically signed by:   Jeremiah Wright PT  Date: 12/6/2024      
and Roll Test for BPPV  [] 5. Improved static balance  [] 6. Improved dynamic balance: increased FGA ( /30)  [] 7. Decreased Dizziness Handicap Inventory (DHI) ( /100)  [x] 8. Independent with Home Exercise Program (HEP)  [] 9. Demonstrate knowledge of fall prevention, issue patient education literature regarding fall prevention  GOAL TBD**   Long Term Goals: (TBD Treatments)  [] 1.  [] 2.  Patient goals: n/A     Rehab Potential:  [x] Good  [] Fair  [] Poor   Suggested Professional Referral:  [x] No  [] Yes:  Barriers to Goal Achievement::  [x] No  [] Yes:  Domestic Concerns:  [x] No  [] Yes:      Pt. Education:    [x] Results of clinical test/Plans/Goals, Risks/Benefits discussed    [] Home exercise program  [] Fall prevention  Method of Education: [x] Verbal  [x] Demo  [] Written  Comprehension of Education:  [x] Verbalizes understanding.  [x] Demonstrates understanding.  [] Needs Review.      Evaluation Complexity:  History (Personal factors, comorbidities) [] 0 [x] 1-2 [] 3+   Exam (limitations, restrictions) [x] 1-2 [] 3 [] 4+   Clinical presentation (progression) [x] Stable [] Evolving  [] Unstable   Decision Making [x] Low [] Moderate [] High    [x] Low Complexity [] Moderate Complexity [] High Complexity         Treatment Plan:  [x] Vestibular Rehab [] Iontophoresis: 4 mg/mL Dexamethasone Sodium Phosphate  mAmin   94050   [x] Therapeutic Activity  92804 [] Vasopneumatic cold with compression  77274    [] Gait Training   28354 [] Ultrasound   74852   [] Neuromuscular Re-education  53248 [] Electrical Stimulation Unattended  47006   [] Manual Therapy  00916 [] Electrical Stimulation Attended  99440   [x] Instruction in HEP  [] Lumbar/Cervical Traction  87600   [] Aquatic Therapy   77771 [] Cold/hotpack    [] Massage   51039      [] Dry Needling, 1 or 2 muscles  41225   [x] Canalith Repositioning Maneuver   24580 [x] Therapeutic Exercise  98847     Frequency:  5 visits over 12 weeks.       Today’s

## 2024-12-14 ENCOUNTER — HOSPITAL ENCOUNTER (EMERGENCY)
Age: 64
Discharge: HOME | End: 2024-12-14
Attending: EMERGENCY MEDICINE
Payer: MEDICARE

## 2024-12-14 VITALS
RESPIRATION RATE: 18 BRPM | TEMPERATURE: 97.5 F | HEART RATE: 129 BPM | DIASTOLIC BLOOD PRESSURE: 96 MMHG | SYSTOLIC BLOOD PRESSURE: 151 MMHG | OXYGEN SATURATION: 100 %

## 2024-12-14 DIAGNOSIS — N40.1 URINARY RETENTION DUE TO BENIGN PROSTATIC HYPERPLASIA: Primary | ICD-10-CM

## 2024-12-14 DIAGNOSIS — R33.8 URINARY RETENTION DUE TO BENIGN PROSTATIC HYPERPLASIA: Primary | ICD-10-CM

## 2024-12-14 LAB
ALBUMIN SERPL-MCNC: 4.5 G/DL (ref 3.5–5.2)
ALBUMIN/GLOB SERPL: 1.6 {RATIO} (ref 1–2.5)
ALP SERPL-CCNC: 56 U/L (ref 40–129)
ALT SERPL-CCNC: 16 U/L (ref 10–50)
ANION GAP SERPL CALCULATED.3IONS-SCNC: 12 MMOL/L (ref 9–16)
AST SERPL-CCNC: 17 U/L (ref 10–50)
BASOPHILS # BLD: <0.03 K/UL (ref 0–0.2)
BASOPHILS NFR BLD: 0 % (ref 0–2)
BILIRUB SERPL-MCNC: 0.3 MG/DL (ref 0–1.2)
BILIRUB UR QL STRIP: NEGATIVE
BUN SERPL-MCNC: 15 MG/DL (ref 8–23)
CALCIUM SERPL-MCNC: 9.5 MG/DL (ref 8.6–10.4)
CHLORIDE SERPL-SCNC: 98 MMOL/L (ref 98–107)
CLARITY UR: CLEAR
CO2 SERPL-SCNC: 25 MMOL/L (ref 20–31)
COLOR UR: YELLOW
COMMENT: NORMAL
CREAT SERPL-MCNC: 0.9 MG/DL (ref 0.7–1.2)
EOSINOPHIL # BLD: 0.12 K/UL (ref 0–0.44)
EOSINOPHILS RELATIVE PERCENT: 3 % (ref 1–4)
ERYTHROCYTE [DISTWIDTH] IN BLOOD BY AUTOMATED COUNT: 13.2 % (ref 11.8–14.4)
GFR, ESTIMATED: >90 ML/MIN/1.73M2
GLUCOSE SERPL-MCNC: 104 MG/DL (ref 74–99)
GLUCOSE UR STRIP-MCNC: NEGATIVE MG/DL
HCT VFR BLD AUTO: 47.6 % (ref 40.7–50.3)
HGB BLD-MCNC: 15.2 G/DL (ref 13–17)
HGB UR QL STRIP.AUTO: NEGATIVE
IMM GRANULOCYTES # BLD AUTO: <0.03 K/UL (ref 0–0.3)
IMM GRANULOCYTES NFR BLD: 0 %
KETONES UR STRIP-MCNC: NEGATIVE MG/DL
LEUKOCYTE ESTERASE UR QL STRIP: NEGATIVE
LIPASE SERPL-CCNC: 43 U/L (ref 13–60)
LYMPHOCYTES NFR BLD: 1.53 K/UL (ref 1.1–3.7)
LYMPHOCYTES RELATIVE PERCENT: 31 % (ref 24–43)
MCH RBC QN AUTO: 25.4 PG (ref 25.2–33.5)
MCHC RBC AUTO-ENTMCNC: 31.9 G/DL (ref 28.4–34.8)
MCV RBC AUTO: 79.5 FL (ref 82.6–102.9)
MONOCYTES NFR BLD: 0.34 K/UL (ref 0.1–1.2)
MONOCYTES NFR BLD: 7 % (ref 3–12)
NEUTROPHILS NFR BLD: 59 % (ref 36–65)
NEUTS SEG NFR BLD: 2.85 K/UL (ref 1.5–8.1)
NITRITE UR QL STRIP: NEGATIVE
NRBC BLD-RTO: 0 PER 100 WBC
PH UR STRIP: 7 [PH] (ref 5–8)
PLATELET # BLD AUTO: 255 K/UL (ref 138–453)
PMV BLD AUTO: 9.5 FL (ref 8.1–13.5)
POTASSIUM SERPL-SCNC: 3.5 MMOL/L (ref 3.7–5.3)
PROT SERPL-MCNC: 7.4 G/DL (ref 6.6–8.7)
PROT UR STRIP-MCNC: NEGATIVE MG/DL
RBC # BLD AUTO: 5.99 M/UL (ref 4.21–5.77)
RBC # BLD: ABNORMAL 10*6/UL
SODIUM SERPL-SCNC: 135 MMOL/L (ref 136–145)
SP GR UR STRIP: 1.01 (ref 1–1.03)
UROBILINOGEN UR STRIP-ACNC: NORMAL EU/DL (ref 0–1)
WBC OTHER # BLD: 4.9 K/UL (ref 3.5–11.3)

## 2024-12-14 PROCEDURE — 51702 INSERT TEMP BLADDER CATH: CPT

## 2024-12-14 PROCEDURE — 83690 ASSAY OF LIPASE: CPT

## 2024-12-14 PROCEDURE — 85025 COMPLETE CBC W/AUTO DIFF WBC: CPT

## 2024-12-14 PROCEDURE — 6370000000 HC RX 637 (ALT 250 FOR IP)

## 2024-12-14 PROCEDURE — 81003 URINALYSIS AUTO W/O SCOPE: CPT

## 2024-12-14 PROCEDURE — 99283 EMERGENCY DEPT VISIT LOW MDM: CPT

## 2024-12-14 PROCEDURE — 80053 COMPREHEN METABOLIC PANEL: CPT

## 2024-12-14 RX ORDER — LIDOCAINE HYDROCHLORIDE 20 MG/ML
JELLY TOPICAL PRN
Status: DISCONTINUED | OUTPATIENT
Start: 2024-12-14 | End: 2024-12-14 | Stop reason: HOSPADM

## 2024-12-14 RX ADMIN — LIDOCAINE HYDROCHLORIDE: 20 JELLY TOPICAL at 14:34

## 2024-12-14 ASSESSMENT — PAIN SCALES - GENERAL: PAINLEVEL_OUTOF10: 5

## 2024-12-14 NOTE — ED PROVIDER NOTES
[facility] EMERGENCY DEPARTMENT     Emergency Department     Faculty Attestation        I performed a history and physical examination of the patient and discussed management with the resident. I reviewed the resident’s note and agree with the documented findings and plan of care. Any areas of disagreement are noted on the chart. I was personally present for the key portions of any procedures. I have documented in the chart those procedures where I was not present during the key portions. I have reviewed the emergency nurses triage note. I agree with the chief complaint, past medical history, past surgical history, allergies, medications, social and family history as documented unless otherwise noted below.    For mid-level providers such as nurse practitioners as well as physicians assistants:    I have personally seen and evaluated the patient.    I find the patient's history and physical exam are consistent with NP/PA documentation.  I agree with the care provided, treatment rendered, disposition, & follow-up plan.     Additional findings are as noted.    Vital Signs: BP (!) 151/96   Pulse (!) 129   Temp 97.5 °F (36.4 °C) (Oral)   Resp 18   SpO2 100%   PCP:  Lisette Strong MD    Pertinent Comments:     Patient has a longstanding history of prostate issues.  States for the past week or so he has had difficulty urinating he states he has has trouble urinating he has to push on his bladder to initiate his stream.  He has been taking Flomax with no relief.  He denies any pain with urination there is no flank pain no shortness of breath results his pulse and her neuroexam is consistent with 100s.  He has mild suprapubic abdominal tenderness no rebound or guarding.  Will check laboratory studies urinalysis, bladder scan, reassess      Critical Care  None          Nael Chávez MD    Attending Emergency Medicine Physician            Anthony Chávez MD  12/14/24

## 2024-12-14 NOTE — ED TRIAGE NOTES
Pt had green light prostates procedure 4 years ago. Has had no issue after recovery but about a week ago started having difficulty urinating. Now is have low urine out put and is not able to pee.

## 2024-12-14 NOTE — ED PROVIDER NOTES
[facility] EMERGENCY DEPARTMENT  Emergency Department Encounter  Emergency Medicine Resident     Pt Name:Ivanna Khoury  MRN: [id number]  Birthdate 1960  Date of evaluation: 12/14/24  PCP:  Lisette Strong MD  Note Started: 12:09 PM EST      CHIEF COMPLAINT       Chief Complaint   Patient presents with    Oliguria       HISTORY OF PRESENT ILLNESS  (Location/Symptom, Timing/Onset, Context/Setting, Quality, Duration, Modifying Factors, Severity.)      Ivanna Khoury is a 64 y.o. male with history of BPH and hypertension presents with 6-day history of inability to void, decreased volume.  Patient states he had a greenlight procedure several years ago and is due to have a follow-up greenlight procedure in the next few months.  He does follow with urology.  He has been taking double doses of his Flomax and started taking ciprofloxacin for the last several days with no improvement of his symptoms.  There is associated burning with urination.  He denies hematuria, chills, fever, myalgias or night sweats.  Denies CVA tenderness, prior history of nephrolithiasis    PAST MEDICAL / SURGICAL / SOCIAL / FAMILY HISTORY      has a past medical history of Hyperlipidemia, Hypertension, and Wellness examination.       has a past surgical history that includes vascular surgery; Cystoscopy (N/A, 07/30/2020); TURP (N/A, 08/14/2020); vascular surgery; Inguinal hernia repair (Right, 07/27/2021); and hernia repair (Right, 7/27/2021).      Social History     Socioeconomic History    Marital status:      Spouse name: Not on file    Number of children: Not on file    Years of education: Not on file    Highest education level: Not on file   Occupational History    Not on file   Tobacco Use    Smoking status: Never    Smokeless tobacco: Never   Vaping Use    Vaping status: Never Used   Substance and Sexual Activity    Alcohol use: Never    Drug use: Never    Sexual activity: Yes   Other Topics Concern    Not on file

## 2024-12-14 NOTE — DISCHARGE INSTRUCTIONS
Urinalysis did not show signs of urinary tract infection.  Your labs showed normal kidney function.  You were found to have a large amount of urinary retention (400 ml) and a Tovar catheter was placed.  Recommended close follow-up with your urologist, please notify them of your emergency department visit and to schedule an appointment the next 1 week.    Return to the emergency department immediately if you have worsening of your symptoms

## 2024-12-15 ENCOUNTER — HOSPITAL ENCOUNTER (EMERGENCY)
Age: 64
Discharge: HOME | End: 2024-12-15
Attending: EMERGENCY MEDICINE
Payer: MEDICARE

## 2024-12-15 ENCOUNTER — APPOINTMENT (OUTPATIENT)
Dept: GENERAL RADIOLOGY | Age: 64
End: 2024-12-15
Payer: MEDICARE

## 2024-12-15 VITALS
SYSTOLIC BLOOD PRESSURE: 158 MMHG | OXYGEN SATURATION: 99 % | RESPIRATION RATE: 14 BRPM | HEART RATE: 91 BPM | DIASTOLIC BLOOD PRESSURE: 82 MMHG | TEMPERATURE: 97.7 F

## 2024-12-15 DIAGNOSIS — R31.9 HEMATURIA, UNSPECIFIED TYPE: ICD-10-CM

## 2024-12-15 DIAGNOSIS — N39.0 URINARY TRACT INFECTION IN MALE: Primary | ICD-10-CM

## 2024-12-15 LAB
ALBUMIN SERPL-MCNC: 4.6 G/DL (ref 3.5–5.2)
ALBUMIN/GLOB SERPL: 1.8 {RATIO} (ref 1–2.5)
ALP SERPL-CCNC: 56 U/L (ref 40–129)
ALT SERPL-CCNC: 16 U/L (ref 10–50)
ANION GAP SERPL CALCULATED.3IONS-SCNC: 12 MMOL/L (ref 9–16)
AST SERPL-CCNC: 16 U/L (ref 10–50)
BACTERIA URNS QL MICRO: NORMAL
BASOPHILS # BLD: 0.04 K/UL (ref 0–0.2)
BASOPHILS NFR BLD: 1 % (ref 0–2)
BILIRUB SERPL-MCNC: 0.4 MG/DL (ref 0–1.2)
BILIRUB UR QL STRIP: ABNORMAL
BUN SERPL-MCNC: 13 MG/DL (ref 8–23)
CALCIUM SERPL-MCNC: 9.9 MG/DL (ref 8.6–10.4)
CASTS #/AREA URNS LPF: NORMAL /LPF (ref 0–8)
CHLORIDE SERPL-SCNC: 99 MMOL/L (ref 98–107)
CLARITY UR: ABNORMAL
CO2 SERPL-SCNC: 26 MMOL/L (ref 20–31)
COLOR UR: ABNORMAL
CREAT SERPL-MCNC: 0.9 MG/DL (ref 0.7–1.2)
EOSINOPHIL # BLD: 0.17 K/UL (ref 0–0.44)
EOSINOPHILS RELATIVE PERCENT: 3 % (ref 1–4)
EPI CELLS #/AREA URNS HPF: NORMAL /HPF (ref 0–5)
ERYTHROCYTE [DISTWIDTH] IN BLOOD BY AUTOMATED COUNT: 13.2 % (ref 11.8–14.4)
GFR, ESTIMATED: >90 ML/MIN/1.73M2
GLUCOSE SERPL-MCNC: 111 MG/DL (ref 74–99)
GLUCOSE UR STRIP-MCNC: NEGATIVE MG/DL
HCT VFR BLD AUTO: 47.8 % (ref 40.7–50.3)
HGB BLD-MCNC: 15.4 G/DL (ref 13–17)
HGB UR QL STRIP.AUTO: ABNORMAL
IMM GRANULOCYTES # BLD AUTO: <0.03 K/UL (ref 0–0.3)
IMM GRANULOCYTES NFR BLD: 0 %
INR PPP: 1
KETONES UR STRIP-MCNC: NEGATIVE MG/DL
LEUKOCYTE ESTERASE UR QL STRIP: ABNORMAL
LYMPHOCYTES NFR BLD: 2.04 K/UL (ref 1.1–3.7)
LYMPHOCYTES RELATIVE PERCENT: 32 % (ref 24–43)
MCH RBC QN AUTO: 25.8 PG (ref 25.2–33.5)
MCHC RBC AUTO-ENTMCNC: 32.2 G/DL (ref 28.4–34.8)
MCV RBC AUTO: 79.9 FL (ref 82.6–102.9)
MONOCYTES NFR BLD: 0.45 K/UL (ref 0.1–1.2)
MONOCYTES NFR BLD: 7 % (ref 3–12)
NEUTROPHILS NFR BLD: 57 % (ref 36–65)
NEUTS SEG NFR BLD: 3.65 K/UL (ref 1.5–8.1)
NITRITE UR QL STRIP: POSITIVE
NRBC BLD-RTO: 0 PER 100 WBC
PH UR STRIP: 5 [PH] (ref 5–8)
PLATELET # BLD AUTO: 272 K/UL (ref 138–453)
PMV BLD AUTO: 9.4 FL (ref 8.1–13.5)
POTASSIUM SERPL-SCNC: 3.4 MMOL/L (ref 3.7–5.3)
PROT SERPL-MCNC: 7.1 G/DL (ref 6.6–8.7)
PROT UR STRIP-MCNC: ABNORMAL MG/DL
PROTHROMBIN TIME: 13.5 SEC (ref 11.7–14.9)
RBC # BLD AUTO: 5.98 M/UL (ref 4.21–5.77)
RBC # BLD: ABNORMAL 10*6/UL
RBC #/AREA URNS HPF: NORMAL /HPF (ref 0–4)
SODIUM SERPL-SCNC: 137 MMOL/L (ref 136–145)
SP GR UR STRIP: 1.01 (ref 1–1.03)
TROPONIN I SERPL HS-MCNC: <6 NG/L (ref 0–22)
TROPONIN I SERPL HS-MCNC: <6 NG/L (ref 0–22)
UROBILINOGEN UR STRIP-ACNC: NORMAL EU/DL (ref 0–1)
WBC #/AREA URNS HPF: NORMAL /HPF (ref 0–5)
WBC OTHER # BLD: 6.4 K/UL (ref 3.5–11.3)

## 2024-12-15 PROCEDURE — 96374 THER/PROPH/DIAG INJ IV PUSH: CPT

## 2024-12-15 PROCEDURE — 81001 URINALYSIS AUTO W/SCOPE: CPT

## 2024-12-15 PROCEDURE — 85610 PROTHROMBIN TIME: CPT

## 2024-12-15 PROCEDURE — 71045 X-RAY EXAM CHEST 1 VIEW: CPT

## 2024-12-15 PROCEDURE — 84484 ASSAY OF TROPONIN QUANT: CPT

## 2024-12-15 PROCEDURE — 6360000002 HC RX W HCPCS

## 2024-12-15 PROCEDURE — 85025 COMPLETE CBC W/AUTO DIFF WBC: CPT

## 2024-12-15 PROCEDURE — 80053 COMPREHEN METABOLIC PANEL: CPT

## 2024-12-15 PROCEDURE — 6370000000 HC RX 637 (ALT 250 FOR IP)

## 2024-12-15 PROCEDURE — 87086 URINE CULTURE/COLONY COUNT: CPT

## 2024-12-15 PROCEDURE — 99285 EMERGENCY DEPT VISIT HI MDM: CPT

## 2024-12-15 RX ORDER — KETOROLAC TROMETHAMINE 15 MG/ML
15 INJECTION, SOLUTION INTRAMUSCULAR; INTRAVENOUS ONCE
Status: COMPLETED | OUTPATIENT
Start: 2024-12-15 | End: 2024-12-15

## 2024-12-15 RX ORDER — CIPROFLOXACIN 500 MG/1
500 TABLET, FILM COATED ORAL ONCE
Status: COMPLETED | OUTPATIENT
Start: 2024-12-15 | End: 2024-12-15

## 2024-12-15 RX ORDER — CIPROFLOXACIN 500 MG/1
500 TABLET, FILM COATED ORAL 2 TIMES DAILY
Qty: 14 TABLET | Refills: 0 | Status: SHIPPED | OUTPATIENT
Start: 2024-12-15 | End: 2024-12-22

## 2024-12-15 RX ADMIN — CIPROFLOXACIN HYDROCHLORIDE 500 MG: 500 TABLET, FILM COATED ORAL at 10:34

## 2024-12-15 RX ADMIN — KETOROLAC TROMETHAMINE 15 MG: 15 INJECTION, SOLUTION INTRAMUSCULAR; INTRAVENOUS at 08:42

## 2024-12-15 NOTE — ED NOTES
Pt to ED for hematuria. Pt was evaluated in ED yesterday and discharged with valladares catheter and urology follow up. Pt states that he since the placement of valladares he has noticed blood in urine. Pt does have some lower abdominal pressure. Pt states that he also has chest pain that started this morning

## 2024-12-15 NOTE — DISCHARGE INSTRUCTIONS
You were seen in the emergency department today for concern of blood in your Tovar catheter drainage bag.  He also were complaining of some lightheadedness.  We did check some heart labs which all came back normal.  We did test her urine which came back positive for an infection.  We will need to treat this with an antibiotic.  We will treat you with ciprofloxacin since this has worked for you in the past.  Please take this twice a day for 7 days.  Please follow-up with Dr. Greenfield in his clinic in 1 week to have the Tovar catheter removed.  I did speak with the urologist while you were here in the emergency department and they stated that you are appropriate for discharge and follow-up in the clinic in 1 week. You need to call their office to set up a time that works for you.  They agree with treating the urinary tract infection with antibiotics.  Please return to the ER if you feel as though the Tovar catheter is not draining, if you have any worsening abdominal pain, if you are feeling like you are not able to empty your bladder, or if you develop any fevers or worsening symptoms.

## 2024-12-15 NOTE — ED PROVIDER NOTES
[facility]     Emergency Department     Faculty Attestation  9:35 AM EST      I performed a history and physical examination of the patient and discussed management with the resident. I have reviewed and agree with the resident’s findings including all diagnostic interpretations, and treatment plans as written. Any areas of disagreement are noted on the chart. I was personally present for the key portions of any procedures. I have documented in the chart those procedures where I was not present during the key portions. I have reviewed the emergency nurses triage note. I agree with the chief complaint, past medical history, past surgical history, allergies, medications, social and family history as documented unless otherwise noted below. Documentation of the HPI, Physical Exam and Medical Decision Making performed by scribglen is based on my personal performance of the HPI, PE and MDM. For Physician Assistant/ Nurse Practitioner cases/documentation I have personally evaluated this patient and have completed at least one if not all key elements of the E/M (history, physical exam, and MDM). Additional findings are as noted.    Primary Care Physician: Lisette Strong MD    Patient had catheter placed yesterday for urinary retention.  Returns today with blood in the urine.  And also feeling some chest pain, shortness of breath and dizziness.  When he was emptying the Tovar bag.  Patient is on aspirin.  Does have some lower abdominal discomfort.    Patient on exam is well-appearing, nontoxic.  Indwelling Tovar noted with hematuria.  No clots present.  Suprapubic tenderness on exam.  Nonperitoneal abdomen.  Lungs are clear to auscultation bilaterally without rales wheezes or rhonchi    EKG shows normal sinus rhythm with normal axis no ST elevation or depressions noted Q-wave noted in lead III.  Patient with hematuria, recent placement of Tovar for urinary retention, 
abdominal tenderness. There is no guarding or rebound.      Comments: No suprapubic fullness or tenderness on exam    Genitourinary:     Comments: Valladares catheter in place with hematuria noted in valladares drainage bag. Drainage bag is collecting urine and appears to be draining well  Musculoskeletal:      Cervical back: Neck supple.   Skin:     General: Skin is warm.      Capillary Refill: Capillary refill takes less than 2 seconds.   Neurological:      Mental Status: He is alert and oriented to person, place, and time.       DDX/DIAGNOSTIC RESULTS / EMERGENCY DEPARTMENT COURSE / MDM     Medical Decision Making  Amount and/or Complexity of Data Reviewed  Labs: ordered. Decision-making details documented in ED Course.  Radiology: ordered. Decision-making details documented in ED Course.  ECG/medicine tests: ordered.    Risk  Prescription drug management.    Patient here with hematuria.  Seen yesterday for urinary retention.  Valladares catheter in place.  Hematuria getting worse, not better which is what prompted him to come back to the ER.  Patient requesting to be seen by urologist.  Also complaining of some lightheadedness after he noticed the blood in the drainage bag.  Will plan to obtain UA to evaluate for UTI, cardiac workup as well as coags, electrolyte panel and renal function as well as chest x-ray and EKG given patient's age and complaints of lightheadedness.  Discussion with urology.    EKG  EKG showing normal sinus rhythm with a ventricular rate of 80 bpm.  Normal axis.  Normal intervals.  No acute ST elevation depression, T wave inversion in lead III, nonspecific EKG    All EKG's are interpreted by the Emergency Department Physician who either signs or Co-signs this chart in the absence of a cardiologist.    EMERGENCY DEPARTMENT COURSE:    ED Course as of 12/15/24 1039   Sun Dec 15, 2024   0940 Nitrite, Urine(!): POSITIVE [MO]   0940 WBC, UA: 20 TO 50 [MO]   1000 Troponin, High Sensitivity: <6 [MO]   1000 XR

## 2024-12-16 ENCOUNTER — TELEPHONE (OUTPATIENT)
Dept: UROLOGY | Age: 64
End: 2024-12-16

## 2024-12-16 LAB
MICROORGANISM SPEC CULT: NO GROWTH
SPECIMEN DESCRIPTION: NORMAL

## 2024-12-16 NOTE — TELEPHONE ENCOUNTER
Received call from patient requesting to schedule 1 week hospital f/u appt w/ Dr. Crews. Pt seen at [facility] ER 12/14/2024 & 12/15/2024 - no openings at time of call until 1/24/2025 (pt currently already scheduled for). Pt stated he was told by ER to call [facility] office but [facility] refused to see schedule him.     Please advise.    Call back#: [phone]

## 2024-12-20 ENCOUNTER — HOSPITAL ENCOUNTER (EMERGENCY)
Age: 64
Discharge: HOME | End: 2024-12-20
Attending: EMERGENCY MEDICINE
Payer: MEDICARE

## 2024-12-20 VITALS
SYSTOLIC BLOOD PRESSURE: 139 MMHG | TEMPERATURE: 97.7 F | BODY MASS INDEX: 30.17 KG/M2 | HEART RATE: 113 BPM | DIASTOLIC BLOOD PRESSURE: 84 MMHG | RESPIRATION RATE: 12 BRPM | OXYGEN SATURATION: 100 % | WEIGHT: 186.95 LBS

## 2024-12-20 DIAGNOSIS — N13.8 BENIGN PROSTATIC HYPERPLASIA WITH URINARY OBSTRUCTION: ICD-10-CM

## 2024-12-20 DIAGNOSIS — R33.9 URINARY RETENTION: Primary | ICD-10-CM

## 2024-12-20 DIAGNOSIS — N40.1 BENIGN PROSTATIC HYPERPLASIA WITH URINARY OBSTRUCTION: ICD-10-CM

## 2024-12-20 PROCEDURE — 99283 EMERGENCY DEPT VISIT LOW MDM: CPT | Performed by: EMERGENCY MEDICINE

## 2024-12-20 NOTE — ED PROVIDER NOTES
[facility]     Emergency Department     Faculty Attestation    I performed a history and physical examination of the patient and discussed management with the resident. I reviewed the resident’s note and agree with the documented findings and plan of care. Any areas of disagreement are noted on the chart. I was personally present for the key portions of any procedures. I have documented in the chart those procedures where I was not present during the key portions. I have reviewed the emergency nurses triage note. I agree with the chief complaint, past medical history, past surgical history, allergies, medications, social and family history as documented unless otherwise noted below. Documentation of the HPI, Physical Exam and Medical Decision Making performed by medical students or scribes is based on my personal performance of the HPI, PE and MDM. For Physician Assistant/ Nurse Practitioner cases/documentation I have personally evaluated this patient and have completed at least one if not all key elements of the E/M (history, physical exam, and MDM). Additional findings are as noted.    Vital signs:   Vitals:    12/20/24 1010   BP:    Pulse:    Resp: 12   Temp:    SpO2:       Patient here with a valladares catheter that he was told to come to the ED for urology to see and possible removal of the valladares. He has BPH. He is on oral antibiotics for a UTI currently. On exam, he is alert and afebrile. Abdomen soft and non-tender. Plan to discuss with Urology.             Toña Gallardo M.D,  Attending Emergency  Physician            Toña Gallardo MD  12/20/24 1023

## 2024-12-20 NOTE — ED NOTES
Patient presents to ED for valladares cath removal as recommended by Dr Crews  States he had it placed for retention and was told to get it removed in 1 wk  Patient denies any issues at this time  Patient a/o x 4 with NAD noted

## 2024-12-20 NOTE — ED PROVIDER NOTES
[facility] EMERGENCY DEPARTMENT  Emergency Department Encounter  Emergency Medicine Resident     Pt Name:Ivanna Khoury  MRN: [id number]  Birthdate 1960  Date of evaluation: 12/20/24  PCP:  Lisette Strong MD  Note Started: 10:00 AM EST      CHIEF COMPLAINT       Chief Complaint   Patient presents with    Urinary Catheter     Needs removal       HISTORY OF PRESENT ILLNESS  (Location/Symptom, Timing/Onset, Context/Setting, Quality, Duration, Modifying Factors, Severity.)      Ivanna Khoury is a 64-year-old male that presents to the ED for potential catheter removal.  Patient has a history of BPH and hypertension.  He was seen within the last week x 2 for urinary retention and had a catheter placed in the ED.  Urology was consulted.  He was also treated for a UTI.  Patient initially had a clinic appointment scheduled for the end of January but he had it rescheduled for early January after calling the clinic for reevaluation on a closer date.  Patient was advised to come to the ED for reevaluation at a sooner time and have a urology physician assessed the patient.  Patient has no complaints.  He is here to be evaluated for possible Tovar catheter removal.  Only endorses patient of a foreign body (left Tovar catheter) in his penis.    Patient is taking his antihypertensive medication and his Flomax.    Patient denies any symptoms such as headache, fever, chills, nausea, vomiting, abdominal pain, suprapubic pain, retention or passage of large blood clots.  Patient does state he sees small streams of blood intermittently.  Denies any cloudiness to his urine    PAST MEDICAL / SURGICAL / SOCIAL / FAMILY HISTORY      has a past medical history of Hyperlipidemia, Hypertension, and Wellness examination.       has a past surgical history that includes vascular surgery; Cystoscopy (N/A, 07/30/2020); TURP (N/A, 08/14/2020); vascular surgery; Inguinal hernia repair (Right, 07/27/2021); and hernia repair (Right,

## 2024-12-20 NOTE — PLAN OF CARE
Patient came to the emergency department for evaluation of Tovar catheter and its removal.  Patient has a history of BPH and is undergone greenlight PVP in the past.  Was told to come into the emergency department by Dr. Crews.  Patient agreed to undergo a void trial.  A fill and spill was performed with 200 cc of saline instilled into the bladder.  Patient was unable to void with an elevated bladder scan.  Tovar catheter was reinserted.  He will follow-up outpatient with Dr. Crews on 1/10/2024 for continued management of catheter and continued urinary retention workup      Jan Miguel MD  Urology Resident, PGY-3

## 2024-12-20 NOTE — DISCHARGE INSTRUCTIONS
-You were seen and evaluated by emergency medicine physicians at [facility].    -Please follow-up with your primary care physician and/or with the referrals to specialist.    -You were diagnosed with: Urinary tension, BPH with urinary obstruction    -Attempted void trial after Tovar cath removal was performed.  You were initially able to urinate but you start to have retention and decreased ability to void.  Urology was consulted.  They recommended replacement of Tovar and to follow-up with them in clinic at your scheduled time.  -Continue to take your antibiotics as prescribed and to completion.    -Please return to the Emergency Department if you are experiencing the following symptoms acutely: Retention of the Tovar catheter, suprapubic pain, blood from Tovar catheter, passage of large blood clots, cloudy discolored urine, headache, fever, chills, nausea, vomiting, chest pain, shortness of breath, abdominal pain, change with urination, change with bowel movements, change in your skin/hair/nail, weakness, fatigue, altered mental status and/or any change from baseline health.    -Thank you for coming to [facility].

## 2024-12-24 ENCOUNTER — TELEPHONE (OUTPATIENT)
Dept: INTERNAL MEDICINE | Age: 64
End: 2024-12-24

## 2024-12-24 DIAGNOSIS — R31.9 HEMATURIA, UNSPECIFIED TYPE: Primary | ICD-10-CM

## 2024-12-24 NOTE — TELEPHONE ENCOUNTER
Pc asking for a referral for urologist after checking records I do not see urologist referral pended

## 2024-12-26 NOTE — TELEPHONE ENCOUNTER
Patient came into the clinic this morning requesting the same information. He stated he has been to the ER a total of 3 times and they have told him he needs a referral for the Urologist from his PCP to move forward with his process. The patient also stated he is still bleeding and hasn't has any comfort with this ongoing issue. He would like a phone call once the referral is placed.    Electronically signed by Phoebe Calvo on 12/26/2024 at 9:09 AM

## 2024-12-29 ENCOUNTER — HOSPITAL ENCOUNTER (EMERGENCY)
Age: 64
Discharge: HOME | DRG: 698 | End: 2024-12-29
Attending: EMERGENCY MEDICINE
Payer: MEDICARE

## 2024-12-29 VITALS
DIASTOLIC BLOOD PRESSURE: 78 MMHG | OXYGEN SATURATION: 95 % | SYSTOLIC BLOOD PRESSURE: 120 MMHG | TEMPERATURE: 98.4 F | BODY MASS INDEX: 29.53 KG/M2 | RESPIRATION RATE: 16 BRPM | HEART RATE: 113 BPM | WEIGHT: 182.98 LBS

## 2024-12-29 DIAGNOSIS — R10.2 SUPRAPUBIC PAIN: Primary | ICD-10-CM

## 2024-12-29 DIAGNOSIS — N30.01 ACUTE CYSTITIS WITH HEMATURIA: ICD-10-CM

## 2024-12-29 LAB
BACTERIA URNS QL MICRO: ABNORMAL
BILIRUB UR QL STRIP: NEGATIVE
CASTS #/AREA URNS LPF: ABNORMAL /LPF (ref 0–8)
CLARITY UR: ABNORMAL
COLOR UR: YELLOW
EPI CELLS #/AREA URNS HPF: ABNORMAL /HPF (ref 0–5)
GLUCOSE UR STRIP-MCNC: NEGATIVE MG/DL
HGB UR QL STRIP.AUTO: ABNORMAL
KETONES UR STRIP-MCNC: NEGATIVE MG/DL
LEUKOCYTE ESTERASE UR QL STRIP: ABNORMAL
NITRITE UR QL STRIP: POSITIVE
PH UR STRIP: 6.5 [PH] (ref 5–8)
PROT UR STRIP-MCNC: ABNORMAL MG/DL
RBC #/AREA URNS HPF: ABNORMAL /HPF (ref 0–2)
SP GR UR STRIP: 1.02 (ref 1–1.03)
UROBILINOGEN UR STRIP-ACNC: NORMAL EU/DL (ref 0–1)
WBC #/AREA URNS HPF: ABNORMAL /HPF (ref 0–5)

## 2024-12-29 PROCEDURE — 87086 URINE CULTURE/COLONY COUNT: CPT

## 2024-12-29 PROCEDURE — 86403 PARTICLE AGGLUT ANTBDY SCRN: CPT

## 2024-12-29 PROCEDURE — 87186 SC STD MICRODIL/AGAR DIL: CPT

## 2024-12-29 PROCEDURE — 81001 URINALYSIS AUTO W/SCOPE: CPT

## 2024-12-29 PROCEDURE — 6370000000 HC RX 637 (ALT 250 FOR IP)

## 2024-12-29 RX ORDER — ACETAMINOPHEN 325 MG/1
650 TABLET ORAL ONCE
Status: DISCONTINUED | OUTPATIENT
Start: 2024-12-29 | End: 2024-12-29 | Stop reason: HOSPADM

## 2024-12-29 RX ORDER — ACETAMINOPHEN 500 MG
500 TABLET ORAL 4 TIMES DAILY PRN
Qty: 30 TABLET | Refills: 0 | Status: ON HOLD | OUTPATIENT
Start: 2024-12-29 | End: 2025-01-04 | Stop reason: HOSPADM

## 2024-12-29 RX ORDER — CEPHALEXIN 500 MG/1
500 CAPSULE ORAL ONCE
Status: COMPLETED | OUTPATIENT
Start: 2024-12-29 | End: 2024-12-29

## 2024-12-29 RX ORDER — CEPHALEXIN 500 MG/1
500 CAPSULE ORAL 4 TIMES DAILY
Qty: 28 CAPSULE | Refills: 0 | Status: ON HOLD | OUTPATIENT
Start: 2024-12-29 | End: 2025-01-04 | Stop reason: HOSPADM

## 2024-12-29 RX ORDER — LIDOCAINE HYDROCHLORIDE 20 MG/ML
JELLY TOPICAL ONCE
Status: COMPLETED | OUTPATIENT
Start: 2024-12-29 | End: 2024-12-29

## 2024-12-29 RX ADMIN — CEPHALEXIN 500 MG: 500 CAPSULE ORAL at 03:38

## 2024-12-29 RX ADMIN — LIDOCAINE HYDROCHLORIDE: 20 JELLY TOPICAL at 03:41

## 2024-12-29 ASSESSMENT — PAIN - FUNCTIONAL ASSESSMENT: PAIN_FUNCTIONAL_ASSESSMENT: 0-10

## 2024-12-29 ASSESSMENT — PAIN DESCRIPTION - LOCATION: LOCATION: PENIS

## 2024-12-29 ASSESSMENT — PAIN DESCRIPTION - DESCRIPTORS: DESCRIPTORS: BURNING

## 2024-12-29 ASSESSMENT — PAIN SCALES - GENERAL: PAINLEVEL_OUTOF10: 10

## 2024-12-29 NOTE — ED NOTES
Lab called stating needing another lavender tube. Pt refusing to writer and stated, \"no it hurts too bad.\"

## 2024-12-29 NOTE — ED NOTES
Pt presents to ED via wheelchair through triage c/o dysuria and urinary retention x1 day. Pt reports \"burning\" sensation at tip of penis. Pt presents with valladares catheter in place, reports placed 2 weeks ago d/t BPH. Pt has small output in valladares bag.   Pt is alert and oriented x4, speaking in complete sentences.   Pt is afebrile, denies n/v.  Family at bedside.

## 2024-12-29 NOTE — ED NOTES
Tovar bag changed by writer, writer was able to flush urine bag with 10 mL saline flush with no issue. Approximately 150 mL urine in bag prior to flushing bag with flush.

## 2024-12-29 NOTE — ED PROVIDER NOTES
[facility]     Emergency Department     Faculty Attestation    I performed a history and physical examination of the patient and discussed management with the resident. I have reviewed and agree with the resident’s findings including all diagnostic interpretations, and treatment plans as written. Any areas of disagreement are noted on the chart. I was personally present for the key portions of any procedures. I have documented in the chart those procedures where I was not present during the key portions. I have reviewed the emergency nurses triage note. I agree with the chief complaint, past medical history, past surgical history, allergies, medications, social and family history as documented unless otherwise noted below. Documentation of the HPI, Physical Exam and Medical Decision Making performed by sandraibglen is based on my personal performance of the HPI, PE and MDM. For Physician Assistant/ Nurse Practitioner cases/documentation I have personally evaluated this patient and have completed at least one if not all key elements of the E/M (history, physical exam, and MDM). Additional findings are as noted.    Note Started: 2:33 AM EST     65 yo M urinary retention, valladares in place, no fever, no vomit,   Pt denies fall or injury   PE tachy, gcs 15 suprapubic tenderness, mild distention, no rebound, no rigidity  -valladares bag exchange, abx, s/s improved, pt has urology follow up with Dr Hurtado  EKG Interpretation    Interpreted by me      CRITICAL CARE: There was a high probability of clinically significant/life threatening deterioration in this patient's condition which required my urgent intervention.  Total critical care time was 0 minutes.  This excludes any time for separately reportable procedures.       Wayne Castillo,   12/29/24 0240       Wayne Dominguez DO  12/29/24 0352

## 2024-12-29 NOTE — ED NOTES
The following labs were labeled with appropriate pt sticker and tubed to lab:     [] Blue     [x] Lavender  x2 [] on ice  [x] Green/yellow x2  [x] Green/black [] on ice  [] Grey  [] on ice  [] Yellow  [] Red  [] Pink  [] Type/ Screen  [] ABG  [] VBG    [] COVID-19 swab    [] Rapid  [] PCR  [] Flu swab  [] Peds Viral Panel     [] Urine Sample  [] Fecal Sample  [] Pelvic Cultures  [] Blood Cultures  [] X 2  [] STREP Cultures  [] Wound Cultures

## 2024-12-29 NOTE — DISCHARGE INSTRUCTIONS
You were seen evaluated [facility] emergency department for concerns for urinary retention as well as pain around the Tovar catheter.  You had a bladder scan done which showed 90 mL of urine in your bladder which is a normal amount.  You had adequate urine drainage while here in the emergency department.    Your urine showed signs of urinary tract infection.  You will take antibiotic 4 times a day for 7 days.    You can take Tylenol as needed for the pain.  Do not exceed more than 4000 mg of Tylenol in 24-hour timeframe.  I recommend taking two 500 mg Tylenol as 3 times a day as needed for pain.  Do not take more than prescribed.    Please follow-up with your urologist by calling to schedule an appointment as soon as possible.    Please return to the ED with any new or worsening symptoms or concerns.

## 2024-12-29 NOTE — ED PROVIDER NOTES
[facility] EMERGENCY DEPARTMENT  Emergency Department Encounter  Emergency Medicine Resident     Pt Name: Ivanna Khoury  MRN: [id number]  Birthdate 1960  Date of evaluation: 12/29/24  PCP:  Lisette Strong MD    Chief Complaint     Chief Complaint   Patient presents with    Urinary Retention    Dysuria       HISTORY OF PRESENT ILLNESS     Ivanna Khoury is a 64 y.o. male who presents with concerns for urinary retention as well as dysuria.  Reports symptoms been ongoing for less than 24 hours.  Reports Tovar catheter has been in place for 2 weeks.  Has a follow-up appointment with urology in early January.  Denies any fever, nausea, vomiting, bowel complaints.  Denying any chest pain or shortness of breath.      REVIEW OF SYSTEMS       See HPI    PAST MEDICAL/SURGICAL/FAMILY HISTORY     PMH:  has a past medical history of Hyperlipidemia, Hypertension, and Wellness examination.  Surgical History:  has a past surgical history that includes vascular surgery; Cystoscopy (N/A, 07/30/2020); TURP (N/A, 08/14/2020); vascular surgery; Inguinal hernia repair (Right, 07/27/2021); and hernia repair (Right, 7/27/2021).  Social History:  reports that he has never smoked. He has never used smokeless tobacco. He reports that he does not drink alcohol and does not use drugs.      Allergies:has No Known Allergies.    PHYSICAL EXAM       INITIAL VITALS: /78   Pulse (!) 113   Temp 98.4 °F (36.9 °C)   Resp 16   Wt 83 kg (182 lb 15.7 oz)   SpO2 95%   BMI 29.53 kg/m²     CONSTITUTIONAL: Vital signs reviewed, Alert and oriented X 3.   HEAD: Atraumatic, Normocephalic.   EYES: Eyes are normal to inspection, Pupils equal, round and reactive to light.   NECK: Normal ROM, No jugular venous distention, No meningeal signs,  No midline bony tenderness to palpation of C/T/L/S-spines.  RESPIRATORY CHEST: No respiratory distress.   ABDOMEN: Suprapubic tenderness without guarding or rebound. No distension. No pulsatile

## 2024-12-30 ENCOUNTER — HOSPITAL ENCOUNTER (INPATIENT)
Age: 64
LOS: 5 days | Discharge: HOME | DRG: 698 | End: 2025-01-04
Attending: EMERGENCY MEDICINE
Payer: MEDICARE

## 2024-12-30 ENCOUNTER — APPOINTMENT (OUTPATIENT)
Dept: GENERAL RADIOLOGY | Age: 64
DRG: 698 | End: 2024-12-30
Payer: MEDICARE

## 2024-12-30 ENCOUNTER — TELEPHONE (OUTPATIENT)
Dept: UROLOGY | Age: 64
End: 2024-12-30

## 2024-12-30 ENCOUNTER — APPOINTMENT (OUTPATIENT)
Dept: CT IMAGING | Age: 64
DRG: 698 | End: 2024-12-30
Payer: MEDICARE

## 2024-12-30 DIAGNOSIS — E87.6 HYPOKALEMIA: ICD-10-CM

## 2024-12-30 DIAGNOSIS — E87.1 HYPONATREMIA: ICD-10-CM

## 2024-12-30 DIAGNOSIS — N45.2 ORCHITIS OF LEFT TESTICLE: ICD-10-CM

## 2024-12-30 DIAGNOSIS — R01.1 HEART MURMUR: ICD-10-CM

## 2024-12-30 DIAGNOSIS — A49.02 MRSA INFECTION: ICD-10-CM

## 2024-12-30 DIAGNOSIS — N39.0 COMPLICATED UTI (URINARY TRACT INFECTION): ICD-10-CM

## 2024-12-30 DIAGNOSIS — R55 NEAR SYNCOPE: ICD-10-CM

## 2024-12-30 DIAGNOSIS — E78.5 HYPERLIPIDEMIA, UNSPECIFIED HYPERLIPIDEMIA TYPE: ICD-10-CM

## 2024-12-30 DIAGNOSIS — A41.9 SEPSIS WITHOUT ACUTE ORGAN DYSFUNCTION, DUE TO UNSPECIFIED ORGANISM (HCC): Primary | ICD-10-CM

## 2024-12-30 DIAGNOSIS — R55 SYNCOPE, UNSPECIFIED SYNCOPE TYPE: ICD-10-CM

## 2024-12-30 LAB
ALBUMIN SERPL-MCNC: 3.7 G/DL (ref 3.5–5.2)
ALBUMIN/GLOB SERPL: 1.3 {RATIO} (ref 1–2.5)
ALP SERPL-CCNC: 85 U/L (ref 40–129)
ALT SERPL-CCNC: 64 U/L (ref 10–50)
ANION GAP SERPL CALCULATED.3IONS-SCNC: 11 MMOL/L (ref 9–16)
ANION GAP SERPL CALCULATED.3IONS-SCNC: 12 MMOL/L (ref 9–16)
AST SERPL-CCNC: 60 U/L (ref 10–50)
BASOPHILS # BLD: 0 K/UL (ref 0–0.2)
BASOPHILS NFR BLD: 0 % (ref 0–2)
BILIRUB SERPL-MCNC: 0.9 MG/DL (ref 0–1.2)
BILIRUB UR QL STRIP: NEGATIVE
BUN SERPL-MCNC: 10 MG/DL (ref 8–23)
BUN SERPL-MCNC: 14 MG/DL (ref 8–23)
CALCIUM SERPL-MCNC: 8.1 MG/DL (ref 8.6–10.4)
CALCIUM SERPL-MCNC: 8.8 MG/DL (ref 8.6–10.4)
CASTS #/AREA URNS LPF: ABNORMAL /LPF (ref 0–2)
CASTS #/AREA URNS LPF: ABNORMAL /LPF (ref 0–2)
CHLORIDE SERPL-SCNC: 89 MMOL/L (ref 98–107)
CHLORIDE SERPL-SCNC: 96 MMOL/L (ref 98–107)
CLARITY UR: CLEAR
CO2 SERPL-SCNC: 22 MMOL/L (ref 20–31)
CO2 SERPL-SCNC: 23 MMOL/L (ref 20–31)
COLOR UR: YELLOW
CREAT SERPL-MCNC: 0.7 MG/DL (ref 0.7–1.2)
CREAT SERPL-MCNC: 0.9 MG/DL (ref 0.7–1.2)
D DIMER PPP FEU-MCNC: 0.74 UG/ML FEU (ref 0–0.57)
EOSINOPHIL # BLD: 0 K/UL (ref 0–0.44)
EOSINOPHILS RELATIVE PERCENT: 0 % (ref 1–4)
EPI CELLS #/AREA URNS HPF: ABNORMAL /HPF (ref 0–5)
ERYTHROCYTE [DISTWIDTH] IN BLOOD BY AUTOMATED COUNT: 13 % (ref 11.8–14.4)
FLUAV AG SPEC QL: NEGATIVE
FLUBV AG SPEC QL: NEGATIVE
GFR, ESTIMATED: >90 ML/MIN/1.73M2
GFR, ESTIMATED: >90 ML/MIN/1.73M2
GLUCOSE SERPL-MCNC: 123 MG/DL (ref 74–99)
GLUCOSE SERPL-MCNC: 148 MG/DL (ref 74–99)
GLUCOSE UR STRIP-MCNC: NEGATIVE MG/DL
HCT VFR BLD AUTO: 36.9 % (ref 40.7–50.3)
HGB BLD-MCNC: 12.3 G/DL (ref 13–17)
HGB UR QL STRIP.AUTO: ABNORMAL
IMM GRANULOCYTES # BLD AUTO: 0.16 K/UL (ref 0–0.3)
IMM GRANULOCYTES NFR BLD: 1 %
KETONES UR STRIP-MCNC: NEGATIVE MG/DL
LACTIC ACID, SEPSIS WHOLE BLOOD: 1.1 MMOL/L (ref 0.5–1.9)
LACTIC ACID, SEPSIS WHOLE BLOOD: 1.4 MMOL/L (ref 0.5–1.9)
LACTIC ACID, SEPSIS WHOLE BLOOD: 1.5 MMOL/L (ref 0.5–1.9)
LEUKOCYTE ESTERASE UR QL STRIP: ABNORMAL
LYMPHOCYTES NFR BLD: 0.62 K/UL (ref 1.1–3.7)
LYMPHOCYTES RELATIVE PERCENT: 4 % (ref 24–43)
MCH RBC QN AUTO: 25.7 PG (ref 25.2–33.5)
MCHC RBC AUTO-ENTMCNC: 33.3 G/DL (ref 28.4–34.8)
MCV RBC AUTO: 77 FL (ref 82.6–102.9)
MICROORGANISM SPEC CULT: ABNORMAL
MONOCYTES NFR BLD: 0.47 K/UL (ref 0.1–1.2)
MONOCYTES NFR BLD: 3 % (ref 3–12)
MORPHOLOGY: ABNORMAL
NEUTROPHILS NFR BLD: 92 % (ref 36–65)
NEUTS SEG NFR BLD: 14.35 K/UL (ref 1.5–8.1)
NITRITE UR QL STRIP: NEGATIVE
NRBC BLD-RTO: 0 PER 100 WBC
OSMOLALITY SERPL: 269 MOSM/KG (ref 275–295)
OSMOLALITY UR: 378 MOSM/KG (ref 80–1300)
PH UR STRIP: 6.5 [PH] (ref 5–8)
PLATELET # BLD AUTO: 182 K/UL (ref 138–453)
PMV BLD AUTO: 9.4 FL (ref 8.1–13.5)
POTASSIUM SERPL-SCNC: 2.8 MMOL/L (ref 3.7–5.3)
POTASSIUM SERPL-SCNC: 3.5 MMOL/L (ref 3.7–5.3)
PROT SERPL-MCNC: 6.5 G/DL (ref 6.6–8.7)
PROT UR STRIP-MCNC: ABNORMAL MG/DL
RBC # BLD AUTO: 4.79 M/UL (ref 4.21–5.77)
RBC #/AREA URNS HPF: ABNORMAL /HPF (ref 0–2)
SARS-COV-2 RDRP RESP QL NAA+PROBE: NOT DETECTED
SODIUM SERPL-SCNC: 123 MMOL/L (ref 136–145)
SODIUM SERPL-SCNC: 130 MMOL/L (ref 136–145)
SODIUM UR-SCNC: <20 MMOL/L
SP GR UR STRIP: 1.01 (ref 1–1.03)
SPECIMEN DESCRIPTION: ABNORMAL
SPECIMEN DESCRIPTION: NORMAL
TROPONIN I SERPL HS-MCNC: <6 NG/L (ref 0–22)
TROPONIN I SERPL HS-MCNC: <6 NG/L (ref 0–22)
UROBILINOGEN UR STRIP-ACNC: NORMAL EU/DL (ref 0–1)
WBC #/AREA URNS HPF: ABNORMAL /HPF (ref 0–5)
WBC OTHER # BLD: 15.6 K/UL (ref 3.5–11.3)

## 2024-12-30 PROCEDURE — 85025 COMPLETE CBC W/AUTO DIFF WBC: CPT

## 2024-12-30 PROCEDURE — 84300 ASSAY OF URINE SODIUM: CPT

## 2024-12-30 PROCEDURE — 71046 X-RAY EXAM CHEST 2 VIEWS: CPT

## 2024-12-30 PROCEDURE — 83935 ASSAY OF URINE OSMOLALITY: CPT

## 2024-12-30 PROCEDURE — 2580000003 HC RX 258: Performed by: INTERNAL MEDICINE

## 2024-12-30 PROCEDURE — 2580000003 HC RX 258: Performed by: EMERGENCY MEDICINE

## 2024-12-30 PROCEDURE — 87804 INFLUENZA ASSAY W/OPTIC: CPT

## 2024-12-30 PROCEDURE — 87635 SARS-COV-2 COVID-19 AMP PRB: CPT

## 2024-12-30 PROCEDURE — 99223 1ST HOSP IP/OBS HIGH 75: CPT | Performed by: INTERNAL MEDICINE

## 2024-12-30 PROCEDURE — 2580000003 HC RX 258

## 2024-12-30 PROCEDURE — 6360000004 HC RX CONTRAST MEDICATION: Performed by: EMERGENCY MEDICINE

## 2024-12-30 PROCEDURE — 96366 THER/PROPH/DIAG IV INF ADDON: CPT

## 2024-12-30 PROCEDURE — 2500000003 HC RX 250 WO HCPCS: Performed by: INTERNAL MEDICINE

## 2024-12-30 PROCEDURE — 6360000002 HC RX W HCPCS: Performed by: INTERNAL MEDICINE

## 2024-12-30 PROCEDURE — 96367 TX/PROPH/DG ADDL SEQ IV INF: CPT

## 2024-12-30 PROCEDURE — 99285 EMERGENCY DEPT VISIT HI MDM: CPT

## 2024-12-30 PROCEDURE — 6370000000 HC RX 637 (ALT 250 FOR IP): Performed by: INTERNAL MEDICINE

## 2024-12-30 PROCEDURE — 96368 THER/DIAG CONCURRENT INF: CPT

## 2024-12-30 PROCEDURE — 85379 FIBRIN DEGRADATION QUANT: CPT

## 2024-12-30 PROCEDURE — 6360000002 HC RX W HCPCS

## 2024-12-30 PROCEDURE — 2500000003 HC RX 250 WO HCPCS: Performed by: NURSE PRACTITIONER

## 2024-12-30 PROCEDURE — 6370000000 HC RX 637 (ALT 250 FOR IP)

## 2024-12-30 PROCEDURE — 83735 ASSAY OF MAGNESIUM: CPT

## 2024-12-30 PROCEDURE — 84484 ASSAY OF TROPONIN QUANT: CPT

## 2024-12-30 PROCEDURE — 81001 URINALYSIS AUTO W/SCOPE: CPT

## 2024-12-30 PROCEDURE — 96365 THER/PROPH/DIAG IV INF INIT: CPT

## 2024-12-30 PROCEDURE — 36415 COLL VENOUS BLD VENIPUNCTURE: CPT

## 2024-12-30 PROCEDURE — 87040 BLOOD CULTURE FOR BACTERIA: CPT

## 2024-12-30 PROCEDURE — 71260 CT THORAX DX C+: CPT

## 2024-12-30 PROCEDURE — 80048 BASIC METABOLIC PNL TOTAL CA: CPT

## 2024-12-30 PROCEDURE — 83605 ASSAY OF LACTIC ACID: CPT

## 2024-12-30 PROCEDURE — 80053 COMPREHEN METABOLIC PANEL: CPT

## 2024-12-30 PROCEDURE — 83930 ASSAY OF BLOOD OSMOLALITY: CPT

## 2024-12-30 PROCEDURE — 1200000000 HC SEMI PRIVATE

## 2024-12-30 PROCEDURE — 6360000002 HC RX W HCPCS: Performed by: EMERGENCY MEDICINE

## 2024-12-30 PROCEDURE — 93005 ELECTROCARDIOGRAM TRACING: CPT | Performed by: EMERGENCY MEDICINE

## 2024-12-30 RX ORDER — SODIUM CHLORIDE 0.9 % (FLUSH) 0.9 %
5-40 SYRINGE (ML) INJECTION EVERY 12 HOURS SCHEDULED
Status: DISCONTINUED | OUTPATIENT
Start: 2024-12-30 | End: 2025-01-04 | Stop reason: HOSPADM

## 2024-12-30 RX ORDER — ENOXAPARIN SODIUM 100 MG/ML
40 INJECTION SUBCUTANEOUS DAILY
Status: DISCONTINUED | OUTPATIENT
Start: 2024-12-30 | End: 2025-01-04 | Stop reason: HOSPADM

## 2024-12-30 RX ORDER — SODIUM CHLORIDE 9 MG/ML
INJECTION, SOLUTION INTRAVENOUS CONTINUOUS
Status: ACTIVE | OUTPATIENT
Start: 2024-12-30 | End: 2024-12-30

## 2024-12-30 RX ORDER — POTASSIUM CHLORIDE 1500 MG/1
40 TABLET, EXTENDED RELEASE ORAL ONCE
Status: COMPLETED | OUTPATIENT
Start: 2024-12-30 | End: 2024-12-30

## 2024-12-30 RX ORDER — ACETAMINOPHEN 650 MG/1
650 SUPPOSITORY RECTAL EVERY 6 HOURS PRN
Status: DISCONTINUED | OUTPATIENT
Start: 2024-12-30 | End: 2025-01-04 | Stop reason: HOSPADM

## 2024-12-30 RX ORDER — ONDANSETRON 2 MG/ML
4 INJECTION INTRAMUSCULAR; INTRAVENOUS EVERY 6 HOURS PRN
Status: DISCONTINUED | OUTPATIENT
Start: 2024-12-30 | End: 2025-01-04 | Stop reason: HOSPADM

## 2024-12-30 RX ORDER — SODIUM CHLORIDE 0.9 % (FLUSH) 0.9 %
5-40 SYRINGE (ML) INJECTION PRN
Status: DISCONTINUED | OUTPATIENT
Start: 2024-12-30 | End: 2025-01-04 | Stop reason: HOSPADM

## 2024-12-30 RX ORDER — POTASSIUM CHLORIDE 1500 MG/1
40 TABLET, EXTENDED RELEASE ORAL PRN
Status: DISCONTINUED | OUTPATIENT
Start: 2024-12-30 | End: 2025-01-04 | Stop reason: HOSPADM

## 2024-12-30 RX ORDER — ACETAMINOPHEN 325 MG/1
650 TABLET ORAL EVERY 6 HOURS PRN
Status: DISCONTINUED | OUTPATIENT
Start: 2024-12-30 | End: 2024-12-30 | Stop reason: SDUPTHER

## 2024-12-30 RX ORDER — SODIUM CHLORIDE 9 MG/ML
INJECTION, SOLUTION INTRAVENOUS PRN
Status: DISCONTINUED | OUTPATIENT
Start: 2024-12-30 | End: 2025-01-04 | Stop reason: HOSPADM

## 2024-12-30 RX ORDER — POLYETHYLENE GLYCOL 3350 17 G/17G
17 POWDER, FOR SOLUTION ORAL DAILY PRN
Status: DISCONTINUED | OUTPATIENT
Start: 2024-12-30 | End: 2025-01-04 | Stop reason: HOSPADM

## 2024-12-30 RX ORDER — ACETAMINOPHEN 325 MG/1
650 TABLET ORAL EVERY 6 HOURS PRN
Status: DISCONTINUED | OUTPATIENT
Start: 2024-12-30 | End: 2025-01-04 | Stop reason: HOSPADM

## 2024-12-30 RX ORDER — IOPAMIDOL 755 MG/ML
75 INJECTION, SOLUTION INTRAVASCULAR
Status: COMPLETED | OUTPATIENT
Start: 2024-12-30 | End: 2024-12-30

## 2024-12-30 RX ORDER — MAGNESIUM SULFATE IN WATER 40 MG/ML
2000 INJECTION, SOLUTION INTRAVENOUS PRN
Status: DISCONTINUED | OUTPATIENT
Start: 2024-12-30 | End: 2025-01-04 | Stop reason: HOSPADM

## 2024-12-30 RX ORDER — SODIUM CHLORIDE 9 MG/ML
INJECTION, SOLUTION INTRAVENOUS ONCE
Status: COMPLETED | OUTPATIENT
Start: 2024-12-30 | End: 2024-12-30

## 2024-12-30 RX ORDER — MAGNESIUM SULFATE IN WATER 40 MG/ML
2000 INJECTION, SOLUTION INTRAVENOUS ONCE
Status: COMPLETED | OUTPATIENT
Start: 2024-12-30 | End: 2024-12-30

## 2024-12-30 RX ORDER — ONDANSETRON 4 MG/1
4 TABLET, ORALLY DISINTEGRATING ORAL EVERY 8 HOURS PRN
Status: DISCONTINUED | OUTPATIENT
Start: 2024-12-30 | End: 2025-01-04 | Stop reason: HOSPADM

## 2024-12-30 RX ORDER — POTASSIUM CHLORIDE 7.45 MG/ML
10 INJECTION INTRAVENOUS ONCE
Status: COMPLETED | OUTPATIENT
Start: 2024-12-30 | End: 2024-12-30

## 2024-12-30 RX ORDER — ENOXAPARIN SODIUM 100 MG/ML
40 INJECTION SUBCUTANEOUS DAILY
Status: DISCONTINUED | OUTPATIENT
Start: 2024-12-30 | End: 2024-12-30 | Stop reason: SDUPTHER

## 2024-12-30 RX ORDER — ACETAMINOPHEN 650 MG/1
650 SUPPOSITORY RECTAL EVERY 6 HOURS PRN
Status: DISCONTINUED | OUTPATIENT
Start: 2024-12-30 | End: 2024-12-30 | Stop reason: SDUPTHER

## 2024-12-30 RX ORDER — POTASSIUM CHLORIDE 7.45 MG/ML
10 INJECTION INTRAVENOUS PRN
Status: DISCONTINUED | OUTPATIENT
Start: 2024-12-30 | End: 2025-01-04 | Stop reason: HOSPADM

## 2024-12-30 RX ADMIN — IOPAMIDOL 75 ML: 755 INJECTION, SOLUTION INTRAVENOUS at 16:46

## 2024-12-30 RX ADMIN — SODIUM CHLORIDE, PRESERVATIVE FREE 10 ML: 5 INJECTION INTRAVENOUS at 22:30

## 2024-12-30 RX ADMIN — POTASSIUM CHLORIDE 40 MEQ: 1500 TABLET, EXTENDED RELEASE ORAL at 15:43

## 2024-12-30 RX ADMIN — POTASSIUM CHLORIDE 10 MEQ: 7.45 INJECTION INTRAVENOUS at 16:40

## 2024-12-30 RX ADMIN — VANCOMYCIN HYDROCHLORIDE 1000 MG: 1 INJECTION, POWDER, LYOPHILIZED, FOR SOLUTION INTRAVENOUS at 15:42

## 2024-12-30 RX ADMIN — MAGNESIUM SULFATE HEPTAHYDRATE 2000 MG: 40 INJECTION, SOLUTION INTRAVENOUS at 17:16

## 2024-12-30 RX ADMIN — SODIUM CHLORIDE: 0.9 INJECTION, SOLUTION INTRAVENOUS at 16:38

## 2024-12-30 RX ADMIN — SODIUM CHLORIDE: 9 INJECTION, SOLUTION INTRAVENOUS at 22:29

## 2024-12-30 RX ADMIN — ENOXAPARIN SODIUM 40 MG: 100 INJECTION SUBCUTANEOUS at 22:24

## 2024-12-30 RX ADMIN — SODIUM CHLORIDE, PRESERVATIVE FREE 10 ML: 5 INJECTION INTRAVENOUS at 22:24

## 2024-12-30 RX ADMIN — POTASSIUM CHLORIDE 40 MEQ: 1500 TABLET, EXTENDED RELEASE ORAL at 22:24

## 2024-12-30 ASSESSMENT — ENCOUNTER SYMPTOMS
NAUSEA: 1
VOMITING: 1
ABDOMINAL PAIN: 0
SHORTNESS OF BREATH: 1

## 2024-12-30 NOTE — ED TRIAGE NOTES
Pt to ED via EMS a/o x4 with c/o near syncope. Pt report he was seen and treated a few days ago and was diagnosed with a uti. Pt

## 2024-12-30 NOTE — TELEPHONE ENCOUNTER
Patient call in to inform the clinic that he went to the ER for fever and urinary retention, patient has an appointment on 1/9/25.

## 2024-12-30 NOTE — ED PROVIDER NOTES
[facility] EMERGENCY DEPARTMENT  Emergency Department Encounter  Emergency Medicine Resident     Pt Name:Ivanna Khoury  MRN: [id number]  Birthdate 1960  Date of evaluation: 12/30/24  PCP:  Lisette Strong MD  Note Started: 1:58 PM EST      CHIEF COMPLAINT       Chief Complaint   Patient presents with    Loss of Consciousness       HISTORY OF PRESENT ILLNESS  (Location/Symptom, Timing/Onset, Context/Setting, Quality, Duration, Modifying Factors, Severity.)      Ivanna Khoury is a 64 y.o. male who presents with near syncope.  Patient states he has been feeling generally fatigued for the last 3 to 4 days.  He states when he gets up to empty his urinary catheter bag that he \"loses control\".  He denies falling or losing consciousness but feels like he is going to each time.  He has been febrile at home and has been taking Tylenol for his fever.  He denies chest pain but does state that he feels short of breath and this has been going on since he had the catheter placed.    Per chart review patient was seen here yesterday for urinary retention and fever.  He had Tovar catheter placed 2 weeks ago due to BPH.  He has an appointment with urology on January 9.  He was having urinary retention and a bladder scan revealing 90 cc of urine.  Tovar catheter was flushed without any issue.  Urinalysis with consistent with UTI and he was started on Keflex.    PAST MEDICAL / SURGICAL / SOCIAL / FAMILY HISTORY      has a past medical history of Hyperlipidemia, Hypertension, and Wellness examination.       has a past surgical history that includes vascular surgery; Cystoscopy (N/A, 07/30/2020); TURP (N/A, 08/14/2020); vascular surgery; Inguinal hernia repair (Right, 07/27/2021); and hernia repair (Right, 7/27/2021).      Social History     Socioeconomic History    Marital status:      Spouse name: Not on file    Number of children: Not on file    Years of education: Not on file    Highest education level: Not on  (within 3 hours of sepsis identification, after blood cultures):  Yes    (If unable to obtain IV access for IV antibiotics within 3 hours of identification of sepsis, IM antibiotics is acceptable.)        Antibiotics were given prior to blood cultures obtained, because delay of antibiotics would be detrimental  to the patient.        If Lactate >2.0 MUST repeat within 6 hours    If elevated, is elevated lactate from a likely infectious source?:    .      IV Fluid Bolus:  Is lactate > 4.0:  No        ED Course as of 12/30/24 1519   Mon Dec 30, 2024   1438 WBC(!): 15.6 [SK]   1438 Hemoglobin Quant(!): 12.3 [SK]   1438 Platelet Count: 182 [SK]   1438 Lactic Acid, Sepsis, Whole Blood: 1.4 [SK]   1505 Flu B Antigen: NEGATIVE [SK]   1505 Flu A Antigen: NEGATIVE [SK]   1505 SARS-CoV-2, Rapid: Not Detected [SK]   1510 Care assumed.  Patient presents following a syncopal episode.  Was here recently for urinary retention, secondary to BPH. Valladares catheter placed, urine culture from yesterday showed skyla. Feels worsening sob, almost syncopized when walking to bathroom to empty the valladares bag he feels presyncopal. No falls, sits down every time he gets up. States ongoing for past 3-4 days. Family called EMS. On arrival, diaphoretic, ill-appearing. Impression - urosepsis. Cardiac workup, infectious workup. [KJ]   1516 Sodium(!): 123 [KJ]   1516 Potassium(!!): 2.8 [KJ]   1517 Sodium(!): 123 [SK]   1517 Potassium(!!): 2.8 [SK]   1517 Alkaline Phosphatase: 85 [SK]   1517 ALT(!): 64 [SK]   1517 AST(!): 60 [SK]   1517 Alkaline Phosphatase: 85 [SK]   1517 Total Bilirubin: 0.9 [SK]      ED Course User Index  [KJ] Brandon Call MD  [SK] Susana Childress DO       PROCEDURES:      CONSULTS:  PHARMACY TO DOSE VANCOMYCIN  IP CONSULT TO NEPHROLOGY    CRITICAL CARE:  There was significant risk of life threatening deterioration of patient's condition requiring my direct management. Critical care time 0 minutes, excluding any documented

## 2024-12-30 NOTE — PROGRESS NOTES
[facility]   Pharmacy Pharmacokinetic Monitoring Service - Vancomycin     Ivanna Khoury is a 64 y.o. male starting on vancomycin therapy for UTI. Pharmacy consulted by Dr. Childress for monitoring and adjustment.    Target Concentration: Goal AUC/MANN 400-600 mg*hr/L    Additional Antimicrobials: N/a    Pertinent Laboratory Values:   Wt Readings from Last 1 Encounters:   12/29/24 83 kg (182 lb 15.7 oz)     Temp Readings from Last 1 Encounters:   12/30/24 98.3 °F (36.8 °C) (Oral)     Estimated Creatinine Clearance: 83 mL/min (based on SCr of 0.9 mg/dL).  Recent Labs     12/30/24  1420   CREATININE 0.9   BUN 14   WBC 15.6*     Pertinent Cultures:  Culture Date Source Results   12/30 Blood x 2 Pending   MRSA Nasal Swab: N/A. Non-respiratory infection.    Plan:  Dosing recommendations based on Bayesian software and PK calculations  Start vancomycin 1000 mg Q12  Anticipated AUC of ~500 and trough concentration of ~14 at steady state  Renal labs as indicated   Vancomycin concentration not ordered yet  Pharmacy will continue to monitor patient and adjust therapy as indicated    Thank you for the consult,  Kathy Taylor RPH  12/30/2024 3:13 PM

## 2024-12-30 NOTE — ED PROVIDER NOTES
[facility]  Emergency Department  Faculty Attestation     I performed a history and physical examination of the patient and discussed management with the resident. I reviewed the resident’s note and agree with the documented findings and plan of care. Any areas of disagreement are noted on the chart. I was personally present for the key portions of any procedures. I have documented in the chart those procedures where I was not present during the key portions. I have reviewed the emergency nurses triage note. I agree with the chief complaint, past medical history, past surgical history, allergies, medications, social and family history as documented unless otherwise noted below.    For Physician Assistant/ Nurse Practitioner cases/documentation I have personally evaluated this patient and have completed at least one if not all key elements of the E/M (history, physical exam, and MDM). Additional findings are as noted.    Preliminary note started at 1:59 PM EST    Primary Care Physician:  Lisette Strong MD    Screenings:  [unfilled]    CHIEF COMPLAINT       Chief Complaint   Patient presents with    Loss of Consciousness       RECENT VITALS:   There were no vitals taken for this visit.    LABS:  Labs Reviewed - No data to display    Radiology  No orders to display       CRITICAL CARE: There was a high probability of clinically significant/life threatening deterioration in this patient's condition which required my urgent intervention.  Total critical care time was 5 minutes.  This excludes any time for separately reportable procedures.     EKG:    EKG Interpretation    Interpreted by me    Rhythm: normal sinus   Rate: normal  Axis: normal  Ectopy: none  Conduction: normal  ST Segments: no acute change  T Waves: no acute change  Q Waves: none    Clinical Impression: no acute changes and normal EKG    Attending Physician Additional  Notes    Patient was seen here last night for  urinary symptoms, has had an indwelling Tovar catheter for a few weeks, scheduled for surgery on the prostate next month, diagnosed as UTI and started on antibiotics.  He states that preceding this he has been ill for a few days but today he has fevers myalgias headache cough congestion and had an episode of presyncope versus syncope.  He is uncertain whether the truly passed out but he collapsed.  He did have vomiting earlier.  No blood in the emesis.  He has otherwise been able to eat.  EMS finds him to be tachycardic and febrile.  On exam vital signs are pending, no respiratory distress.  Mouth is dry.  He moves all extremities.  Lungs are clear.  Abdomen is soft and nontender.  There is little urine in the catheter but that does appear to be clear.  Review of culture from last night shows greater than 100,000 Staph aureus.  Impression is syncope, fever, concerning for sepsis, dehydration, presumed urine source but consider pneumonia COVID or flu.  Plan is ECG, cardiac monitor, IV access, laboratory studies including cultures, lactate, COVID/flu assay, chest x-ray, bedside ultrasound of bladder to assess for retention, anticipate changing antibiotics to cover for Staph aureus, IV fluids, anticipate admission.            Edin Reina MD, FACEP  Attending Emergency  Physician                Edin Reina MD  12/30/24 1402       Edin Reina MD  12/30/24 141

## 2024-12-30 NOTE — ED PROVIDER NOTES
components:    D-Dimer, Quant 0.74 (*)     All other components within normal limits   COVID-19, RAPID   RAPID INFLUENZA A/B ANTIGENS   CULTURE, BLOOD 1   CULTURE, BLOOD 2   TROPONIN   LACTATE, SEPSIS   TROPONIN   LACTATE, SEPSIS       XR CHEST PORTABLE    Result Date: 12/15/2024  EXAMINATION: ONE XRAY VIEW OF THE CHEST 12/15/2024 8:48 am COMPARISON: None. HISTORY: ORDERING SYSTEM PROVIDED HISTORY: SOB TECHNOLOGIST PROVIDED HISTORY: SOB FINDINGS: There is a mild infiltrate in the lateral aspect of the left lung base, suggestive of pneumonia.  There is no effusion or pneumothorax. The cardiomediastinal silhouette is without acute process. The osseous structures are without acute process.     Mild infiltrate in the the left lung base suspicious for pneumonia.       RECENT VITALS:     Temp: 98.3 °F (36.8 °C),  Pulse: 86, Respirations: 20, BP: 106/78, SpO2: 98 %      This patient is a 64 y.o. Male following multiple pre-syncopal episodes.  Was here recently for urinary retention, secondary to BPH. Valladares catheter placed, was seen again yesterday did not mention the pre-syncope but was concerned there was retention. Valladares was working fine yesterday, culture was obtained however. Urine culture from yesterday showed staph. Feels worsening sob, almost syncopized when walking to bathroom to empty the valladares bag he feels presyncopal. No falls, sits down every time he gets up. States ongoing for past 3-4 days. Family called EMS. On arrival, diaphoretic, ill-appearing. Impression - urosepsis. Cardiac workup, infectious workup.      ED Course as of 12/30/24 1514   Mon Dec 30, 2024   1438 WBC(!): 15.6 [SK]   1438 Hemoglobin Quant(!): 12.3 [SK]   1438 Platelet Count: 182 [SK]   1438 Lactic Acid, Sepsis, Whole Blood: 1.4 [SK]   1505 Flu B Antigen: NEGATIVE [SK]   1505 Flu A Antigen: NEGATIVE [SK]   1505 SARS-CoV-2, Rapid: Not Detected [SK]   1510 Care assumed.  Patient presents following multiple pre-syncopal episodes.  Was here  recently for urinary retention, secondary to BPH. Valladares catheter placed, was seen again yesterday did not mention the pre-syncope but was concerned there was retention. Valladares was working fine yesterday, culture was obtained however. Urine culture from yesterday showed michael Feels worsening sob, almost syncopized when walking to bathroom to empty the valladares bag he feels presyncopal. No falls, sits down every time he gets up. States ongoing for past 3-4 days. Family called EMS. On arrival, diaphoretic, ill-appearing. Impression - urosepsis. Cardiac workup, infectious workup. [KJ]      ED Course User Index  [KJ] Brandon Call MD  [SK] Susana Childress,        OUTSTANDING TASKS / RECOMMENDATIONS:    CT PE - negative  Trops - <6  CMP, labs  Dispo - admit for presyncopal w/u, ID eval for uro-sepsis     FINAL IMPRESSION:     1. Sepsis without acute organ dysfunction, due to unspecified organism (HCC)    2. Near syncope        DISPOSITION:         DISPOSITION:  []  Discharge   []  Transfer -    [x]  Admission -     []  Against Medical Advice   []  Eloped   FOLLOW-UP: No follow-up provider specified.   DISCHARGE MEDICATIONS: New Prescriptions    No medications on file          Brandon Call MD  Emergency Medicine Resident  [facility]

## 2024-12-31 ENCOUNTER — APPOINTMENT (OUTPATIENT)
Dept: ULTRASOUND IMAGING | Age: 64
DRG: 698 | End: 2024-12-31
Payer: MEDICARE

## 2024-12-31 ENCOUNTER — APPOINTMENT (OUTPATIENT)
Age: 64
DRG: 698 | End: 2024-12-31
Attending: INTERNAL MEDICINE
Payer: MEDICARE

## 2024-12-31 PROBLEM — E87.6 HYPOKALEMIA: Status: ACTIVE | Noted: 2024-12-31

## 2024-12-31 PROBLEM — R01.1 HEART MURMUR: Status: ACTIVE | Noted: 2024-12-31

## 2024-12-31 PROBLEM — E87.1 HYPONATREMIA: Status: ACTIVE | Noted: 2024-12-31

## 2024-12-31 LAB
ALBUMIN SERPL-MCNC: 3 G/DL (ref 3.5–5.2)
ALBUMIN/GLOB SERPL: 1 {RATIO} (ref 1–2.5)
ALP SERPL-CCNC: 76 U/L (ref 40–129)
ALT SERPL-CCNC: 46 U/L (ref 10–50)
ANION GAP SERPL CALCULATED.3IONS-SCNC: 14 MMOL/L (ref 9–16)
AST SERPL-CCNC: 33 U/L (ref 10–50)
BASOPHILS # BLD: <0.03 K/UL (ref 0–0.2)
BASOPHILS NFR BLD: 0 % (ref 0–2)
BILIRUB SERPL-MCNC: 0.4 MG/DL (ref 0–1.2)
BUN SERPL-MCNC: 10 MG/DL (ref 8–23)
CALCIUM SERPL-MCNC: 8.3 MG/DL (ref 8.6–10.4)
CHLORIDE SERPL-SCNC: 97 MMOL/L (ref 98–107)
CO2 SERPL-SCNC: 16 MMOL/L (ref 20–31)
CREAT SERPL-MCNC: 0.7 MG/DL (ref 0.7–1.2)
CRP SERPL HS-MCNC: 262 MG/L (ref 0–5)
ECHO AO ROOT DIAM: 3.1 CM
ECHO AO ROOT INDEX: 1.61 CM/M2
ECHO AV AREA PEAK VELOCITY: 2.3 CM2
ECHO AV AREA VTI: 2.1 CM2
ECHO AV AREA/BSA PEAK VELOCITY: 1.2 CM2/M2
ECHO AV AREA/BSA VTI: 1.1 CM2/M2
ECHO AV MEAN GRADIENT: 5 MMHG
ECHO AV MEAN VELOCITY: 1.1 M/S
ECHO AV PEAK GRADIENT: 9 MMHG
ECHO AV PEAK VELOCITY: 1.5 M/S
ECHO AV VELOCITY RATIO: 0.6
ECHO AV VTI: 27.8 CM
ECHO BSA: 1.96 M2
ECHO EST RA PRESSURE: 3 MMHG
ECHO LA AREA 2C: 14 CM2
ECHO LA AREA 4C: 16.8 CM2
ECHO LA DIAMETER INDEX: 2.03 CM/M2
ECHO LA DIAMETER: 3.9 CM
ECHO LA MAJOR AXIS: 5 CM
ECHO LA MINOR AXIS: 3.9 CM
ECHO LA TO AORTIC ROOT RATIO: 1.26
ECHO LA VOL MOD A2C: 41 ML (ref 18–58)
ECHO LA VOL MOD A4C: 46 ML (ref 18–58)
ECHO LA VOLUME INDEX MOD A2C: 21 ML/M2 (ref 16–34)
ECHO LA VOLUME INDEX MOD A4C: 24 ML/M2 (ref 16–34)
ECHO LV E' LATERAL VELOCITY: 11.3 CM/S
ECHO LV E' SEPTAL VELOCITY: 8.81 CM/S
ECHO LV EDV A2C: 62 ML
ECHO LV EDV A4C: 75 ML
ECHO LV EDV INDEX A4C: 39 ML/M2
ECHO LV EDV NDEX A2C: 32 ML/M2
ECHO LV EJECTION FRACTION A2C: 43 %
ECHO LV EJECTION FRACTION A4C: 50 %
ECHO LV EJECTION FRACTION BIPLANE: 50 % (ref 55–100)
ECHO LV ESV A2C: 35 ML
ECHO LV ESV A4C: 38 ML
ECHO LV ESV INDEX A2C: 18 ML/M2
ECHO LV ESV INDEX A4C: 20 ML/M2
ECHO LV FRACTIONAL SHORTENING: 22 % (ref 28–44)
ECHO LV INTERNAL DIMENSION DIASTOLE INDEX: 2.14 CM/M2
ECHO LV INTERNAL DIMENSION DIASTOLIC: 4.1 CM (ref 4.2–5.9)
ECHO LV INTERNAL DIMENSION SYSTOLIC INDEX: 1.67 CM/M2
ECHO LV INTERNAL DIMENSION SYSTOLIC: 3.2 CM
ECHO LV IVSD: 1.1 CM (ref 0.6–1)
ECHO LV MASS 2D: 141.5 G (ref 88–224)
ECHO LV MASS INDEX 2D: 73.7 G/M2 (ref 49–115)
ECHO LV POSTERIOR WALL DIASTOLIC: 1 CM (ref 0.6–1)
ECHO LV RELATIVE WALL THICKNESS RATIO: 0.49
ECHO LVOT AREA: 3.8 CM2
ECHO LVOT AV VTI INDEX: 0.55
ECHO LVOT DIAM: 2.2 CM
ECHO LVOT MEAN GRADIENT: 2 MMHG
ECHO LVOT PEAK GRADIENT: 3 MMHG
ECHO LVOT PEAK VELOCITY: 0.9 M/S
ECHO LVOT STROKE VOLUME INDEX: 30.1 ML/M2
ECHO LVOT SV: 57.8 ML
ECHO LVOT VTI: 15.2 CM
ECHO MV A VELOCITY: 0.78 M/S
ECHO MV AREA VTI: 2.6 CM2
ECHO MV E DECELERATION TIME (DT): 159 MS
ECHO MV E VELOCITY: 0.91 M/S
ECHO MV E/A RATIO: 1.17
ECHO MV E/E' LATERAL: 8.05
ECHO MV E/E' RATIO (AVERAGED): 9.19
ECHO MV E/E' SEPTAL: 10.33
ECHO MV LVOT VTI INDEX: 1.43
ECHO MV MAX VELOCITY: 1 M/S
ECHO MV MEAN GRADIENT: 2 MMHG
ECHO MV MEAN VELOCITY: 0.7 M/S
ECHO MV PEAK GRADIENT: 4 MMHG
ECHO MV VTI: 21.8 CM
ECHO PV MAX VELOCITY: 1 M/S
ECHO PV PEAK GRADIENT: 4 MMHG
ECHO RIGHT VENTRICULAR SYSTOLIC PRESSURE (RVSP): 27 MMHG
ECHO RV BASAL DIMENSION: 3.7 CM
ECHO RV FREE WALL PEAK S': 16.1 CM/S
ECHO RV TAPSE: 2.2 CM (ref 1.7–?)
ECHO TV REGURGITANT MAX VELOCITY: 2.46 M/S
ECHO TV REGURGITANT PEAK GRADIENT: 24 MMHG
EKG ATRIAL RATE: 106 BPM
EKG ATRIAL RATE: 90 BPM
EKG P AXIS: 35 DEGREES
EKG P AXIS: 42 DEGREES
EKG P-R INTERVAL: 168 MS
EKG P-R INTERVAL: 172 MS
EKG Q-T INTERVAL: 336 MS
EKG Q-T INTERVAL: 368 MS
EKG QRS DURATION: 100 MS
EKG QRS DURATION: 94 MS
EKG QTC CALCULATION (BAZETT): 446 MS
EKG QTC CALCULATION (BAZETT): 450 MS
EKG R AXIS: 14 DEGREES
EKG R AXIS: 37 DEGREES
EKG T AXIS: 0 DEGREES
EKG T AXIS: 14 DEGREES
EKG VENTRICULAR RATE: 106 BPM
EKG VENTRICULAR RATE: 90 BPM
EOSINOPHIL # BLD: <0.03 K/UL (ref 0–0.44)
EOSINOPHILS RELATIVE PERCENT: 0 % (ref 1–4)
ERYTHROCYTE [DISTWIDTH] IN BLOOD BY AUTOMATED COUNT: 13.5 % (ref 11.8–14.4)
GFR, ESTIMATED: >90 ML/MIN/1.73M2
GLUCOSE SERPL-MCNC: 98 MG/DL (ref 74–99)
HCT VFR BLD AUTO: 38.8 % (ref 40.7–50.3)
HGB BLD-MCNC: 11.7 G/DL (ref 13–17)
IMM GRANULOCYTES # BLD AUTO: 0.05 K/UL (ref 0–0.3)
IMM GRANULOCYTES NFR BLD: 0 %
INR PPP: 1.2
LYMPHOCYTES NFR BLD: 0.72 K/UL (ref 1.1–3.7)
LYMPHOCYTES RELATIVE PERCENT: 5 % (ref 24–43)
MAGNESIUM SERPL-MCNC: 2.3 MG/DL (ref 1.6–2.4)
MCH RBC QN AUTO: 26.2 PG (ref 25.2–33.5)
MCHC RBC AUTO-ENTMCNC: 30.2 G/DL (ref 28.4–34.8)
MCV RBC AUTO: 86.8 FL (ref 82.6–102.9)
MICROORGANISM SPEC CULT: NORMAL
MONOCYTES NFR BLD: 0.6 K/UL (ref 0.1–1.2)
MONOCYTES NFR BLD: 5 % (ref 3–12)
NEUTROPHILS NFR BLD: 90 % (ref 36–65)
NEUTS SEG NFR BLD: 12 K/UL (ref 1.5–8.1)
NRBC BLD-RTO: 0 PER 100 WBC
PLATELET # BLD AUTO: 197 K/UL (ref 138–453)
PMV BLD AUTO: 9.6 FL (ref 8.1–13.5)
POTASSIUM SERPL-SCNC: 3.5 MMOL/L (ref 3.7–5.3)
PROT SERPL-MCNC: 6 G/DL (ref 6.6–8.7)
PROTHROMBIN TIME: 15 SEC (ref 11.7–14.9)
RBC # BLD AUTO: 4.47 M/UL (ref 4.21–5.77)
SERVICE CMNT-IMP: NORMAL
SODIUM SERPL-SCNC: 127 MMOL/L (ref 136–145)
SODIUM SERPL-SCNC: 129 MMOL/L (ref 136–145)
SODIUM SERPL-SCNC: 132 MMOL/L (ref 136–145)
SPECIMEN DESCRIPTION: NORMAL
WBC OTHER # BLD: 13.4 K/UL (ref 3.5–11.3)

## 2024-12-31 PROCEDURE — 99222 1ST HOSP IP/OBS MODERATE 55: CPT | Performed by: INTERNAL MEDICINE

## 2024-12-31 PROCEDURE — 36415 COLL VENOUS BLD VENIPUNCTURE: CPT

## 2024-12-31 PROCEDURE — 84295 ASSAY OF SERUM SODIUM: CPT

## 2024-12-31 PROCEDURE — 6370000000 HC RX 637 (ALT 250 FOR IP): Performed by: INTERNAL MEDICINE

## 2024-12-31 PROCEDURE — 2580000003 HC RX 258: Performed by: EMERGENCY MEDICINE

## 2024-12-31 PROCEDURE — 97530 THERAPEUTIC ACTIVITIES: CPT

## 2024-12-31 PROCEDURE — 93306 TTE W/DOPPLER COMPLETE: CPT | Performed by: STUDENT IN AN ORGANIZED HEALTH CARE EDUCATION/TRAINING PROGRAM

## 2024-12-31 PROCEDURE — 93306 TTE W/DOPPLER COMPLETE: CPT

## 2024-12-31 PROCEDURE — 76770 US EXAM ABDO BACK WALL COMP: CPT

## 2024-12-31 PROCEDURE — 85610 PROTHROMBIN TIME: CPT

## 2024-12-31 PROCEDURE — 85025 COMPLETE CBC W/AUTO DIFF WBC: CPT

## 2024-12-31 PROCEDURE — 2500000003 HC RX 250 WO HCPCS: Performed by: INTERNAL MEDICINE

## 2024-12-31 PROCEDURE — 76870 US EXAM SCROTUM: CPT

## 2024-12-31 PROCEDURE — 86140 C-REACTIVE PROTEIN: CPT

## 2024-12-31 PROCEDURE — 99233 SBSQ HOSP IP/OBS HIGH 50: CPT | Performed by: HOSPITALIST

## 2024-12-31 PROCEDURE — 1200000000 HC SEMI PRIVATE

## 2024-12-31 PROCEDURE — 97161 PT EVAL LOW COMPLEX 20 MIN: CPT

## 2024-12-31 PROCEDURE — 80053 COMPREHEN METABOLIC PANEL: CPT

## 2024-12-31 PROCEDURE — 6360000002 HC RX W HCPCS: Performed by: EMERGENCY MEDICINE

## 2024-12-31 PROCEDURE — 2580000003 HC RX 258: Performed by: INTERNAL MEDICINE

## 2024-12-31 PROCEDURE — 87086 URINE CULTURE/COLONY COUNT: CPT

## 2024-12-31 PROCEDURE — 6360000002 HC RX W HCPCS: Performed by: INTERNAL MEDICINE

## 2024-12-31 PROCEDURE — 93005 ELECTROCARDIOGRAM TRACING: CPT | Performed by: INTERNAL MEDICINE

## 2024-12-31 RX ORDER — SODIUM CHLORIDE 9 MG/ML
INJECTION, SOLUTION INTRAVENOUS ONCE
Status: COMPLETED | OUTPATIENT
Start: 2024-12-31 | End: 2024-12-31

## 2024-12-31 RX ORDER — ROSUVASTATIN CALCIUM 10 MG/1
10 TABLET, COATED ORAL NIGHTLY
Status: DISCONTINUED | OUTPATIENT
Start: 2024-12-31 | End: 2025-01-04 | Stop reason: HOSPADM

## 2024-12-31 RX ORDER — LISINOPRIL 20 MG/1
20 TABLET ORAL DAILY
Status: DISCONTINUED | OUTPATIENT
Start: 2024-12-31 | End: 2025-01-04 | Stop reason: HOSPADM

## 2024-12-31 RX ORDER — KETOTIFEN FUMARATE 0.35 MG/ML
1 SOLUTION/ DROPS OPHTHALMIC PRN
Status: DISCONTINUED | OUTPATIENT
Start: 2024-12-31 | End: 2025-01-04 | Stop reason: HOSPADM

## 2024-12-31 RX ORDER — MECLIZINE HCL 12.5 MG 12.5 MG/1
25 TABLET ORAL 3 TIMES DAILY PRN
Status: DISCONTINUED | OUTPATIENT
Start: 2024-12-31 | End: 2025-01-04 | Stop reason: HOSPADM

## 2024-12-31 RX ORDER — ACETAMINOPHEN 500 MG
500 TABLET ORAL 4 TIMES DAILY PRN
Status: DISCONTINUED | OUTPATIENT
Start: 2024-12-31 | End: 2025-01-04 | Stop reason: HOSPADM

## 2024-12-31 RX ORDER — CEPHALEXIN 500 MG/1
500 CAPSULE ORAL 4 TIMES DAILY
Status: DISCONTINUED | OUTPATIENT
Start: 2024-12-31 | End: 2024-12-31

## 2024-12-31 RX ORDER — ASPIRIN 81 MG/1
81 TABLET ORAL DAILY
Status: DISCONTINUED | OUTPATIENT
Start: 2024-12-31 | End: 2025-01-04 | Stop reason: HOSPADM

## 2024-12-31 RX ORDER — HYDROCHLOROTHIAZIDE 25 MG/1
25 TABLET ORAL DAILY
Status: DISCONTINUED | OUTPATIENT
Start: 2024-12-31 | End: 2025-01-04 | Stop reason: HOSPADM

## 2024-12-31 RX ORDER — ECHINACEA PURPUREA EXTRACT 125 MG
1 TABLET ORAL PRN
Status: DISCONTINUED | OUTPATIENT
Start: 2024-12-31 | End: 2025-01-04 | Stop reason: HOSPADM

## 2024-12-31 RX ORDER — MORPHINE SULFATE 2 MG/ML
2 INJECTION, SOLUTION INTRAMUSCULAR; INTRAVENOUS EVERY 4 HOURS PRN
Status: DISCONTINUED | OUTPATIENT
Start: 2024-12-31 | End: 2025-01-04 | Stop reason: HOSPADM

## 2024-12-31 RX ORDER — TAMSULOSIN HYDROCHLORIDE 0.4 MG/1
0.4 CAPSULE ORAL 2 TIMES DAILY
Status: DISCONTINUED | OUTPATIENT
Start: 2024-12-31 | End: 2025-01-04 | Stop reason: HOSPADM

## 2024-12-31 RX ADMIN — SODIUM CHLORIDE: 9 INJECTION, SOLUTION INTRAVENOUS at 01:00

## 2024-12-31 RX ADMIN — ASPIRIN 81 MG: 81 TABLET, COATED ORAL at 09:38

## 2024-12-31 RX ADMIN — ENOXAPARIN SODIUM 40 MG: 100 INJECTION SUBCUTANEOUS at 09:38

## 2024-12-31 RX ADMIN — VANCOMYCIN HYDROCHLORIDE 1000 MG: 1 INJECTION, POWDER, LYOPHILIZED, FOR SOLUTION INTRAVENOUS at 05:49

## 2024-12-31 RX ADMIN — LISINOPRIL 20 MG: 20 TABLET ORAL at 09:38

## 2024-12-31 RX ADMIN — SODIUM CHLORIDE, PRESERVATIVE FREE 10 ML: 5 INJECTION INTRAVENOUS at 09:39

## 2024-12-31 RX ADMIN — SODIUM CHLORIDE, PRESERVATIVE FREE 10 ML: 5 INJECTION INTRAVENOUS at 21:14

## 2024-12-31 RX ADMIN — VANCOMYCIN HYDROCHLORIDE 1000 MG: 1 INJECTION, POWDER, LYOPHILIZED, FOR SOLUTION INTRAVENOUS at 18:51

## 2024-12-31 RX ADMIN — ACETAMINOPHEN 650 MG: 325 TABLET ORAL at 22:36

## 2024-12-31 RX ADMIN — ROSUVASTATIN CALCIUM 10 MG: 10 TABLET, FILM COATED ORAL at 21:14

## 2024-12-31 RX ADMIN — POTASSIUM CHLORIDE 40 MEQ: 1500 TABLET, EXTENDED RELEASE ORAL at 18:46

## 2024-12-31 ASSESSMENT — ENCOUNTER SYMPTOMS
GASTROINTESTINAL NEGATIVE: 1
ALLERGIC/IMMUNOLOGIC NEGATIVE: 1
EYES NEGATIVE: 1
COLOR CHANGE: 0
RESPIRATORY NEGATIVE: 1
EYE DISCHARGE: 0
APNEA: 0
ABDOMINAL PAIN: 0

## 2024-12-31 ASSESSMENT — PAIN DESCRIPTION - DESCRIPTORS: DESCRIPTORS: ACHING

## 2024-12-31 ASSESSMENT — PAIN SCALES - GENERAL
PAINLEVEL_OUTOF10: 3
PAINLEVEL_OUTOF10: 10

## 2024-12-31 ASSESSMENT — PAIN DESCRIPTION - ORIENTATION: ORIENTATION: LEFT

## 2024-12-31 ASSESSMENT — PAIN DESCRIPTION - LOCATION: LOCATION: SCROTUM

## 2024-12-31 NOTE — ED NOTES
Pt NG tube came out. Pt requesting a break before placing in back in   
Pt to ED via Medic 5 a/o x4 with c/o near syncope. Pt reports he was seen and treated for a uti. Pt arrives with a valladares cath in place. Pt denies any chest pain or SOB but stated it feels different. Pt reports he is taking his home antibiotics. Pt reports fever at home. Pt reports tylenol one hour ago. Family reports pt may have hit his head. Pt stated he didn't full pass out. Pt placed on cardiac monitor, call light is in reach   
Report given to Juan Antonio GRIMM   All questions answerred   
HERNIA REPAIR Right 7/27/2021    XI ROBOTIC LAPAROSCOPIC INGUINAL HERNIA REPAIR WITH MESH performed by Kermit Sotelo IV, DO at [facility]    INGUINAL HERNIA REPAIR Right 07/27/2021    XI ROBOTIC LAPAROSCOPIC INGUINAL HERNIA REPAIR WITH MESH     TURP N/A 08/14/2020    CYSTO, TUR PROSTATE GREENLIGHT XPS performed by Jayesh Roca MD at [facility] OR    VASCULAR SURGERY      varicose vein of right leg    VASCULAR SURGERY      right leg       PAST MEDICAL HISTORY       Past Medical History:   Diagnosis Date    Hyperlipidemia     Hypertension     Wellness examination     Dr. Strong last seen 7/2021       Labs:  Labs Reviewed   CBC WITH AUTO DIFFERENTIAL - Abnormal; Notable for the following components:       Result Value    WBC 15.6 (*)     Hemoglobin 12.3 (*)     Hematocrit 36.9 (*)     MCV 77.0 (*)     Immature Granulocytes % 1 (*)     Neutrophils % 92 (*)     Lymphocytes % 4 (*)     Eosinophils % 0 (*)     Neutrophils Absolute 14.35 (*)     Lymphocytes Absolute 0.62 (*)     All other components within normal limits   COMPREHENSIVE METABOLIC PANEL - Abnormal; Notable for the following components:    Sodium 123 (*)     Potassium 2.8 (*)     Chloride 89 (*)     Glucose 148 (*)     Total Protein 6.5 (*)     ALT 64 (*)     AST 60 (*)     All other components within normal limits   D-DIMER, QUANTITATIVE - Abnormal; Notable for the following components:    D-Dimer, Quant 0.74 (*)     All other components within normal limits   CULTURE, BLOOD 1   CULTURE, BLOOD 2   COVID-19, RAPID   RAPID INFLUENZA A/B ANTIGENS   TROPONIN   TROPONIN   LACTATE, SEPSIS   LACTATE, SEPSIS   SODIUM, URINE, RANDOM   OSMOLALITY, URINE   URINALYSIS WITH MICROSCOPIC   BASIC METABOLIC PANEL       Electronically signed by Rain Guy RN on 12/30/2024 at 7:27 PM

## 2024-12-31 NOTE — PROGRESS NOTES
[facility]  Office: [phone]  Jc Morales DO, Mane Leone DO, Idris Rose DO, Brennon Alexander DO, Jami Pacheco MD, Laura Warren MD, Renee Eng MD, Radha Gutierres MD,  Charles Hill MD, Mar Roca MD, Weston Gil DO, Kenton Sanchez MD,  Dane Cabrera DO, Shelby Molina MD, Alberto Dodson MD, Edin Morales DO, Agnieszka Abarca MD, Cezar Ryan MD, Wade Haney DO, Maribeth Toro MD, Melany Hess MD, Shirin Zimmer MD, Adriano Titus MD,  Carroll Hess DO, Becca Arce MD,  Shirley Waterhouse, CNP,  Angely Dean, CNP, Keara Denise, CNP, Sunny Pelayo, CNP,  Evelia Bedoya, [facility], Aidee Beasley, CNP, Opal Teague, CNP, Sandi Billy, CNP, Luisa Colbert, CNP, Ailyn George, CNP, BIA Bergeron-C, Marta Jose, CNS, Margarita Savage, CNP, Milli Gay, CNP         [facility]   IN-PATIENT SERVICE   [facility]    Progress Note    12/31/2024    4:40 PM    Name:   Ivanna Khoury  MRN:     [id number]     Acct:      [id number]   Room:   0234/0234-01   Day:  1  Admit Date:  12/30/2024  1:56 PM    PCP:   Lisette Strong MD  Code Status:  Full Code    Subjective:     C/C:   Chief Complaint   Patient presents with    Loss of Consciousness     Interval History Status: improved.     Patient states he has scrotal swelling and pain.  Concerned about that.  No longer feels like he is about to pass out.    Brief History:     Ivanna Khoury is a 64 y.o.with chronic valladares and active UTI on abx presents to hospital with multiple concerns. He feels like he's going to pass out. But has not LOC or fallen . Patient reports symptoms are worse with positional changes and bearing down to use rest room. He denies any cardiopulmonary problems at this time. He reports not drinking as much water as  he should. He reports compliance and denies diarrhea. His urine cultures showed staph aureus growth pending sensitivities. His abx brocaded. Ct chest on  appreciated    3. Scrotal swelling and testicular pain  - ?Orchitis  - Urology consulted and will await input  - Continue antibiotics  - US ordered and pending    4. Near syncope  - Cause unclear  - EKG no acute changes  - Cardiac monitor  - Trend troponins  - Fall precautions  - Orthostatic vitals pending  - 2D Echo done and normal    5. Fevers, fatigue  - Sec to infection  - Supportive and symptomatic care    6. Hyponatremia, Hypochloremia  - Sec to poor PO intake and dehydration  - Osmolality studies done and show hypo-osmolar hyponatremia  - Urine osmolality and sodium studies reviewed  - IV fluids for hydration  - Monitor BMP daily  - Improving    7. Hypokalemia  - Replace per protocol  - Monitor BMP daily  - Improving    8. Leucocytosis  - Sec to infection  - Monitor CBC daily  - Improving    9. Elevated D-dimer  - CTA chest done and negative for PE  - Monitor clinically    10. Metabolic acidosis  - Monitor BMP daily  - Stable    11. Anemia  - Watch for bleeding  - Monitor CBC daily    12. HTN, HLD, BPH with LEE s/p indwelling Tovar, TIA, Migraines, chronic venous insufficiency, BPPV  - All other conditions appear to be stable  - Restart home meds    13. DVT and GI prophylaxis          STEPHANY PARKS MD  12/31/2024  4:40 PM

## 2024-12-31 NOTE — PROGRESS NOTES
Physical Therapy  Facility/Department: [address] ORTHO/MED SURG  Physical Therapy Initial Assessment    Name: Ivanna Khoury  : 1960  MRN: [id number]  Date of Service: 2024    Discharge Recommendations:  No therapy recommended at discharge   PT Equipment Recommendations  Equipment Needed: No      Patient Diagnosis(es): The primary encounter diagnosis was Sepsis without acute organ dysfunction, due to unspecified organism (HCC). Diagnoses of Near syncope, Hypokalemia, Hyponatremia, Heart murmur, and Syncope, unspecified syncope type were also pertinent to this visit.  Past Medical History:  has a past medical history of Hyperlipidemia, Hypertension, and Wellness examination.  Past Surgical History:  has a past surgical history that includes vascular surgery; Cystoscopy (N/A, 2020); TURP (N/A, 2020); vascular surgery; Inguinal hernia repair (Right, 2021); and hernia repair (Right, 2021).    Assessment  Assessment: The pt ambulated 60 ft without a device x SBA. He is able to ambulate independently without a device. No further PT intervention is needed at this time  Therapy Prognosis: Good  Decision Making: Medium Complexity  Requires PT Follow-Up: No  Activity Tolerance  Activity Tolerance: Patient tolerated treatment well    Plan  Physical Therapy Plan  General Plan: Discharge with evaluation only  Safety Devices  Type of Devices: Nurse notified, Left in bed, Call light within reach  Restraints  Restraints Initially in Place: No    Restrictions  Restrictions/Precautions  Restrictions/Precautions: Fall Risk  Activity Level: Up as Tolerated     Subjective  General  Patient assessed for rehabilitation services?: Yes  Response To Previous Treatment: Not applicable  Family/Caregiver Present: No  Follows Commands: Within Functional Limits  Subjective  Subjective: The pt denies pain         Social/Functional History  Social/Functional History  Lives With: Spouse  Type of Home: Apartment  Home

## 2024-12-31 NOTE — H&P
[facility]  Office: [phone]  Jc Morales DO, Mane Leone DO, Idris Rose DO, Brennon Alexander DO, Jami Pacheco MD, Laura Warren MD, Renee Eng MD, Radha Gutierres MD,  Charles Hill MD, Mar Roca MD, Kenton Sanchez MD,  Dane Cabrera DO, Shelby Molina MD, Alberto Dodson MD, Edin Morales DO, Agnieszka Abarca MD,  Wade Haney DO, Maribeth Toro MD, Melany Hess MD, Shirin Zimmer MD, Adriano Titus MD,  Toro Olivo MD, Sabrina Lopez MD, Jody Cruz MD, Concepcion Bob MD, Serge Acevedo MD, David Hopkins MD, Sunny Mckeon DO, Cezar Ryan MD, Dane Mariscal MD, Mohsin Reza, MD, Shirley Waterhouse, CNP,  Angely Dean CNP, Sunny Pelayo, CNP,  Evelia Bedoya, EMILY, Aidee Beasley, CNP, Opal Teague, CNP, Luisa Colbert, CNP, Ailyn George, CNP, Ayah Pelaez, PA-C, Sanna Burroughs PA-C, Arelis Parra, CNP, David Fowler, CNP,  Julia Lopez, CNP, Laxmi Perez, CNP, Keara Denise, CNP,  Milli Gay, CNP, Geni Weiss, CNP         [facility]   IN-PATIENT SERVICE   [facility]    HISTORY AND PHYSICAL EXAMINATION            Date:   12/30/2024  Patient name:  Ivanna Khoury  Date of admission:  12/30/2024  1:56 PM  MRN:   [id number]  Account:  [id number]  YOB: 1960  PCP:    Lisette Strong MD  Room:   22/22  Code Status:    Prior    Chief Complaint:     Chief Complaint   Patient presents with    Loss of Consciousness   Feels like he's going to pass out     History Obtained From:     patient    History of Present Illness:     Ivanna Khoury is a 64 y.o.with chronic valladares and active UTI on abx presents to hospital with multiple concerns. He feels like he's going to pass out. But has not LOC or fallen . Patient reports symptoms are worse with positional changes and bearing down to use rest room. He denies any cardiopulmonary problems at this time. He reports not drinking as much water as  he should. He reports  36.9 (L) 40.7 - 50.3 %    MCV 77.0 (L) 82.6 - 102.9 fL    MCH 25.7 25.2 - 33.5 pg    MCHC 33.3 28.4 - 34.8 g/dL    RDW 13.0 11.8 - 14.4 %    Platelets 182 138 - 453 k/uL    MPV 9.4 8.1 - 13.5 fL    NRBC Automated 0.0 0.0 per 100 WBC    Immature Granulocytes % 1 (H) 0 %    Neutrophils % 92 (H) 36 - 65 %    Lymphocytes % 4 (L) 24 - 43 %    Monocytes % 3 3 - 12 %    Eosinophils % 0 (L) 1 - 4 %    Basophils % 0 0 - 2 %    Immature Granulocytes Absolute 0.16 0.00 - 0.30 k/uL    Neutrophils Absolute 14.35 (H) 1.50 - 8.10 k/uL    Lymphocytes Absolute 0.62 (L) 1.10 - 3.70 k/uL    Monocytes Absolute 0.47 0.10 - 1.20 k/uL    Eosinophils Absolute 0.00 0.00 - 0.44 k/uL    Basophils Absolute 0.00 0.00 - 0.20 k/uL    Morphology MICROCYTOSIS PRESENT    CMP    Collection Time: 12/30/24  2:20 PM   Result Value Ref Range    Sodium 123 (L) 136 - 145 mmol/L    Potassium 2.8 (LL) 3.7 - 5.3 mmol/L    Chloride 89 (L) 98 - 107 mmol/L    CO2 23 20 - 31 mmol/L    Anion Gap 11 9 - 16 mmol/L    Glucose 148 (H) 74 - 99 mg/dL    BUN 14 8 - 23 mg/dL    Creatinine 0.9 0.7 - 1.2 mg/dL    Est, Glom Filt Rate >90 >60 mL/min/1.73m2    Calcium 8.8 8.6 - 10.4 mg/dL    Total Protein 6.5 (L) 6.6 - 8.7 g/dL    Albumin 3.7 3.5 - 5.2 g/dL    Albumin/Globulin Ratio 1.3 1.0 - 2.5    Total Bilirubin 0.9 0.0 - 1.2 mg/dL    Alkaline Phosphatase 85 40 - 129 U/L    ALT 64 (H) 10 - 50 U/L    AST 60 (H) 10 - 50 U/L   Troponin    Collection Time: 12/30/24  2:20 PM   Result Value Ref Range    Troponin, High Sensitivity <6 0 - 22 ng/L   Lactate, Sepsis    Collection Time: 12/30/24  2:20 PM   Result Value Ref Range    Lactic Acid, Sepsis, Whole Blood 1.4 0.5 - 1.9 mmol/L   D-Dimer, Quantitative    Collection Time: 12/30/24  2:20 PM   Result Value Ref Range    D-Dimer, Quant 0.74 (H) 0.00 - 0.57 ug/mL FEU   RAPID INFLUENZA A/B ANTIGENS    Collection Time: 12/30/24  2:30 PM    Specimen: Nasopharyngeal   Result Value Ref Range    Flu A Antigen NEGATIVE NEGATIVE    Flu B

## 2024-12-31 NOTE — CARE COORDINATION
Case Management Assessment  Initial Evaluation    Date/Time of Evaluation: 12/31/2024 9:36 AM  Assessment Completed by: Tosin Wright    If patient is discharged prior to next notation, then this note serves as note for discharge by case management.    Patient Name: Ivanna Khoury                   YOB: 1960  Diagnosis: Hypokalemia [E87.6]  Hyponatremia [E87.1]  Near syncope [R55]  Complicated UTI (urinary tract infection) [N39.0]  Sepsis due to urinary tract infection (HCC) [A41.9, N39.0]  Sepsis without acute organ dysfunction, due to unspecified organism (HCC) [A41.9]                   Date / Time: 12/30/2024  1:56 PM    Patient Admission Status: Inpatient   Readmission Risk (Low < 19, Mod (19-27), High > 27): Readmission Risk Score: 13.9    Current PCP: Lisette Strong MD  PCP verified by CM? Yes (Dr. Strong)    Chart Reviewed: Yes      History Provided by: Patient  Patient Orientation: Alert and Oriented, Person, Place, Situation    Patient Cognition: Alert    Hospitalization in the last 30 days (Readmission):  No    If yes, Readmission Assessment in CM Navigator will be completed.    Advance Directives:      Code Status: Full Code   Patient's Primary Decision Maker is: Legal Next of Kin      Discharge Planning:    Patient lives with: Spouse/Significant Other, Children Type of Home: House  Primary Care Giver: Self  Patient Support Systems include: Children, Family Members, Spouse/Significant Other   Current Financial resources: Medicare, Medicaid  Current community resources: None  Current services prior to admission: None            Current DME:              Type of Home Care services:  None    ADLS  Prior functional level: Independent in ADLs/IADLs  Current functional level: Independent in ADLs/IADLs    PT AM-PAC:   /24  OT AM-PAC:   /24    Family can provide assistance at DC: Yes  Would you like Case Management to discuss the discharge plan with any other family members/significant others,

## 2024-12-31 NOTE — CONSULTS
[facility] -   Infectious diseases evaluation  admission date 12/30/2024    reason for consultation:   Possible sepsis    Impression :   Current:  Indwelling Valladares x 12/14/24-  Had episodes of bleed on off   MRSA valladares related UTI,  Urine culture MRSA 12/29  Urine culture pending 12/30  Left large orchitis, but not tender - new 12/31  Near syncope, possibly related to dehydration  Bandemia  Acute anemia - ?? Related to recent urine bleed?    Other:    Discussion / summary of stay / plan of care/ Recommendations:     HENCE:   Get CRP to assess level of physical activity  Get an ultrasound of the kidneys  and the testicles rule out obstruction by kidney stone or hematoma of the left testicle  Consult    Agree w valladares needs to be changed - pt concerned about the new left testicle edema, and likes to defer changing the valladares for now  Continue vancomycin for now repeat urine culture comes back  Watch creatinine  Blood cultures also are pending    Infection Control Recommendations   [place] Precautions  Contact Isolation   Airborne isolation      Antimicrobial Stewardship Recommendations   Simplification of therapy  Targeted therapy      History of Present Illness:   Initial history:  Ivanna Khoury is a 64 y.o.-year-old male presenting because he felt that he was passing out  and was not eating x 2 weeks or drinking, kind of depressed due to having a new urine obstruction and valladares placed 12/14  Past TURP few y agio, but seems to have re obstructed     He never felt good  since the valladares was placed-  Back to ER 12/29 and U cx showed MRSA  Now he seems to have developed a left  very large  and indurated left testicle, but not tender- suspicious for left orchitis- new since 12/30-      He also has a history of repeated UTIs.  He had no chest pain no fever, does not have any diarrhea no cough.    Admitted to the hospital because of near syncope, likely dehydration  the  tamsulosin  0.4 mg Oral BID    vancomycin (VANCOCIN) intermittent dosing (placeholder)   Other RX Placeholder    vancomycin  1,000 mg IntraVENous Q12H    sodium chloride flush  5-40 mL IntraVENous 2 times per day    sodium chloride flush  5-40 mL IntraVENous 2 times per day    enoxaparin  40 mg SubCUTAneous Daily       Social History:     Social History     Socioeconomic History    Marital status:      Spouse name: Not on file    Number of children: Not on file    Years of education: Not on file    Highest education level: Not on file   Occupational History    Not on file   Tobacco Use    Smoking status: Never    Smokeless tobacco: Never   Vaping Use    Vaping status: Never Used   Substance and Sexual Activity    Alcohol use: Never    Drug use: Never    Sexual activity: Yes   Other Topics Concern    Not on file   Social History Narrative    Not on file     Social Determinants of Health     Financial Resource Strain: High Risk (8/10/2023)    Overall Financial Resource Strain (CARDIA)     Difficulty of Paying Living Expenses: Very hard   Food Insecurity: No Food Insecurity (12/31/2024)    Hunger Vital Sign     Worried About Running Out of Food in the Last Year: Never true     Ran Out of Food in the Last Year: Never true   Transportation Needs: No Transportation Needs (12/31/2024)    PRAPARE - Transportation     Lack of Transportation (Medical): No     Lack of Transportation (Non-Medical): No   Physical Activity: Not on file   Stress: Not on file   Social Connections: Not on file   Intimate Partner Violence: Not on file   Housing Stability: Low Risk  (12/31/2024)    Housing Stability Vital Sign     Unable to Pay for Housing in the Last Year: No     Number of Times Moved in the Last Year: 0     Homeless in the Last Year: No       Family History:   History reviewed. No pertinent family history.   Medical Decision Making:   I have independently reviewed/ordered the following labs:    CBC with Differential:   Recent

## 2024-12-31 NOTE — PROGRESS NOTES
Pt valladares has not been changed yet since admission. RN attempted to replace valladares but pt refusing stating he wants a Dr to assess him first. Dr Olivo notified. Will monitor.

## 2024-12-31 NOTE — PROGRESS NOTES
Physician Progress Note      PATIENT:               THOR LOMELI  CSN #:                  [id number]  :                       1960  ADMIT DATE:       2024 1:56 PM  DISCH DATE:  RESPONDING  PROVIDER #:        Toro Olivo MD          QUERY TEXT:    Pt admitted with UTI/ Sepsis.  Pt noted to have  Indwelling Tovar x 24.   If possible, please document in the progress notes and discharge summary if   you are evaluating and/or treating any of the following:    The medical record reflects the following:  Risk Factors: Chronic Tovar -2/2 urinary retention  Clinical Indicators: WBC 15.6 , .0, Temp 99.3, >131, RR 23,     Urine culture MRSA   Treatment: Vancomycin IV, 0.9 sodium chloride infusion 250ml/hr, Flomax,   replaced Urinary Catheter 24 Tovar    Thank you, Please epic in box message if questions  Alessandra Mcmahon RN CDS  Options provided:  -- UTI due to chronic indwelling urinary catheter  -- UTI not due to indwelling urinary catheter  -- Other - I will add my own diagnosis  -- Disagree - Not applicable / Not valid  -- Disagree - Clinically unable to determine / Unknown  -- Refer to Clinical Documentation Reviewer    PROVIDER RESPONSE TEXT:    UTI is due to the chronic indwelling urinary catheter.    Query created by: Alessandra Correa on 2024 3:08 PM      Electronically signed by:  Toro Olivo MD 2024 6:34 PM

## 2024-12-31 NOTE — TELEPHONE ENCOUNTER
Patient is currently admitted in the hospital. Will try back calling patient later and inform he to make sure to keep appointment on the 1/9/25.

## 2025-01-01 LAB
ANION GAP SERPL CALCULATED.3IONS-SCNC: 10 MMOL/L (ref 9–16)
BASOPHILS # BLD: 0.03 K/UL (ref 0–0.2)
BASOPHILS NFR BLD: 0 % (ref 0–2)
BUN SERPL-MCNC: 16 MG/DL (ref 8–23)
CALCIUM SERPL-MCNC: 8.3 MG/DL (ref 8.6–10.4)
CHLORIDE SERPL-SCNC: 99 MMOL/L (ref 98–107)
CO2 SERPL-SCNC: 23 MMOL/L (ref 20–31)
CREAT SERPL-MCNC: 0.8 MG/DL (ref 0.7–1.2)
EOSINOPHIL # BLD: 0.13 K/UL (ref 0–0.44)
EOSINOPHILS RELATIVE PERCENT: 1 % (ref 1–4)
ERYTHROCYTE [DISTWIDTH] IN BLOOD BY AUTOMATED COUNT: 13.8 % (ref 11.8–14.4)
GFR, ESTIMATED: >90 ML/MIN/1.73M2
GLUCOSE SERPL-MCNC: 113 MG/DL (ref 74–99)
HCT VFR BLD AUTO: 37 % (ref 40.7–50.3)
HGB BLD-MCNC: 11.8 G/DL (ref 13–17)
IMM GRANULOCYTES # BLD AUTO: 0.13 K/UL (ref 0–0.3)
IMM GRANULOCYTES NFR BLD: 1 %
LYMPHOCYTES NFR BLD: 0.93 K/UL (ref 1.1–3.7)
LYMPHOCYTES RELATIVE PERCENT: 8 % (ref 24–43)
MAGNESIUM SERPL-MCNC: 2.3 MG/DL (ref 1.6–2.4)
MCH RBC QN AUTO: 25.7 PG (ref 25.2–33.5)
MCHC RBC AUTO-ENTMCNC: 31.9 G/DL (ref 28.4–34.8)
MCV RBC AUTO: 80.4 FL (ref 82.6–102.9)
MICROORGANISM SPEC CULT: NO GROWTH
MONOCYTES NFR BLD: 0.78 K/UL (ref 0.1–1.2)
MONOCYTES NFR BLD: 7 % (ref 3–12)
NEUTROPHILS NFR BLD: 83 % (ref 36–65)
NEUTS SEG NFR BLD: 9.81 K/UL (ref 1.5–8.1)
NRBC BLD-RTO: 0 PER 100 WBC
PLATELET # BLD AUTO: 265 K/UL (ref 138–453)
PMV BLD AUTO: 9 FL (ref 8.1–13.5)
POTASSIUM SERPL-SCNC: 3.5 MMOL/L (ref 3.7–5.3)
RBC # BLD AUTO: 4.6 M/UL (ref 4.21–5.77)
RBC # BLD: ABNORMAL 10*6/UL
SERVICE CMNT-IMP: NORMAL
SODIUM SERPL-SCNC: 130 MMOL/L (ref 136–145)
SODIUM SERPL-SCNC: 132 MMOL/L (ref 136–145)
SODIUM SERPL-SCNC: 132 MMOL/L (ref 136–145)
SODIUM SERPL-SCNC: 135 MMOL/L (ref 136–145)
SPECIMEN DESCRIPTION: NORMAL
VANCOMYCIN SERPL-MCNC: 8.7 UG/ML (ref 5–40)
WBC OTHER # BLD: 11.8 K/UL (ref 3.5–11.3)

## 2025-01-01 PROCEDURE — 6360000002 HC RX W HCPCS: Performed by: STUDENT IN AN ORGANIZED HEALTH CARE EDUCATION/TRAINING PROGRAM

## 2025-01-01 PROCEDURE — 80048 BASIC METABOLIC PNL TOTAL CA: CPT

## 2025-01-01 PROCEDURE — 6360000002 HC RX W HCPCS: Performed by: EMERGENCY MEDICINE

## 2025-01-01 PROCEDURE — 6370000000 HC RX 637 (ALT 250 FOR IP): Performed by: INTERNAL MEDICINE

## 2025-01-01 PROCEDURE — 84295 ASSAY OF SERUM SODIUM: CPT

## 2025-01-01 PROCEDURE — 83735 ASSAY OF MAGNESIUM: CPT

## 2025-01-01 PROCEDURE — 1200000000 HC SEMI PRIVATE

## 2025-01-01 PROCEDURE — 99232 SBSQ HOSP IP/OBS MODERATE 35: CPT | Performed by: HOSPITALIST

## 2025-01-01 PROCEDURE — 51798 US URINE CAPACITY MEASURE: CPT

## 2025-01-01 PROCEDURE — 80202 ASSAY OF VANCOMYCIN: CPT

## 2025-01-01 PROCEDURE — 6360000002 HC RX W HCPCS: Performed by: INTERNAL MEDICINE

## 2025-01-01 PROCEDURE — 2500000003 HC RX 250 WO HCPCS: Performed by: INTERNAL MEDICINE

## 2025-01-01 PROCEDURE — 85025 COMPLETE CBC W/AUTO DIFF WBC: CPT

## 2025-01-01 PROCEDURE — 2580000003 HC RX 258: Performed by: EMERGENCY MEDICINE

## 2025-01-01 PROCEDURE — 36415 COLL VENOUS BLD VENIPUNCTURE: CPT

## 2025-01-01 RX ORDER — LEVOFLOXACIN 5 MG/ML
750 INJECTION, SOLUTION INTRAVENOUS EVERY 24 HOURS
Status: DISCONTINUED | OUTPATIENT
Start: 2025-01-01 | End: 2025-01-02

## 2025-01-01 RX ORDER — KETOROLAC TROMETHAMINE 15 MG/ML
15 INJECTION, SOLUTION INTRAMUSCULAR; INTRAVENOUS EVERY 6 HOURS
Status: COMPLETED | OUTPATIENT
Start: 2025-01-01 | End: 2025-01-04

## 2025-01-01 RX ORDER — KETOROLAC TROMETHAMINE 15 MG/ML
15 INJECTION, SOLUTION INTRAMUSCULAR; INTRAVENOUS EVERY 6 HOURS
Status: DISCONTINUED | OUTPATIENT
Start: 2025-01-01 | End: 2025-01-01

## 2025-01-01 RX ORDER — LEVOFLOXACIN 5 MG/ML
750 INJECTION, SOLUTION INTRAVENOUS EVERY 24 HOURS
Status: DISCONTINUED | OUTPATIENT
Start: 2025-01-01 | End: 2025-01-01

## 2025-01-01 RX ADMIN — LEVOFLOXACIN 750 MG: 5 INJECTION, SOLUTION INTRAVENOUS at 13:16

## 2025-01-01 RX ADMIN — VANCOMYCIN HYDROCHLORIDE 1000 MG: 1 INJECTION, POWDER, LYOPHILIZED, FOR SOLUTION INTRAVENOUS at 06:40

## 2025-01-01 RX ADMIN — KETOROLAC TROMETHAMINE 15 MG: 15 INJECTION, SOLUTION INTRAMUSCULAR; INTRAVENOUS at 17:58

## 2025-01-01 RX ADMIN — POTASSIUM CHLORIDE 40 MEQ: 1500 TABLET, EXTENDED RELEASE ORAL at 20:26

## 2025-01-01 RX ADMIN — LISINOPRIL 20 MG: 20 TABLET ORAL at 08:27

## 2025-01-01 RX ADMIN — ROSUVASTATIN CALCIUM 10 MG: 10 TABLET, FILM COATED ORAL at 20:26

## 2025-01-01 RX ADMIN — KETOROLAC TROMETHAMINE 15 MG: 15 INJECTION, SOLUTION INTRAMUSCULAR; INTRAVENOUS at 23:13

## 2025-01-01 RX ADMIN — SODIUM CHLORIDE, PRESERVATIVE FREE 10 ML: 5 INJECTION INTRAVENOUS at 20:26

## 2025-01-01 RX ADMIN — KETOROLAC TROMETHAMINE 15 MG: 15 INJECTION, SOLUTION INTRAMUSCULAR; INTRAVENOUS at 13:06

## 2025-01-01 RX ADMIN — ASPIRIN 81 MG: 81 TABLET, COATED ORAL at 08:27

## 2025-01-01 RX ADMIN — VANCOMYCIN HYDROCHLORIDE 1000 MG: 1 INJECTION, POWDER, LYOPHILIZED, FOR SOLUTION INTRAVENOUS at 18:09

## 2025-01-01 ASSESSMENT — ENCOUNTER SYMPTOMS
COLOR CHANGE: 0
RESPIRATORY NEGATIVE: 1
ALLERGIC/IMMUNOLOGIC NEGATIVE: 1
EYES NEGATIVE: 1
EYE DISCHARGE: 0
APNEA: 0
GASTROINTESTINAL NEGATIVE: 1
ABDOMINAL PAIN: 0

## 2025-01-01 ASSESSMENT — PAIN DESCRIPTION - LOCATION
LOCATION: SCROTUM
LOCATION: PENIS

## 2025-01-01 ASSESSMENT — PAIN DESCRIPTION - DESCRIPTORS: DESCRIPTORS: SORE

## 2025-01-01 ASSESSMENT — PAIN SCALES - GENERAL
PAINLEVEL_OUTOF10: 6
PAINLEVEL_OUTOF10: 4

## 2025-01-01 NOTE — PROGRESS NOTES
[facility]  Office: [phone]  Jc Morales DO, Mane Leone DO, Idris Rose DO, Brennon Alexander DO, Jami Pacheco MD, Laura Warren MD, Renee Eng MD, Radha Gutierres MD,  Charles Hill MD, Mar Roca MD, Weston Gil DO, Kenton Sanchez MD,  Dane Cabrera DO, Shelby Molina MD, Alberto Dodson MD, Edin Morales DO, Agnieszka Abarca MD, Cezar Ryan MD, Wade Haney DO, Maribeth Toro MD, Melany Hess MD, Shirin Zimmer MD, Adriano Titus MD,  Carroll Hess DO, Becca Arce MD,  Shirley Waterhouse, CNP,  Angely Dean, CNP, Keara Denise, CNP, Sunny Pelayo, CNP,  Evelia Bedoya, [facility], Aidee Beasley, CNP, Opal Teague, CNP, Sandi Billy, CNP, Luisa Colbert, CNP, Ailyn George, CNP, BIA Bergeron-C, Marta Jose, CNS, Margarita Savage, CNP, Milli Gay, CNP         [facility]   IN-PATIENT SERVICE   [facility]    Progress Note    1/1/2025    2:54 PM    Name:   Ivanna Khoury  MRN:     [id number]     Acct:      [id number]   Room:   0234/0234-01   Day:  2  Admit Date:  12/30/2024  1:56 PM    PCP:   Lisette Strong MD  Code Status:  Full Code    Subjective:     C/C:   Chief Complaint   Patient presents with    Loss of Consciousness     Interval History Status: improved.     Patient states he has scrotal swelling and pain.  Concerned about that.  No longer feels like he is about to pass out.    Brief History:     Ivanna Khoury is a 64 y.o.with chronic valladares and active UTI on abx presents to hospital with multiple concerns. He feels like he's going to pass out. But has not LOC or fallen . Patient reports symptoms are worse with positional changes and bearing down to use rest room. He denies any cardiopulmonary problems at this time. He reports not drinking as much water as  he should. He reports compliance and denies diarrhea. His urine cultures showed staph aureus growth pending sensitivities. His abx brocaded. Ct chest on

## 2025-01-01 NOTE — PLAN OF CARE
Problem: Discharge Planning  Goal: Discharge to home or other facility with appropriate resources  1/1/2025 1717 by Rosalie Glover RN  Outcome: Progressing  1/1/2025 0621 by Honey Lopez RN  Outcome: Progressing     Problem: Safety - Adult  Goal: Free from fall injury  1/1/2025 1717 by Rosalie Glover, RN  Outcome: Progressing  1/1/2025 0621 by Honey Lopez, RN  Outcome: Progressing

## 2025-01-01 NOTE — PROGRESS NOTES
[facility]   Pharmacy Pharmacokinetic Monitoring Service - Vancomycin    Consulting Provider: Dr. Childress   Indication: UTI  Target Concentration: Goal trough of 10-15 mg/L and AUC/MANN <500 mg*hr/L  Day of Therapy: 3    Pertinent Laboratory Values:   Wt Readings from Last 1 Encounters:   01/01/25 83 kg (182 lb 15.7 oz)     Temp Readings from Last 1 Encounters:   01/01/25 97.9 °F (36.6 °C) (Oral)     Estimated Creatinine Clearance: 106 mL/min (based on SCr of 0.7 mg/dL).  Recent Labs     12/30/24  1420 12/30/24 2001 12/31/24  0906   CREATININE 0.9 0.7 0.7   BUN 14 10 10   WBC 15.6*  --  13.4*       Recent vancomycin administrations                     vancomycin (VANCOCIN) 1,000 mg in sodium chloride 0.9 % 250 mL IVPB ([id number]) (mg) 1,000 mg New Bag 01/01/25 0640     1,000 mg New Bag 12/31/24 1851     1,000 mg New Bag  0549     1,000 mg New Bag 12/30/24 1542                    Assessment:  Date/Time Current Dose Concentration Timing of Concentration (h) AUC   1/1/25 1000 mg IV q12h 8.7 mcg/mL 12 hours post-dose 426   Note: Serum concentrations collected for AUC dosing may appear elevated if collected in close proximity to the dose administered, this is not necessarily an indication of toxicity    Plan:  Current dosing regimen is therapeutic  Continue current dose  Pharmacy will continue to monitor patient and adjust therapy as indicated    Thank you for the consult,  Alberto Stahl Pharm.D., RAFAEL, BCCCP  1/1/2025 8:29 AM

## 2025-01-01 NOTE — CONSULTS
David Landis, Abelino Mcfadden, Mariah, Titus, Eleonora Jr.  Urology Consult      Patient:  Ivanna Khoury  MRN: [id number]  YOB: 1960    CHIEF COMPLAINT:  Orchitis    HISTORY OF PRESENT ILLNESS:   The patient is a 64 y.o. male with history of Greenlight PVP performed by Dr. Jayesh Roca in 2020, subsequently developed recurrent urinary retention requiring chronic valladares catheter, admitted for syncope and sepsis suspected to be secondary to UTI vs. orchitis. Patient developed left scrotal swelling with tenderness in the past few days. Patient was started on IV vancomycin by infectious disease. Valladares catheter was exchanged yesterday.     WBC 13.4, Cr 0.7. Scrotal US demonstrates normal bilateral flow, enlarged left epididymis with increased vascularity, complex area with multiple cystic loculations and septations at head of left epididymis 1.67 x 3.75 x 3.09 cm, left hydrocele, possible left varicocele, complex structure with prominent vascularity in the left inguinal canal.     Patient's old records, notes and chart reviewed and summarized above.    Past Medical History:    Past Medical History:   Diagnosis Date    Hyperlipidemia     Hypertension     Wellness examination     Dr. Strong last seen 7/2021       Past Surgical History:    Past Surgical History:   Procedure Laterality Date    CYSTOSCOPY N/A 07/30/2020    CYSTOSCOPY performed by Jayesh Roca MD at [facility] OR    HERNIA REPAIR Right 7/27/2021    XI ROBOTIC LAPAROSCOPIC INGUINAL HERNIA REPAIR WITH MESH performed by Kermit Sotelo IV, DO at [facility] OR    INGUINAL HERNIA REPAIR Right 07/27/2021    XI ROBOTIC LAPAROSCOPIC INGUINAL HERNIA REPAIR WITH MESH     TURP N/A 08/14/2020    CYSTO, TUR PROSTATE GREENLIGHT XPS performed by Jayesh Roca MD at [facility] OR    VASCULAR SURGERY      varicose vein of right leg    VASCULAR SURGERY      right leg       Medications:      Current Facility-Administered Medications:     [Held by provider] acetaminophen (TYLENOL)

## 2025-01-02 PROBLEM — R55 NEAR SYNCOPE: Status: ACTIVE | Noted: 2025-01-02

## 2025-01-02 PROBLEM — A41.9 SEPSIS WITHOUT ACUTE ORGAN DYSFUNCTION (HCC): Status: ACTIVE | Noted: 2025-01-02

## 2025-01-02 PROBLEM — A49.02 MRSA INFECTION: Status: ACTIVE | Noted: 2025-01-02

## 2025-01-02 PROBLEM — N45.2 ORCHITIS OF LEFT TESTICLE: Status: ACTIVE | Noted: 2025-01-02

## 2025-01-02 LAB
ANION GAP SERPL CALCULATED.3IONS-SCNC: 12 MMOL/L (ref 9–16)
BASOPHILS # BLD: 0.07 K/UL (ref 0–0.2)
BASOPHILS NFR BLD: 1 % (ref 0–2)
BUN SERPL-MCNC: 13 MG/DL (ref 8–23)
CALCIUM SERPL-MCNC: 8.6 MG/DL (ref 8.6–10.4)
CHLORIDE SERPL-SCNC: 101 MMOL/L (ref 98–107)
CO2 SERPL-SCNC: 21 MMOL/L (ref 20–31)
CREAT SERPL-MCNC: 0.7 MG/DL (ref 0.7–1.2)
EOSINOPHIL # BLD: 0.18 K/UL (ref 0–0.44)
EOSINOPHILS RELATIVE PERCENT: 2 % (ref 1–4)
ERYTHROCYTE [DISTWIDTH] IN BLOOD BY AUTOMATED COUNT: 13.7 % (ref 11.8–14.4)
GFR, ESTIMATED: >90 ML/MIN/1.73M2
GLUCOSE SERPL-MCNC: 151 MG/DL (ref 74–99)
HCT VFR BLD AUTO: 38.9 % (ref 40.7–50.3)
HGB BLD-MCNC: 12.1 G/DL (ref 13–17)
IMM GRANULOCYTES # BLD AUTO: 0.37 K/UL (ref 0–0.3)
IMM GRANULOCYTES NFR BLD: 3 %
LYMPHOCYTES NFR BLD: 0.99 K/UL (ref 1.1–3.7)
LYMPHOCYTES RELATIVE PERCENT: 9 % (ref 24–43)
MCH RBC QN AUTO: 25.5 PG (ref 25.2–33.5)
MCHC RBC AUTO-ENTMCNC: 31.1 G/DL (ref 28.4–34.8)
MCV RBC AUTO: 81.9 FL (ref 82.6–102.9)
MONOCYTES NFR BLD: 0.69 K/UL (ref 0.1–1.2)
MONOCYTES NFR BLD: 6 % (ref 3–12)
NEUTROPHILS NFR BLD: 79 % (ref 36–65)
NEUTS SEG NFR BLD: 9.27 K/UL (ref 1.5–8.1)
NRBC BLD-RTO: 0 PER 100 WBC
PLATELET # BLD AUTO: 296 K/UL (ref 138–453)
PMV BLD AUTO: 9.2 FL (ref 8.1–13.5)
POTASSIUM SERPL-SCNC: 3.6 MMOL/L (ref 3.7–5.3)
RBC # BLD AUTO: 4.75 M/UL (ref 4.21–5.77)
RBC # BLD: ABNORMAL 10*6/UL
SODIUM SERPL-SCNC: 133 MMOL/L (ref 136–145)
SODIUM SERPL-SCNC: 134 MMOL/L (ref 136–145)
WBC OTHER # BLD: 11.6 K/UL (ref 3.5–11.3)

## 2025-01-02 PROCEDURE — 84295 ASSAY OF SERUM SODIUM: CPT

## 2025-01-02 PROCEDURE — 6360000002 HC RX W HCPCS: Performed by: EMERGENCY MEDICINE

## 2025-01-02 PROCEDURE — 1200000000 HC SEMI PRIVATE

## 2025-01-02 PROCEDURE — 2500000003 HC RX 250 WO HCPCS: Performed by: INTERNAL MEDICINE

## 2025-01-02 PROCEDURE — 85025 COMPLETE CBC W/AUTO DIFF WBC: CPT

## 2025-01-02 PROCEDURE — 6370000000 HC RX 637 (ALT 250 FOR IP): Performed by: INTERNAL MEDICINE

## 2025-01-02 PROCEDURE — 6360000002 HC RX W HCPCS: Performed by: STUDENT IN AN ORGANIZED HEALTH CARE EDUCATION/TRAINING PROGRAM

## 2025-01-02 PROCEDURE — 99232 SBSQ HOSP IP/OBS MODERATE 35: CPT | Performed by: STUDENT IN AN ORGANIZED HEALTH CARE EDUCATION/TRAINING PROGRAM

## 2025-01-02 PROCEDURE — 36415 COLL VENOUS BLD VENIPUNCTURE: CPT

## 2025-01-02 PROCEDURE — 6360000002 HC RX W HCPCS: Performed by: INTERNAL MEDICINE

## 2025-01-02 PROCEDURE — 80048 BASIC METABOLIC PNL TOTAL CA: CPT

## 2025-01-02 PROCEDURE — 2580000003 HC RX 258: Performed by: EMERGENCY MEDICINE

## 2025-01-02 PROCEDURE — 99232 SBSQ HOSP IP/OBS MODERATE 35: CPT | Performed by: INTERNAL MEDICINE

## 2025-01-02 PROCEDURE — 2500000003 HC RX 250 WO HCPCS: Performed by: NURSE PRACTITIONER

## 2025-01-02 RX ORDER — LINEZOLID 600 MG/1
600 TABLET, FILM COATED ORAL 2 TIMES DAILY
Qty: 28 TABLET | Refills: 0 | Status: SHIPPED | OUTPATIENT
Start: 2025-01-02 | End: 2025-01-16

## 2025-01-02 RX ADMIN — VANCOMYCIN HYDROCHLORIDE 1000 MG: 1 INJECTION, POWDER, LYOPHILIZED, FOR SOLUTION INTRAVENOUS at 05:52

## 2025-01-02 RX ADMIN — ASPIRIN 81 MG: 81 TABLET, COATED ORAL at 08:20

## 2025-01-02 RX ADMIN — SODIUM CHLORIDE, PRESERVATIVE FREE 10 ML: 5 INJECTION INTRAVENOUS at 08:22

## 2025-01-02 RX ADMIN — LISINOPRIL 20 MG: 20 TABLET ORAL at 08:20

## 2025-01-02 RX ADMIN — VANCOMYCIN HYDROCHLORIDE 1000 MG: 1 INJECTION, POWDER, LYOPHILIZED, FOR SOLUTION INTRAVENOUS at 18:10

## 2025-01-02 RX ADMIN — KETOROLAC TROMETHAMINE 15 MG: 15 INJECTION, SOLUTION INTRAMUSCULAR; INTRAVENOUS at 12:53

## 2025-01-02 RX ADMIN — KETOROLAC TROMETHAMINE 15 MG: 15 INJECTION, SOLUTION INTRAMUSCULAR; INTRAVENOUS at 05:47

## 2025-01-02 RX ADMIN — ROSUVASTATIN CALCIUM 10 MG: 10 TABLET, FILM COATED ORAL at 20:23

## 2025-01-02 RX ADMIN — KETOROLAC TROMETHAMINE 15 MG: 15 INJECTION, SOLUTION INTRAMUSCULAR; INTRAVENOUS at 23:21

## 2025-01-02 RX ADMIN — SODIUM CHLORIDE, PRESERVATIVE FREE 10 ML: 5 INJECTION INTRAVENOUS at 20:23

## 2025-01-02 RX ADMIN — KETOROLAC TROMETHAMINE 15 MG: 15 INJECTION, SOLUTION INTRAMUSCULAR; INTRAVENOUS at 18:14

## 2025-01-02 ASSESSMENT — PAIN SCALES - GENERAL
PAINLEVEL_OUTOF10: 5
PAINLEVEL_OUTOF10: 5

## 2025-01-02 ASSESSMENT — PAIN DESCRIPTION - LOCATION
LOCATION: SCROTUM
LOCATION: SCROTUM

## 2025-01-02 ASSESSMENT — PAIN DESCRIPTION - DESCRIPTORS
DESCRIPTORS: SORE
DESCRIPTORS: SORE

## 2025-01-02 NOTE — PROGRESS NOTES
Urology Progress Note      Subjective: Ivanna Khoury is a 64 y.o. male.  His/Her current Diet is: ADULT DIET; Regular; No Pork.    Since the previous note, the patient reports the following:  No acute issues overnight.  Still having scrotal swelling  Left scrotum tender    AM labs pending      Vitals and Labs:  Vitals:    01/01/25 0600 01/01/25 0733 01/01/25 1915 01/02/25 0600   BP:  133/79 137/79    Pulse:   81    Resp:  17 18    Temp:  97.9 °F (36.6 °C) 98.3 °F (36.8 °C)    TempSrc:  Oral Oral    SpO2:   96%    Weight: 83 kg (182 lb 15.7 oz)   83.1 kg (183 lb 3.2 oz)   Height:         I/O last 3 completed shifts:  In: 460 [P.O.:460]  Out: 2800 [Urine:2800]    Recent Labs     12/30/24  1420 12/31/24  0906 01/01/25  1727   WBC 15.6* 13.4* 11.8*   HGB 12.3* 11.7* 11.8*   HCT 36.9* 38.8* 37.0*   MCV 77.0* 86.8 80.4*    197 265     Recent Labs     12/30/24 2001 12/31/24  0906 12/31/24  1348 01/01/25  1727 01/01/25 1952 01/02/25  0200   * 127*   < > 132* 130* 133*   K 3.5* 3.5*  --  3.5*  --   --    CL 96* 97*  --  99  --   --    CO2 22 16*  --  23  --   --    BUN 10 10  --  16  --   --    CREATININE 0.7 0.7  --  0.8  --   --     < > = values in this interval not displayed.       Recent Labs     12/30/24 1954   COLORU Yellow   PHUR 6.5   WBCUA 20 TO 50   RBCUA 10 TO 20   LEUKOCYTESUR LARGE*   UROBILINOGEN Normal   BILIRUBINUR NEGATIVE       Physical Exam:  NAD  A/O x 3  RRR  No accessory muscles of inspiration  Abdomen soft, non-tender, non-distended. No CVA tenderness.  Scrotal exam: left scrotum is indurated and enlarged, no crepitus or fluctuance  Testicle could be palpated on the right  Tovar in place. Clear yellow UOP.    Impression: 63 yo male with  Left epididymoorchitis  History of BPH s/p Greenlight PVP in 2020, has recurrent BPH    Plan:  No acute urological surgical intervention  Scrotal elevation  Recommend IV levofloxacin for optimal scrotal penetration  Scheduled toradol 15 mg q6h x 3

## 2025-01-02 NOTE — PROGRESS NOTES
the visit, the document can still have some errors including those of syntax and sound a like substitutions which may escape proof reading.  It such instances, actual meaningcan be extrapolated by contextual diversion.    Pricilla Tamayo MD  Office: [phone]  Perfect serve / office [phone]

## 2025-01-02 NOTE — PROGRESS NOTES
--    CREATININE 0.9  --  0.7 0.7  --  0.8  --   --    MG  --   --  2.3  --   --  2.3  --   --    ANIONGAP 11  --  12 14  --  10  --   --    LABGLOM >90  --  >90 >90  --  >90  --   --    CALCIUM 8.8  --  8.1* 8.3*  --  8.3*  --   --    TROPHS <6 <6  --   --   --   --   --   --     < > = values in this interval not displayed.     Recent Labs     12/30/24  1420 12/31/24  0906   AST 60* 33   ALT 64* 46   ALKPHOS 85 76   BILITOT 0.9 0.4     ABG:  Lab Results   Component Value Date/Time    FIO2 NOT REPORTED 07/28/2021 10:23 PM     Lab Results   Component Value Date/Time    SPECIAL Site: Urine 12/31/2024 03:58 PM     Lab Results   Component Value Date/Time    CULTURE NO GROWTH 12/31/2024 03:58 PM       Radiology:  US SCROTUM W LIMITED DUPLEX    Result Date: 1/1/2025  1. No evidence of testicular torsion Prominent cystic changes with septated appearance in the bilateral rete testis without any dominant mass and without any dominant cyst in this area. 2. A 1.86 x 1.64 x 1.13 cm cyst at the junction of the right rete testis and the epididymis, with mildly echogenic debris in the dependent portion of the cyst. 3. A moderately large hydrocele in left scrotal sac with some echogenic debris. 4. Enlarged left epididymis with diffuse increased vascularity, indicating ongoing acute or subacute epididymitis. 5. Adjacent to the head of left epididymis, complex structure with multiple cystic loculations and heterogeneous echogenicity with multiple septations. This complex structure may represent old hematoma with complex cystic changes within the hematoma or possibly chronic abscess internal septation. A complex cystic mass lesion may also be possible. 6. Evidence of prominent vascular structures in the left inguinal canal particularly visualized with Valsalva maneuver. The findings may represent varicocele in the pampiniform plexus of veins.  Left inguinal hernia cannot be excluded.  On the CT scan on 11/11/2024, there was no  evidence of inguinal hernia. 7. Mildly prominent lymph node in the left groin. 8. If there is clinical suspicion of left inguinal hernia, follow-up evaluation with CT scan of abdomen and pelvis may be considered.     US RENAL COMPLETE    Result Date: 12/31/2024  1. No hydronephrosis. 2. Benign-appearing bilateral renal cysts. 3. Indwelling catheter in the urinary bladder, not well evaluated.     CT CHEST PULMONARY EMBOLISM W CONTRAST    Result Date: 12/30/2024  No evidence of pulmonary embolism or acute pulmonary abnormality.     XR CHEST (2 VW)    Result Date: 12/30/2024  No acute cardiopulmonary disease.       Physical Examination:        General appearance:  alert, cooperative and no distress  Mental Status:  oriented to person, place and time and normal affect  Lungs:  clear to auscultation bilaterally, normal effort  Heart:  regular rate and rhythm, no murmur  Abdomen:  soft, nontender, nondistended, normal bowel sounds, no masses, hepatomegaly, splenomegaly  Extremities:  no edema, redness, tenderness in the calves  Skin:  no gross lesions, rashes, induration  : Otvar catheter in place, enlarged left testicle, no erythema or skin discoloration    Assessment:        Hospital Problems             Last Modified POA    * (Principal) Sepsis due to urinary tract infection (HCC) 12/31/2024 Yes    Syncope 12/31/2024 Yes    Complicated UTI (urinary tract infection) 12/30/2024 Yes    Heart murmur 12/31/2024 Yes    Hypokalemia 12/31/2024 Yes    Hyponatremia 12/31/2024 Yes    MRSA infection 1/2/2025 Yes    Orchitis of left testicle 1/2/2025 Yes       Plan:        Sepsis 2/2 UTI and epididymitis-resolving  ID following  Urology following  Continue on IV vancomycin and IV Levaquin  Chronic Tovar in place  US scrotum showed rt testis epididymis cyst, Large hydrocele left scrotal sac, Left epididymis infection     Near syncope likely 2/2 above  EKG no acute changes  Cardiac monitor  Fall precautions  Orthostatic vitals

## 2025-01-02 NOTE — PLAN OF CARE
Problem: Discharge Planning  Goal: Discharge to home or other facility with appropriate resources  1/1/2025 2229 by Deanna Yuan, RN  Outcome: Progressing  1/1/2025 1717 by Rosalie Glover RN  Outcome: Progressing     Problem: Safety - Adult  Goal: Free from fall injury  1/1/2025 2229 by Deanna Yuan, RN  Outcome: Progressing  1/1/2025 1717 by Rosalie Glover, RN  Outcome: Progressing

## 2025-01-02 NOTE — PROGRESS NOTES
[facility]  Occupational Therapy Not Seen Note    DATE: 2025    NAME: Ivanna Khoury  MRN: [id number]   : 1960      Patient not seen this date for Occupational Therapy due to:    Patient independent with ADLs and functional tasks with no acute OT needs. Will defer OT evaluation at this time. Please reorder OT if future needs arise.     Electronically signed by ARTURO Cohen/L on 2025 at 8:57 AM

## 2025-01-02 NOTE — PROGRESS NOTES
Urology:  Suspected left epididymo-orchitis - Continue to treat with antibiotics outpatient per ID.    At this time no plans for intervention, but will monitor progress as outpatient.    Home with valladares catheter as patient has had multiple issues with retention.  Will schedule cystoscopy in 1-2 weeks for catheter removal and void trial once infection cleared.     Sunny Crews Jr, MD

## 2025-01-03 ENCOUNTER — APPOINTMENT (OUTPATIENT)
Dept: ULTRASOUND IMAGING | Age: 65
DRG: 698 | End: 2025-01-03
Payer: MEDICARE

## 2025-01-03 LAB
ANION GAP SERPL CALCULATED.3IONS-SCNC: 11 MMOL/L (ref 9–16)
BASOPHILS # BLD: 0 K/UL (ref 0–0.2)
BASOPHILS NFR BLD: 0 % (ref 0–2)
BUN SERPL-MCNC: 12 MG/DL (ref 8–23)
CALCIUM SERPL-MCNC: 8.6 MG/DL (ref 8.6–10.4)
CHLORIDE SERPL-SCNC: 101 MMOL/L (ref 98–107)
CO2 SERPL-SCNC: 22 MMOL/L (ref 20–31)
CREAT SERPL-MCNC: 0.7 MG/DL (ref 0.7–1.2)
CRP SERPL HS-MCNC: 116 MG/L (ref 0–5)
EOSINOPHIL # BLD: 0 K/UL (ref 0–0.4)
EOSINOPHILS RELATIVE PERCENT: 0 % (ref 1–4)
ERYTHROCYTE [DISTWIDTH] IN BLOOD BY AUTOMATED COUNT: 14.1 % (ref 11.8–14.4)
GFR, ESTIMATED: >90 ML/MIN/1.73M2
GLUCOSE SERPL-MCNC: 104 MG/DL (ref 74–99)
HCT VFR BLD AUTO: 36.1 % (ref 40.7–50.3)
HGB BLD-MCNC: 11.3 G/DL (ref 13–17)
IMM GRANULOCYTES # BLD AUTO: 0 K/UL (ref 0–0.3)
IMM GRANULOCYTES NFR BLD: 0 %
LYMPHOCYTES NFR BLD: 1.48 K/UL (ref 1–4.8)
LYMPHOCYTES RELATIVE PERCENT: 13 % (ref 24–44)
MAGNESIUM SERPL-MCNC: 2.3 MG/DL (ref 1.6–2.4)
MCH RBC QN AUTO: 25.2 PG (ref 25.2–33.5)
MCHC RBC AUTO-ENTMCNC: 31.3 G/DL (ref 28.4–34.8)
MCV RBC AUTO: 80.6 FL (ref 82.6–102.9)
MONOCYTES NFR BLD: 0.57 K/UL (ref 0.1–0.8)
MONOCYTES NFR BLD: 5 % (ref 1–7)
MORPHOLOGY: ABNORMAL
NEUTROPHILS NFR BLD: 82 % (ref 36–66)
NEUTS SEG NFR BLD: 9.35 K/UL (ref 1.8–7.7)
NRBC BLD-RTO: 0 PER 100 WBC
PLATELET # BLD AUTO: 332 K/UL (ref 138–453)
PMV BLD AUTO: 9.1 FL (ref 8.1–13.5)
POTASSIUM SERPL-SCNC: 3.8 MMOL/L (ref 3.7–5.3)
RBC # BLD AUTO: 4.48 M/UL (ref 4.21–5.77)
SODIUM SERPL-SCNC: 134 MMOL/L (ref 136–145)
WBC OTHER # BLD: 11.4 K/UL (ref 3.5–11.3)

## 2025-01-03 PROCEDURE — 6360000002 HC RX W HCPCS: Performed by: STUDENT IN AN ORGANIZED HEALTH CARE EDUCATION/TRAINING PROGRAM

## 2025-01-03 PROCEDURE — 76870 US EXAM SCROTUM: CPT

## 2025-01-03 PROCEDURE — 99232 SBSQ HOSP IP/OBS MODERATE 35: CPT | Performed by: INTERNAL MEDICINE

## 2025-01-03 PROCEDURE — 83735 ASSAY OF MAGNESIUM: CPT

## 2025-01-03 PROCEDURE — 36415 COLL VENOUS BLD VENIPUNCTURE: CPT

## 2025-01-03 PROCEDURE — 1200000000 HC SEMI PRIVATE

## 2025-01-03 PROCEDURE — 6370000000 HC RX 637 (ALT 250 FOR IP): Performed by: INTERNAL MEDICINE

## 2025-01-03 PROCEDURE — 2500000003 HC RX 250 WO HCPCS: Performed by: NURSE PRACTITIONER

## 2025-01-03 PROCEDURE — 93976 VASCULAR STUDY: CPT

## 2025-01-03 PROCEDURE — 6360000002 HC RX W HCPCS: Performed by: EMERGENCY MEDICINE

## 2025-01-03 PROCEDURE — 99232 SBSQ HOSP IP/OBS MODERATE 35: CPT | Performed by: STUDENT IN AN ORGANIZED HEALTH CARE EDUCATION/TRAINING PROGRAM

## 2025-01-03 PROCEDURE — 2580000003 HC RX 258: Performed by: EMERGENCY MEDICINE

## 2025-01-03 PROCEDURE — 86140 C-REACTIVE PROTEIN: CPT

## 2025-01-03 PROCEDURE — 6360000002 HC RX W HCPCS: Performed by: NURSE PRACTITIONER

## 2025-01-03 PROCEDURE — 85025 COMPLETE CBC W/AUTO DIFF WBC: CPT

## 2025-01-03 PROCEDURE — 2500000003 HC RX 250 WO HCPCS: Performed by: INTERNAL MEDICINE

## 2025-01-03 PROCEDURE — 80048 BASIC METABOLIC PNL TOTAL CA: CPT

## 2025-01-03 RX ORDER — LEVOFLOXACIN 500 MG/1
500 TABLET, FILM COATED ORAL DAILY
Qty: 14 TABLET | Refills: 0 | Status: SHIPPED | OUTPATIENT
Start: 2025-01-03 | End: 2025-01-17

## 2025-01-03 RX ORDER — LEVOFLOXACIN 500 MG/1
500 TABLET, FILM COATED ORAL DAILY
Status: DISCONTINUED | OUTPATIENT
Start: 2025-01-03 | End: 2025-01-04 | Stop reason: HOSPADM

## 2025-01-03 RX ADMIN — KETOROLAC TROMETHAMINE 15 MG: 15 INJECTION, SOLUTION INTRAMUSCULAR; INTRAVENOUS at 17:43

## 2025-01-03 RX ADMIN — VANCOMYCIN HYDROCHLORIDE 1000 MG: 1 INJECTION, POWDER, LYOPHILIZED, FOR SOLUTION INTRAVENOUS at 17:40

## 2025-01-03 RX ADMIN — MORPHINE SULFATE 2 MG: 2 INJECTION, SOLUTION INTRAMUSCULAR; INTRAVENOUS at 04:05

## 2025-01-03 RX ADMIN — POLYETHYLENE GLYCOL 3350 17 G: 17 POWDER, FOR SOLUTION ORAL at 19:53

## 2025-01-03 RX ADMIN — ROSUVASTATIN CALCIUM 10 MG: 10 TABLET, FILM COATED ORAL at 19:53

## 2025-01-03 RX ADMIN — LISINOPRIL 20 MG: 20 TABLET ORAL at 09:37

## 2025-01-03 RX ADMIN — SODIUM CHLORIDE, PRESERVATIVE FREE 5 ML: 5 INJECTION INTRAVENOUS at 21:00

## 2025-01-03 RX ADMIN — KETOROLAC TROMETHAMINE 15 MG: 15 INJECTION, SOLUTION INTRAMUSCULAR; INTRAVENOUS at 09:36

## 2025-01-03 RX ADMIN — ASPIRIN 81 MG: 81 TABLET, COATED ORAL at 09:37

## 2025-01-03 RX ADMIN — LEVOFLOXACIN 500 MG: 500 TABLET, FILM COATED ORAL at 17:06

## 2025-01-03 RX ADMIN — SODIUM CHLORIDE, PRESERVATIVE FREE 10 ML: 5 INJECTION INTRAVENOUS at 09:37

## 2025-01-03 RX ADMIN — KETOROLAC TROMETHAMINE 15 MG: 15 INJECTION, SOLUTION INTRAMUSCULAR; INTRAVENOUS at 23:37

## 2025-01-03 RX ADMIN — VANCOMYCIN HYDROCHLORIDE 1000 MG: 1 INJECTION, POWDER, LYOPHILIZED, FOR SOLUTION INTRAVENOUS at 05:47

## 2025-01-03 RX ADMIN — SODIUM CHLORIDE, PRESERVATIVE FREE 10 ML: 5 INJECTION INTRAVENOUS at 17:29

## 2025-01-03 RX ADMIN — KETOROLAC TROMETHAMINE 15 MG: 15 INJECTION, SOLUTION INTRAMUSCULAR; INTRAVENOUS at 05:43

## 2025-01-03 ASSESSMENT — PAIN DESCRIPTION - LOCATION
LOCATION: SCROTUM

## 2025-01-03 ASSESSMENT — PAIN DESCRIPTION - DESCRIPTORS
DESCRIPTORS: HEAVINESS;SORE
DESCRIPTORS: DISCOMFORT
DESCRIPTORS: SHARP
DESCRIPTORS: DISCOMFORT
DESCRIPTORS: SHARP

## 2025-01-03 ASSESSMENT — PAIN SCALES - GENERAL
PAINLEVEL_OUTOF10: 6
PAINLEVEL_OUTOF10: 7
PAINLEVEL_OUTOF10: 6
PAINLEVEL_OUTOF10: 6
PAINLEVEL_OUTOF10: 9
PAINLEVEL_OUTOF10: 6

## 2025-01-03 ASSESSMENT — ENCOUNTER SYMPTOMS
APNEA: 0
EYE DISCHARGE: 0
COLOR CHANGE: 0
ABDOMINAL PAIN: 0

## 2025-01-03 ASSESSMENT — PAIN DESCRIPTION - ORIENTATION
ORIENTATION: LEFT
ORIENTATION: LEFT

## 2025-01-03 NOTE — PLAN OF CARE
Problem: Safety - Adult  Goal: Free from fall injury  1/3/2025 0814 by Solange Burkett, RN  Outcome: Progressing  Note: The patient remained free from falls this shift, call light within reach, bed in locked and lowest position.  Side rails up x2.  Continue to monitor closely.      Problem: Discharge Planning  Goal: Discharge to home or other facility with appropriate resources  1/3/2025 0814 by Solange Burkett, RN  Outcome: Progressing

## 2025-01-03 NOTE — PROGRESS NOTES
[facility]  Office: [phone]  Jc Morales DO, Mane Leone DO, Idris Rose DO, Brennon Alexander DO, Jami Pacheco MD, Laura Warren MD, Renee Eng MD, Radha Gutierres MD,  Charles Hill MD, Mar Roca MD, Kenton Sanchez MD,  Dane Cabrera DO, Shelby Molina MD, Alberto Dodson MD, Edin Morales DO, Agnieszka Abarca MD,  Wade Haney DO, Maribeth Toro MD, Melany Hess MD, Shirin Zimmer MD, Adriano Titus MD,  Toro Olivo MD, Sabrina Lopez MD, Jody Cruz MD, Concepcion Bob MD, Serge Acevedo MD, David Hopkins MD, Sunny Mckeon DO, Cezar Ryan MD, Dane Mariscal MD, Mohsin Reza, MD, Shirley Waterhouse, CNP,  Angely Dean CNP, Sunny Pelayo, CNP,  Evelia Bedoya, EMILY, Aidee Beasley, CNP, Opal Teague, CNP, Luisa Colbert, CNP, Ailyn George, CNP, Ayah Pelaez, PA-C, Sanna Burroughs PA-C, Arelis Parra, CNP, David Fowler, CNP,  Julia Lopez, CNP, Laxmi Perez, CNP, Keara Denise, CNP,  Milli Gay, CNP, Geni Weiss, CNP         [facility]   IN-PATIENT SERVICE   [facility]    Progress Note    1/3/2025    7:19 AM    Name:   Ivanna Khoury  MRN:     [id number]     Acct:      [id number]   Room:   0234/0234-01   Day:  4  Admit Date:  12/30/2024  1:56 PM    PCP:   Lisette Strong MD  Code Status:  Full Code    Subjective:     C/C:   Chief Complaint   Patient presents with    Loss of Consciousness     Interval History Status: improved.     Vitals reviewed, afebrile hemodynamically stable.  Saturating well on room air.  Labs reviewed, mild hyponatremia but stable 134, elevated  but coming down from 262 with antibiotics, leukocytosis slowly downtrending but stable, hemoglobin 12.1-11.3?,  Platelets are stable.  Updated scrotal ultrasound reviewed demonstrating ongoing left-sided epididymitis and orchitis with prominently enlarged and heterogeneous epididymal head and moderate hydrocele with small appearance of  large hydrocele in left scrotal sac with some echogenic debris. 4. Enlarged left epididymis with diffuse increased vascularity, indicating ongoing acute or subacute epididymitis. 5. Adjacent to the head of left epididymis, complex structure with multiple cystic loculations and heterogeneous echogenicity with multiple septations. This complex structure may represent old hematoma with complex cystic changes within the hematoma or possibly chronic abscess internal septation. A complex cystic mass lesion may also be possible. 6. Evidence of prominent vascular structures in the left inguinal canal particularly visualized with Valsalva maneuver. The findings may represent varicocele in the pampiniform plexus of veins.  Left inguinal hernia cannot be excluded.  On the CT scan on 11/11/2024, there was no evidence of inguinal hernia. 7. Mildly prominent lymph node in the left groin. 8. If there is clinical suspicion of left inguinal hernia, follow-up evaluation with CT scan of abdomen and pelvis may be considered.     US RENAL COMPLETE    Result Date: 12/31/2024  1. No hydronephrosis. 2. Benign-appearing bilateral renal cysts. 3. Indwelling catheter in the urinary bladder, not well evaluated.     CT CHEST PULMONARY EMBOLISM W CONTRAST    Result Date: 12/30/2024  No evidence of pulmonary embolism or acute pulmonary abnormality.     XR CHEST (2 VW)    Result Date: 12/30/2024  No acute cardiopulmonary disease.     Physical Examination:        General appearance:  alert, cooperative and no distress  Mental Status:  oriented to person, place and time and normal affect  Lungs:  clear to auscultation bilaterally, normal effort  Heart:  regular rate and rhythm, no murmur  Abdomen:  soft, nontender, nondistended, bowel sounds are present.  Genitourinary: There is scrotal edema with mild erythema.  No obvious bleeding.  Extremities:  no edema, redness, tenderness in the calves  Skin:  no gross lesions, rashes,

## 2025-01-03 NOTE — PLAN OF CARE
Problem: Discharge Planning  Goal: Discharge to home or other facility with appropriate resources  1/2/2025 2124 by Deanna Yuan, RN  Outcome: Progressing  1/2/2025 1747 by Toña Dias RN  Outcome: Progressing     Problem: Safety - Adult  Goal: Free from fall injury  1/2/2025 2124 by Deanna Yuan, RN  Outcome: Progressing  1/2/2025 1747 by Toña Dias RN  Outcome: Progressing

## 2025-01-03 NOTE — PROGRESS NOTES
Patient called writer into room stating he was bleeding. Upon RN assessment patient appeared to have some bleeding from scrotum due to stretching of swollen skin. Patient states he was in the bathroom and felt something \"dripping\". RN did notice bright red blood on bathroom floor. Upon RN assessment patient had no active bleeding. He was medicated with prn morphine once returned to bed. Urology resident notified.

## 2025-01-03 NOTE — PROGRESS NOTES
[facility] -   Infectious diseases evaluation  admission date 12/30/2024    reason for consultation:   Possible sepsis    Impression :   Current:  Urine retention leading to valladares   Indwelling Valladares x 12/14/24-  Had episodes of bleed on off   MRSA valladares related UTI,  Urine culture MRSA 12/29  Urine culture pending 12/30  Left acute epidydimitis /  complex collections w L hydrocele -not tender - new 12/31  Near syncope, possibly related to dehydration  Bandemia 15 - 13 - 11  Acute anemia - ?? Related to recent urine bleed?  CRP elevation 262 - 116    Other:    Discussion / summary of stay / plan of care/ Recommendations:     HENCE:   I suspect the epididymal collection is rather a hematoma - no clear suggestion of infection and U cx 12/29 U cx showed MRSA  ID treating w AB  for the concern of a super infection    ultrasound of the kidneys -epididymal collections  Vanco and levaquin ( per family request)  -w toradol per   Repeat U cx neg so far and BC neg   U cx 12/29 MRSA    have outpt plans for re eval andf a cysto in 2 weeks   - 11 6 improving under therapy  Ultimately anticipate DC on zyvox and levaquin  x 2 weeks ( rec d )but await  further surg plans  1/3 family and pt frustrated about the series of issues since the valladares-- long discussion w them on bedside      Infection Control Recommendations   [place] Precautions  Contact Isolation   Airborne isolation      Antimicrobial Stewardship Recommendations   Simplification of therapy  Targeted therapy      History of Present Illness:   Initial history:  Ivanna Khoury is a 64 y.o.-year-old male presenting because he felt that he was passing out  and was not eating x 2 weeks or drinking, kind of depressed due to having a new urine obstruction and valladares placed 12/14  Past TURP few y ago, but seems to have re obstructed     He never felt good  since the valladares was placed-  Back to ER 12/29 and U cx showed  of children: Not on file    Years of education: Not on file    Highest education level: Not on file   Occupational History    Not on file   Tobacco Use    Smoking status: Never    Smokeless tobacco: Never   Vaping Use    Vaping status: Never Used   Substance and Sexual Activity    Alcohol use: Never    Drug use: Never    Sexual activity: Yes   Other Topics Concern    Not on file   Social History Narrative    Not on file     Social Determinants of Health     Financial Resource Strain: High Risk (8/10/2023)    Overall Financial Resource Strain (CARDIA)     Difficulty of Paying Living Expenses: Very hard   Food Insecurity: No Food Insecurity (12/31/2024)    Hunger Vital Sign     Worried About Running Out of Food in the Last Year: Never true     Ran Out of Food in the Last Year: Never true   Transportation Needs: No Transportation Needs (12/31/2024)    PRAPARE - Transportation     Lack of Transportation (Medical): No     Lack of Transportation (Non-Medical): No   Physical Activity: Not on file   Stress: Not on file   Social Connections: Not on file   Intimate Partner Violence: Not on file   Housing Stability: Low Risk  (12/31/2024)    Housing Stability Vital Sign     Unable to Pay for Housing in the Last Year: No     Number of Times Moved in the Last Year: 0     Homeless in the Last Year: No       Family History:   History reviewed. No pertinent family history.   Medical Decision Making:   I have independently reviewed/ordered the following labs:    CBC with Differential:   Recent Labs     01/02/25  0844 01/03/25  0709   WBC 11.6* 11.4*   HGB 12.1* 11.3*   HCT 38.9* 36.1*    332   LYMPHOPCT 9* 13*   MONOPCT 6 5   EOSPCT 2 0*     BMP:  Recent Labs     01/01/25  1727 01/01/25  1952 01/02/25  0844 01/02/25  1508 01/02/25 2007 01/03/25  0709   *   < > 134*   < > 134* 134*   K 3.5*  --  3.6*  --   --  3.8   CL 99  --  101  --   --  101   CO2 23  --  21  --   --  22   BUN 16  --  13  --   --  12   CREATININE 0.8

## 2025-01-03 NOTE — CARE COORDINATION
Transitional Planning:    Plan remains to return home with family, family for transport, no DME needs

## 2025-01-03 NOTE — PROGRESS NOTES
Urology Progress Note      Subjective: Ivanna Khoury is a 64 y.o. male.  His/Her current Diet is: ADULT DIET; Regular; No Pork.    Since the previous note, the patient reports the following:  Patient had dripping of blood when going to bathroom, patient thinks is from scrotum, however no open wound seen on scrotum  Still having scrotal swelling, looks less swollen compared to day prior  Left scrotum tender    WBC 11.4 (11.6)  Hgb 11.3 (12.1)  Cr 0.7 (0.7)    Tovar UOP 2500 mL/24h yellow      Vitals and Labs:  Vitals:    01/03/25 0435 01/03/25 0600 01/03/25 0719 01/03/25 1145   BP:   133/79 133/83   Pulse:   74    Resp: 18  17    Temp:   98.2 °F (36.8 °C) 98 °F (36.7 °C)   TempSrc:   Oral Oral   SpO2:   96%    Weight:  83.3 kg (183 lb 10.3 oz)     Height:         I/O last 3 completed shifts:  In: 450 [P.O.:450]  Out: 3850 [Urine:3850]    Recent Labs     01/01/25  1727 01/02/25  0844 01/03/25  0709   WBC 11.8* 11.6* 11.4*   HGB 11.8* 12.1* 11.3*   HCT 37.0* 38.9* 36.1*   MCV 80.4* 81.9* 80.6*    296 332     Recent Labs     01/01/25  1727 01/01/25  1952 01/02/25  0844 01/02/25  1508 01/02/25 2007 01/03/25  0709   *   < > 134* 134* 134* 134*   K 3.5*  --  3.6*  --   --  3.8   CL 99  --  101  --   --  101   CO2 23  --  21  --   --  22   BUN 16  --  13  --   --  12   CREATININE 0.8  --  0.7  --   --  0.7    < > = values in this interval not displayed.       No results for input(s): \"COLORU\", \"PHUR\", \"LABCAST\", \"WBCUA\", \"RBCUA\", \"MUCUS\", \"TRICHOMONAS\", \"YEAST\", \"BACTERIA\", \"CLARITYU\", \"SPECGRAV\", \"LEUKOCYTESUR\", \"UROBILINOGEN\", \"BILIRUBINUR\", \"BLOODU\" in the last 72 hours.    Invalid input(s): \"NITRATE\", \"GLUCOSEUKETONESUAMORPHOUS\"      Physical Exam:  NAD  A/O x 3  RRR  No accessory muscles of inspiration  Abdomen soft, non-tender, non-distended. No CVA tenderness.  Scrotal exam: left scrotum is indurated and enlarged, no crepitus or fluctuance  Testicle could be palpated on the right  Tovar in place.

## 2025-01-04 VITALS
WEIGHT: 183.64 LBS | RESPIRATION RATE: 16 BRPM | SYSTOLIC BLOOD PRESSURE: 122 MMHG | HEIGHT: 66 IN | HEART RATE: 74 BPM | OXYGEN SATURATION: 100 % | BODY MASS INDEX: 29.51 KG/M2 | TEMPERATURE: 97.7 F | DIASTOLIC BLOOD PRESSURE: 76 MMHG

## 2025-01-04 LAB
MICROORGANISM SPEC CULT: NORMAL
MICROORGANISM SPEC CULT: NORMAL
SERVICE CMNT-IMP: NORMAL
SERVICE CMNT-IMP: NORMAL
SPECIMEN DESCRIPTION: NORMAL
SPECIMEN DESCRIPTION: NORMAL

## 2025-01-04 PROCEDURE — 6370000000 HC RX 637 (ALT 250 FOR IP): Performed by: INTERNAL MEDICINE

## 2025-01-04 PROCEDURE — 6360000002 HC RX W HCPCS: Performed by: STUDENT IN AN ORGANIZED HEALTH CARE EDUCATION/TRAINING PROGRAM

## 2025-01-04 PROCEDURE — 99239 HOSP IP/OBS DSCHRG MGMT >30: CPT | Performed by: STUDENT IN AN ORGANIZED HEALTH CARE EDUCATION/TRAINING PROGRAM

## 2025-01-04 PROCEDURE — 2580000003 HC RX 258: Performed by: EMERGENCY MEDICINE

## 2025-01-04 PROCEDURE — 2500000003 HC RX 250 WO HCPCS: Performed by: NURSE PRACTITIONER

## 2025-01-04 PROCEDURE — 6360000002 HC RX W HCPCS: Performed by: EMERGENCY MEDICINE

## 2025-01-04 PROCEDURE — 2500000003 HC RX 250 WO HCPCS: Performed by: INTERNAL MEDICINE

## 2025-01-04 RX ORDER — TAMSULOSIN HYDROCHLORIDE 0.4 MG/1
0.4 CAPSULE ORAL DAILY
Qty: 180 CAPSULE | Refills: 11 | Status: SHIPPED | OUTPATIENT
Start: 2025-01-04

## 2025-01-04 RX ORDER — DOCUSATE SODIUM 100 MG/1
100 CAPSULE, LIQUID FILLED ORAL DAILY PRN
Qty: 7 CAPSULE | Refills: 0 | Status: SHIPPED | OUTPATIENT
Start: 2025-01-04

## 2025-01-04 RX ORDER — ROSUVASTATIN CALCIUM 10 MG/1
10 TABLET, COATED ORAL NIGHTLY
Qty: 30 TABLET | Refills: 3 | Status: SHIPPED | OUTPATIENT
Start: 2025-01-04

## 2025-01-04 RX ADMIN — SODIUM CHLORIDE, PRESERVATIVE FREE 10 ML: 5 INJECTION INTRAVENOUS at 08:21

## 2025-01-04 RX ADMIN — VANCOMYCIN HYDROCHLORIDE 1000 MG: 1 INJECTION, POWDER, LYOPHILIZED, FOR SOLUTION INTRAVENOUS at 06:09

## 2025-01-04 RX ADMIN — LISINOPRIL 20 MG: 20 TABLET ORAL at 08:27

## 2025-01-04 RX ADMIN — LEVOFLOXACIN 500 MG: 500 TABLET, FILM COATED ORAL at 08:28

## 2025-01-04 RX ADMIN — KETOROLAC TROMETHAMINE 15 MG: 15 INJECTION, SOLUTION INTRAMUSCULAR; INTRAVENOUS at 06:08

## 2025-01-04 RX ADMIN — ASPIRIN 81 MG: 81 TABLET, COATED ORAL at 08:27

## 2025-01-04 ASSESSMENT — ENCOUNTER SYMPTOMS
COLOR CHANGE: 0
EYE DISCHARGE: 0
ABDOMINAL PAIN: 0
APNEA: 0

## 2025-01-04 ASSESSMENT — PAIN SCALES - GENERAL: PAINLEVEL_OUTOF10: 9

## 2025-01-04 ASSESSMENT — PAIN DESCRIPTION - LOCATION: LOCATION: SCROTUM

## 2025-01-04 ASSESSMENT — PAIN DESCRIPTION - DESCRIPTORS: DESCRIPTORS: SHARP

## 2025-01-04 NOTE — PROGRESS NOTES
Discharge teaching and instructions completed with patient and family at bedside/ AVS reviewed.   Prescriptions sent home with Pt from outpt pharmacy.   Patient voiced understanding regarding prescriptions, follow up appointments, and care of self at home. Discharged home with family. Tovar leg bag supplies sent home with the Pt and educated on its use.   PIV removed. All questions answered. Belongings sent with the Pt.   Pt walked off unit with family down to lobby.

## 2025-01-04 NOTE — PROGRESS NOTES
[facility] -   Infectious diseases evaluation  admission date 12/30/2024    reason for consultation:   Possible sepsis    Impression :   Current:  Urine retention leading to valladares   Indwelling Valladares x 12/14/24-  Had episodes of bleed on off   MRSA valladares related UTI,  Urine culture MRSA 12/29  Urine culture pending 12/30  Left acute epidydimitis /  complex collections w L hydrocele -not tender - new 12/31  Near syncope, possibly related to dehydration  Bandemia 15 - 13 - 11  Acute anemia - hb stable between 11-12 g/dl  CRP elevation 262 - 116  Anticipated dc today    Other:    Discussion / summary of stay / plan of care/ Recommendations:     HENCE:   I suspect the epididymal collection is rather a hematoma - no clear suggestion of infection and U cx 12/29 U cx showed MRSA  ID treating w AB  for the concern of a super infection    ultrasound of the kidneys -epididymal collections  Vanco and levaquin ( per family request)  -w toradol per   Repeat U cx neg so far and BC neg   U cx 12/29 MRSA    have outpt plans for re eval andf a cysto in 2 weeks   - 11 6 improving under therapy  Ultimately anticipate DC on zyvox and levaquin  x 2 weeks ( rec d )but await  further surg plans  1/3 family and pt frustrated about the series of issues since the valladares-- long discussion w them on bedside      Infection Control Recommendations   [place] Precautions  Contact Isolation   Airborne isolation      Antimicrobial Stewardship Recommendations   Simplification of therapy  Targeted therapy      History of Present Illness:   Initial history:  Ivanna Khoury is a 64 y.o.-year-old male presenting because he felt that he was passing out  and was not eating x 2 weeks or drinking, kind of depressed due to having a new urine obstruction and valladares placed 12/14  Past TURP few y ago, but seems to have re obstructed     He never felt good  since the valladares was placed-  Back to ER 12/29

## 2025-01-04 NOTE — PROGRESS NOTES
[facility]  Office: [phone]  Jc Morales DO, Mane Leone DO, Idris Rose DO, Brennon Alexander DO, Jami Pacheco MD, Laura Warren MD, Renee Eng MD, Radha Gutierres MD,  Charles Hill MD, Mar Roca MD, Kenton Sanchez MD,  Dane Cabrera DO, Shelby Molina MD, Alberto Dodson MD, Edin Morales DO, Agnieszka Abarca MD,  Wade Haney DO, Maribeth Toro MD, Melany Hess MD, Shirin Zimmer MD, Adriano Titus MD,  Toro Olivo MD, Sabrina Lopez MD, Jody Cruz MD, Concepcion Bbo MD, Serge Acevedo MD, David Hopkins MD, Sunny Mckeon DO, Cezar Ryan MD, Dane Mariscal MD, Mohsin Reza, MD, Shirley Waterhouse, CNP,  Angely Dean CNP, Sunny Pelayo, CNP,  Evelia Bedoya, EMILY, Aidee Beasley, CNP, Opal Teague, CNP, Luisa Colbert, CNP, Ailyn George, CNP, Ayah Pelaez, PA-C, Sanna Burroughs PA-C, Arelis Parra, CNP, David Fowler, CNP,  Julia Lopez, CNP, Laxmi Perez, CNP, Keara Denise, CNP,  Milli Gay, DAVID, Geni Weiss, CNP         [facility]   IN-PATIENT SERVICE   [facility]    Progress Note    1/4/2025    7:15 AM    Name:   Ivanna Khoury  MRN:     [id number]     Acct:      [id number]   Room:   0234/0234-01   Day:  5  Admit Date:  12/30/2024  1:56 PM    PCP:   Lisette Strong MD  Code Status:  Full Code    Subjective:     C/C:   Chief Complaint   Patient presents with    Loss of Consciousness     Interval History Status: improved.     Vitals reviewed, afebrile hemodynamically stable.  Saturating well on room air.  Labs reviewed.   Overnight patient had no significant events.    On examination patient resting comfortably in bed. Complains of constipation but passing gas requesting stool softener on DC. Reevaluated by urology 1/3/2022 with no acute urological intervention planned with plan for scrotal elevation, Toradol and antibiotics per infectious disease with outpatient follow-up for repeat bladder outlet

## 2025-01-04 NOTE — PLAN OF CARE
Problem: Discharge Planning  Goal: Discharge to home or other facility with appropriate resources  1/4/2025 1046 by Solange Burkett, RN  Outcome: Progressing     Problem: Safety - Adult  Goal: Free from fall injury  1/4/2025 1046 by Solange Burkett, RN  Outcome: Progressing

## 2025-01-04 NOTE — DISCHARGE SUMMARY
[facility]  Office: [phone]  Jc Morales DO, Mane Leone DO, Idris Rose DO, Brennon Alexander DO, Jami Pacheco MD, Laura Warren MD, Renee Eng MD, Radha Gutierres MD,  Charles Hill MD, Mar Roca MD, Kenton Sanchez MD,  Dane Cabrera DO, Shelby Molina MD, Alberto Dodson MD, Edin Morales DO, Agnieszka Abarca MD,  Wade Haney DO, Maribeth Toro MD, Melany Hess MD, Shirin Zimmer MD, Adriano Titus MD,  Toro Olivo MD, Sabrina Lopez MD, Jody Cruz MD, Concepcion Bob MD, Serge Acevedo MD, David Hopkins MD, Sunny Mckeon DO, Cezar Ryan MD, Dane Mariscal MD, Mohsin Reza, MD, Shirley Waterhouse, CNP,  Angely Dean CNP, Sunny Pelayo, DAVID,  Evelia Bedoya, EMILY, Aidee Beasley, CNP, Opal Teague, CNP, Luisa Colbert, CNP, Ailyn George, CNP, Ayah Pelaez, PA-C, Sanna Burroughs PA-C, Arelis Parra, CNP, David Fowler, CNP,  Julia Lopez, CNP, Laxmi Perez, CNP, Keara Denise, CNP,  Milli Gay CNP, Geni Weiss, CNP         [facility]   IN-PATIENT SERVICE   [facility]    Discharge Summary     Patient ID: Ivanna Khoury  :  1960   MRN: [id number]     ACCOUNT:  [id number]   Patient's PCP: Lisette Strong MD  Admit Date: 2024   Discharge Date: 2025     Length of Stay: 5  Code Status:  Full Code  Admitting Physician: No admitting provider for patient encounter.  Discharge Physician: Wade Haney DO     Active Discharge Diagnoses:     Hospital Problem Lists:  Principal Problem:    Sepsis due to urinary tract infection (HCC)  Active Problems:    Syncope    Complicated UTI (urinary tract infection)    Heart murmur    Hypokalemia    Hyponatremia    MRSA infection    Orchitis of left testicle    Near syncope    Sepsis without acute organ dysfunction (HCC)  Resolved Problems:    * No resolved hospital problems. *    Admission Condition:  fair     Discharged Condition: good    Hospital Stay:  CRESTOR  Take 1 tablet by mouth nightly     sodium chloride 0.65 % nasal spray  Commonly known as: Altamist Spray  1 spray by Nasal route as needed for Congestion            STOP taking these medications      acetaminophen 500 MG tablet  Commonly known as: TYLENOL     cephALEXin 500 MG capsule  Commonly known as: KEFLEX     hydroCHLOROthiazide 25 MG tablet  Commonly known as: HYDRODIURIL     meclizine 25 MG tablet  Commonly known as: ANTIVERT               Where to Get Your Medications        These medications were sent to [address] [phone] - F [phone]  [facility] [address]      Phone: [phone]   docusate sodium 100 MG capsule  levoFLOXacin 500 MG tablet  linezolid 600 MG tablet  rosuvastatin 10 MG tablet  tamsulosin 0.4 MG capsule         Discharge Procedure Orders   CBC with Auto Differential   Standing Status: Standing Number of Occurrences: 3 Standing Exp. Date: 02/03/25     C-Reactive Protein   Standing Status: Standing Number of Occurrences: 3 Standing Exp. Date: 02/03/25     Creatinine   Standing Status: Standing Number of Occurrences: 3 Standing Exp. Date: 02/03/25       Time Spent on discharge is  31 mins in patient examination, evaluation, counseling as well as medication reconciliation, prescriptions for required medications, discharge plan and follow up.    Electronically signed by   Wade Haney DO  1/4/2025  8:43 AM      Thank you Lisette Oneal MD for the opportunity to be involved in this patient's care.

## 2025-01-04 NOTE — PROGRESS NOTES
Picked up x5 at pharmacy 1/4/25, 10:46a. $8.46   Price (Do Not Change): 0.00 Instructions: This plan will send the code FBSE to the PM system.  DO NOT or CHANGE the price. Detail Level: Generalized

## 2025-01-04 NOTE — DISCHARGE INSTRUCTIONS
Follow up with your PCP in 3 days. Call for an appointment as soon as possible.  Follow-up with specialist as instructed.  Call for an appointment as soon as possible.  -Follow-up with urology and infectious disease as instructed.  Medications as instructed.  - Please stop your hydrochlorothiazide as your blood pressure has been stable on lisinopril and patient and your sodium was low. (I believe this could have been also decreasing your Potassium levels at home. Please discontinue hydrochlorothiazide and follow up with your PCP regarding you BP trend. Your BP was good inpatient without this medication).  Return to the emergency department immediately for any new or worsening concerns.

## 2025-01-09 ENCOUNTER — OFFICE VISIT (OUTPATIENT)
Dept: UROLOGY | Age: 65
End: 2025-01-09
Payer: MEDICARE

## 2025-01-09 VITALS
DIASTOLIC BLOOD PRESSURE: 78 MMHG | BODY MASS INDEX: 29.41 KG/M2 | SYSTOLIC BLOOD PRESSURE: 126 MMHG | OXYGEN SATURATION: 99 % | HEART RATE: 104 BPM | HEIGHT: 66 IN | WEIGHT: 183 LBS | TEMPERATURE: 97.8 F

## 2025-01-09 DIAGNOSIS — R33.9 URINE RETENTION: ICD-10-CM

## 2025-01-09 DIAGNOSIS — N40.1 BENIGN PROSTATIC HYPERPLASIA WITH INCOMPLETE BLADDER EMPTYING: Primary | ICD-10-CM

## 2025-01-09 DIAGNOSIS — N30.00 ACUTE CYSTITIS WITHOUT HEMATURIA: ICD-10-CM

## 2025-01-09 DIAGNOSIS — R39.14 BENIGN PROSTATIC HYPERPLASIA WITH INCOMPLETE BLADDER EMPTYING: Primary | ICD-10-CM

## 2025-01-09 PROCEDURE — 3017F COLORECTAL CA SCREEN DOC REV: CPT | Performed by: UROLOGY

## 2025-01-09 PROCEDURE — 1111F DSCHRG MED/CURRENT MED MERGE: CPT | Performed by: UROLOGY

## 2025-01-09 PROCEDURE — 99214 OFFICE O/P EST MOD 30 MIN: CPT | Performed by: UROLOGY

## 2025-01-09 PROCEDURE — 1036F TOBACCO NON-USER: CPT | Performed by: UROLOGY

## 2025-01-09 PROCEDURE — 1123F ACP DISCUSS/DSCN MKR DOCD: CPT | Performed by: UROLOGY

## 2025-01-09 PROCEDURE — G8427 DOCREV CUR MEDS BY ELIG CLIN: HCPCS | Performed by: UROLOGY

## 2025-01-09 PROCEDURE — G8417 CALC BMI ABV UP PARAM F/U: HCPCS | Performed by: UROLOGY

## 2025-01-09 RX ORDER — TAMSULOSIN HYDROCHLORIDE 0.4 MG/1
0.8 CAPSULE ORAL DAILY
Qty: 180 CAPSULE | Refills: 3 | Status: SHIPPED | OUTPATIENT
Start: 2025-01-09

## 2025-01-09 ASSESSMENT — ENCOUNTER SYMPTOMS
EYE PAIN: 0
NAUSEA: 0
GASTROINTESTINAL NEGATIVE: 1
WHEEZING: 0
BACK PAIN: 0
COLOR CHANGE: 0
COUGH: 0
SHORTNESS OF BREATH: 0
RESPIRATORY NEGATIVE: 1
EYES NEGATIVE: 1
ALLERGIC/IMMUNOLOGIC NEGATIVE: 1
VOMITING: 0
ABDOMINAL PAIN: 0
EYE REDNESS: 0

## 2025-01-09 NOTE — PROGRESS NOTES
[facility]  [address]  [facility] [address]  Dept: [phone]    [facility] Urology Office Note - New Patient    Patient:  Ivanna Khoury  YOB: 1960  Date: 1/9/2025    The patient is a 64 y.o. male who presentstoday for evaluation of the following problems:   Chief Complaint   Patient presents with    Hematuria    Urinary Retention    Frequent/Recurrent UTI    referred by Lisette Strong MD.    HPI  Here for bph and urine retention. Had greenlight in past. Has been having luts, recently went into retention.   Wants valladares out  Has uti on abx with id  (Patient's old records have been requested, reviewed and summarized in today's note.)    Summary of old records: N/A    History: N/A    ProceduresToday: N/A    Urinalysis today:  No results found for this visit on 01/09/25.    AUA Symptom Score (1/9/2025):                               Last BUN andcreatinine:  Lab Results   Component Value Date    BUN 12 01/03/2025     Lab Results   Component Value Date    CREATININE 0.7 01/03/2025       Additional Lab/Culture results: none    Reviewed during this Office Visit: none  (results were independently reviewed byphysician and radiology report verified)    PAST MEDICAL, FAMILY AND SOCIAL HISTORY:  Past Medical History:   Diagnosis Date    Hyperlipidemia     Hypertension     Wellness examination     Dr. Strong last seen 7/2021     Past Surgical History:   Procedure Laterality Date    CYSTOSCOPY N/A 07/30/2020    CYSTOSCOPY performed by Jayesh Roca MD at [facility] OR    HERNIA REPAIR Right 7/27/2021    XI ROBOTIC LAPAROSCOPIC INGUINAL HERNIA REPAIR WITH MESH performed by Kermit Sotelo IV, DO at [facility] OR    INGUINAL HERNIA REPAIR Right 07/27/2021    XI ROBOTIC LAPAROSCOPIC INGUINAL HERNIA REPAIR WITH MESH     TURP N/A 08/14/2020    CYSTO, TUR PROSTATE GREENLIGHT XPS performed by Jayesh Roca MD at [facility] OR    VASCULAR SURGERY

## 2025-01-09 NOTE — PROGRESS NOTES
Review of Systems   Constitutional: Negative.  Negative for appetite change, chills and fever.   HENT: Negative.     Eyes: Negative.  Negative for pain, redness and visual disturbance.   Respiratory: Negative.  Negative for cough, shortness of breath and wheezing.    Cardiovascular:  Negative for chest pain and leg swelling.   Gastrointestinal: Negative.  Negative for abdominal pain, nausea and vomiting.   Endocrine: Negative.    Genitourinary:  Positive for difficulty urinating, frequency and hematuria. Negative for dysuria, flank pain, testicular pain and urgency.   Musculoskeletal: Negative.  Negative for back pain, joint swelling and myalgias.   Skin: Negative.  Negative for color change, rash and wound.   Allergic/Immunologic: Negative.    Neurological: Negative.  Negative for dizziness, tremors, weakness, numbness and headaches.   Hematological: Negative.  Negative for adenopathy. Does not bruise/bleed easily.   Psychiatric/Behavioral: Negative.

## 2025-01-10 ENCOUNTER — HOSPITAL ENCOUNTER (OUTPATIENT)
Age: 65
Setting detail: SPECIMEN
Discharge: HOME | End: 2025-01-10

## 2025-01-10 DIAGNOSIS — A49.02 MRSA INFECTION: ICD-10-CM

## 2025-01-10 DIAGNOSIS — N39.0 COMPLICATED UTI (URINARY TRACT INFECTION): ICD-10-CM

## 2025-01-10 DIAGNOSIS — N45.2 ORCHITIS OF LEFT TESTICLE: ICD-10-CM

## 2025-01-10 LAB
BASOPHILS # BLD: 0.03 K/UL (ref 0–0.2)
BASOPHILS NFR BLD: 1 % (ref 0–2)
CREAT SERPL-MCNC: 0.9 MG/DL (ref 0.7–1.2)
CRP SERPL HS-MCNC: 7.2 MG/L (ref 0–5)
EOSINOPHIL # BLD: 0.21 K/UL (ref 0–0.44)
EOSINOPHILS RELATIVE PERCENT: 3 % (ref 1–4)
ERYTHROCYTE [DISTWIDTH] IN BLOOD BY AUTOMATED COUNT: 14.6 % (ref 11.8–14.4)
GFR, ESTIMATED: >90 ML/MIN/1.73M2
HCT VFR BLD AUTO: 43.3 % (ref 40.7–50.3)
HGB BLD-MCNC: 13.1 G/DL (ref 13–17)
IMM GRANULOCYTES # BLD AUTO: 0.08 K/UL (ref 0–0.3)
IMM GRANULOCYTES NFR BLD: 1 %
LYMPHOCYTES NFR BLD: 2.43 K/UL (ref 1.1–3.7)
LYMPHOCYTES RELATIVE PERCENT: 37 % (ref 24–43)
MCH RBC QN AUTO: 25.4 PG (ref 25.2–33.5)
MCHC RBC AUTO-ENTMCNC: 30.3 G/DL (ref 28.4–34.8)
MCV RBC AUTO: 83.9 FL (ref 82.6–102.9)
MONOCYTES NFR BLD: 0.31 K/UL (ref 0.1–1.2)
MONOCYTES NFR BLD: 5 % (ref 3–12)
NEUTROPHILS NFR BLD: 53 % (ref 36–65)
NEUTS SEG NFR BLD: 3.59 K/UL (ref 1.5–8.1)
NRBC BLD-RTO: 0 PER 100 WBC
PLATELET # BLD AUTO: 538 K/UL (ref 138–453)
PMV BLD AUTO: 8.3 FL (ref 8.1–13.5)
RBC # BLD AUTO: 5.16 M/UL (ref 4.21–5.77)
RBC # BLD: ABNORMAL 10*6/UL
WBC OTHER # BLD: 6.7 K/UL (ref 3.5–11.3)

## 2025-01-13 ENCOUNTER — OFFICE VISIT (OUTPATIENT)
Dept: INFECTIOUS DISEASES | Age: 65
End: 2025-01-13
Payer: MEDICARE

## 2025-01-13 VITALS
WEIGHT: 183 LBS | RESPIRATION RATE: 19 BRPM | HEIGHT: 66 IN | SYSTOLIC BLOOD PRESSURE: 127 MMHG | HEART RATE: 91 BPM | DIASTOLIC BLOOD PRESSURE: 82 MMHG | TEMPERATURE: 97.2 F | BODY MASS INDEX: 29.41 KG/M2 | OXYGEN SATURATION: 98 %

## 2025-01-13 DIAGNOSIS — N45.1 ACUTE EPIDIDYMITIS: Primary | ICD-10-CM

## 2025-01-13 DIAGNOSIS — N45.2 ORCHITIS: ICD-10-CM

## 2025-01-13 DIAGNOSIS — A49.02 MRSA INFECTION: ICD-10-CM

## 2025-01-13 PROCEDURE — 3017F COLORECTAL CA SCREEN DOC REV: CPT | Performed by: INTERNAL MEDICINE

## 2025-01-13 PROCEDURE — 1111F DSCHRG MED/CURRENT MED MERGE: CPT | Performed by: INTERNAL MEDICINE

## 2025-01-13 PROCEDURE — 99214 OFFICE O/P EST MOD 30 MIN: CPT | Performed by: INTERNAL MEDICINE

## 2025-01-13 PROCEDURE — 1123F ACP DISCUSS/DSCN MKR DOCD: CPT | Performed by: INTERNAL MEDICINE

## 2025-01-13 PROCEDURE — 1036F TOBACCO NON-USER: CPT | Performed by: INTERNAL MEDICINE

## 2025-01-13 PROCEDURE — G8417 CALC BMI ABV UP PARAM F/U: HCPCS | Performed by: INTERNAL MEDICINE

## 2025-01-13 PROCEDURE — G8427 DOCREV CUR MEDS BY ELIG CLIN: HCPCS | Performed by: INTERNAL MEDICINE

## 2025-01-13 RX ORDER — LINEZOLID 600 MG/1
600 TABLET, FILM COATED ORAL 2 TIMES DAILY
Qty: 20 TABLET | Refills: 0 | Status: SHIPPED | OUTPATIENT
Start: 2025-01-13 | End: 2025-01-23

## 2025-01-13 RX ORDER — LEVOFLOXACIN 500 MG/1
500 TABLET, FILM COATED ORAL DAILY
Qty: 10 TABLET | Refills: 0 | Status: SHIPPED | OUTPATIENT
Start: 2025-01-13 | End: 2025-01-23

## 2025-01-13 ASSESSMENT — ENCOUNTER SYMPTOMS
EYE DISCHARGE: 0
COLOR CHANGE: 0
ABDOMINAL DISTENTION: 0
APNEA: 0

## 2025-01-13 NOTE — PROGRESS NOTES
[facility] - Initial Consult Note  Today's Date: 1/13/2025    Impression :   Post STV 12/30/24  Urine retention leading to valladares   Indwelling Valladares x 12/14/24-  Had episodes of bleed on off   MRSA valladares related UTI,  Urine culture MRSA 12/29  Urine culture  12/31 negative  Left acute epidydimitis /  complex collections w L hydrocele - tender - new 12/31/2024  DC on zyvox and levaquin  x 2 weeks ( rec d )but await  further surg plans   have outpt plans for re eval andf a cysto in 2 weeks  After DC, saw Dr Pressley, planned for a cystoscopy 1/16/25  Office visit 1/13/25   Pain better - edema better left epididyme  about 50% - no tendon pain   WBC  6 - plts up 530 - CRP 7.2 - creat normal  AB extended 10 more days till 1/26 w labs 2 x per week  Near syncope, possibly related to dehydration  Bandemia 15 - 13 - 11  Acute anemia - ?? Related to recent urine bleed?  CRP elevation 262 - 116      Recommendations   Office visit 1/13/25   Pain better - edema better left epididyme  about 50% - no tendon pain   WBC  6 - plts up 530 - CRP 7.2 - creat normal  AB extended 10 more days till 1/26 w labs 2 x per week  Plan - renew levaquin and zyvox till 1/26  Keep cbc 2 x per week - rest of the labs weekly  See me in  2 weeks   I will ask Evelia Bragg to look at the labs in my absence - if the WBC drops of plts goes out of wack, and if the testicle feels back to normal, just  stop both AB         Diagnosis Orders   1. Acute epididymitis  Comprehensive Metabolic Panel    Creatinine    CBC with Auto Differential    C-Reactive Protein      2. Orchitis  Comprehensive Metabolic Panel    Creatinine    CBC with Auto Differential    C-Reactive Protein      3. MRSA infection  Comprehensive Metabolic Panel    Creatinine    CBC with Auto Differential    C-Reactive Protein          Return in about 2 weeks (around 1/27/2025).      History of Present Illness:   Ivanna Khoury is a 64 y.o.-year-old  male

## 2025-01-16 ENCOUNTER — PROCEDURE VISIT (OUTPATIENT)
Dept: UROLOGY | Age: 65
End: 2025-01-16
Payer: MEDICARE

## 2025-01-16 ENCOUNTER — TELEPHONE (OUTPATIENT)
Dept: UROLOGY | Age: 65
End: 2025-01-16

## 2025-01-16 ENCOUNTER — PREP FOR PROCEDURE (OUTPATIENT)
Dept: UROLOGY | Age: 65
End: 2025-01-16

## 2025-01-16 VITALS — SYSTOLIC BLOOD PRESSURE: 120 MMHG | TEMPERATURE: 97.7 F | DIASTOLIC BLOOD PRESSURE: 80 MMHG | HEART RATE: 114 BPM

## 2025-01-16 DIAGNOSIS — N40.1 BPH WITH OBSTRUCTION/LOWER URINARY TRACT SYMPTOMS: ICD-10-CM

## 2025-01-16 DIAGNOSIS — R33.9 URINE RETENTION: Primary | ICD-10-CM

## 2025-01-16 DIAGNOSIS — N13.8 BPH WITH OBSTRUCTION/LOWER URINARY TRACT SYMPTOMS: ICD-10-CM

## 2025-01-16 DIAGNOSIS — R33.9 URINARY RETENTION: ICD-10-CM

## 2025-01-16 PROCEDURE — 51700 IRRIGATION OF BLADDER: CPT | Performed by: NURSE PRACTITIONER

## 2025-01-16 PROCEDURE — 51798 US URINE CAPACITY MEASURE: CPT | Performed by: NURSE PRACTITIONER

## 2025-01-16 NOTE — PROGRESS NOTES
Hx bph, GLL in past.  Issues with retention.  He had void trial in the office.  425cc in, 350cc.  At that point, I went into the room and explained that he was emptying >50% of his bladder and his PVR was minimal, recommending trying to go home with catheter and having close follow up.  Patient was dissatisfied with the plan because he was not entirely empty. I explained that given BPH and hx, I expect some residual urine but felt comfortable in allowing him to return home. Patient became very frustrated and did not agree with the plan of care.  At that time, he requests another PVR. I explained that there's not going to be a significant change, as it has only been <15mins.  He still demands PVR, which again was WNL.  Several times throughout this morning, he requested PVRs although I explained that this is not beneficial to re-scan him every 15-30mins.  Again, I explained his PVR is acceptable and he wanted to wait in the office. I had a discussion about arranging cystoscopy. Patient has very specific requests.  He states that he was told he needed a cysto within two weeks. I explained that this is non-emergent basis.  Patient and his wife became very upset with me raising their voice and shaking their head \"no\" to me, stating that this was an emergency in their opinion. I mentioned that Dr. Pressley is out of the office, but I could arrange for close follow up with PVR and cysto next available which would be ~3 weeks.  Patient requests for cysto to be done ASAP and wants it to be done under MAC at [facility] only with Dr. Crews because he saw him in the office.  Clear expectations were set in that patient will need to remain with only one provider in our practice for continuity of care.  He was then directed to surgery schedulers to arrange a cysto next available. I updated Dr. Crews, he is agreeable to proceed

## 2025-01-16 NOTE — PROGRESS NOTES
Fill and pull procedure  Indication/Diagnosis: Urinary retention  Risks and Benefits discussed with patient prior to procedure.    Procedure Date: 1/16/25    Verbal consent obtained from patient prior to procedure.  Patient arrived with old urethral catheter in place for fill and pull. Bladder installation of 425 ml of sterile water per gravity until patient felt the sensation of bladder capacity fullness. Valladares ballon deflated and valladares removed without complication. Attempted to void with return of 350.        New 16F valladares inserted utilizing sterile technique per organization policy placed by Candace Otto RN.  10ml saline balloon inflated with positive returns noted.  Valladares secured and attached to overnight bag.  Patient tolerated procedure well and without complications.    Encouraged to stay well hydrated. Reviewed catheter care with verbal and written instructions. Verbalized understanding.     Agree with the ROS entered by the MA.      After first PVR, pt stated he wanted to wait to have catheter placed or discharged from office until he scheduled cysto (with office) and he drank 2 approx 20 oz bottles of water.  Once scheduled and he drank his water, he insisted on bladder scan again prior to insertion of catheter.  After it was explained to him why the second scan was not needed, he still insisted, so bladder scan was done a second time.   Catheter was then replaced.

## 2025-01-16 NOTE — TELEPHONE ENCOUNTER
Cysto -MAC- @ [facility] 1/24/25 1:00pm   PAT same day         Spoke with patient in office. Patient has seen multiple physicians in practice, will discuss with Dr. Crews post procedure to see which physician will be handling continuation of care.

## 2025-01-17 ENCOUNTER — HOSPITAL ENCOUNTER (OUTPATIENT)
Age: 65
Setting detail: SPECIMEN
Discharge: HOME | End: 2025-01-17

## 2025-01-17 DIAGNOSIS — A49.02 MRSA INFECTION: ICD-10-CM

## 2025-01-17 DIAGNOSIS — N45.2 ORCHITIS: ICD-10-CM

## 2025-01-17 DIAGNOSIS — N45.1 ACUTE EPIDIDYMITIS: ICD-10-CM

## 2025-01-17 LAB
ALBUMIN SERPL-MCNC: 4.1 G/DL (ref 3.5–5.2)
ALBUMIN/GLOB SERPL: 1.3 {RATIO} (ref 1–2.5)
ALP SERPL-CCNC: 117 U/L (ref 40–129)
ALT SERPL-CCNC: 37 U/L (ref 10–50)
ANION GAP SERPL CALCULATED.3IONS-SCNC: 12 MMOL/L (ref 9–16)
AST SERPL-CCNC: 18 U/L (ref 10–50)
BASOPHILS # BLD: <0.03 K/UL (ref 0–0.2)
BASOPHILS NFR BLD: 1 % (ref 0–2)
BILIRUB SERPL-MCNC: 0.4 MG/DL (ref 0–1.2)
BUN SERPL-MCNC: 15 MG/DL (ref 8–23)
CALCIUM SERPL-MCNC: 9.8 MG/DL (ref 8.6–10.4)
CHLORIDE SERPL-SCNC: 103 MMOL/L (ref 98–107)
CO2 SERPL-SCNC: 25 MMOL/L (ref 20–31)
CREAT SERPL-MCNC: 0.9 MG/DL (ref 0.7–1.2)
CRP SERPL HS-MCNC: 3.2 MG/L (ref 0–5)
EOSINOPHIL # BLD: 0.14 K/UL (ref 0–0.44)
EOSINOPHILS RELATIVE PERCENT: 3 % (ref 1–4)
ERYTHROCYTE [DISTWIDTH] IN BLOOD BY AUTOMATED COUNT: 13.9 % (ref 11.8–14.4)
GFR, ESTIMATED: >90 ML/MIN/1.73M2
GLUCOSE SERPL-MCNC: 90 MG/DL (ref 74–99)
HCT VFR BLD AUTO: 41.8 % (ref 40.7–50.3)
HGB BLD-MCNC: 12.6 G/DL (ref 13–17)
IMM GRANULOCYTES # BLD AUTO: <0.03 K/UL (ref 0–0.3)
IMM GRANULOCYTES NFR BLD: 0 %
LYMPHOCYTES NFR BLD: 1.91 K/UL (ref 1.1–3.7)
LYMPHOCYTES RELATIVE PERCENT: 43 % (ref 24–43)
MCH RBC QN AUTO: 25.1 PG (ref 25.2–33.5)
MCHC RBC AUTO-ENTMCNC: 30.1 G/DL (ref 28.4–34.8)
MCV RBC AUTO: 83.3 FL (ref 82.6–102.9)
MONOCYTES NFR BLD: 0.28 K/UL (ref 0.1–1.2)
MONOCYTES NFR BLD: 6 % (ref 3–12)
NEUTROPHILS NFR BLD: 47 % (ref 36–65)
NEUTS SEG NFR BLD: 2.06 K/UL (ref 1.5–8.1)
NRBC BLD-RTO: 0 PER 100 WBC
PLATELET # BLD AUTO: 311 K/UL (ref 138–453)
PMV BLD AUTO: 8.9 FL (ref 8.1–13.5)
POTASSIUM SERPL-SCNC: 4.2 MMOL/L (ref 3.7–5.3)
PROT SERPL-MCNC: 7.2 G/DL (ref 6.6–8.7)
RBC # BLD AUTO: 5.02 M/UL (ref 4.21–5.77)
SODIUM SERPL-SCNC: 140 MMOL/L (ref 136–145)
WBC OTHER # BLD: 4.4 K/UL (ref 3.5–11.3)

## 2025-01-17 RX ORDER — SODIUM CHLORIDE, SODIUM LACTATE, POTASSIUM CHLORIDE, CALCIUM CHLORIDE 600; 310; 30; 20 MG/100ML; MG/100ML; MG/100ML; MG/100ML
INJECTION, SOLUTION INTRAVENOUS CONTINUOUS
Status: CANCELLED | OUTPATIENT
Start: 2025-01-17

## 2025-01-17 NOTE — DISCHARGE INSTRUCTIONS
Pre-operative Instructions    Please arrive at the surgery center by 11:00 AM on 1/24/2025  (or as directed by your surgeon's office). See Directons to Surgery Center below.                FASTING    NOTHING TO EAT OR DRINK AFTER MIDNIGHT the night prior to surgery (This includes gum, candy, mints, chewing tobacco, etc).                 MEDICATIONS    1. What to CONTINUE leading up to your surgery:   Please take all your other daily medications except the medications listed above that you were instructed to hold.    2. What to TAKE MORNING OF SURGERY with SMALL SIP OF WATER:     Hold ramipril morning of surgery.     PLEASE NOTE: ANY \"STOPPED\" MEDICATIONS MAY BE DUE TO CLEANING UP MEDICATION LIST DURING THIS VISIT.                        IF APPLICABLE:  -If you have been given a blood band, you must bring it with you the day of surgery, unclasped.  -Use routine inhalers and bring inhalers the day of surgery.   -Bring C-Pap/Bi-pap with you morning of surgery if planning on staying in the hospital overnight.  -Do not take diabetic medications on the day of surgery.  -Any weekly antidiabetic injections must be stopped one week prior to surgery and plan discussed with prescribing provider.                             OTHER IMPORTANT REMINDERS    1) You may be required to provide a urine sample upon your arrival to the pre-op area, so please take this into consideration.     2) If  NOT planning on staying in the hospital overnight :    A.You will need an adult family member /friend to drive you home after your procedure. Taxi cabs or any form of public transportation ALONE is not acceptable.   -Your  must be 18 years of age or older and able to sign off on your discharge instructions.     -It is preferable that the friend or family member stay at the hospital throughout your procedure.  B. Someone must remain with you once you have arrived home for the first 24 hours after your surgery if you receive anesthesia

## 2025-01-20 ENCOUNTER — HOSPITAL ENCOUNTER (OUTPATIENT)
Dept: PREADMISSION TESTING | Age: 65
Discharge: HOME | End: 2025-01-24

## 2025-01-20 VITALS
BODY MASS INDEX: 29.09 KG/M2 | HEIGHT: 66 IN | TEMPERATURE: 97.7 F | DIASTOLIC BLOOD PRESSURE: 98 MMHG | RESPIRATION RATE: 20 BRPM | OXYGEN SATURATION: 99 % | HEART RATE: 117 BPM | SYSTOLIC BLOOD PRESSURE: 143 MMHG | WEIGHT: 181 LBS

## 2025-01-22 ENCOUNTER — OFFICE VISIT (OUTPATIENT)
Dept: INTERNAL MEDICINE | Age: 65
End: 2025-01-22
Payer: MEDICARE

## 2025-01-22 VITALS
TEMPERATURE: 97 F | SYSTOLIC BLOOD PRESSURE: 142 MMHG | BODY MASS INDEX: 30.16 KG/M2 | DIASTOLIC BLOOD PRESSURE: 92 MMHG | OXYGEN SATURATION: 99 % | HEIGHT: 65 IN | HEART RATE: 97 BPM | WEIGHT: 181 LBS

## 2025-01-22 DIAGNOSIS — I10 ESSENTIAL HYPERTENSION: Primary | ICD-10-CM

## 2025-01-22 PROBLEM — A41.9 SEPSIS DUE TO URINARY TRACT INFECTION (HCC): Status: RESOLVED | Noted: 2024-12-30 | Resolved: 2025-01-22

## 2025-01-22 PROBLEM — R31.0 GROSS HEMATURIA: Status: RESOLVED | Noted: 2020-09-12 | Resolved: 2025-01-22

## 2025-01-22 PROBLEM — N39.0 SEPSIS DUE TO URINARY TRACT INFECTION (HCC): Status: RESOLVED | Noted: 2024-12-30 | Resolved: 2025-01-22

## 2025-01-22 PROBLEM — N45.2 ORCHITIS OF LEFT TESTICLE: Status: RESOLVED | Noted: 2025-01-02 | Resolved: 2025-01-22

## 2025-01-22 PROBLEM — R31.9 HEMATURIA: Status: RESOLVED | Noted: 2020-09-12 | Resolved: 2025-01-22

## 2025-01-22 PROBLEM — E87.6 HYPOKALEMIA: Status: RESOLVED | Noted: 2024-12-31 | Resolved: 2025-01-22

## 2025-01-22 PROBLEM — A49.02 MRSA INFECTION: Status: RESOLVED | Noted: 2025-01-02 | Resolved: 2025-01-22

## 2025-01-22 PROBLEM — N39.0 COMPLICATED UTI (URINARY TRACT INFECTION): Status: RESOLVED | Noted: 2024-12-30 | Resolved: 2025-01-22

## 2025-01-22 PROBLEM — E87.1 HYPONATREMIA: Status: RESOLVED | Noted: 2024-12-31 | Resolved: 2025-01-22

## 2025-01-22 PROBLEM — A41.9 SEPSIS WITHOUT ACUTE ORGAN DYSFUNCTION (HCC): Status: RESOLVED | Noted: 2025-01-02 | Resolved: 2025-01-22

## 2025-01-22 PROCEDURE — 1036F TOBACCO NON-USER: CPT | Performed by: INTERNAL MEDICINE

## 2025-01-22 PROCEDURE — G8417 CALC BMI ABV UP PARAM F/U: HCPCS | Performed by: INTERNAL MEDICINE

## 2025-01-22 PROCEDURE — 3077F SYST BP >= 140 MM HG: CPT | Performed by: INTERNAL MEDICINE

## 2025-01-22 PROCEDURE — G8427 DOCREV CUR MEDS BY ELIG CLIN: HCPCS | Performed by: INTERNAL MEDICINE

## 2025-01-22 PROCEDURE — 3017F COLORECTAL CA SCREEN DOC REV: CPT | Performed by: INTERNAL MEDICINE

## 2025-01-22 PROCEDURE — 1111F DSCHRG MED/CURRENT MED MERGE: CPT | Performed by: INTERNAL MEDICINE

## 2025-01-22 PROCEDURE — 1123F ACP DISCUSS/DSCN MKR DOCD: CPT | Performed by: INTERNAL MEDICINE

## 2025-01-22 PROCEDURE — 3080F DIAST BP >= 90 MM HG: CPT | Performed by: INTERNAL MEDICINE

## 2025-01-22 PROCEDURE — 99214 OFFICE O/P EST MOD 30 MIN: CPT | Performed by: INTERNAL MEDICINE

## 2025-01-22 RX ORDER — CARVEDILOL 3.12 MG/1
3.12 TABLET ORAL 2 TIMES DAILY
Qty: 60 TABLET | Refills: 3 | Status: SHIPPED | OUTPATIENT
Start: 2025-01-22

## 2025-01-22 RX ORDER — POTASSIUM CHLORIDE 1500 MG/1
20 TABLET, EXTENDED RELEASE ORAL EVERY OTHER DAY
Qty: 30 TABLET | Refills: 1 | Status: SHIPPED | OUTPATIENT
Start: 2025-01-22

## 2025-01-22 ASSESSMENT — ENCOUNTER SYMPTOMS
ALLERGIC/IMMUNOLOGIC NEGATIVE: 1
EYES NEGATIVE: 1
GASTROINTESTINAL NEGATIVE: 1
RESPIRATORY NEGATIVE: 1

## 2025-01-22 ASSESSMENT — PATIENT HEALTH QUESTIONNAIRE - PHQ9
SUM OF ALL RESPONSES TO PHQ QUESTIONS 1-9: 0
SUM OF ALL RESPONSES TO PHQ QUESTIONS 1-9: 0
SUM OF ALL RESPONSES TO PHQ9 QUESTIONS 1 & 2: 0
SUM OF ALL RESPONSES TO PHQ QUESTIONS 1-9: 0
SUM OF ALL RESPONSES TO PHQ QUESTIONS 1-9: 0
2. FEELING DOWN, DEPRESSED OR HOPELESS: NOT AT ALL
1. LITTLE INTEREST OR PLEASURE IN DOING THINGS: NOT AT ALL

## 2025-01-22 NOTE — PROGRESS NOTES
[facility]   Progress Note    Visit Information    Have you changed or started any medications since your last visit including any over-the-counter medicines, vitamins, or herbal medicines? no   Are you having any side effects from any of your medications? -  no  Have you stopped taking any of your medications? Is so, why? -  no    Have you seen any other physician or provider since your last visit? No  Have you had any other diagnostic tests since your last visit? No  Have you been seen in the emergency room and/or had an admission to a hospital since we last saw you? No  Have you had your routine dental cleaning in the past 6 months? no    Have you activated your [employer] account? If not, what are your barriers? No:      Patient Care Team:  Lisette Strong MD as PCP - General (Internal Medicine)  Lisette Strong MD as PCP - Empaneled Provider  Jorge Eng MD as Consulting Physician (Gastroenterology)  Stacy Lyles as Health   Joe Galaviz MD as Consulting Physician (Pulmonary Disease)    Medical History Review  Past Medical, Family, and Social History reviewed and does not contribute to the patient presenting condition    Health Maintenance   Topic Date Due    DTaP/Tdap/Td vaccine (1 - Tdap) Never done    Shingles vaccine (1 of 2) Never done    Respiratory Syncytial Virus (RSV) Pregnant or age 60 yrs+ (1 - Risk 60-74 years 1-dose series) Never done    Pneumococcal 65+ years Vaccine (1 of 1 - PCV) Never done    Flu vaccine (1) 08/01/2024    Annual Wellness Visit (Medicare)  Never done    Depression Screen  01/25/2025    COVID-19 Vaccine (5 - 2023-24 season) 03/02/2025    Lipids  04/26/2025    Colorectal Cancer Screen  09/14/2026    Hepatitis C screen  Completed    HIV screen  Completed    Hepatitis A vaccine  Aged Out    Hepatitis B vaccine  Aged Out    Hib vaccine  Aged Out    Polio vaccine  Aged Out    Meningococcal (ACWY) vaccine  Aged Out    A1C test (Diabetic or

## 2025-01-23 NOTE — H&P
Social Connections: Not on file   Intimate Partner Violence: Not on file   Housing Stability: Low Risk  (12/31/2024)    Housing Stability Vital Sign     Unable to Pay for Housing in the Last Year: No     Number of Times Moved in the Last Year: 0     Homeless in the Last Year: No       Family History:  History reviewed. No pertinent family history.    REVIEW OF SYSTEMS:  Constitutional: negative  Eyes: negative  Respiratory: negative  Cardiovascular: negative  Gastrointestinal: negative  Genitourinary: no acute issues  Musculoskeletal: negative  Skin: negative   Neurological: negative  Hematological/Lymphatic: negative  Psychological: negative    PHYSICAL EXAM:    Patient Vitals for the past 24 hrs:   Height Weight   01/23/25 1401 1.676 m (5' 6\") 82.1 kg (181 lb)     Constitutional: Patient in NAD  Neuro: Alert and oriented to person, place, and time  Psych: Mood and affect normal  Skin: Clean, dry, intact   Lungs: Respiratory effort normal, CTA  Cardiovascular:  Normal peripheral pulses; no murmur. Normal rhythm  Abdomen: Soft, non-tender, non-distended, no hepatosplenomegaly or hernia, CVA tenderness none  Bladder: Non-tender and non-disdended   : Non-tender, skin intact, no lesions       LABS:   No results for input(s): \"WBC\", \"HGB\", \"HCT\", \"MCV\", \"PLT\" in the last 72 hours.  No results for input(s): \"NA\", \"K\", \"CL\", \"CO2\", \"PHOS\", \"BUN\", \"CREATININE\" in the last 72 hours.    Invalid input(s): \"CA\"  No results found for: \"PSA\"      Urinalysis: No results for input(s): \"COLORU\", \"PHUR\", \"LABCAST\", \"WBCUA\", \"RBCUA\", \"MUCUS\", \"TRICHOMONAS\", \"YEAST\", \"BACTERIA\", \"CLARITYU\", \"SPECGRAV\", \"LEUKOCYTESUR\", \"UROBILINOGEN\", \"BILIRUBINUR\", \"BLOODU\" in the last 72 hours.    Invalid input(s): \"NITRATE\", \"GLUCOSEUKETONESUAMORPHOUS\"     -----------------------------------------------------------------    ASSESSMENT AND PLAN:    Impression:    BPH with LUTS        Plan: Cystoscopy in OR today.    Consent obtained    Mayank

## 2025-01-23 NOTE — DISCHARGE INSTRUCTIONS
Discharge instructions: Cystoscopy  You may experience pain and/or burning with urination and see blood in the urine after your procedure. This should resolve over the next few days.    Ok to discharge home in good condition  No heavy lifting, >10 lbs for today  Patient should avoid strenuous activity for today  Patient should walk moderately at home   Ok to shower   Patient may resume diet as tolerated  Please call attending physician or hospital  with questions  Call or go to ED if fever (> 101F), intractable nausea vomiting or pain, inability to urinate  Please take prescriptions as directed if prescribed      Patient should follow up with Dr. Crews, in 3-4 weeks, call to confirm appointment

## 2025-01-24 ENCOUNTER — HOSPITAL ENCOUNTER (OUTPATIENT)
Age: 65
Setting detail: OUTPATIENT SURGERY
Discharge: HOME | End: 2025-01-24
Attending: UROLOGY | Admitting: UROLOGY
Payer: MEDICARE

## 2025-01-24 ENCOUNTER — ANESTHESIA (OUTPATIENT)
Dept: OPERATING ROOM | Age: 65
End: 2025-01-24
Payer: MEDICARE

## 2025-01-24 ENCOUNTER — ANESTHESIA EVENT (OUTPATIENT)
Dept: OPERATING ROOM | Age: 65
End: 2025-01-24
Payer: MEDICARE

## 2025-01-24 VITALS
TEMPERATURE: 97.7 F | BODY MASS INDEX: 29.41 KG/M2 | DIASTOLIC BLOOD PRESSURE: 82 MMHG | WEIGHT: 183 LBS | OXYGEN SATURATION: 100 % | HEART RATE: 64 BPM | HEIGHT: 66 IN | RESPIRATION RATE: 18 BRPM | SYSTOLIC BLOOD PRESSURE: 128 MMHG

## 2025-01-24 PROCEDURE — 6360000002 HC RX W HCPCS

## 2025-01-24 PROCEDURE — 7100000010 HC PHASE II RECOVERY - FIRST 15 MIN: Performed by: UROLOGY

## 2025-01-24 PROCEDURE — 2580000003 HC RX 258: Performed by: ANESTHESIOLOGY

## 2025-01-24 PROCEDURE — 7100000011 HC PHASE II RECOVERY - ADDTL 15 MIN: Performed by: UROLOGY

## 2025-01-24 PROCEDURE — 2709999900 HC NON-CHARGEABLE SUPPLY: Performed by: UROLOGY

## 2025-01-24 PROCEDURE — 3600000004 HC SURGERY LEVEL 4 BASE: Performed by: UROLOGY

## 2025-01-24 PROCEDURE — 3700000001 HC ADD 15 MINUTES (ANESTHESIA): Performed by: UROLOGY

## 2025-01-24 PROCEDURE — 87086 URINE CULTURE/COLONY COUNT: CPT

## 2025-01-24 PROCEDURE — 2500000003 HC RX 250 WO HCPCS: Performed by: UROLOGY

## 2025-01-24 PROCEDURE — 3600000014 HC SURGERY LEVEL 4 ADDTL 15MIN: Performed by: UROLOGY

## 2025-01-24 PROCEDURE — 3700000000 HC ANESTHESIA ATTENDED CARE: Performed by: UROLOGY

## 2025-01-24 RX ORDER — NALOXONE HYDROCHLORIDE 0.4 MG/ML
INJECTION, SOLUTION INTRAMUSCULAR; INTRAVENOUS; SUBCUTANEOUS PRN
Status: DISCONTINUED | OUTPATIENT
Start: 2025-01-24 | End: 2025-01-24 | Stop reason: HOSPADM

## 2025-01-24 RX ORDER — DOXYCYCLINE HYCLATE 100 MG
100 TABLET ORAL 2 TIMES DAILY
Qty: 6 TABLET | Refills: 0 | Status: SHIPPED | OUTPATIENT
Start: 2025-01-24 | End: 2025-01-27

## 2025-01-24 RX ORDER — SODIUM CHLORIDE, SODIUM LACTATE, POTASSIUM CHLORIDE, CALCIUM CHLORIDE 600; 310; 30; 20 MG/100ML; MG/100ML; MG/100ML; MG/100ML
INJECTION, SOLUTION INTRAVENOUS CONTINUOUS
Status: DISCONTINUED | OUTPATIENT
Start: 2025-01-24 | End: 2025-01-24 | Stop reason: HOSPADM

## 2025-01-24 RX ORDER — FENTANYL CITRATE 50 UG/ML
INJECTION, SOLUTION INTRAMUSCULAR; INTRAVENOUS
Status: DISCONTINUED | OUTPATIENT
Start: 2025-01-24 | End: 2025-01-24 | Stop reason: SDUPTHER

## 2025-01-24 RX ORDER — FENTANYL CITRATE 50 UG/ML
50 INJECTION, SOLUTION INTRAMUSCULAR; INTRAVENOUS EVERY 5 MIN PRN
Status: DISCONTINUED | OUTPATIENT
Start: 2025-01-24 | End: 2025-01-24 | Stop reason: HOSPADM

## 2025-01-24 RX ORDER — FENTANYL CITRATE 50 UG/ML
25 INJECTION, SOLUTION INTRAMUSCULAR; INTRAVENOUS EVERY 5 MIN PRN
Status: DISCONTINUED | OUTPATIENT
Start: 2025-01-24 | End: 2025-01-24 | Stop reason: HOSPADM

## 2025-01-24 RX ORDER — ONDANSETRON 2 MG/ML
4 INJECTION INTRAMUSCULAR; INTRAVENOUS
Status: DISCONTINUED | OUTPATIENT
Start: 2025-01-24 | End: 2025-01-24 | Stop reason: HOSPADM

## 2025-01-24 RX ORDER — PROPOFOL 10 MG/ML
INJECTION, EMULSION INTRAVENOUS
Status: DISCONTINUED | OUTPATIENT
Start: 2025-01-24 | End: 2025-01-24 | Stop reason: SDUPTHER

## 2025-01-24 RX ORDER — SODIUM CHLORIDE 9 MG/ML
INJECTION, SOLUTION INTRAVENOUS PRN
Status: DISCONTINUED | OUTPATIENT
Start: 2025-01-24 | End: 2025-01-24 | Stop reason: HOSPADM

## 2025-01-24 RX ORDER — LIDOCAINE HYDROCHLORIDE 10 MG/ML
INJECTION, SOLUTION EPIDURAL; INFILTRATION; INTRACAUDAL; PERINEURAL
Status: DISCONTINUED | OUTPATIENT
Start: 2025-01-24 | End: 2025-01-24 | Stop reason: SDUPTHER

## 2025-01-24 RX ORDER — PHENAZOPYRIDINE HYDROCHLORIDE 100 MG/1
100 TABLET, FILM COATED ORAL 3 TIMES DAILY PRN
Qty: 9 TABLET | Refills: 0 | Status: SHIPPED | OUTPATIENT
Start: 2025-01-24 | End: 2025-01-27

## 2025-01-24 RX ORDER — SODIUM CHLORIDE 0.9 % (FLUSH) 0.9 %
5-40 SYRINGE (ML) INJECTION PRN
Status: DISCONTINUED | OUTPATIENT
Start: 2025-01-24 | End: 2025-01-24 | Stop reason: HOSPADM

## 2025-01-24 RX ORDER — SODIUM CHLORIDE 0.9 % (FLUSH) 0.9 %
5-40 SYRINGE (ML) INJECTION EVERY 12 HOURS SCHEDULED
Status: DISCONTINUED | OUTPATIENT
Start: 2025-01-24 | End: 2025-01-24 | Stop reason: HOSPADM

## 2025-01-24 RX ORDER — MIDAZOLAM HYDROCHLORIDE 2 MG/2ML
1 INJECTION, SOLUTION INTRAMUSCULAR; INTRAVENOUS EVERY 10 MIN PRN
Status: DISCONTINUED | OUTPATIENT
Start: 2025-01-24 | End: 2025-01-24 | Stop reason: HOSPADM

## 2025-01-24 RX ADMIN — SODIUM CHLORIDE: 9 INJECTION, SOLUTION INTRAVENOUS at 12:34

## 2025-01-24 RX ADMIN — PROPOFOL 150 MCG/KG/MIN: 10 INJECTION, EMULSION INTRAVENOUS at 12:40

## 2025-01-24 RX ADMIN — LIDOCAINE HYDROCHLORIDE 50 MG: 10 INJECTION, SOLUTION EPIDURAL; INFILTRATION; INTRACAUDAL; PERINEURAL at 12:38

## 2025-01-24 RX ADMIN — FENTANYL CITRATE 25 MCG: 50 INJECTION, SOLUTION INTRAMUSCULAR; INTRAVENOUS at 12:38

## 2025-01-24 RX ADMIN — PROPOFOL 30 MG: 10 INJECTION, EMULSION INTRAVENOUS at 12:41

## 2025-01-24 ASSESSMENT — PAIN - FUNCTIONAL ASSESSMENT: PAIN_FUNCTIONAL_ASSESSMENT: NONE - DENIES PAIN

## 2025-01-24 NOTE — ANESTHESIA PRE PROCEDURE
Department of Anesthesiology  Preprocedure Note       Name:  Ivanna Khoury   Age:  64 y.o.  :  1960                                          MRN:  [id number]         Date:  2025      Surgeon: Surgeon(s):  Sunny Crews Jr., MD    Procedure: Procedure(s):  CYSTOSCOPY    Medications prior to admission:   Prior to Admission medications    Medication Sig Start Date End Date Taking? Authorizing Provider   carvedilol (COREG) 3.125 MG tablet Take 1 tablet by mouth 2 times daily 25  Yes Lisette Strong MD   potassium chloride (KLOR-CON M) 20 MEQ extended release tablet Take 1 tablet by mouth every other day 25  Yes Lisette Strong MD   tamsulosin (FLOMAX) 0.4 MG capsule Take 1 capsule by mouth daily 25  Yes Wade Haney DO   rosuvastatin (CRESTOR) 10 MG tablet Take 1 tablet by mouth nightly 25  Yes Wade Haney DO   ramipril (ALTACE) 10 MG capsule Take 1 capsule by mouth daily 24  Yes Lisette Strong MD   sodium chloride (ALTAMIST SPRAY) 0.65 % nasal spray 1 spray by Nasal route as needed for Congestion 24  Yes Joe Galaviz MD   aspirin (ASPIRIN LOW DOSE) 81 MG EC tablet Take 1 tablet by mouth daily 24  Yes Lisette Strong MD       Current medications:    Current Facility-Administered Medications   Medication Dose Route Frequency Provider Last Rate Last Admin    lactated ringers infusion   IntraVENous Continuous Morelia Ponce MD           Allergies:  No Known Allergies    Problem List:    Patient Active Problem List   Diagnosis Code    BPH with urinary obstruction N40.1, N13.8    TIA (transient ischemic attack) G45.9    COVID-19 U07.1    Syncope R55    Postprocedural urinary retention N99.89, R33.8    Intractable migraine with aura without status migrainosus G43.119    Post-op pain G89.18    Ileus (HCC) K56.7    Chronic venous insufficiency of lower extremity I87.2    Venous insufficiency of right leg I87.2    Benign paroxysmal positional vertigo due to

## 2025-01-24 NOTE — OP NOTE
Operative Note      Patient: Ivanna Khoury  YOB: 1960  MRN: [id number]    Date of Procedure: 1/24/2025    Pre-Op Diagnosis Codes:      * Urinary retention [R33.9]    Post-Op Diagnosis: Same       Procedure(s):  CYSTOSCOPY    Surgeon(s):  Sunny Crews Jr., MD    Assistant:   Resident: Sarwat Naidu MD    Anesthesia: Monitor Anesthesia Care    Estimated Blood Loss (mL): Minimal    Complications: None    Specimens:   * No specimens in log *    Implants:  * No implants in log *      Drains:   Urinary Catheter 01/24/25 2 Way (Active)       [REMOVED] Urinary Catheter 12/30/24 (Removed)   Catheter Indications Urinary retention (acute or chronic), continuous bladder irrigation or bladder outlet obstruction 12/31/24 1300   Site Assessment No urethral drainage 12/31/24 1300   Urine Color Yellow 12/31/24 1300   Urine Appearance Clear 12/31/24 1300   Collection Container Standard 12/31/24 1300   Catheter Care  Soap and water 12/31/24 1000   Catheter Best Practices  Catheter secured to thigh;Tamper seal intact;Bag below bladder;Bag not on floor;Lack of dependent loop in tubing;Drainage bag less than half full 12/31/24 1300   Status Draining;Patent 12/31/24 1300   Output (mL) 200 mL 12/31/24 1300       [REMOVED] Urinary Catheter 12/31/24 [place]ar (Removed)   Catheter Indications Urinary retention (acute or chronic), continuous bladder irrigation or bladder outlet obstruction 01/04/25 1000   Site Assessment No urethral drainage 01/04/25 1000   Urine Color [place] 01/04/25 1000   Urine Appearance Clear 01/04/25 1000   Urine Odor Other (Comment) 01/04/25 1000   Collection Container Standard 01/04/25 1000   Securement Method Securing device (Describe) 01/04/25 1000   Catheter Care  Soap and water 01/02/25 0927   Catheter Best Practices  Drainage tube clipped to bed;Catheter secured to thigh;Tamper seal intact;Bag below bladder;Bag not on floor;Lack of dependent loop in tubing;Drainage bag less than half full 01/04/25

## 2025-01-24 NOTE — PROGRESS NOTES
Discharge instructions and catheter care discuss with patient and wife, verbalizes understanding. All belongings given to patient. Discharge per wheelchair in a stable condition with valladares catheter in place attach to standard bag, draining well.

## 2025-01-25 LAB
MICROORGANISM SPEC CULT: NO GROWTH
SERVICE CMNT-IMP: NORMAL
SPECIMEN DESCRIPTION: NORMAL

## 2025-01-27 ENCOUNTER — TELEPHONE (OUTPATIENT)
Dept: UROLOGY | Age: 65
End: 2025-01-27

## 2025-01-27 ENCOUNTER — HOSPITAL ENCOUNTER (OUTPATIENT)
Age: 65
Setting detail: SPECIMEN
Discharge: HOME | End: 2025-01-27

## 2025-01-27 DIAGNOSIS — I10 ESSENTIAL HYPERTENSION: ICD-10-CM

## 2025-01-27 LAB
ALBUMIN SERPL-MCNC: 4.4 G/DL (ref 3.5–5.2)
ALBUMIN/GLOB SERPL: 1.6 {RATIO} (ref 1–2.5)
ALP SERPL-CCNC: 90 U/L (ref 40–129)
ALT SERPL-CCNC: 30 U/L (ref 10–50)
ANION GAP SERPL CALCULATED.3IONS-SCNC: 11 MMOL/L (ref 9–16)
ANION GAP SERPL CALCULATED.3IONS-SCNC: 12 MMOL/L (ref 9–16)
AST SERPL-CCNC: 20 U/L (ref 10–50)
BASOPHILS # BLD: <0.03 K/UL (ref 0–0.2)
BASOPHILS NFR BLD: 1 % (ref 0–2)
BILIRUB SERPL-MCNC: 0.3 MG/DL (ref 0–1.2)
BUN SERPL-MCNC: 16 MG/DL (ref 8–23)
BUN SERPL-MCNC: 17 MG/DL (ref 8–23)
CALCIUM SERPL-MCNC: 9.7 MG/DL (ref 8.6–10.4)
CALCIUM SERPL-MCNC: 9.7 MG/DL (ref 8.6–10.4)
CHLORIDE SERPL-SCNC: 102 MMOL/L (ref 98–107)
CHLORIDE SERPL-SCNC: 103 MMOL/L (ref 98–107)
CK SERPL-CCNC: 33 U/L (ref 39–308)
CO2 SERPL-SCNC: 25 MMOL/L (ref 20–31)
CO2 SERPL-SCNC: 26 MMOL/L (ref 20–31)
CREAT SERPL-MCNC: 0.8 MG/DL (ref 0.7–1.2)
CREAT SERPL-MCNC: 0.8 MG/DL (ref 0.7–1.2)
CRP SERPL HS-MCNC: <3 MG/L (ref 0–5)
EOSINOPHIL # BLD: 0.21 K/UL (ref 0–0.44)
EOSINOPHILS RELATIVE PERCENT: 7 % (ref 1–4)
ERYTHROCYTE [DISTWIDTH] IN BLOOD BY AUTOMATED COUNT: 13.7 % (ref 11.8–14.4)
GFR, ESTIMATED: >90 ML/MIN/1.73M2
GFR, ESTIMATED: >90 ML/MIN/1.73M2
GLUCOSE SERPL-MCNC: 94 MG/DL (ref 74–99)
GLUCOSE SERPL-MCNC: 95 MG/DL (ref 74–99)
HCT VFR BLD AUTO: 38.7 % (ref 40.7–50.3)
HGB BLD-MCNC: 11.9 G/DL (ref 13–17)
IMM GRANULOCYTES # BLD AUTO: <0.03 K/UL (ref 0–0.3)
IMM GRANULOCYTES NFR BLD: 0 %
LYMPHOCYTES NFR BLD: 1.48 K/UL (ref 1.1–3.7)
LYMPHOCYTES RELATIVE PERCENT: 49 % (ref 24–43)
MCH RBC QN AUTO: 25.3 PG (ref 25.2–33.5)
MCHC RBC AUTO-ENTMCNC: 30.7 G/DL (ref 28.4–34.8)
MCV RBC AUTO: 82.2 FL (ref 82.6–102.9)
MONOCYTES NFR BLD: 0.26 K/UL (ref 0.1–1.2)
MONOCYTES NFR BLD: 9 % (ref 3–12)
NEUTROPHILS NFR BLD: 34 % (ref 36–65)
NEUTS SEG NFR BLD: 1.01 K/UL (ref 1.5–8.1)
NRBC BLD-RTO: 0 PER 100 WBC
PLATELET # BLD AUTO: 189 K/UL (ref 138–453)
PMV BLD AUTO: 9.8 FL (ref 8.1–13.5)
POTASSIUM SERPL-SCNC: 4 MMOL/L (ref 3.7–5.3)
POTASSIUM SERPL-SCNC: 4 MMOL/L (ref 3.7–5.3)
PROT SERPL-MCNC: 7.1 G/DL (ref 6.6–8.7)
RBC # BLD AUTO: 4.71 M/UL (ref 4.21–5.77)
RBC # BLD: ABNORMAL 10*6/UL
SODIUM SERPL-SCNC: 139 MMOL/L (ref 136–145)
SODIUM SERPL-SCNC: 140 MMOL/L (ref 136–145)
WBC OTHER # BLD: 3 K/UL (ref 3.5–11.3)

## 2025-01-27 NOTE — TELEPHONE ENCOUNTER
Pt called in to schd a surg with Dr. Crews. Had a Cystoscopy on 01/24/25.      Please call pt to asst @ [phone]

## 2025-01-27 NOTE — TELEPHONE ENCOUNTER
Patient to follow up with [facility] per Dr. Crews- patient can call Radha at [facility] for all further instruction. . Patient is to follow up with [facility] for now until told otherwise.

## 2025-01-28 ENCOUNTER — TELEPHONE (OUTPATIENT)
Dept: UROLOGY | Age: 65
End: 2025-01-28

## 2025-01-28 ENCOUNTER — OFFICE VISIT (OUTPATIENT)
Dept: INFECTIOUS DISEASES | Age: 65
End: 2025-01-28
Payer: MEDICARE

## 2025-01-28 VITALS
DIASTOLIC BLOOD PRESSURE: 84 MMHG | RESPIRATION RATE: 18 BRPM | BODY MASS INDEX: 29.41 KG/M2 | OXYGEN SATURATION: 99 % | HEART RATE: 88 BPM | SYSTOLIC BLOOD PRESSURE: 136 MMHG | WEIGHT: 183 LBS | HEIGHT: 66 IN

## 2025-01-28 DIAGNOSIS — N45.1 LEFT EPIDIDYMITIS: Primary | ICD-10-CM

## 2025-01-28 DIAGNOSIS — A49.02 MRSA INFECTION: ICD-10-CM

## 2025-01-28 DIAGNOSIS — R33.9 URINARY RETENTION: Primary | ICD-10-CM

## 2025-01-28 PROBLEM — N13.8 BPH WITH OBSTRUCTION/LOWER URINARY TRACT SYMPTOMS: Status: ACTIVE | Noted: 2025-01-16

## 2025-01-28 PROBLEM — N40.1 BPH WITH OBSTRUCTION/LOWER URINARY TRACT SYMPTOMS: Status: ACTIVE | Noted: 2025-01-16

## 2025-01-28 PROCEDURE — G8427 DOCREV CUR MEDS BY ELIG CLIN: HCPCS | Performed by: INTERNAL MEDICINE

## 2025-01-28 PROCEDURE — 1123F ACP DISCUSS/DSCN MKR DOCD: CPT | Performed by: INTERNAL MEDICINE

## 2025-01-28 PROCEDURE — 1111F DSCHRG MED/CURRENT MED MERGE: CPT | Performed by: INTERNAL MEDICINE

## 2025-01-28 PROCEDURE — G8417 CALC BMI ABV UP PARAM F/U: HCPCS | Performed by: INTERNAL MEDICINE

## 2025-01-28 PROCEDURE — 99214 OFFICE O/P EST MOD 30 MIN: CPT | Performed by: INTERNAL MEDICINE

## 2025-01-28 PROCEDURE — G2211 COMPLEX E/M VISIT ADD ON: HCPCS | Performed by: INTERNAL MEDICINE

## 2025-01-28 PROCEDURE — 3017F COLORECTAL CA SCREEN DOC REV: CPT | Performed by: INTERNAL MEDICINE

## 2025-01-28 PROCEDURE — 1036F TOBACCO NON-USER: CPT | Performed by: INTERNAL MEDICINE

## 2025-01-28 ASSESSMENT — ENCOUNTER SYMPTOMS
EYE DISCHARGE: 0
ABDOMINAL DISTENTION: 0
COLOR CHANGE: 0
APNEA: 0

## 2025-01-28 NOTE — PROGRESS NOTES
[facility] - Initial Consult Note  Today's Date: 1/28/2025    Impression :   Post STV 12/30/24  Urine retention leading to valladares   Indwelling Valladares x 12/14/24-  Had episodes of bleed on off   MRSA valladares related UTI,  Urine culture MRSA 12/29  Urine culture  12/31 negative  Left acute epidydimitis /  complex collections w L hydrocele - tender - new 12/31/2024  DC on zyvox and levaquin  x 2 weeks ( rec d )but await  further surg plans   have outpt plans for re eval andf a cysto in 2 weeks  After DC, saw Dr Pressley, planned for a cystoscopy 1/16/25  Office visit 1/13/25   Pain better - edema better left epididyme  about 50% - no tendon pain   WBC  6 - plts up 530 - CRP 7.2 - creat normal  AB extended 10 more days till 1/26 w labs 2 x per week  Near syncope, possibly related to dehydration  Bandemia 15 - 13 - 11  Acute anemia - ?? Related to recent urine bleed?  CRP elevation 262 - 116      Recommendations   Office visit 1/13/25   Pain better - edema better left epididyme  about 50% - no tendon pain   WBC  6 - plts up 530 - CRP 7.2 - creat normal  AB extended 10 more days till 1/26 w labs 2 x per week  Plan - renew levaquin and zyvox till 1/26  Keep cbc 2 x per week - rest of the labs weekly  See me in  2 weeks   I will ask Evelia Bragg to look at the labs in my absence - if the WBC drops of plts goes out of wack, and if the testicle feels back to normal, just  stop both AB    Office visit 1/28/25  WBC  3 - crp < 3- creat normal- CPK ok -plts 311 - U cx 1/24 neg  Done w AB 1/27/25  Exam - epididymitis resolved - and testicular exam normal   Cystoscopy done 1/24/25 BPH   Planned for  minimal invasive robotic prostatectomy procedure planned -TBD - Dr Eleonora CHAVEZ Suggested to him intermittent cath but pt prefers the valladares -though concerned about infection  Plan - problem resolved for now -keep off AB -  we need to keep valladares clean and avoid a new infection pend the minimal invasive

## 2025-01-28 NOTE — TELEPHONE ENCOUNTER
Patient is scheduled for surgery on 2/13 at 1:30 PM and for Pre admit testing on 2/5 at 9:00 AM.  Talked to patient and gave him the dates, time to arrive and instructions.  Patient confirmed and verbalized understanding.  Letter mailed out today.

## 2025-01-28 NOTE — PATIENT INSTRUCTIONS
Julia from Urology will have RN discuss Tovar change and urine culture 1 wk prior to surgery as Dr Tamayo recommended.   Surgery scheduled at  2/13/25 at 1:30 pm

## 2025-02-03 RX ORDER — SODIUM CHLORIDE, SODIUM LACTATE, POTASSIUM CHLORIDE, CALCIUM CHLORIDE 600; 310; 30; 20 MG/100ML; MG/100ML; MG/100ML; MG/100ML
INJECTION, SOLUTION INTRAVENOUS CONTINUOUS
Status: CANCELLED | OUTPATIENT
Start: 2025-02-03

## 2025-02-03 NOTE — DISCHARGE INSTRUCTIONS
Pre-operative Instructions           NOTHING to eat or drink after midnight the night prior to surgery   (This includes gum, candy, mints, chewing tobacco, etc). Smoking cessation is always advised.   (Follow bowel prep or diet instructions as/if instructed by your surgeon)    Please arrive at the surgery center (Entrance B) by 11:30 AM on 2/13/2025 (or as directed by your surgeon's office).  See Directons to Surgery Center on next page.     Please take only the following medication(s) the day of surgery with a small sip of water: carvedilol (Coreg)    Please hold ramipril x 24 hours. (So if you usually take in the morning- hold it morning of surgery;  if you usually take at evening/night- hold it evening/night prior to surgery.)      If applicable:   -Use/bring daily inhalers with you   -Do not take diabetic medications on the day of surgery.    Please stop any blood thinning medications  AS DIRECTED BY PRESCRIBING PROVIDER! : aspirin     Failure to stop these medications as instructed (too soon or too late) may result in injury to you, or your surgery may need to be rescheduled.   Below is a list of some examples for your reference. This is not an all-inclusive list and if you have any questions/concerns, please check with your surgeon's office.     Antiplatelets : (stop blood cells (called platelets) from sticking together and forming a blood clot):   Aspirin (Bufferin, Ecotrin), Clopidogrel (Plavix), Ticagrelor (Brilinta), Prasugrel (Effient), Dipyridamole/aspirin (Aggrenox), Ticlopidine (Ticlid), Eptifibatide (Integrilin)    Anticoagulants: (slow down your body's process of making clots):   Warfarin (Coumadin), Rivaroxaban (Xarelto), Dabigatran (Pradaxa), Apixaban (Eliquis), Edoxaban (Savaysa), Heparin, Enoxaparin (Lovenox), Fondaparinux (Arixtra)    NSAIDS: Aspirin (Bufferin, Ecotrin), Ibuprofen (Motrin, Nuprin,Advil), Naproxen (Aleve),Meloxicam (Mobic), Celecoxib (Celebrex), Diclofenac (Voltaren), Etodolac

## 2025-02-05 ENCOUNTER — HOSPITAL ENCOUNTER (OUTPATIENT)
Dept: PREADMISSION TESTING | Age: 65
Discharge: HOME | End: 2025-02-09
Payer: MEDICARE

## 2025-02-05 VITALS
WEIGHT: 184 LBS | OXYGEN SATURATION: 100 % | HEART RATE: 75 BPM | SYSTOLIC BLOOD PRESSURE: 159 MMHG | BODY MASS INDEX: 29.57 KG/M2 | DIASTOLIC BLOOD PRESSURE: 88 MMHG | RESPIRATION RATE: 18 BRPM | TEMPERATURE: 97.7 F | HEIGHT: 66 IN

## 2025-02-05 LAB
ABO + RH BLD: NORMAL
ANION GAP SERPL CALCULATED.3IONS-SCNC: 11 MMOL/L (ref 9–16)
ARM BAND NUMBER: NORMAL
BLOOD BANK SAMPLE EXPIRATION: NORMAL
BLOOD GROUP ANTIBODIES SERPL: NEGATIVE
BUN SERPL-MCNC: 12 MG/DL (ref 8–23)
CALCIUM SERPL-MCNC: 9.3 MG/DL (ref 8.6–10.4)
CHLORIDE SERPL-SCNC: 103 MMOL/L (ref 98–107)
CO2 SERPL-SCNC: 26 MMOL/L (ref 20–31)
CREAT SERPL-MCNC: 0.8 MG/DL (ref 0.7–1.2)
EKG ATRIAL RATE: 73 BPM
EKG P AXIS: 39 DEGREES
EKG P-R INTERVAL: 188 MS
EKG Q-T INTERVAL: 398 MS
EKG QRS DURATION: 94 MS
EKG QTC CALCULATION (BAZETT): 438 MS
EKG R AXIS: 34 DEGREES
EKG T AXIS: 26 DEGREES
EKG VENTRICULAR RATE: 73 BPM
ERYTHROCYTE [DISTWIDTH] IN BLOOD BY AUTOMATED COUNT: 15.7 % (ref 11.8–14.4)
GFR, ESTIMATED: >90 ML/MIN/1.73M2
GLUCOSE SERPL-MCNC: 107 MG/DL (ref 74–99)
HCT VFR BLD AUTO: 38.4 % (ref 40.7–50.3)
HGB BLD-MCNC: 11.7 G/DL (ref 13–17)
MCH RBC QN AUTO: 25.2 PG (ref 25.2–33.5)
MCHC RBC AUTO-ENTMCNC: 30.5 G/DL (ref 28.4–34.8)
MCV RBC AUTO: 82.6 FL (ref 82.6–102.9)
NRBC BLD-RTO: 0 PER 100 WBC
PLATELET # BLD AUTO: 294 K/UL (ref 138–453)
PMV BLD AUTO: 9.5 FL (ref 8.1–13.5)
POTASSIUM SERPL-SCNC: 4.2 MMOL/L (ref 3.7–5.3)
RBC # BLD AUTO: 4.65 M/UL (ref 4.21–5.77)
SODIUM SERPL-SCNC: 140 MMOL/L (ref 136–145)
WBC OTHER # BLD: 5.3 K/UL (ref 3.5–11.3)

## 2025-02-05 PROCEDURE — 93005 ELECTROCARDIOGRAM TRACING: CPT | Performed by: ANESTHESIOLOGY

## 2025-02-05 PROCEDURE — 86901 BLOOD TYPING SEROLOGIC RH(D): CPT

## 2025-02-05 PROCEDURE — 86850 RBC ANTIBODY SCREEN: CPT

## 2025-02-05 PROCEDURE — 93010 ELECTROCARDIOGRAM REPORT: CPT | Performed by: INTERNAL MEDICINE

## 2025-02-05 PROCEDURE — 36415 COLL VENOUS BLD VENIPUNCTURE: CPT

## 2025-02-05 PROCEDURE — 86900 BLOOD TYPING SEROLOGIC ABO: CPT

## 2025-02-05 PROCEDURE — 85027 COMPLETE CBC AUTOMATED: CPT

## 2025-02-05 PROCEDURE — 80048 BASIC METABOLIC PNL TOTAL CA: CPT

## 2025-02-05 NOTE — PROGRESS NOTES
Anesthesia Focused Assessment  Upcoming surgery:   2/13/2025 ROBOTIC LAPAROSCOPIC SIMPLE PROSTATECTOMY Sunny Crews Jr., MD     History of anesthesia complications:  no  Family history of anesthesia complications:  no    METS functional capacity:   -Moderate/Excellent >4   + Covid-19 test in last 8 weeks? no      STOP-BANG Sleep Apnea Questionnaire  SNORE loudly (heard through closed doors)?   No  TIRED, fatigued, sleepy during daytime?    No  OBSERVED stopping breathing during sleep?   No  High blood PRESSURE or being treated?    Yes    BMI over 35?        No  AGE over 50?        Yes  NECK circumference over 16\"?     No  GENDER (male)?       Yes             Total 3  High risk 5-8  Intermediate risk 3-4  Low risk 0-2    ----------------------------------------------------------------------------------------------------------------------  RONALD:                                             no  If yes, machine?:                              Type 1 DM:                                   no  T2DM:                                           no    Coronary Artery Disease:             no  Hypertension:                               yes  Defib / AICD / Pacemaker:           no    Renal Failure:                               no  If yes, on dialysis?:                           Active smoker:                              no  Drinks Alcohol:                              no  Illicit drugs:                                    no    Dentition:                                      benign     Past Medical History:   Diagnosis Date    Acute epididymitis     COVID 2020    no hospitalization    Enlarged prostate     Tovar catheter in place 02/05/2025    Hyperlipidemia     Hypertension     Lung nodules     not changed in 5 years-no longer has to visit with pulmonology    MRSA (methicillin resistant staph aureus) culture positive     states resolved    Under care of service provider     Dr. Strognjack [facility]- last seen Dec 2024

## 2025-02-05 NOTE — H&P
History and Physical    Pt Name: Ivanna Khoury  MRN: [id number]  YOB: 1960  Date of evaluation: 2/5/2025  Primary Care Physician: Lisette Strong MD    SUBJECTIVE:   History of Chief Complaint:    Ivanna Khoury is a 64 y.o. male who presents for PAT appointment. Patient complains of urinary retention, BPH with incomplete bladder emptying. Patient reports these issues have been ongoing for >10 years and has tried oral meds, is s/p cysto greenlight 5 years ago and reports he has had indwelling urinary catheter since September 2024. Patient has been scheduled for ROBOTIC LAPAROSCOPIC SIMPLE PROSTATECTOMY.   Allergies  has No Known Allergies.  Medications  Prior to Admission medications    Medication Sig Start Date End Date Taking? Authorizing Provider   carvedilol (COREG) 3.125 MG tablet Take 1 tablet by mouth 2 times daily 1/22/25  Yes Lisette Strong MD   potassium chloride (KLOR-CON M) 20 MEQ extended release tablet Take 1 tablet by mouth every other day 1/22/25  Yes Lisette Strong MD   tamsulosin (FLOMAX) 0.4 MG capsule Take 1 capsule by mouth daily 1/4/25  Yes Wade Haney DO   rosuvastatin (CRESTOR) 10 MG tablet Take 1 tablet by mouth nightly 1/4/25  Yes Wade Haney DO   ramipril (ALTACE) 10 MG capsule Take 1 capsule by mouth daily 9/11/24  Yes Lisette Strong MD   sodium chloride (ALTAMIST SPRAY) 0.65 % nasal spray 1 spray by Nasal route as needed for Congestion 9/11/24  Yes Joe Galaviz MD   aspirin (ASPIRIN LOW DOSE) 81 MG EC tablet Take 1 tablet by mouth daily 9/11/24  Yes Lisette Strong MD     Past Medical History    has a past medical history of Acute epididymitis, COVID, Enlarged prostate, Tovar catheter in place, Hyperlipidemia, Hypertension, Lung nodules, MRSA (methicillin resistant staph aureus) culture positive, Under care of service provider, Under care of service provider, Urinary retention, and UTI (urinary tract infection).  Past Surgical History   has a past surgical

## 2025-02-06 DIAGNOSIS — R33.9 URINARY RETENTION: Primary | ICD-10-CM

## 2025-02-06 NOTE — PROGRESS NOTES
Left message on Radha's vm ( Dr. Crews's surgery scheduler) that urine for C&S unable to be obtained at 2/5/25 PAT appointment. A message was left yesterday,as well, per another RN.

## 2025-02-07 ENCOUNTER — PROCEDURE VISIT (OUTPATIENT)
Dept: UROLOGY | Age: 65
End: 2025-02-07
Payer: MEDICARE

## 2025-02-07 ENCOUNTER — HOSPITAL ENCOUNTER (OUTPATIENT)
Age: 65
Setting detail: SPECIMEN
Discharge: HOME | End: 2025-02-07

## 2025-02-07 DIAGNOSIS — R33.9 URINARY RETENTION: ICD-10-CM

## 2025-02-07 DIAGNOSIS — R33.9 URINARY RETENTION: Primary | ICD-10-CM

## 2025-02-07 PROCEDURE — 51702 INSERT TEMP BLADDER CATH: CPT | Performed by: UROLOGY

## 2025-02-07 NOTE — PROGRESS NOTES
Indication/Diagnosis  Urinary Retention    Valladares Change and Insertion Procedure:  Risks and Benefits discussed with patient prior to procedure.    Placement Date: 2/7/25    Verbal consent obtained from patient prior to procedure.  Patient arrived with old urethral catheter in place, balloon deflated and was removed without complication. Lidocaine 2% 100 mg Jelly inserted into urethra. New 16F valladares inserted utilizing sterile technique per organization policy placed by Tavo Guy MA.  10ml sterile water balloon inflated, attached to leg bag with return of yellow urine.   Valladares secured.  Patient tolerated procedure well and without complications. Reviewed valladares care. Verbalized understanding.

## 2025-02-09 LAB
MICROORGANISM SPEC CULT: ABNORMAL
SERVICE CMNT-IMP: ABNORMAL
SPECIMEN DESCRIPTION: ABNORMAL

## 2025-02-10 ENCOUNTER — TELEPHONE (OUTPATIENT)
Dept: INFECTIOUS DISEASES | Age: 65
End: 2025-02-10

## 2025-02-10 LAB
EKG ATRIAL RATE: 73 BPM
EKG P AXIS: 39 DEGREES
EKG P-R INTERVAL: 188 MS
EKG Q-T INTERVAL: 398 MS
EKG QRS DURATION: 94 MS
EKG QTC CALCULATION (BAZETT): 438 MS
EKG R AXIS: 34 DEGREES
EKG T AXIS: 26 DEGREES
EKG VENTRICULAR RATE: 73 BPM

## 2025-02-10 RX ORDER — CEPHALEXIN 500 MG/1
500 CAPSULE ORAL 4 TIMES DAILY
Qty: 40 CAPSULE | Refills: 0 | Status: ON HOLD | OUTPATIENT
Start: 2025-02-10 | End: 2025-02-20

## 2025-02-10 NOTE — TELEPHONE ENCOUNTER
Patient called and wants Dr Tamayo to look at culture results from 2/7/25.  I have a perfect serve message into Dr Tamayo to take a look at results

## 2025-02-10 NOTE — TELEPHONE ENCOUNTER
AnnamariepolaIvanna \"Fadumo\" Male, 64 y.o., 1960 MRN: [id number] Patient called being that he had a urine culture done 2/7/25 by another physician and he is wanting you to take a look at it to see if he needs to be on antibiotics. Please advise thank you Theodore, Feb 10, 2025, 9:26:05 AM  Read   Pricilla Cervantes MD  Is the urine in the system?Mon, Feb 10, 2025, 9:41:53 AM  Pricilla Cervantes MD  I called him and called for him to Ruben, Feb 10, 2025, 9:48:54 AM

## 2025-02-13 ENCOUNTER — HOSPITAL ENCOUNTER (OUTPATIENT)
Age: 65
Setting detail: OBSERVATION
Discharge: HOME | DRG: 854 | End: 2025-02-14
Attending: UROLOGY | Admitting: UROLOGY
Payer: MEDICARE

## 2025-02-13 ENCOUNTER — ANESTHESIA (OUTPATIENT)
Dept: OPERATING ROOM | Age: 65
End: 2025-02-13
Payer: MEDICARE

## 2025-02-13 ENCOUNTER — ANESTHESIA EVENT (OUTPATIENT)
Dept: OPERATING ROOM | Age: 65
End: 2025-02-13
Payer: MEDICARE

## 2025-02-13 DIAGNOSIS — N40.1 BPH WITH OBSTRUCTION/LOWER URINARY TRACT SYMPTOMS: ICD-10-CM

## 2025-02-13 DIAGNOSIS — G89.18 POST-OP PAIN: Primary | ICD-10-CM

## 2025-02-13 DIAGNOSIS — N13.8 BPH WITH OBSTRUCTION/LOWER URINARY TRACT SYMPTOMS: ICD-10-CM

## 2025-02-13 LAB
ANION GAP SERPL CALCULATED.3IONS-SCNC: 12 MMOL/L (ref 9–16)
BUN SERPL-MCNC: 7 MG/DL (ref 8–23)
CALCIUM SERPL-MCNC: 8.7 MG/DL (ref 8.6–10.4)
CHLORIDE SERPL-SCNC: 103 MMOL/L (ref 98–107)
CO2 SERPL-SCNC: 21 MMOL/L (ref 20–31)
CREAT SERPL-MCNC: 0.7 MG/DL (ref 0.7–1.2)
ERYTHROCYTE [DISTWIDTH] IN BLOOD BY AUTOMATED COUNT: 15.8 % (ref 11.8–14.4)
GFR, ESTIMATED: >90 ML/MIN/1.73M2
GLUCOSE SERPL-MCNC: 214 MG/DL (ref 74–99)
HCT VFR BLD AUTO: 35.4 % (ref 40.7–50.3)
HGB BLD-MCNC: 10.9 G/DL (ref 13–17)
MCH RBC QN AUTO: 25.3 PG (ref 25.2–33.5)
MCHC RBC AUTO-ENTMCNC: 30.8 G/DL (ref 28.4–34.8)
MCV RBC AUTO: 82.1 FL (ref 82.6–102.9)
NRBC BLD-RTO: 0 PER 100 WBC
PLATELET # BLD AUTO: 309 K/UL (ref 138–453)
PMV BLD AUTO: 9 FL (ref 8.1–13.5)
POTASSIUM BLD-SCNC: 3.6 MMOL/L (ref 3.5–4.5)
POTASSIUM SERPL-SCNC: 4 MMOL/L (ref 3.7–5.3)
RBC # BLD AUTO: 4.31 M/UL (ref 4.21–5.77)
SODIUM SERPL-SCNC: 136 MMOL/L (ref 136–145)
WBC OTHER # BLD: 11.3 K/UL (ref 3.5–11.3)

## 2025-02-13 PROCEDURE — 3600000019 HC SURGERY ROBOT ADDTL 15MIN: Performed by: UROLOGY

## 2025-02-13 PROCEDURE — 85027 COMPLETE CBC AUTOMATED: CPT

## 2025-02-13 PROCEDURE — 2709999900 HC NON-CHARGEABLE SUPPLY: Performed by: UROLOGY

## 2025-02-13 PROCEDURE — 3600000009 HC SURGERY ROBOT BASE: Performed by: UROLOGY

## 2025-02-13 PROCEDURE — 8E0W4CZ ROBOTIC ASSISTED PROCEDURE OF TRUNK REGION, PERCUTANEOUS ENDOSCOPIC APPROACH: ICD-10-PCS | Performed by: UROLOGY

## 2025-02-13 PROCEDURE — 3700000001 HC ADD 15 MINUTES (ANESTHESIA): Performed by: UROLOGY

## 2025-02-13 PROCEDURE — 36415 COLL VENOUS BLD VENIPUNCTURE: CPT

## 2025-02-13 PROCEDURE — 2580000003 HC RX 258: Performed by: ANESTHESIOLOGY

## 2025-02-13 PROCEDURE — 7100000001 HC PACU RECOVERY - ADDTL 15 MIN: Performed by: UROLOGY

## 2025-02-13 PROCEDURE — 6360000002 HC RX W HCPCS

## 2025-02-13 PROCEDURE — 80048 BASIC METABOLIC PNL TOTAL CA: CPT

## 2025-02-13 PROCEDURE — 2720000010 HC SURG SUPPLY STERILE: Performed by: UROLOGY

## 2025-02-13 PROCEDURE — 0VB04ZZ EXCISION OF PROSTATE, PERCUTANEOUS ENDOSCOPIC APPROACH: ICD-10-PCS | Performed by: UROLOGY

## 2025-02-13 PROCEDURE — 2500000003 HC RX 250 WO HCPCS

## 2025-02-13 PROCEDURE — 3700000000 HC ANESTHESIA ATTENDED CARE: Performed by: UROLOGY

## 2025-02-13 PROCEDURE — 6360000002 HC RX W HCPCS: Performed by: NURSE ANESTHETIST, CERTIFIED REGISTERED

## 2025-02-13 PROCEDURE — 2500000003 HC RX 250 WO HCPCS: Performed by: PHYSICIAN ASSISTANT

## 2025-02-13 PROCEDURE — 7100000000 HC PACU RECOVERY - FIRST 15 MIN: Performed by: UROLOGY

## 2025-02-13 PROCEDURE — 6370000000 HC RX 637 (ALT 250 FOR IP): Performed by: UROLOGY

## 2025-02-13 PROCEDURE — G0378 HOSPITAL OBSERVATION PER HR: HCPCS

## 2025-02-13 PROCEDURE — 6360000002 HC RX W HCPCS: Performed by: UROLOGY

## 2025-02-13 PROCEDURE — 2500000003 HC RX 250 WO HCPCS: Performed by: UROLOGY

## 2025-02-13 PROCEDURE — 2500000003 HC RX 250 WO HCPCS: Performed by: NURSE ANESTHETIST, CERTIFIED REGISTERED

## 2025-02-13 PROCEDURE — 6360000002 HC RX W HCPCS: Performed by: PHYSICIAN ASSISTANT

## 2025-02-13 PROCEDURE — S2900 ROBOTIC SURGICAL SYSTEM: HCPCS | Performed by: UROLOGY

## 2025-02-13 PROCEDURE — 6370000000 HC RX 637 (ALT 250 FOR IP): Performed by: PHYSICIAN ASSISTANT

## 2025-02-13 PROCEDURE — 84132 ASSAY OF SERUM POTASSIUM: CPT

## 2025-02-13 PROCEDURE — 87086 URINE CULTURE/COLONY COUNT: CPT

## 2025-02-13 PROCEDURE — 88307 TISSUE EXAM BY PATHOLOGIST: CPT

## 2025-02-13 RX ORDER — ULTRASOUND COUPLING MEDIUM
GEL (GRAM) TOPICAL PRN
Status: DISCONTINUED | OUTPATIENT
Start: 2025-02-13 | End: 2025-02-13 | Stop reason: HOSPADM

## 2025-02-13 RX ORDER — ONDANSETRON 2 MG/ML
INJECTION INTRAMUSCULAR; INTRAVENOUS
Status: DISCONTINUED | OUTPATIENT
Start: 2025-02-13 | End: 2025-02-13 | Stop reason: SDUPTHER

## 2025-02-13 RX ORDER — DIPHENHYDRAMINE HYDROCHLORIDE 50 MG/ML
12.5 INJECTION INTRAMUSCULAR; INTRAVENOUS
Status: DISCONTINUED | OUTPATIENT
Start: 2025-02-13 | End: 2025-02-13 | Stop reason: HOSPADM

## 2025-02-13 RX ORDER — MINERAL OIL AND WHITE PETROLATUM 150; 830 MG/G; MG/G
OINTMENT OPHTHALMIC PRN
Status: DISCONTINUED | OUTPATIENT
Start: 2025-02-13 | End: 2025-02-14 | Stop reason: HOSPADM

## 2025-02-13 RX ORDER — MORPHINE SULFATE 2 MG/ML
2 INJECTION, SOLUTION INTRAMUSCULAR; INTRAVENOUS
Status: DISCONTINUED | OUTPATIENT
Start: 2025-02-13 | End: 2025-02-14 | Stop reason: HOSPADM

## 2025-02-13 RX ORDER — ONDANSETRON 2 MG/ML
4 INJECTION INTRAMUSCULAR; INTRAVENOUS EVERY 6 HOURS PRN
Status: DISCONTINUED | OUTPATIENT
Start: 2025-02-13 | End: 2025-02-14 | Stop reason: HOSPADM

## 2025-02-13 RX ORDER — SODIUM CHLORIDE 9 MG/ML
INJECTION, SOLUTION INTRAVENOUS PRN
Status: DISCONTINUED | OUTPATIENT
Start: 2025-02-13 | End: 2025-02-14 | Stop reason: HOSPADM

## 2025-02-13 RX ORDER — SODIUM CHLORIDE 0.9 % (FLUSH) 0.9 %
5-40 SYRINGE (ML) INJECTION EVERY 12 HOURS SCHEDULED
Status: DISCONTINUED | OUTPATIENT
Start: 2025-02-13 | End: 2025-02-14 | Stop reason: HOSPADM

## 2025-02-13 RX ORDER — VANCOMYCIN 1.75 G/350ML
1250 INJECTION, SOLUTION INTRAVENOUS ONCE
Status: COMPLETED | OUTPATIENT
Start: 2025-02-13 | End: 2025-02-13

## 2025-02-13 RX ORDER — MAGNESIUM SULFATE IN WATER 40 MG/ML
2000 INJECTION, SOLUTION INTRAVENOUS PRN
Status: DISCONTINUED | OUTPATIENT
Start: 2025-02-13 | End: 2025-02-14 | Stop reason: HOSPADM

## 2025-02-13 RX ORDER — LIDOCAINE HYDROCHLORIDE 10 MG/ML
INJECTION, SOLUTION EPIDURAL; INFILTRATION; INTRACAUDAL; PERINEURAL
Status: DISCONTINUED | OUTPATIENT
Start: 2025-02-13 | End: 2025-02-13 | Stop reason: SDUPTHER

## 2025-02-13 RX ORDER — GENTAMICIN SULFATE 60 MG/50ML
120 INJECTION, SOLUTION INTRAVENOUS EVERY 12 HOURS
Status: COMPLETED | OUTPATIENT
Start: 2025-02-14 | End: 2025-02-14

## 2025-02-13 RX ORDER — ECHINACEA PURPUREA EXTRACT 125 MG
1 TABLET ORAL PRN
Status: DISCONTINUED | OUTPATIENT
Start: 2025-02-13 | End: 2025-02-14 | Stop reason: HOSPADM

## 2025-02-13 RX ORDER — OXYCODONE HYDROCHLORIDE 5 MG/1
10 TABLET ORAL EVERY 4 HOURS PRN
Status: DISCONTINUED | OUTPATIENT
Start: 2025-02-13 | End: 2025-02-14 | Stop reason: HOSPADM

## 2025-02-13 RX ORDER — ROCURONIUM BROMIDE 10 MG/ML
INJECTION, SOLUTION INTRAVENOUS
Status: DISCONTINUED | OUTPATIENT
Start: 2025-02-13 | End: 2025-02-13 | Stop reason: SDUPTHER

## 2025-02-13 RX ORDER — IPRATROPIUM BROMIDE AND ALBUTEROL SULFATE 2.5; .5 MG/3ML; MG/3ML
1 SOLUTION RESPIRATORY (INHALATION)
Status: DISCONTINUED | OUTPATIENT
Start: 2025-02-13 | End: 2025-02-13 | Stop reason: HOSPADM

## 2025-02-13 RX ORDER — SODIUM CHLORIDE 0.9 % (FLUSH) 0.9 %
5-40 SYRINGE (ML) INJECTION PRN
Status: DISCONTINUED | OUTPATIENT
Start: 2025-02-13 | End: 2025-02-14 | Stop reason: HOSPADM

## 2025-02-13 RX ORDER — LABETALOL HYDROCHLORIDE 5 MG/ML
10 INJECTION, SOLUTION INTRAVENOUS
Status: DISCONTINUED | OUTPATIENT
Start: 2025-02-13 | End: 2025-02-13 | Stop reason: HOSPADM

## 2025-02-13 RX ORDER — POTASSIUM CHLORIDE 1500 MG/1
20 TABLET, EXTENDED RELEASE ORAL EVERY OTHER DAY
Status: DISCONTINUED | OUTPATIENT
Start: 2025-02-13 | End: 2025-02-14 | Stop reason: HOSPADM

## 2025-02-13 RX ORDER — SODIUM CHLORIDE 0.9 % (FLUSH) 0.9 %
5-40 SYRINGE (ML) INJECTION EVERY 12 HOURS SCHEDULED
Status: DISCONTINUED | OUTPATIENT
Start: 2025-02-13 | End: 2025-02-13 | Stop reason: HOSPADM

## 2025-02-13 RX ORDER — SODIUM CHLORIDE 9 MG/ML
INJECTION, SOLUTION INTRAVENOUS PRN
Status: DISCONTINUED | OUTPATIENT
Start: 2025-02-13 | End: 2025-02-13 | Stop reason: HOSPADM

## 2025-02-13 RX ORDER — OXYCODONE HYDROCHLORIDE 5 MG/1
5 TABLET ORAL EVERY 4 HOURS PRN
Status: DISCONTINUED | OUTPATIENT
Start: 2025-02-13 | End: 2025-02-14 | Stop reason: HOSPADM

## 2025-02-13 RX ORDER — POTASSIUM CHLORIDE 1500 MG/1
40 TABLET, EXTENDED RELEASE ORAL PRN
Status: DISCONTINUED | OUTPATIENT
Start: 2025-02-13 | End: 2025-02-14 | Stop reason: HOSPADM

## 2025-02-13 RX ORDER — MORPHINE SULFATE 4 MG/ML
4 INJECTION, SOLUTION INTRAMUSCULAR; INTRAVENOUS
Status: DISCONTINUED | OUTPATIENT
Start: 2025-02-13 | End: 2025-02-14 | Stop reason: HOSPADM

## 2025-02-13 RX ORDER — PROPOFOL 10 MG/ML
INJECTION, EMULSION INTRAVENOUS
Status: DISCONTINUED | OUTPATIENT
Start: 2025-02-13 | End: 2025-02-13 | Stop reason: SDUPTHER

## 2025-02-13 RX ORDER — MAGNESIUM HYDROXIDE 1200 MG/15ML
LIQUID ORAL CONTINUOUS PRN
Status: COMPLETED | OUTPATIENT
Start: 2025-02-13 | End: 2025-02-13

## 2025-02-13 RX ORDER — CARVEDILOL 3.12 MG/1
3.12 TABLET ORAL 2 TIMES DAILY
Status: DISCONTINUED | OUTPATIENT
Start: 2025-02-13 | End: 2025-02-14 | Stop reason: HOSPADM

## 2025-02-13 RX ORDER — GENTAMICIN SULFATE 60 MG/50ML
120 INJECTION, SOLUTION INTRAVENOUS ONCE
Status: COMPLETED | OUTPATIENT
Start: 2025-02-13 | End: 2025-02-13

## 2025-02-13 RX ORDER — POTASSIUM CHLORIDE 7.45 MG/ML
10 INJECTION INTRAVENOUS PRN
Status: DISCONTINUED | OUTPATIENT
Start: 2025-02-13 | End: 2025-02-14 | Stop reason: HOSPADM

## 2025-02-13 RX ORDER — PROCHLORPERAZINE EDISYLATE 5 MG/ML
5 INJECTION INTRAMUSCULAR; INTRAVENOUS
Status: DISCONTINUED | OUTPATIENT
Start: 2025-02-13 | End: 2025-02-13 | Stop reason: HOSPADM

## 2025-02-13 RX ORDER — GLYCOPYRROLATE 1 MG/5 ML
SYRINGE (ML) INTRAVENOUS
Status: DISCONTINUED | OUTPATIENT
Start: 2025-02-13 | End: 2025-02-13 | Stop reason: SDUPTHER

## 2025-02-13 RX ORDER — SODIUM CHLORIDE 0.9 % (FLUSH) 0.9 %
5-40 SYRINGE (ML) INJECTION PRN
Status: DISCONTINUED | OUTPATIENT
Start: 2025-02-13 | End: 2025-02-13 | Stop reason: HOSPADM

## 2025-02-13 RX ORDER — BUPIVACAINE HYDROCHLORIDE 5 MG/ML
INJECTION, SOLUTION PERINEURAL PRN
Status: DISCONTINUED | OUTPATIENT
Start: 2025-02-13 | End: 2025-02-13 | Stop reason: ALTCHOICE

## 2025-02-13 RX ORDER — HYDRALAZINE HYDROCHLORIDE 20 MG/ML
10 INJECTION INTRAMUSCULAR; INTRAVENOUS
Status: DISCONTINUED | OUTPATIENT
Start: 2025-02-13 | End: 2025-02-13 | Stop reason: HOSPADM

## 2025-02-13 RX ORDER — HYOSCYAMINE SULFATE 0.12 MG/1
0.12 TABLET SUBLINGUAL EVERY 4 HOURS PRN
Status: DISCONTINUED | OUTPATIENT
Start: 2025-02-13 | End: 2025-02-14 | Stop reason: HOSPADM

## 2025-02-13 RX ORDER — FENTANYL CITRATE 50 UG/ML
INJECTION, SOLUTION INTRAMUSCULAR; INTRAVENOUS
Status: DISCONTINUED | OUTPATIENT
Start: 2025-02-13 | End: 2025-02-13 | Stop reason: SDUPTHER

## 2025-02-13 RX ORDER — DEXAMETHASONE SODIUM PHOSPHATE 10 MG/ML
INJECTION INTRAMUSCULAR; INTRAVENOUS
Status: DISCONTINUED | OUTPATIENT
Start: 2025-02-13 | End: 2025-02-13 | Stop reason: SDUPTHER

## 2025-02-13 RX ORDER — METHOCARBAMOL 750 MG/1
750 TABLET, FILM COATED ORAL 4 TIMES DAILY PRN
Status: DISCONTINUED | OUTPATIENT
Start: 2025-02-13 | End: 2025-02-14 | Stop reason: HOSPADM

## 2025-02-13 RX ORDER — ONDANSETRON 4 MG/1
4 TABLET, ORALLY DISINTEGRATING ORAL EVERY 8 HOURS PRN
Status: DISCONTINUED | OUTPATIENT
Start: 2025-02-13 | End: 2025-02-14 | Stop reason: HOSPADM

## 2025-02-13 RX ORDER — NALOXONE HYDROCHLORIDE 0.4 MG/ML
INJECTION, SOLUTION INTRAMUSCULAR; INTRAVENOUS; SUBCUTANEOUS PRN
Status: DISCONTINUED | OUTPATIENT
Start: 2025-02-13 | End: 2025-02-13 | Stop reason: HOSPADM

## 2025-02-13 RX ORDER — POLYETHYLENE GLYCOL 3350 17 G/17G
17 POWDER, FOR SOLUTION ORAL DAILY
Status: DISCONTINUED | OUTPATIENT
Start: 2025-02-13 | End: 2025-02-14 | Stop reason: HOSPADM

## 2025-02-13 RX ORDER — MAGNESIUM HYDROXIDE 1200 MG/15ML
LIQUID ORAL CONTINUOUS PRN
Status: DISCONTINUED | OUTPATIENT
Start: 2025-02-13 | End: 2025-02-13 | Stop reason: HOSPADM

## 2025-02-13 RX ORDER — ACETAMINOPHEN 325 MG/1
650 TABLET ORAL EVERY 6 HOURS
Status: DISCONTINUED | OUTPATIENT
Start: 2025-02-13 | End: 2025-02-14 | Stop reason: HOSPADM

## 2025-02-13 RX ORDER — MAGNESIUM HYDROXIDE 1200 MG/15ML
3000 LIQUID ORAL CONTINUOUS
Status: DISCONTINUED | OUTPATIENT
Start: 2025-02-13 | End: 2025-02-14

## 2025-02-13 RX ORDER — SODIUM CHLORIDE, SODIUM LACTATE, POTASSIUM CHLORIDE, CALCIUM CHLORIDE 600; 310; 30; 20 MG/100ML; MG/100ML; MG/100ML; MG/100ML
INJECTION, SOLUTION INTRAVENOUS CONTINUOUS
Status: DISCONTINUED | OUTPATIENT
Start: 2025-02-13 | End: 2025-02-13 | Stop reason: HOSPADM

## 2025-02-13 RX ORDER — LISINOPRIL 20 MG/1
40 TABLET ORAL DAILY
Status: DISCONTINUED | OUTPATIENT
Start: 2025-02-13 | End: 2025-02-14 | Stop reason: HOSPADM

## 2025-02-13 RX ADMIN — POTASSIUM CHLORIDE 20 MEQ: 1500 TABLET, EXTENDED RELEASE ORAL at 19:55

## 2025-02-13 RX ADMIN — ACETAMINOPHEN 650 MG: 325 TABLET ORAL at 18:35

## 2025-02-13 RX ADMIN — FENTANYL CITRATE 50 MCG: 50 INJECTION, SOLUTION INTRAMUSCULAR; INTRAVENOUS at 13:52

## 2025-02-13 RX ADMIN — ROCURONIUM BROMIDE 10 MG: 10 INJECTION, SOLUTION INTRAVENOUS at 15:09

## 2025-02-13 RX ADMIN — SUGAMMADEX 200 MG: 100 INJECTION, SOLUTION INTRAVENOUS at 17:03

## 2025-02-13 RX ADMIN — MORPHINE SULFATE 2 MG: 2 INJECTION, SOLUTION INTRAMUSCULAR; INTRAVENOUS at 22:00

## 2025-02-13 RX ADMIN — GENTAMICIN SULFATE 120 MG: 60 INJECTION, SOLUTION INTRAVENOUS at 13:55

## 2025-02-13 RX ADMIN — Medication 0.2 MG: at 14:24

## 2025-02-13 RX ADMIN — ONDANSETRON 4 MG: 2 INJECTION INTRAMUSCULAR; INTRAVENOUS at 16:41

## 2025-02-13 RX ADMIN — VANCOMYCIN 1250 MG: 1.75 INJECTION, SOLUTION INTRAVENOUS at 14:05

## 2025-02-13 RX ADMIN — FENTANYL CITRATE 50 MCG: 50 INJECTION, SOLUTION INTRAMUSCULAR; INTRAVENOUS at 16:50

## 2025-02-13 RX ADMIN — ROCURONIUM BROMIDE 10 MG: 10 INJECTION, SOLUTION INTRAVENOUS at 16:11

## 2025-02-13 RX ADMIN — DEXAMETHASONE SODIUM PHOSPHATE 10 MG: 10 INJECTION INTRAMUSCULAR; INTRAVENOUS at 14:20

## 2025-02-13 RX ADMIN — SODIUM CHLORIDE, POTASSIUM CHLORIDE, SODIUM LACTATE AND CALCIUM CHLORIDE: 600; 310; 30; 20 INJECTION, SOLUTION INTRAVENOUS at 16:13

## 2025-02-13 RX ADMIN — SODIUM CHLORIDE, POTASSIUM CHLORIDE, SODIUM LACTATE AND CALCIUM CHLORIDE: 600; 310; 30; 20 INJECTION, SOLUTION INTRAVENOUS at 13:39

## 2025-02-13 RX ADMIN — ROCURONIUM BROMIDE 10 MG: 10 INJECTION, SOLUTION INTRAVENOUS at 15:41

## 2025-02-13 RX ADMIN — ROCURONIUM BROMIDE 50 MG: 10 INJECTION, SOLUTION INTRAVENOUS at 13:51

## 2025-02-13 RX ADMIN — CARVEDILOL 3.12 MG: 3.12 TABLET, FILM COATED ORAL at 19:55

## 2025-02-13 RX ADMIN — LIDOCAINE HYDROCHLORIDE 50 MG: 10 INJECTION, SOLUTION EPIDURAL; INFILTRATION; INTRACAUDAL; PERINEURAL at 13:50

## 2025-02-13 RX ADMIN — PROPOFOL 200 MG: 10 INJECTION, EMULSION INTRAVENOUS at 13:50

## 2025-02-13 RX ADMIN — FENTANYL CITRATE 50 MCG: 50 INJECTION, SOLUTION INTRAMUSCULAR; INTRAVENOUS at 14:25

## 2025-02-13 RX ADMIN — FENTANYL CITRATE 50 MCG: 50 INJECTION, SOLUTION INTRAMUSCULAR; INTRAVENOUS at 16:43

## 2025-02-13 RX ADMIN — OXYCODONE 10 MG: 5 TABLET ORAL at 20:05

## 2025-02-13 RX ADMIN — POLYETHYLENE GLYCOL 3350 17 G: 17 POWDER, FOR SOLUTION ORAL at 19:55

## 2025-02-13 RX ADMIN — SODIUM CHLORIDE, PRESERVATIVE FREE 10 ML: 5 INJECTION INTRAVENOUS at 22:01

## 2025-02-13 RX ADMIN — MORPHINE SULFATE 4 MG: 4 INJECTION INTRAVENOUS at 18:35

## 2025-02-13 RX ADMIN — HYOSCYAMINE SULFATE 0.12 MG: 0.12 TABLET SUBLINGUAL at 19:02

## 2025-02-13 RX ADMIN — SODIUM CHLORIDE, PRESERVATIVE FREE 10 ML: 5 INJECTION INTRAVENOUS at 19:55

## 2025-02-13 RX ADMIN — SODIUM CHLORIDE 3000 ML: 900 IRRIGANT IRRIGATION at 19:57

## 2025-02-13 ASSESSMENT — PAIN DESCRIPTION - LOCATION: LOCATION: ABDOMEN

## 2025-02-13 ASSESSMENT — PAIN SCALES - WONG BAKER
WONGBAKER_NUMERICALRESPONSE: NO HURT

## 2025-02-13 ASSESSMENT — PAIN SCALES - GENERAL
PAINLEVEL_OUTOF10: 10
PAINLEVEL_OUTOF10: 5
PAINLEVEL_OUTOF10: 10
PAINLEVEL_OUTOF10: 10
PAINLEVEL_OUTOF10: 6
PAINLEVEL_OUTOF10: 10
PAINLEVEL_OUTOF10: 8

## 2025-02-13 ASSESSMENT — PAIN DESCRIPTION - ORIENTATION: ORIENTATION: ANTERIOR

## 2025-02-13 ASSESSMENT — PAIN - FUNCTIONAL ASSESSMENT: PAIN_FUNCTIONAL_ASSESSMENT: 0-10

## 2025-02-13 ASSESSMENT — PAIN DESCRIPTION - DESCRIPTORS: DESCRIPTORS: CRAMPING

## 2025-02-13 NOTE — ANESTHESIA PRE PROCEDURE
\"LABABO\"    Drug/Infectious Status (If Applicable):  Lab Results   Component Value Date/Time    HEPCAB NONREACTIVE 12/03/2020 02:05 PM       COVID-19 Screening (If Applicable):   Lab Results   Component Value Date/Time    COVID19 Not Detected 12/30/2024 02:31 PM    COVID19 Not Detected 10/09/2020 10:20 PM    COVID19 Not Detected 08/10/2020 01:41 AM       EKG  12/2024  Sinus tachycardia  Otherwise normal ECG  When compared with ECG of 30-DEC-2024 14:06,  No significant change was found    TTE   12/2024    Left Ventricle: Preserved left ventricular systolic function with a visually estimated EF of 50 - 55%. EF by 2D Simpsons Biplane is 50%. Left ventricle size is normal. Mildly increased wall thickness. Normal wall motion. Normal diastolic function.    Aortic Valve: Trileaflet valve. Mild aortic valve sclerosis.    Mitral Valve: Mild regurgitation.    Tricuspid Valve: Mild regurgitation. Normal RVSP. The estimated RVSP is 27 mmHg.    Image quality is good      Anesthesia Evaluation  Patient summary reviewed and Nursing notes reviewed   no history of anesthetic complications:   Airway: Mallampati: II  TM distance: >3 FB   Neck ROM: full  Mouth opening: > = 3 FB   Dental:    (+) other      Pulmonary:Negative Pulmonary ROS and normal exam  breath sounds clear to auscultation                             Cardiovascular:  Exercise tolerance: good (>4 METS)  (+) hypertension:, hyperlipidemia      ECG reviewed  Rhythm: regular  Rate: normal  Echocardiogram reviewed         Beta Blocker:  Dose within 24 Hrs      ROS comment: Vasovagal syncope     Neuro/Psych:   (+) TIA, headaches:            GI/Hepatic/Renal: Neg GI/Hepatic/Renal ROS            Endo/Other: Negative Endo/Other ROS                    Abdominal:   (+) obese          Vascular: negative vascular ROS.         Other Findings:             Anesthesia Plan      general     ASA 2     (GETA, piv x 2)  Induction: intravenous.    MIPS: Postoperative opioids intended and

## 2025-02-13 NOTE — PROGRESS NOTES
[facility]   Pharmacy Pharmacokinetic Monitoring Service - Vancomycin     Ivanna Khoury is a 64 y.o. male starting on vancomycin therapy for surgical prophylaxis. Pharmacy consulted by Dr Holloway for monitoring and adjustment.    Target Concentration:     Additional Antimicrobials: Gentamicin     Pertinent Laboratory Values:   Wt Readings from Last 1 Encounters:   02/05/25 83.5 kg (184 lb)     Temp Readings from Last 1 Encounters:   02/13/25 97.5 °F (36.4 °C)     CrCl cannot be calculated (Unknown ideal weight.).  No results for input(s): \"CREATININE\", \"BUN\", \"WBC\" in the last 72 hours.  Procalcitonin:     Plan:  Dosing recommendation of 15 mg/kg for 24 hours of therapy  Start vancomycin 1250 q12h for 2 doses for a total of 24 hours of therapy  Renal labs as indicated   Vancomycin concentration ordered for not indicated  Pharmacy will continue to monitor patient and adjust therapy as indicated    Thank you for the consult,  Doni MelendezD, [facility]  2/13/2025 6:52 PM     Render Risk Assessment In Note?: no Detail Level: Simple

## 2025-02-13 NOTE — H&P
Pre-op History and Physical      Patient:  Ivanna Khoury  MRN: [id number]  YOB: 1960    HISTORY OF PRESENT ILLNESS:     The patient is a 64 y.o. male who presents with history of BPH, he is s/p Greenlight PVP performed by Dr. Jayesh Roca in 2020, subsequently developed recurrent urinary retention requiring chronic valladares catheter. He did pass void trial in the office, but is unsatisfied with his incomplete emptying. Here for Robotic laparoscopic simple prostatectomy.    Patient's old records, notes and chart reviewed and summarized above.     I independently reviewed the images and verified the radiology reports from:    No results found.      Past Medical History:    Past Medical History:   Diagnosis Date    Acute epididymitis     COVID 2020    no hospitalization    Enlarged prostate     Valladares catheter in place 02/05/2025    Hyperlipidemia     Hypertension     Lung nodules     not changed in 5 years-no longer has to visit with pulmonology    MRSA (methicillin resistant staph aureus) culture positive     states resolved    [facility]    Under care of service provider     Dr Sunny Crews / last visit Dec 2024    Urinary retention     UTI (urinary tract infection)     Sepsis 12/2024       Past Surgical History:    Past Surgical History:   Procedure Laterality Date    CYSTOSCOPY N/A 07/30/2020    CYSTOSCOPY performed by Jayesh Roca MD at [facility]    CYSTOSCOPY  01/24/2025    CYSTOSCOPY N/A 1/24/2025    CYSTOSCOPY performed by Sunny Crews Jr., MD at [facility] OR    HERNIA REPAIR Right 07/27/2021    XI ROBOTIC LAPAROSCOPIC INGUINAL HERNIA REPAIR WITH MESH performed by Kermit Sotelo IV, DO at [facility] OR    TURP N/A 08/14/2020    CYSTO, TUR PROSTATE GREENLIGHT XPS performed by Jayesh Roca MD at [facility] OR    VASCULAR SURGERY      varicose vein of right leg       Medications Prior to Admission:    Prior to Admission medications     Medication Sig Start Date End Date Taking? Authorizing Provider   cephALEXin (KEFLEX) 500 MG capsule Take 1 capsule by mouth 4 times daily for 10 days 2/10/25 2/20/25  Pricilla Tamayo MD   carvedilol (COREG) 3.125 MG tablet Take 1 tablet by mouth 2 times daily 1/22/25   Lisette Strong MD   potassium chloride (KLOR-CON M) 20 MEQ extended release tablet Take 1 tablet by mouth every other day 1/22/25   Lisette Strong MD   tamsulosin (FLOMAX) 0.4 MG capsule Take 1 capsule by mouth daily 1/4/25   Wade Haney DO   rosuvastatin (CRESTOR) 10 MG tablet Take 1 tablet by mouth nightly 1/4/25   Wade Haney DO   ramipril (ALTACE) 10 MG capsule Take 1 capsule by mouth daily 9/11/24   Lisette Strong MD   sodium chloride (ALTAMIST SPRAY) 0.65 % nasal spray 1 spray by Nasal route as needed for Congestion 9/11/24   Joe Galaviz MD   aspirin (ASPIRIN LOW DOSE) 81 MG EC tablet Take 1 tablet by mouth daily 9/11/24   Lisette Strong MD       Allergies:  Patient has no known allergies.    Social History:    Social History     Socioeconomic History    Marital status:      Spouse name: Not on file    Number of children: Not on file    Years of education: Not on file    Highest education level: Not on file   Occupational History    Not on file   Tobacco Use    Smoking status: Never    Smokeless tobacco: Never   Vaping Use    Vaping status: Never Used   Substance and Sexual Activity    Alcohol use: Never    Drug use: Never    Sexual activity: Yes   Other Topics Concern    Not on file   Social History Narrative    Not on file     Social Determinants of Health     Financial Resource Strain: High Risk (8/10/2023)    Overall Financial Resource Strain (CARDIA)     Difficulty of Paying Living Expenses: Very hard   Food Insecurity: No Food Insecurity (12/31/2024)    Hunger Vital Sign     Worried About Running Out of Food in the Last Year: Never true     Ran Out of Food in the Last Year: Never true   Transportation Needs:      -----------------------------------------------------------------    ASSESSMENT AND PLAN:    Impression:    Urinary Retention  BPH with LUTS      Plan: Robotic laparoscopic radical prostatectomy in OR today.    Consent obtained      Marsha Joya PA-C  11:27 AM 2/13/2025

## 2025-02-13 NOTE — ANESTHESIA POSTPROCEDURE EVALUATION
Department of Anesthesiology  Postprocedure Note    Patient: Ivanna Khoury  MRN: [id number]  YOB: 1960  Date of evaluation: 2/13/2025    Procedure Summary       Date: 02/13/25 Room / Location: [address]    Anesthesia Start: 1339 Anesthesia Stop: 1716    Procedure: ROBOTIC LAPAROSCOPIC SIMPLE PROSTATECTOMY Diagnosis:       BPH with obstruction/lower urinary tract symptoms      (BPH with obstruction/lower urinary tract symptoms [N40.1, N13.8])    Surgeons: Sunny Crews Jr., MD Responsible Provider: Uli Guardado MD    Anesthesia Type: General ASA Status: 2            Anesthesia Type: General    Melissa Phase I: Melissa Score: 10    Melissa Phase II:      Anesthesia Post Evaluation    Patient location during evaluation: PACU  Patient participation: complete - patient participated  Level of consciousness: awake  Airway patency: patent  Nausea & Vomiting: no nausea and no vomiting  Cardiovascular status: blood pressure returned to baseline  Respiratory status: acceptable  Hydration status: euvolemic  Comments: /74   Pulse 64   Temp 97.5 °F (36.4 °C)   Resp 18   SpO2 98%   Pain management: adequate    No notable events documented.

## 2025-02-13 NOTE — OP NOTE
Operative Note      Patient: Ivanna Khoury  YOB: 1960  MRN: [id number]    Date of Procedure: 2/13/2025    Pre-Op Diagnosis Codes:      * BPH with obstruction/lower urinary tract symptoms [N40.1, N13.8]    Post-Op Diagnosis: Same       Procedures:  Robotic assisted simple prostatectomy    Surgeon(s):  Sunny Crews Jr., MD    Assistant:   First Assistant: Bonnie Morris  Resident: Mayank Holloway MD    Anesthesia: General    Estimated Blood Loss (mL): less than 50     Complications: None    Specimens:   ID Type Source Tests Collected by Time Destination   1 : URINE FOR CULTURE Urine Urine, indwelling catheter CULTURE, URINE Sunny Crews Jr., MD 2/13/2025 1417    A : PROSTATE Tissue Prostate SURGICAL PATHOLOGY Sunny Crews Jr., MD 2/13/2025 1224        Implants:  * No implants in log *      Drains:   Closed/Suction Drain Left;Lateral LLQ Bulb (Active)       Urinary Catheter 02/13/25 3 Way (Active)       [REMOVED] Urinary Catheter 01/24/25 2 Way (Removed)   Catheter Indications Perioperative use for selected surgical procedures 01/24/25 1430   Site Assessment No urethral drainage 01/24/25 1430   Urine Color Yellow 01/24/25 1430   Urine Appearance Clear 01/24/25 1430   Collection Container Standard 01/24/25 1430   Securement Method Leg strap 01/24/25 1430   Catheter Best Practices  Tamper seal intact;Bag below bladder;Bag not on floor 01/24/25 1430   Status Draining 01/24/25 1430   Output (mL) 100 mL 01/24/25 1430       [REMOVED] Urinary Catheter 02/13/25 (Removed)   $ Urethral catheter insertion Inserted for procedure 02/13/25 1218   Catheter Indications Urinary retention (acute or chronic), continuous bladder irrigation or bladder outlet obstruction 02/13/25 1218   Site Assessment No urethral drainage 02/13/25 1218   Urine Color Yellow 02/13/25 1218   Urine Appearance Clear 02/13/25 1218   Collection Container Standard 02/13/25 1218   Securement Method Leg strap 02/13/25 1218   Catheter Best  Practices  Bag below bladder;Bag not on floor;Catheter secured to thigh;Lack of dependent loop in tubing;Drainage tube clipped to bed 02/13/25 1218   Status Draining 02/13/25 1218   Discontinuation Reason Per provider order 02/13/25 1218       [REMOVED] Urinary Catheter 02/13/25 2 Way (Removed)       Findings:  Large, pedunculated intravesical median lobe, this was excised in its entirety, bladder neck was already open, some of the lateral lobes were excised as well.  Wide open prostatic fossa.  Mucosal advancement stitch performed, bladder closed in 2 layers, watertight closure, ADY drain in place, Tovar catheter in place on CBI.    Indication:  64-year-old male with urinary retention, secondary to BPH, did not undergo prior greenlight PVP but having recurrence of symptoms.  He did consent to the procedures listed above after risks and benefits were explained.    Detailed Description of Procedure:   The patient was transferred from the preoperative holding area to the operative suite, placed on the table in supine position, general anesthesia was induced, EPC cuffs on a running, vancomycin 1.2 g and gentamicin 120 mg were administered, his abdomen was prepped and draped in normal sterile fashion, proper timeout performed.  We began by inserting a Veress needle into the abdomen, the Veress need to be removed from midline due to not achieving good pneumoperitoneum, the Veress was placed on the right side and placed into the peritoneum, aspiration and drop test performed.  Pneumoperitoneum was obtained to 15 cm H2O.  Robotic ports were placed in standard position for pelvic surgery, including for 8 mm robotic ports, 12 mm and 5 mm assistant ports on the right side.  The robot was docked and targeted.  An 18 [place] Tovar catheter was placed.  The patient was placed in steep Trendelenburg position.  The bladder was filled with 200 cc saline.  A horizontal cystotomy was made to reveal the lumen of the bladder.  We

## 2025-02-14 VITALS
OXYGEN SATURATION: 99 % | HEART RATE: 65 BPM | SYSTOLIC BLOOD PRESSURE: 129 MMHG | TEMPERATURE: 97.5 F | DIASTOLIC BLOOD PRESSURE: 79 MMHG | HEIGHT: 66 IN | BODY MASS INDEX: 28.84 KG/M2 | RESPIRATION RATE: 18 BRPM | WEIGHT: 179.45 LBS

## 2025-02-14 LAB
ANION GAP SERPL CALCULATED.3IONS-SCNC: 10 MMOL/L (ref 9–16)
BUN SERPL-MCNC: 8 MG/DL (ref 8–23)
CALCIUM SERPL-MCNC: 8.8 MG/DL (ref 8.6–10.4)
CHLORIDE SERPL-SCNC: 99 MMOL/L (ref 98–107)
CO2 SERPL-SCNC: 24 MMOL/L (ref 20–31)
CREAT SERPL-MCNC: 0.8 MG/DL (ref 0.7–1.2)
CREATININE FLUID: 0.8 MG/DL
ERYTHROCYTE [DISTWIDTH] IN BLOOD BY AUTOMATED COUNT: 15.5 % (ref 11.8–14.4)
GFR, ESTIMATED: >90 ML/MIN/1.73M2
GLUCOSE SERPL-MCNC: 134 MG/DL (ref 74–99)
HCT VFR BLD AUTO: 32.6 % (ref 40.7–50.3)
HGB BLD-MCNC: 10.2 G/DL (ref 13–17)
MCH RBC QN AUTO: 25.1 PG (ref 25.2–33.5)
MCHC RBC AUTO-ENTMCNC: 31.3 G/DL (ref 28.4–34.8)
MCV RBC AUTO: 80.3 FL (ref 82.6–102.9)
MICROORGANISM SPEC CULT: NORMAL
NRBC BLD-RTO: 0 PER 100 WBC
PLATELET # BLD AUTO: 337 K/UL (ref 138–453)
PMV BLD AUTO: 9.5 FL (ref 8.1–13.5)
POTASSIUM SERPL-SCNC: 4.1 MMOL/L (ref 3.7–5.3)
RBC # BLD AUTO: 4.06 M/UL (ref 4.21–5.77)
SERVICE CMNT-IMP: NORMAL
SODIUM SERPL-SCNC: 133 MMOL/L (ref 136–145)
SPECIMEN DESCRIPTION: NORMAL
SPECIMEN TYPE: NORMAL
WBC OTHER # BLD: 11.5 K/UL (ref 3.5–11.3)

## 2025-02-14 PROCEDURE — 2580000003 HC RX 258

## 2025-02-14 PROCEDURE — 85027 COMPLETE CBC AUTOMATED: CPT

## 2025-02-14 PROCEDURE — G0378 HOSPITAL OBSERVATION PER HR: HCPCS

## 2025-02-14 PROCEDURE — 82570 ASSAY OF URINE CREATININE: CPT

## 2025-02-14 PROCEDURE — 2580000003 HC RX 258: Performed by: PHYSICIAN ASSISTANT

## 2025-02-14 PROCEDURE — 96374 THER/PROPH/DIAG INJ IV PUSH: CPT

## 2025-02-14 PROCEDURE — 6360000002 HC RX W HCPCS

## 2025-02-14 PROCEDURE — 6370000000 HC RX 637 (ALT 250 FOR IP): Performed by: PHYSICIAN ASSISTANT

## 2025-02-14 PROCEDURE — 6360000002 HC RX W HCPCS: Performed by: PHYSICIAN ASSISTANT

## 2025-02-14 PROCEDURE — 80048 BASIC METABOLIC PNL TOTAL CA: CPT

## 2025-02-14 PROCEDURE — 36415 COLL VENOUS BLD VENIPUNCTURE: CPT

## 2025-02-14 RX ORDER — LEVOFLOXACIN 500 MG/1
500 TABLET, FILM COATED ORAL DAILY
Qty: 10 TABLET | Refills: 0 | Status: ON HOLD | OUTPATIENT
Start: 2025-02-14 | End: 2025-02-23 | Stop reason: HOSPADM

## 2025-02-14 RX ORDER — METHOCARBAMOL 750 MG/1
750 TABLET, FILM COATED ORAL 4 TIMES DAILY
Qty: 20 TABLET | Refills: 0 | Status: ON HOLD | OUTPATIENT
Start: 2025-02-14 | End: 2025-02-23 | Stop reason: HOSPADM

## 2025-02-14 RX ORDER — HYOSCYAMINE SULFATE 0.12 MG/1
0.12 TABLET SUBLINGUAL EVERY 8 HOURS PRN
Qty: 15 TABLET | Refills: 0 | Status: ON HOLD | OUTPATIENT
Start: 2025-02-14 | End: 2025-02-23 | Stop reason: HOSPADM

## 2025-02-14 RX ORDER — DOCUSATE SODIUM 100 MG/1
100 CAPSULE, LIQUID FILLED ORAL 2 TIMES DAILY PRN
Qty: 60 CAPSULE | Refills: 0 | Status: SHIPPED | OUTPATIENT
Start: 2025-02-14 | End: 2025-03-16

## 2025-02-14 RX ORDER — OXYCODONE HYDROCHLORIDE 5 MG/1
5 TABLET ORAL EVERY 6 HOURS PRN
Qty: 12 TABLET | Refills: 0 | Status: ON HOLD | OUTPATIENT
Start: 2025-02-14 | End: 2025-02-23 | Stop reason: HOSPADM

## 2025-02-14 RX ADMIN — GENTAMICIN SULFATE 120 MG: 60 INJECTION, SOLUTION INTRAVENOUS at 14:54

## 2025-02-14 RX ADMIN — MORPHINE SULFATE 4 MG: 4 INJECTION INTRAVENOUS at 00:59

## 2025-02-14 RX ADMIN — Medication 3 MG: at 00:04

## 2025-02-14 RX ADMIN — VANCOMYCIN HYDROCHLORIDE 1250 MG: 1.25 INJECTION, POWDER, LYOPHILIZED, FOR SOLUTION INTRAVENOUS at 01:31

## 2025-02-14 RX ADMIN — ACETAMINOPHEN 650 MG: 325 TABLET ORAL at 00:05

## 2025-02-14 RX ADMIN — OXYCODONE 10 MG: 5 TABLET ORAL at 00:04

## 2025-02-14 RX ADMIN — CARVEDILOL 3.12 MG: 3.12 TABLET, FILM COATED ORAL at 08:13

## 2025-02-14 RX ADMIN — GENTAMICIN SULFATE 120 MG: 60 INJECTION, SOLUTION INTRAVENOUS at 00:12

## 2025-02-14 RX ADMIN — ACETAMINOPHEN 650 MG: 325 TABLET ORAL at 13:25

## 2025-02-14 RX ADMIN — VANCOMYCIN HYDROCHLORIDE 1250 MG: 1.25 INJECTION, POWDER, LYOPHILIZED, FOR SOLUTION INTRAVENOUS at 13:25

## 2025-02-14 RX ADMIN — POLYETHYLENE GLYCOL 3350 17 G: 17 POWDER, FOR SOLUTION ORAL at 08:13

## 2025-02-14 RX ADMIN — SODIUM CHLORIDE: 0.9 INJECTION, SOLUTION INTRAVENOUS at 00:09

## 2025-02-14 ASSESSMENT — PAIN SCALES - GENERAL
PAINLEVEL_OUTOF10: 5
PAINLEVEL_OUTOF10: 2
PAINLEVEL_OUTOF10: 8
PAINLEVEL_OUTOF10: 10

## 2025-02-14 ASSESSMENT — PAIN SCALES - WONG BAKER
WONGBAKER_NUMERICALRESPONSE: NO HURT
WONGBAKER_NUMERICALRESPONSE: NO HURT

## 2025-02-14 NOTE — PLAN OF CARE
Problem: Discharge Planning  Goal: Discharge to home or other facility with appropriate resources  2/14/2025 0118 by Monroe Harden RN  Outcome: Progressing  2/14/2025 0118 by Monroe Harden RN  Reactivated     Problem: Pain  Goal: Verbalizes/displays adequate comfort level or baseline comfort level  Outcome: Progressing     Problem: Safety - Adult  Goal: Free from fall injury  2/14/2025 0118 by Monroe Harden RN  Outcome: Progressing  2/13/2025 1900 by Monroe Harden RN  Outcome: Progressing     Problem: ABCDS Injury Assessment  Goal: Absence of physical injury  Outcome: Progressing

## 2025-02-14 NOTE — DISCHARGE SUMMARY
DISCHARGE SUMMARY NOTE:      Patient Identification  PATIENT: Ivanna Khoury is a 64 y.o. male.  MRN: [id number]  :  1960  Admit Date:  2025  Discharge date: 2025                                  Disposition: home  Discharged Condition:  good  Discharge Diagnoses:   Patient Active Problem List   Diagnosis    BPH with urinary obstruction    TIA (transient ischemic attack)    COVID-19    Syncope    Postprocedural urinary retention    Intractable migraine with aura without status migrainosus    Post-op pain    Ileus (HCC)    Chronic venous insufficiency of lower extremity    Venous insufficiency of right leg    Benign paroxysmal positional vertigo due to bilateral vestibular disorder    Heart murmur    Near syncope    Urinary retention    BPH with obstruction/lower urinary tract symptoms       Consults: none    Surgery: Robotic assisted simple prostatectomy    Patient Instructions:   Activity: As tolerated  Diet: As tolerated  Patient told to follow up with urology in 1 week.   Discharge Medications:      Medication List        START taking these medications      docusate sodium 100 MG capsule  Commonly known as: COLACE  Take 1 capsule by mouth 2 times daily as needed for Constipation     hyoscyamine 0.125 MG sublingual tablet  Commonly known as: Levsin/SL  Place 1 tablet under the tongue every 8 hours as needed (bladder spasms or stent pain) Stop taking day before valladares catheter removal     methocarbamol 750 MG tablet  Commonly known as: Robaxin-750  Take 1 tablet by mouth 4 times daily for 5 days     oxyCODONE 5 MG immediate release tablet  Commonly known as: Roxicodone  Take 1 tablet by mouth every 6 hours as needed for Pain for up to 3 days. Intended supply: 3 days. Take lowest dose possible to manage pain Max Daily Amount: 20 mg            CONTINUE taking these medications      carvedilol 3.125 MG tablet  Commonly known as: Coreg  Take 1 tablet by mouth 2 times daily     cephALEXin 500 MG

## 2025-02-14 NOTE — PLAN OF CARE
Problem: Pain  Goal: Verbalizes/displays adequate comfort level or baseline comfort level  Outcome: Progressing     Problem: Safety - Adult  Goal: Free from fall injury  2/14/2025 0118 by Monroe Harden, RN  Outcome: Progressing  2/13/2025 1900 by Monroe Harden, RN  Outcome: Progressing     Problem: ABCDS Injury Assessment  Goal: Absence of physical injury  Outcome: Progressing     Problem: Discharge Planning  Goal: Discharge to home or other facility with appropriate resources  Reactivated

## 2025-02-14 NOTE — PROGRESS NOTES
David Pressley, Abelino Mcfadden Mostafa, Eleonora Qureshi Jr  Urology Progress Note     Chief Complaint: Status post robotic simple prostatectomy    Subjective:  No acute events overnight, afebrile, vital signs stable  Pain level: Minimal  Denies fever, chills, nausea, vomiting, chest pain, shortness of breath  Ambulated a little bit  Tolerating diet  On room air    AM labs pending    Patient Vitals for the past 24 hrs:   BP Temp Temp src Pulse Resp SpO2 Height Weight   02/14/25 0412 (!) 137/91 97.5 °F (36.4 °C) Oral (!) 105 18 95 % -- --   02/14/25 0129 -- -- -- -- 18 -- -- --   02/14/25 0034 -- -- -- -- 18 -- -- --   02/14/25 0015 120/70 97.9 °F (36.6 °C) Oral 94 16 95 % -- --   02/13/25 2245 -- -- -- -- -- -- 1.676 m (5' 6\") 81.4 kg (179 lb 7.3 oz)   02/13/25 2230 -- -- -- -- 18 -- -- --   02/13/25 2035 -- -- -- -- 18 -- -- --   02/13/25 1945 122/68 97.7 °F (36.5 °C) Axillary 90 18 100 % -- --   02/13/25 1905 -- -- -- -- 18 -- -- --   02/13/25 1845 -- -- -- -- -- -- -- 81.5 kg (179 lb 10.8 oz)   02/13/25 1825 133/74 97.5 °F (36.4 °C) -- 64 18 98 % -- --   02/13/25 1745 131/82 97.3 °F (36.3 °C) Temporal 72 15 99 % -- --   02/13/25 1730 125/82 -- -- 73 15 97 % -- --   02/13/25 1715 118/72 -- -- 76 18 100 % -- --   02/13/25 1712 126/74 97.7 °F (36.5 °C) Temporal 73 18 100 % -- --   02/13/25 1138 (!) 143/81 96.8 °F (36 °C) Temporal 87 16 98 % -- --       Intake/Output Summary (Last 24 hours) at 2/14/2025 0644  Last data filed at 2/14/2025 0510  Gross per 24 hour   Intake 3199.93 ml   Output -510 ml   Net 3709.93 ml       Recent Labs     02/13/25  2319   WBC 11.3   HGB 10.9*   HCT 35.4*   MCV 82.1*        Recent Labs     02/13/25  2319      K 4.0      CO2 21   BUN 7*   CREATININE 0.7       No results for input(s): \"COLORU\", \"PHUR\", \"LABCAST\", \"WBCUA\", \"RBCUA\", \"MUCUS\", \"TRICHOMONAS\", \"YEAST\", \"BACTERIA\", \"CLARITYU\", \"SPECGRAV\", \"LEUKOCYTESUR\", \"UROBILINOGEN\", \"BILIRUBINUR\", \"BLOODU\" in the last 72

## 2025-02-14 NOTE — DISCHARGE INSTRUCTIONS
General Discharge Instructions:    Pt ok to discharge home in good condition  No heavy lifting, >10 lbs for 4-6 week or till cleared by attending physician  Pt should avoid strenuous activity for 4-6 weeks  Pt should walk moderately at home  Pt ok to shower in 24 hrs after discharge while keeping incision clean / dry.  Pt may resume diet as tolerated  Pt should take Rx as directed.  Resume aspirin in 5 days  No driving while on narcotics  Please call attending physician or hospital  with questions  Call or Present to ED if fever (> 101F), intractable nausea vomiting or pain, if incisions become red/swollen or drain pus/fluid, or if calves become red swollen and tender.  Pt should follow up with Dr. Eleonora Bertrand, in 1 week, call to confirm appointment

## 2025-02-14 NOTE — PROGRESS NOTES
CLINICAL PHARMACY NOTE: MEDS TO BEDS    Total # of Prescriptions Filled: 4   The following medications were delivered to the patient:  Docusate sodium  Methocarbamol  Hyoscyamine  oxycodone    Additional Documentation: bacilio shah

## 2025-02-15 ENCOUNTER — HOSPITAL ENCOUNTER (INPATIENT)
Age: 65
LOS: 8 days | Discharge: HOME | DRG: 854 | End: 2025-02-23
Attending: EMERGENCY MEDICINE
Payer: MEDICARE

## 2025-02-15 ENCOUNTER — APPOINTMENT (OUTPATIENT)
Dept: GENERAL RADIOLOGY | Age: 65
DRG: 854 | End: 2025-02-15
Payer: MEDICARE

## 2025-02-15 ENCOUNTER — APPOINTMENT (OUTPATIENT)
Dept: CT IMAGING | Age: 65
DRG: 854 | End: 2025-02-15
Payer: MEDICARE

## 2025-02-15 DIAGNOSIS — N30.00 ACUTE CYSTITIS WITHOUT HEMATURIA: ICD-10-CM

## 2025-02-15 DIAGNOSIS — K91.89 ILEUS, POSTOPERATIVE (HCC): Primary | ICD-10-CM

## 2025-02-15 DIAGNOSIS — N41.0 PROSTATITIS, ACUTE: ICD-10-CM

## 2025-02-15 DIAGNOSIS — A49.8 KLEBSIELLA INFECTION: ICD-10-CM

## 2025-02-15 DIAGNOSIS — N39.0 UTI DUE TO KLEBSIELLA SPECIES: ICD-10-CM

## 2025-02-15 DIAGNOSIS — G43.119 INTRACTABLE MIGRAINE WITH AURA WITHOUT STATUS MIGRAINOSUS: ICD-10-CM

## 2025-02-15 DIAGNOSIS — K56.7 ILEUS, POSTOPERATIVE (HCC): Primary | ICD-10-CM

## 2025-02-15 DIAGNOSIS — G89.18 POST-OP PAIN: ICD-10-CM

## 2025-02-15 DIAGNOSIS — B96.89 UTI DUE TO KLEBSIELLA SPECIES: ICD-10-CM

## 2025-02-15 DIAGNOSIS — K35.33: ICD-10-CM

## 2025-02-15 PROBLEM — Z90.79 STATUS POST PROSTATECTOMY: Status: ACTIVE | Noted: 2025-02-15

## 2025-02-15 PROBLEM — I10 HTN (HYPERTENSION): Status: ACTIVE | Noted: 2025-02-15

## 2025-02-15 PROBLEM — R10.84 GENERALIZED ABDOMINAL PAIN: Status: ACTIVE | Noted: 2025-02-15

## 2025-02-15 LAB
ALBUMIN SERPL-MCNC: 3.7 G/DL (ref 3.5–5.2)
ALBUMIN/GLOB SERPL: 1.2 {RATIO} (ref 1–2.5)
ALP SERPL-CCNC: 101 U/L (ref 40–129)
ALT SERPL-CCNC: 81 U/L (ref 10–50)
ANION GAP SERPL CALCULATED.3IONS-SCNC: 14 MMOL/L (ref 9–16)
AST SERPL-CCNC: 37 U/L (ref 10–50)
BACTERIA URNS QL MICRO: ABNORMAL
BASOPHILS # BLD: <0.03 K/UL (ref 0–0.2)
BASOPHILS NFR BLD: 0 % (ref 0–2)
BILIRUB SERPL-MCNC: 0.6 MG/DL (ref 0–1.2)
BILIRUB UR QL STRIP: NEGATIVE
BUN SERPL-MCNC: 7 MG/DL (ref 8–23)
CALCIUM SERPL-MCNC: 9.5 MG/DL (ref 8.6–10.4)
CASTS #/AREA URNS LPF: ABNORMAL /LPF (ref 0–2)
CASTS #/AREA URNS LPF: ABNORMAL /LPF (ref 0–2)
CHLORIDE SERPL-SCNC: 94 MMOL/L (ref 98–107)
CLARITY UR: ABNORMAL
CO2 SERPL-SCNC: 24 MMOL/L (ref 20–31)
COLOR UR: ABNORMAL
CREAT SERPL-MCNC: 0.7 MG/DL (ref 0.7–1.2)
CRP SERPL HS-MCNC: 151 MG/L (ref 0–5)
EOSINOPHIL # BLD: 0.05 K/UL (ref 0–0.44)
EOSINOPHILS RELATIVE PERCENT: 0 % (ref 1–4)
EPI CELLS #/AREA URNS HPF: ABNORMAL /HPF (ref 0–5)
ERYTHROCYTE [DISTWIDTH] IN BLOOD BY AUTOMATED COUNT: 15.6 % (ref 11.8–14.4)
GFR, ESTIMATED: >90 ML/MIN/1.73M2
GLUCOSE SERPL-MCNC: 128 MG/DL (ref 74–99)
GLUCOSE UR STRIP-MCNC: NEGATIVE MG/DL
HCT VFR BLD AUTO: 35.4 % (ref 40.7–50.3)
HGB BLD-MCNC: 11.5 G/DL (ref 13–17)
HGB UR QL STRIP.AUTO: ABNORMAL
IMM GRANULOCYTES # BLD AUTO: 0.08 K/UL (ref 0–0.3)
IMM GRANULOCYTES NFR BLD: 1 %
INR PPP: 1.1
KETONES UR STRIP-MCNC: NEGATIVE MG/DL
LACTIC ACID, SEPSIS WHOLE BLOOD: 1.1 MMOL/L (ref 0.5–1.9)
LACTIC ACID, SEPSIS WHOLE BLOOD: 1.2 MMOL/L (ref 0.5–1.9)
LEUKOCYTE ESTERASE UR QL STRIP: ABNORMAL
LIPASE SERPL-CCNC: 18 U/L (ref 13–60)
LYMPHOCYTES NFR BLD: 0.87 K/UL (ref 1.1–3.7)
LYMPHOCYTES RELATIVE PERCENT: 7 % (ref 24–43)
MCH RBC QN AUTO: 25 PG (ref 25.2–33.5)
MCHC RBC AUTO-ENTMCNC: 32.5 G/DL (ref 28.4–34.8)
MCV RBC AUTO: 77 FL (ref 82.6–102.9)
MONOCYTES NFR BLD: 0.47 K/UL (ref 0.1–1.2)
MONOCYTES NFR BLD: 4 % (ref 3–12)
MUCOUS THREADS URNS QL MICRO: ABNORMAL
NEUTROPHILS NFR BLD: 88 % (ref 36–65)
NEUTS SEG NFR BLD: 10.75 K/UL (ref 1.5–8.1)
NITRITE UR QL STRIP: NEGATIVE
NRBC BLD-RTO: 0.2 PER 100 WBC
PARTIAL THROMBOPLASTIN TIME: 32 SEC (ref 23–36.5)
PH UR STRIP: 5.5 [PH] (ref 5–8)
PLATELET # BLD AUTO: 331 K/UL (ref 138–453)
PMV BLD AUTO: 9 FL (ref 8.1–13.5)
POTASSIUM SERPL-SCNC: 3.6 MMOL/L (ref 3.7–5.3)
PROT SERPL-MCNC: 6.8 G/DL (ref 6.6–8.7)
PROT UR STRIP-MCNC: ABNORMAL MG/DL
PROTHROMBIN TIME: 14.1 SEC (ref 11.7–14.9)
RBC # BLD AUTO: 4.6 M/UL (ref 4.21–5.77)
RBC # BLD: ABNORMAL 10*6/UL
RBC #/AREA URNS HPF: ABNORMAL /HPF (ref 0–2)
SODIUM SERPL-SCNC: 132 MMOL/L (ref 136–145)
SP GR UR STRIP: 1.01 (ref 1–1.03)
TROPONIN I SERPL HS-MCNC: <6 NG/L (ref 0–22)
UROBILINOGEN UR STRIP-ACNC: NORMAL EU/DL (ref 0–1)
WBC #/AREA URNS HPF: ABNORMAL /HPF (ref 0–5)
WBC OTHER # BLD: 12.2 K/UL (ref 3.5–11.3)

## 2025-02-15 PROCEDURE — 6370000000 HC RX 637 (ALT 250 FOR IP): Performed by: NURSE PRACTITIONER

## 2025-02-15 PROCEDURE — 80053 COMPREHEN METABOLIC PANEL: CPT

## 2025-02-15 PROCEDURE — 6360000004 HC RX CONTRAST MEDICATION

## 2025-02-15 PROCEDURE — 96375 TX/PRO/DX INJ NEW DRUG ADDON: CPT

## 2025-02-15 PROCEDURE — 99222 1ST HOSP IP/OBS MODERATE 55: CPT | Performed by: SURGERY

## 2025-02-15 PROCEDURE — 74177 CT ABD & PELVIS W/CONTRAST: CPT

## 2025-02-15 PROCEDURE — 93005 ELECTROCARDIOGRAM TRACING: CPT

## 2025-02-15 PROCEDURE — 85025 COMPLETE CBC W/AUTO DIFF WBC: CPT

## 2025-02-15 PROCEDURE — 2580000003 HC RX 258

## 2025-02-15 PROCEDURE — 6360000002 HC RX W HCPCS

## 2025-02-15 PROCEDURE — 6360000002 HC RX W HCPCS: Performed by: NURSE PRACTITIONER

## 2025-02-15 PROCEDURE — 2060000000 HC ICU INTERMEDIATE R&B

## 2025-02-15 PROCEDURE — 99285 EMERGENCY DEPT VISIT HI MDM: CPT

## 2025-02-15 PROCEDURE — 71045 X-RAY EXAM CHEST 1 VIEW: CPT

## 2025-02-15 PROCEDURE — 85730 THROMBOPLASTIN TIME PARTIAL: CPT

## 2025-02-15 PROCEDURE — 86140 C-REACTIVE PROTEIN: CPT

## 2025-02-15 PROCEDURE — 87186 SC STD MICRODIL/AGAR DIL: CPT

## 2025-02-15 PROCEDURE — 99222 1ST HOSP IP/OBS MODERATE 55: CPT | Performed by: INTERNAL MEDICINE

## 2025-02-15 PROCEDURE — 96365 THER/PROPH/DIAG IV INF INIT: CPT

## 2025-02-15 PROCEDURE — 83690 ASSAY OF LIPASE: CPT

## 2025-02-15 PROCEDURE — 99222 1ST HOSP IP/OBS MODERATE 55: CPT | Performed by: NURSE PRACTITIONER

## 2025-02-15 PROCEDURE — 81001 URINALYSIS AUTO W/SCOPE: CPT

## 2025-02-15 PROCEDURE — 2500000003 HC RX 250 WO HCPCS: Performed by: NURSE PRACTITIONER

## 2025-02-15 PROCEDURE — 74018 RADEX ABDOMEN 1 VIEW: CPT

## 2025-02-15 PROCEDURE — 87086 URINE CULTURE/COLONY COUNT: CPT

## 2025-02-15 PROCEDURE — 2580000003 HC RX 258: Performed by: NURSE PRACTITIONER

## 2025-02-15 PROCEDURE — 85610 PROTHROMBIN TIME: CPT

## 2025-02-15 PROCEDURE — 83605 ASSAY OF LACTIC ACID: CPT

## 2025-02-15 PROCEDURE — 84484 ASSAY OF TROPONIN QUANT: CPT

## 2025-02-15 PROCEDURE — 87040 BLOOD CULTURE FOR BACTERIA: CPT

## 2025-02-15 PROCEDURE — G0545 PR INHERENT VISIT TO INPT: HCPCS | Performed by: INTERNAL MEDICINE

## 2025-02-15 PROCEDURE — 96361 HYDRATE IV INFUSION ADD-ON: CPT

## 2025-02-15 RX ORDER — IOPAMIDOL 755 MG/ML
75 INJECTION, SOLUTION INTRAVASCULAR
Status: COMPLETED | OUTPATIENT
Start: 2025-02-15 | End: 2025-02-15

## 2025-02-15 RX ORDER — POLYETHYLENE GLYCOL 3350 17 G/17G
17 POWDER, FOR SOLUTION ORAL DAILY PRN
Status: DISCONTINUED | OUTPATIENT
Start: 2025-02-15 | End: 2025-02-18

## 2025-02-15 RX ORDER — SODIUM PHOSPHATE, DIBASIC AND SODIUM PHOSPHATE, MONOBASIC 7; 19 G/230ML; G/230ML
1 ENEMA RECTAL ONCE
Status: COMPLETED | OUTPATIENT
Start: 2025-02-15 | End: 2025-02-15

## 2025-02-15 RX ORDER — 0.9 % SODIUM CHLORIDE 0.9 %
1000 INTRAVENOUS SOLUTION INTRAVENOUS ONCE
Status: COMPLETED | OUTPATIENT
Start: 2025-02-15 | End: 2025-02-15

## 2025-02-15 RX ORDER — POTASSIUM CHLORIDE 7.45 MG/ML
10 INJECTION INTRAVENOUS PRN
Status: DISCONTINUED | OUTPATIENT
Start: 2025-02-15 | End: 2025-02-23 | Stop reason: HOSPADM

## 2025-02-15 RX ORDER — METOPROLOL TARTRATE 1 MG/ML
5 INJECTION, SOLUTION INTRAVENOUS EVERY 6 HOURS
Status: DISCONTINUED | OUTPATIENT
Start: 2025-02-15 | End: 2025-02-16

## 2025-02-15 RX ORDER — SODIUM CHLORIDE 9 MG/ML
INJECTION, SOLUTION INTRAVENOUS PRN
Status: DISCONTINUED | OUTPATIENT
Start: 2025-02-15 | End: 2025-02-23 | Stop reason: HOSPADM

## 2025-02-15 RX ORDER — ACETAMINOPHEN 650 MG/1
650 SUPPOSITORY RECTAL EVERY 6 HOURS PRN
Status: DISCONTINUED | OUTPATIENT
Start: 2025-02-15 | End: 2025-02-23 | Stop reason: HOSPADM

## 2025-02-15 RX ORDER — SODIUM CHLORIDE 0.9 % (FLUSH) 0.9 %
5-40 SYRINGE (ML) INJECTION EVERY 12 HOURS SCHEDULED
Status: DISCONTINUED | OUTPATIENT
Start: 2025-02-15 | End: 2025-02-23 | Stop reason: HOSPADM

## 2025-02-15 RX ORDER — ECHINACEA PURPUREA EXTRACT 125 MG
1 TABLET ORAL PRN
Status: DISCONTINUED | OUTPATIENT
Start: 2025-02-15 | End: 2025-02-23 | Stop reason: HOSPADM

## 2025-02-15 RX ORDER — ACETAMINOPHEN 325 MG/1
650 TABLET ORAL EVERY 6 HOURS PRN
Status: DISCONTINUED | OUTPATIENT
Start: 2025-02-15 | End: 2025-02-23 | Stop reason: HOSPADM

## 2025-02-15 RX ORDER — SODIUM CHLORIDE 9 MG/ML
INJECTION, SOLUTION INTRAVENOUS CONTINUOUS
Status: DISCONTINUED | OUTPATIENT
Start: 2025-02-15 | End: 2025-02-18

## 2025-02-15 RX ORDER — BISACODYL 10 MG
10 SUPPOSITORY, RECTAL RECTAL DAILY PRN
Status: DISCONTINUED | OUTPATIENT
Start: 2025-02-15 | End: 2025-02-22

## 2025-02-15 RX ORDER — MORPHINE SULFATE 10 MG/ML
8 INJECTION, SOLUTION INTRAMUSCULAR; INTRAVENOUS ONCE
Status: COMPLETED | OUTPATIENT
Start: 2025-02-15 | End: 2025-02-15

## 2025-02-15 RX ORDER — MORPHINE SULFATE 4 MG/ML
2 INJECTION INTRAVENOUS EVERY 4 HOURS PRN
Status: DISCONTINUED | OUTPATIENT
Start: 2025-02-15 | End: 2025-02-15

## 2025-02-15 RX ORDER — MORPHINE SULFATE 4 MG/ML
4 INJECTION INTRAVENOUS EVERY 4 HOURS PRN
Status: DISCONTINUED | OUTPATIENT
Start: 2025-02-15 | End: 2025-02-23 | Stop reason: HOSPADM

## 2025-02-15 RX ORDER — HYOSCYAMINE SULFATE 0.12 MG/1
0.12 TABLET SUBLINGUAL EVERY 8 HOURS PRN
Status: DISCONTINUED | OUTPATIENT
Start: 2025-02-15 | End: 2025-02-23 | Stop reason: HOSPADM

## 2025-02-15 RX ORDER — ENOXAPARIN SODIUM 100 MG/ML
40 INJECTION SUBCUTANEOUS DAILY
Status: DISCONTINUED | OUTPATIENT
Start: 2025-02-16 | End: 2025-02-23 | Stop reason: HOSPADM

## 2025-02-15 RX ORDER — MIDAZOLAM HYDROCHLORIDE 2 MG/2ML
2 INJECTION, SOLUTION INTRAMUSCULAR; INTRAVENOUS ONCE
Status: COMPLETED | OUTPATIENT
Start: 2025-02-15 | End: 2025-02-15

## 2025-02-15 RX ORDER — SODIUM CHLORIDE 0.9 % (FLUSH) 0.9 %
5-40 SYRINGE (ML) INJECTION PRN
Status: DISCONTINUED | OUTPATIENT
Start: 2025-02-15 | End: 2025-02-23 | Stop reason: HOSPADM

## 2025-02-15 RX ORDER — POTASSIUM CHLORIDE 1500 MG/1
40 TABLET, EXTENDED RELEASE ORAL PRN
Status: DISCONTINUED | OUTPATIENT
Start: 2025-02-15 | End: 2025-02-23 | Stop reason: HOSPADM

## 2025-02-15 RX ORDER — ONDANSETRON 4 MG/1
4 TABLET, ORALLY DISINTEGRATING ORAL EVERY 8 HOURS PRN
Status: DISCONTINUED | OUTPATIENT
Start: 2025-02-15 | End: 2025-02-23 | Stop reason: HOSPADM

## 2025-02-15 RX ORDER — FENTANYL CITRATE 50 UG/ML
100 INJECTION, SOLUTION INTRAMUSCULAR; INTRAVENOUS ONCE
Status: COMPLETED | OUTPATIENT
Start: 2025-02-15 | End: 2025-02-15

## 2025-02-15 RX ORDER — ONDANSETRON 2 MG/ML
4 INJECTION INTRAMUSCULAR; INTRAVENOUS EVERY 6 HOURS PRN
Status: DISCONTINUED | OUTPATIENT
Start: 2025-02-15 | End: 2025-02-23 | Stop reason: HOSPADM

## 2025-02-15 RX ADMIN — SODIUM PHOSPHATE, DIBASIC AND SODIUM PHOSPHATE, MONOBASIC 1 ENEMA: 7; 19 ENEMA RECTAL at 22:28

## 2025-02-15 RX ADMIN — FENTANYL CITRATE 100 MCG: 50 INJECTION, SOLUTION INTRAMUSCULAR; INTRAVENOUS at 16:00

## 2025-02-15 RX ADMIN — MIDAZOLAM HYDROCHLORIDE 2 MG: 1 INJECTION, SOLUTION INTRAMUSCULAR; INTRAVENOUS at 18:06

## 2025-02-15 RX ADMIN — IOPAMIDOL 75 ML: 755 INJECTION, SOLUTION INTRAVENOUS at 16:57

## 2025-02-15 RX ADMIN — SODIUM CHLORIDE: 0.9 INJECTION, SOLUTION INTRAVENOUS at 22:53

## 2025-02-15 RX ADMIN — MORPHINE SULFATE 2 MG: 4 INJECTION INTRAVENOUS at 22:22

## 2025-02-15 RX ADMIN — METOPROLOL TARTRATE 5 MG: 5 INJECTION INTRAVENOUS at 22:18

## 2025-02-15 RX ADMIN — HYDROMORPHONE HYDROCHLORIDE 0.5 MG: 1 INJECTION, SOLUTION INTRAMUSCULAR; INTRAVENOUS; SUBCUTANEOUS at 19:14

## 2025-02-15 RX ADMIN — SODIUM CHLORIDE 1000 ML: 9 INJECTION, SOLUTION INTRAVENOUS at 16:06

## 2025-02-15 RX ADMIN — SODIUM CHLORIDE 1000 ML: 0.9 INJECTION, SOLUTION INTRAVENOUS at 16:40

## 2025-02-15 RX ADMIN — CEFEPIME 2000 MG: 2 INJECTION, POWDER, FOR SOLUTION INTRAVENOUS at 16:39

## 2025-02-15 RX ADMIN — MORPHINE SULFATE 8 MG: 10 INJECTION INTRAVENOUS at 16:42

## 2025-02-15 ASSESSMENT — PAIN SCALES - GENERAL
PAINLEVEL_OUTOF10: 3
PAINLEVEL_OUTOF10: 9
PAINLEVEL_OUTOF10: 10
PAINLEVEL_OUTOF10: 10
PAINLEVEL_OUTOF10: 4
PAINLEVEL_OUTOF10: 7

## 2025-02-15 ASSESSMENT — PAIN DESCRIPTION - DESCRIPTORS: DESCRIPTORS: ACHING;CRAMPING;DISCOMFORT

## 2025-02-15 ASSESSMENT — PAIN DESCRIPTION - LOCATION
LOCATION: ABDOMEN

## 2025-02-15 ASSESSMENT — PAIN DESCRIPTION - PAIN TYPE: TYPE: ACUTE PAIN

## 2025-02-15 ASSESSMENT — PAIN - FUNCTIONAL ASSESSMENT: PAIN_FUNCTIONAL_ASSESSMENT: 0-10

## 2025-02-15 ASSESSMENT — PAIN DESCRIPTION - ORIENTATION: ORIENTATION: RIGHT;LEFT;LOWER

## 2025-02-15 NOTE — ED PROVIDER NOTES
[facility]     Emergency Department     Faculty Attestation    I performed a history and physical examination of the patient and discussed management with the resident. I reviewed the resident's note and agree with the documented findings and plan of care. Any areas of disagreement are noted on the chart. I was personally present for the key portions of any procedures. I have documented in the chart those procedures where I was not present during the key portions. I have reviewed the emergency nurses triage note. I agree with the chief complaint, past medical history, past surgical history, allergies, medications, social and family history as documented unless otherwise noted below. For Physician Assistant/ Nurse Practitioner cases/documentation I have personally evaluated this patient and have completed at least one if not all key elements of the E/M (history, physical exam, and MDM). Additional findings are as noted.    Recent robotic prostatectomy, abdomen distended and diffusely tender.     Mehul Lujan MD  02/15/25 8550

## 2025-02-15 NOTE — ED PROVIDER NOTES
[facility] EMERGENCY DEPARTMENT  Emergency Department Encounter  Emergency Medicine Resident     Pt Name:Ivanna Khoury  MRN: [id number]  Birthdate 1960  Date of evaluation: 2/15/25  PCP:  Lisette Strong MD  Note Started: 4:00 PM EST      CHIEF COMPLAINT       Chief Complaint   Patient presents with    Abdominal Pain     HISTORY OF PRESENT ILLNESS  (Location/Symptom, Timing/Onset, Context/Setting, Quality, Duration, Modifying Factors, Severity.)      Ivanna Khoury is a 64 y.o. male who presents with generalized abdominal pain had S/P robotic laparoscopic prostatectomy on 2/13/25, discharged yesterday with p.o. antibiotics.  Patient is having abdominal distention with nausea and has not been able to pass any stool or flatus.  Patient has no appetite patient is also febrile and has severe abdominal pain reported 10 out of 10 in severity.  Patient denies any chest pain, shortness of breath, cough, sick contacts, trauma.  Patient does have urinary catheter in place.  Patient had a temperature of 102 at home and took Tylenol 1 g around 8 AM and again around 3 PM.  Patient is afebrile on admission but does feel warm to touch.  Patient is currently not on any oral anticoagulants.    PAST MEDICAL / SURGICAL / SOCIAL / FAMILY HISTORY      has a past medical history of Acute epididymitis, COVID, Enlarged prostate, Tovar catheter in place, Hyperlipidemia, Hypertension, Lung nodules, MRSA (methicillin resistant staph aureus) culture positive, Under care of service provider, Under care of service provider, Urinary retention, and UTI (urinary tract infection).     has a past surgical history that includes vascular surgery; Cystoscopy (N/A, 07/30/2020); TURP (N/A, 08/14/2020); hernia repair (Right, 07/27/2021); Cystoscopy (01/24/2025); Cystoscopy (N/A, 1/24/2025); and Prostatectomy (N/A, 2/13/2025).    Social History     Socioeconomic History    Marital status:      Spouse name: Not on file    Number of

## 2025-02-15 NOTE — ED PROVIDER NOTES
[facility] EMERGENCY DEPARTMENT  Emergency Department  Emergency Medicine Resident Turn-Over     Care of Ivanna Khoury was assumed from Dr. Adams and is being seen for Abdominal Pain  .  The patient's initial evaluation and plan have been discussed with the prior provider who initially evaluated the patient.     EMERGENCY DEPARTMENT COURSE / MEDICAL DECISION MAKING:       MEDICATIONS GIVEN:  Orders Placed This Encounter   Medications    fentaNYL (SUBLIMAZE) injection 100 mcg    sodium chloride 0.9 % bolus 1,000 mL    ceFEPIme (MAXIPIME) 2,000 mg in sodium chloride 0.9 % 100 mL IVPB (mini-bag)     Order Specific Question:   Antimicrobial Indications     Answer:   Surgical Site Infection    sodium chloride 0.9 % bolus 1,000 mL    iopamidol (ISOVUE-370) 76 % injection 75 mL    morphine (PF) injection 8 mg    midazolam PF (VERSED) injection 2 mg    HYDROmorphone (DILAUDID) injection 0.5 mg       LABS / RADIOLOGY:     Labs Reviewed   CBC WITH AUTO DIFFERENTIAL - Abnormal; Notable for the following components:       Result Value    WBC 12.2 (*)     Hemoglobin 11.5 (*)     Hematocrit 35.4 (*)     MCV 77.0 (*)     MCH 25.0 (*)     RDW 15.6 (*)     NRBC Automated 0.2 (*)     Neutrophils % 88 (*)     Lymphocytes % 7 (*)     Eosinophils % 0 (*)     Immature Granulocytes % 1 (*)     Neutrophils Absolute 10.75 (*)     Lymphocytes Absolute 0.87 (*)     All other components within normal limits   COMPREHENSIVE METABOLIC PANEL - Abnormal; Notable for the following components:    Sodium 132 (*)     Potassium 3.6 (*)     Chloride 94 (*)     Glucose 128 (*)     BUN 7 (*)     ALT 81 (*)     All other components within normal limits   URINALYSIS WITH MICROSCOPIC - Abnormal; Notable for the following components:    Color, UA Orange (*)     Turbidity UA Turbid (*)     Urine Hgb LARGE (*)     Protein, UA 3+ (*)     Leukocyte Esterase, Urine MODERATE (*)     Bacteria, UA FEW (*)     All other components within normal limits  Respirations: 20, BP: (!) 138/96, SpO2: 92 %    This patient is a 64 y.o. Male with   Severe abdominal pain  Robotic lap prostatectomy 2/13  No BM not passing flatus  Nausea    Post op ileus on CT  Urology no acute intervention  Bladder infection --> Rocephin    Post op ileus --> NG tube, versed for ileus  2L IVF     ED Course as of 02/15/25 2033   Sat Feb 15, 2025   1611 Patient's white blood cell is 12.2 with a neutrophilia of 88% [LL]   1612 Lactate is 1.1 [LL]   1621 Spoke with urology on-call team Dr. Miguel who recommended starting cefepime broad-spectrum antibiotic for patient.  Will wait on CT abdomen with IV contrast to further determine patient's disposition.  Advised to leave Tovar in once it is draining.  Once CT has resulted will reach out back again to urology [LL]   1628 Decision to do the 30 mL/kg bolus given that patient has darker colored urine.  Using ideal weight patient's 30 mL/kg is 1914 mL.  Will add morphine 8 mg IV stat for more pain control [LL]   1634 Patient reassessed in room says pain has significantly improved however still having some pain.  Updated patient on urology's consult.  Potassium is 3.6.  Patient is completed his fluid bolus will do potassium replacement PT PTT were all normal [LL]   1640 Urinary bag is draining freely, darkly stained urine 400 mL in bag patient stated that he emptied about an 11 AM [LL]   1710 Lipase and troponin all within normal limit [LL]   1711 Patient's pulse has reduced to 111 post 1 L normal saline IVF bolus [LL]   1728 Patient had his chest x-ray and CT abdomen still awaiting report [LL]   1728 Patient's pulse is currently 103 much improved from initial 130's [LL]   1732 Spoke with Dr. Stephenson, at [facility] stated that patient has postoperative ileus, with distention.  Cannot be doable with recent surgery.  States the bladder appears to have signs for infection.  Did update patient [LL]   1749 Spoke with urology team stated that patient

## 2025-02-15 NOTE — ED PROVIDER NOTES
Handoff taken on the following patient from prior Attending Physician:    Pt Name: Ivanna Khoury    PCP:  Lisette Strong MD         Attestation    I was available and discussed any additional care issues that arose and coordinated the management plans with the resident(s) caring for the patient during my duty period. Any areas of disagreement with resident’s documentation of care or procedures are noted on the chart. I was personally present for the key portions of any/all procedures during my duty period. I have documented in the chart those procedures where I was not present during the key portions.    Patient is a 64-year-old male status post robotic prostatectomy recently currently has acute abdomen awaiting CTs with contrast and urology consultation at this time patient meeting SIRS criteria antibiotics will be required in this individual.    ED Course as of 02/15/25 2330   Sat Feb 15, 2025   1611 Patient's white blood cell is 12.2 with a neutrophilia of 88% [LL]   1612 Lactate is 1.1 [LL]   1621 Spoke with urology on-call team Dr. Miguel who recommended starting cefepime broad-spectrum antibiotic for patient.  Will wait on CT abdomen with IV contrast to further determine patient's disposition.  Advised to leave Tovar in once it is draining.  Once CT has resulted will reach out back again to urology [LL]   1628 Decision to do the 30 mL/kg bolus given that patient has darker colored urine.  Using ideal weight patient's 30 mL/kg is 1914 mL.  Will add morphine 8 mg IV stat for more pain control [LL]   1634 Patient reassessed in room says pain has significantly improved however still having some pain.  Updated patient on urology's consult.  Potassium is 3.6.  Patient is completed his fluid bolus will do potassium replacement PT PTT were all normal [LL]   1640 Urinary bag is draining freely, darkly stained urine 400 mL in bag patient stated that he emptied about an 11 AM [LL]   1710 Lipase and troponin all

## 2025-02-16 ENCOUNTER — APPOINTMENT (OUTPATIENT)
Dept: GENERAL RADIOLOGY | Age: 65
DRG: 854 | End: 2025-02-16
Payer: MEDICARE

## 2025-02-16 PROBLEM — A49.8 KLEBSIELLA INFECTION: Status: ACTIVE | Noted: 2025-02-16

## 2025-02-16 PROBLEM — N39.0 UTI DUE TO KLEBSIELLA SPECIES: Status: ACTIVE | Noted: 2025-02-16

## 2025-02-16 PROBLEM — B96.89 UTI DUE TO KLEBSIELLA SPECIES: Status: ACTIVE | Noted: 2025-02-16

## 2025-02-16 LAB
ANION GAP SERPL CALCULATED.3IONS-SCNC: 14 MMOL/L (ref 9–16)
BASOPHILS # BLD: 0.03 K/UL (ref 0–0.2)
BASOPHILS NFR BLD: 0 % (ref 0–2)
BUN SERPL-MCNC: 11 MG/DL (ref 8–23)
CALCIUM SERPL-MCNC: 8.8 MG/DL (ref 8.6–10.4)
CHLORIDE SERPL-SCNC: 98 MMOL/L (ref 98–107)
CO2 SERPL-SCNC: 23 MMOL/L (ref 20–31)
CREAT SERPL-MCNC: 0.6 MG/DL (ref 0.7–1.2)
EOSINOPHIL # BLD: <0.03 K/UL (ref 0–0.44)
EOSINOPHILS RELATIVE PERCENT: 0 % (ref 1–4)
ERYTHROCYTE [DISTWIDTH] IN BLOOD BY AUTOMATED COUNT: 15.8 % (ref 11.8–14.4)
FERRITIN SERPL-MCNC: 488 NG/ML
GFR, ESTIMATED: >90 ML/MIN/1.73M2
GLUCOSE BLD-MCNC: 110 MG/DL (ref 75–110)
GLUCOSE SERPL-MCNC: 151 MG/DL (ref 74–99)
HCT VFR BLD AUTO: 36.8 % (ref 40.7–50.3)
HGB BLD-MCNC: 11.4 G/DL (ref 13–17)
IMM GRANULOCYTES # BLD AUTO: 0.08 K/UL (ref 0–0.3)
IMM GRANULOCYTES NFR BLD: 1 %
IRON SATN MFR SERPL: 8 % (ref 20–55)
IRON SERPL-MCNC: 16 UG/DL (ref 61–157)
LYMPHOCYTES NFR BLD: 0.77 K/UL (ref 1.1–3.7)
LYMPHOCYTES RELATIVE PERCENT: 6 % (ref 24–43)
MAGNESIUM SERPL-MCNC: 2 MG/DL (ref 1.6–2.4)
MCH RBC QN AUTO: 24.5 PG (ref 25.2–33.5)
MCHC RBC AUTO-ENTMCNC: 31 G/DL (ref 28.4–34.8)
MCV RBC AUTO: 79 FL (ref 82.6–102.9)
MONOCYTES NFR BLD: 0.48 K/UL (ref 0.1–1.2)
MONOCYTES NFR BLD: 4 % (ref 3–12)
NEUTROPHILS NFR BLD: 89 % (ref 36–65)
NEUTS SEG NFR BLD: 11.23 K/UL (ref 1.5–8.1)
NRBC BLD-RTO: 0 PER 100 WBC
PLATELET # BLD AUTO: 339 K/UL (ref 138–453)
PMV BLD AUTO: 9.3 FL (ref 8.1–13.5)
POTASSIUM SERPL-SCNC: 3.5 MMOL/L (ref 3.7–5.3)
PROCALCITONIN SERPL-MCNC: 0.27 NG/ML (ref 0–0.09)
RBC # BLD AUTO: 4.66 M/UL (ref 4.21–5.77)
RBC # BLD: ABNORMAL 10*6/UL
SODIUM SERPL-SCNC: 135 MMOL/L (ref 136–145)
TIBC SERPL-MCNC: 194 UG/DL (ref 250–450)
UNSATURATED IRON BINDING CAPACITY: 178 UG/DL (ref 112–347)
WBC OTHER # BLD: 12.6 K/UL (ref 3.5–11.3)

## 2025-02-16 PROCEDURE — 82728 ASSAY OF FERRITIN: CPT

## 2025-02-16 PROCEDURE — 74018 RADEX ABDOMEN 1 VIEW: CPT

## 2025-02-16 PROCEDURE — 83735 ASSAY OF MAGNESIUM: CPT

## 2025-02-16 PROCEDURE — 2500000003 HC RX 250 WO HCPCS: Performed by: NURSE PRACTITIONER

## 2025-02-16 PROCEDURE — 99232 SBSQ HOSP IP/OBS MODERATE 35: CPT | Performed by: SURGERY

## 2025-02-16 PROCEDURE — 97161 PT EVAL LOW COMPLEX 20 MIN: CPT

## 2025-02-16 PROCEDURE — 97116 GAIT TRAINING THERAPY: CPT

## 2025-02-16 PROCEDURE — 84145 PROCALCITONIN (PCT): CPT

## 2025-02-16 PROCEDURE — 80048 BASIC METABOLIC PNL TOTAL CA: CPT

## 2025-02-16 PROCEDURE — 2580000003 HC RX 258: Performed by: NURSE PRACTITIONER

## 2025-02-16 PROCEDURE — 85025 COMPLETE CBC W/AUTO DIFF WBC: CPT

## 2025-02-16 PROCEDURE — 83550 IRON BINDING TEST: CPT

## 2025-02-16 PROCEDURE — 6360000002 HC RX W HCPCS: Performed by: NURSE PRACTITIONER

## 2025-02-16 PROCEDURE — 83540 ASSAY OF IRON: CPT

## 2025-02-16 PROCEDURE — 2580000003 HC RX 258: Performed by: STUDENT IN AN ORGANIZED HEALTH CARE EDUCATION/TRAINING PROGRAM

## 2025-02-16 PROCEDURE — 6370000000 HC RX 637 (ALT 250 FOR IP): Performed by: NURSE PRACTITIONER

## 2025-02-16 PROCEDURE — 99232 SBSQ HOSP IP/OBS MODERATE 35: CPT | Performed by: INTERNAL MEDICINE

## 2025-02-16 PROCEDURE — G0545 PR INHERENT VISIT TO INPT: HCPCS | Performed by: INTERNAL MEDICINE

## 2025-02-16 PROCEDURE — 2060000000 HC ICU INTERMEDIATE R&B

## 2025-02-16 PROCEDURE — 82947 ASSAY GLUCOSE BLOOD QUANT: CPT

## 2025-02-16 PROCEDURE — 99232 SBSQ HOSP IP/OBS MODERATE 35: CPT | Performed by: STUDENT IN AN ORGANIZED HEALTH CARE EDUCATION/TRAINING PROGRAM

## 2025-02-16 PROCEDURE — 36415 COLL VENOUS BLD VENIPUNCTURE: CPT

## 2025-02-16 PROCEDURE — 6360000002 HC RX W HCPCS: Performed by: STUDENT IN AN ORGANIZED HEALTH CARE EDUCATION/TRAINING PROGRAM

## 2025-02-16 PROCEDURE — 6370000000 HC RX 637 (ALT 250 FOR IP): Performed by: STUDENT IN AN ORGANIZED HEALTH CARE EDUCATION/TRAINING PROGRAM

## 2025-02-16 RX ORDER — KETOROLAC TROMETHAMINE 30 MG/ML
15 INJECTION, SOLUTION INTRAMUSCULAR; INTRAVENOUS EVERY 6 HOURS PRN
Status: ACTIVE | OUTPATIENT
Start: 2025-02-16 | End: 2025-02-21

## 2025-02-16 RX ORDER — METOPROLOL TARTRATE 1 MG/ML
5 INJECTION, SOLUTION INTRAVENOUS EVERY 4 HOURS PRN
Status: DISCONTINUED | OUTPATIENT
Start: 2025-02-16 | End: 2025-02-23 | Stop reason: HOSPADM

## 2025-02-16 RX ORDER — HYDRALAZINE HYDROCHLORIDE 20 MG/ML
10 INJECTION INTRAMUSCULAR; INTRAVENOUS EVERY 6 HOURS PRN
Status: DISCONTINUED | OUTPATIENT
Start: 2025-02-16 | End: 2025-02-23 | Stop reason: HOSPADM

## 2025-02-16 RX ORDER — KETOROLAC TROMETHAMINE 30 MG/ML
30 INJECTION, SOLUTION INTRAMUSCULAR; INTRAVENOUS EVERY 6 HOURS PRN
Status: DISPENSED | OUTPATIENT
Start: 2025-02-16 | End: 2025-02-21

## 2025-02-16 RX ORDER — TAMSULOSIN HYDROCHLORIDE 0.4 MG/1
0.4 CAPSULE ORAL DAILY
Status: DISCONTINUED | OUTPATIENT
Start: 2025-02-16 | End: 2025-02-23 | Stop reason: HOSPADM

## 2025-02-16 RX ORDER — ROSUVASTATIN CALCIUM 10 MG/1
10 TABLET, COATED ORAL NIGHTLY
Status: DISCONTINUED | OUTPATIENT
Start: 2025-02-16 | End: 2025-02-23 | Stop reason: HOSPADM

## 2025-02-16 RX ORDER — LISINOPRIL 20 MG/1
40 TABLET ORAL DAILY
Status: DISCONTINUED | OUTPATIENT
Start: 2025-02-16 | End: 2025-02-19

## 2025-02-16 RX ORDER — CARVEDILOL 6.25 MG/1
3.12 TABLET ORAL 2 TIMES DAILY
Status: DISCONTINUED | OUTPATIENT
Start: 2025-02-16 | End: 2025-02-23 | Stop reason: HOSPADM

## 2025-02-16 RX ADMIN — POTASSIUM BICARBONATE 40 MEQ: 782 TABLET, EFFERVESCENT ORAL at 12:15

## 2025-02-16 RX ADMIN — METOPROLOL TARTRATE 5 MG: 5 INJECTION INTRAVENOUS at 09:00

## 2025-02-16 RX ADMIN — CEFEPIME 2000 MG: 2 INJECTION, POWDER, FOR SOLUTION INTRAVENOUS at 04:18

## 2025-02-16 RX ADMIN — LISINOPRIL 40 MG: 20 TABLET ORAL at 16:45

## 2025-02-16 RX ADMIN — KETOROLAC TROMETHAMINE 30 MG: 30 INJECTION, SOLUTION INTRAMUSCULAR; INTRAVENOUS at 11:03

## 2025-02-16 RX ADMIN — KETOROLAC TROMETHAMINE 30 MG: 30 INJECTION, SOLUTION INTRAMUSCULAR; INTRAVENOUS at 17:38

## 2025-02-16 RX ADMIN — ROSUVASTATIN CALCIUM 10 MG: 10 TABLET, FILM COATED ORAL at 21:29

## 2025-02-16 RX ADMIN — ENOXAPARIN SODIUM 40 MG: 100 INJECTION SUBCUTANEOUS at 08:59

## 2025-02-16 RX ADMIN — METOPROLOL TARTRATE 5 MG: 5 INJECTION INTRAVENOUS at 04:16

## 2025-02-16 RX ADMIN — SODIUM CHLORIDE, PRESERVATIVE FREE 40 MG: 5 INJECTION INTRAVENOUS at 09:00

## 2025-02-16 RX ADMIN — CEFEPIME 2000 MG: 2 INJECTION, POWDER, FOR SOLUTION INTRAVENOUS at 15:47

## 2025-02-16 RX ADMIN — SODIUM CHLORIDE: 0.9 INJECTION, SOLUTION INTRAVENOUS at 13:18

## 2025-02-16 RX ADMIN — SODIUM CHLORIDE, PRESERVATIVE FREE 10 ML: 5 INJECTION INTRAVENOUS at 21:29

## 2025-02-16 RX ADMIN — ACETAMINOPHEN 650 MG: 325 TABLET ORAL at 21:28

## 2025-02-16 RX ADMIN — SODIUM CHLORIDE, PRESERVATIVE FREE 10 ML: 5 INJECTION INTRAVENOUS at 09:00

## 2025-02-16 ASSESSMENT — PAIN SCALES - GENERAL
PAINLEVEL_OUTOF10: 7
PAINLEVEL_OUTOF10: 7
PAINLEVEL_OUTOF10: 2
PAINLEVEL_OUTOF10: 3
PAINLEVEL_OUTOF10: 6
PAINLEVEL_OUTOF10: 6

## 2025-02-16 ASSESSMENT — PAIN - FUNCTIONAL ASSESSMENT
PAIN_FUNCTIONAL_ASSESSMENT: ACTIVITIES ARE NOT PREVENTED

## 2025-02-16 ASSESSMENT — PAIN DESCRIPTION - LOCATION
LOCATION: ABDOMEN

## 2025-02-16 ASSESSMENT — PAIN DESCRIPTION - ORIENTATION
ORIENTATION: MID;LOWER
ORIENTATION: LOWER
ORIENTATION: MID;LOWER
ORIENTATION: RIGHT;LEFT;LOWER
ORIENTATION: MID;LOWER
ORIENTATION: LOWER

## 2025-02-16 ASSESSMENT — PAIN DESCRIPTION - PAIN TYPE
TYPE: ACUTE PAIN

## 2025-02-16 ASSESSMENT — PAIN DESCRIPTION - DESCRIPTORS
DESCRIPTORS: DISCOMFORT;CRAMPING
DESCRIPTORS: ACHING;DISCOMFORT
DESCRIPTORS: DISCOMFORT;CRAMPING
DESCRIPTORS: DISCOMFORT
DESCRIPTORS: CRAMPING
DESCRIPTORS: DISCOMFORT

## 2025-02-16 ASSESSMENT — PAIN DESCRIPTION - ONSET
ONSET: ON-GOING

## 2025-02-16 ASSESSMENT — PAIN DESCRIPTION - FREQUENCY
FREQUENCY: CONTINUOUS
FREQUENCY: INTERMITTENT
FREQUENCY: CONTINUOUS

## 2025-02-16 ASSESSMENT — PAIN SCALES - WONG BAKER
WONGBAKER_NUMERICALRESPONSE: NO HURT
WONGBAKER_NUMERICALRESPONSE: NO HURT

## 2025-02-16 NOTE — ED NOTES
Pt arrived to ED with report of abd pains s/p prostatectomy two day ago.   Pt reports fever at home, denies emesis.   Pt has valladares catheter placed PTA and reports dark urine with sediment.   Pt reports he has not passed gas or had a BM since before surgery.   Pt A&Ox4, pt attached to full cardiac monitor

## 2025-02-16 NOTE — CONSULTS
Urology Consult Note        Patient:  Ivanna Khoury  MRN: [id number]  YOB: 1960    REASON FOR CONSULT: Recent robotic simple prostatectomy, abdominal distention, fevers, abdominal pain    HISTORY OF PRESENT ILLNESS:   The patient is a 64 y.o. male who underwent a robotic simple prostatectomy for treatment of BPH with Dr. Crews on 2/13/2025.  Patient was discharged home yesterday with Tovar catheter in place.  States that after discharge she started noticing worsening abdominal pain and discomfort.  This morning him and his wife noticed worsening abdominal distention.  He did develop a fever of Tmax 102 Fahrenheit at home and took some Tylenol subsequently.  Urology service was consulted due to recent surgery.    Patient was questioned and examined the bedside with his wife present.  She states that she started noticing worsening abdominal distention this morning.  Also noticing some bulging of the supraumbilical midline incision.  Patient has not had a bowel movement since the day before surgery.  He is not passing any gas.  Does endorse some nausea and vomiting.  Has not been tolerating any oral intake.  He has been taking MiraLAX, but has not been able to have a bowel movement.  Endorses 10 out of 10 pain in his abdomen.  Tovar's in place clear yellow urine    Patient is afebrile in the emergency department.  He is tachycardic with heart rate in the 110s, blood pressure within normal limits.  Lab work reveals: WBC 12.2, hemoglobin 11.5, creatinine 0.7.  Lactate within normal limits at 1.2.  Urinalysis with moderate leukocytes, many WBC.  Urine and blood cultures are pending.  Patient empirically initiated on cefepime.  CT of the abdomen/pelvis with contrast showing dilated GI tract from the bowel to the rectum, no discrete transition point.  Gas and feces seen in the rectosigmoid colon.  Likely to be postoperative ileus.  Tovar catheter in place with decompressed urinary bladder, no

## 2025-02-16 NOTE — ED NOTES
ED to inpatient nurses report      Chief Complaint:  Chief Complaint   Patient presents with    Abdominal Pain     Present to ED from: home    MOA:     LOC: alert and orientated to name, place, date  Mobility: Independent  Oxygen Baseline: RA    Current needs required: RA   Pending ED orders:   Present condition: stable     Why did the patient come to the ED? Abd pain s/p prostatectomy  What is the plan?   Any procedures or intervention occur? NG  Any safety concerns??    Mental Status:  Level of Consciousness: Alert (0)    Psych Assessment:      Vital signs   Vitals:    02/15/25 1538 02/15/25 1640 02/15/25 1708 02/15/25 1722   BP: (!) 155/94  (!) 154/83    Pulse: (!) 128 (!) 111  (!) 103   Resp: 30 27  26   Temp: 98.5 °F (36.9 °C)      TempSrc: Oral      SpO2:  93%  95%        Vitals:  Patient Vitals for the past 24 hrs:   BP Temp Temp src Pulse Resp SpO2   02/15/25 1722 -- -- -- (!) 103 26 95 %   02/15/25 1708 (!) 154/83 -- -- -- -- --   02/15/25 1640 -- -- -- (!) 111 27 93 %   02/15/25 1538 (!) 155/94 98.5 °F (36.9 °C) Oral (!) 128 30 --      Visit Vitals  BP (!) 154/83   Pulse (!) 103   Temp 98.5 °F (36.9 °C) (Oral)   Resp 26   SpO2 95%        LDAs:   Peripheral IV 02/15/25 Proximal;Right Forearm (Active)   Site Assessment Clean, dry & intact 02/15/25 1545   Line Status Blood return noted;Normal saline locked 02/15/25 1545   Phlebitis Assessment No symptoms 02/15/25 1545   Infiltration Assessment 0 02/15/25 1545   Dressing Type Transparent 02/15/25 1545   Dressing Intervention New 02/15/25 1545       Ambulatory Status:  No data recorded    Diagnosis:  DISPOSITION Decision To Admit 02/15/2025 05:59:07 PM   Final diagnoses:   Generalized abdominal pain        Consults:  IP CONSULT TO UROLOGY  IP CONSULT TO HOSPITALIST  IP CONSULT TO GENERAL SURGERY     Treatment Team:   Treatment Team:   Mehul Lujan MD Ariss, Steven M, MD Bohland, Jessalyn RN  Jo Mcdowell DO Miester, Aimee, APRN - NP    Treatment:  ED  Course as of 02/15/25 1910   Sat Feb 15, 2025   1611 Patient's white blood cell is 12.2 with a neutrophilia of 88% [LL]   1612 Lactate is 1.1 [LL]   1621 Spoke with urology on-call team Dr. Miguel who recommended starting cefepime broad-spectrum antibiotic for patient.  Will wait on CT abdomen with IV contrast to further determine patient's disposition.  Advised to leave Tovar in once it is draining.  Once CT has resulted will reach out back again to urology [LL]   1628 Decision to do the 30 mL/kg bolus given that patient has darker colored urine.  Using ideal weight patient's 30 mL/kg is 1914 mL.  Will add morphine 8 mg IV stat for more pain control [LL]   1634 Patient reassessed in room says pain has significantly improved however still having some pain.  Updated patient on urology's consult.  Potassium is 3.6.  Patient is completed his fluid bolus will do potassium replacement PT PTT were all normal [LL]   1640 Urinary bag is draining freely, darkly stained urine 400 mL in bag patient stated that he emptied about an 11 AM [LL]   1710 Lipase and troponin all within normal limit [LL]   1711 Patient's pulse has reduced to 111 post 1 L normal saline IVF bolus [LL]   1728 Patient had his chest x-ray and CT abdomen still awaiting report [LL]   1728 Patient's pulse is currently 103 much improved from initial 130's [LL]   1732 Spoke with Dr. Stephenson, at [facility] stated that patient has postoperative ileus, with distention.  Cannot be doable with recent surgery.  States the bladder appears to have signs for infection.  Did update patient [LL]   1749 Spoke with urology team stated that patient does not need any acute surgical intervention at this time.  Would recommend [facility] for admission and NGT tube placement for his ileus [LL]   1759 Patient care was signed out to nighttime resident [LL]   1841 Spoke with jalen who would like gen surg consulted before admission [TD]   1901 X-ray ordered for NG placement,

## 2025-02-16 NOTE — CONSULTS
General Surgery  Consult    PATIENT NAME: Ivanna Khoury  AGE: 64 y.o.  MEDICAL RECORD NO. [id number]  DATE: 2/16/2025  SURGEON: Dr. Medrano  PRIMARY CARE PHYSICIAN: Lisette Strong MD    Patient evaluated at the request of  Dr. gonsales  Reason for evaluation: SBO    Patient information was obtained from patient.  History/Exam limitations: none.  Patient presented to the Emergency Department by ambulance where the patient received pain medication prior to arrival.    IMPRESSION:     Patient Active Problem List   Diagnosis    BPH with urinary obstruction    TIA (transient ischemic attack)    COVID-19    Syncope    Postprocedural urinary retention    Intractable migraine with aura without status migrainosus    Post-op pain    Ileus (HCC)    Chronic venous insufficiency of lower extremity    Venous insufficiency of right leg    Benign paroxysmal positional vertigo due to bilateral vestibular disorder    Heart murmur    Near syncope    Urinary retention    BPH with obstruction/lower urinary tract symptoms    Generalized abdominal pain    Ileus, postoperative (HCC)    Status post prostatectomy    Acute cystitis    HTN (hypertension)   Status post robotic prostatectomy  Ileus      PLAN:   Recommend NG tube placement, maintain low remittent wall suction, record output  N.p.o. okay for sips of meds and clamp NG tube for 30 minutes  Maintenance IV fluids  Maintain potassium 4 mag 2  Recommend urology consult  UA pending      HISTORY:   History of Chief Complaint:    Ivanna Khoury is a 64 y.o. male who presents with abdominal pain. patient has hx of prostatectomy 2/13/2025 robotically for BPH and was discharged home with Tovar catheter.  He noticed worsening abdominal distention since discharge home.  He has not been tolerating oral intake.  He has not had a bowel movement.  Patient has history of hypertension hyperlipidemia UTI and urinary retention.  He also has history of right robotic inguinal hernia repair.  On exam

## 2025-02-16 NOTE — H&P
[facility]  Office: [phone]  Jc Morales DO, Mane Leone DO, Idris Rose DO, Brennon Alexander DO, Jami Pacheco MD, Laura Warren MD, Renee Eng MD, Radha Gutierres MD,  Charles Hill MD, Mar Roca MD, Kenton Sanchez MD,  Dane Cabrera DO, Shelby Molina MD, Alberto Dodson MD, Edin Morales DO, Agnieszka Abarca MD,  Wade Haney DO, Maribeth Toro MD, Melany Hess MD, Shirin Zimmer MD, Adriano Titus MD,  Toro Olivo MD, Sabrina Lopez MD, Jody Cruz MD, Concepcion Bob MD, Serge Acevedo MD, David Hopkins MD, Sunny Mckeon DO, Cezar Ryan MD, Dane Mariscal MD, Mohsin Reza, MD, Shirley Waterhouse, CNP,  Angely Dean CNP, Sunny Pelayo, CNP,  Evelia Bedoya, EMILY, Aidee Beasley, CNP, Opal Teague, CNP, Luisa Colbert, CNP, Ailyn George CNP, BIA Domínguez-JUSTUS, Arelis Parra, CNP, David Fowler, CNP,  Julia Lopez, CNP, Laxmi Perez, CNP, Keara Denise, CNP,  Marta Jose, CNS, Sandi Billy CNP, Milli Gay CNP,   Geni Weiss, CNP         [facility]   IN-PATIENT SERVICE   [facility]    HISTORY AND PHYSICAL EXAMINATION            Date:   2/15/2025  Patient name:  Ivanna Khoury  Date of admission:  2/15/2025  3:38 PM  MRN:   [id number]  Account:  [id number]  YOB: 1960  PCP:    Lisette Strong MD  Room:   07/07  Code Status:    Full Code    Chief Complaint:     Chief Complaint   Patient presents with    Abdominal Pain       History Obtained From:     Patient and medical record     History of Present Illness:     Ivanna Khoury is a 64 y.o. Non- / non  male pmh Htn, hld, and enlarged prostate status post robotic prostatectomy 2/13/2025 who presents with Abdominal Pain  Fever , abdominal distention , the lack of flatulence and no bowel movement for 2-3 days and is admitted to the hospital for the management of Ileus, postoperative (HCC).    Mr Khoury reports that he has had

## 2025-02-16 NOTE — PLAN OF CARE
Problem: Discharge Planning  Goal: Discharge to home or other facility with appropriate resources  2/16/2025 1353 by Yola Whitaker, RN  Outcome: Progressing  Flowsheets (Taken 2/16/2025 0745)  Discharge to home or other facility with appropriate resources:   Identify discharge learning needs (meds, wound care, etc)   Identify barriers to discharge with patient and caregiver   Arrange for needed discharge resources and transportation as appropriate   Refer to discharge planning if patient needs post-hospital services based on physician order or complex needs related to functional status, cognitive ability or social support system  2/16/2025 0458 by Amanda Hameed, RN  Outcome: Progressing  Flowsheets  Taken 2/16/2025 0458  Discharge to home or other facility with appropriate resources:   Identify barriers to discharge with patient and caregiver   Identify discharge learning needs (meds, wound care, etc)  Taken 2/16/2025 0000  Discharge to home or other facility with appropriate resources:   Identify barriers to discharge with patient and caregiver   Identify discharge learning needs (meds, wound care, etc)     Problem: Pain  Goal: Verbalizes/displays adequate comfort level or baseline comfort level  2/16/2025 1353 by Yola Whitaker, RN  Outcome: Progressing  Flowsheets (Taken 2/16/2025 2314)  Verbalizes/displays adequate comfort level or baseline comfort level:   Encourage patient to monitor pain and request assistance   Assess pain using appropriate pain scale   Administer analgesics based on type and severity of pain and evaluate response   Implement non-pharmacological measures as appropriate and evaluate response   Consider cultural and social influences on pain and pain management   Notify Licensed Independent Practitioner if interventions unsuccessful or patient reports new pain  2/16/2025 0458 by Amanda Hameed, RN  Outcome: Progressing  Flowsheets (Taken 2/15/2025 0444)  Verbalizes/displays adequate comfort

## 2025-02-16 NOTE — PROGRESS NOTES
Pt states he doesn't think he will be able to take Klor-Con pills. RN attempted to push Effer-K down NG tube, however unable to instill or pull back. RN had pt drink potassium as yesterday's gen surg note stated okay to give meds and clamp NG for 30 min post administration. Multiple Rns attempted to unclog tube using coca-cola, hot water, and the unclogging tool. No improvement. Dr Ureña notified. NG removed and RN x2 attempted to place new NG tube, but met resistance each time. Dr Ureña notified and states she will attempt to place NG when able.

## 2025-02-16 NOTE — CONSULTS
abdomen.  Patient is recently post prostatectomy.  Tiny amount of gas in the subcutaneous tissues.  There is also a small gas fluid collection in the anterior subcutaneous tissues but it does not have an enhancing capsule.  This is adjacent to the umbilicus.  It measures 1.7 cm by 2.9 cm and is completely in the subcutaneous tissues.  Bubbles of gas in the subcutaneous tissues which may be related to recent injections.  They have no or the recent surgery. Aorta: Aorta is of normal size.  Negative for dissection.  IVC is unremarkable.Celiac axis and SMA are patent. Portal vein is patent. PELVIS GI: No small bowel dilation.  No colonic wall thickening.  No enlarged lymphadenopathy. Small amount of free fluid in the pelvis. : Tovar catheter in the bladder.  The bladder is decompressed but it has a diffusely thickened irregular wall and adjacent fatty stranding..  Free fluid in the pelvis but no large abscess. Osseous: Spondylosis.     1. Bibasilar consolidation.  Likely atelectasis. 2.  postoperative ileus. 3. Small amount of free fluid and free gas in the abdomen.  Patient is recently postoperative 4. Small gas fluid collection in the anterior subcutaneous tissues adjacent to the umbilicus. This does not have an enhancing capsule.  Possible abscess versus seroma. 5. Probable cystitis.     XR CHEST PORTABLE    Result Date: 2/15/2025  EXAMINATION: ONE XRAY VIEW OF THE CHEST 2/15/2025 5:04 pm COMPARISON: 12/30/2024 HISTORY: ORDERING SYSTEM PROVIDED HISTORY: sepsis TECHNOLOGIST PROVIDED HISTORY: sepsis Reason for Exam: port upright FINDINGS: Heart size is normal  Aorta is normal.  Lungs are normally expanded and clear. No pleural effusions. Large of gas within the colon.  Probable small bowel dilation as well.     Lungs are clear       Medical Decision Yaijrv-Svrjtrfv-Fabgo:       Dx: Urinary retention    Specimen Information: Urine, clean catch   0 Result Notes      Component    Specimen Description .CLEAN CATCH URINE  02/15/25 1812     Specimen Description .BLOOD     Special Requests L WRIST 1ML     Culture NO GROWTH <24 HRS    Culture, Urine [[id number]] Collected: 02/13/25 1417    Order Status: Completed Specimen: Urine, indwelling catheter Updated: 02/14/25 1128     Specimen Description .INDWELLING CATH URINE     Special Requests Site: Urine     Culture NO SIGNIFICANT GROWTH    Culture, Urine [[id number]]  (Abnormal)  (Susceptibility) Collected: 02/07/25 2127    Order Status: Completed Specimen: Urine, clean catch Updated: 02/09/25 1035     Specimen Description .CLEAN CATCH URINE     Special Requests Site: Urine     Culture KLEBSIELLA PNEUMONIAE >100,000 CFU/ML    Susceptibility        Klebsiella pneumoniae      BACTERIAL SUSCEPTIBILITY PANEL MANN      ampicillin >=32  Resistant      ceFAZolin <=4  Sensitive  [1]       cefTRIAXone <=0.25  Sensitive      Confirmatory Extended Spectrum Beta-Lactamase NEGATIVE  Sensitive      gentamicin >=16  Resistant      levofloxacin >=8  Resistant      nitrofurantoin 128  Resistant      piperacillin-tazobactam 32  Intermediate      tobramycin >=16  Resistant      trimethoprim-sulfamethoxazole 40  Sensitive                   [1]  Cefazolin sensitivity results can be used to predict the effectiveness of oral   cephalosporins (eg. Cephalexin) in uncomplicated Urinary Tract Infections due to E. coli, K.   pneumoniae, and P. mirabilis                                Medical Decision Making-Other:     Note:    Thank you for allowing us to participate in the care of this patient. Please call with questions.    Bassam Rodriguez MD  Office: [phone]

## 2025-02-16 NOTE — CARE COORDINATION
Case Management Assessment  Initial Evaluation    Date/Time of Evaluation: 2/16/2025 8:45 AM  Assessment Completed by: Henok Lewis    If patient is discharged prior to next notation, then this note serves as note for discharge by case management.    Patient Name: Ivanna Khoury                   YOB: 1960  Diagnosis: Ileus, postoperative (HCC) [K91.89, K56.7]  Acute cystitis without hematuria [N30.00]                   Date / Time: 2/15/2025  3:38 PM    Patient Admission Status: Inpatient   Readmission Risk (Low < 19, Mod (19-27), High > 27): Readmission Risk Score: 20.5    Current PCP: Lisette Strong MD  PCP verified by CM? (P) Yes    Chart Reviewed: Yes      History Provided by: (P) Patient  Patient Orientation: (P) Alert and Oriented    Patient Cognition: (P) Alert    Hospitalization in the last 30 days (Readmission):  Yes    If yes, Readmission Assessment in CM Navigator will be completed.    Advance Directives:      Code Status: Full Code   Patient's Primary Decision Maker is: (P) Legal Next of Kin      Discharge Planning:    Patient lives with: (P) Spouse/Significant Other Type of Home: (P) Apartment  Primary Care Giver: (P) Self  Patient Support Systems include: (P) Spouse/Significant Other   Current Financial resources: (P) Medicaid, Medicare  Current community resources: (P) None  Current services prior to admission: (P) None            Current DME:              Type of Home Care services:  (P) Nursing Services    ADLS  Prior functional level: (P) Independent in ADLs/IADLs  Current functional level: (P) Independent in ADLs/IADLs    PT AM-PAC:   /24  OT AM-PAC:   /24    Family can provide assistance at DC: (P) Yes  Would you like Case Management to discuss the discharge plan with any other family members/significant others, and if so, who? (P) Yes  Plans to Return to Present Housing: (P) Yes  Other Identified Issues/Barriers to RETURNING to current housing: None  Potential Assistance

## 2025-02-16 NOTE — PROGRESS NOTES
Patient accidentally removed NGT. Writer placed new NGT, KUB ordered for placement verification. Vitals stable during and after insertion.

## 2025-02-16 NOTE — PROGRESS NOTES
Physical Therapy  Facility/Department: [address] STEPDOWN   Physical Therapy Initial Evaluation    Patient Name: Ivanna Khoury        MRN: [id number]    : 1960    Date of Service: 2025    Chief Complaint   Patient presents with    Abdominal Pain     Past Medical History:  has a past medical history of Acute epididymitis, COVID, Enlarged prostate, Tovar catheter in place, Hyperlipidemia, Hypertension, Lung nodules, MRSA (methicillin resistant staph aureus) culture positive, Under care of service provider, Under care of service provider, Urinary retention, and UTI (urinary tract infection).  Past Surgical History:  has a past surgical history that includes vascular surgery; Cystoscopy (N/A, 2020); TURP (N/A, 2020); hernia repair (Right, 2021); Cystoscopy (2025); Cystoscopy (N/A, 2025); and Prostatectomy (N/A, 2025).    Discharge Recommendations  Discharge Recommendations: No therapy recommended at discharge  PT Equipment Recommendations  Equipment Needed: No    Assessment     Assessment: Pt able to ambulate 600 ft without device and SUP, no loss of balance. Pt able to manage lines and plans to continue to ambulate with nursing or spouse.  Pt indep for transfers without device. Pt at baseline for mobility and gait, no further therapy needs at this time.  Therapy Prognosis: Good  Decision Making: Low Complexity  Requires PT Follow-Up: No  Activity Tolerance  Activity Tolerance: Patient tolerated evaluation without incident  Safety Devices  Type of Devices: Call light within reach, Gait belt, Left in chair  Restraints  Restraints Initially in Place: No    AM-PAC  AM-PAC Basic Mobility - Inpatient   How much help is needed turning from your back to your side while in a flat bed without using bedrails?: None  How much help is needed moving from lying on your back to sitting on the side of a flat bed without using bedrails?: None  How much help is needed moving to and from a bed  RLE (degrees)  RLE AROM: WFL  AROM LLE (degrees)  LLE AROM : WFL          Strength RLE  Strength RLE: WFL  Strength LLE  Strength LLE: WFL    Mobility   Bed mobility  Rolling to Right: Supervision  Supine to Sit: Supervision  Scooting: Supervision  Bed Mobility Comments: pt able to manage blankets         Transfers  Sit to Stand: Supervision  Stand to Sit: Supervision  Comment: up without device    Ambulation  Surface: Level tile  Device: No Device  Assistance: Supervision  Quality of Gait: quick pace, slightly impulsive.  Distance: 600 ft  Comments: pt educated on importance of slowing down for safety with IV pole, valladares and NG tube.  Pt very indep and does not allow writer to assist with equipment  More Ambulation?: No    Stairs/Curb  Stairs?: No          Balance   Balance  Posture: Good  Sitting - Static: Good  Sitting - Dynamic: Good  Standing - Static: Good  Standing - Dynamic: Good  Comments: standing balance without UE support      Patient Education  Patient Education  Education Given To: Patient;Family  Education Provided: Role of Therapy;Plan of Care  Education Provided Comments: importane of walking to improve GI function, importance of OOB to recliner, safety  Education Method: Verbal;Demonstration  Barriers to Learning: None  Education Outcome: Verbalized understanding    Plan  Physical Therapy Plan  General Plan: Discharge with evaluation only    Goals  Patient Goals   Patient Goals : To get better  Short Term Goals  Time Frame for Short Term Goals: 1 visit  Short Term Goal 1: Pt able to ambulate halls safely to promote improved function and healing    Minutes  PT Individual Minutes  Time In: 1040  Time Out: 1100  Minutes: 20  Time Code Minutes  Timed Code Treatment Minutes: 8 Minutes    Electronically signed by Laxmi Joy PT on 2/16/25 at 12:49 PM EST

## 2025-02-16 NOTE — PROGRESS NOTES
Urology Progress Note     Subjective:   AF tachycardic   BP WNL  NG in place however not on suction, pending KUB to confirm placement  Labs pending  Passed some gas, no BM    Patient Vitals for the past 24 hrs:   BP Temp Temp src Pulse Resp SpO2 Height Weight   02/16/25 0411 (!) 138/96 98.5 °F (36.9 °C) Oral (!) 115 22 92 % -- --   02/16/25 0108 -- -- -- -- -- -- 1.676 m (5' 6\") 82 kg (180 lb 12.4 oz)   02/15/25 2339 126/83 98.9 °F (37.2 °C) Oral (!) 101 25 91 % -- --   02/15/25 2200 (!) 140/95 -- -- (!) 114 28 91 % -- --   02/15/25 2048 -- -- -- (!) 112 24 91 % -- --   02/15/25 2030 (!) 137/94 -- -- (!) 113 21 92 % -- --   02/15/25 1946 -- -- -- -- -- 92 % -- --   02/15/25 1945 (!) 138/96 -- -- (!) 117 20 -- -- --   02/15/25 1722 -- -- -- (!) 103 26 95 % -- --   02/15/25 1708 (!) 154/83 -- -- -- -- -- -- --   02/15/25 1640 -- -- -- (!) 111 27 93 % -- --   02/15/25 1538 (!) 155/94 98.5 °F (36.9 °C) Oral (!) 128 30 -- -- --       Intake/Output Summary (Last 24 hours) at 2/16/2025 0713  Last data filed at 2/16/2025 0420  Gross per 24 hour   Intake 360.25 ml   Output 650 ml   Net -289.75 ml       Recent Labs     02/14/25  0709 02/15/25  1558 02/16/25  0321   WBC 11.5* 12.2* 12.6*   HGB 10.2* 11.5* 11.4*   HCT 32.6* 35.4* 36.8*   MCV 80.3* 77.0* 79.0*    331 339     Recent Labs     02/14/25  0709 02/15/25  1558 02/16/25  0321   * 132* 135*   K 4.1 3.6* 3.5*   CL 99 94* 98   CO2 24 24 23   BUN 8 7* 11   CREATININE 0.8 0.7 0.6*       Recent Labs     02/15/25  1610   COLORU [place]*   PHUR 5.5   WBCUA TOO NUMEROUS TO COUNT   RBCUA TOO NUMEROUS TO COUNT   MUCUS 2+   BACTERIA FEW*   LEUKOCYTESUR MODERATE*   UROBILINOGEN Normal   BILIRUBINUR NEGATIVE       Additional Lab/culture results:    Physical Exam:   NAD  RRR  Symm chest rise normal WOB  Soft NT improved distention, NGT  Tovar     Interval Imaging Findings:    Impression:   64-year-old male with a history of BPH who underwent a robotic simple prostatectomy  on 2/13/2025.    Ileus  Fever       Plan:   No acute urologic interventions needed at this time  Agree with NG tube for postoperative ileus  Await x-ray of the abdomen before initiating wall suction  Appreciate general surgery recs   Maintain Tovar catheter in place  Do not remove Tovar catheter  If catheter stops draining, please notify urology service  Agree with broad-spectrum empiric cefepime due to concerns of sepsis  Follow-up on urine and blood cultures  De-escalate antibiotics as able based on if positive and sensitivities  Keep the patient n.p.o. for bowel rest  Would recommend rectal suppository or enema to assist with stimulation of bowels  Pain and nausea control as needed  Limit narcotics as able as this can worsen ileus  Encourage aggressive ambulation as this can improve ileus  Urology will continue to follow    Modesto Sparks MD PGY5  Urology

## 2025-02-16 NOTE — PLAN OF CARE
Problem: Discharge Planning  Goal: Discharge to home or other facility with appropriate resources  2/16/2025 1638 by Yola Whitaker RN  Outcome: Progressing  2/16/2025 1353 by Yola Whitaker RN  Outcome: Progressing  Flowsheets (Taken 2/16/2025 0745)  Discharge to home or other facility with appropriate resources:   Identify discharge learning needs (meds, wound care, etc)   Identify barriers to discharge with patient and caregiver   Arrange for needed discharge resources and transportation as appropriate   Refer to discharge planning if patient needs post-hospital services based on physician order or complex needs related to functional status, cognitive ability or social support system  2/16/2025 0458 by Amanda Hameed RN  Outcome: Progressing  Flowsheets  Taken 2/16/2025 0458  Discharge to home or other facility with appropriate resources:   Identify barriers to discharge with patient and caregiver   Identify discharge learning needs (meds, wound care, etc)  Taken 2/16/2025 0000  Discharge to home or other facility with appropriate resources:   Identify barriers to discharge with patient and caregiver   Identify discharge learning needs (meds, wound care, etc)     Problem: Pain  Goal: Verbalizes/displays adequate comfort level or baseline comfort level  2/16/2025 1638 by Yola Whitaker RN  Outcome: Progressing  2/16/2025 1353 by Yola Whitaker RN  Outcome: Progressing  Flowsheets (Taken 2/16/2025 0712)  Verbalizes/displays adequate comfort level or baseline comfort level:   Encourage patient to monitor pain and request assistance   Assess pain using appropriate pain scale   Administer analgesics based on type and severity of pain and evaluate response   Implement non-pharmacological measures as appropriate and evaluate response   Consider cultural and social influences on pain and pain management   Notify Licensed Independent Practitioner if interventions unsuccessful or patient reports new pain  2/16/2025 0454

## 2025-02-16 NOTE — PROGRESS NOTES
[facility]  Office: [phone]  Jc Morales DO, Mane Leone DO, Idris Rose DO, Brennon Alexander DO, Jami Pacheco MD, Laura Warren MD, Renee Eng MD, Radha Gutierres MD,  Charles Hill MD, Mar Roca MD, Kenton Sanchez MD,  Dane Cabrera DO, Shelby Molina MD, Alberto Dodson MD, Edin Morales DO, Agnieszka Abarca MD,  Wade Haney DO, Maribeth Toro MD, Melany Hess MD, Shirin Zimmer MD, Adriano Titus MD,  Toro Olivo MD, Sabrina Lopez MD, Jody Cruz MD, Concepcion Bob MD, Serge Acevedo MD, David Hopkins MD, Sunny Mckeon DO, Cezar Ryan MD, Dane Mariscal MD, Mohsin Reza, MD, Shirley Waterhouse, CNP,  Angely Dean CNP, Sunny Pelayo, CNP,  Evelia Bedoya, DNP, Aidee Beasley, CNP, Opal Teague, CNP, Luisa Colbert, CNP, Ailyn George CNP, Ayha Pelaez, PA-C, Arelis Parra, CNP, David Fowler, CNP,  Julia Lopez, CNP, Laxmi Perez, CNP, Keara Denise, CNP,  Marta Jose, CNS, Sandi Billy CNP, Milli Gay CNP,   Geni Weiss, CNP         [facility]   IN-PATIENT SERVICE   [facility]    Progress Note    2/16/2025    11:42 AM    Name:   Ivanna Khoury  MRN:     [id number]     Acct:      [id number]   Room:   0133/0133-01   Day:  1  Admit Date:  2/15/2025  3:38 PM    PCP:   Lisette Strong MD  Code Status:  Full Code    Subjective:     C/C:   Chief Complaint   Patient presents with    Abdominal Pain     Interval History Status: improved.     Patient seen and examined at bedside this morning. No acute events overnight. Patient resting in bed. NGT is in place hooked to wall suction. Patient feels he has slightly improved. Has not passed any flatus or BM. Urology and GS following.  Asking for pain medication and is concerned about his potassium.    Brief History:     Ivanna Khoury is a 64 y.o. male with PMHx HTN, HLD, and enlarged prostate s/p robotic prostatectomy 2/13/2025 who presents with Abdominal

## 2025-02-16 NOTE — PLAN OF CARE
Problem: Discharge Planning  Goal: Discharge to home or other facility with appropriate resources  Outcome: Progressing  Flowsheets  Taken 2/16/2025 0458  Discharge to home or other facility with appropriate resources:   Identify barriers to discharge with patient and caregiver   Identify discharge learning needs (meds, wound care, etc)  Taken 2/16/2025 0000  Discharge to home or other facility with appropriate resources:   Identify barriers to discharge with patient and caregiver   Identify discharge learning needs (meds, wound care, etc)     Problem: Pain  Goal: Verbalizes/displays adequate comfort level or baseline comfort level  Outcome: Progressing  Flowsheets (Taken 2/15/2025 2335)  Verbalizes/displays adequate comfort level or baseline comfort level:   Encourage patient to monitor pain and request assistance   Assess pain using appropriate pain scale   Administer analgesics based on type and severity of pain and evaluate response   Implement non-pharmacological measures as appropriate and evaluate response   Consider cultural and social influences on pain and pain management   Notify Licensed Independent Practitioner if interventions unsuccessful or patient reports new pain     Problem: Safety - Adult  Goal: Free from fall injury  Outcome: Progressing  Flowsheets (Taken 2/16/2025 0458)  Free From Fall Injury: Instruct family/caregiver on patient safety     Problem: Chronic Conditions and Co-morbidities  Goal: Patient's chronic conditions and co-morbidity symptoms are monitored and maintained or improved  Outcome: Progressing  Flowsheets (Taken 2/16/2025 0458)  Care Plan - Patient's Chronic Conditions and Co-Morbidity Symptoms are Monitored and Maintained or Improved:   Monitor and assess patient's chronic conditions and comorbid symptoms for stability, deterioration, or improvement   Collaborate with multidisciplinary team to address chronic and comorbid conditions and prevent exacerbation or

## 2025-02-17 ENCOUNTER — APPOINTMENT (OUTPATIENT)
Dept: GENERAL RADIOLOGY | Age: 65
DRG: 854 | End: 2025-02-17
Payer: MEDICARE

## 2025-02-17 LAB
ANION GAP SERPL CALCULATED.3IONS-SCNC: 14 MMOL/L (ref 9–16)
BASOPHILS # BLD: 0.03 K/UL (ref 0–0.2)
BASOPHILS NFR BLD: 0 % (ref 0–2)
BUN SERPL-MCNC: 19 MG/DL (ref 8–23)
CALCIUM SERPL-MCNC: 8.3 MG/DL (ref 8.6–10.4)
CHLORIDE SERPL-SCNC: 103 MMOL/L (ref 98–107)
CO2 SERPL-SCNC: 22 MMOL/L (ref 20–31)
CREAT SERPL-MCNC: 0.7 MG/DL (ref 0.7–1.2)
EKG ATRIAL RATE: 131 BPM
EKG P AXIS: 55 DEGREES
EKG P-R INTERVAL: 144 MS
EKG Q-T INTERVAL: 290 MS
EKG QRS DURATION: 88 MS
EKG QTC CALCULATION (BAZETT): 428 MS
EKG R AXIS: 58 DEGREES
EKG VENTRICULAR RATE: 131 BPM
EOSINOPHIL # BLD: 0.18 K/UL (ref 0–0.44)
EOSINOPHILS RELATIVE PERCENT: 2 % (ref 1–4)
ERYTHROCYTE [DISTWIDTH] IN BLOOD BY AUTOMATED COUNT: 16.1 % (ref 11.8–14.4)
GFR, ESTIMATED: >90 ML/MIN/1.73M2
GLUCOSE BLD-MCNC: 102 MG/DL (ref 75–110)
GLUCOSE SERPL-MCNC: 108 MG/DL (ref 74–99)
HCT VFR BLD AUTO: 33.4 % (ref 40.7–50.3)
HGB BLD-MCNC: 10.4 G/DL (ref 13–17)
IMM GRANULOCYTES # BLD AUTO: 0.12 K/UL (ref 0–0.3)
IMM GRANULOCYTES NFR BLD: 1 %
LYMPHOCYTES NFR BLD: 1.09 K/UL (ref 1.1–3.7)
LYMPHOCYTES RELATIVE PERCENT: 10 % (ref 24–43)
MCH RBC QN AUTO: 24.9 PG (ref 25.2–33.5)
MCHC RBC AUTO-ENTMCNC: 31.1 G/DL (ref 28.4–34.8)
MCV RBC AUTO: 79.9 FL (ref 82.6–102.9)
MICROORGANISM SPEC CULT: ABNORMAL
MONOCYTES NFR BLD: 0.73 K/UL (ref 0.1–1.2)
MONOCYTES NFR BLD: 7 % (ref 3–12)
NEUTROPHILS NFR BLD: 80 % (ref 36–65)
NEUTS SEG NFR BLD: 8.83 K/UL (ref 1.5–8.1)
NRBC BLD-RTO: 0 PER 100 WBC
PLATELET # BLD AUTO: 403 K/UL (ref 138–453)
PMV BLD AUTO: 8.8 FL (ref 8.1–13.5)
POTASSIUM SERPL-SCNC: 3.7 MMOL/L (ref 3.7–5.3)
RBC # BLD AUTO: 4.18 M/UL (ref 4.21–5.77)
RBC # BLD: ABNORMAL 10*6/UL
SERVICE CMNT-IMP: ABNORMAL
SODIUM SERPL-SCNC: 139 MMOL/L (ref 136–145)
SPECIMEN DESCRIPTION: ABNORMAL
WBC OTHER # BLD: 11 K/UL (ref 3.5–11.3)

## 2025-02-17 PROCEDURE — 2580000003 HC RX 258: Performed by: STUDENT IN AN ORGANIZED HEALTH CARE EDUCATION/TRAINING PROGRAM

## 2025-02-17 PROCEDURE — 6370000000 HC RX 637 (ALT 250 FOR IP): Performed by: STUDENT IN AN ORGANIZED HEALTH CARE EDUCATION/TRAINING PROGRAM

## 2025-02-17 PROCEDURE — 2580000003 HC RX 258: Performed by: NURSE PRACTITIONER

## 2025-02-17 PROCEDURE — 6360000002 HC RX W HCPCS: Performed by: NURSE PRACTITIONER

## 2025-02-17 PROCEDURE — 2500000003 HC RX 250 WO HCPCS: Performed by: NURSE PRACTITIONER

## 2025-02-17 PROCEDURE — 80048 BASIC METABOLIC PNL TOTAL CA: CPT

## 2025-02-17 PROCEDURE — 2060000000 HC ICU INTERMEDIATE R&B

## 2025-02-17 PROCEDURE — 6360000002 HC RX W HCPCS: Performed by: STUDENT IN AN ORGANIZED HEALTH CARE EDUCATION/TRAINING PROGRAM

## 2025-02-17 PROCEDURE — 99232 SBSQ HOSP IP/OBS MODERATE 35: CPT | Performed by: INTERNAL MEDICINE

## 2025-02-17 PROCEDURE — 2500000003 HC RX 250 WO HCPCS: Performed by: INTERNAL MEDICINE

## 2025-02-17 PROCEDURE — 99231 SBSQ HOSP IP/OBS SF/LOW 25: CPT | Performed by: SURGERY

## 2025-02-17 PROCEDURE — 74018 RADEX ABDOMEN 1 VIEW: CPT

## 2025-02-17 PROCEDURE — 6360000002 HC RX W HCPCS: Performed by: INTERNAL MEDICINE

## 2025-02-17 PROCEDURE — 36415 COLL VENOUS BLD VENIPUNCTURE: CPT

## 2025-02-17 PROCEDURE — G0545 PR INHERENT VISIT TO INPT: HCPCS | Performed by: INTERNAL MEDICINE

## 2025-02-17 PROCEDURE — 85025 COMPLETE CBC W/AUTO DIFF WBC: CPT

## 2025-02-17 PROCEDURE — 99232 SBSQ HOSP IP/OBS MODERATE 35: CPT | Performed by: STUDENT IN AN ORGANIZED HEALTH CARE EDUCATION/TRAINING PROGRAM

## 2025-02-17 PROCEDURE — 82947 ASSAY GLUCOSE BLOOD QUANT: CPT

## 2025-02-17 RX ADMIN — SODIUM CHLORIDE, PRESERVATIVE FREE 40 MG: 5 INJECTION INTRAVENOUS at 09:52

## 2025-02-17 RX ADMIN — ROSUVASTATIN CALCIUM 10 MG: 10 TABLET, FILM COATED ORAL at 20:16

## 2025-02-17 RX ADMIN — MORPHINE SULFATE 4 MG: 4 INJECTION INTRAVENOUS at 14:44

## 2025-02-17 RX ADMIN — CARVEDILOL 3.12 MG: 6.25 TABLET, FILM COATED ORAL at 20:16

## 2025-02-17 RX ADMIN — SODIUM CHLORIDE, PRESERVATIVE FREE 10 ML: 5 INJECTION INTRAVENOUS at 09:00

## 2025-02-17 RX ADMIN — SODIUM CHLORIDE: 0.9 INJECTION, SOLUTION INTRAVENOUS at 04:04

## 2025-02-17 RX ADMIN — KETOROLAC TROMETHAMINE 30 MG: 30 INJECTION, SOLUTION INTRAMUSCULAR; INTRAVENOUS at 13:13

## 2025-02-17 RX ADMIN — LISINOPRIL 40 MG: 20 TABLET ORAL at 09:52

## 2025-02-17 RX ADMIN — TAMSULOSIN HYDROCHLORIDE 0.4 MG: 0.4 CAPSULE ORAL at 09:53

## 2025-02-17 RX ADMIN — WATER 1000 MG: 1 INJECTION INTRAMUSCULAR; INTRAVENOUS; SUBCUTANEOUS at 18:07

## 2025-02-17 RX ADMIN — CARVEDILOL 3.12 MG: 6.25 TABLET, FILM COATED ORAL at 09:53

## 2025-02-17 RX ADMIN — SODIUM CHLORIDE, PRESERVATIVE FREE 10 ML: 5 INJECTION INTRAVENOUS at 20:17

## 2025-02-17 RX ADMIN — CEFEPIME 2000 MG: 2 INJECTION, POWDER, FOR SOLUTION INTRAVENOUS at 04:04

## 2025-02-17 ASSESSMENT — PAIN DESCRIPTION - ORIENTATION
ORIENTATION: MID;LOWER
ORIENTATION: MID
ORIENTATION: MID
ORIENTATION: MID;LOWER

## 2025-02-17 ASSESSMENT — PAIN - FUNCTIONAL ASSESSMENT
PAIN_FUNCTIONAL_ASSESSMENT: ACTIVITIES ARE NOT PREVENTED
PAIN_FUNCTIONAL_ASSESSMENT: ACTIVITIES ARE NOT PREVENTED

## 2025-02-17 ASSESSMENT — PAIN DESCRIPTION - DESCRIPTORS
DESCRIPTORS: SHARP
DESCRIPTORS: ACHING;DISCOMFORT
DESCRIPTORS: SORE;DISCOMFORT
DESCRIPTORS: SHARP

## 2025-02-17 ASSESSMENT — PAIN DESCRIPTION - LOCATION
LOCATION: ABDOMEN

## 2025-02-17 ASSESSMENT — PAIN SCALES - GENERAL
PAINLEVEL_OUTOF10: 4
PAINLEVEL_OUTOF10: 10
PAINLEVEL_OUTOF10: 4
PAINLEVEL_OUTOF10: 8
PAINLEVEL_OUTOF10: 5
PAINLEVEL_OUTOF10: 5
PAINLEVEL_OUTOF10: 4

## 2025-02-17 ASSESSMENT — PAIN DESCRIPTION - PAIN TYPE
TYPE: ACUTE PAIN
TYPE: ACUTE PAIN

## 2025-02-17 ASSESSMENT — PAIN DESCRIPTION - ONSET
ONSET: ON-GOING
ONSET: ON-GOING

## 2025-02-17 ASSESSMENT — PAIN DESCRIPTION - FREQUENCY
FREQUENCY: CONTINUOUS
FREQUENCY: INTERMITTENT

## 2025-02-17 NOTE — PROGRESS NOTES
reported    Medications reviewed:  D/C cefepime   On cefazolin for Klebsiella in urine culture    Labs, X rays reviewed: 2/17/2025 with independent review of X rays    I have independently reviewed/ordered the following labs:    CBC with Differential:   Recent Labs     02/16/25  0321 02/17/25  0555   WBC 12.6* 11.0   HGB 11.4* 10.4*   HCT 36.8* 33.4*    403   LYMPHOPCT 6* 10*   MONOPCT 4 7   EOSPCT 0* 2     BMP:   Recent Labs     02/16/25  0321 02/17/25  0555   * 139   K 3.5* 3.7   CL 98 103   CO2 23 22   BUN 11 19   CREATININE 0.6* 0.7   MG 2.0  --      Hepatic Function Panel:   Recent Labs     02/15/25  1558   BILITOT 0.6   ALKPHOS 101   ALT 81*   AST 37     No results for input(s): \"RPR\" in the last 72 hours.  No results for input(s): \"HIV\" in the last 72 hours.  No results for input(s): \"BC\" in the last 72 hours.  Lab Results   Component Value Date/Time    BACTERIA FEW 02/15/2025 04:10 PM    MUCUS 2+ 02/15/2025 04:10 PM    RBC 4.18 02/17/2025 05:55 AM    TRICHOMONAS NOT REPORTED 09/12/2020 11:05 AM    WBC 11.0 02/17/2025 05:55 AM    YEAST NOT REPORTED 09/12/2020 11:05 AM    TURBIDITY Turbid 02/15/2025 04:10 PM     Lab Results   Component Value Date/Time    CREATININE 0.7 02/17/2025 05:55 AM    GLUCOSE 108 02/17/2025 05:55 AM         Cultures:  Urine:  2-15-25: Klebsiella  Blood:  2-15-25: Pending  Sputum :    Wound:    MRSA Nares:      Imaging:  CT  2-15-25: Ileus. Small Lt sided fluid collection in abdomen. Bibasilar atelectasis    2-15-25        I have personally reviewed the past medical history, past surgical history, medications, social history, and family history, and I have updated the database accordingly.  Past Medical History:     Past Medical History:   Diagnosis Date    Acute epididymitis     COVID 2020    no hospitalization    Enlarged prostate     Tovar catheter in place 02/05/2025    Hyperlipidemia     Hypertension     Lung nodules     not changed in 5 years-no longer has to visit  HISTORY: Confirmation of course of NG/OG/NE tube and location of tip of tube Portable?->Yes FINDINGS: Enteric tube tip overlies the gastric fundus.     Enteric tube tip overlies the gastric fundus.     CT ABDOMEN PELVIS W IV CONTRAST Additional Contrast? None    Result Date: 2/15/2025  EXAMINATION: CT OF THE ABDOMEN AND PELVIS WITH CONTRAST 2/15/2025 4:51 pm TECHNIQUE: CT of the abdomen and pelvis was performed with the administration of intravenous contrast. Multiplanar reformatted images are provided for review. Automated exposure control, iterative reconstruction, and/or weight based adjustment of the mA/kV was utilized to reduce the radiation dose to as low as reasonably achievable. COMPARISON: 11/11/2024 HISTORY: ORDERING SYSTEM PROVIDED HISTORY: rule surgical abdomen TECHNOLOGIST PROVIDED HISTORY: rule surgical abdomen Decision Support Exception - unselect if not a suspected or confirmed emergency medical condition->Emergency Medical Condition (MA) FINDINGS: Lower chest: Heart size is normal. Lungs are bibasilar consolidation. Multiple air bronchograms bilaterally. Liver: Liver is normal density. No enhancing masses.  Normal enhancement of the intrahepatic vasculature. Spleen:  Normal size.  No enhancing masses Pancreas: No enhancing masses.  No ductal dilation. No adjacent fatty stranding. Gallbladder not seen Bile ducts: No biliary ductal dilation Adrenals: The adrenal glands are unremarkable Kidneys: Kidneys are normal in appearance.  No hydronephrosis.  No enhancing masses. Norenal stones. GI: Stomach is largely distended.  Diffuse small bowel dilation.  The terminal ileum is mildly decompressed but this is not the point of stricture. The colon is also predominately dilated with transition in the region of the splenic flexure but this is not a mass.  Gas and feces are seen distally within the rectosigmoid colon which is also mildly dilated.  This is most likely a postoperative ileus. Mesentery: Small

## 2025-02-17 NOTE — PLAN OF CARE
Problem: Discharge Planning  Goal: Discharge to home or other facility with appropriate resources  2/17/2025 1646 by Radha Deras RN  Outcome: Progressing  2/17/2025 0315 by Kayla Em RN  Outcome: Progressing  2/17/2025 0315 by Kayla Em RN  Outcome: Progressing  Flowsheets (Taken 2/16/2025 2010)  Discharge to home or other facility with appropriate resources:   Identify barriers to discharge with patient and caregiver   Identify discharge learning needs (meds, wound care, etc)     Problem: Pain  Goal: Verbalizes/displays adequate comfort level or baseline comfort level  2/17/2025 1646 by Radha Deras RN  Outcome: Progressing  2/17/2025 0315 by Kayla Em RN  Outcome: Progressing  2/17/2025 0315 by Kayla Em RN  Outcome: Progressing  Flowsheets (Taken 2/16/2025 2010)  Verbalizes/displays adequate comfort level or baseline comfort level:   Encourage patient to monitor pain and request assistance   Assess pain using appropriate pain scale   Administer analgesics based on type and severity of pain and evaluate response   Implement non-pharmacological measures as appropriate and evaluate response   Consider cultural and social influences on pain and pain management   Notify Licensed Independent Practitioner if interventions unsuccessful or patient reports new pain     Problem: Safety - Adult  Goal: Free from fall injury  2/17/2025 1646 by Radha Deras RN  Outcome: Progressing  2/17/2025 0315 by Kayla Em RN  Outcome: Progressing  2/17/2025 0315 by Kayla Em RN  Outcome: Progressing  Flowsheets (Taken 2/17/2025 0300)  Free From Fall Injury: Instruct family/caregiver on patient safety     Problem: Chronic Conditions and Co-morbidities  Goal: Patient's chronic conditions and co-morbidity symptoms are monitored and maintained or improved  2/17/2025 1646 by Radha Deras RN  Outcome: Progressing  2/17/2025 0315 by Kayla Em RN  Outcome:

## 2025-02-17 NOTE — PROGRESS NOTES
[facility] - Progress Note    Today's Date and Time: 2/16/2025, 9:15 PM    Impression :   Ileus  Prior BPH  Prior urinary retention  S/P Robotic prostatectomy 2-13-25  Small Lt sided intraabdominal fluid collection by CT  Prior UTI  Current Klebsiella complicated UTI  Essential HTN  HLD    Recommendations:   On cefepime    Medical Decision Making/Summary/Discussion:2/16/2025     Daily Elements of Decision Making provided by Consulting Physician:    Note: I have independently performed the steps listed below as part of the medical decision making and evaluation.    Review of current Problems:  Today I am seeing and examining the patient for the following problems:  Ileus  Prior BPH  Prior urinary retention  S/P Robotic prostatectomy 2-13-25  Small Lt sided intraabdominal fluid collection by CT  Prior UTI  Essential HTN  HLD  Evaluation of Patient:  Patient with above problems  Evaluated for UTI and possible infection in abdomen  Please see daily details in Interval Changes Section  Changes in physical exam:  Ileus with bowel distension  Please see daily details in Physical Exam section and in Interval Changes Section  Changes in ROS:  Unchanged  Please see daily details in Review of Symptoms section and in Interval Changes Section  Discussion with Referring Physician or Service  Dr. Hopkins  Laboratory and Radiologic Studies with personal review of radiologic studies  Laboratory  Radiology  Microbiology  Please see Details in daily Interval Changes Section devoted to Lab, Micro and Radiology and in Medical Decision Laboratory, Imaging and Cultures sections.  Review of Infection Control and Prevention measures:  Contact: Hx MDRO Klebsiella   Please see daily details in Infection Control Recommendations section  Administer medications as ordered:  Cefepime   Review of Antimicrobial Stewardship Measures:  Targeted therapy  PK dosing   Please see daily details in Antimicrobial  Small amount of free fluid and free gas in the abdomen.  Patient is recently post prostatectomy.  Tiny amount of gas in the subcutaneous tissues.  There is also a small gas fluid collection in the anterior subcutaneous tissues but it does not have an enhancing capsule.  This is adjacent to the umbilicus.  It measures 1.7 cm by 2.9 cm and is completely in the subcutaneous tissues.  Bubbles of gas in the subcutaneous tissues which may be related to recent injections.  They have no or the recent surgery. Aorta: Aorta is of normal size.  Negative for dissection.  IVC is unremarkable.Celiac axis and SMA are patent. Portal vein is patent. PELVIS GI: No small bowel dilation.  No colonic wall thickening.  No enlarged lymphadenopathy. Small amount of free fluid in the pelvis. : Tovar catheter in the bladder.  The bladder is decompressed but it has a diffusely thickened irregular wall and adjacent fatty stranding..  Free fluid in the pelvis but no large abscess. Osseous: Spondylosis.     1. Bibasilar consolidation.  Likely atelectasis. 2.  postoperative ileus. 3. Small amount of free fluid and free gas in the abdomen.  Patient is recently postoperative 4. Small gas fluid collection in the anterior subcutaneous tissues adjacent to the umbilicus. This does not have an enhancing capsule.  Possible abscess versus seroma. 5. Probable cystitis.     XR CHEST PORTABLE    Result Date: 2/15/2025  EXAMINATION: ONE XRAY VIEW OF THE CHEST 2/15/2025 5:04 pm COMPARISON: 12/30/2024 HISTORY: ORDERING SYSTEM PROVIDED HISTORY: sepsis TECHNOLOGIST PROVIDED HISTORY: sepsis Reason for Exam: port upright FINDINGS: Heart size is normal  Aorta is normal.  Lungs are normally expanded and clear. No pleural effusions. Large of gas within the colon.  Probable small bowel dilation as well.     Lungs are clear       Medical Decision [id number]:       Dx: Urinary retention    Specimen Information: Urine, clean catch   0 Result Notes

## 2025-02-17 NOTE — PROGRESS NOTES
Urology Progress Note     Subjective:   AF tachycardic   BP WNL  NG in place  Passed some gas, no BM    Patient Vitals for the past 24 hrs:   BP Temp Temp src Pulse Resp SpO2   02/17/25 0715 138/71 98.4 °F (36.9 °C) Oral -- -- --   02/17/25 0400 133/87 98.2 °F (36.8 °C) Oral (!) 111 18 91 %   02/17/25 0000 123/72 98.2 °F (36.8 °C) Oral 90 20 91 %   02/16/25 2200 -- -- -- 98 24 93 %   02/16/25 2128 -- -- -- (!) 104 22 93 %   02/16/25 2010 (!) 141/77 98.2 °F (36.8 °C) Oral (!) 114 24 92 %   02/16/25 1800 -- -- -- (!) 103 24 92 %   02/16/25 1700 -- -- -- 92 24 95 %   02/16/25 1644 (!) 144/87 98.2 °F (36.8 °C) Oral 98 23 93 %   02/16/25 1600 -- -- -- 93 21 93 %   02/16/25 1500 -- -- -- 96 -- 92 %   02/16/25 1400 -- -- -- 92 -- 96 %   02/16/25 1300 -- -- -- 91 21 94 %   02/16/25 1130 139/87 97.9 °F (36.6 °C) Oral 99 20 93 %   02/16/25 1100 -- -- -- (!) 107 20 91 %   02/16/25 1000 -- -- -- 88 20 94 %   02/16/25 0900 -- -- -- (!) 104 25 91 %   02/16/25 0800 -- -- -- (!) 106 24 92 %       Intake/Output Summary (Last 24 hours) at 2/17/2025 0733  Last data filed at 2/17/2025 0630  Gross per 24 hour   Intake 1845.15 ml   Output 2040 ml   Net -194.85 ml       Recent Labs     02/15/25  1558 02/16/25  0321 02/17/25  0555   WBC 12.2* 12.6* 11.0   HGB 11.5* 11.4* 10.4*   HCT 35.4* 36.8* 33.4*   MCV 77.0* 79.0* 79.9*    339 403     Recent Labs     02/15/25  1558 02/16/25  0321 02/17/25  0555   * 135* 139   K 3.6* 3.5* 3.7   CL 94* 98 103   CO2 24 23 22   BUN 7* 11 19   CREATININE 0.7 0.6* 0.7       Recent Labs     02/15/25  1610   COLORU [place]*   PHUR 5.5   WBCUA TOO NUMEROUS TO COUNT   RBCUA TOO NUMEROUS TO COUNT   MUCUS 2+   BACTERIA FEW*   LEUKOCYTESUR MODERATE*   UROBILINOGEN Normal   BILIRUBINUR NEGATIVE       Additional Lab/culture results:    Physical Exam:   NAD  RRR  Symm chest rise normal WOB  Soft NT improved distention, NGT  Tovar     Interval Imaging Findings:    Impression:   64-year-old male with a

## 2025-02-17 NOTE — PROGRESS NOTES
Occupational Therapy    [facility]  Occupational Therapy Not Seen Note    DATE: 2025    NAME: Ivanna Khoury  MRN: [id number]   : 1960      Patient not seen this date for Occupational Therapy due to:    Patient independent with ADLs and functional mobility with no acute OT needs. Pt observed mobilizing to and from bathroom independently. Will defer OT evaluation at this time. Pt in agreement. Please reorder OT if future needs arise.       Electronically signed by NUSRAT Estes on 2025 at 3:51 PM

## 2025-02-17 NOTE — PROGRESS NOTES
[facility]  Office: [phone]  Jc Morales DO, Mane Leone DO, Idris Rose DO, Brennon Alexander DO, Jami Pacheco MD, Laura Warren MD, Renee Eng MD, Radha Gutierres MD,  Charles Hill MD, Mar Roca MD, Kenton Sanchez MD,  Dane Cabrera DO, Shelby Molina MD, Alberto Dodson MD, Edin Morales DO, Agnieszka Abarca MD,  Wade Haney DO, Maribeth Toro MD, Melany Hess MD, Shirin Zimmer MD, Adriano Titus MD,  Toro Olivo MD, Sabrina Lopez MD, Jody Cruz MD, Concepcion Bob MD, Serge Acevedo MD, David Hopkins MD, Sunny Mckeon DO, Cezar Ryan MD, Dane Mariscal MD, Mohsin Reza, MD, Shirley Waterhouse, CNP,  Angely Dean CNP, Sunny Pelayo, CNP,  Evelia Bedoya, DNP, Aidee Beasley, CNP, Opal Teague, CNP, Luisa Colbert, CNP, Ailyn George CNP, Ayah Pelaez, PA-C, Arelis Parra, CNP, David Fowler, CNP,  Julia Lopez, CNP, Laxmi Perez, CNP, Keara Denise, CNP,  Marta Jose, CNS, Sandi Billy CNP, Milli Gay CNP,   Geni Weiss, CNP         [facility]   IN-PATIENT SERVICE   [facility]    Progress Note    2/17/2025    8:02 AM    Name:   Ivanna Khoury  MRN:     [id number]     Acct:      [id number]   Room:   0133/0133-01   Day:  2  Admit Date:  2/15/2025  3:38 PM    PCP:   Lisette Strong MD  Code Status:  Full Code    Subjective:     C/C:   Chief Complaint   Patient presents with    Abdominal Pain     Interval History Status: improved.     Patient seen and examined at bedside this morning . Patient passed flatus. GS at bedside this morning, planning to clamp NGT and possibly start patient on CLD. Patient pain is well controlled. Denies any current nausea, vomiting, fever, chills, CP or SOB. Patient continues on IV cefepime per ID.    Brief History:     Ivanna Khoury is a 64 y.o. male with PMHx HTN, HLD, and enlarged prostate s/p robotic prostatectomy 2/13/2025 who presents with Abdominal Pain, fever

## 2025-02-17 NOTE — PLAN OF CARE
Problem: Discharge Planning  Goal: Discharge to home or other facility with appropriate resources  2/17/2025 0315 by Kayla Em RN  Outcome: Progressing  2/17/2025 0315 by Kayla Em RN  Outcome: Progressing  Flowsheets (Taken 2/16/2025 2010)  Discharge to home or other facility with appropriate resources:   Identify barriers to discharge with patient and caregiver   Identify discharge learning needs (meds, wound care, etc)  2/16/2025 1638 by Yola Whitaker RN  Outcome: Progressing  2/16/2025 1353 by Yola Whitaker RN  Outcome: Progressing  Flowsheets (Taken 2/16/2025 0745)  Discharge to home or other facility with appropriate resources:   Identify discharge learning needs (meds, wound care, etc)   Identify barriers to discharge with patient and caregiver   Arrange for needed discharge resources and transportation as appropriate   Refer to discharge planning if patient needs post-hospital services based on physician order or complex needs related to functional status, cognitive ability or social support system     Problem: Pain  Goal: Verbalizes/displays adequate comfort level or baseline comfort level  2/17/2025 0315 by Kayla Em RN  Outcome: Progressing  2/17/2025 0315 by Kayla Em RN  Outcome: Progressing  Flowsheets (Taken 2/16/2025 2010)  Verbalizes/displays adequate comfort level or baseline comfort level:   Encourage patient to monitor pain and request assistance   Assess pain using appropriate pain scale   Administer analgesics based on type and severity of pain and evaluate response   Implement non-pharmacological measures as appropriate and evaluate response   Consider cultural and social influences on pain and pain management   Notify Licensed Independent Practitioner if interventions unsuccessful or patient reports new pain  2/16/2025 1638 by Yola Whitaker, RN  Outcome: Progressing  2/16/2025 1353 by Yola Whitaker, RN  Outcome: Progressing  Flowsheets (Taken 2/16/2025  Strong peripheral pulses

## 2025-02-17 NOTE — PROGRESS NOTES
PROGRESS NOTE          PATIENT NAME: Ivanna Khoury  MEDICAL RECORD NO. [id number]  DATE: 2/17/2025    HD: # 2      DIAGNOSIS AND PLAN    Ileus s/p robotic prostatectomy    NGT clamp trial, check residual at noon   Start CLD and remove NGT if OP minimal   Keep K>4 and Mg>2   Remainder of care per primary     Chief Complaint: \"I am passing gas\"    SUBJECTIVE    HDS, reports passing flatus, no nausea, no BM as of this AM. Wants NGT out     OBJECTIVE  VITALS:   Vitals:    02/17/25 0952   BP: 137/79   Pulse: 99   Resp:    Temp:    SpO2:      Physical Exam  Constitutional:       General: He is not in acute distress.     Appearance: He is normal weight.   HENT:      Head: Normocephalic and atraumatic.      Right Ear: External ear normal.      Left Ear: External ear normal.      Nose: Nose normal.      Mouth/Throat:      Pharynx: Oropharynx is clear.   Eyes:      Extraocular Movements: Extraocular movements intact.      Pupils: Pupils are equal, round, and reactive to light.   Cardiovascular:      Rate and Rhythm: Regular rhythm. Tachycardia present.      Pulses: Normal pulses.   Pulmonary:      Effort: Pulmonary effort is normal.   Abdominal:      General: There is distension.      Tenderness: There is no abdominal tenderness. There is no guarding.      Comments: Incision cdi with skin glue   Musculoskeletal:         General: No swelling or deformity.      Cervical back: Normal range of motion and neck supple.   Skin:     Capillary Refill: Capillary refill takes less than 2 seconds.      Coloration: Skin is not jaundiced.      Findings: No bruising.   Neurological:      General: No focal deficit present.      Mental Status: He is alert and oriented to person, place, and time.           Drain/tube output:  ngt 400 in ed    LAB:  CBC:   Recent Labs     02/15/25  1558 02/16/25  0321 02/17/25  0555   WBC 12.2* 12.6* 11.0   HGB 11.5* 11.4* 10.4*   HCT 35.4* 36.8* 33.4*   MCV 77.0* 79.0* 79.9*    339 403     BMP:

## 2025-02-18 ENCOUNTER — APPOINTMENT (OUTPATIENT)
Dept: GENERAL RADIOLOGY | Age: 65
DRG: 854 | End: 2025-02-18
Payer: MEDICARE

## 2025-02-18 LAB
ANION GAP SERPL CALCULATED.3IONS-SCNC: 14 MMOL/L (ref 9–16)
BASOPHILS # BLD: <0.03 K/UL (ref 0–0.2)
BASOPHILS NFR BLD: 0 % (ref 0–2)
BUN SERPL-MCNC: 16 MG/DL (ref 8–23)
CALCIUM SERPL-MCNC: 7.9 MG/DL (ref 8.6–10.4)
CHLORIDE SERPL-SCNC: 105 MMOL/L (ref 98–107)
CO2 SERPL-SCNC: 21 MMOL/L (ref 20–31)
CREAT SERPL-MCNC: 0.6 MG/DL (ref 0.7–1.2)
EOSINOPHIL # BLD: 0.16 K/UL (ref 0–0.44)
EOSINOPHILS RELATIVE PERCENT: 2 % (ref 1–4)
ERYTHROCYTE [DISTWIDTH] IN BLOOD BY AUTOMATED COUNT: 16.3 % (ref 11.8–14.4)
GFR, ESTIMATED: >90 ML/MIN/1.73M2
GLUCOSE SERPL-MCNC: 71 MG/DL (ref 74–99)
HCT VFR BLD AUTO: 30.2 % (ref 40.7–50.3)
HGB BLD-MCNC: 9.4 G/DL (ref 13–17)
IMM GRANULOCYTES # BLD AUTO: 0.14 K/UL (ref 0–0.3)
IMM GRANULOCYTES NFR BLD: 2 %
LYMPHOCYTES NFR BLD: 0.83 K/UL (ref 1.1–3.7)
LYMPHOCYTES RELATIVE PERCENT: 10 % (ref 24–43)
MCH RBC QN AUTO: 25.1 PG (ref 25.2–33.5)
MCHC RBC AUTO-ENTMCNC: 31.1 G/DL (ref 28.4–34.8)
MCV RBC AUTO: 80.5 FL (ref 82.6–102.9)
MONOCYTES NFR BLD: 0.4 K/UL (ref 0.1–1.2)
MONOCYTES NFR BLD: 5 % (ref 3–12)
NEUTROPHILS NFR BLD: 81 % (ref 36–65)
NEUTS SEG NFR BLD: 6.71 K/UL (ref 1.5–8.1)
NRBC BLD-RTO: 0 PER 100 WBC
PLATELET # BLD AUTO: 337 K/UL (ref 138–453)
PMV BLD AUTO: 8.4 FL (ref 8.1–13.5)
POTASSIUM SERPL-SCNC: 3.3 MMOL/L (ref 3.7–5.3)
RBC # BLD AUTO: 3.75 M/UL (ref 4.21–5.77)
RBC # BLD: ABNORMAL 10*6/UL
SODIUM SERPL-SCNC: 140 MMOL/L (ref 136–145)
SURGICAL PATHOLOGY REPORT: NORMAL
WBC OTHER # BLD: 8.3 K/UL (ref 3.5–11.3)

## 2025-02-18 PROCEDURE — 6360000002 HC RX W HCPCS: Performed by: NURSE PRACTITIONER

## 2025-02-18 PROCEDURE — 2500000003 HC RX 250 WO HCPCS: Performed by: NURSE PRACTITIONER

## 2025-02-18 PROCEDURE — 74018 RADEX ABDOMEN 1 VIEW: CPT

## 2025-02-18 PROCEDURE — 85025 COMPLETE CBC W/AUTO DIFF WBC: CPT

## 2025-02-18 PROCEDURE — 99232 SBSQ HOSP IP/OBS MODERATE 35: CPT | Performed by: INTERNAL MEDICINE

## 2025-02-18 PROCEDURE — G0545 PR INHERENT VISIT TO INPT: HCPCS | Performed by: INTERNAL MEDICINE

## 2025-02-18 PROCEDURE — 2580000003 HC RX 258: Performed by: STUDENT IN AN ORGANIZED HEALTH CARE EDUCATION/TRAINING PROGRAM

## 2025-02-18 PROCEDURE — 6360000002 HC RX W HCPCS: Performed by: INTERNAL MEDICINE

## 2025-02-18 PROCEDURE — 99231 SBSQ HOSP IP/OBS SF/LOW 25: CPT

## 2025-02-18 PROCEDURE — 87077 CULTURE AEROBIC IDENTIFY: CPT

## 2025-02-18 PROCEDURE — 87070 CULTURE OTHR SPECIMN AEROBIC: CPT

## 2025-02-18 PROCEDURE — 2580000003 HC RX 258: Performed by: NURSE PRACTITIONER

## 2025-02-18 PROCEDURE — 2060000000 HC ICU INTERMEDIATE R&B

## 2025-02-18 PROCEDURE — 87205 SMEAR GRAM STAIN: CPT

## 2025-02-18 PROCEDURE — 99232 SBSQ HOSP IP/OBS MODERATE 35: CPT | Performed by: STUDENT IN AN ORGANIZED HEALTH CARE EDUCATION/TRAINING PROGRAM

## 2025-02-18 PROCEDURE — 2500000003 HC RX 250 WO HCPCS: Performed by: INTERNAL MEDICINE

## 2025-02-18 PROCEDURE — 6370000000 HC RX 637 (ALT 250 FOR IP): Performed by: STUDENT IN AN ORGANIZED HEALTH CARE EDUCATION/TRAINING PROGRAM

## 2025-02-18 PROCEDURE — 36415 COLL VENOUS BLD VENIPUNCTURE: CPT

## 2025-02-18 PROCEDURE — 6370000000 HC RX 637 (ALT 250 FOR IP)

## 2025-02-18 PROCEDURE — 99231 SBSQ HOSP IP/OBS SF/LOW 25: CPT | Performed by: SURGERY

## 2025-02-18 PROCEDURE — 87186 SC STD MICRODIL/AGAR DIL: CPT

## 2025-02-18 PROCEDURE — 99211 OFF/OP EST MAY X REQ PHY/QHP: CPT

## 2025-02-18 PROCEDURE — 80048 BASIC METABOLIC PNL TOTAL CA: CPT

## 2025-02-18 PROCEDURE — 6370000000 HC RX 637 (ALT 250 FOR IP): Performed by: NURSE PRACTITIONER

## 2025-02-18 RX ORDER — LACTULOSE 10 G/15ML
20 SOLUTION ORAL 2 TIMES DAILY
Status: DISCONTINUED | OUTPATIENT
Start: 2025-02-18 | End: 2025-02-22

## 2025-02-18 RX ORDER — POLYETHYLENE GLYCOL 3350 17 G/17G
17 POWDER, FOR SOLUTION ORAL DAILY
Status: DISCONTINUED | OUTPATIENT
Start: 2025-02-18 | End: 2025-02-23

## 2025-02-18 RX ADMIN — SODIUM CHLORIDE: 0.9 INJECTION, SOLUTION INTRAVENOUS at 04:05

## 2025-02-18 RX ADMIN — LACTULOSE 20 G: 20 SOLUTION ORAL at 20:57

## 2025-02-18 RX ADMIN — WATER 1000 MG: 1 INJECTION INTRAMUSCULAR; INTRAVENOUS; SUBCUTANEOUS at 02:03

## 2025-02-18 RX ADMIN — Medication 2000 MG: at 17:45

## 2025-02-18 RX ADMIN — TAMSULOSIN HYDROCHLORIDE 0.4 MG: 0.4 CAPSULE ORAL at 08:59

## 2025-02-18 RX ADMIN — ROSUVASTATIN CALCIUM 10 MG: 10 TABLET, FILM COATED ORAL at 20:57

## 2025-02-18 RX ADMIN — LISINOPRIL 40 MG: 20 TABLET ORAL at 08:59

## 2025-02-18 RX ADMIN — MORPHINE SULFATE 4 MG: 4 INJECTION INTRAVENOUS at 04:49

## 2025-02-18 RX ADMIN — CARVEDILOL 3.12 MG: 6.25 TABLET, FILM COATED ORAL at 09:00

## 2025-02-18 RX ADMIN — POLYETHYLENE GLYCOL 3350 17 G: 17 POWDER, FOR SOLUTION ORAL at 09:01

## 2025-02-18 RX ADMIN — WATER 1000 MG: 1 INJECTION INTRAMUSCULAR; INTRAVENOUS; SUBCUTANEOUS at 10:39

## 2025-02-18 RX ADMIN — SODIUM CHLORIDE, PRESERVATIVE FREE 10 ML: 5 INJECTION INTRAVENOUS at 09:02

## 2025-02-18 RX ADMIN — CARVEDILOL 3.12 MG: 6.25 TABLET, FILM COATED ORAL at 20:56

## 2025-02-18 RX ADMIN — SODIUM CHLORIDE, PRESERVATIVE FREE 40 MG: 5 INJECTION INTRAVENOUS at 09:01

## 2025-02-18 RX ADMIN — POTASSIUM CHLORIDE 40 MEQ: 1500 TABLET, EXTENDED RELEASE ORAL at 20:56

## 2025-02-18 RX ADMIN — SODIUM CHLORIDE, PRESERVATIVE FREE 10 ML: 5 INJECTION INTRAVENOUS at 20:58

## 2025-02-18 ASSESSMENT — PAIN SCALES - GENERAL
PAINLEVEL_OUTOF10: 0
PAINLEVEL_OUTOF10: 10
PAINLEVEL_OUTOF10: 10
PAINLEVEL_OUTOF10: 0

## 2025-02-18 ASSESSMENT — PAIN DESCRIPTION - LOCATION: LOCATION: ABDOMEN

## 2025-02-18 NOTE — PROGRESS NOTES
[facility] - Progress Note    Today's Date and Time: 2/18/2025, 12:51 PM    Impression :   Ileus  Prior BPH  Prior urinary retention  S/P Robotic prostatectomy 2-13-25  Small Lt sided intraabdominal fluid collection by CT  Prior UTI  Current Klebsiella complicated UTI  Essential HTN  HLD  Midline incision with leakage of serous fluid suggestive of underlying seroma. 2-18-25  Culture sent    Recommendations:   On cefazolin for Klebsiella UTI  Dry dressings for abdominal incision leakage  Culture in progress  Discussed with Dr Holloway, Urology    Medical Decision Making/Summary/Discussion:2/18/2025     Daily Elements of Decision Making provided by Consulting Physician:    Note: I have independently performed the steps listed below as part of the medical decision making and evaluation.    Review of current Problems:  Today I am seeing and examining the patient for the following problems:  Ileus  Prior BPH  Prior urinary retention  S/P Robotic prostatectomy 2-13-25  Small Lt sided intraabdominal fluid collection by CT  Prior UTI  Essential HTN  HLD  Evaluation of Patient:  Patient with above problems  Evaluated for UTI and possible infection in abdomen  Please see daily details in Interval Changes Section  Changes in physical exam:  Ileus with bowel distension  Please see daily details in Physical Exam section and in Interval Changes Section  Changes in ROS:  Unchanged  Please see daily details in Review of Symptoms section and in Interval Changes Section  Discussion with Referring Physician or Service  Dr. Hopkins  Laboratory and Radiologic Studies with personal review of radiologic studies  Laboratory  Radiology  Microbiology  Klebsiella in urine  Please see Details in daily Interval Changes Section devoted to Lab, Micro and Radiology and in Medical Decision Laboratory, Imaging and Cultures sections.  Review of Infection Control and Prevention measures:  Contact: Eda SMITH  auscultation, without wheezes, rales, or rhonchi. No areas of consolidation.Good air movement bilaterally. No egophony.  Cardiovascular: Regular rate and rhythm without murmurs, rubs, or gallops. S1 and S2 normal.  Abdomen: Soft, no tenderness, bowel sounds normal  All four Extremities: No cyanosis, clubbing, edema, or effusions.  Neurologic: No gross sensory or motor deficits.  Skin: No rash or lesions. IV site inspected: no inflammation.      Medical Decision Making:   I have independently reviewed/ordered the following labs:    CBC with Differential:   Recent Labs     02/17/25  0555 02/18/25  0810   WBC 11.0 8.3   HGB 10.4* 9.4*   HCT 33.4* 30.2*    337   LYMPHOPCT 10* 10*   MONOPCT 7 5   EOSPCT 2 2     BMP:   Recent Labs     02/16/25  0321 02/17/25  0555 02/18/25  0810   * 139 140   K 3.5* 3.7 3.3*   CL 98 103 105   CO2 23 22 21   BUN 11 19 16   CREATININE 0.6* 0.7 0.6*   MG 2.0  --   --      Hepatic Function Panel:   Recent Labs     02/15/25  1558   BILITOT 0.6   ALKPHOS 101   ALT 81*   AST 37     No results found for: \"VANCOTROUGH\"     Medications:      polyethylene glycol  17 g Oral Daily    ceFAZolin  1,000 mg IntraVENous Q8H    carvedilol  3.125 mg Oral BID    lisinopril  40 mg Oral Daily    rosuvastatin  10 mg Oral Nightly    tamsulosin  0.4 mg Oral Daily    sodium chloride flush  5-40 mL IntraVENous 2 times per day    enoxaparin  40 mg SubCUTAneous Daily    pantoprazole (PROTONIX) 40 mg in sodium chloride (PF) 0.9 % 10 mL injection  40 mg IntraVENous Daily       Thank you for allowing us to participate in the care of this patient. Please call with questions.    CAMILLE Louie  Pager: [phone]  - Office: [phone]

## 2025-02-18 NOTE — PROGRESS NOTES
[facility] Nurse  Consult Note       NAME:  Ivanna Khoury  MEDICAL RECORD NUMBER:  [id number]  AGE: 64 y.o.   GENDER: male  : 1960  TODAY'S DATE:  2025    Subjective:     Reason for WOCN Evaluation and Assessment: \"post surgical abdominal wounds\"      Ivanna Khoury is a 64 y.o. male referred by:   [x] Physician  [] Nursing  [] Other:     Simple robotic prostatectomy 2025. Patient now with post operative ileus.     Primary RN reports laparoscopic puncture site with small amount of serosanguinous drainage. ABD pad in use. Urologic surgeons have evaluated the site and have no concerns.     [facility] nursing could only offer similar if not same plan of ABD pad to contain drainage. Care of the surgical wounds are per the surgeon.

## 2025-02-18 NOTE — PROGRESS NOTES
David Pressley Santacroce, Khan, Mostafa, Buck, Murtagh Jr  Urology Progress Note     Chief Complaint: Ileus status post simple prostatectomy    Subjective:  No acute events overnight, afebrile, continues to be mildly tachycardic but normotensive  Pain level: Controlled  Denies fever, chills, nausea, vomiting, chest pain, shortness of breath  Passing gas, had a bowel movement this morning  NG tube still in place    A.m. labs pending.    Patient Vitals for the past 24 hrs:   BP Temp Temp src Pulse Resp SpO2   02/18/25 0519 -- -- -- -- 20 --   02/18/25 0400 (!) 149/78 97.6 °F (36.4 °C) Oral 94 20 95 %   02/17/25 2340 135/76 97.5 °F (36.4 °C) Oral 87 25 92 %   02/17/25 2015 130/68 97.4 °F (36.3 °C) Oral 90 18 92 %   02/17/25 1531 (!) 141/83 97.3 °F (36.3 °C) Oral (!) 106 20 95 %   02/17/25 1200 135/85 98.4 °F (36.9 °C) Oral 89 20 96 %   02/17/25 0952 137/79 -- -- 99 -- --   02/17/25 0715 138/71 98.4 °F (36.9 °C) Oral 92 23 94 %       Intake/Output Summary (Last 24 hours) at 2/18/2025 0709  Last data filed at 2/18/2025 0507  Gross per 24 hour   Intake 10 ml   Output 1350 ml   Net -1340 ml       Recent Labs     02/15/25  1558 02/16/25  0321 02/17/25  0555   WBC 12.2* 12.6* 11.0   HGB 11.5* 11.4* 10.4*   HCT 35.4* 36.8* 33.4*   MCV 77.0* 79.0* 79.9*    339 403     Recent Labs     02/15/25  1558 02/16/25  0321 02/17/25  0555   * 135* 139   K 3.6* 3.5* 3.7   CL 94* 98 103   CO2 24 23 22   BUN 7* 11 19   CREATININE 0.7 0.6* 0.7       Recent Labs     02/15/25  1610   COLORU [place]*   PHUR 5.5   WBCUA TOO NUMEROUS TO COUNT   RBCUA TOO NUMEROUS TO COUNT   MUCUS 2+   BACTERIA FEW*   LEUKOCYTESUR MODERATE*   UROBILINOGEN Normal   BILIRUBINUR NEGATIVE       Additional Lab/culture results:  Culture Klebsiella    Physical Exam:  Constitutional: Patient in no acute distress.  Neuro: alert and oriented to person place and time.  HEENT: No acute abnormalities  Lungs: Respiratory effort normal on room air  Cardiovascular:

## 2025-02-18 NOTE — PROGRESS NOTES
[facility]  Office: [phone]  Jc Morales DO, Mane Leone DO, Idris Rose DO, Brennon Alexander DO, Jami Pacheco MD, Laura Warren MD, Renee Eng MD, Radha Gutierres MD,  Charles Hill MD, Mar Roca MD, Kenton Sanchez MD,  Dane Cabrera DO, Shelby Molina MD, Alberto Dodson MD, Edin Morales DO, Agnieszka Abarca MD,  Wade Haney DO, Maribeth Toro MD, Melany Hess MD, Shirin Zimmer MD, Adriano Titus MD,  Toro Olivo MD, Sabrina Lopez MD, Jody Cruz MD, Concepcion Bob MD, Serge Acevedo MD, David Hopkins MD, Sunny Mckeon DO, Cezar Ryan MD, Dane Mariscal MD, Mohsin Reza, MD, Shirley Waterhouse, CNP,  Angely Dean CNP, Sunny Pelayo, CNP,  Evelia Bedoya, DNP, Aidee Beasley, CNP, Opal Teague, CNP, Luisa Colbert, CNP, Ailyn George, CNP, Ayah Pelaez, PA-C, Arelis Parra, CNP, David Fowler, CNP,  Julia Lopez, CNP, Laxmi Perez, CNP, Keara Denise, CNP,  Marta Jose, CNS, Sandi Billy CNP, Milli Gay CNP,   Geni Weiss, CNP         [facility]   IN-PATIENT SERVICE   [facility]    Progress Note    2/18/2025    1:14 PM    Name:   Ivanna Khoury  MRN:     [id number]     Acct:      [id number]   Room:   0133/0133-01   Day:  3  Admit Date:  2/15/2025  3:38 PM    PCP:   Lisette Strong MD  Code Status:  Full Code    Subjective:     C/C:   Chief Complaint   Patient presents with    Abdominal Pain     Interval History Status: not changed.     Patient seen examined.  Had 2 small bowel movements earlier this morning tried clear liquid diet for breakfast however still having abdominal Distention.  Patient is concerned that antibiotics are not working.  Explained to patient that I am concerned that he might still be having an ileus    Brief History:     Per chart:  \"  Ivanna Khoury is a 64 y.o. male with PMHx HTN, HLD, and enlarged prostate s/p robotic prostatectomy 2/13/2025 who presents with Abdominal    CALCIUM 9.5 8.8 8.3* 7.9*   TROPHS <6  --   --   --      Recent Labs     02/15/25  1558 02/16/25 2017 02/17/25  0715   AST 37  --   --    ALT 81*  --   --    ALKPHOS 101  --   --    BILITOT 0.6  --   --    LIPASE 18  --   --    POCGLU  --  110 102     ABG:  Lab Results   Component Value Date/Time    FIO2 NOT REPORTED 07/28/2021 10:23 PM     Lab Results   Component Value Date/Time    SPECIAL Site: Urine 02/15/2025 04:25 PM     Lab Results   Component Value Date/Time    CULTURE (A) 02/15/2025 04:25 PM     KLEBSIELLA PNEUMONIAE 50 ,000 CFU/ML Identification by MALDI-TOF       Radiology:  XR ABDOMEN (KUB) (SINGLE AP VIEW)    Result Date: 2/17/2025  Findings consistent with ongoing ileus.     XR ABDOMEN FOR NG/OG/NE TUBE PLACEMENT    Result Date: 2/16/2025  1. Normally located gastric tube with the tip and side-port in in the stomach. 2. A persistent dilated loops of small bowel which could represent ileus, but some degree of small bowel obstruction cannot be excluded.     XR ABDOMEN FOR NG/OG/NE TUBE PLACEMENT    Result Date: 2/16/2025  1. Nasogastric tube terminating in the gastric body.     XR ABDOMEN FOR NG/OG/NE TUBE PLACEMENT    Result Date: 2/16/2025  NG tube coursing into the stomach with the side port below the level of the diaphragm. Dilated gas-filled loops of small bowel and colon in the upper abdomen and mid abdomen.  Could relate to ileus or obstruction.     XR ABDOMEN FOR NG/OG/NE TUBE PLACEMENT    Result Date: 2/15/2025  Enteric tube tip overlies the gastric fundus.     CT ABDOMEN PELVIS W IV CONTRAST Additional Contrast? None    Result Date: 2/15/2025  1. Bibasilar consolidation.  Likely atelectasis. 2.  postoperative ileus. 3. Small amount of free fluid and free gas in the abdomen.  Patient is recently postoperative 4. Small gas fluid collection in the anterior subcutaneous tissues adjacent to the umbilicus. This does not have an enhancing capsule.  Possible abscess versus seroma. 5. Probable

## 2025-02-18 NOTE — PROGRESS NOTES
PROGRESS NOTE          PATIENT NAME: Ivanna Khoury  MEDICAL RECORD NO. [id number]  DATE: 2/18/2025    HD: # 3      DIAGNOSIS AND PLAN    Ileus s/p robotic prostatectomy    NGT removed this AM, patient patient flatus and had Bmx2   CLD started, primary to advance as tolerated  Keep K>4 and Mg>2   Recommend bowel regimen during the post-operative course to prevent recurrent ileus   Remainder of care per primary, general surgery to sign off     Chief Complaint: \"I am passing gas\"    SUBJECTIVE    HDS, reports passing flatus, no nausea, Bmx2, abdomen is soft, non-tender     OBJECTIVE  VITALS:   Vitals:    02/18/25 0735   BP: (!) 144/74   Pulse: 87   Resp: 18   Temp: 97.7 °F (36.5 °C)   SpO2: 94%     Physical Exam  Constitutional:       General: He is not in acute distress.     Appearance: He is normal weight.   HENT:      Head: Normocephalic and atraumatic.      Right Ear: External ear normal.      Left Ear: External ear normal.      Nose: Nose normal.      Mouth/Throat:      Pharynx: Oropharynx is clear.   Eyes:      Extraocular Movements: Extraocular movements intact.      Pupils: Pupils are equal, round, and reactive to light.   Cardiovascular:      Rate and Rhythm: Regular rhythm. Tachycardia present.      Pulses: Normal pulses.   Pulmonary:      Effort: Pulmonary effort is normal.   Abdominal:      General: There is distension.      Tenderness: There is no abdominal tenderness. There is no guarding.      Comments: Incision cdi with skin glue   Musculoskeletal:         General: No swelling or deformity.      Cervical back: Normal range of motion and neck supple.   Skin:     Capillary Refill: Capillary refill takes less than 2 seconds.      Coloration: Skin is not jaundiced.      Findings: No bruising.   Neurological:      General: No focal deficit present.      Mental Status: He is alert and oriented to person, place, and time.           Drain/tube output:  ngt 400 in ed    LAB:  CBC:   Recent Labs

## 2025-02-18 NOTE — CARE COORDINATION
Transitional planning: Met w/ patient about discharge plan and plans to return home independent w/ wife. States wife can transport home.

## 2025-02-18 NOTE — PLAN OF CARE
Problem: Discharge Planning  Goal: Discharge to home or other facility with appropriate resources  Outcome: Progressing     Problem: Pain  Goal: Verbalizes/displays adequate comfort level or baseline comfort level  Outcome: Progressing     Problem: Safety - Adult  Goal: Free from fall injury  Outcome: Progressing     Problem: Chronic Conditions and Co-morbidities  Goal: Patient's chronic conditions and co-morbidity symptoms are monitored and maintained or improved  Outcome: Progressing     Problem: Skin/Tissue Integrity  Goal: Skin integrity remains intact  Description: 1.  Monitor for areas of redness and/or skin breakdown  2.  Assess vascular access sites hourly  3.  Every 4-6 hours minimum:  Change oxygen saturation probe site  4.  Every 4-6 hours:  If on nasal continuous positive airway pressure, respiratory therapy assess nares and determine need for appliance change or resting period  Outcome: Progressing     Problem: Gastrointestinal - Adult  Goal: Minimal or absence of nausea and vomiting  Outcome: Progressing  Goal: Maintains or returns to baseline bowel function  Outcome: Progressing  Goal: Maintains adequate nutritional intake  Outcome: Progressing     Problem: Genitourinary - Adult  Goal: Absence of urinary retention  Outcome: Progressing  Goal: Urinary catheter remains patent  Outcome: Progressing     Problem: Infection - Adult  Goal: Absence of infection at discharge  Outcome: Progressing  Goal: Absence of infection during hospitalization  Outcome: Progressing     Problem: ABCDS Injury Assessment  Goal: Absence of physical injury  Outcome: Progressing      04-Jan-2024 07:20 04-Jan-2024 07:00

## 2025-02-19 ENCOUNTER — APPOINTMENT (OUTPATIENT)
Dept: CT IMAGING | Age: 65
DRG: 854 | End: 2025-02-19
Payer: MEDICARE

## 2025-02-19 PROBLEM — R65.10 SIRS (SYSTEMIC INFLAMMATORY RESPONSE SYNDROME) (HCC): Status: ACTIVE | Noted: 2025-02-19

## 2025-02-19 PROBLEM — D72.825 BANDEMIA: Status: ACTIVE | Noted: 2025-02-19

## 2025-02-19 PROBLEM — R79.89 ELEVATED PROCALCITONIN: Status: ACTIVE | Noted: 2025-02-19

## 2025-02-19 LAB
ANION GAP SERPL CALCULATED.3IONS-SCNC: 10 MMOL/L (ref 9–16)
BASOPHILS # BLD: 0.03 K/UL (ref 0–0.2)
BASOPHILS NFR BLD: 0 % (ref 0–2)
BUN SERPL-MCNC: 11 MG/DL (ref 8–23)
CALCIUM SERPL-MCNC: 8.3 MG/DL (ref 8.6–10.4)
CHLORIDE SERPL-SCNC: 102 MMOL/L (ref 98–107)
CO2 SERPL-SCNC: 25 MMOL/L (ref 20–31)
CREAT SERPL-MCNC: 0.6 MG/DL (ref 0.7–1.2)
EOSINOPHIL # BLD: 0.15 K/UL (ref 0–0.44)
EOSINOPHILS RELATIVE PERCENT: 2 % (ref 1–4)
ERYTHROCYTE [DISTWIDTH] IN BLOOD BY AUTOMATED COUNT: 16.3 % (ref 11.8–14.4)
GFR, ESTIMATED: >90 ML/MIN/1.73M2
GLUCOSE SERPL-MCNC: 125 MG/DL (ref 74–99)
HCT VFR BLD AUTO: 31.5 % (ref 40.7–50.3)
HGB BLD-MCNC: 9.9 G/DL (ref 13–17)
IMM GRANULOCYTES # BLD AUTO: 0.23 K/UL (ref 0–0.3)
IMM GRANULOCYTES NFR BLD: 3 %
LYMPHOCYTES NFR BLD: 0.89 K/UL (ref 1.1–3.7)
LYMPHOCYTES RELATIVE PERCENT: 10 % (ref 24–43)
MCH RBC QN AUTO: 25.4 PG (ref 25.2–33.5)
MCHC RBC AUTO-ENTMCNC: 31.4 G/DL (ref 28.4–34.8)
MCV RBC AUTO: 80.8 FL (ref 82.6–102.9)
MONOCYTES NFR BLD: 0.52 K/UL (ref 0.1–1.2)
MONOCYTES NFR BLD: 6 % (ref 3–12)
NEUTROPHILS NFR BLD: 79 % (ref 36–65)
NEUTS SEG NFR BLD: 7.02 K/UL (ref 1.5–8.1)
NRBC BLD-RTO: 0 PER 100 WBC
PLATELET # BLD AUTO: 383 K/UL (ref 138–453)
PMV BLD AUTO: 8.4 FL (ref 8.1–13.5)
POTASSIUM SERPL-SCNC: 3.3 MMOL/L (ref 3.7–5.3)
RBC # BLD AUTO: 3.9 M/UL (ref 4.21–5.77)
RBC # BLD: ABNORMAL 10*6/UL
SODIUM SERPL-SCNC: 137 MMOL/L (ref 136–145)
WBC OTHER # BLD: 8.8 K/UL (ref 3.5–11.3)

## 2025-02-19 PROCEDURE — 6370000000 HC RX 637 (ALT 250 FOR IP): Performed by: STUDENT IN AN ORGANIZED HEALTH CARE EDUCATION/TRAINING PROGRAM

## 2025-02-19 PROCEDURE — 6370000000 HC RX 637 (ALT 250 FOR IP)

## 2025-02-19 PROCEDURE — 99233 SBSQ HOSP IP/OBS HIGH 50: CPT | Performed by: INTERNAL MEDICINE

## 2025-02-19 PROCEDURE — 2060000000 HC ICU INTERMEDIATE R&B

## 2025-02-19 PROCEDURE — 85025 COMPLETE CBC W/AUTO DIFF WBC: CPT

## 2025-02-19 PROCEDURE — 99232 SBSQ HOSP IP/OBS MODERATE 35: CPT | Performed by: STUDENT IN AN ORGANIZED HEALTH CARE EDUCATION/TRAINING PROGRAM

## 2025-02-19 PROCEDURE — 2500000003 HC RX 250 WO HCPCS: Performed by: NURSE PRACTITIONER

## 2025-02-19 PROCEDURE — 6370000000 HC RX 637 (ALT 250 FOR IP): Performed by: NURSE PRACTITIONER

## 2025-02-19 PROCEDURE — 2580000003 HC RX 258: Performed by: STUDENT IN AN ORGANIZED HEALTH CARE EDUCATION/TRAINING PROGRAM

## 2025-02-19 PROCEDURE — 74176 CT ABD & PELVIS W/O CONTRAST: CPT

## 2025-02-19 PROCEDURE — G0545 PR INHERENT VISIT TO INPT: HCPCS | Performed by: INTERNAL MEDICINE

## 2025-02-19 PROCEDURE — 6360000002 HC RX W HCPCS: Performed by: INTERNAL MEDICINE

## 2025-02-19 PROCEDURE — 80048 BASIC METABOLIC PNL TOTAL CA: CPT

## 2025-02-19 PROCEDURE — 6360000002 HC RX W HCPCS: Performed by: NURSE PRACTITIONER

## 2025-02-19 PROCEDURE — 36415 COLL VENOUS BLD VENIPUNCTURE: CPT

## 2025-02-19 PROCEDURE — 2580000003 HC RX 258: Performed by: NURSE PRACTITIONER

## 2025-02-19 RX ORDER — LISINOPRIL 10 MG/1
10 TABLET ORAL DAILY
Status: DISCONTINUED | OUTPATIENT
Start: 2025-02-19 | End: 2025-02-23

## 2025-02-19 RX ORDER — SODIUM CHLORIDE 9 MG/ML
INJECTION, SOLUTION INTRAVENOUS CONTINUOUS
Status: DISCONTINUED | OUTPATIENT
Start: 2025-02-19 | End: 2025-02-21

## 2025-02-19 RX ORDER — BISACODYL 10 MG
10 SUPPOSITORY, RECTAL RECTAL ONCE
Status: COMPLETED | OUTPATIENT
Start: 2025-02-19 | End: 2025-02-19

## 2025-02-19 RX ADMIN — ROSUVASTATIN CALCIUM 10 MG: 10 TABLET, FILM COATED ORAL at 20:21

## 2025-02-19 RX ADMIN — SODIUM CHLORIDE, PRESERVATIVE FREE 40 MG: 5 INJECTION INTRAVENOUS at 09:24

## 2025-02-19 RX ADMIN — SODIUM CHLORIDE, PRESERVATIVE FREE 10 ML: 5 INJECTION INTRAVENOUS at 20:21

## 2025-02-19 RX ADMIN — LACTULOSE 20 G: 20 SOLUTION ORAL at 09:24

## 2025-02-19 RX ADMIN — LISINOPRIL 10 MG: 10 TABLET ORAL at 09:23

## 2025-02-19 RX ADMIN — BISACODYL 10 MG: 10 SUPPOSITORY RECTAL at 09:23

## 2025-02-19 RX ADMIN — POTASSIUM CHLORIDE 40 MEQ: 1500 TABLET, EXTENDED RELEASE ORAL at 09:23

## 2025-02-19 RX ADMIN — Medication 2000 MG: at 02:29

## 2025-02-19 RX ADMIN — CARVEDILOL 3.12 MG: 6.25 TABLET, FILM COATED ORAL at 09:23

## 2025-02-19 RX ADMIN — SODIUM CHLORIDE: 0.9 INJECTION, SOLUTION INTRAVENOUS at 09:21

## 2025-02-19 RX ADMIN — BISACODYL 10 MG: 10 SUPPOSITORY RECTAL at 13:36

## 2025-02-19 RX ADMIN — CARVEDILOL 3.12 MG: 6.25 TABLET, FILM COATED ORAL at 20:20

## 2025-02-19 RX ADMIN — POLYETHYLENE GLYCOL 3350 17 G: 17 POWDER, FOR SOLUTION ORAL at 09:24

## 2025-02-19 RX ADMIN — TAMSULOSIN HYDROCHLORIDE 0.4 MG: 0.4 CAPSULE ORAL at 09:23

## 2025-02-19 RX ADMIN — Medication 2000 MG: at 18:18

## 2025-02-19 RX ADMIN — Medication 2000 MG: at 10:50

## 2025-02-19 RX ADMIN — LACTULOSE 20 G: 20 SOLUTION ORAL at 20:20

## 2025-02-19 ASSESSMENT — PAIN SCALES - GENERAL
PAINLEVEL_OUTOF10: 0

## 2025-02-19 NOTE — PROGRESS NOTES
Physician Progress Note      PATIENT:               THOR LOMELI  CSN #:                  [id number]  :                       1960  ADMIT DATE:       2/15/2025 3:38 PM  DISCH DATE:  RESPONDING  PROVIDER #:        Kenton DOWLING MD          QUERY TEXT:    Pt admitted with post operative ileus, acute cystitis. Pt noted to have on   admission WBC- 12.2,12.6, CRP- 151.0, PROCAL- 0.27. If possible, please   document in the progress notes and discharge summary if you are evaluating and   /or treating any of the following:    The medical record reflects the following:    Risk Factors: robotic simple prostatectomy on 2025. Post -operative   ileus.    Clinical Indicators: c/o abdominal pain, WBC- 12.2, 12.6,11.0, 8.3, TEMP -   97.3-98.9 and noted as 102 at home per patient ; CRP -151.0;  PRO CALCITONIN-   0.27. UA-  2/15- turbid, moderate leuk est, 3+ protein, RBC and WBC- Too   numerous to count.  Current Klebsiella complicated UTI.    Treatment: NGT - removed on , IV- Cefazolin, Cefepime. IVF- NS @ 100; IVF   - 2L NS - bolus- 2/15.    Thank You  Options provided:  -- Sepsis due to cystitis, present on admission, now resolved  -- Sepsis due to cystitis, present on admission  -- Acute cystitis without Sepsis  -- Other - I will add my own diagnosis  -- Disagree - Not applicable / Not valid  -- Disagree - Clinically unable to determine / Unknown  -- Refer to Clinical Documentation Reviewer    PROVIDER RESPONSE TEXT:    This patient was treated for Sepsis due to cystitis this admission, which was   present on admission and is currently resolved.    Query created by: Edin Saravia on 2025 9:58 AM      Electronically signed by:  Kenton DOWLING MD 2025 11:03 AM

## 2025-02-19 NOTE — PLAN OF CARE
Problem: Discharge Planning  Goal: Discharge to home or other facility with appropriate resources  2/19/2025 1704 by Luisa Sanchez RN  Outcome: Progressing  Flowsheets (Taken 2/19/2025 0925)  Discharge to home or other facility with appropriate resources:   Identify barriers to discharge with patient and caregiver   Arrange for needed discharge resources and transportation as appropriate   Identify discharge learning needs (meds, wound care, etc)   Refer to discharge planning if patient needs post-hospital services based on physician order or complex needs related to functional status, cognitive ability or social support system  2/19/2025 0611 by Reena Dykes RN  Outcome: Progressing  Flowsheets (Taken 2/18/2025 2000)  Discharge to home or other facility with appropriate resources: Identify barriers to discharge with patient and caregiver     Problem: Pain  Goal: Verbalizes/displays adequate comfort level or baseline comfort level  2/19/2025 1704 by Luisa Sanchez RN  Outcome: Progressing  2/19/2025 0611 by Reena Dykes RN  Outcome: Progressing     Problem: Safety - Adult  Goal: Free from fall injury  2/19/2025 1704 by Luisa Sanchez RN  Outcome: Progressing  Flowsheets (Taken 2/19/2025 0700)  Free From Fall Injury: Instruct family/caregiver on patient safety  2/19/2025 0611 by Reena Dykes RN  Outcome: Progressing  Flowsheets (Taken 2/18/2025 2000)  Free From Fall Injury: Instruct family/caregiver on patient safety     Problem: Chronic Conditions and Co-morbidities  Goal: Patient's chronic conditions and co-morbidity symptoms are monitored and maintained or improved  2/19/2025 1704 by Luisa Sanchez RN  Outcome: Progressing  Flowsheets (Taken 2/19/2025 0925)  Care Plan - Patient's Chronic Conditions and Co-Morbidity Symptoms are Monitored and Maintained or Improved:   Monitor and assess patient's chronic conditions and comorbid symptoms for stability, deterioration, or improvement   Collaborate with  2000)  Minimal or absence of nausea and vomiting: Administer IV fluids as ordered to ensure adequate hydration  Goal: Maintains or returns to baseline bowel function  2/19/2025 1704 by Luisa Sanchez RN  Outcome: Progressing  Flowsheets (Taken 2/19/2025 0925)  Maintains or returns to baseline bowel function:   Assess bowel function   Encourage oral fluids to ensure adequate hydration  2/19/2025 0611 by Reena Dykes RN  Outcome: Progressing  Flowsheets (Taken 2/18/2025 2000)  Maintains or returns to baseline bowel function: Assess bowel function  Goal: Maintains adequate nutritional intake  2/19/2025 1704 by Luisa Sanchez RN  Outcome: Progressing  Flowsheets (Taken 2/19/2025 0925)  Maintains adequate nutritional intake:   Monitor percentage of each meal consumed   Identify factors contributing to decreased intake, treat as appropriate  2/19/2025 0611 by Reena Dykes RN  Outcome: Progressing  Flowsheets (Taken 2/18/2025 2000)  Maintains adequate nutritional intake: Monitor percentage of each meal consumed     Problem: Genitourinary - Adult  Goal: Absence of urinary retention  2/19/2025 1704 by Luisa Sanchez RN  Outcome: Progressing  Flowsheets (Taken 2/19/2025 0925)  Absence of urinary retention:   Assess patient’s ability to void and empty bladder   Monitor intake/output and perform bladder scan as needed  2/19/2025 0611 by Reena Dykes RN  Outcome: Progressing  Flowsheets (Taken 2/18/2025 2000)  Absence of urinary retention: Assess patient’s ability to void and empty bladder  Goal: Urinary catheter remains patent  2/19/2025 1704 by Luisa Sanchez RN  Outcome: Progressing  Flowsheets (Taken 2/19/2025 0925)  Urinary catheter remains patent: Assess patency of urinary catheter  2/19/2025 0611 by Reena Dykes RN  Outcome: Progressing  Flowsheets (Taken 2/18/2025 2000)  Urinary catheter remains patent: Assess patency of urinary catheter     Problem: Infection - Adult  Goal: Absence of infection at

## 2025-02-19 NOTE — PROGRESS NOTES
[facility]  Office: [phone]  Jc Morales DO, Mane Leone DO, Idris Rose DO, Brennon Alexander DO, Jami Pacheco MD, Laura Warren MD, Renee Eng MD, Radha Gutierres MD,  Charles Hill MD, Mar Roca MD, Kenton Sanchez MD,  Dane Cabrera DO, Shelby Molina MD, Alberto Dodson MD, Edin Morales DO, Agnieszka Abarca MD,  Wade Haney DO, Maribeth Toro MD, Melany Hess MD, Shirin Zimmer MD, Adriano Titus MD,  Toro Olivo MD, Sabrina Lopez MD, Jody Cruz MD, Concepcion Bob MD, Serge Acevedo MD, David Hopkins MD, Sunny Mckeon DO, Cezar Ryan MD, Dane Mariscal MD, Mohsin Reza, MD, Shirley Waterhouse, CNP,  Angely Dean CNP, Sunny Pelayo, CNP,  Evelia Bedoya, DNP, Aidee Beasley, CNP, Opal Teague, CNP, Luisa Colbert, CNP, Ailyn George, CNP, Ayah Pelaez, PA-C, Arelis Parar, CNP, David Fowler, CNP,  Julia Lopez, CNP, Laxmi Perez, CNP, Keara Denise, CNP,  Marta Jose, CNS, Sandi Billy CNP, Milli Gay CNP,   Geni Weiss, CNP         [facility]   IN-PATIENT SERVICE   [facility]    Progress Note    2/19/2025    11:32 AM    Name:   Ivanna Khoury  MRN:     [id number]     Acct:      [id number]   Room:   0133/0133-01   Day:  4  Admit Date:  2/15/2025  3:38 PM    PCP:   Lisette Strong MD  Code Status:  Full Code    Subjective:     C/C:   Chief Complaint   Patient presents with    Abdominal Pain     Interval History Status: not changed.     Patient seen examined.  Had a bowel movement after suppository. Still having abominal distention, unfortunately needed multiple attempts at IV being placed overnight.     Brief History:     Per chart:  \"  Ivanna Khoury is a 64 y.o. male with PMHx HTN, HLD, and enlarged prostate s/p robotic prostatectomy 2/13/2025 who presents with Abdominal Pain, fever and abdominal distention.  Patient was without flatulence or BM for 2-3 days prior to admission.  Patient

## 2025-02-19 NOTE — PLAN OF CARE
Problem: Discharge Planning  Goal: Discharge to home or other facility with appropriate resources  Outcome: Progressing  Flowsheets (Taken 2/18/2025 2000)  Discharge to home or other facility with appropriate resources: Identify barriers to discharge with patient and caregiver     Problem: Pain  Goal: Verbalizes/displays adequate comfort level or baseline comfort level  Outcome: Progressing     Problem: Safety - Adult  Goal: Free from fall injury  Outcome: Progressing  Flowsheets (Taken 2/18/2025 2000)  Free From Fall Injury: Instruct family/caregiver on patient safety

## 2025-02-19 NOTE — PROGRESS NOTES
David Pressley Santacroce, Khan, Mostafa, Buck, Murtagh Jr  Urology Progress Note     Chief Complaint: Ileus status post simple prostatectomy    Subjective:  No acute events overnight, afebrile, continues to be mildly tachycardic but normotensive  Pain level: Controlled  Denies fever, chills, nausea, vomiting, chest pain, shortness of breath  NG tube removed yesterday  Patient passing gas and having bowel movements  Patient has had increased drainage from midline incision    A.m. labs pending.    Patient Vitals for the past 24 hrs:   BP Temp Temp src Pulse Resp SpO2   02/19/25 0400 138/79 97.6 °F (36.4 °C) Oral 74 13 97 %   02/18/25 2336 (!) 151/85 98.3 °F (36.8 °C) Oral 79 29 93 %   02/18/25 2056 138/82 98.5 °F (36.9 °C) Oral 84 25 95 %   02/18/25 1526 (!) 164/86 98.3 °F (36.8 °C) Oral 89 17 98 %   02/18/25 0859 (!) 144/74 -- -- 89 -- --   02/18/25 0735 (!) 144/74 97.7 °F (36.5 °C) Oral 87 18 94 %       Intake/Output Summary (Last 24 hours) at 2/19/2025 0722  Last data filed at 2/19/2025 0400  Gross per 24 hour   Intake 10 ml   Output 450 ml   Net -440 ml       Recent Labs     02/17/25  0555 02/18/25  0810 02/19/25  0545   WBC 11.0 8.3 8.8   HGB 10.4* 9.4* 9.9*   HCT 33.4* 30.2* 31.5*   MCV 79.9* 80.5* 80.8*    337 383     Recent Labs     02/17/25  0555 02/18/25  0810 02/19/25  0545    140 137   K 3.7 3.3* 3.3*    105 102   CO2 22 21 25   BUN 19 16 11   CREATININE 0.7 0.6* 0.6*       No results for input(s): \"COLORU\", \"PHUR\", \"LABCAST\", \"WBCUA\", \"RBCUA\", \"MUCUS\", \"TRICHOMONAS\", \"YEAST\", \"BACTERIA\", \"CLARITYU\", \"SPECGRAV\", \"LEUKOCYTESUR\", \"UROBILINOGEN\", \"BILIRUBINUR\", \"BLOODU\" in the last 72 hours.    Invalid input(s): \"NITRATE\", \"GLUCOSEUKETONESUAMORPHOUS\"      Additional Lab/culture results:  Culture Klebsiella    Physical Exam:  Constitutional: Patient in no acute distress.  Neuro: alert and oriented to person place and time.  HEENT: No acute abnormalities  Lungs: Respiratory effort normal

## 2025-02-19 NOTE — PROGRESS NOTES
[facility] -   Infectious diseases evaluation  admission date 2/15/2025    reason for consultation:   Kleb UTI - sepsis    Impression :   Current:  TUPR 2/13 - post op left ADY pulled  Had a valladares x few weeks before TURP for obstruction   Had MRSA epididymitis while  on valladares then klebsiella - treated  Ucx preemptive before surg showed kleb and treated to prep for the surgery  Post op fever sirs  Much better on ancef -  U cx again kleb 2/15  Post op ileus, needed few NGT  2/19 ileus better and good gas and BM   Increased abd drainage from surg abd sites, yellow clear fluid  Swab superf cx 2/18 GNR  Bandemia - resolved  Procal slightly elevated  0.27    Other:    Discussion / summary of stay / plan of care/ Recommendations:     HENCE:   Keep ancef  for now  Await the superf fluid cx 2/18 GNR ID  CT AP 2/19 looking for a collection left abd and resolution of the ileus  Unclear how to interpret the fluid gushing from the surg sites  Anticipate 4 weeks po AB to treat a possible prostate involvement by the kleb  Disc w fam -I ll follow w you    Infection Control Recommendations   [place] Precautions  Contact Isolation       Antimicrobial Stewardship Recommendations   Simplification of therapy  Targeted therapy      History of Present Illness:   Initial history:  Ivanna Khoury is a 64 y.o.-year-old male w PH and valladares x few weeks w MRSA then w klebsiella  and treated to prep for the TURP that  occurred 3/13 and he did well -  Went home and developed a fever and  back w sepsis fever 2/15-  CT suggested a small left pelvic fluid collection - site of the michelle op ADY that was pulled at DC after the surgery    BC neg abd urine cx again shows kleb S ancef 2/15   A lovcal swab from the draining abd wound showing GNR end    Drainage is serous yellow  from mod abd wound and from the keft lower quadrant old drain site -    Pt was in ileus and abd very distended - needed NGTs -

## 2025-02-20 ENCOUNTER — APPOINTMENT (OUTPATIENT)
Dept: CT IMAGING | Age: 65
DRG: 854 | End: 2025-02-20
Payer: MEDICARE

## 2025-02-20 PROBLEM — K35.33: Status: ACTIVE | Noted: 2025-02-20

## 2025-02-20 LAB
ANION GAP SERPL CALCULATED.3IONS-SCNC: 11 MMOL/L (ref 9–16)
BASOPHILS # BLD: 0.05 K/UL (ref 0–0.2)
BASOPHILS NFR BLD: 1 % (ref 0–2)
BUN SERPL-MCNC: 4 MG/DL (ref 8–23)
CALCIUM SERPL-MCNC: 8.3 MG/DL (ref 8.6–10.4)
CHLORIDE SERPL-SCNC: 101 MMOL/L (ref 98–107)
CO2 SERPL-SCNC: 23 MMOL/L (ref 20–31)
CREAT SERPL-MCNC: 0.6 MG/DL (ref 0.7–1.2)
EOSINOPHIL # BLD: 0.34 K/UL (ref 0–0.44)
EOSINOPHILS RELATIVE PERCENT: 3 % (ref 1–4)
ERYTHROCYTE [DISTWIDTH] IN BLOOD BY AUTOMATED COUNT: 16.1 % (ref 11.8–14.4)
GFR, ESTIMATED: >90 ML/MIN/1.73M2
GLUCOSE SERPL-MCNC: 112 MG/DL (ref 74–99)
HCT VFR BLD AUTO: 31.8 % (ref 40.7–50.3)
HGB BLD-MCNC: 9.7 G/DL (ref 13–17)
IMM GRANULOCYTES # BLD AUTO: 0.26 K/UL (ref 0–0.3)
IMM GRANULOCYTES NFR BLD: 3 %
LYMPHOCYTES NFR BLD: 1.08 K/UL (ref 1.1–3.7)
LYMPHOCYTES RELATIVE PERCENT: 11 % (ref 24–43)
MCH RBC QN AUTO: 24.7 PG (ref 25.2–33.5)
MCHC RBC AUTO-ENTMCNC: 30.5 G/DL (ref 28.4–34.8)
MCV RBC AUTO: 80.9 FL (ref 82.6–102.9)
MICROORGANISM SPEC CULT: NORMAL
MICROORGANISM SPEC CULT: NORMAL
MONOCYTES NFR BLD: 0.46 K/UL (ref 0.1–1.2)
MONOCYTES NFR BLD: 5 % (ref 3–12)
NEUTROPHILS NFR BLD: 77 % (ref 36–65)
NEUTS SEG NFR BLD: 8.08 K/UL (ref 1.5–8.1)
NRBC BLD-RTO: 0 PER 100 WBC
PLATELET # BLD AUTO: 342 K/UL (ref 138–453)
PMV BLD AUTO: 8.3 FL (ref 8.1–13.5)
POTASSIUM SERPL-SCNC: 3.3 MMOL/L (ref 3.7–5.3)
RBC # BLD AUTO: 3.93 M/UL (ref 4.21–5.77)
RBC # BLD: ABNORMAL 10*6/UL
SERVICE CMNT-IMP: NORMAL
SERVICE CMNT-IMP: NORMAL
SODIUM SERPL-SCNC: 135 MMOL/L (ref 136–145)
SPECIMEN DESCRIPTION: NORMAL
SPECIMEN DESCRIPTION: NORMAL
WBC OTHER # BLD: 10.3 K/UL (ref 3.5–11.3)

## 2025-02-20 PROCEDURE — 36415 COLL VENOUS BLD VENIPUNCTURE: CPT

## 2025-02-20 PROCEDURE — 85025 COMPLETE CBC W/AUTO DIFF WBC: CPT

## 2025-02-20 PROCEDURE — 6360000004 HC RX CONTRAST MEDICATION: Performed by: INTERNAL MEDICINE

## 2025-02-20 PROCEDURE — 99233 SBSQ HOSP IP/OBS HIGH 50: CPT | Performed by: INTERNAL MEDICINE

## 2025-02-20 PROCEDURE — 6360000002 HC RX W HCPCS: Performed by: INTERNAL MEDICINE

## 2025-02-20 PROCEDURE — 2500000003 HC RX 250 WO HCPCS: Performed by: NURSE PRACTITIONER

## 2025-02-20 PROCEDURE — 99232 SBSQ HOSP IP/OBS MODERATE 35: CPT | Performed by: STUDENT IN AN ORGANIZED HEALTH CARE EDUCATION/TRAINING PROGRAM

## 2025-02-20 PROCEDURE — 2580000003 HC RX 258: Performed by: NURSE PRACTITIONER

## 2025-02-20 PROCEDURE — G0545 PR INHERENT VISIT TO INPT: HCPCS | Performed by: INTERNAL MEDICINE

## 2025-02-20 PROCEDURE — 6370000000 HC RX 637 (ALT 250 FOR IP): Performed by: STUDENT IN AN ORGANIZED HEALTH CARE EDUCATION/TRAINING PROGRAM

## 2025-02-20 PROCEDURE — 80048 BASIC METABOLIC PNL TOTAL CA: CPT

## 2025-02-20 PROCEDURE — 2500000003 HC RX 250 WO HCPCS: Performed by: INTERNAL MEDICINE

## 2025-02-20 PROCEDURE — 6370000000 HC RX 637 (ALT 250 FOR IP): Performed by: NURSE PRACTITIONER

## 2025-02-20 PROCEDURE — 72193 CT PELVIS W/DYE: CPT

## 2025-02-20 PROCEDURE — 6370000000 HC RX 637 (ALT 250 FOR IP)

## 2025-02-20 PROCEDURE — BT101ZZ FLUOROSCOPY OF BLADDER USING LOW OSMOLAR CONTRAST: ICD-10-PCS | Performed by: UROLOGY

## 2025-02-20 PROCEDURE — 6360000002 HC RX W HCPCS: Performed by: NURSE PRACTITIONER

## 2025-02-20 PROCEDURE — 2060000000 HC ICU INTERMEDIATE R&B

## 2025-02-20 PROCEDURE — 2580000003 HC RX 258: Performed by: STUDENT IN AN ORGANIZED HEALTH CARE EDUCATION/TRAINING PROGRAM

## 2025-02-20 RX ADMIN — SODIUM CHLORIDE: 0.9 INJECTION, SOLUTION INTRAVENOUS at 05:51

## 2025-02-20 RX ADMIN — TAMSULOSIN HYDROCHLORIDE 0.4 MG: 0.4 CAPSULE ORAL at 08:12

## 2025-02-20 RX ADMIN — ROSUVASTATIN CALCIUM 10 MG: 10 TABLET, FILM COATED ORAL at 20:50

## 2025-02-20 RX ADMIN — LACTULOSE 20 G: 20 SOLUTION ORAL at 08:12

## 2025-02-20 RX ADMIN — LISINOPRIL 10 MG: 10 TABLET ORAL at 08:12

## 2025-02-20 RX ADMIN — Medication 2000 MG: at 02:03

## 2025-02-20 RX ADMIN — POLYETHYLENE GLYCOL 3350 17 G: 17 POWDER, FOR SOLUTION ORAL at 08:12

## 2025-02-20 RX ADMIN — CARVEDILOL 3.12 MG: 6.25 TABLET, FILM COATED ORAL at 08:12

## 2025-02-20 RX ADMIN — CARVEDILOL 3.12 MG: 6.25 TABLET, FILM COATED ORAL at 20:50

## 2025-02-20 RX ADMIN — Medication 2000 MG: at 10:46

## 2025-02-20 RX ADMIN — Medication 2000 MG: at 18:13

## 2025-02-20 RX ADMIN — SODIUM CHLORIDE, PRESERVATIVE FREE 40 MG: 5 INJECTION INTRAVENOUS at 08:13

## 2025-02-20 RX ADMIN — POTASSIUM CHLORIDE 40 MEQ: 1500 TABLET, EXTENDED RELEASE ORAL at 08:20

## 2025-02-20 RX ADMIN — SODIUM CHLORIDE, PRESERVATIVE FREE 10 ML: 5 INJECTION INTRAVENOUS at 20:52

## 2025-02-20 RX ADMIN — LACTULOSE 20 G: 20 SOLUTION ORAL at 20:50

## 2025-02-20 RX ADMIN — IOTHALAMATE MEGLUMINE 250 ML: 172 INJECTION URETERAL at 09:22

## 2025-02-20 ASSESSMENT — PAIN SCALES - GENERAL
PAINLEVEL_OUTOF10: 0
PAINLEVEL_OUTOF10: 0
PAINLEVEL_OUTOF10: 5
PAINLEVEL_OUTOF10: 4

## 2025-02-20 NOTE — PROGRESS NOTES
David Pressley Santacroce, Khan, Mostafa, Buck, Murtagh Jr  Urology Progress Note     Chief Complaint: Ileus status post simple prostatectomy    Subjective:  No acute events overnight, afebrile, HDS  Pain level: Controlled  Denies fever, chills, nausea, vomiting, chest pain, shortness of breath  Tolerating some diet  Patient passing gas and having bowel movements  Patient has had increased drainage from midline incision which cultured positive for Klebsiella   CT with small fluid collection in L lateral pelvis however unchanged from previous CT     A.m. labs pending.    Patient Vitals for the past 24 hrs:   BP Temp Temp src Pulse Resp SpO2   02/20/25 0332 138/85 98.2 °F (36.8 °C) Oral 74 21 97 %   02/19/25 2328 (!) 153/87 98.3 °F (36.8 °C) Oral 73 16 96 %   02/19/25 2020 -- -- -- 89 -- --   02/19/25 2001 (!) 144/81 98 °F (36.7 °C) Oral 73 20 96 %   02/19/25 1657 (!) 145/88 97.5 °F (36.4 °C) Oral 72 20 95 %   02/19/25 0901 (!) 146/85 98 °F (36.7 °C) Oral 77 23 94 %       Intake/Output Summary (Last 24 hours) at 2/20/2025 0613  Last data filed at 2/20/2025 0400  Gross per 24 hour   Intake 720 ml   Output 700 ml   Net 20 ml       Recent Labs     02/18/25  0810 02/19/25  0545 02/20/25  0541   WBC 8.3 8.8 10.3   HGB 9.4* 9.9* 9.7*   HCT 30.2* 31.5* 31.8*   MCV 80.5* 80.8* 80.9*    383 342     Recent Labs     02/18/25  0810 02/19/25  0545    137   K 3.3* 3.3*    102   CO2 21 25   BUN 16 11   CREATININE 0.6* 0.6*       No results for input(s): \"COLORU\", \"PHUR\", \"LABCAST\", \"WBCUA\", \"RBCUA\", \"MUCUS\", \"TRICHOMONAS\", \"YEAST\", \"BACTERIA\", \"CLARITYU\", \"SPECGRAV\", \"LEUKOCYTESUR\", \"UROBILINOGEN\", \"BILIRUBINUR\", \"BLOODU\" in the last 72 hours.    Invalid input(s): \"NITRATE\", \"GLUCOSEUKETONESUAMORPHOUS\"      Additional Lab/culture results:  Culture Klebsiella    Physical Exam:  Constitutional: Patient in no acute distress.  Neuro: alert and oriented to person place and time.  HEENT: No acute abnormalities  Lungs:

## 2025-02-20 NOTE — PROGRESS NOTES
[facility]  Office: [phone]  Jc Morales DO, Mane Leone DO, Idris Rose DO, Brennon Alexander DO, Jami Pacheco MD, Laura Warren MD, Renee Eng MD, Radha Gutierres MD,  Charles Hill MD, Mar Roca MD, Kenton Sanchez MD,  Dane Cabrera DO, Shelby Molina MD, Alberto Dodson MD, Edin Morales DO, Agnieszka Abarca MD,  Wade Haney DO, Maribeth Toro MD, Melany Hess MD, Shirin Zimmer MD, Adriano Titus MD,  Toro Olivo MD, Sabrina Lopez MD, Jody Cruz MD, Concepcion Bob MD, Serge Acevedo MD, David Hopkins MD, Sunny Mckeon DO, Cezar Ryan MD, Dane Mariscal MD, Mohsin Reza, MD, Shirley Waterhouse, CNP,  Angely Dean CNP, Sunny Pelayo, CNP,  Evelia Bedoya, DNP, Aidee Beasley, CNP, Opal Teague, CNP, Luisa Colbert, CNP, Ailyn George, CNP, Ayah Pelaez, PA-C, Arelis Parra, CNP, David Fowler, CNP,  Julia Lopez, CNP, Laxmi Perez, CNP, Keara Denise, CNP,  Marta Jose, CNS, Sandi Billy CNP, Milli Gay CNP,   Geni Weiss, CNP         [facility]   IN-PATIENT SERVICE   [facility]    Progress Note    2/20/2025    4:25 PM    Name:   Ivanna Khoury  MRN:     [id number]     Acct:      [id number]   Room:   0133/0133-01   Day:  5  Admit Date:  2/15/2025  3:38 PM    PCP:   Lisette Strong MD  Code Status:  Full Code    Subjective:     C/C:   Chief Complaint   Patient presents with    Abdominal Pain     Interval History Status: not changed.     Patient seen examined.  Was previously walking around the hallways multiple times.  Did have 3 bowel movements today.  Under went a cystogram showing no acute leak.  Tovar was removed earlier today.    Brief History:     Per chart:  \"  Ivanna Khoury is a 64 y.o. male with PMHx HTN, HLD, and enlarged prostate s/p robotic prostatectomy 2/13/2025 who presents with Abdominal Pain, fever and abdominal distention.  Patient was without flatulence or BM for 2-3 days  (systemic inflammatory response syndrome) (HCC) 2/19/2025 Yes    Elevated procalcitonin 2/19/2025 Yes       Plan:        Postop ileus.  Trying to advance diet, oral nutritional supplements, oral hydration appreciate surgery recommendations  Discussed findings of CT.  Discussed about my concerns of more of adynamic ileus that is resolving versus small bowel obstruction.  Will reach out to general surgery again  Suppository PRN for constipation, continue glycolax, lactulose.   Appreciate infectious disease conitnue cefazolin for klebsiella infection  Discussed with nurse during Rounds.    Kenton Sanchez MD  2/20/2025  4:25 PM

## 2025-02-20 NOTE — PLAN OF CARE
Problem: Discharge Planning  Goal: Discharge to home or other facility with appropriate resources  2/20/2025 1734 by Luisa Sanchez RN  Outcome: Progressing  Flowsheets (Taken 2/20/2025 0812)  Discharge to home or other facility with appropriate resources:   Identify barriers to discharge with patient and caregiver   Arrange for needed discharge resources and transportation as appropriate   Identify discharge learning needs (meds, wound care, etc)   Refer to discharge planning if patient needs post-hospital services based on physician order or complex needs related to functional status, cognitive ability or social support system  2/20/2025 0551 by Rachell Carlos RN  Outcome: Progressing     Problem: Pain  Goal: Verbalizes/displays adequate comfort level or baseline comfort level  2/20/2025 1734 by Luisa Sanchez RN  Outcome: Progressing  2/20/2025 0551 by Rachell Carlos RN  Outcome: Progressing     Problem: Safety - Adult  Goal: Free from fall injury  2/20/2025 1734 by Luisa Sanchez RN  Outcome: Progressing  Flowsheets (Taken 2/20/2025 0700)  Free From Fall Injury: Instruct family/caregiver on patient safety  2/20/2025 0551 by Rachell Carlos RN  Outcome: Progressing     Problem: Chronic Conditions and Co-morbidities  Goal: Patient's chronic conditions and co-morbidity symptoms are monitored and maintained or improved  Outcome: Progressing  Flowsheets (Taken 2/20/2025 0812)  Care Plan - Patient's Chronic Conditions and Co-Morbidity Symptoms are Monitored and Maintained or Improved:   Monitor and assess patient's chronic conditions and comorbid symptoms for stability, deterioration, or improvement   Collaborate with multidisciplinary team to address chronic and comorbid conditions and prevent exacerbation or deterioration     Problem: Skin/Tissue Integrity  Goal: Skin integrity remains intact  Description: 1.  Monitor for areas of redness and/or skin breakdown  2.  Assess vascular access sites hourly  3.  Every

## 2025-02-20 NOTE — PLAN OF CARE
Problem: Discharge Planning  Goal: Discharge to home or other facility with appropriate resources  2/20/2025 0551 by Rachell Carlos RN  Outcome: Progressing  2/19/2025 1704 by Luisa Sanchez RN  Outcome: Progressing  Flowsheets (Taken 2/19/2025 0925)  Discharge to home or other facility with appropriate resources:   Identify barriers to discharge with patient and caregiver   Arrange for needed discharge resources and transportation as appropriate   Identify discharge learning needs (meds, wound care, etc)   Refer to discharge planning if patient needs post-hospital services based on physician order or complex needs related to functional status, cognitive ability or social support system     Problem: Pain  Goal: Verbalizes/displays adequate comfort level or baseline comfort level  2/20/2025 0551 by Rachell Carlos RN  Outcome: Progressing  2/19/2025 1704 by Luisa Sanchez RN  Outcome: Progressing     Problem: Safety - Adult  Goal: Free from fall injury  2/20/2025 0551 by Rachell Carlos RN  Outcome: Progressing  2/19/2025 1704 by Luisa Sanchez RN  Outcome: Progressing  Flowsheets (Taken 2/19/2025 0700)  Free From Fall Injury: Instruct family/caregiver on patient safety     Problem: Chronic Conditions and Co-morbidities  Goal: Patient's chronic conditions and co-morbidity symptoms are monitored and maintained or improved  2/19/2025 1704 by Luisa Sanchez RN  Outcome: Progressing  Flowsheets (Taken 2/19/2025 0925)  Care Plan - Patient's Chronic Conditions and Co-Morbidity Symptoms are Monitored and Maintained or Improved:   Monitor and assess patient's chronic conditions and comorbid symptoms for stability, deterioration, or improvement   Collaborate with multidisciplinary team to address chronic and comorbid conditions and prevent exacerbation or deterioration     Problem: Skin/Tissue Integrity  Goal: Skin integrity remains intact  Description: 1.  Monitor for areas of redness and/or skin breakdown  2.  Assess

## 2025-02-20 NOTE — PROGRESS NOTES
[facility] -   Infectious diseases evaluation  admission date 2/15/2025    reason for consultation:   Kleb UTI - sepsis    Impression :   Current:  TUPR 2/13 - post op left ADY pulled  Had a valladares x few weeks before TURP for obstruction   Had MRSA epididymitis while  on valladares then klebsiella - treated  Ucx preemptive before surg showed kleb and treated to prep for the surgery  Post op fever sirs  Much better on ancef -  UTI - U cx again kleb 2/15  Post op ileus, needed few NGT  2/19 ileus better and good gas and BM   Post op left iliac fluid collection 2.5  x  3.1 cm  - seroma vs abscess  2/20 IR to aspirate   Increased abd drainage from surg abd sites, yellow clear fluid  Swab superf cx 2/18 kleb multiS  Bandemia - resolved  Procal slightly elevated  0.27    Other:    Discussion / summary of stay / plan of care/ Recommendations:     HENCE:   Keep ancef  for now  Await the superf fluid cx 2/18 GNR ID  CT AP 2/19 looking for a collection left abd and resolution of the ileus  Unclear how to interpret the fluid gushing from the surg sites  L iliac collection infected vs not- case discv w Dr Brown, IR will drain it tomor am and decide on drain according to fluid consistence - NPO after MN w meds, defer lovenox in am   Anticipate 4 weeks  AB to treat a possible prostate involvement by the kleb ( iv vs po depending on the fluid collection)  Disc w fam - and w     Infection Control Recommendations   [place] Precautions  Contact Isolation       Antimicrobial Stewardship Recommendations   Simplification of therapy  Targeted therapy      History of Present Illness:   Initial history:  Ivanna Khoury is a 64 y.o.-year-old male w PH and valladares x few weeks w MRSA then w klebsiella  and treated to prep for the TURP that  occurred 3/13 and he did well -  Went home and developed a fever and  back w sepsis fever 2/15-  CT suggested a small left pelvic fluid collection - site of the

## 2025-02-21 ENCOUNTER — APPOINTMENT (OUTPATIENT)
Dept: CT IMAGING | Age: 65
DRG: 854 | End: 2025-02-21
Payer: MEDICARE

## 2025-02-21 LAB
ANION GAP SERPL CALCULATED.3IONS-SCNC: 10 MMOL/L (ref 9–16)
BASOPHILS # BLD: 0.05 K/UL (ref 0–0.2)
BASOPHILS NFR BLD: 0 % (ref 0–2)
BUN SERPL-MCNC: 4 MG/DL (ref 8–23)
CALCIUM SERPL-MCNC: 8.5 MG/DL (ref 8.6–10.4)
CHLORIDE SERPL-SCNC: 103 MMOL/L (ref 98–107)
CO2 SERPL-SCNC: 25 MMOL/L (ref 20–31)
CREAT SERPL-MCNC: 0.7 MG/DL (ref 0.7–1.2)
EOSINOPHIL # BLD: 0.39 K/UL (ref 0–0.44)
EOSINOPHILS RELATIVE PERCENT: 3 % (ref 1–4)
ERYTHROCYTE [DISTWIDTH] IN BLOOD BY AUTOMATED COUNT: 16.1 % (ref 11.8–14.4)
GFR, ESTIMATED: >90 ML/MIN/1.73M2
GLUCOSE SERPL-MCNC: 106 MG/DL (ref 74–99)
HCT VFR BLD AUTO: 32.8 % (ref 40.7–50.3)
HGB BLD-MCNC: 10.2 G/DL (ref 13–17)
IMM GRANULOCYTES # BLD AUTO: 0.32 K/UL (ref 0–0.3)
IMM GRANULOCYTES NFR BLD: 3 %
LYMPHOCYTES NFR BLD: 1.15 K/UL (ref 1.1–3.7)
LYMPHOCYTES RELATIVE PERCENT: 10 % (ref 24–43)
MCH RBC QN AUTO: 24.7 PG (ref 25.2–33.5)
MCHC RBC AUTO-ENTMCNC: 31.1 G/DL (ref 28.4–34.8)
MCV RBC AUTO: 79.4 FL (ref 82.6–102.9)
MICROORGANISM SPEC CULT: ABNORMAL
MICROORGANISM/AGENT SPEC: ABNORMAL
MICROORGANISM/AGENT SPEC: ABNORMAL
MONOCYTES NFR BLD: 0.43 K/UL (ref 0.1–1.2)
MONOCYTES NFR BLD: 4 % (ref 3–12)
NEUTROPHILS NFR BLD: 80 % (ref 36–65)
NEUTS SEG NFR BLD: 9.25 K/UL (ref 1.5–8.1)
NRBC BLD-RTO: 0 PER 100 WBC
PLATELET # BLD AUTO: 379 K/UL (ref 138–453)
PMV BLD AUTO: 8.4 FL (ref 8.1–13.5)
POTASSIUM SERPL-SCNC: 3.6 MMOL/L (ref 3.7–5.3)
RBC # BLD AUTO: 4.13 M/UL (ref 4.21–5.77)
RBC # BLD: ABNORMAL 10*6/UL
SODIUM SERPL-SCNC: 138 MMOL/L (ref 136–145)
SPECIMEN DESCRIPTION: ABNORMAL
WBC OTHER # BLD: 11.6 K/UL (ref 3.5–11.3)

## 2025-02-21 PROCEDURE — 2500000003 HC RX 250 WO HCPCS: Performed by: INTERNAL MEDICINE

## 2025-02-21 PROCEDURE — 6370000000 HC RX 637 (ALT 250 FOR IP): Performed by: STUDENT IN AN ORGANIZED HEALTH CARE EDUCATION/TRAINING PROGRAM

## 2025-02-21 PROCEDURE — 2500000003 HC RX 250 WO HCPCS: Performed by: NURSE PRACTITIONER

## 2025-02-21 PROCEDURE — 36568 INSJ PICC <5 YR W/O IMAGING: CPT

## 2025-02-21 PROCEDURE — 87077 CULTURE AEROBIC IDENTIFY: CPT

## 2025-02-21 PROCEDURE — 2060000000 HC ICU INTERMEDIATE R&B

## 2025-02-21 PROCEDURE — 87075 CULTR BACTERIA EXCEPT BLOOD: CPT

## 2025-02-21 PROCEDURE — 85025 COMPLETE CBC W/AUTO DIFF WBC: CPT

## 2025-02-21 PROCEDURE — 87205 SMEAR GRAM STAIN: CPT

## 2025-02-21 PROCEDURE — 99232 SBSQ HOSP IP/OBS MODERATE 35: CPT | Performed by: STUDENT IN AN ORGANIZED HEALTH CARE EDUCATION/TRAINING PROGRAM

## 2025-02-21 PROCEDURE — 87070 CULTURE OTHR SPECIMN AEROBIC: CPT

## 2025-02-21 PROCEDURE — 36415 COLL VENOUS BLD VENIPUNCTURE: CPT

## 2025-02-21 PROCEDURE — 87186 SC STD MICRODIL/AGAR DIL: CPT

## 2025-02-21 PROCEDURE — 76937 US GUIDE VASCULAR ACCESS: CPT

## 2025-02-21 PROCEDURE — 99232 SBSQ HOSP IP/OBS MODERATE 35: CPT | Performed by: INTERNAL MEDICINE

## 2025-02-21 PROCEDURE — 2580000003 HC RX 258: Performed by: NURSE PRACTITIONER

## 2025-02-21 PROCEDURE — 2709999900 CT ABSCESS DRAINAGE SOFT TISSUE

## 2025-02-21 PROCEDURE — 6360000002 HC RX W HCPCS: Performed by: NURSE PRACTITIONER

## 2025-02-21 PROCEDURE — 6370000000 HC RX 637 (ALT 250 FOR IP)

## 2025-02-21 PROCEDURE — 6360000002 HC RX W HCPCS: Performed by: INTERNAL MEDICINE

## 2025-02-21 PROCEDURE — C1751 CATH, INF, PER/CENT/MIDLINE: HCPCS

## 2025-02-21 PROCEDURE — G0545 PR INHERENT VISIT TO INPT: HCPCS | Performed by: INTERNAL MEDICINE

## 2025-02-21 PROCEDURE — 0W9J3ZZ DRAINAGE OF PELVIC CAVITY, PERCUTANEOUS APPROACH: ICD-10-PCS | Performed by: RADIOLOGY

## 2025-02-21 PROCEDURE — 80048 BASIC METABOLIC PNL TOTAL CA: CPT

## 2025-02-21 RX ORDER — LIDOCAINE HYDROCHLORIDE 10 MG/ML
50 INJECTION, SOLUTION EPIDURAL; INFILTRATION; INTRACAUDAL; PERINEURAL ONCE
Status: DISCONTINUED | OUTPATIENT
Start: 2025-02-21 | End: 2025-02-23 | Stop reason: HOSPADM

## 2025-02-21 RX ORDER — BENZONATATE 100 MG/1
100 CAPSULE ORAL 3 TIMES DAILY PRN
Status: DISCONTINUED | OUTPATIENT
Start: 2025-02-21 | End: 2025-02-23 | Stop reason: HOSPADM

## 2025-02-21 RX ORDER — SODIUM CHLORIDE 0.9 % (FLUSH) 0.9 %
5-40 SYRINGE (ML) INJECTION EVERY 12 HOURS SCHEDULED
Status: DISCONTINUED | OUTPATIENT
Start: 2025-02-21 | End: 2025-02-23 | Stop reason: HOSPADM

## 2025-02-21 RX ORDER — SODIUM CHLORIDE 9 MG/ML
INJECTION, SOLUTION INTRAVENOUS PRN
Status: DISCONTINUED | OUTPATIENT
Start: 2025-02-21 | End: 2025-02-23 | Stop reason: HOSPADM

## 2025-02-21 RX ORDER — PANTOPRAZOLE SODIUM 40 MG/1
40 TABLET, DELAYED RELEASE ORAL
Status: DISCONTINUED | OUTPATIENT
Start: 2025-02-22 | End: 2025-02-23 | Stop reason: HOSPADM

## 2025-02-21 RX ORDER — GUAIFENESIN/DEXTROMETHORPHAN 100-10MG/5
5 SYRUP ORAL EVERY 4 HOURS PRN
Status: DISCONTINUED | OUTPATIENT
Start: 2025-02-21 | End: 2025-02-23 | Stop reason: HOSPADM

## 2025-02-21 RX ORDER — SODIUM CHLORIDE 0.9 % (FLUSH) 0.9 %
5-40 SYRINGE (ML) INJECTION PRN
Status: DISCONTINUED | OUTPATIENT
Start: 2025-02-21 | End: 2025-02-23 | Stop reason: HOSPADM

## 2025-02-21 RX ADMIN — SODIUM CHLORIDE, PRESERVATIVE FREE 10 ML: 5 INJECTION INTRAVENOUS at 23:08

## 2025-02-21 RX ADMIN — POLYETHYLENE GLYCOL 3350 17 G: 17 POWDER, FOR SOLUTION ORAL at 16:55

## 2025-02-21 RX ADMIN — ROSUVASTATIN CALCIUM 10 MG: 10 TABLET, FILM COATED ORAL at 22:02

## 2025-02-21 RX ADMIN — TAMSULOSIN HYDROCHLORIDE 0.4 MG: 0.4 CAPSULE ORAL at 09:16

## 2025-02-21 RX ADMIN — LACTULOSE 20 G: 20 SOLUTION ORAL at 22:02

## 2025-02-21 RX ADMIN — CARVEDILOL 3.12 MG: 6.25 TABLET, FILM COATED ORAL at 22:02

## 2025-02-21 RX ADMIN — GUAIFENESIN AND DEXTROMETHORPHAN 5 ML: 100; 10 SYRUP ORAL at 16:56

## 2025-02-21 RX ADMIN — LISINOPRIL 10 MG: 10 TABLET ORAL at 09:17

## 2025-02-21 RX ADMIN — WATER 2000 MG: 1 INJECTION INTRAMUSCULAR; INTRAVENOUS; SUBCUTANEOUS at 15:57

## 2025-02-21 RX ADMIN — SODIUM CHLORIDE, PRESERVATIVE FREE 10 ML: 5 INJECTION INTRAVENOUS at 22:02

## 2025-02-21 RX ADMIN — Medication 2000 MG: at 10:31

## 2025-02-21 RX ADMIN — BENZONATATE 100 MG: 100 CAPSULE ORAL at 16:56

## 2025-02-21 RX ADMIN — CARVEDILOL 3.12 MG: 6.25 TABLET, FILM COATED ORAL at 09:16

## 2025-02-21 RX ADMIN — LACTULOSE 20 G: 20 SOLUTION ORAL at 09:17

## 2025-02-21 RX ADMIN — SODIUM CHLORIDE, PRESERVATIVE FREE 40 MG: 5 INJECTION INTRAVENOUS at 09:17

## 2025-02-21 RX ADMIN — Medication 2000 MG: at 03:30

## 2025-02-21 ASSESSMENT — PAIN SCALES - GENERAL
PAINLEVEL_OUTOF10: 0

## 2025-02-21 NOTE — PROGRESS NOTES
[facility] -   Infectious diseases evaluation  admission date 2/15/2025    reason for consultation:   Kleb UTI - sepsis    Impression :   Current:  TUPR 2/13 - post op left ADY pulled - prostate abscess found, no cx sent  Had a valladares x few weeks before TURP for obstruction   Had MRSA epididymitis while  on valladares then klebsiella - treated  Ucx preemptive before surg showed kleb and treated to prep for the surgery  Post op fever sirs  Much better on ancef -  UTI - U cx again kleb 2/15  Post op ileus, needed few NGT  2/19 ileus better and good gas and BM   Post op left iliac fluid collection 2.5  x  3.1 cm  - seroma vs abscess  2/20 IR to aspirate   Increased abd drainage from surg abd sites, yellow clear fluid  Swab superf cx 2/18 kleb multiS  Bandemia - resolved  Procal slightly elevated  0.27  Small atelectasis on CTAP     Other:    Discussion / summary of stay / plan of care/ Recommendations:   U cx 2/7 kleb   U cx 2/13( fater AB) neg  Surg 2/13, surgeon found and abscess -m confirmed at pathology  U cx 2/15 2 days after surg, kleb   2/18 drainage from abd kleb  2/21 left iliac abscess drainage by IR pend  HENCE:   2/21 Stop  ancef  - switch to  ceftriaxone as I anticipate the left iliac to be kleb related as well   Await the superf fluid cx 2/18 GNR ID  2/20 Cystogram no urine leak  2/21 surg wound fluid much decreased     Anticipate 6 weeks  iv  AB to treat prostatitis and left iliac abscess  Ceftriaxone 2 g daily for now - final choice per final left iliac abscess cx  Start a picc for now 2/21  Disc w fam -    Infection Control Recommendations   [place] Precautions  Contact Isolation       Antimicrobial Stewardship Recommendations   Simplification of therapy  Targeted therapy      History of Present Illness:   Initial history:  Ivanna Khoury is a 64 y.o.-year-old male w PH and valladares x few weeks w MRSA then w klebsiella  and treated to prep for the TURP that   study, possibly focal abscess.  3. Nonspecific trace abdominal ascites.  4. Sequela from recent abdominal surgical intervention, correlate with  history.  5. Small volume right and left pleural effusion and bibasilar atelectasis.  Underlying pneumonia is a consideratio        I have personally reviewed the past medical history, past surgical history, medications, social history, and family history, and I haveupdated the database accordingly.      Allergies:   Patient has no known allergies.     Review of Systems:     Review of Systems   Constitutional:  Positive for activity change. Negative for diaphoresis, fatigue and unexpected weight change.   HENT:  Negative for congestion.    Eyes:  Negative for pain, discharge and redness.   Respiratory:  Negative for cough and choking.    Cardiovascular:  Negative for chest pain.   Gastrointestinal:  Positive for abdominal distention and abdominal pain.   Endocrine: Negative for cold intolerance, heat intolerance, polydipsia and polyphagia.   Genitourinary:  Negative for dysuria and frequency.   Musculoskeletal:  Negative for arthralgias.   Skin:  Negative for color change.   Allergic/Immunologic: Negative for immunocompromised state.   Neurological:  Negative for dizziness.   Hematological:  Negative for adenopathy.   Psychiatric/Behavioral:  Negative for agitation.        Physical Examination :       Physical Exam  Constitutional:       General: He is not in acute distress.     Appearance: Normal appearance. He is not ill-appearing.   HENT:      Head: Normocephalic and atraumatic.      Nose: Nose normal.      Mouth/Throat:      Mouth: Mucous membranes are moist.   Eyes:      General: No scleral icterus.     Conjunctiva/sclera: Conjunctivae normal.   Cardiovascular:      Rate and Rhythm: Normal rate and regular rhythm.      Heart sounds: No murmur heard.     No friction rub. No gallop.   Pulmonary:      Effort: No respiratory distress.      Breath sounds: No wheezing.

## 2025-02-21 NOTE — PROGRESS NOTES
[facility] Most[facility], Buck[facility]  Urology Progress Note     Chief Complaint: Ileus status post simple prostatectomy    Subjective:  No acute events overnight, afebrile, HDS  Pain level: Controlled  Denies fever, chills, nausea, vomiting, chest pain, shortness of breath  Tolerating some diet  Patient passing gas and having bowel movements, reports not passing gas overnight  Draining through midline incision is down  CT cystogram negative, valladares removed, voiding with some incontinence,  cc   CT with small fluid collection in L lateral pelvis however unchanged from previous CT, NPOmn for IR drain placement today    A.m. labs pending.    Patient Vitals for the past 24 hrs:   BP Temp Temp src Pulse Resp SpO2   02/21/25 0330 129/72 97.9 °F (36.6 °C) Oral 82 22 98 %   02/21/25 0015 129/85 97.9 °F (36.6 °C) Oral 75 21 98 %   02/20/25 2004 -- -- -- 96 28 97 %   02/20/25 2000 130/72 97.7 °F (36.5 °C) Oral (!) 102 30 96 %   02/20/25 1750 128/76 98.1 °F (36.7 °C) Oral 80 23 96 %   02/20/25 1154 139/86 98.2 °F (36.8 °C) Oral 79 18 97 %   02/20/25 0812 (!) 140/82 97.3 °F (36.3 °C) Oral 76 19 94 %       Intake/Output Summary (Last 24 hours) at 2/21/2025 0656  Last data filed at 2/20/2025 1530  Gross per 24 hour   Intake 720 ml   Output 650 ml   Net 70 ml       Recent Labs     02/18/25  0810 02/19/25  0545 02/20/25  0541   WBC 8.3 8.8 10.3   HGB 9.4* 9.9* 9.7*   HCT 30.2* 31.5* 31.8*   MCV 80.5* 80.8* 80.9*    383 342     Recent Labs     02/18/25  0810 02/19/25  0545 02/20/25  0541    137 135*   K 3.3* 3.3* 3.3*    102 101   CO2 21 25 23   BUN 16 11 4*   CREATININE 0.6* 0.6* 0.6*       No results for input(s): \"COLORU\", \"PHUR\", \"LABCAST\", \"WBCUA\", \"RBCUA\", \"MUCUS\", \"TRICHOMONAS\", \"YEAST\", \"BACTERIA\", \"CLARITYU\", \"SPECGRAV\", \"LEUKOCYTESUR\", \"UROBILINOGEN\", \"BILIRUBINUR\", \"BLOODU\" in the last 72 hours.    Invalid input(s): \"NITRATE\", \"GLUCOSEUKETONESUAMORPHOUS\"      Additional

## 2025-02-21 NOTE — PLAN OF CARE
Problem: Discharge Planning  Goal: Discharge to home or other facility with appropriate resources  2/21/2025 1714 by Luisa Sanchez RN  Outcome: Progressing  Flowsheets (Taken 2/21/2025 0915)  Discharge to home or other facility with appropriate resources:   Identify barriers to discharge with patient and caregiver   Arrange for needed discharge resources and transportation as appropriate   Identify discharge learning needs (meds, wound care, etc)   Refer to discharge planning if patient needs post-hospital services based on physician order or complex needs related to functional status, cognitive ability or social support system  2/21/2025 0505 by Vivian Rodriguez RN  Outcome: Progressing  Flowsheets (Taken 2/20/2025 2000)  Discharge to home or other facility with appropriate resources: Identify barriers to discharge with patient and caregiver     Problem: Pain  Goal: Verbalizes/displays adequate comfort level or baseline comfort level  2/21/2025 1714 by Luisa Sanchez RN  Outcome: Progressing  2/21/2025 0505 by Vivian Rodriguez RN  Outcome: Progressing     Problem: Safety - Adult  Goal: Free from fall injury  2/21/2025 1714 by Luisa Sanchez RN  Outcome: Progressing  Flowsheets (Taken 2/21/2025 0700)  Free From Fall Injury: Instruct family/caregiver on patient safety  2/21/2025 0505 by Vivian Rodriguez RN  Outcome: Progressing     Problem: Chronic Conditions and Co-morbidities  Goal: Patient's chronic conditions and co-morbidity symptoms are monitored and maintained or improved  Outcome: Progressing  Flowsheets (Taken 2/21/2025 0915)  Care Plan - Patient's Chronic Conditions and Co-Morbidity Symptoms are Monitored and Maintained or Improved:   Monitor and assess patient's chronic conditions and comorbid symptoms for stability, deterioration, or improvement   Collaborate with multidisciplinary team to address chronic and comorbid conditions and prevent exacerbation or deterioration     Problem: Skin/Tissue Integrity  Goal:  Progressing  Flowsheets (Taken 2/21/2025 0915)  Urinary catheter remains patent:   Assess patency of urinary catheter   Assess need for a larger catheter size or a 3-way catheter for continuous bladder irrigation     Problem: Infection - Adult  Goal: Absence of infection at discharge  Outcome: Progressing  Flowsheets (Taken 2/21/2025 0915)  Absence of infection at discharge:   Assess and monitor for signs and symptoms of infection   Monitor lab/diagnostic results   Monitor all insertion sites i.e., indwelling lines, tubes and drains  Goal: Absence of infection during hospitalization  Outcome: Progressing  Flowsheets (Taken 2/21/2025 0915)  Absence of infection during hospitalization:   Assess and monitor for signs and symptoms of infection   Monitor lab/diagnostic results     Problem: ABCDS Injury Assessment  Goal: Absence of physical injury  Outcome: Progressing  Flowsheets (Taken 2/21/2025 0700)  Absence of Physical Injury: Implement safety measures based on patient assessment     Problem: Skin/Tissue Integrity - Adult  Goal: Skin integrity remains intact  Description: 1.  Monitor for areas of redness and/or skin breakdown  2.  Assess vascular access sites hourly  3.  Every 4-6 hours minimum:  Change oxygen saturation probe site  4.  Every 4-6 hours:  If on nasal continuous positive airway pressure, respiratory therapy assess nares and determine need for appliance change or resting period  Outcome: Progressing  Flowsheets  Taken 2/21/2025 0915  Skin Integrity Remains Intact:   Monitor for areas of redness and/or skin breakdown   Assess vascular access sites hourly   Every 4-6 hours minimum: Change oxygen saturation probe site  Taken 2/21/2025 0700  Skin Integrity Remains Intact:   Monitor for areas of redness and/or skin breakdown   Assess vascular access sites hourly   Every 4-6 hours minimum: Change oxygen saturation probe site  Goal: Incisions, wounds, or drain sites healing without S/S of infection  Outcome:

## 2025-02-21 NOTE — PROGRESS NOTES
[facility]  Office: [phone]  Jc Morales DO, Mane Leone DO, Idris Rose DO, Brennon Alexander DO, Jami Pacheco MD, Laura Warren MD, Renee Eng MD, Radha Gutierres MD,  Charles Hill MD, aMr Roca MD, Kenton Sanchez MD,  Dane Cabrera DO, Shelby Molina MD, Alberto Dodson MD, Edin Morales DO, Agnieszka Abarca MD,  Wade Haney DO, Maribeth Toro MD, Melany Hess MD, Shirin Zimmer MD, Adriano Titus MD,  Toro Olivo MD, Sabrina Lopez MD, Jody Cruz MD, Concepcion Bob MD, Serge Acevedo MD, David Hopkins MD, Sunny Mckeon DO, Cezar Ryan MD, Dane Mariscal MD, Mohsin Reza, MD, Shirley Waterhouse, CNP,  Angely Dean CNP, Sunny Pelayo, CNP,  Evelia Bedoya, DNP, Aidee Beasley, CNP, Opal Teague, CNP, Luias Colbert, CNP, Ailyn George, CNP, Ayah Pelaez, PA-C, Arelis Parra, CNP, David Fowler, CNP,  Julia Lopez, CNP, Laxmi Perez, CNP, Keara Denise, CNP,  Marta Jose, CNS, Sandi Billy CNP, Milli Gay CNP,   Geni Weiss, CNP         [facility]   IN-PATIENT SERVICE   [facility]    Progress Note    2/21/2025    4:23 PM    Name:   Ivanna Khoury  MRN:     [id number]     Acct:      [id number]   Room:   0133/0133-01  IP Day:  6  Admit Date:  2/15/2025  3:38 PM    PCP:   Lisette Strong MD  Code Status:  Full Code    Subjective:     C/C:   Chief Complaint   Patient presents with    Abdominal Pain     Interval History Status: not changed.     Patient seen examined.  Underwent IR procedure only 1 healthy to have aspirated from left side.  Complaining of some distention had a small bowel movement this morning    Brief History:       Ivanna Khoury is a 64 y.o. male with PMHx HTN, HLD, and enlarged prostate s/p robotic prostatectomy 2/13/2025 who presents with Abdominal Pain, fever and abdominal distention.  Patient was without flatulence or BM for 2-3 days prior to admission.  Patient admitted for

## 2025-02-21 NOTE — PROGRESS NOTES
IV to PO change per P[employer] policy    Pantoprazole 40mg IV daily was changed to:    Pantoprazole 40mg PO daily

## 2025-02-21 NOTE — PLAN OF CARE
Problem: Discharge Planning  Goal: Discharge to home or other facility with appropriate resources  2/21/2025 0505 by Vivian Rodriguez, RN  Outcome: Progressing  Flowsheets (Taken 2/20/2025 2000)  Discharge to home or other facility with appropriate resources: Identify barriers to discharge with patient and caregiver     Problem: Pain  Goal: Verbalizes/displays adequate comfort level or baseline comfort level  2/21/2025 0505 by Vivian Rodriguez, RN  Outcome: Progressing     Problem: Safety - Adult  Goal: Free from fall injury  2/21/2025 0505 by Vivian Rodriguez, RN  Outcome: Progressing

## 2025-02-21 NOTE — PROGRESS NOTES
Nutrition Assessment     Type and Reason for Visit: Initial, LOS    Nutrition Recommendations/Plan:   Continue Regular diet     Malnutrition Assessment:  Malnutrition Status: No malnutrition    Nutrition Assessment:  Pt admitted with Abd pain and cystitis with Hx HTN, HLD, lung nodules and UTI.  Pt currently on Regular diet (no pork) with % of most meals consumed.  Although Pt states he did not eat well yesterday d/t some Abd pain, he did consume 100% of his lunch today and is eager for dinner tonight.  Weight history shows slight weight loss, 1 kg x 1 month and 4.6 kg x 1 year, not significant.  Current appetite/intake is likely meeting nutritional needs and Pt denies any nutritional problems.  No current nutritional problems identified so that full nutritoin assessment is not warranted at this time.      Nutrition Related Findings:   Meds/Labs reviewed.  Meds include Lactulose,  No edema noted. Wound Type: Surgical Incision    Current Nutrition Therapies:    ADULT DIET; Easy to Chew; No Pork  ADULT ORAL NUTRITION SUPPLEMENT; Breakfast, Lunch, Dinner; Low Calorie/High Protein Oral Supplement  ORAL HYDRATION; Low/No-sugar Electrolyte Solution; 600 ml (20 oz)    Anthropometric Measures:  Height: 167.6 cm (5' 5.98\")  Current Body Wt: 82 kg (180 lb 12.4 oz)   BMI: 29.2        Nutrition Diagnosis:   No nutrition diagnosis at this time    Nutrition Interventions:   Food and/or Nutrient Delivery: Continue Current Diet  Nutrition Education/Counseling: No recommendation at this time  Coordination of Nutrition Care: Continue to monitor while inpatient  Plan of Care discussed with: Patient    Goals:             Nutrition Monitoring and Evaluation:   Behavioral-Environmental Outcomes: None Identified  Food/Nutrient Intake Outcomes: Food and Nutrient Intake  Physical Signs/Symptoms Outcomes: Biochemical Data, GI Status, Weight    Discharge Planning:    No discharge needs at this time     Elaine Cunningham RD  Contact:

## 2025-02-21 NOTE — PROCEDURES
PROCEDURE NOTE  Date: 2/21/2025   Name: Ivanna Khoury  YOB: 1960    Procedures        Picc placement order:    Benefits include stable, long term intravenous access. Blood draws. Peripheral vein preservation. Safely deliver vesicant medications.    Risks include failure to obtain the desired result(s) of the procedure, discomfort, injury, the need for additional procedures/therapies, permanent loss of body function, bleeding/bruising, arterial puncture, air embolism, nerve damage, hematoma, phlebitis, catheter fracture/rupture, catheter embolism, catheter occlusion, catheter migration, catheter site infection, unintentional/accidental removal of catheter, bloodstream infection, infiltration, cardiac arrhythmia, vein thrombosis, difficult catheter removal.   Alternatives discussed including centrally inserted central catheter, as well as less invasive procedures such as multiple peripheral IVs, extended dwell catheters and midline placements.     Consent obtained by proceduralist, signed by Patient    Anticipate 4 weeks  AB to treat a possible prostate involvement by the kleb   Prescribed therapy: ancef   Peripheral ultrasound assessment done. Plan for right brachial vein insertion.   Vein measurement = 0.85 cm and area based CVR= 2.34%, preferable CVR based on area would be less than 20% (which correlates to less than 45% linear CVR).  Product type: Antimicrobial/Antithrombogenic Arrow non tapered power injectable PICC  History/Labs/Allergies Reviewed  Placed By: Alberto - ALFA IV Team  Assistant - Lucas  Time out Performed using Two Identifiers  Catheter info: 4french -  singlelumen  Total length: 39 cm  External catheter length: 3 cm  Extremity circumference at site: 26 cm  Number of attempts: 1  Estimated blood loss: 1 ml  Placement verified by: positive blood return & flushes easily  Special equipment used: [facility] ECG Tip tracker system, ultrasound, MST, and micro-introducer needle.   Catheter

## 2025-02-21 NOTE — BRIEF OP NOTE
Brief Postoperative Note    Ivanna Khoury  YOB: 1960  [id number]    Pre-operative Diagnosis: LLQ Abscess      Post-operative Diagnosis: Same    Procedure: Abscess drain placement    Medication Given: none    Anesthesia: 1% Lidocaine     Surgeons/Assistants:  MD Eduardo and Bret Land PA-C    Estimated Blood Loss: Minimal    Complications: none    Specimen: Was collected    Procedure:  Successful aspiration of LLQ abscess.  Approximately 1 mL of pink/tan fluid was obtained.  Fluid collection not large enough to accept abscess drain.  Pt tolerated procedure well and left the department in stable condition.      Electronically signed by BIA Juares on 2/21/2025 at 11:27 AM

## 2025-02-22 ENCOUNTER — APPOINTMENT (OUTPATIENT)
Dept: GENERAL RADIOLOGY | Age: 65
DRG: 854 | End: 2025-02-22
Payer: MEDICARE

## 2025-02-22 PROBLEM — N41.0 PROSTATITIS, ACUTE: Status: ACTIVE | Noted: 2025-02-22

## 2025-02-22 LAB
ANION GAP SERPL CALCULATED.3IONS-SCNC: 11 MMOL/L (ref 9–16)
BASOPHILS # BLD: 0.04 K/UL (ref 0–0.2)
BASOPHILS NFR BLD: 0 % (ref 0–2)
BUN SERPL-MCNC: 4 MG/DL (ref 8–23)
CALCIUM SERPL-MCNC: 8.3 MG/DL (ref 8.6–10.4)
CHLORIDE SERPL-SCNC: 104 MMOL/L (ref 98–107)
CO2 SERPL-SCNC: 23 MMOL/L (ref 20–31)
CREAT SERPL-MCNC: 0.6 MG/DL (ref 0.7–1.2)
EOSINOPHIL # BLD: 0.53 K/UL (ref 0–0.44)
EOSINOPHILS RELATIVE PERCENT: 5 % (ref 1–4)
ERYTHROCYTE [DISTWIDTH] IN BLOOD BY AUTOMATED COUNT: 16.1 % (ref 11.8–14.4)
GFR, ESTIMATED: >90 ML/MIN/1.73M2
GLUCOSE SERPL-MCNC: 103 MG/DL (ref 74–99)
HCT VFR BLD AUTO: 32.2 % (ref 40.7–50.3)
HGB BLD-MCNC: 10.3 G/DL (ref 13–17)
IMM GRANULOCYTES # BLD AUTO: 0.28 K/UL (ref 0–0.3)
IMM GRANULOCYTES NFR BLD: 3 %
LYMPHOCYTES NFR BLD: 1.42 K/UL (ref 1.1–3.7)
LYMPHOCYTES RELATIVE PERCENT: 13 % (ref 24–43)
MCH RBC QN AUTO: 25.1 PG (ref 25.2–33.5)
MCHC RBC AUTO-ENTMCNC: 32 G/DL (ref 28.4–34.8)
MCV RBC AUTO: 78.5 FL (ref 82.6–102.9)
MONOCYTES NFR BLD: 0.44 K/UL (ref 0.1–1.2)
MONOCYTES NFR BLD: 4 % (ref 3–12)
NEUTROPHILS NFR BLD: 75 % (ref 36–65)
NEUTS SEG NFR BLD: 8.53 K/UL (ref 1.5–8.1)
NRBC BLD-RTO: 0.2 PER 100 WBC
PLATELET # BLD AUTO: 402 K/UL (ref 138–453)
PMV BLD AUTO: 8.4 FL (ref 8.1–13.5)
POTASSIUM SERPL-SCNC: 3.7 MMOL/L (ref 3.7–5.3)
RBC # BLD AUTO: 4.1 M/UL (ref 4.21–5.77)
RBC # BLD: ABNORMAL 10*6/UL
SODIUM SERPL-SCNC: 138 MMOL/L (ref 136–145)
WBC OTHER # BLD: 11.2 K/UL (ref 3.5–11.3)

## 2025-02-22 PROCEDURE — 6370000000 HC RX 637 (ALT 250 FOR IP): Performed by: NURSE PRACTITIONER

## 2025-02-22 PROCEDURE — 6370000000 HC RX 637 (ALT 250 FOR IP): Performed by: STUDENT IN AN ORGANIZED HEALTH CARE EDUCATION/TRAINING PROGRAM

## 2025-02-22 PROCEDURE — 99232 SBSQ HOSP IP/OBS MODERATE 35: CPT | Performed by: STUDENT IN AN ORGANIZED HEALTH CARE EDUCATION/TRAINING PROGRAM

## 2025-02-22 PROCEDURE — 6360000002 HC RX W HCPCS: Performed by: INTERNAL MEDICINE

## 2025-02-22 PROCEDURE — 36415 COLL VENOUS BLD VENIPUNCTURE: CPT

## 2025-02-22 PROCEDURE — 99232 SBSQ HOSP IP/OBS MODERATE 35: CPT | Performed by: INTERNAL MEDICINE

## 2025-02-22 PROCEDURE — 74018 RADEX ABDOMEN 1 VIEW: CPT

## 2025-02-22 PROCEDURE — 85025 COMPLETE CBC W/AUTO DIFF WBC: CPT

## 2025-02-22 PROCEDURE — 2500000003 HC RX 250 WO HCPCS: Performed by: NURSE PRACTITIONER

## 2025-02-22 PROCEDURE — 80048 BASIC METABOLIC PNL TOTAL CA: CPT

## 2025-02-22 PROCEDURE — 2500000003 HC RX 250 WO HCPCS: Performed by: INTERNAL MEDICINE

## 2025-02-22 PROCEDURE — 2060000000 HC ICU INTERMEDIATE R&B

## 2025-02-22 PROCEDURE — 6370000000 HC RX 637 (ALT 250 FOR IP)

## 2025-02-22 PROCEDURE — 6360000002 HC RX W HCPCS: Performed by: STUDENT IN AN ORGANIZED HEALTH CARE EDUCATION/TRAINING PROGRAM

## 2025-02-22 PROCEDURE — G0545 PR INHERENT VISIT TO INPT: HCPCS | Performed by: INTERNAL MEDICINE

## 2025-02-22 RX ORDER — OXYCODONE HCL 5 MG/5 ML
5 SOLUTION, ORAL ORAL ONCE
Status: COMPLETED | OUTPATIENT
Start: 2025-02-22 | End: 2025-02-22

## 2025-02-22 RX ORDER — ASPIRIN 81 MG/1
81 TABLET, CHEWABLE ORAL DAILY
Status: DISCONTINUED | OUTPATIENT
Start: 2025-02-23 | End: 2025-02-23 | Stop reason: HOSPADM

## 2025-02-22 RX ORDER — CYCLOBENZAPRINE HCL 10 MG
10 TABLET ORAL 3 TIMES DAILY PRN
Status: DISCONTINUED | OUTPATIENT
Start: 2025-02-22 | End: 2025-02-23 | Stop reason: HOSPADM

## 2025-02-22 RX ORDER — METOCLOPRAMIDE HYDROCHLORIDE 5 MG/ML
10 INJECTION INTRAMUSCULAR; INTRAVENOUS
Status: DISCONTINUED | OUTPATIENT
Start: 2025-02-22 | End: 2025-02-23 | Stop reason: HOSPADM

## 2025-02-22 RX ORDER — BISACODYL 10 MG
10 SUPPOSITORY, RECTAL RECTAL DAILY
Status: DISCONTINUED | OUTPATIENT
Start: 2025-02-22 | End: 2025-02-22

## 2025-02-22 RX ADMIN — CARVEDILOL 3.12 MG: 6.25 TABLET, FILM COATED ORAL at 09:02

## 2025-02-22 RX ADMIN — PANTOPRAZOLE SODIUM 40 MG: 40 TABLET, DELAYED RELEASE ORAL at 09:01

## 2025-02-22 RX ADMIN — SODIUM CHLORIDE, PRESERVATIVE FREE 10 ML: 5 INJECTION INTRAVENOUS at 09:06

## 2025-02-22 RX ADMIN — METOCLOPRAMIDE HYDROCHLORIDE 10 MG: 5 INJECTION INTRAMUSCULAR; INTRAVENOUS at 17:18

## 2025-02-22 RX ADMIN — CARVEDILOL 3.12 MG: 6.25 TABLET, FILM COATED ORAL at 20:52

## 2025-02-22 RX ADMIN — OXYCODONE HYDROCHLORIDE 5 MG: 5 SOLUTION ORAL at 13:38

## 2025-02-22 RX ADMIN — SODIUM CHLORIDE, PRESERVATIVE FREE 10 ML: 5 INJECTION INTRAVENOUS at 22:01

## 2025-02-22 RX ADMIN — TAMSULOSIN HYDROCHLORIDE 0.4 MG: 0.4 CAPSULE ORAL at 09:01

## 2025-02-22 RX ADMIN — WATER 2000 MG: 1 INJECTION INTRAMUSCULAR; INTRAVENOUS; SUBCUTANEOUS at 13:49

## 2025-02-22 RX ADMIN — METOCLOPRAMIDE HYDROCHLORIDE 10 MG: 5 INJECTION INTRAMUSCULAR; INTRAVENOUS at 11:12

## 2025-02-22 RX ADMIN — LISINOPRIL 10 MG: 10 TABLET ORAL at 09:05

## 2025-02-22 RX ADMIN — POLYETHYLENE GLYCOL 3350 17 G: 17 POWDER, FOR SOLUTION ORAL at 09:05

## 2025-02-22 RX ADMIN — LACTULOSE 20 G: 20 SOLUTION ORAL at 09:05

## 2025-02-22 RX ADMIN — ROSUVASTATIN CALCIUM 10 MG: 10 TABLET, FILM COATED ORAL at 20:52

## 2025-02-22 RX ADMIN — BISACODYL 10 MG: 10 SUPPOSITORY RECTAL at 09:05

## 2025-02-22 RX ADMIN — METOCLOPRAMIDE HYDROCHLORIDE 10 MG: 5 INJECTION INTRAMUSCULAR; INTRAVENOUS at 21:55

## 2025-02-22 ASSESSMENT — PAIN DESCRIPTION - DESCRIPTORS: DESCRIPTORS: SHOOTING

## 2025-02-22 ASSESSMENT — PAIN SCALES - GENERAL: PAINLEVEL_OUTOF10: 5

## 2025-02-22 ASSESSMENT — PAIN DESCRIPTION - LOCATION: LOCATION: ABDOMEN

## 2025-02-22 NOTE — PROGRESS NOTES
[facility] Santacroce, Khan, Mostafa, Buck, Murtagh Jr  Urology Progress Note     Chief Complaint: Ileus status post simple prostatectomy    Subjective:  Nothing acute overnight  AFVSS  Patient upset this am as he has not passed flatus for two days at this point  He is ambulating several times in hallways, tolerating a regular diet  He says when he eats his bowels stop  Minimal draining from midline incision  Voiding without issues    Cr remains baseline 0.6  Hb 10.3 (10.2)  No white count    Patient Vitals for the past 24 hrs:   BP Temp Temp src Pulse Resp SpO2 Height   02/22/25 0424 121/62 98.3 °F (36.8 °C) Oral 85 18 100 % --   02/22/25 0003 134/81 98.2 °F (36.8 °C) Oral 82 18 97 % --   02/21/25 2222 (!) 147/74 98.2 °F (36.8 °C) Oral 92 19 99 % --   02/21/25 1736 119/77 97.7 °F (36.5 °C) Oral 83 22 97 % --   02/21/25 1427 -- -- -- -- -- -- 1.676 m (5' 5.98\")   02/21/25 1412 (!) 143/82 97.3 °F (36.3 °C) Oral 85 18 98 % --   02/21/25 1122 (!) 154/73 -- -- 75 22 97 % --   02/21/25 1112 (!) 137/57 -- -- 70 26 97 % --   02/21/25 0916 130/87 -- -- -- -- -- --       Intake/Output Summary (Last 24 hours) at 2/22/2025 0752  Last data filed at 2/21/2025 1412  Gross per 24 hour   Intake 360 ml   Output --   Net 360 ml       Recent Labs     02/20/25  0541 02/21/25  0825 02/22/25  0506   WBC 10.3 11.6* 11.2   HGB 9.7* 10.2* 10.3*   HCT 31.8* 32.8* 32.2*   MCV 80.9* 79.4* 78.5*    379 402     Recent Labs     02/20/25  0541 02/21/25  0825 02/22/25  0402   * 138 138   K 3.3* 3.6* 3.7    103 104   CO2 23 25 23   BUN 4* 4* 4*   CREATININE 0.6* 0.7 0.6*       No results for input(s): \"COLORU\", \"PHUR\", \"LABCAST\", \"WBCUA\", \"RBCUA\", \"MUCUS\", \"TRICHOMONAS\", \"YEAST\", \"BACTERIA\", \"CLARITYU\", \"SPECGRAV\", \"LEUKOCYTESUR\", \"UROBILINOGEN\", \"BILIRUBINUR\", \"BLOODU\" in the last 72 hours.    Invalid input(s): \"NITRATE\", \"GLUCOSEUKETONESUAMORPHOUS\"      Additional Lab/culture results:  Culture Klebsiella    Physical  Exam:  Constitutional: Patient in no acute distress.  Neuro: alert and oriented to person place and time.  HEENT: No acute abnormalities  Lungs: Respiratory effort normal on room air  Cardiovascular:  Heart rate regular rate and rhythm  Abdomen: Soft, non-tender, not distended, incisions clean and dry, midline incision with serous drainage   Extremities: No leg swelling, no edema  : Valladares catheter removed    Interval Imaging Findings:  EXAMINATION:  ONE SUPINE XRAY VIEW(S) OF THE ABDOMEN     2/17/2025 3:28 pm     COMPARISON:  Radiographs February 16, 2025     HISTORY:  ORDERING SYSTEM PROVIDED HISTORY: fu ileus  TECHNOLOGIST PROVIDED HISTORY:  fu ileus     FINDINGS:  Diffuse gas-filled dilated loops of small bowel throughout the abdomen.  Gastric decompression tube in place with tip terminating at the expected  position of the gastric body.     IMPRESSION:  Findings consistent with ongoing ileus.    Impression:  64-year-old male with a history of BPH who underwent a robotic simple prostatectomy on 2/13/2025.    Ileus  UTI  Seroma         Plan:   Starting reglan 4x daily  Dulcolax suppository daily  Continue regular diet - encourage patient family to bring food from home  NG tube removed 2/18, patient having bowel movements and gas, on CLD, advance as tolerated, general surgery signed off, may ask them to reevaluate due to reduced flatus  Monitor drainage from midline incision, much improved  UTI Klebsiella  On Ancef, maintain per ID   CT cystogram negative for leak, valladares removed, voiding well, instructed to start Kegel exercises for incontinence  Pain and nausea control as needed  Limit narcotics as able as this can worsen ileus  Encourage aggressive ambulation as this can improve ileus  Urology will continue to follow    Niya Rebollar DO, PGY-5  Urology Resident

## 2025-02-22 NOTE — PROGRESS NOTES
[facility]  Office: [phone]  Jc Morales DO, Mane Leone DO, Idris Rose DO, Brennon Alexander DO, Jami Pacheco MD, Laura Warren MD, Renee Eng MD, Radha Gutierres MD,  Charles Hill MD, Mar Roca MD, Kenton Sanchez MD,  Dane Cabrera DO, Shelby Molina MD, Alberto Dodson MD, Edin Morales DO, Agnieszka Abarca MD,  Wade Haney DO, Maribeth Toro MD, Melany Hess MD, Shirin Zimmer MD, Adriano Titus MD,  Toro Olivo MD, Sabrina Lopez MD, Jody Cruz MD, Concepcion Bob MD, Serge Acevedo MD, David Hopkins MD, Sunny Mckeon DO, Cezar Ryan MD, Dane Maricsal MD, Mohsin Reza, MD, Shirley Waterhouse, CNP,  Angely Dean CNP, Sunny Pelayo, CNP,  Evelia Bedoya, DNP, Aidee Beasley, CNP, Opal Teague, CNP, Luisa Colbert, CNP, Ailyn George, CNP, Ayah Pelaez, PA-C, Arelis Parra, CNP, David Fowler, CNP,  Julia Lopez, CNP, Laxmi Perez, CNP, Keara Denise, CNP,  Marta Jose, CNS, Sandi Billy CNP, Milli Gay CNP,   Geni Weiss, CNP         [facility]   IN-PATIENT SERVICE   [facility]    Progress Note    2/22/2025    4:39 PM    Name:   Ivanna Khoury  MRN:     [id number]     Acct:      [id number]   Room:   0133/0133-01   Day:  7  Admit Date:  2/15/2025  3:38 PM    PCP:   Lisette Strong MD  Code Status:  Full Code    Subjective:     C/C:   Chief Complaint   Patient presents with    Abdominal Pain     Interval History Status: not changed.     Patient seen examined.  Distention is significantly lower today compared to other days evaluating patient.  Patient states he has not had a bowel movement however is having multiple liquid stools.  Patient is concerned that as soon as he eats he does not have the feeling of a bowel movement but has some distention.  I had explained to patient about postsurgery that bowels may move his lower than normal.    Brief History:       Ivanna Khoury is a 64 y.o. male

## 2025-02-22 NOTE — PLAN OF CARE
Problem: Discharge Planning  Goal: Discharge to home or other facility with appropriate resources  2/22/2025 0453 by Chaparro Watt  Outcome: Progressing     Problem: Pain  Goal: Verbalizes/displays adequate comfort level or baseline comfort level  2/22/2025 0453 by Chaparro Watt  Outcome: Progressing     Problem: Safety - Adult  Goal: Free from fall injury  2/22/2025 0453 by Chaparro Watt  Outcome: Progressing

## 2025-02-22 NOTE — PLAN OF CARE
Problem: Discharge Planning  Goal: Discharge to home or other facility with appropriate resources  2/22/2025 1529 by Irene Carrillo RN  Outcome: Progressing  Flowsheets (Taken 2/22/2025 0902)  Discharge to home or other facility with appropriate resources:   Identify barriers to discharge with patient and caregiver   Arrange for needed discharge resources and transportation as appropriate   Identify discharge learning needs (meds, wound care, etc)   Refer to discharge planning if patient needs post-hospital services based on physician order or complex needs related to functional status, cognitive ability or social support system  2/22/2025 0453 by Chaparro Watt  Outcome: Progressing     Problem: Pain  Goal: Verbalizes/displays adequate comfort level or baseline comfort level  2/22/2025 1529 by Irene Carrillo RN  Outcome: Progressing  Flowsheets (Taken 2/22/2025 0902)  Verbalizes/displays adequate comfort level or baseline comfort level:   Encourage patient to monitor pain and request assistance   Assess pain using appropriate pain scale   Administer analgesics based on type and severity of pain and evaluate response   Implement non-pharmacological measures as appropriate and evaluate response   Consider cultural and social influences on pain and pain management   Notify Licensed Independent Practitioner if interventions unsuccessful or patient reports new pain  2/22/2025 0453 by Chaparro Watt  Outcome: Progressing     Problem: Safety - Adult  Goal: Free from fall injury  2/22/2025 1529 by Irene Carrillo RN  Outcome: Progressing  2/22/2025 0453 by Chaparro Watt  Outcome: Progressing     Problem: Chronic Conditions and Co-morbidities  Goal: Patient's chronic conditions and co-morbidity symptoms are monitored and maintained or improved  Outcome: Progressing  Flowsheets (Taken 2/22/2025 0902)  Care Plan - Patient's Chronic Conditions and Co-Morbidity Symptoms are Monitored and Maintained or Improved:

## 2025-02-22 NOTE — PROGRESS NOTES
[facility] -   Infectious diseases evaluation  admission date 2/15/2025    reason for consultation:   Kleb UTI - sepsis    Impression :   Current:  TUPR 2/13 - post op left ADY pulled - prostate abscess found, no cx sent  Had a valladares x few weeks before TURP for obstruction   Had MRSA epididymitis while  on valladares then klebsiella - treated  Ucx preemptive before surg showed kleb and treated to prep for the surgery  Post op fever sirs  Much better on ancef -  UTI - U cx again kleb 2/15  Post op ileus, needed few NGT  2/19 ileus better and good gas and BM   Post op left iliac fluid collection 2.5  x  3.1 cm  - seroma vs abscess  2/20 IR to aspirate   Increased abd drainage from surg abd sites, yellow clear fluid  Swab superf cx 2/18 kleb multiS  Bandemia - resolved  Procal slightly elevated  0.27  Small atelectasis on CTAP     Other:    Discussion / summary of stay / plan of care/ Recommendations:   U cx 2/7 kleb   U cx 2/13( fater AB) neg  Surg 2/13, surgeon found and abscess -m confirmed at pathology  U cx 2/15 2 days after surg, kleb   2/18 drainage from abd kleb  2/21 left iliac abscess drainage by IR pend  HENCE:   2/21 Stop  ancef  - switch to  ceftriaxone as I anticipate the left iliac to be kleb related as well   Await the superf fluid cx 2/18 GNR ID  2/20 Cystogram no urine leak  2/21 surg wound fluid much decreased     Anticipate 6 weeks  iv  AB to treat prostatitis and left iliac abscess  Ceftriaxone 2 g daily for now - final choice per final left iliac abscess cx  Start a picc for now 2/21  Disc w fam -    Infection Control Recommendations   [place] Precautions  Contact Isolation       Antimicrobial Stewardship Recommendations   Simplification of therapy  Targeted therapy      History of Present Illness:   Initial history:  Ivanna Khoury is a 64 y.o.-year-old male w PH and valladares x few weeks w MRSA then w klebsiella  and treated to prep for the TURP that

## 2025-02-23 VITALS
SYSTOLIC BLOOD PRESSURE: 120 MMHG | RESPIRATION RATE: 22 BRPM | DIASTOLIC BLOOD PRESSURE: 73 MMHG | TEMPERATURE: 98.7 F | WEIGHT: 180.78 LBS | HEIGHT: 66 IN | HEART RATE: 94 BPM | OXYGEN SATURATION: 97 % | BODY MASS INDEX: 29.05 KG/M2

## 2025-02-23 LAB
ALBUMIN SERPL-MCNC: 3.4 G/DL (ref 3.5–5.2)
ALBUMIN/GLOB SERPL: 1.1 {RATIO} (ref 1–2.5)
ALP SERPL-CCNC: 92 U/L (ref 40–129)
ALT SERPL-CCNC: 37 U/L (ref 10–50)
AMMONIA PLAS-SCNC: 28 UMOL/L (ref 16–60)
ANION GAP SERPL CALCULATED.3IONS-SCNC: 11 MMOL/L (ref 9–16)
AST SERPL-CCNC: 28 U/L (ref 10–50)
BASOPHILS # BLD: 0.03 K/UL (ref 0–0.2)
BASOPHILS NFR BLD: 0 % (ref 0–2)
BILIRUB SERPL-MCNC: 0.2 MG/DL (ref 0–1.2)
BUN SERPL-MCNC: 6 MG/DL (ref 8–23)
CALCIUM SERPL-MCNC: 9.1 MG/DL (ref 8.6–10.4)
CHLORIDE SERPL-SCNC: 101 MMOL/L (ref 98–107)
CO2 SERPL-SCNC: 23 MMOL/L (ref 20–31)
CREAT SERPL-MCNC: 0.6 MG/DL (ref 0.7–1.2)
EOSINOPHIL # BLD: 0.62 K/UL (ref 0–0.44)
EOSINOPHILS RELATIVE PERCENT: 6 % (ref 1–4)
ERYTHROCYTE [DISTWIDTH] IN BLOOD BY AUTOMATED COUNT: 16.3 % (ref 11.8–14.4)
GFR, ESTIMATED: >90 ML/MIN/1.73M2
GLUCOSE SERPL-MCNC: 111 MG/DL (ref 74–99)
HCT VFR BLD AUTO: 34.8 % (ref 40.7–50.3)
HGB BLD-MCNC: 10.9 G/DL (ref 13–17)
IMM GRANULOCYTES # BLD AUTO: 0.22 K/UL (ref 0–0.3)
IMM GRANULOCYTES NFR BLD: 2 %
LYMPHOCYTES NFR BLD: 1.33 K/UL (ref 1.1–3.7)
LYMPHOCYTES RELATIVE PERCENT: 12 % (ref 24–43)
MCH RBC QN AUTO: 24.9 PG (ref 25.2–33.5)
MCHC RBC AUTO-ENTMCNC: 31.3 G/DL (ref 28.4–34.8)
MCV RBC AUTO: 79.6 FL (ref 82.6–102.9)
MONOCYTES NFR BLD: 0.46 K/UL (ref 0.1–1.2)
MONOCYTES NFR BLD: 4 % (ref 3–12)
NEUTROPHILS NFR BLD: 76 % (ref 36–65)
NEUTS SEG NFR BLD: 8.27 K/UL (ref 1.5–8.1)
NRBC BLD-RTO: 0 PER 100 WBC
PLATELET # BLD AUTO: 417 K/UL (ref 138–453)
PMV BLD AUTO: 8.2 FL (ref 8.1–13.5)
POTASSIUM SERPL-SCNC: 4 MMOL/L (ref 3.7–5.3)
PROT SERPL-MCNC: 6.5 G/DL (ref 6.6–8.7)
RBC # BLD AUTO: 4.37 M/UL (ref 4.21–5.77)
RBC # BLD: ABNORMAL 10*6/UL
SODIUM SERPL-SCNC: 135 MMOL/L (ref 136–145)
WBC OTHER # BLD: 10.9 K/UL (ref 3.5–11.3)

## 2025-02-23 PROCEDURE — 6370000000 HC RX 637 (ALT 250 FOR IP): Performed by: NURSE PRACTITIONER

## 2025-02-23 PROCEDURE — 99239 HOSP IP/OBS DSCHRG MGMT >30: CPT | Performed by: STUDENT IN AN ORGANIZED HEALTH CARE EDUCATION/TRAINING PROGRAM

## 2025-02-23 PROCEDURE — 2500000003 HC RX 250 WO HCPCS: Performed by: NURSE PRACTITIONER

## 2025-02-23 PROCEDURE — 85025 COMPLETE CBC W/AUTO DIFF WBC: CPT

## 2025-02-23 PROCEDURE — 6370000000 HC RX 637 (ALT 250 FOR IP): Performed by: STUDENT IN AN ORGANIZED HEALTH CARE EDUCATION/TRAINING PROGRAM

## 2025-02-23 PROCEDURE — 80053 COMPREHEN METABOLIC PANEL: CPT

## 2025-02-23 PROCEDURE — 6360000002 HC RX W HCPCS: Performed by: INTERNAL MEDICINE

## 2025-02-23 PROCEDURE — 36415 COLL VENOUS BLD VENIPUNCTURE: CPT

## 2025-02-23 PROCEDURE — 82140 ASSAY OF AMMONIA: CPT

## 2025-02-23 PROCEDURE — 2500000003 HC RX 250 WO HCPCS: Performed by: INTERNAL MEDICINE

## 2025-02-23 PROCEDURE — 6360000002 HC RX W HCPCS: Performed by: STUDENT IN AN ORGANIZED HEALTH CARE EDUCATION/TRAINING PROGRAM

## 2025-02-23 RX ORDER — LACTULOSE 10 G/15ML
10 SOLUTION ORAL EVERY EVENING
Qty: 105 ML | Refills: 0 | Status: SHIPPED | OUTPATIENT
Start: 2025-02-23 | End: 2025-02-26 | Stop reason: SDUPTHER

## 2025-02-23 RX ORDER — LISINOPRIL 10 MG/1
5 TABLET ORAL DAILY
Status: DISCONTINUED | OUTPATIENT
Start: 2025-02-23 | End: 2025-02-23 | Stop reason: HOSPADM

## 2025-02-23 RX ORDER — OXYCODONE HCL 5 MG/5 ML
5 SOLUTION, ORAL ORAL EVERY 4 HOURS PRN
Qty: 30 ML | Refills: 0 | Status: SHIPPED | OUTPATIENT
Start: 2025-02-23 | End: 2025-03-02

## 2025-02-23 RX ORDER — ASPIRIN 81 MG/1
81 TABLET, CHEWABLE ORAL DAILY
Qty: 30 TABLET | Refills: 3 | Status: SHIPPED | OUTPATIENT
Start: 2025-02-24

## 2025-02-23 RX ORDER — BENZONATATE 100 MG/1
100 CAPSULE ORAL 3 TIMES DAILY PRN
Qty: 21 CAPSULE | Refills: 0 | Status: SHIPPED | OUTPATIENT
Start: 2025-02-23 | End: 2025-03-02

## 2025-02-23 RX ORDER — LISINOPRIL 5 MG/1
5 TABLET ORAL DAILY
Qty: 30 TABLET | Refills: 0 | Status: SHIPPED | OUTPATIENT
Start: 2025-02-24 | End: 2025-03-26

## 2025-02-23 RX ORDER — GUAIFENESIN/DEXTROMETHORPHAN 100-10MG/5
5 SYRUP ORAL EVERY 4 HOURS PRN
Qty: 120 ML | Refills: 0 | Status: SHIPPED | OUTPATIENT
Start: 2025-02-23 | End: 2025-03-05

## 2025-02-23 RX ORDER — SODIUM CHLORIDE 0.9 % (FLUSH) 0.9 %
5-40 SYRINGE (ML) INJECTION PRN
Status: CANCELLED | OUTPATIENT
Start: 2025-02-24

## 2025-02-23 RX ORDER — POLYETHYLENE GLYCOL 3350 17 G/17G
17 POWDER, FOR SOLUTION ORAL DAILY PRN
Status: DISCONTINUED | OUTPATIENT
Start: 2025-02-23 | End: 2025-02-23 | Stop reason: HOSPADM

## 2025-02-23 RX ORDER — HYOSCYAMINE SULFATE 0.12 MG/1
0.12 TABLET SUBLINGUAL EVERY 8 HOURS PRN
Qty: 30 TABLET | Refills: 3 | Status: SHIPPED | OUTPATIENT
Start: 2025-02-23

## 2025-02-23 RX ADMIN — LISINOPRIL 5 MG: 10 TABLET ORAL at 08:41

## 2025-02-23 RX ADMIN — METOCLOPRAMIDE HYDROCHLORIDE 10 MG: 5 INJECTION INTRAMUSCULAR; INTRAVENOUS at 11:31

## 2025-02-23 RX ADMIN — WATER 2000 MG: 1 INJECTION INTRAMUSCULAR; INTRAVENOUS; SUBCUTANEOUS at 11:30

## 2025-02-23 RX ADMIN — SODIUM CHLORIDE, PRESERVATIVE FREE 10 ML: 5 INJECTION INTRAVENOUS at 07:34

## 2025-02-23 RX ADMIN — METOCLOPRAMIDE HYDROCHLORIDE 10 MG: 5 INJECTION INTRAMUSCULAR; INTRAVENOUS at 07:33

## 2025-02-23 RX ADMIN — ASPIRIN 81 MG: 81 TABLET, CHEWABLE ORAL at 08:44

## 2025-02-23 RX ADMIN — TAMSULOSIN HYDROCHLORIDE 0.4 MG: 0.4 CAPSULE ORAL at 08:41

## 2025-02-23 RX ADMIN — PANTOPRAZOLE SODIUM 40 MG: 40 TABLET, DELAYED RELEASE ORAL at 07:33

## 2025-02-23 RX ADMIN — POLYETHYLENE GLYCOL 3350 17 G: 17 POWDER, FOR SOLUTION ORAL at 08:41

## 2025-02-23 RX ADMIN — SODIUM CHLORIDE, PRESERVATIVE FREE 10 ML: 5 INJECTION INTRAVENOUS at 08:44

## 2025-02-23 RX ADMIN — CARVEDILOL 3.12 MG: 6.25 TABLET, FILM COATED ORAL at 08:41

## 2025-02-23 NOTE — PROGRESS NOTES
[facility]  Office: [phone]  Jc Morales DO, Mane Leone DO, Idris Rose DO, Brennon Alexander DO, Jami Pacheco MD, Laura Warren MD, Renee Eng MD, Radha Gutierres MD,  Charles Hill MD, Mar Roca MD, Kenton Sanchez MD,  Dane Cabrera DO, Shelby Molina MD, Alberto Dodson MD, Edin Morales DO, Agnieszka Abarca MD,  Wade Haney DO, Maribeth Toro MD, Melany Hess MD, Shirin Zimmer MD, Adriano Titus MD,  Toro Olivo MD, Sabrina Lopez MD, Jody Cruz MD, Concepcion Bob MD, Serge Acevedo MD, David Hopkins MD, Sunny Mckeon DO, Cezar Ryan MD, Dane Mariscal MD, Mohsin Reza, MD, Shirley Waterhouse, CNP,  Angely Dean CNP, Sunny Pelayo, CNP,  Evelia Bedoya, DNP, Aidee Beasley, CNP, Opal Teague, CNP, Luisa Colbert, CNP, Ailyn George, CNP, Ayah Pelaez, PA-C, Arelis Parra, CNP, David Fowler, CNP,  Julia Lopez, CNP, Laxmi Perez, CNP, Keara Denise, CNP,  Marta Jose, CNS, Sandi Billy CNP, Milli Gay CNP,   Geni Weiss, CNP         [facility]   IN-PATIENT SERVICE   [facility]    Progress Note    2/23/2025    9:11 AM    Name:   Ivanna Khoury  MRN:     [id number]     Acct:      [id number]   Room:   0133/0133-01   Day:  8  Admit Date:  2/15/2025  3:38 PM    PCP:   Lisette Strong MD  Code Status:  Full Code    Subjective:     C/C:   Chief Complaint   Patient presents with    Abdominal Pain     Interval History Status: not changed.     Patient seen examined.  Distention is significantly lower today compared to other days evaluating patient.  Patient states he has not had a bowel movement however is having multiple liquid stools.  Patient is concerned that as soon as he eats he does not have the feeling of a bowel movement but has some distention.  I had explained to patient about postsurgery that bowels may move his lower than normal.    Brief History:       Ivanna Khoury is a 64 y.o. male

## 2025-02-23 NOTE — PLAN OF CARE
Problem: Pain  Goal: Verbalizes/displays adequate comfort level or baseline comfort level  Outcome: Adequate for Discharge     Problem: Chronic Conditions and Co-morbidities  Goal: Patient's chronic conditions and co-morbidity symptoms are monitored and maintained or improved  Outcome: Adequate for Discharge  Flowsheets (Taken 2/23/2025 0841)  Care Plan - Patient's Chronic Conditions and Co-Morbidity Symptoms are Monitored and Maintained or Improved:   Monitor and assess patient's chronic conditions and comorbid symptoms for stability, deterioration, or improvement   Collaborate with multidisciplinary team to address chronic and comorbid conditions and prevent exacerbation or deterioration   Update acute care plan with appropriate goals if chronic or comorbid symptoms are exacerbated and prevent overall improvement and discharge     Problem: Skin/Tissue Integrity  Goal: Skin integrity remains intact  Description: 1.  Monitor for areas of redness and/or skin breakdown  2.  Assess vascular access sites hourly  3.  Every 4-6 hours minimum:  Change oxygen saturation probe site  4.  Every 4-6 hours:  If on nasal continuous positive airway pressure, respiratory therapy assess nares and determine need for appliance change or resting period  Outcome: Adequate for Discharge  Flowsheets (Taken 2/23/2025 0841)  Skin Integrity Remains Intact:   Monitor for areas of redness and/or skin breakdown   Assess vascular access sites hourly   Every 4-6 hours minimum: Change oxygen saturation probe site   Every 4-6 hours: If on nasal continuous positive airway pressure, respiratory therapy assesses nares and determine need for appliance change or resting period     Problem: Skin/Tissue Integrity - Adult  Goal: Skin integrity remains intact  Description: 1.  Monitor for areas of redness and/or skin breakdown  2.  Assess vascular access sites hourly  3.  Every 4-6 hours minimum:  Change oxygen saturation probe site  4.  Every 4-6 hours:

## 2025-02-23 NOTE — PROGRESS NOTES
CLINICAL PHARMACY NOTE: MEDS TO BEDS    Total # of Prescriptions Filled: 7   The following medications were delivered to the patient:  Aspirin 81mg chew  Lisinopril 5mg  Oxycodone 5mg/5ml  Hyoscyamine 0.125mg subl  Lactulose 10mg/15ml  Robitussin DM 10-100mg/5ml    Additional Documentation: delivered to patient in room 133 2/23 at 11:20am. Co-pay $24.91 cloAttero.

## 2025-02-23 NOTE — DISCHARGE SUMMARY
[facility]  Office: [phone]  Jc Morales DO, Mane Leone DO, Idris Rose DO, Brennon Alexander DO, Jami Pacheco MD, Laura Warren MD, Renee Eng MD, Radha Gutierres MD,  Charles Hill MD, Mar Roca MD, Kenton Sanchez MD,  Dane Cabrera DO, Shelby Molina MD, Alberto Dodson MD, Edin Morales DO, Agnieszka Abarca MD,  Wade Haney DO, Maribeth Toro MD, Melany Hess MD, Shirin Zimmer MD, Adriano Titus MD,  Toro Olivo MD, Sabrina Lopez MD, Jody Cruz MD, Concepcion Bob MD, Serge Acevedo MD, David Hopkins MD, Sunny Mckeon DO, Cezar Ryan MD, Dane Mariscal MD, Mohsin Reza, MD, Shirley Waterhouse, CNP,  Angely Dean CNP, Sunny Pelayo, CNP,  Evelia Bedoya, EMILY, Aidee Beasley, CNP, Opal Teague, CNP, Luisa Colbert, CNP, Ailyn George CNP, BIA Domínguez-C, Arelis Parra, CNP, David Fowler, CNP,  Julia Lopez, CNP, Laxmi Perez, CNP, Keara Denise, CNP,  Marta Jose, CNS, Sandi Billy CNP, Milli Gay CNP,   Geni Weiss, CNP         [facility]   IN-PATIENT SERVICE   [facility]    Discharge Summary     Patient ID: Ivanna Khouyr  :  1960   MRN: [id number]     ACCOUNT:  [id number]   Patient's PCP: Lisette Strong MD  Admit Date: 2/15/2025   Discharge Date: 2025     Length of Stay: 8  Code Status:  Full Code  Admitting Physician: No admitting provider for patient encounter.  Discharge Physician: Kenton Sanchez MD     Active Discharge Diagnoses:     Hospital Problem Lists:  Principal Problem:    Ileus, postoperative (HCC)  Active Problems:    Chronic venous insufficiency of lower extremity    BPH with urinary obstruction    Complicated UTI (urinary tract infection)    Generalized abdominal pain    Status post prostatectomy    Acute cystitis    HTN (hypertension)    Klebsiella infection    UTI due to Klebsiella species    Bandemia    SIRS (systemic inflammatory response syndrome)

## 2025-02-23 NOTE — PROGRESS NOTES
[facility]acro[facility], Khan, Mostafa, Buck, Murtagh Jr  Urology Progress Note     Chief Complaint: Ileus status post simple prostatectomy    Subjective:  Nothing acute overnight  AFVSS  Patient had 2 BM yesterday  KUB showed marked improvement in bowel distension    Patient Vitals for the past 24 hrs:   BP Temp Temp src Pulse Resp SpO2   02/23/25 0400 97/62 99.1 °F (37.3 °C) Oral 82 22 97 %   02/22/25 2330 109/62 97.8 °F (36.6 °C) Oral 88 18 97 %   02/22/25 1951 116/69 97.7 °F (36.5 °C) Oral 91 18 97 %   02/22/25 1600 125/75 97.5 °F (36.4 °C) Oral 91 19 100 %   02/22/25 1100 131/83 97.9 °F (36.6 °C) Oral 87 18 99 %   02/22/25 0902 (!) 141/95 97.5 °F (36.4 °C) Oral 92 16 99 %       Intake/Output Summary (Last 24 hours) at 2/23/2025 0751  Last data filed at 2/23/2025 0400  Gross per 24 hour   Intake 600 ml   Output --   Net 600 ml       Recent Labs     02/21/25  0825 02/22/25  0506   WBC 11.6* 11.2   HGB 10.2* 10.3*   HCT 32.8* 32.2*   MCV 79.4* 78.5*    402     Recent Labs     02/21/25  0825 02/22/25  0402    138   K 3.6* 3.7    104   CO2 25 23   BUN 4* 4*   CREATININE 0.7 0.6*       No results for input(s): \"COLORU\", \"PHUR\", \"LABCAST\", \"WBCUA\", \"RBCUA\", \"MUCUS\", \"TRICHOMONAS\", \"YEAST\", \"BACTERIA\", \"CLARITYU\", \"SPECGRAV\", \"LEUKOCYTESUR\", \"UROBILINOGEN\", \"BILIRUBINUR\", \"BLOODU\" in the last 72 hours.    Invalid input(s): \"NITRATE\", \"GLUCOSEUKETONESUAMORPHOUS\"      Additional Lab/culture results:  Culture Klebsiella    Physical Exam:  Constitutional: Patient in no acute distress.  Neuro: alert and oriented to person place and time.  HEENT: No acute abnormalities  Lungs: Respiratory effort normal on room air  Cardiovascular:  Heart rate regular rate and rhythm  Abdomen: Soft, non-tender, not distended, incisions clean and dry, midline incision with serous drainage   Extremities: No leg swelling, no edema  : no flank pain no SP pain    Interval Imaging Findings:  EXAMINATION:  ONE SUPINE XRAY  VIEW(S) OF THE ABDOMEN     2/17/2025 3:28 pm     COMPARISON:  Radiographs February 16, 2025     HISTORY:  ORDERING SYSTEM PROVIDED HISTORY: fu ileus  TECHNOLOGIST PROVIDED HISTORY:  fu ileus     FINDINGS:  Diffuse gas-filled dilated loops of small bowel throughout the abdomen.  Gastric decompression tube in place with tip terminating at the expected  position of the gastric body.     IMPRESSION:  Findings consistent with ongoing ileus.    Impression:  64-year-old male with a history of BPH who underwent a robotic simple prostatectomy on 2/13/2025.    Ileus  UTI  Seroma         Plan:   OK for discharge from urology standpoint  KUB 2/23/25 - showed marked improvement in bowel distension  Continue reglan 4x daily  Dulcolax suppository daily  Continue regular diet - encourage patient family to bring food from home  NG tube removed 2/18, patient having bowel movements and gas, on CLD, advance as tolerated, general surgery signed off, may ask them to reevaluate due to reduced flatus  Monitor drainage from midline incision, much improved  UTI Klebsiella  On Ancef, maintain per ID   CT cystogram negative for leak, valladares removed, voiding well, instructed to start Kegel exercises for incontinence  Pain and nausea control as needed  Limit narcotics as able as this can worsen ileus  Encourage aggressive ambulation as this can improve ileus    From urology standpoint, patient is ok to be discharged home. F/u with Dr. Crews in 2 weeks.    Niya Rebollar DO, PGY-5  Urology Resident

## 2025-02-24 ENCOUNTER — CARE COORDINATION (OUTPATIENT)
Dept: CARE COORDINATION | Age: 65
End: 2025-02-24

## 2025-02-24 ENCOUNTER — HOSPITAL ENCOUNTER (OUTPATIENT)
Dept: OBSERVATION | Age: 65
Discharge: HOME | End: 2025-02-24
Attending: INTERNAL MEDICINE | Admitting: INTERNAL MEDICINE
Payer: MEDICARE

## 2025-02-24 VITALS
DIASTOLIC BLOOD PRESSURE: 86 MMHG | RESPIRATION RATE: 18 BRPM | SYSTOLIC BLOOD PRESSURE: 123 MMHG | HEART RATE: 93 BPM | TEMPERATURE: 97.7 F | OXYGEN SATURATION: 99 %

## 2025-02-24 DIAGNOSIS — K91.89 ILEUS, POSTOPERATIVE (HCC): Primary | ICD-10-CM

## 2025-02-24 DIAGNOSIS — K56.7 ILEUS, POSTOPERATIVE (HCC): Primary | ICD-10-CM

## 2025-02-24 PROCEDURE — 6360000002 HC RX W HCPCS: Performed by: INTERNAL MEDICINE

## 2025-02-24 PROCEDURE — 2500000003 HC RX 250 WO HCPCS: Performed by: INTERNAL MEDICINE

## 2025-02-24 PROCEDURE — 96374 THER/PROPH/DIAG INJ IV PUSH: CPT

## 2025-02-24 RX ORDER — SODIUM CHLORIDE 0.9 % (FLUSH) 0.9 %
5-40 SYRINGE (ML) INJECTION PRN
Status: CANCELLED | OUTPATIENT
Start: 2025-02-25

## 2025-02-24 RX ADMIN — WATER 2000 MG: 1 INJECTION INTRAMUSCULAR; INTRAVENOUS; SUBCUTANEOUS at 12:40

## 2025-02-24 NOTE — CARE COORDINATION
Care Transitions Note    Initial Call - Call within 2 business days of discharge: Yes    Attempted to reach patient for transitions of care follow up. Unable to reach patient.    Outreach Attempts:   HIPAA compliant voicemail left for patient.     Patient: Ivanna Khoury    Patient : 1960   MRN: [id number]    Reason for Admission: post-op illeus  Discharge Date: 25  RURS: Readmission Risk Score: 23.1    Last Discharge Facility       Date Complaint Diagnosis Description Type Department Provider    2/15/25 Abdominal Pain Ileus, postoperative (HCC) ... ED to Hosp-Admission (Discharged) (ADMITTED) [facility] Kenton Sanchez MD; Madeline Lujan...            Was this an external facility discharge? No    Follow Up Appointment:   Patient has hospital follow up appointment scheduled within 7 days of discharge.    Future Appointments         Provider Specialty Dept Phone    2025 12:30 PM [facility] BED 3 IP Unit [phone]    2025 1:40 PM Sunny Crews Jr., MD Urology [phone]    3/3/2025 1:45 PM Pricilla Tamayo MD Infectious Diseases [phone]    3/5/2025 1:20 PM Lisette Strong MD Internal Medicine [phone]    2025 12:30 PM Stefano Cole MD Neurology [phone]    2025 1:30 PM Joe Galaviz MD Pulmonology [phone]            Plan for follow-up on next business day.      Toma Wesley RN

## 2025-02-25 ENCOUNTER — CARE COORDINATION (OUTPATIENT)
Dept: CARE COORDINATION | Age: 65
End: 2025-02-25

## 2025-02-25 ENCOUNTER — HOSPITAL ENCOUNTER (OUTPATIENT)
Dept: OBSERVATION | Age: 65
Discharge: HOME | End: 2025-02-25
Attending: INTERNAL MEDICINE | Admitting: INTERNAL MEDICINE
Payer: MEDICARE

## 2025-02-25 VITALS
DIASTOLIC BLOOD PRESSURE: 79 MMHG | RESPIRATION RATE: 18 BRPM | HEART RATE: 91 BPM | TEMPERATURE: 97.7 F | SYSTOLIC BLOOD PRESSURE: 140 MMHG | OXYGEN SATURATION: 99 %

## 2025-02-25 DIAGNOSIS — K56.7 ILEUS, POSTOPERATIVE (HCC): Primary | ICD-10-CM

## 2025-02-25 DIAGNOSIS — K91.89 ILEUS, POSTOPERATIVE (HCC): Primary | ICD-10-CM

## 2025-02-25 LAB
MICROORGANISM SPEC CULT: ABNORMAL
MICROORGANISM SPEC CULT: ABNORMAL
MICROORGANISM/AGENT SPEC: ABNORMAL
MICROORGANISM/AGENT SPEC: ABNORMAL
SPECIMEN DESCRIPTION: ABNORMAL

## 2025-02-25 PROCEDURE — 6360000002 HC RX W HCPCS: Performed by: INTERNAL MEDICINE

## 2025-02-25 PROCEDURE — 2500000003 HC RX 250 WO HCPCS: Performed by: INTERNAL MEDICINE

## 2025-02-25 PROCEDURE — 96374 THER/PROPH/DIAG INJ IV PUSH: CPT

## 2025-02-25 RX ORDER — SODIUM CHLORIDE 0.9 % (FLUSH) 0.9 %
5-40 SYRINGE (ML) INJECTION PRN
Status: CANCELLED | OUTPATIENT
Start: 2025-02-26

## 2025-02-25 RX ADMIN — WATER 2000 MG: 1 INJECTION INTRAMUSCULAR; INTRAVENOUS; SUBCUTANEOUS at 13:25

## 2025-02-25 NOTE — CARE COORDINATION
VM from patient stating need for assistance with protein supplement. RD consult placed.   
BED 1 IP Unit [phone]    3/15/2025 12:00 PM [facility] BED 1 IP Unit [phone]    3/16/2025 12:00 PM [facility] BED 1 IP Unit [phone]    3/17/2025 12:00 PM [facility] BED 1 IP Unit [phone]    3/18/2025 12:00 PM [facility] BED 1 IP Unit [phone]    3/19/2025 12:00 PM [facility] BED 1 IP Unit [phone]    3/20/2025 12:00 PM [facility] BED 1 IP Unit [phone]    3/21/2025 12:00 PM [facility] BED 1 IP Unit [phone]    3/22/2025 12:00 PM [facility] BED 1 IP Unit [phone]    3/23/2025 12:00 PM [facility] BED 1 IP Unit [phone]    3/24/2025 12:00 PM [facility] BED 1 IP Unit [phone]    3/25/2025 12:00 PM [facility] BED 1 IP Unit [phone]    3/26/2025 12:00 PM [facility] BED 1 IP Unit [phone]    3/27/2025 12:00 PM [facility] BED 1 IP Unit [phone]    3/28/2025 12:00 PM [facility] BED 1 IP Unit [phone]    3/29/2025 12:00 PM [facility] BED 1 IP Unit [phone]    3/30/2025 12:00 PM [facility] BED 1 IP Unit [phone]    3/31/2025 12:00 PM [facility] BED 1 IP Unit [phone]    4/1/2025 12:00 PM [facility] BED 1 IP Unit [phone]    4/2/2025 12:00 PM [facility] BED 1 IP Unit [phone]    4/3/2025 12:00 PM [facility] BED 1 IP Unit [phone]    6/5/2025 12:30 PM Stefano Cole MD Neurology [phone]    9/4/2025 1:30 PM Joe Galaviz MD Pulmonology [phone]            Care Transition Nurse provided contact information.  Plan for follow-up call in 2-5 days based on severity of symptoms and risk factors.  Plan for next call: symptom management-GI symptoms/ BM? How is incision?  follow-up appointment-need assistance with gen surgery appt?  Does he have ACP?     Toma Wesley, RN

## 2025-02-26 ENCOUNTER — HOSPITAL ENCOUNTER (OUTPATIENT)
Dept: OBSERVATION | Age: 65
Discharge: HOME | End: 2025-02-26
Attending: INTERNAL MEDICINE | Admitting: INTERNAL MEDICINE
Payer: MEDICARE

## 2025-02-26 ENCOUNTER — NURSE ONLY (OUTPATIENT)
Dept: INTERNAL MEDICINE | Age: 65
End: 2025-02-26
Payer: MEDICARE

## 2025-02-26 ENCOUNTER — CARE COORDINATION (OUTPATIENT)
Dept: CARE COORDINATION | Age: 65
End: 2025-02-26

## 2025-02-26 VITALS
RESPIRATION RATE: 18 BRPM | HEART RATE: 87 BPM | DIASTOLIC BLOOD PRESSURE: 81 MMHG | OXYGEN SATURATION: 100 % | SYSTOLIC BLOOD PRESSURE: 124 MMHG | TEMPERATURE: 97.7 F

## 2025-02-26 DIAGNOSIS — K91.89 ILEUS, POSTOPERATIVE (HCC): Primary | ICD-10-CM

## 2025-02-26 DIAGNOSIS — K56.7 ILEUS, POSTOPERATIVE (HCC): Primary | ICD-10-CM

## 2025-02-26 DIAGNOSIS — N39.0 COMPLICATED UTI (URINARY TRACT INFECTION): ICD-10-CM

## 2025-02-26 DIAGNOSIS — T14.8XXA WOUND DRAINAGE: Primary | ICD-10-CM

## 2025-02-26 PROCEDURE — 2500000003 HC RX 250 WO HCPCS: Performed by: INTERNAL MEDICINE

## 2025-02-26 PROCEDURE — 6360000002 HC RX W HCPCS: Performed by: INTERNAL MEDICINE

## 2025-02-26 PROCEDURE — 96374 THER/PROPH/DIAG INJ IV PUSH: CPT

## 2025-02-26 RX ORDER — SODIUM CHLORIDE 0.9 % (FLUSH) 0.9 %
5-40 SYRINGE (ML) INJECTION PRN
Status: CANCELLED | OUTPATIENT
Start: 2025-02-27

## 2025-02-26 RX ADMIN — WATER 2000 MG: 1 INJECTION INTRAMUSCULAR; INTRAVENOUS; SUBCUTANEOUS at 12:53

## 2025-02-26 NOTE — TELEPHONE ENCOUNTER
Pt came to office for assistance with changing bandage over surgical incision. Pt states he was supposed to have gotten lactulose, but when he got home it was not in the pharmacy bag. Pt was advised by pharmacy to have another script sent in by PCP.    Pt was also advised by infusion nurse that he may be eligible for nutritional supplements d/t recent surgery and health conditions. Pt has Medicaid secondary, insurance may cover. Script pended, will need to be printed and sent to [facility]. Should be able to use chart notes from procedure/hospital visits for documentation.

## 2025-02-26 NOTE — PROGRESS NOTES
Pt here for help changing dressing over abdominal incision. Pt states his son changed the dressing for him about 1830 yesterday d/t previous dressing being saturated.    Pt brought with him a silicone wound protectant as well as bordered gauze. Writer removed old dressing, small amount of yellow drainage noted on old dressing. Pt's incision is well approximated, no redness or erythema noted. Normal saline used to wash incision area and patted dry with sterile gauze. Silicone barrier applied and bordered dressing applied over. Pt states he will call office if additional assistance is needed.    Pt states he was never able to get lactulose. Pt states he [facility] home, but when he got home lactulose was not in bag. Per pt pharmacy advised him to reach out to PCP to place another order. See alternate phone encounter.    Pt also requesting nutritional supplement d/t recent surgery. Writer provided pt with Ensure high protein supplements. Will attempt to order and send to [employer], see alternate encounter.

## 2025-02-26 NOTE — CARE COORDINATION
Referral from CTN, Toma Wesley, RN-  Patient has poorly healing surgical wound, requesting assistance with protein supplement     Will reach out to patient for consult.  NAVARRO Hairston

## 2025-02-27 ENCOUNTER — HOSPITAL ENCOUNTER (OUTPATIENT)
Dept: OBSERVATION | Age: 65
Discharge: HOME | End: 2025-02-27
Attending: INTERNAL MEDICINE | Admitting: INTERNAL MEDICINE
Payer: MEDICARE

## 2025-02-27 VITALS
TEMPERATURE: 97.3 F | OXYGEN SATURATION: 100 % | HEART RATE: 95 BPM | RESPIRATION RATE: 18 BRPM | DIASTOLIC BLOOD PRESSURE: 83 MMHG | SYSTOLIC BLOOD PRESSURE: 130 MMHG

## 2025-02-27 DIAGNOSIS — K91.89 ILEUS, POSTOPERATIVE (HCC): Primary | ICD-10-CM

## 2025-02-27 DIAGNOSIS — K56.7 ILEUS, POSTOPERATIVE (HCC): Primary | ICD-10-CM

## 2025-02-27 PROCEDURE — 96374 THER/PROPH/DIAG INJ IV PUSH: CPT

## 2025-02-27 PROCEDURE — 6360000002 HC RX W HCPCS: Performed by: INTERNAL MEDICINE

## 2025-02-27 PROCEDURE — 2500000003 HC RX 250 WO HCPCS: Performed by: INTERNAL MEDICINE

## 2025-02-27 RX ORDER — SODIUM CHLORIDE 0.9 % (FLUSH) 0.9 %
5-40 SYRINGE (ML) INJECTION PRN
Status: CANCELLED | OUTPATIENT
Start: 2025-02-28

## 2025-02-27 RX ADMIN — WATER 2000 MG: 1 INJECTION INTRAMUSCULAR; INTRAVENOUS; SUBCUTANEOUS at 13:23

## 2025-02-27 NOTE — CARE COORDINATION
Registered Dietitian Initial Assessment for Care Coordination      Name-Ivanna Khoury  February 27, 2025    Initial Referral Reason: surgical wound- increased protein needs/Ileus    Patient Care Team:  Lisette Strong MD as PCP - General (Internal Medicine)  Lisette Strong MD as P[facility]  Jorge Eng MD as Consulting Physician (Gastroenterology)  Joe Galaviz MD as Consulting Physician (Pulmonary Disease)  Toma Wesley, RN as Care Transitions Nurse  Valeria Byrd RD, LD as Dietitian    Patient Active Problem List   Diagnosis    BPH with urinary obstruction    TIA (transient ischemic attack)    COVID-19    Syncope    Postprocedural urinary retention    Intractable migraine with aura without status migrainosus    Post-op pain    Ileus (HCC)    Chronic venous insufficiency of lower extremity    Venous insufficiency of right leg    Benign paroxysmal positional vertigo due to bilateral vestibular disorder    Complicated UTI (urinary tract infection)    Heart murmur    Near syncope    Urinary retention    BPH with obstruction/lower urinary tract symptoms    Generalized abdominal pain    Ileus, postoperative ([facility])    Status post prostatectomy    Acute cystitis    HTN (hypertension)    Klebsiella infection    UTI due to Klebsiella species    Bandemia    SIRS (systemic inflammatory response syndrome) (HCC)    Elevated procalcitonin    Abscess of left iliac fossa    Prostatitis, acute       Current Outpatient Medications   Medication Sig Dispense Refill    aspirin 81 MG chewable tablet Take 1 tablet by mouth daily 30 tablet 3    lisinopril (PRINIVIL;ZESTRIL) 5 MG tablet Take 1 tablet by mouth daily 30 tablet 0    hyoscyamine (LEVSIN/SL) 0.125 MG sublingual tablet Place 1 tablet under the tongue every 8 hours as needed (bladder spasms or stent pain) 30 tablet 3    benzonatate (TESSALON) 100 MG capsule Take 1 capsule by mouth 3 times daily as needed for Cough 21 capsule 0

## 2025-02-28 ENCOUNTER — HOSPITAL ENCOUNTER (OUTPATIENT)
Dept: OBSERVATION | Age: 65
Discharge: HOME | End: 2025-02-28
Attending: INTERNAL MEDICINE | Admitting: INTERNAL MEDICINE
Payer: MEDICARE

## 2025-02-28 ENCOUNTER — CARE COORDINATION (OUTPATIENT)
Dept: CARE COORDINATION | Age: 65
End: 2025-02-28

## 2025-02-28 VITALS
OXYGEN SATURATION: 97 % | HEART RATE: 90 BPM | DIASTOLIC BLOOD PRESSURE: 79 MMHG | TEMPERATURE: 97.3 F | RESPIRATION RATE: 16 BRPM | SYSTOLIC BLOOD PRESSURE: 128 MMHG

## 2025-02-28 DIAGNOSIS — K91.89 ILEUS, POSTOPERATIVE (HCC): Primary | ICD-10-CM

## 2025-02-28 DIAGNOSIS — K56.7 ILEUS, POSTOPERATIVE (HCC): Primary | ICD-10-CM

## 2025-02-28 PROCEDURE — 96374 THER/PROPH/DIAG INJ IV PUSH: CPT

## 2025-02-28 PROCEDURE — 6360000002 HC RX W HCPCS: Performed by: INTERNAL MEDICINE

## 2025-02-28 PROCEDURE — 2500000003 HC RX 250 WO HCPCS: Performed by: INTERNAL MEDICINE

## 2025-02-28 RX ORDER — SODIUM CHLORIDE 0.9 % (FLUSH) 0.9 %
5-40 SYRINGE (ML) INJECTION PRN
Status: CANCELLED | OUTPATIENT
Start: 2025-03-01

## 2025-02-28 RX ORDER — LACTULOSE 10 G/15ML
10 SOLUTION ORAL EVERY EVENING
Qty: 105 ML | Refills: 0 | Status: SHIPPED | OUTPATIENT
Start: 2025-02-28 | End: 2025-03-07

## 2025-02-28 RX ADMIN — WATER 2000 MG: 1 INJECTION INTRAMUSCULAR; INTRAVENOUS; SUBCUTANEOUS at 12:19

## 2025-02-28 NOTE — TELEPHONE ENCOUNTER
Order for nutritional supplements faxed to [facility] as [facility] does not work with pt's secondary insurance.

## 2025-03-01 ENCOUNTER — HOSPITAL ENCOUNTER (OUTPATIENT)
Dept: OBSERVATION | Age: 65
Discharge: HOME | End: 2025-03-01
Attending: INTERNAL MEDICINE | Admitting: INTERNAL MEDICINE
Payer: MEDICARE

## 2025-03-01 ENCOUNTER — HOSPITAL ENCOUNTER (OUTPATIENT)
Age: 65
Discharge: HOME | End: 2025-03-01
Payer: MEDICARE

## 2025-03-01 VITALS
OXYGEN SATURATION: 100 % | TEMPERATURE: 97.3 F | SYSTOLIC BLOOD PRESSURE: 129 MMHG | RESPIRATION RATE: 16 BRPM | HEART RATE: 76 BPM | DIASTOLIC BLOOD PRESSURE: 79 MMHG

## 2025-03-01 DIAGNOSIS — K91.89 ILEUS, POSTOPERATIVE (HCC): Primary | ICD-10-CM

## 2025-03-01 DIAGNOSIS — K35.33: ICD-10-CM

## 2025-03-01 DIAGNOSIS — A49.8 KLEBSIELLA INFECTION: ICD-10-CM

## 2025-03-01 DIAGNOSIS — K56.7 ILEUS, POSTOPERATIVE (HCC): Primary | ICD-10-CM

## 2025-03-01 LAB
ALBUMIN SERPL-MCNC: 4.1 G/DL (ref 3.5–5.2)
ALBUMIN/GLOB SERPL: 1.3 {RATIO} (ref 1–2.5)
ALP SERPL-CCNC: 97 U/L (ref 40–129)
ALT SERPL-CCNC: 86 U/L (ref 10–50)
AST SERPL-CCNC: 46 U/L (ref 10–50)
BASOPHILS # BLD: 0.04 K/UL (ref 0–0.2)
BASOPHILS NFR BLD: 1 % (ref 0–2)
BILIRUB DIRECT SERPL-MCNC: 0.1 MG/DL (ref 0–0.2)
BILIRUB INDIRECT SERPL-MCNC: ABNORMAL MG/DL (ref 0–1)
BILIRUB SERPL-MCNC: <0.2 MG/DL (ref 0–1.2)
CREAT SERPL-MCNC: 0.7 MG/DL (ref 0.7–1.2)
EOSINOPHIL # BLD: 0.71 K/UL (ref 0–0.44)
EOSINOPHILS RELATIVE PERCENT: 9 % (ref 1–4)
ERYTHROCYTE [DISTWIDTH] IN BLOOD BY AUTOMATED COUNT: 16.1 % (ref 11.8–14.4)
GFR, ESTIMATED: >90 ML/MIN/1.73M2
GLOBULIN SER CALC-MCNC: 3.1 G/DL
HCT VFR BLD AUTO: 37.1 % (ref 40.7–50.3)
HGB BLD-MCNC: 11 G/DL (ref 13–17)
IMM GRANULOCYTES # BLD AUTO: 0.07 K/UL (ref 0–0.3)
IMM GRANULOCYTES NFR BLD: 1 %
LYMPHOCYTES NFR BLD: 1.71 K/UL (ref 1.1–3.7)
LYMPHOCYTES RELATIVE PERCENT: 22 % (ref 24–43)
MCH RBC QN AUTO: 24.4 PG (ref 25.2–33.5)
MCHC RBC AUTO-ENTMCNC: 29.6 G/DL (ref 28.4–34.8)
MCV RBC AUTO: 82.3 FL (ref 82.6–102.9)
MONOCYTES NFR BLD: 0.45 K/UL (ref 0.1–1.2)
MONOCYTES NFR BLD: 6 % (ref 3–12)
NEUTROPHILS NFR BLD: 61 % (ref 36–65)
NEUTS SEG NFR BLD: 4.94 K/UL (ref 1.5–8.1)
NRBC BLD-RTO: 0 PER 100 WBC
PLATELET # BLD AUTO: 530 K/UL (ref 138–453)
PMV BLD AUTO: 8.6 FL (ref 8.1–13.5)
PROT SERPL-MCNC: 7.2 G/DL (ref 6.6–8.7)
RBC # BLD AUTO: 4.51 M/UL (ref 4.21–5.77)
RBC # BLD: ABNORMAL 10*6/UL
WBC OTHER # BLD: 7.9 K/UL (ref 3.5–11.3)

## 2025-03-01 PROCEDURE — 85025 COMPLETE CBC W/AUTO DIFF WBC: CPT

## 2025-03-01 PROCEDURE — 6360000002 HC RX W HCPCS: Performed by: INTERNAL MEDICINE

## 2025-03-01 PROCEDURE — 80076 HEPATIC FUNCTION PANEL: CPT

## 2025-03-01 PROCEDURE — 2500000003 HC RX 250 WO HCPCS: Performed by: INTERNAL MEDICINE

## 2025-03-01 PROCEDURE — 96374 THER/PROPH/DIAG INJ IV PUSH: CPT

## 2025-03-01 PROCEDURE — 36415 COLL VENOUS BLD VENIPUNCTURE: CPT

## 2025-03-01 PROCEDURE — 82565 ASSAY OF CREATININE: CPT

## 2025-03-01 RX ORDER — SODIUM CHLORIDE 0.9 % (FLUSH) 0.9 %
5-40 SYRINGE (ML) INJECTION PRN
OUTPATIENT
Start: 2025-03-02

## 2025-03-01 RX ADMIN — WATER 2000 MG: 1 INJECTION INTRAMUSCULAR; INTRAVENOUS; SUBCUTANEOUS at 12:43

## 2025-03-02 ENCOUNTER — HOSPITAL ENCOUNTER (OUTPATIENT)
Dept: OBSERVATION | Age: 65
Discharge: HOME | End: 2025-03-02
Attending: INTERNAL MEDICINE | Admitting: INTERNAL MEDICINE
Payer: MEDICARE

## 2025-03-02 VITALS
OXYGEN SATURATION: 100 % | TEMPERATURE: 97.5 F | HEART RATE: 81 BPM | DIASTOLIC BLOOD PRESSURE: 86 MMHG | RESPIRATION RATE: 16 BRPM | SYSTOLIC BLOOD PRESSURE: 131 MMHG

## 2025-03-02 DIAGNOSIS — K91.89 ILEUS, POSTOPERATIVE (HCC): Primary | ICD-10-CM

## 2025-03-02 DIAGNOSIS — K56.7 ILEUS, POSTOPERATIVE (HCC): Primary | ICD-10-CM

## 2025-03-02 PROCEDURE — 2500000003 HC RX 250 WO HCPCS: Performed by: INTERNAL MEDICINE

## 2025-03-02 PROCEDURE — 6360000002 HC RX W HCPCS: Performed by: INTERNAL MEDICINE

## 2025-03-02 RX ORDER — SODIUM CHLORIDE 0.9 % (FLUSH) 0.9 %
5-40 SYRINGE (ML) INJECTION PRN
Status: CANCELLED | OUTPATIENT
Start: 2025-03-03

## 2025-03-02 RX ADMIN — WATER 2000 MG: 1 INJECTION INTRAMUSCULAR; INTRAVENOUS; SUBCUTANEOUS at 13:26

## 2025-03-03 ENCOUNTER — HOSPITAL ENCOUNTER (OUTPATIENT)
Dept: OBSERVATION | Age: 65
Discharge: HOME | End: 2025-03-03
Attending: INTERNAL MEDICINE | Admitting: INTERNAL MEDICINE
Payer: MEDICARE

## 2025-03-03 ENCOUNTER — CARE COORDINATION (OUTPATIENT)
Dept: CARE COORDINATION | Age: 65
End: 2025-03-03

## 2025-03-03 ENCOUNTER — OFFICE VISIT (OUTPATIENT)
Dept: INFECTIOUS DISEASES | Age: 65
End: 2025-03-03
Payer: MEDICARE

## 2025-03-03 VITALS
OXYGEN SATURATION: 98 % | SYSTOLIC BLOOD PRESSURE: 127 MMHG | BODY MASS INDEX: 29.16 KG/M2 | TEMPERATURE: 97.4 F | HEART RATE: 82 BPM | HEIGHT: 65 IN | WEIGHT: 175 LBS | RESPIRATION RATE: 18 BRPM | DIASTOLIC BLOOD PRESSURE: 82 MMHG

## 2025-03-03 VITALS
OXYGEN SATURATION: 99 % | TEMPERATURE: 97.2 F | RESPIRATION RATE: 17 BRPM | HEART RATE: 86 BPM | DIASTOLIC BLOOD PRESSURE: 88 MMHG | SYSTOLIC BLOOD PRESSURE: 124 MMHG

## 2025-03-03 DIAGNOSIS — K65.1 ABDOMINOPELVIC ABSCESS (HCC): ICD-10-CM

## 2025-03-03 DIAGNOSIS — N41.0 ACUTE PROSTATITIS: Primary | ICD-10-CM

## 2025-03-03 DIAGNOSIS — K91.89 ILEUS, POSTOPERATIVE (HCC): Primary | ICD-10-CM

## 2025-03-03 DIAGNOSIS — K56.7 ILEUS, POSTOPERATIVE (HCC): Primary | ICD-10-CM

## 2025-03-03 DIAGNOSIS — B35.1 ONYCHOMYCOSIS: ICD-10-CM

## 2025-03-03 PROCEDURE — 1036F TOBACCO NON-USER: CPT | Performed by: INTERNAL MEDICINE

## 2025-03-03 PROCEDURE — 1123F ACP DISCUSS/DSCN MKR DOCD: CPT | Performed by: INTERNAL MEDICINE

## 2025-03-03 PROCEDURE — G8427 DOCREV CUR MEDS BY ELIG CLIN: HCPCS | Performed by: INTERNAL MEDICINE

## 2025-03-03 PROCEDURE — 6360000002 HC RX W HCPCS: Performed by: INTERNAL MEDICINE

## 2025-03-03 PROCEDURE — 2500000003 HC RX 250 WO HCPCS: Performed by: INTERNAL MEDICINE

## 2025-03-03 PROCEDURE — 96374 THER/PROPH/DIAG INJ IV PUSH: CPT

## 2025-03-03 PROCEDURE — 3079F DIAST BP 80-89 MM HG: CPT | Performed by: INTERNAL MEDICINE

## 2025-03-03 PROCEDURE — 99214 OFFICE O/P EST MOD 30 MIN: CPT | Performed by: INTERNAL MEDICINE

## 2025-03-03 PROCEDURE — 3017F COLORECTAL CA SCREEN DOC REV: CPT | Performed by: INTERNAL MEDICINE

## 2025-03-03 PROCEDURE — 3074F SYST BP LT 130 MM HG: CPT | Performed by: INTERNAL MEDICINE

## 2025-03-03 PROCEDURE — 1111F DSCHRG MED/CURRENT MED MERGE: CPT | Performed by: INTERNAL MEDICINE

## 2025-03-03 PROCEDURE — G8417 CALC BMI ABV UP PARAM F/U: HCPCS | Performed by: INTERNAL MEDICINE

## 2025-03-03 RX ORDER — KETOCONAZOLE 20 MG/G
CREAM TOPICAL
COMMUNITY
Start: 2025-01-30

## 2025-03-03 RX ORDER — SODIUM CHLORIDE 0.9 % (FLUSH) 0.9 %
5-40 SYRINGE (ML) INJECTION PRN
Status: CANCELLED | OUTPATIENT
Start: 2025-03-04

## 2025-03-03 RX ADMIN — WATER 2000 MG: 1 INJECTION INTRAMUSCULAR; INTRAVENOUS; SUBCUTANEOUS at 12:30

## 2025-03-03 ASSESSMENT — ENCOUNTER SYMPTOMS
PHOTOPHOBIA: 0
APNEA: 0
EYE REDNESS: 0
EYE DISCHARGE: 0
COLOR CHANGE: 0
ABDOMINAL DISTENTION: 0

## 2025-03-03 NOTE — CARE COORDINATION
Unit [phone]    3/26/2025 1:00 PM [facility] BED 1 IP Unit [phone]    3/27/2025 1:00 PM [facility] BED 1 IP Unit [phone]    3/28/2025 1:00 PM [facility] BED 1 IP Unit [phone]    3/29/2025 1:00 PM [facility] BED 1 IP Unit [phone]    3/30/2025 1:00 PM [facility] BED 1 IP Unit [phone]    3/31/2025 1:00 PM [facility] BED 1 IP Unit [phone]    4/1/2025 1:00 PM [facility] BED 1 IP Unit [phone]    4/2/2025 1:00 PM [facility] BED 1 IP Unit [phone]    4/3/2025 1:00 PM [facility] BED 1 IP Unit [phone]    6/5/2025 12:30 PM Stefano Cole MD Neurology [phone]    9/4/2025 1:30 PM Joe Galaviz MD Pulmonology [phone]            Plan for follow-up call in 2-5 days based on severity of symptoms and risk factors. Plan for next call: ymptom management-GI symptoms/ BM? How is incision?  follow-up appointment-need assistance with gen surgery appt?  Does he have ACP?   Supplement coupons? Ensure samples? [facility] for order per PCP office    Toma Wesley RN

## 2025-03-03 NOTE — PROGRESS NOTES
04:02 AM    CREATININE 0.7 03/01/2025 12:22 PM    CREATININE 0.6 02/23/2025 07:41 AM    MG 2.0 02/16/2025 03:21 AM    MG 2.3 01/03/2025 07:09 AM     Hepatic Function Panel:   Lab Results   Component Value Date/Time    BILIDIR 0.1 03/01/2025 12:22 PM    BILIDIR 0.09 06/18/2021 11:37 AM    IBILI Can not be calculated 03/01/2025 12:22 PM    IBILI 0.25 06/18/2021 11:37 AM    BILITOT <0.2 03/01/2025 12:22 PM    BILITOT 0.2 02/23/2025 07:41 AM    ALKPHOS 97 03/01/2025 12:22 PM    ALKPHOS 92 02/23/2025 07:41 AM    ALT 86 03/01/2025 12:22 PM    ALT 37 02/23/2025 07:41 AM    AST 46 03/01/2025 12:22 PM    AST 28 02/23/2025 07:41 AM     No results found for: \"RPR\"  No results found for: \"HIV\"  No results found for: \"BC\"  Lab Results   Component Value Date/Time    BACTERIA FEW 02/15/2025 04:10 PM    MUCUS 2+ 02/15/2025 04:10 PM    RBC 4.51 03/01/2025 12:22 PM    TRICHOMONAS NOT REPORTED 09/12/2020 11:05 AM    WBC 7.9 03/01/2025 12:22 PM    YEAST NOT REPORTED 09/12/2020 11:05 AM    TURBIDITY Turbid 02/15/2025 04:10 PM     Lab Results   Component Value Date/Time    CREATININE 0.7 03/01/2025 12:22 PM    GLUCOSE 111 02/23/2025 07:41 AM   you for allowing us to participate in the care of this patient. Please call with questions.      Pricilla Tamayo MD  - Office: [phone]    Please note that this chart was generated using voice recognition Dragon dictation software.  Although every effort was made to ensure the accuracy of this automated transcription, some errors in transcription mayhave occurred.

## 2025-03-04 ENCOUNTER — HOSPITAL ENCOUNTER (OUTPATIENT)
Dept: OBSERVATION | Age: 65
Discharge: HOME | End: 2025-03-04
Attending: INTERNAL MEDICINE | Admitting: INTERNAL MEDICINE
Payer: MEDICARE

## 2025-03-04 VITALS
DIASTOLIC BLOOD PRESSURE: 77 MMHG | SYSTOLIC BLOOD PRESSURE: 125 MMHG | TEMPERATURE: 97.3 F | HEART RATE: 86 BPM | RESPIRATION RATE: 18 BRPM | OXYGEN SATURATION: 99 %

## 2025-03-04 DIAGNOSIS — K56.7 ILEUS, POSTOPERATIVE (HCC): Primary | ICD-10-CM

## 2025-03-04 DIAGNOSIS — K91.89 ILEUS, POSTOPERATIVE (HCC): Primary | ICD-10-CM

## 2025-03-04 PROCEDURE — 2500000003 HC RX 250 WO HCPCS: Performed by: INTERNAL MEDICINE

## 2025-03-04 PROCEDURE — 6360000002 HC RX W HCPCS: Performed by: INTERNAL MEDICINE

## 2025-03-04 PROCEDURE — 96374 THER/PROPH/DIAG INJ IV PUSH: CPT

## 2025-03-04 RX ORDER — SODIUM CHLORIDE 0.9 % (FLUSH) 0.9 %
5-40 SYRINGE (ML) INJECTION PRN
Status: CANCELLED | OUTPATIENT
Start: 2025-03-05

## 2025-03-04 RX ADMIN — WATER 2000 MG: 1 INJECTION INTRAMUSCULAR; INTRAVENOUS; SUBCUTANEOUS at 13:20

## 2025-03-05 ENCOUNTER — HOSPITAL ENCOUNTER (OUTPATIENT)
Dept: OBSERVATION | Age: 65
Discharge: HOME | End: 2025-03-05
Attending: INTERNAL MEDICINE | Admitting: INTERNAL MEDICINE
Payer: MEDICARE

## 2025-03-05 ENCOUNTER — OFFICE VISIT (OUTPATIENT)
Dept: INTERNAL MEDICINE | Age: 65
End: 2025-03-05
Payer: MEDICARE

## 2025-03-05 VITALS
OXYGEN SATURATION: 98 % | SYSTOLIC BLOOD PRESSURE: 122 MMHG | HEART RATE: 97 BPM | TEMPERATURE: 97.3 F | DIASTOLIC BLOOD PRESSURE: 94 MMHG

## 2025-03-05 VITALS
WEIGHT: 179.4 LBS | BODY MASS INDEX: 29.89 KG/M2 | TEMPERATURE: 98.1 F | SYSTOLIC BLOOD PRESSURE: 128 MMHG | DIASTOLIC BLOOD PRESSURE: 80 MMHG | OXYGEN SATURATION: 100 % | HEIGHT: 65 IN | HEART RATE: 94 BPM

## 2025-03-05 DIAGNOSIS — K91.89 ILEUS, POSTOPERATIVE (HCC): Primary | ICD-10-CM

## 2025-03-05 DIAGNOSIS — K56.7 ILEUS, POSTOPERATIVE (HCC): Primary | ICD-10-CM

## 2025-03-05 DIAGNOSIS — I10 ESSENTIAL HYPERTENSION: Primary | ICD-10-CM

## 2025-03-05 DIAGNOSIS — D64.9 ANEMIA, UNSPECIFIED TYPE: ICD-10-CM

## 2025-03-05 PROCEDURE — G8427 DOCREV CUR MEDS BY ELIG CLIN: HCPCS | Performed by: INTERNAL MEDICINE

## 2025-03-05 PROCEDURE — G8417 CALC BMI ABV UP PARAM F/U: HCPCS | Performed by: INTERNAL MEDICINE

## 2025-03-05 PROCEDURE — 1111F DSCHRG MED/CURRENT MED MERGE: CPT | Performed by: INTERNAL MEDICINE

## 2025-03-05 PROCEDURE — 3017F COLORECTAL CA SCREEN DOC REV: CPT | Performed by: INTERNAL MEDICINE

## 2025-03-05 PROCEDURE — 2500000003 HC RX 250 WO HCPCS: Performed by: INTERNAL MEDICINE

## 2025-03-05 PROCEDURE — 99213 OFFICE O/P EST LOW 20 MIN: CPT | Performed by: INTERNAL MEDICINE

## 2025-03-05 PROCEDURE — 3079F DIAST BP 80-89 MM HG: CPT | Performed by: INTERNAL MEDICINE

## 2025-03-05 PROCEDURE — 1123F ACP DISCUSS/DSCN MKR DOCD: CPT | Performed by: INTERNAL MEDICINE

## 2025-03-05 PROCEDURE — 1036F TOBACCO NON-USER: CPT | Performed by: INTERNAL MEDICINE

## 2025-03-05 PROCEDURE — 99214 OFFICE O/P EST MOD 30 MIN: CPT | Performed by: INTERNAL MEDICINE

## 2025-03-05 PROCEDURE — 3074F SYST BP LT 130 MM HG: CPT | Performed by: INTERNAL MEDICINE

## 2025-03-05 PROCEDURE — 96374 THER/PROPH/DIAG INJ IV PUSH: CPT

## 2025-03-05 PROCEDURE — 6360000002 HC RX W HCPCS: Performed by: INTERNAL MEDICINE

## 2025-03-05 RX ORDER — LISINOPRIL 5 MG/1
5 TABLET ORAL DAILY
Qty: 30 TABLET | Refills: 5 | Status: SHIPPED | OUTPATIENT
Start: 2025-03-05 | End: 2025-09-01

## 2025-03-05 RX ORDER — POTASSIUM CHLORIDE 1500 MG/1
20 TABLET, EXTENDED RELEASE ORAL EVERY OTHER DAY
Qty: 15 TABLET | Refills: 5 | Status: SHIPPED | OUTPATIENT
Start: 2025-03-05

## 2025-03-05 RX ORDER — DOCUSATE SODIUM 100 MG/1
100 CAPSULE, LIQUID FILLED ORAL 2 TIMES DAILY
Qty: 20 CAPSULE | Refills: 0 | COMMUNITY
Start: 2025-03-05 | End: 2026-03-05

## 2025-03-05 RX ORDER — SODIUM CHLORIDE 0.9 % (FLUSH) 0.9 %
5-40 SYRINGE (ML) INJECTION PRN
Status: CANCELLED | OUTPATIENT
Start: 2025-03-06

## 2025-03-05 RX ORDER — DOCUSATE SODIUM 100 MG/1
100 CAPSULE, LIQUID FILLED ORAL 2 TIMES DAILY PRN
Qty: 60 CAPSULE | Refills: 5 | Status: SHIPPED | OUTPATIENT
Start: 2025-03-05 | End: 2025-09-01

## 2025-03-05 RX ADMIN — WATER 2000 MG: 1 INJECTION INTRAMUSCULAR; INTRAVENOUS; SUBCUTANEOUS at 12:25

## 2025-03-05 ASSESSMENT — ENCOUNTER SYMPTOMS
GASTROINTESTINAL NEGATIVE: 1
RESPIRATORY NEGATIVE: 1
ALLERGIC/IMMUNOLOGIC NEGATIVE: 1
EYES NEGATIVE: 1

## 2025-03-05 NOTE — PROGRESS NOTES
Ct tech reports ready for pt, RT contacted to transport pt, they confirm.      Acute epididymitis     COVID 2020    no hospitalization    Enlarged prostate     Tovar catheter in place 02/05/2025    Hyperlipidemia     Hypertension     Lung nodules     not changed in 5 years-no longer has to visit with pulmonology    MRSA (methicillin resistant staph aureus) culture positive     states resolved    [facility]    Under care of service provider     Dr Sunny Crews / last visit Dec 2024    Urinary retention     UTI (urinary tract infection)     Sepsis 12/2024     Past Surgical History:   Procedure Laterality Date    CT ABSCESS DRAINAGE SOFT TISSUE  2/21/2025    CT ABSCESS DRAINAGE SOFT TISSUE 2/21/2025 [facility] CT SCAN    CYSTOSCOPY N/A 07/30/2020    CYSTOSCOPY performed by Jayesh Roca MD at [facility] OR    CYSTOSCOPY  01/24/2025    CYSTOSCOPY N/A 1/24/2025    CYSTOSCOPY performed by Sunny Crews Jr., MD at [facility] OR    HERNIA REPAIR Right 07/27/2021    XI ROBOTIC LAPAROSCOPIC INGUINAL HERNIA REPAIR WITH MESH performed by Kermit Sotelo IV, DO at [facility] OR    PROSTATECTOMY N/A 2/13/2025    ROBOTIC LAPAROSCOPIC SIMPLE PROSTATECTOMY performed by Sunny Crews Jr., MD at [facility] OR    TURP N/A 08/14/2020    CYSTO, TUR PROSTATE GREENLIGHT XPS performed by Jayesh Roca MD at [facility] OR    VASCULAR SURGERY      varicose vein of right leg       SOCIAL HISTORY      Social History     Tobacco Use    Smoking status: Never    Smokeless tobacco: Never   Substance Use Topics    Alcohol use: Never     Ivanna  reports that he has never smoked. He has never used smokeless tobacco.    FAMILY HISTORY:    No family history on file.    REVIEW OF SYSTEMS:    Review of Systems   Constitutional: Negative.    HENT: Negative.     Eyes: Negative.    Respiratory: Negative.     Cardiovascular: Negative.    Gastrointestinal: Negative.    Endocrine: Negative.    Genitourinary: Negative.    Musculoskeletal: Negative.    Skin: Negative.    Allergic/Immunologic:

## 2025-03-06 ENCOUNTER — CARE COORDINATION (OUTPATIENT)
Dept: CARE COORDINATION | Age: 65
End: 2025-03-06

## 2025-03-06 ENCOUNTER — HOSPITAL ENCOUNTER (OUTPATIENT)
Dept: OBSERVATION | Age: 65
Setting detail: OBSERVATION
Discharge: HOME | End: 2025-03-06
Attending: INTERNAL MEDICINE | Admitting: INTERNAL MEDICINE
Payer: MEDICARE

## 2025-03-06 VITALS
SYSTOLIC BLOOD PRESSURE: 131 MMHG | RESPIRATION RATE: 18 BRPM | TEMPERATURE: 97.3 F | DIASTOLIC BLOOD PRESSURE: 86 MMHG | HEART RATE: 86 BPM | OXYGEN SATURATION: 98 %

## 2025-03-06 DIAGNOSIS — K91.89 ILEUS, POSTOPERATIVE (HCC): Primary | ICD-10-CM

## 2025-03-06 DIAGNOSIS — K56.7 ILEUS, POSTOPERATIVE (HCC): Primary | ICD-10-CM

## 2025-03-06 PROCEDURE — 96374 THER/PROPH/DIAG INJ IV PUSH: CPT

## 2025-03-06 PROCEDURE — 2500000003 HC RX 250 WO HCPCS: Performed by: INTERNAL MEDICINE

## 2025-03-06 PROCEDURE — 6360000002 HC RX W HCPCS: Performed by: INTERNAL MEDICINE

## 2025-03-06 RX ORDER — SODIUM CHLORIDE 0.9 % (FLUSH) 0.9 %
5-40 SYRINGE (ML) INJECTION PRN
Status: CANCELLED | OUTPATIENT
Start: 2025-03-07

## 2025-03-06 RX ADMIN — WATER 2000 MG: 1 INJECTION INTRAMUSCULAR; INTRAVENOUS; SUBCUTANEOUS at 13:27

## 2025-03-06 NOTE — CARE COORDINATION
BED 1 IP Unit [phone]    3/27/2025 9:00 AM (Arrive by 8:45 AM) ST[facility] CT ROOM 1 Radiology [phone]    3/27/2025 1:00 PM [facility] BED 1 IP Unit [phone]    3/28/2025 1:00 PM [facility] BED 1 IP Unit [phone]    3/29/2025 1:00 PM [facility] BED 1 IP Unit [phone]    3/30/2025 1:00 PM [facility] BED 1 IP Unit [phone]    3/31/2025 1:00 PM [facility] BED 1 IP Unit [phone]    3/31/2025 3:45 PM Pricilla Tamayo MD Infectious Diseases [phone]    4/1/2025 1:00 PM [facility] BED 1 IP Unit [phone]    4/2/2025 1:00 PM [facility] BED 1 IP Unit [phone]    4/3/2025 1:00 PM [facility] BED 1 IP Unit [phone]    6/5/2025 12:30 PM Stefano Cole MD Neurology [phone]    9/4/2025 1:30 PM Joe Galaviz MD Pulmonology [phone]            No further follow-up call indicated based on severity of symptoms and risk factors. Plan for next call: referral to ambulatory care manager-Joon Wesley RN

## 2025-03-06 NOTE — CARE COORDINATION
This patient was received as a referral from Care Transition Nurse.    ACM contacted the patient by telephone. Verified name and  with patient as identifiers. Provided introduction to self, and explanation of the ACM role.   Patient declined care management services at this time.     Provided my contact information for any needs in the future

## 2025-03-07 ENCOUNTER — OFFICE VISIT (OUTPATIENT)
Dept: UROLOGY | Age: 65
End: 2025-03-07
Payer: MEDICARE

## 2025-03-07 ENCOUNTER — HOSPITAL ENCOUNTER (OUTPATIENT)
Dept: OBSERVATION | Age: 65
Discharge: HOME | End: 2025-03-07
Attending: INTERNAL MEDICINE | Admitting: INTERNAL MEDICINE
Payer: MEDICARE

## 2025-03-07 ENCOUNTER — HOSPITAL ENCOUNTER (OUTPATIENT)
Age: 65
Discharge: HOME | End: 2025-03-07
Payer: MEDICARE

## 2025-03-07 VITALS
OXYGEN SATURATION: 99 % | RESPIRATION RATE: 18 BRPM | HEART RATE: 84 BPM | SYSTOLIC BLOOD PRESSURE: 120 MMHG | DIASTOLIC BLOOD PRESSURE: 71 MMHG | TEMPERATURE: 97.7 F

## 2025-03-07 VITALS
SYSTOLIC BLOOD PRESSURE: 120 MMHG | HEART RATE: 84 BPM | WEIGHT: 179 LBS | DIASTOLIC BLOOD PRESSURE: 71 MMHG | BODY MASS INDEX: 29.82 KG/M2 | HEIGHT: 65 IN

## 2025-03-07 DIAGNOSIS — R33.9 URINARY RETENTION: Primary | ICD-10-CM

## 2025-03-07 DIAGNOSIS — I10 ESSENTIAL HYPERTENSION: ICD-10-CM

## 2025-03-07 DIAGNOSIS — N41.0 ACUTE PROSTATITIS: ICD-10-CM

## 2025-03-07 DIAGNOSIS — A49.8 KLEBSIELLA INFECTION: ICD-10-CM

## 2025-03-07 DIAGNOSIS — D64.9 ANEMIA, UNSPECIFIED TYPE: ICD-10-CM

## 2025-03-07 DIAGNOSIS — K35.33: ICD-10-CM

## 2025-03-07 DIAGNOSIS — K91.89 ILEUS, POSTOPERATIVE (HCC): Primary | ICD-10-CM

## 2025-03-07 DIAGNOSIS — K56.7 ILEUS, POSTOPERATIVE (HCC): Primary | ICD-10-CM

## 2025-03-07 LAB
ALBUMIN SERPL-MCNC: 4.1 G/DL (ref 3.5–5.2)
ALBUMIN/GLOB SERPL: 1.3 {RATIO} (ref 1–2.5)
ALP SERPL-CCNC: 81 U/L (ref 40–129)
ALT SERPL-CCNC: 49 U/L (ref 10–50)
ANION GAP SERPL CALCULATED.3IONS-SCNC: 12 MMOL/L (ref 9–16)
AST SERPL-CCNC: 26 U/L (ref 10–50)
BASOPHILS # BLD: 0.03 K/UL (ref 0–0.2)
BASOPHILS NFR BLD: 1 % (ref 0–2)
BILIRUB DIRECT SERPL-MCNC: 0.1 MG/DL (ref 0–0.2)
BILIRUB INDIRECT SERPL-MCNC: 0.1 MG/DL (ref 0–1)
BILIRUB SERPL-MCNC: 0.2 MG/DL (ref 0–1.2)
BUN SERPL-MCNC: 21 MG/DL (ref 8–23)
CALCIUM SERPL-MCNC: 9.4 MG/DL (ref 8.6–10.4)
CHLORIDE SERPL-SCNC: 105 MMOL/L (ref 98–107)
CO2 SERPL-SCNC: 22 MMOL/L (ref 20–31)
CREAT SERPL-MCNC: 0.7 MG/DL (ref 0.7–1.2)
CRP SERPL HS-MCNC: <3 MG/L (ref 0–5)
EOSINOPHIL # BLD: 0.7 K/UL (ref 0–0.44)
EOSINOPHILS RELATIVE PERCENT: 12 % (ref 1–4)
ERYTHROCYTE [DISTWIDTH] IN BLOOD BY AUTOMATED COUNT: 16.1 % (ref 11.8–14.4)
GFR, ESTIMATED: >90 ML/MIN/1.73M2
GLOBULIN SER CALC-MCNC: 3.1 G/DL
GLUCOSE SERPL-MCNC: 117 MG/DL (ref 74–99)
HCT VFR BLD AUTO: 39.5 % (ref 40.7–50.3)
HGB BLD-MCNC: 11.8 G/DL (ref 13–17)
IMM GRANULOCYTES # BLD AUTO: 0.03 K/UL (ref 0–0.3)
IMM GRANULOCYTES NFR BLD: 1 %
LYMPHOCYTES NFR BLD: 1.67 K/UL (ref 1.1–3.7)
LYMPHOCYTES RELATIVE PERCENT: 28 % (ref 24–43)
MCH RBC QN AUTO: 24.7 PG (ref 25.2–33.5)
MCHC RBC AUTO-ENTMCNC: 29.9 G/DL (ref 28.4–34.8)
MCV RBC AUTO: 82.6 FL (ref 82.6–102.9)
MONOCYTES NFR BLD: 0.41 K/UL (ref 0.1–1.2)
MONOCYTES NFR BLD: 7 % (ref 3–12)
NEUTROPHILS NFR BLD: 51 % (ref 36–65)
NEUTS SEG NFR BLD: 3.06 K/UL (ref 1.5–8.1)
NRBC BLD-RTO: 0 PER 100 WBC
PLATELET # BLD AUTO: 375 K/UL (ref 138–453)
PMV BLD AUTO: 8.6 FL (ref 8.1–13.5)
POTASSIUM SERPL-SCNC: 4.4 MMOL/L (ref 3.7–5.3)
PROT SERPL-MCNC: 7.2 G/DL (ref 6.6–8.7)
RBC # BLD AUTO: 4.78 M/UL (ref 4.21–5.77)
RBC # BLD: ABNORMAL 10*6/UL
SODIUM SERPL-SCNC: 139 MMOL/L (ref 136–145)
WBC OTHER # BLD: 5.9 K/UL (ref 3.5–11.3)

## 2025-03-07 PROCEDURE — 80048 BASIC METABOLIC PNL TOTAL CA: CPT

## 2025-03-07 PROCEDURE — 6360000002 HC RX W HCPCS: Performed by: INTERNAL MEDICINE

## 2025-03-07 PROCEDURE — 2500000003 HC RX 250 WO HCPCS: Performed by: INTERNAL MEDICINE

## 2025-03-07 PROCEDURE — 96374 THER/PROPH/DIAG INJ IV PUSH: CPT

## 2025-03-07 PROCEDURE — 80076 HEPATIC FUNCTION PANEL: CPT

## 2025-03-07 PROCEDURE — 36415 COLL VENOUS BLD VENIPUNCTURE: CPT

## 2025-03-07 PROCEDURE — 99024 POSTOP FOLLOW-UP VISIT: CPT | Performed by: UROLOGY

## 2025-03-07 PROCEDURE — 86140 C-REACTIVE PROTEIN: CPT

## 2025-03-07 PROCEDURE — 85025 COMPLETE CBC W/AUTO DIFF WBC: CPT

## 2025-03-07 RX ORDER — SODIUM CHLORIDE 0.9 % (FLUSH) 0.9 %
5-40 SYRINGE (ML) INJECTION PRN
Status: CANCELLED | OUTPATIENT
Start: 2025-03-08

## 2025-03-07 RX ORDER — OXYBUTYNIN CHLORIDE 10 MG/1
10 TABLET, EXTENDED RELEASE ORAL DAILY
Qty: 30 TABLET | Refills: 2 | Status: SHIPPED | OUTPATIENT
Start: 2025-03-07

## 2025-03-07 RX ADMIN — WATER 2000 MG: 1 INJECTION INTRAMUSCULAR; INTRAVENOUS; SUBCUTANEOUS at 12:24

## 2025-03-07 NOTE — PROGRESS NOTES
Mikhail Lemus MD   Urology Clinic office Visit      Patient:  Ivanna Khoury  YOB: 1960  Date: 3/7/2025    HISTORY OF PRESENT ILLNESS:   The patient is a 64 y.o. male who presents today for evaluation of the following problem(s): post-op simple prostatectomy        Overall the problem(s) : are improving.  Associated Symptoms: Notes some dysuria and gross hematuria.  Pain Severity: minimal pain    Summary of old records:  (Patient's old records, notes and chart reviewed and summarized above.)      3/7/2025  Patient underwent a simple prostatectomy on 2/13.  He was discharged on postop day 1.  Was returned to hospital due to worsening abdominal pain.  Is found to have an ileus.  CT scan also showed some abscess in the abdomen.  Patient had NG tube placed due to ileus which resolved.  Antibiotics were given.  He is only cleared for discharge and will day 7 after admission with patient receiving IV antibiotics.    He returns to the clinic today due to follow-up visit.  Was found to have a small seroma on midline incision.  Is healing well.  No signs of dehiscence.    Patient with some gross hematuria, dysuria.  This is some minor in nature and improving.  He also notes some urinary frequency.  He feels like his empties bladder completely.    I independently reviewed and verified the images and reports from:    No results found.      Last several PSA's:  No results found for: \"PSA\"    Last total testosterone:  No results found for: \"TESTOSTERONE\"    Urinalysis today:  No results found for this visit on 03/07/25.      Last BUN and creatinine:  Lab Results   Component Value Date    BUN 21 03/07/2025     Lab Results   Component Value Date    CREATININE 0.7 03/07/2025       Imaging Reviewed during this Office Visit:   (results were independently reviewed by physician and radiology report verified)    PAST MEDICAL, FAMILY AND SOCIAL HISTORY:  Past Medical History:   Diagnosis Date    Acute epididymitis

## 2025-03-08 ENCOUNTER — HOSPITAL ENCOUNTER (OUTPATIENT)
Dept: OBSERVATION | Age: 65
Setting detail: OBSERVATION
Discharge: HOME | End: 2025-03-08
Attending: INTERNAL MEDICINE | Admitting: INTERNAL MEDICINE
Payer: MEDICARE

## 2025-03-08 VITALS
RESPIRATION RATE: 16 BRPM | HEART RATE: 89 BPM | TEMPERATURE: 97.7 F | SYSTOLIC BLOOD PRESSURE: 121 MMHG | DIASTOLIC BLOOD PRESSURE: 78 MMHG

## 2025-03-08 DIAGNOSIS — K56.7 ILEUS, POSTOPERATIVE (HCC): Primary | ICD-10-CM

## 2025-03-08 DIAGNOSIS — K91.89 ILEUS, POSTOPERATIVE (HCC): Primary | ICD-10-CM

## 2025-03-08 PROCEDURE — 6360000002 HC RX W HCPCS: Performed by: INTERNAL MEDICINE

## 2025-03-08 PROCEDURE — 2500000003 HC RX 250 WO HCPCS: Performed by: INTERNAL MEDICINE

## 2025-03-08 PROCEDURE — 96374 THER/PROPH/DIAG INJ IV PUSH: CPT

## 2025-03-08 RX ORDER — SODIUM CHLORIDE 0.9 % (FLUSH) 0.9 %
5-40 SYRINGE (ML) INJECTION PRN
OUTPATIENT
Start: 2025-03-09

## 2025-03-08 RX ADMIN — WATER 2000 MG: 1 INJECTION INTRAMUSCULAR; INTRAVENOUS; SUBCUTANEOUS at 13:14

## 2025-03-09 ENCOUNTER — HOSPITAL ENCOUNTER (OUTPATIENT)
Dept: OBSERVATION | Age: 65
Setting detail: OBSERVATION
Discharge: HOME | End: 2025-03-09
Attending: INTERNAL MEDICINE | Admitting: INTERNAL MEDICINE
Payer: MEDICARE

## 2025-03-09 VITALS
DIASTOLIC BLOOD PRESSURE: 80 MMHG | SYSTOLIC BLOOD PRESSURE: 124 MMHG | TEMPERATURE: 97.7 F | OXYGEN SATURATION: 99 % | RESPIRATION RATE: 18 BRPM | HEART RATE: 86 BPM

## 2025-03-09 DIAGNOSIS — K56.7 ILEUS, POSTOPERATIVE (HCC): Primary | ICD-10-CM

## 2025-03-09 DIAGNOSIS — K91.89 ILEUS, POSTOPERATIVE (HCC): Primary | ICD-10-CM

## 2025-03-09 PROCEDURE — 2500000003 HC RX 250 WO HCPCS: Performed by: INTERNAL MEDICINE

## 2025-03-09 PROCEDURE — 6360000002 HC RX W HCPCS: Performed by: INTERNAL MEDICINE

## 2025-03-09 PROCEDURE — 96374 THER/PROPH/DIAG INJ IV PUSH: CPT

## 2025-03-09 RX ORDER — SODIUM CHLORIDE 0.9 % (FLUSH) 0.9 %
5-40 SYRINGE (ML) INJECTION PRN
Status: CANCELLED | OUTPATIENT
Start: 2025-03-10

## 2025-03-09 RX ADMIN — WATER 2000 MG: 1 INJECTION INTRAMUSCULAR; INTRAVENOUS; SUBCUTANEOUS at 13:44

## 2025-03-10 ENCOUNTER — HOSPITAL ENCOUNTER (OUTPATIENT)
Dept: OBSERVATION | Age: 65
Discharge: HOME | End: 2025-03-10
Attending: INTERNAL MEDICINE | Admitting: INTERNAL MEDICINE
Payer: MEDICARE

## 2025-03-10 DIAGNOSIS — K56.7 ILEUS, POSTOPERATIVE (HCC): Primary | ICD-10-CM

## 2025-03-10 DIAGNOSIS — K91.89 ILEUS, POSTOPERATIVE (HCC): Primary | ICD-10-CM

## 2025-03-10 PROCEDURE — 96374 THER/PROPH/DIAG INJ IV PUSH: CPT

## 2025-03-10 PROCEDURE — 6360000002 HC RX W HCPCS: Performed by: INTERNAL MEDICINE

## 2025-03-10 PROCEDURE — 2500000003 HC RX 250 WO HCPCS: Performed by: INTERNAL MEDICINE

## 2025-03-10 RX ORDER — SODIUM CHLORIDE 0.9 % (FLUSH) 0.9 %
5-40 SYRINGE (ML) INJECTION PRN
Status: CANCELLED | OUTPATIENT
Start: 2025-03-11

## 2025-03-10 RX ADMIN — CEFTRIAXONE SODIUM 2000 MG: 2 INJECTION, POWDER, FOR SOLUTION INTRAMUSCULAR; INTRAVENOUS at 13:03

## 2025-03-11 ENCOUNTER — HOSPITAL ENCOUNTER (OUTPATIENT)
Dept: OBSERVATION | Age: 65
Discharge: HOME | End: 2025-03-11
Attending: INTERNAL MEDICINE | Admitting: INTERNAL MEDICINE
Payer: MEDICARE

## 2025-03-11 VITALS
SYSTOLIC BLOOD PRESSURE: 140 MMHG | RESPIRATION RATE: 16 BRPM | TEMPERATURE: 97.3 F | DIASTOLIC BLOOD PRESSURE: 90 MMHG | HEART RATE: 94 BPM | OXYGEN SATURATION: 100 %

## 2025-03-11 DIAGNOSIS — K91.89 ILEUS, POSTOPERATIVE (HCC): Primary | ICD-10-CM

## 2025-03-11 DIAGNOSIS — K56.7 ILEUS, POSTOPERATIVE (HCC): Primary | ICD-10-CM

## 2025-03-11 PROCEDURE — 6360000002 HC RX W HCPCS: Performed by: INTERNAL MEDICINE

## 2025-03-11 PROCEDURE — 2500000003 HC RX 250 WO HCPCS: Performed by: INTERNAL MEDICINE

## 2025-03-11 PROCEDURE — 96374 THER/PROPH/DIAG INJ IV PUSH: CPT

## 2025-03-11 RX ORDER — SODIUM CHLORIDE 0.9 % (FLUSH) 0.9 %
5-40 SYRINGE (ML) INJECTION PRN
Status: CANCELLED | OUTPATIENT
Start: 2025-03-12

## 2025-03-11 RX ADMIN — CEFTRIAXONE SODIUM 2000 MG: 2 INJECTION, POWDER, FOR SOLUTION INTRAMUSCULAR; INTRAVENOUS at 12:51

## 2025-03-12 ENCOUNTER — HOSPITAL ENCOUNTER (OUTPATIENT)
Dept: OBSERVATION | Age: 65
Discharge: HOME | End: 2025-03-12
Attending: INTERNAL MEDICINE | Admitting: INTERNAL MEDICINE
Payer: MEDICARE

## 2025-03-12 VITALS
OXYGEN SATURATION: 99 % | SYSTOLIC BLOOD PRESSURE: 127 MMHG | DIASTOLIC BLOOD PRESSURE: 81 MMHG | HEART RATE: 79 BPM | TEMPERATURE: 97.7 F | RESPIRATION RATE: 18 BRPM

## 2025-03-12 DIAGNOSIS — K56.7 ILEUS, POSTOPERATIVE (HCC): Primary | ICD-10-CM

## 2025-03-12 DIAGNOSIS — K91.89 ILEUS, POSTOPERATIVE (HCC): Primary | ICD-10-CM

## 2025-03-12 PROCEDURE — 6360000002 HC RX W HCPCS: Performed by: INTERNAL MEDICINE

## 2025-03-12 PROCEDURE — 96374 THER/PROPH/DIAG INJ IV PUSH: CPT

## 2025-03-12 PROCEDURE — 2500000003 HC RX 250 WO HCPCS: Performed by: INTERNAL MEDICINE

## 2025-03-12 RX ORDER — SODIUM CHLORIDE 0.9 % (FLUSH) 0.9 %
5-40 SYRINGE (ML) INJECTION PRN
Status: CANCELLED | OUTPATIENT
Start: 2025-03-13

## 2025-03-12 RX ADMIN — CEFTRIAXONE SODIUM 2000 MG: 2 INJECTION, POWDER, FOR SOLUTION INTRAMUSCULAR; INTRAVENOUS at 13:34

## 2025-03-13 ENCOUNTER — HOSPITAL ENCOUNTER (OUTPATIENT)
Dept: OBSERVATION | Age: 65
Discharge: HOME | End: 2025-03-13
Attending: INTERNAL MEDICINE | Admitting: INTERNAL MEDICINE
Payer: MEDICARE

## 2025-03-13 VITALS
TEMPERATURE: 97.9 F | SYSTOLIC BLOOD PRESSURE: 122 MMHG | DIASTOLIC BLOOD PRESSURE: 80 MMHG | HEART RATE: 84 BPM | OXYGEN SATURATION: 99 % | RESPIRATION RATE: 18 BRPM

## 2025-03-13 DIAGNOSIS — K56.7 ILEUS, POSTOPERATIVE (HCC): Primary | ICD-10-CM

## 2025-03-13 DIAGNOSIS — K91.89 ILEUS, POSTOPERATIVE (HCC): Primary | ICD-10-CM

## 2025-03-13 PROCEDURE — 6360000002 HC RX W HCPCS: Performed by: INTERNAL MEDICINE

## 2025-03-13 PROCEDURE — 96374 THER/PROPH/DIAG INJ IV PUSH: CPT

## 2025-03-13 PROCEDURE — 2500000003 HC RX 250 WO HCPCS: Performed by: INTERNAL MEDICINE

## 2025-03-13 RX ORDER — SODIUM CHLORIDE 0.9 % (FLUSH) 0.9 %
5-40 SYRINGE (ML) INJECTION PRN
Status: CANCELLED | OUTPATIENT
Start: 2025-03-14

## 2025-03-13 RX ADMIN — CEFTRIAXONE SODIUM 2000 MG: 2 INJECTION, POWDER, FOR SOLUTION INTRAMUSCULAR; INTRAVENOUS at 13:04

## 2025-03-14 ENCOUNTER — HOSPITAL ENCOUNTER (OUTPATIENT)
Age: 65
Discharge: HOME | End: 2025-03-14
Payer: MEDICARE

## 2025-03-14 ENCOUNTER — HOSPITAL ENCOUNTER (OUTPATIENT)
Dept: OBSERVATION | Age: 65
Setting detail: OBSERVATION
Discharge: HOME | End: 2025-03-14
Attending: INTERNAL MEDICINE | Admitting: INTERNAL MEDICINE
Payer: MEDICARE

## 2025-03-14 ENCOUNTER — OFFICE VISIT (OUTPATIENT)
Dept: UROLOGY | Age: 65
End: 2025-03-14
Payer: MEDICARE

## 2025-03-14 ENCOUNTER — CARE COORDINATION (OUTPATIENT)
Dept: CARE COORDINATION | Age: 65
End: 2025-03-14

## 2025-03-14 VITALS
WEIGHT: 180 LBS | HEART RATE: 102 BPM | DIASTOLIC BLOOD PRESSURE: 82 MMHG | SYSTOLIC BLOOD PRESSURE: 128 MMHG | BODY MASS INDEX: 29.95 KG/M2

## 2025-03-14 VITALS
SYSTOLIC BLOOD PRESSURE: 131 MMHG | RESPIRATION RATE: 16 BRPM | TEMPERATURE: 98.1 F | HEART RATE: 88 BPM | DIASTOLIC BLOOD PRESSURE: 81 MMHG

## 2025-03-14 DIAGNOSIS — A49.8 KLEBSIELLA INFECTION: ICD-10-CM

## 2025-03-14 DIAGNOSIS — R33.9 URINARY RETENTION: Primary | ICD-10-CM

## 2025-03-14 DIAGNOSIS — K91.89 ILEUS, POSTOPERATIVE (HCC): Primary | ICD-10-CM

## 2025-03-14 DIAGNOSIS — N41.0 ACUTE PROSTATITIS: ICD-10-CM

## 2025-03-14 DIAGNOSIS — K35.33: ICD-10-CM

## 2025-03-14 DIAGNOSIS — K56.7 ILEUS, POSTOPERATIVE (HCC): Primary | ICD-10-CM

## 2025-03-14 LAB
ALBUMIN SERPL-MCNC: 4.2 G/DL (ref 3.5–5.2)
ALBUMIN/GLOB SERPL: 1.4 {RATIO} (ref 1–2.5)
ALP SERPL-CCNC: 74 U/L (ref 40–129)
ALT SERPL-CCNC: 38 U/L (ref 10–50)
AST SERPL-CCNC: 26 U/L (ref 10–50)
BASOPHILS # BLD: 0.04 K/UL (ref 0–0.2)
BASOPHILS NFR BLD: 1 % (ref 0–2)
BILIRUB DIRECT SERPL-MCNC: 0.1 MG/DL (ref 0–0.2)
BILIRUB INDIRECT SERPL-MCNC: 0.1 MG/DL (ref 0–1)
BILIRUB SERPL-MCNC: 0.2 MG/DL (ref 0–1.2)
CREAT SERPL-MCNC: 0.8 MG/DL (ref 0.7–1.2)
CRP SERPL HS-MCNC: <3 MG/L (ref 0–5)
EOSINOPHIL # BLD: 0.54 K/UL (ref 0–0.44)
EOSINOPHILS RELATIVE PERCENT: 11 % (ref 1–4)
ERYTHROCYTE [DISTWIDTH] IN BLOOD BY AUTOMATED COUNT: 16.1 % (ref 11.8–14.4)
GFR, ESTIMATED: >90 ML/MIN/1.73M2
GLOBULIN SER CALC-MCNC: 3 G/DL
HCT VFR BLD AUTO: 40.5 % (ref 40.7–50.3)
HGB BLD-MCNC: 12.3 G/DL (ref 13–17)
IMM GRANULOCYTES # BLD AUTO: 0.03 K/UL (ref 0–0.3)
IMM GRANULOCYTES NFR BLD: 1 %
LYMPHOCYTES NFR BLD: 1.65 K/UL (ref 1.1–3.7)
LYMPHOCYTES RELATIVE PERCENT: 34 % (ref 24–43)
MCH RBC QN AUTO: 24.6 PG (ref 25.2–33.5)
MCHC RBC AUTO-ENTMCNC: 30.4 G/DL (ref 28.4–34.8)
MCV RBC AUTO: 81.2 FL (ref 82.6–102.9)
MONOCYTES NFR BLD: 0.36 K/UL (ref 0.1–1.2)
MONOCYTES NFR BLD: 7 % (ref 3–12)
NEUTROPHILS NFR BLD: 46 % (ref 36–65)
NEUTS SEG NFR BLD: 2.31 K/UL (ref 1.5–8.1)
NRBC BLD-RTO: 0 PER 100 WBC
PLATELET # BLD AUTO: 307 K/UL (ref 138–453)
PMV BLD AUTO: 9.2 FL (ref 8.1–13.5)
PROT SERPL-MCNC: 7.2 G/DL (ref 6.6–8.7)
RBC # BLD AUTO: 4.99 M/UL (ref 4.21–5.77)
RBC # BLD: ABNORMAL 10*6/UL
WBC OTHER # BLD: 4.9 K/UL (ref 3.5–11.3)

## 2025-03-14 PROCEDURE — 86140 C-REACTIVE PROTEIN: CPT

## 2025-03-14 PROCEDURE — 6360000002 HC RX W HCPCS: Performed by: INTERNAL MEDICINE

## 2025-03-14 PROCEDURE — 2500000003 HC RX 250 WO HCPCS: Performed by: INTERNAL MEDICINE

## 2025-03-14 PROCEDURE — 99024 POSTOP FOLLOW-UP VISIT: CPT | Performed by: UROLOGY

## 2025-03-14 PROCEDURE — 36415 COLL VENOUS BLD VENIPUNCTURE: CPT

## 2025-03-14 PROCEDURE — 51798 US URINE CAPACITY MEASURE: CPT | Performed by: UROLOGY

## 2025-03-14 PROCEDURE — 80076 HEPATIC FUNCTION PANEL: CPT

## 2025-03-14 PROCEDURE — 99213 OFFICE O/P EST LOW 20 MIN: CPT | Performed by: UROLOGY

## 2025-03-14 PROCEDURE — 85025 COMPLETE CBC W/AUTO DIFF WBC: CPT

## 2025-03-14 PROCEDURE — 96374 THER/PROPH/DIAG INJ IV PUSH: CPT

## 2025-03-14 PROCEDURE — 82565 ASSAY OF CREATININE: CPT

## 2025-03-14 RX ORDER — SODIUM CHLORIDE 0.9 % (FLUSH) 0.9 %
5-40 SYRINGE (ML) INJECTION PRN
Status: CANCELLED | OUTPATIENT
Start: 2025-03-15

## 2025-03-14 RX ORDER — FINASTERIDE 5 MG/1
5 TABLET, FILM COATED ORAL DAILY
Qty: 90 TABLET | Refills: 3 | Status: SHIPPED | OUTPATIENT
Start: 2025-03-14

## 2025-03-14 RX ADMIN — CEFTRIAXONE SODIUM 2000 MG: 2 INJECTION, POWDER, FOR SOLUTION INTRAMUSCULAR; INTRAVENOUS at 13:03

## 2025-03-14 NOTE — CARE COORDINATION
Nutrition Care Coordinator Follow-Up visit:    Food Recall:did not provide    Activity Level:  Sedentary:X  Lightly Active:  Moderately Active:  Very Active:    Adult BMI:  Underweight (below 18.5)  Normal Weight (18.5-24.9)  Overweight (25-29.9)X  Obese (30-39.9)  Morbidly Obese (>40)    Plan:  Plan was established with patient:  Increase dietary fiber by consuming whole grains, fruits and vegetables:  Limit dietary cholesterol to >200mg/day:  Increase water intake:  Avoid added sugar:  Avoid sweetened beverages:  Choose lean meats:  Increase protein intake: X    Monitoring:  Will monitor weight:  Will monitor adherence to meal plan:X  Will monitor adherence to exercise plan:  Will monitor HGA1c:    Handouts Provided :  Low Carb snacking:  Carb counting /individual meal plan:  Portion Control:  Food Labels:  Physical Activity:  Low Fat/Cholesterol:  Hypo/Hyperglycemia:  Calorie Controlled Meal Plan:  Ileus and diet: X    Goals:  Patient goals set:  1.Reviewed- Drinking  plenty of water and other fluids throughout the day. Encouraged to Chew food well and eat slowly  2.Reviewed using moist cooking methods like stewing, poaching, or roasting,. Pureed foods may be easier to digest  3. Reviewed Avoiding tough or stringy foods like celery and gristly meat. Encouraged Cooking vegetables well and remove skins, seeds, and stems  4. Encouraged low fiber foods- Choose white breads and cereals instead of whole grains. Eat low-fiber fruits and vegetables like bananas, melons, and applesauce.  5. Reviewed increased protein needs due to surgical wound- eggs, cottage cheese, pureed meats. We discussed using a high protein nutrition supplement daily- will send patient Ensure coupons.   Spoke with patient briefly who relays he did get nutrition information but can't talk now states he needs to get to a doctor's appointment. Did not get a chance to review goals with patient he abruptly ended the call. Will attempt to reach

## 2025-03-14 NOTE — PROGRESS NOTES
Dr. Sunny Crews Jr, MD MD  [facility] Consultation / New Patient Visit    Patient:  Ivanna Khoury  YOB: 1960  Date: 3/14/2025  Consult requested from Lisette Strong MD     HISTORY OF PRESENT ILLNESS:   The patient is a 64 y.o. male who presents today for follow-up for the following problem(s): BPH with urinary retention  Overall the problem(s) : are worsening.  Associated Symptoms: No dysuria, gross hematuria.   Pain Severity: Pain Score:   0 - No pain    Today visit:   3/14/25  BPH with Urinary retention s/p simple prostatectomy with prostate abscess.       Summary of old records:   (Patient's old records, notes and chart reviewed and summarized above.)    Last several PSA's:  No results found for: \"PSA\"    Last total testosterone:  No results found for: \"TESTOSTERONE\"    Urinalysis today:  No results found for this visit on 03/14/25.      Last BUN and creatinine:  Lab Results   Component Value Date    BUN 21 03/07/2025     Lab Results   Component Value Date    CREATININE 0.8 03/14/2025       Imaging Reviewed during this Office Visit:   (results were independently reviewed by physician and radiology report verified)    PAST MEDICAL, FAMILY AND SOCIAL HISTORY:  Past Medical History:   Diagnosis Date    Acute epididymitis     COVID 2020    no hospitalization    Enlarged prostate     Tovar catheter in place 02/05/2025    Hyperlipidemia     Hypertension     Lung nodules     not changed in 5 years-no longer has to visit with pulmonology    MRSA (methicillin resistant staph aureus) culture positive     states resolved    [facility]    Under care of service provider     Dr Sunny Crews / last visit Dec 2024    Urinary retention     UTI (urinary tract infection)     Sepsis 12/2024     Past Surgical History:   Procedure Laterality Date    CT ABSCESS DRAINAGE SOFT TISSUE  2/21/2025    CT ABSCESS DRAINAGE SOFT

## 2025-03-15 ENCOUNTER — HOSPITAL ENCOUNTER (OUTPATIENT)
Dept: OBSERVATION | Age: 65
Discharge: HOME | End: 2025-03-15
Attending: INTERNAL MEDICINE | Admitting: INTERNAL MEDICINE
Payer: MEDICARE

## 2025-03-15 VITALS
RESPIRATION RATE: 18 BRPM | TEMPERATURE: 98.2 F | DIASTOLIC BLOOD PRESSURE: 73 MMHG | OXYGEN SATURATION: 100 % | HEART RATE: 84 BPM | SYSTOLIC BLOOD PRESSURE: 126 MMHG

## 2025-03-15 DIAGNOSIS — K91.89 ILEUS, POSTOPERATIVE (HCC): Primary | ICD-10-CM

## 2025-03-15 DIAGNOSIS — K56.7 ILEUS, POSTOPERATIVE (HCC): Primary | ICD-10-CM

## 2025-03-15 PROCEDURE — 96374 THER/PROPH/DIAG INJ IV PUSH: CPT

## 2025-03-15 PROCEDURE — 96365 THER/PROPH/DIAG IV INF INIT: CPT

## 2025-03-15 PROCEDURE — 6360000002 HC RX W HCPCS: Performed by: INTERNAL MEDICINE

## 2025-03-15 PROCEDURE — 2500000003 HC RX 250 WO HCPCS: Performed by: INTERNAL MEDICINE

## 2025-03-15 RX ORDER — SODIUM CHLORIDE 0.9 % (FLUSH) 0.9 %
5-40 SYRINGE (ML) INJECTION PRN
OUTPATIENT
Start: 2025-03-16

## 2025-03-15 RX ADMIN — CEFTRIAXONE SODIUM 2000 MG: 2 INJECTION, POWDER, FOR SOLUTION INTRAMUSCULAR; INTRAVENOUS at 12:46

## 2025-03-16 ENCOUNTER — HOSPITAL ENCOUNTER (OUTPATIENT)
Dept: OBSERVATION | Age: 65
Setting detail: OBSERVATION
Discharge: HOME | End: 2025-03-16
Attending: INTERNAL MEDICINE | Admitting: INTERNAL MEDICINE
Payer: MEDICARE

## 2025-03-16 DIAGNOSIS — K56.7 ILEUS, POSTOPERATIVE (HCC): Primary | ICD-10-CM

## 2025-03-16 DIAGNOSIS — K91.89 ILEUS, POSTOPERATIVE (HCC): Primary | ICD-10-CM

## 2025-03-16 PROCEDURE — 96374 THER/PROPH/DIAG INJ IV PUSH: CPT

## 2025-03-16 PROCEDURE — 2500000003 HC RX 250 WO HCPCS: Performed by: INTERNAL MEDICINE

## 2025-03-16 PROCEDURE — 6360000002 HC RX W HCPCS: Performed by: INTERNAL MEDICINE

## 2025-03-16 RX ORDER — SODIUM CHLORIDE 0.9 % (FLUSH) 0.9 %
5-40 SYRINGE (ML) INJECTION PRN
Status: CANCELLED | OUTPATIENT
Start: 2025-03-17

## 2025-03-16 RX ADMIN — CEFTRIAXONE SODIUM 2000 MG: 2 INJECTION, POWDER, FOR SOLUTION INTRAMUSCULAR; INTRAVENOUS at 13:10

## 2025-03-17 ENCOUNTER — HOSPITAL ENCOUNTER (OUTPATIENT)
Dept: OBSERVATION | Age: 65
Discharge: HOME | End: 2025-03-17
Attending: INTERNAL MEDICINE | Admitting: INTERNAL MEDICINE
Payer: MEDICARE

## 2025-03-17 ENCOUNTER — RESULTS FOLLOW-UP (OUTPATIENT)
Dept: INPATIENT UNIT | Age: 65
End: 2025-03-17

## 2025-03-17 VITALS
OXYGEN SATURATION: 100 % | RESPIRATION RATE: 18 BRPM | HEART RATE: 83 BPM | SYSTOLIC BLOOD PRESSURE: 130 MMHG | DIASTOLIC BLOOD PRESSURE: 72 MMHG | TEMPERATURE: 98.1 F

## 2025-03-17 DIAGNOSIS — K91.89 ILEUS, POSTOPERATIVE (HCC): Primary | ICD-10-CM

## 2025-03-17 DIAGNOSIS — K56.7 ILEUS, POSTOPERATIVE (HCC): Primary | ICD-10-CM

## 2025-03-17 PROCEDURE — 6360000002 HC RX W HCPCS: Performed by: INTERNAL MEDICINE

## 2025-03-17 PROCEDURE — 96374 THER/PROPH/DIAG INJ IV PUSH: CPT

## 2025-03-17 PROCEDURE — 2500000003 HC RX 250 WO HCPCS: Performed by: INTERNAL MEDICINE

## 2025-03-17 RX ORDER — SODIUM CHLORIDE 0.9 % (FLUSH) 0.9 %
5-40 SYRINGE (ML) INJECTION PRN
Status: CANCELLED | OUTPATIENT
Start: 2025-03-18

## 2025-03-17 RX ADMIN — CEFTRIAXONE SODIUM 2000 MG: 2 INJECTION, POWDER, FOR SOLUTION INTRAMUSCULAR; INTRAVENOUS at 13:09

## 2025-03-18 ENCOUNTER — HOSPITAL ENCOUNTER (OUTPATIENT)
Dept: OBSERVATION | Age: 65
Discharge: HOME | End: 2025-03-18
Attending: INTERNAL MEDICINE | Admitting: INTERNAL MEDICINE
Payer: MEDICARE

## 2025-03-18 DIAGNOSIS — K56.7 ILEUS, POSTOPERATIVE (HCC): Primary | ICD-10-CM

## 2025-03-18 DIAGNOSIS — K91.89 ILEUS, POSTOPERATIVE (HCC): Primary | ICD-10-CM

## 2025-03-18 PROCEDURE — 2500000003 HC RX 250 WO HCPCS: Performed by: INTERNAL MEDICINE

## 2025-03-18 PROCEDURE — 6360000002 HC RX W HCPCS: Performed by: INTERNAL MEDICINE

## 2025-03-18 PROCEDURE — 96374 THER/PROPH/DIAG INJ IV PUSH: CPT

## 2025-03-18 RX ORDER — SODIUM CHLORIDE 0.9 % (FLUSH) 0.9 %
5-40 SYRINGE (ML) INJECTION PRN
Status: CANCELLED | OUTPATIENT
Start: 2025-03-19

## 2025-03-18 RX ADMIN — CEFTRIAXONE SODIUM 2000 MG: 2 INJECTION, POWDER, FOR SOLUTION INTRAMUSCULAR; INTRAVENOUS at 13:06

## 2025-03-19 ENCOUNTER — HOSPITAL ENCOUNTER (OUTPATIENT)
Dept: OBSERVATION | Age: 65
Discharge: HOME | End: 2025-03-19
Attending: INTERNAL MEDICINE | Admitting: INTERNAL MEDICINE
Payer: MEDICARE

## 2025-03-19 VITALS
DIASTOLIC BLOOD PRESSURE: 87 MMHG | SYSTOLIC BLOOD PRESSURE: 129 MMHG | HEART RATE: 86 BPM | RESPIRATION RATE: 16 BRPM | TEMPERATURE: 97.7 F | OXYGEN SATURATION: 98 %

## 2025-03-19 DIAGNOSIS — K56.7 ILEUS, POSTOPERATIVE (HCC): Primary | ICD-10-CM

## 2025-03-19 DIAGNOSIS — K91.89 ILEUS, POSTOPERATIVE (HCC): Primary | ICD-10-CM

## 2025-03-19 PROCEDURE — 6360000002 HC RX W HCPCS: Performed by: INTERNAL MEDICINE

## 2025-03-19 PROCEDURE — 96374 THER/PROPH/DIAG INJ IV PUSH: CPT

## 2025-03-19 PROCEDURE — 2500000003 HC RX 250 WO HCPCS: Performed by: INTERNAL MEDICINE

## 2025-03-19 RX ORDER — SODIUM CHLORIDE 0.9 % (FLUSH) 0.9 %
5-40 SYRINGE (ML) INJECTION PRN
Status: CANCELLED | OUTPATIENT
Start: 2025-03-20

## 2025-03-19 RX ADMIN — CEFTRIAXONE SODIUM 2000 MG: 2 INJECTION, POWDER, FOR SOLUTION INTRAMUSCULAR; INTRAVENOUS at 13:22

## 2025-03-20 ENCOUNTER — HOSPITAL ENCOUNTER (OUTPATIENT)
Dept: OBSERVATION | Age: 65
Discharge: HOME | End: 2025-03-20
Attending: INTERNAL MEDICINE | Admitting: INTERNAL MEDICINE
Payer: MEDICARE

## 2025-03-20 VITALS
RESPIRATION RATE: 16 BRPM | OXYGEN SATURATION: 100 % | TEMPERATURE: 97.7 F | SYSTOLIC BLOOD PRESSURE: 123 MMHG | DIASTOLIC BLOOD PRESSURE: 86 MMHG | HEART RATE: 86 BPM

## 2025-03-20 DIAGNOSIS — K91.89 ILEUS, POSTOPERATIVE (HCC): Primary | ICD-10-CM

## 2025-03-20 DIAGNOSIS — K56.7 ILEUS, POSTOPERATIVE (HCC): Primary | ICD-10-CM

## 2025-03-20 PROCEDURE — 6360000002 HC RX W HCPCS: Performed by: INTERNAL MEDICINE

## 2025-03-20 PROCEDURE — 96374 THER/PROPH/DIAG INJ IV PUSH: CPT

## 2025-03-20 PROCEDURE — 2500000003 HC RX 250 WO HCPCS: Performed by: INTERNAL MEDICINE

## 2025-03-20 RX ORDER — SODIUM CHLORIDE 0.9 % (FLUSH) 0.9 %
5-40 SYRINGE (ML) INJECTION PRN
Status: CANCELLED | OUTPATIENT
Start: 2025-03-21

## 2025-03-20 RX ADMIN — CEFTRIAXONE SODIUM 2000 MG: 2 INJECTION, POWDER, FOR SOLUTION INTRAMUSCULAR; INTRAVENOUS at 13:10

## 2025-03-21 ENCOUNTER — HOSPITAL ENCOUNTER (OUTPATIENT)
Age: 65
Discharge: HOME | End: 2025-03-21
Attending: INTERNAL MEDICINE
Payer: MEDICARE

## 2025-03-21 ENCOUNTER — HOSPITAL ENCOUNTER (OUTPATIENT)
Dept: OBSERVATION | Age: 65
Discharge: HOME | End: 2025-03-21
Attending: INTERNAL MEDICINE | Admitting: INTERNAL MEDICINE
Payer: MEDICARE

## 2025-03-21 VITALS
SYSTOLIC BLOOD PRESSURE: 134 MMHG | TEMPERATURE: 97.5 F | RESPIRATION RATE: 16 BRPM | OXYGEN SATURATION: 99 % | HEART RATE: 76 BPM | DIASTOLIC BLOOD PRESSURE: 94 MMHG

## 2025-03-21 DIAGNOSIS — K91.89 ILEUS, POSTOPERATIVE (HCC): Primary | ICD-10-CM

## 2025-03-21 DIAGNOSIS — K56.7 ILEUS, POSTOPERATIVE (HCC): Primary | ICD-10-CM

## 2025-03-21 DIAGNOSIS — K35.33: ICD-10-CM

## 2025-03-21 DIAGNOSIS — N41.0 ACUTE PROSTATITIS: ICD-10-CM

## 2025-03-21 DIAGNOSIS — A49.8 KLEBSIELLA INFECTION: ICD-10-CM

## 2025-03-21 LAB
ALBUMIN SERPL-MCNC: 4.3 G/DL (ref 3.5–5.2)
ALBUMIN/GLOB SERPL: 1.5 {RATIO} (ref 1–2.5)
ALP SERPL-CCNC: 75 U/L (ref 40–129)
ALT SERPL-CCNC: 25 U/L (ref 10–50)
ANION GAP SERPL CALCULATED.3IONS-SCNC: 11 MMOL/L (ref 9–16)
AST SERPL-CCNC: 19 U/L (ref 10–50)
BASOPHILS # BLD: 0.03 K/UL (ref 0–0.2)
BASOPHILS # BLD: <0.03 K/UL (ref 0–0.2)
BASOPHILS NFR BLD: 0 % (ref 0–2)
BASOPHILS NFR BLD: 1 % (ref 0–2)
BILIRUB DIRECT SERPL-MCNC: <0.1 MG/DL (ref 0–0.2)
BILIRUB INDIRECT SERPL-MCNC: NORMAL MG/DL (ref 0–1)
BILIRUB SERPL-MCNC: 0.2 MG/DL (ref 0–1.2)
BUN SERPL-MCNC: 24 MG/DL (ref 8–23)
CALCIUM SERPL-MCNC: 9.5 MG/DL (ref 8.6–10.4)
CHLORIDE SERPL-SCNC: 102 MMOL/L (ref 98–107)
CO2 SERPL-SCNC: 25 MMOL/L (ref 20–31)
CREAT SERPL-MCNC: 0.7 MG/DL (ref 0.7–1.2)
CREAT SERPL-MCNC: 0.8 MG/DL (ref 0.7–1.2)
CRP SERPL HS-MCNC: <3 MG/L (ref 0–5)
EOSINOPHIL # BLD: 0.4 K/UL (ref 0–0.44)
EOSINOPHIL # BLD: 0.42 K/UL (ref 0–0.44)
EOSINOPHILS RELATIVE PERCENT: 8 % (ref 1–4)
EOSINOPHILS RELATIVE PERCENT: 8 % (ref 1–4)
ERYTHROCYTE [DISTWIDTH] IN BLOOD BY AUTOMATED COUNT: 15.9 % (ref 11.8–14.4)
ERYTHROCYTE [DISTWIDTH] IN BLOOD BY AUTOMATED COUNT: 15.9 % (ref 11.8–14.4)
GFR, ESTIMATED: >90 ML/MIN/1.73M2
GFR, ESTIMATED: >90 ML/MIN/1.73M2
GLOBULIN SER CALC-MCNC: 2.9 G/DL
GLUCOSE SERPL-MCNC: 106 MG/DL (ref 74–99)
HCT VFR BLD AUTO: 41.8 % (ref 40.7–50.3)
HCT VFR BLD AUTO: 41.9 % (ref 40.7–50.3)
HGB BLD-MCNC: 12.5 G/DL (ref 13–17)
HGB BLD-MCNC: 12.6 G/DL (ref 13–17)
IMM GRANULOCYTES # BLD AUTO: <0.03 K/UL (ref 0–0.3)
IMM GRANULOCYTES # BLD AUTO: <0.03 K/UL (ref 0–0.3)
IMM GRANULOCYTES NFR BLD: 0 %
IMM GRANULOCYTES NFR BLD: 0 %
LYMPHOCYTES NFR BLD: 1.59 K/UL (ref 1.1–3.7)
LYMPHOCYTES NFR BLD: 1.59 K/UL (ref 1.1–3.7)
LYMPHOCYTES RELATIVE PERCENT: 31 % (ref 24–43)
LYMPHOCYTES RELATIVE PERCENT: 32 % (ref 24–43)
MCH RBC QN AUTO: 24.3 PG (ref 25.2–33.5)
MCH RBC QN AUTO: 24.4 PG (ref 25.2–33.5)
MCHC RBC AUTO-ENTMCNC: 29.8 G/DL (ref 28.4–34.8)
MCHC RBC AUTO-ENTMCNC: 30.1 G/DL (ref 28.4–34.8)
MCV RBC AUTO: 81 FL (ref 82.6–102.9)
MCV RBC AUTO: 81.4 FL (ref 82.6–102.9)
MONOCYTES NFR BLD: 0.34 K/UL (ref 0.1–1.2)
MONOCYTES NFR BLD: 0.37 K/UL (ref 0.1–1.2)
MONOCYTES NFR BLD: 7 % (ref 3–12)
MONOCYTES NFR BLD: 7 % (ref 3–12)
NEUTROPHILS NFR BLD: 53 % (ref 36–65)
NEUTROPHILS NFR BLD: 53 % (ref 36–65)
NEUTS SEG NFR BLD: 2.64 K/UL (ref 1.5–8.1)
NEUTS SEG NFR BLD: 2.66 K/UL (ref 1.5–8.1)
NRBC BLD-RTO: 0 PER 100 WBC
NRBC BLD-RTO: 0 PER 100 WBC
PLATELET # BLD AUTO: 291 K/UL (ref 138–453)
PLATELET # BLD AUTO: 292 K/UL (ref 138–453)
PMV BLD AUTO: 9.2 FL (ref 8.1–13.5)
PMV BLD AUTO: 9.3 FL (ref 8.1–13.5)
POTASSIUM SERPL-SCNC: 4.3 MMOL/L (ref 3.7–5.3)
PROT SERPL-MCNC: 7.2 G/DL (ref 6.6–8.7)
RBC # BLD AUTO: 5.15 M/UL (ref 4.21–5.77)
RBC # BLD AUTO: 5.16 M/UL (ref 4.21–5.77)
RBC # BLD: ABNORMAL 10*6/UL
RBC # BLD: ABNORMAL 10*6/UL
SODIUM SERPL-SCNC: 138 MMOL/L (ref 136–145)
WBC OTHER # BLD: 5 K/UL (ref 3.5–11.3)
WBC OTHER # BLD: 5.1 K/UL (ref 3.5–11.3)

## 2025-03-21 PROCEDURE — 2500000003 HC RX 250 WO HCPCS: Performed by: INTERNAL MEDICINE

## 2025-03-21 PROCEDURE — 80076 HEPATIC FUNCTION PANEL: CPT

## 2025-03-21 PROCEDURE — 36415 COLL VENOUS BLD VENIPUNCTURE: CPT

## 2025-03-21 PROCEDURE — 82565 ASSAY OF CREATININE: CPT

## 2025-03-21 PROCEDURE — 6360000002 HC RX W HCPCS: Performed by: INTERNAL MEDICINE

## 2025-03-21 PROCEDURE — 85025 COMPLETE CBC W/AUTO DIFF WBC: CPT

## 2025-03-21 PROCEDURE — 86140 C-REACTIVE PROTEIN: CPT

## 2025-03-21 PROCEDURE — 80048 BASIC METABOLIC PNL TOTAL CA: CPT

## 2025-03-21 PROCEDURE — 96374 THER/PROPH/DIAG INJ IV PUSH: CPT

## 2025-03-21 RX ORDER — SODIUM CHLORIDE 0.9 % (FLUSH) 0.9 %
5-40 SYRINGE (ML) INJECTION PRN
Status: CANCELLED | OUTPATIENT
Start: 2025-03-22

## 2025-03-21 RX ADMIN — CEFTRIAXONE SODIUM 2000 MG: 2 INJECTION, POWDER, FOR SOLUTION INTRAMUSCULAR; INTRAVENOUS at 12:58

## 2025-03-22 ENCOUNTER — HOSPITAL ENCOUNTER (OUTPATIENT)
Dept: OBSERVATION | Age: 65
Discharge: HOME | End: 2025-03-22
Attending: INTERNAL MEDICINE | Admitting: INTERNAL MEDICINE
Payer: MEDICARE

## 2025-03-22 VITALS
TEMPERATURE: 97.3 F | SYSTOLIC BLOOD PRESSURE: 143 MMHG | OXYGEN SATURATION: 99 % | HEART RATE: 75 BPM | DIASTOLIC BLOOD PRESSURE: 88 MMHG | RESPIRATION RATE: 18 BRPM

## 2025-03-22 DIAGNOSIS — K56.7 ILEUS, POSTOPERATIVE (HCC): Primary | ICD-10-CM

## 2025-03-22 DIAGNOSIS — K91.89 ILEUS, POSTOPERATIVE (HCC): Primary | ICD-10-CM

## 2025-03-22 PROCEDURE — 6360000002 HC RX W HCPCS: Performed by: INTERNAL MEDICINE

## 2025-03-22 PROCEDURE — 96374 THER/PROPH/DIAG INJ IV PUSH: CPT

## 2025-03-22 PROCEDURE — 2500000003 HC RX 250 WO HCPCS: Performed by: INTERNAL MEDICINE

## 2025-03-22 RX ORDER — SODIUM CHLORIDE 0.9 % (FLUSH) 0.9 %
5-40 SYRINGE (ML) INJECTION PRN
OUTPATIENT
Start: 2025-03-23

## 2025-03-22 RX ADMIN — CEFTRIAXONE SODIUM 2000 MG: 2 INJECTION, POWDER, FOR SOLUTION INTRAMUSCULAR; INTRAVENOUS at 12:55

## 2025-03-23 ENCOUNTER — HOSPITAL ENCOUNTER (OUTPATIENT)
Dept: OBSERVATION | Age: 65
Setting detail: OBSERVATION
Discharge: HOME | End: 2025-03-23
Attending: INTERNAL MEDICINE | Admitting: INTERNAL MEDICINE
Payer: MEDICARE

## 2025-03-23 VITALS
OXYGEN SATURATION: 99 % | TEMPERATURE: 97.9 F | HEART RATE: 77 BPM | DIASTOLIC BLOOD PRESSURE: 89 MMHG | SYSTOLIC BLOOD PRESSURE: 129 MMHG | RESPIRATION RATE: 18 BRPM

## 2025-03-23 DIAGNOSIS — K91.89 ILEUS, POSTOPERATIVE (HCC): Primary | ICD-10-CM

## 2025-03-23 DIAGNOSIS — K56.7 ILEUS, POSTOPERATIVE (HCC): Primary | ICD-10-CM

## 2025-03-23 PROCEDURE — 2500000003 HC RX 250 WO HCPCS: Performed by: INTERNAL MEDICINE

## 2025-03-23 PROCEDURE — 96374 THER/PROPH/DIAG INJ IV PUSH: CPT

## 2025-03-23 PROCEDURE — 6360000002 HC RX W HCPCS: Performed by: INTERNAL MEDICINE

## 2025-03-23 RX ORDER — SODIUM CHLORIDE 0.9 % (FLUSH) 0.9 %
5-40 SYRINGE (ML) INJECTION PRN
Status: CANCELLED | OUTPATIENT
Start: 2025-03-24

## 2025-03-23 RX ADMIN — WATER 2000 MG: 1 INJECTION INTRAMUSCULAR; INTRAVENOUS; SUBCUTANEOUS at 13:12

## 2025-03-24 ENCOUNTER — HOSPITAL ENCOUNTER (OUTPATIENT)
Dept: OBSERVATION | Age: 65
Discharge: HOME | End: 2025-03-24
Attending: INTERNAL MEDICINE | Admitting: INTERNAL MEDICINE
Payer: MEDICARE

## 2025-03-24 VITALS — TEMPERATURE: 97.5 F | DIASTOLIC BLOOD PRESSURE: 76 MMHG | HEART RATE: 83 BPM | SYSTOLIC BLOOD PRESSURE: 125 MMHG

## 2025-03-24 DIAGNOSIS — R01.1 HEART MURMUR: ICD-10-CM

## 2025-03-24 DIAGNOSIS — G45.9 TIA (TRANSIENT ISCHEMIC ATTACK): Primary | ICD-10-CM

## 2025-03-24 DIAGNOSIS — K56.7 ILEUS, POSTOPERATIVE (HCC): Primary | ICD-10-CM

## 2025-03-24 DIAGNOSIS — K91.89 ILEUS, POSTOPERATIVE (HCC): Primary | ICD-10-CM

## 2025-03-24 PROCEDURE — 6360000002 HC RX W HCPCS: Performed by: INTERNAL MEDICINE

## 2025-03-24 PROCEDURE — 96374 THER/PROPH/DIAG INJ IV PUSH: CPT

## 2025-03-24 PROCEDURE — 2500000003 HC RX 250 WO HCPCS: Performed by: INTERNAL MEDICINE

## 2025-03-24 RX ORDER — SODIUM CHLORIDE 0.9 % (FLUSH) 0.9 %
5-40 SYRINGE (ML) INJECTION PRN
Status: CANCELLED | OUTPATIENT
Start: 2025-03-25

## 2025-03-24 RX ORDER — ASPIRIN 81 MG/1
81 TABLET, CHEWABLE ORAL DAILY
Qty: 90 TABLET | Refills: 1 | Status: SHIPPED | OUTPATIENT
Start: 2025-03-24

## 2025-03-24 RX ADMIN — WATER 2000 MG: 1 INJECTION INTRAMUSCULAR; INTRAVENOUS; SUBCUTANEOUS at 13:12

## 2025-03-24 NOTE — TELEPHONE ENCOUNTER
Request for   Requested Prescriptions     Pending Prescriptions Disp Refills    aspirin 81 MG chewable tablet 90 tablet 1     Sig: Take 1 tablet by mouth daily    .      Please review and e-scribe to pharmacy listed in chart if appropriate. Thank you.      Last Visit Date: 3/5/2025  Next Visit Date: Visit date not found    Future Appointments   Date Time Provider Department Center   3/24/2025  1:00 PM [facility] BED 1 [facility] [place]   3/25/2025  1:00 PM [facility] BED 1 [facility] [place]   3/26/2025  1:00 PM [facility] INF[facility] BED 1 [facility] [place]   3/27/2025  9:00 AM STV CT ROOM 1 [facility] CT SCAN [facility] Radiolog   3/27/2025  1:00 PM [facility] INFUSION BED 1 [facility] [place]   3/28/2025  1:00 PM [facility] INFUSION BED 1 [facility] [place]   3/29/2025  1:00 PM [facility] INFUSION BED 1 [facility] [place]   3/30/2025  1:00 PM [facility] INFUSION BED 1 [facility] [place]   3/31/2025  1:00 PM [facility] INFUSION BED 1 [facility] [place]   3/31/2025  3:45 PM Pricilla Tamayo MD INFT DISEASE [facility]   4/1/2025  1:00 PM [facility] INFUSION BED 1 [facility] [place]   4/2/2025  1:00 PM [facility] INFUSION BED 1 [facility] [place]   4/3/2025  1:00 PM [facility] INFUS[facility] BED 1 [facility] [place]   4/7/2025  4:00 PM Agnieszka Li PT [facility] SF PT [facility]   6/5/2025 12:30 PM Stefano Cole MD Neuro [facility] -   9/4/2025  1:30 PM Joe Galaviz MD Resp Spec [facility]   9/12/2025  3:00 PM Sunny Crews Jr., MD [facility] Urology [facility]       Health Maintenance   Topic Date Due    DTaP/Tdap/Td vaccine (1 - Tdap) Never done    Pneumococcal 50+ years Vaccine (1 of 1 - PCV) Never done    Respiratory Syncytial Virus (RSV) Pregnant or age 60 yrs+ (1 - Risk 60-74 years 1-dose series) Never done    Flu vaccine (1) 08/01/2024    Annual Wellness Visit (Medicare)  Never done    Shingles vaccine (1 of 2) 03/04/2026 (Originally 6/1/2009)    Lipids  04/26/2025    COVID-19 Vaccine (5 - 2024-25 season)

## 2025-03-25 ENCOUNTER — HOSPITAL ENCOUNTER (OUTPATIENT)
Dept: OBSERVATION | Age: 65
Discharge: HOME | End: 2025-03-25
Attending: INTERNAL MEDICINE | Admitting: INTERNAL MEDICINE
Payer: MEDICARE

## 2025-03-25 ENCOUNTER — TELEPHONE (OUTPATIENT)
Dept: INTERNAL MEDICINE | Age: 65
End: 2025-03-25

## 2025-03-25 VITALS
SYSTOLIC BLOOD PRESSURE: 135 MMHG | HEART RATE: 78 BPM | TEMPERATURE: 97.9 F | RESPIRATION RATE: 16 BRPM | DIASTOLIC BLOOD PRESSURE: 90 MMHG

## 2025-03-25 DIAGNOSIS — K56.7 ILEUS, POSTOPERATIVE (HCC): Primary | ICD-10-CM

## 2025-03-25 DIAGNOSIS — K91.89 ILEUS, POSTOPERATIVE (HCC): Primary | ICD-10-CM

## 2025-03-25 PROCEDURE — 6360000002 HC RX W HCPCS: Performed by: INTERNAL MEDICINE

## 2025-03-25 PROCEDURE — 96374 THER/PROPH/DIAG INJ IV PUSH: CPT

## 2025-03-25 PROCEDURE — 2500000003 HC RX 250 WO HCPCS: Performed by: INTERNAL MEDICINE

## 2025-03-25 RX ORDER — SODIUM CHLORIDE 0.9 % (FLUSH) 0.9 %
5-40 SYRINGE (ML) INJECTION PRN
Status: CANCELLED | OUTPATIENT
Start: 2025-03-26

## 2025-03-25 RX ADMIN — WATER 2000 MG: 1 INJECTION INTRAMUSCULAR; INTRAVENOUS; SUBCUTANEOUS at 13:08

## 2025-03-25 NOTE — TELEPHONE ENCOUNTER
Patient calling stating that his insurance will not cover the Aspirin anymore and he is wanting a blood thinner that is equivalent to the aspirin.  Patient states that he can not afford to buy them aspirin OTC.

## 2025-03-26 ENCOUNTER — HOSPITAL ENCOUNTER (OUTPATIENT)
Dept: OBSERVATION | Age: 65
Discharge: HOME | End: 2025-03-26
Attending: INTERNAL MEDICINE | Admitting: INTERNAL MEDICINE
Payer: MEDICARE

## 2025-03-26 VITALS
OXYGEN SATURATION: 100 % | TEMPERATURE: 97.3 F | RESPIRATION RATE: 17 BRPM | HEART RATE: 83 BPM | DIASTOLIC BLOOD PRESSURE: 81 MMHG | SYSTOLIC BLOOD PRESSURE: 120 MMHG

## 2025-03-26 DIAGNOSIS — K91.89 ILEUS, POSTOPERATIVE (HCC): Primary | ICD-10-CM

## 2025-03-26 DIAGNOSIS — K56.7 ILEUS, POSTOPERATIVE (HCC): Primary | ICD-10-CM

## 2025-03-26 PROCEDURE — 6360000002 HC RX W HCPCS: Performed by: INTERNAL MEDICINE

## 2025-03-26 PROCEDURE — 2500000003 HC RX 250 WO HCPCS: Performed by: INTERNAL MEDICINE

## 2025-03-26 PROCEDURE — 96374 THER/PROPH/DIAG INJ IV PUSH: CPT

## 2025-03-26 RX ORDER — SODIUM CHLORIDE 0.9 % (FLUSH) 0.9 %
5-40 SYRINGE (ML) INJECTION PRN
Status: CANCELLED | OUTPATIENT
Start: 2025-03-27

## 2025-03-26 RX ADMIN — CEFTRIAXONE SODIUM 2000 MG: 2 INJECTION, POWDER, FOR SOLUTION INTRAMUSCULAR; INTRAVENOUS at 13:04

## 2025-03-27 ENCOUNTER — HOSPITAL ENCOUNTER (OUTPATIENT)
Dept: CT IMAGING | Age: 65
Discharge: HOME | End: 2025-03-29
Attending: INTERNAL MEDICINE
Payer: MEDICARE

## 2025-03-27 ENCOUNTER — HOSPITAL ENCOUNTER (OUTPATIENT)
Dept: OBSERVATION | Age: 65
Setting detail: OBSERVATION
Discharge: HOME | End: 2025-03-27
Attending: INTERNAL MEDICINE | Admitting: INTERNAL MEDICINE
Payer: MEDICARE

## 2025-03-27 VITALS
DIASTOLIC BLOOD PRESSURE: 89 MMHG | RESPIRATION RATE: 18 BRPM | TEMPERATURE: 98.1 F | OXYGEN SATURATION: 100 % | SYSTOLIC BLOOD PRESSURE: 133 MMHG | HEART RATE: 92 BPM

## 2025-03-27 DIAGNOSIS — N39.0 UTI DUE TO KLEBSIELLA SPECIES: ICD-10-CM

## 2025-03-27 DIAGNOSIS — B96.89 UTI DUE TO KLEBSIELLA SPECIES: ICD-10-CM

## 2025-03-27 DIAGNOSIS — K56.7 ILEUS, POSTOPERATIVE (HCC): Primary | ICD-10-CM

## 2025-03-27 DIAGNOSIS — K91.89 ILEUS, POSTOPERATIVE (HCC): Primary | ICD-10-CM

## 2025-03-27 PROCEDURE — 2500000003 HC RX 250 WO HCPCS: Performed by: INTERNAL MEDICINE

## 2025-03-27 PROCEDURE — 74177 CT ABD & PELVIS W/CONTRAST: CPT

## 2025-03-27 PROCEDURE — 6360000004 HC RX CONTRAST MEDICATION: Performed by: INTERNAL MEDICINE

## 2025-03-27 PROCEDURE — 6360000002 HC RX W HCPCS: Performed by: INTERNAL MEDICINE

## 2025-03-27 PROCEDURE — 96374 THER/PROPH/DIAG INJ IV PUSH: CPT

## 2025-03-27 RX ORDER — IOPAMIDOL 755 MG/ML
75 INJECTION, SOLUTION INTRAVASCULAR
Status: COMPLETED | OUTPATIENT
Start: 2025-03-27 | End: 2025-03-27

## 2025-03-27 RX ORDER — SODIUM CHLORIDE 0.9 % (FLUSH) 0.9 %
5-40 SYRINGE (ML) INJECTION PRN
Status: CANCELLED | OUTPATIENT
Start: 2025-03-28

## 2025-03-27 RX ADMIN — WATER 2000 MG: 1 INJECTION INTRAMUSCULAR; INTRAVENOUS; SUBCUTANEOUS at 13:06

## 2025-03-27 RX ADMIN — IOPAMIDOL 75 ML: 755 INJECTION, SOLUTION INTRAVENOUS at 08:31

## 2025-03-28 ENCOUNTER — HOSPITAL ENCOUNTER (OUTPATIENT)
Age: 65
Discharge: HOME | End: 2025-03-28
Attending: INTERNAL MEDICINE
Payer: MEDICARE

## 2025-03-28 ENCOUNTER — HOSPITAL ENCOUNTER (OUTPATIENT)
Dept: OBSERVATION | Age: 65
Discharge: HOME | End: 2025-03-28
Attending: INTERNAL MEDICINE | Admitting: INTERNAL MEDICINE
Payer: MEDICARE

## 2025-03-28 VITALS
OXYGEN SATURATION: 98 % | DIASTOLIC BLOOD PRESSURE: 77 MMHG | TEMPERATURE: 97.9 F | RESPIRATION RATE: 18 BRPM | HEART RATE: 91 BPM | SYSTOLIC BLOOD PRESSURE: 125 MMHG

## 2025-03-28 DIAGNOSIS — K91.89 ILEUS, POSTOPERATIVE (HCC): Primary | ICD-10-CM

## 2025-03-28 DIAGNOSIS — K35.33: ICD-10-CM

## 2025-03-28 DIAGNOSIS — K56.7 ILEUS, POSTOPERATIVE (HCC): Primary | ICD-10-CM

## 2025-03-28 DIAGNOSIS — N41.0 ACUTE PROSTATITIS: ICD-10-CM

## 2025-03-28 DIAGNOSIS — A49.8 KLEBSIELLA INFECTION: ICD-10-CM

## 2025-03-28 LAB
ALBUMIN SERPL-MCNC: 4.1 G/DL (ref 3.5–5.2)
ALBUMIN/GLOB SERPL: 1.4 {RATIO} (ref 1–2.5)
ALP SERPL-CCNC: 65 U/L (ref 40–129)
ALT SERPL-CCNC: 28 U/L (ref 10–50)
AST SERPL-CCNC: 22 U/L (ref 10–50)
BASOPHILS # BLD: <0.03 K/UL (ref 0–0.2)
BASOPHILS NFR BLD: 0 % (ref 0–2)
BILIRUB DIRECT SERPL-MCNC: 0.1 MG/DL (ref 0–0.2)
BILIRUB INDIRECT SERPL-MCNC: 0.2 MG/DL (ref 0–1)
BILIRUB SERPL-MCNC: 0.3 MG/DL (ref 0–1.2)
CREAT SERPL-MCNC: 0.8 MG/DL (ref 0.7–1.2)
CRP SERPL HS-MCNC: <3 MG/L (ref 0–5)
EOSINOPHIL # BLD: 0.44 K/UL (ref 0–0.44)
EOSINOPHILS RELATIVE PERCENT: 10 % (ref 1–4)
ERYTHROCYTE [DISTWIDTH] IN BLOOD BY AUTOMATED COUNT: 15.6 % (ref 11.8–14.4)
GFR, ESTIMATED: >90 ML/MIN/1.73M2
GLOBULIN SER CALC-MCNC: 2.9 G/DL
HCT VFR BLD AUTO: 42.2 % (ref 40.7–50.3)
HGB BLD-MCNC: 12.4 G/DL (ref 13–17)
IMM GRANULOCYTES # BLD AUTO: <0.03 K/UL (ref 0–0.3)
IMM GRANULOCYTES NFR BLD: 0 %
LYMPHOCYTES NFR BLD: 1.66 K/UL (ref 1.1–3.7)
LYMPHOCYTES RELATIVE PERCENT: 36 % (ref 24–43)
MCH RBC QN AUTO: 24.5 PG (ref 25.2–33.5)
MCHC RBC AUTO-ENTMCNC: 29.4 G/DL (ref 28.4–34.8)
MCV RBC AUTO: 83.2 FL (ref 82.6–102.9)
MONOCYTES NFR BLD: 0.35 K/UL (ref 0.1–1.2)
MONOCYTES NFR BLD: 8 % (ref 3–12)
NEUTROPHILS NFR BLD: 46 % (ref 36–65)
NEUTS SEG NFR BLD: 2.07 K/UL (ref 1.5–8.1)
NRBC BLD-RTO: 0 PER 100 WBC
PLATELET # BLD AUTO: 262 K/UL (ref 138–453)
PMV BLD AUTO: 9.4 FL (ref 8.1–13.5)
PROT SERPL-MCNC: 7 G/DL (ref 6.6–8.7)
RBC # BLD AUTO: 5.07 M/UL (ref 4.21–5.77)
RBC # BLD: ABNORMAL 10*6/UL
WBC OTHER # BLD: 4.6 K/UL (ref 3.5–11.3)

## 2025-03-28 PROCEDURE — 85025 COMPLETE CBC W/AUTO DIFF WBC: CPT

## 2025-03-28 PROCEDURE — 96374 THER/PROPH/DIAG INJ IV PUSH: CPT

## 2025-03-28 PROCEDURE — 86140 C-REACTIVE PROTEIN: CPT

## 2025-03-28 PROCEDURE — 36415 COLL VENOUS BLD VENIPUNCTURE: CPT

## 2025-03-28 PROCEDURE — 82565 ASSAY OF CREATININE: CPT

## 2025-03-28 PROCEDURE — 6360000002 HC RX W HCPCS: Performed by: INTERNAL MEDICINE

## 2025-03-28 PROCEDURE — 80076 HEPATIC FUNCTION PANEL: CPT

## 2025-03-28 PROCEDURE — 2500000003 HC RX 250 WO HCPCS: Performed by: INTERNAL MEDICINE

## 2025-03-28 RX ORDER — SODIUM CHLORIDE 0.9 % (FLUSH) 0.9 %
5-40 SYRINGE (ML) INJECTION PRN
Status: CANCELLED | OUTPATIENT
Start: 2025-03-29

## 2025-03-28 RX ADMIN — WATER 2000 MG: 1 INJECTION INTRAMUSCULAR; INTRAVENOUS; SUBCUTANEOUS at 13:08

## 2025-03-29 ENCOUNTER — HOSPITAL ENCOUNTER (OUTPATIENT)
Dept: OBSERVATION | Age: 65
Discharge: HOME | End: 2025-03-29
Attending: INTERNAL MEDICINE | Admitting: INTERNAL MEDICINE
Payer: MEDICARE

## 2025-03-29 VITALS
DIASTOLIC BLOOD PRESSURE: 87 MMHG | SYSTOLIC BLOOD PRESSURE: 125 MMHG | OXYGEN SATURATION: 98 % | HEART RATE: 90 BPM | TEMPERATURE: 97.9 F | RESPIRATION RATE: 16 BRPM

## 2025-03-29 DIAGNOSIS — K91.89 ILEUS, POSTOPERATIVE (HCC): Primary | ICD-10-CM

## 2025-03-29 DIAGNOSIS — K56.7 ILEUS, POSTOPERATIVE (HCC): Primary | ICD-10-CM

## 2025-03-29 PROCEDURE — 2500000003 HC RX 250 WO HCPCS: Performed by: INTERNAL MEDICINE

## 2025-03-29 PROCEDURE — 96374 THER/PROPH/DIAG INJ IV PUSH: CPT

## 2025-03-29 PROCEDURE — 6360000002 HC RX W HCPCS: Performed by: INTERNAL MEDICINE

## 2025-03-29 RX ORDER — SODIUM CHLORIDE 0.9 % (FLUSH) 0.9 %
5-40 SYRINGE (ML) INJECTION PRN
OUTPATIENT
Start: 2025-03-30

## 2025-03-29 RX ADMIN — WATER 2000 MG: 1 INJECTION INTRAMUSCULAR; INTRAVENOUS; SUBCUTANEOUS at 12:49

## 2025-03-30 ENCOUNTER — HOSPITAL ENCOUNTER (OUTPATIENT)
Dept: OBSERVATION | Age: 65
Discharge: HOME | End: 2025-03-30
Attending: INTERNAL MEDICINE | Admitting: INTERNAL MEDICINE
Payer: MEDICARE

## 2025-03-30 ENCOUNTER — RESULTS FOLLOW-UP (OUTPATIENT)
Dept: INFECTIOUS DISEASES | Age: 65
End: 2025-03-30

## 2025-03-30 VITALS
HEART RATE: 97 BPM | SYSTOLIC BLOOD PRESSURE: 139 MMHG | TEMPERATURE: 97.9 F | RESPIRATION RATE: 16 BRPM | OXYGEN SATURATION: 95 % | DIASTOLIC BLOOD PRESSURE: 90 MMHG

## 2025-03-30 DIAGNOSIS — K56.7 ILEUS, POSTOPERATIVE (HCC): Primary | ICD-10-CM

## 2025-03-30 DIAGNOSIS — K65.1 PELVIC ABSCESS IN MALE (HCC): Primary | ICD-10-CM

## 2025-03-30 DIAGNOSIS — K91.89 ILEUS, POSTOPERATIVE (HCC): Primary | ICD-10-CM

## 2025-03-30 PROCEDURE — 96374 THER/PROPH/DIAG INJ IV PUSH: CPT

## 2025-03-30 PROCEDURE — 6360000002 HC RX W HCPCS: Performed by: INTERNAL MEDICINE

## 2025-03-30 PROCEDURE — 2500000003 HC RX 250 WO HCPCS: Performed by: INTERNAL MEDICINE

## 2025-03-30 RX ORDER — SODIUM CHLORIDE 0.9 % (FLUSH) 0.9 %
5-40 SYRINGE (ML) INJECTION PRN
Status: CANCELLED | OUTPATIENT
Start: 2025-03-31

## 2025-03-30 RX ADMIN — WATER 2000 MG: 1 INJECTION INTRAMUSCULAR; INTRAVENOUS; SUBCUTANEOUS at 13:10

## 2025-03-31 ENCOUNTER — CARE COORDINATION (OUTPATIENT)
Dept: CARE COORDINATION | Age: 65
End: 2025-03-31

## 2025-03-31 ENCOUNTER — TELEPHONE (OUTPATIENT)
Dept: INTERNAL MEDICINE | Age: 65
End: 2025-03-31

## 2025-03-31 ENCOUNTER — OFFICE VISIT (OUTPATIENT)
Dept: INFECTIOUS DISEASES | Age: 65
End: 2025-03-31
Payer: MEDICARE

## 2025-03-31 ENCOUNTER — HOSPITAL ENCOUNTER (OUTPATIENT)
Dept: OBSERVATION | Age: 65
Discharge: HOME | End: 2025-03-31
Attending: INTERNAL MEDICINE | Admitting: INTERNAL MEDICINE
Payer: MEDICARE

## 2025-03-31 VITALS
TEMPERATURE: 97.9 F | HEIGHT: 66 IN | SYSTOLIC BLOOD PRESSURE: 133 MMHG | WEIGHT: 184 LBS | HEART RATE: 99 BPM | BODY MASS INDEX: 29.57 KG/M2 | DIASTOLIC BLOOD PRESSURE: 89 MMHG

## 2025-03-31 VITALS
SYSTOLIC BLOOD PRESSURE: 139 MMHG | HEART RATE: 97 BPM | OXYGEN SATURATION: 99 % | DIASTOLIC BLOOD PRESSURE: 84 MMHG | TEMPERATURE: 98.1 F | RESPIRATION RATE: 18 BRPM

## 2025-03-31 DIAGNOSIS — K65.1 PELVIC ABSCESS IN MALE (HCC): Primary | ICD-10-CM

## 2025-03-31 DIAGNOSIS — K56.7 ILEUS, POSTOPERATIVE (HCC): Primary | ICD-10-CM

## 2025-03-31 DIAGNOSIS — K46.9 ABDOMINAL HERNIA WITHOUT OBSTRUCTION AND WITHOUT GANGRENE, RECURRENCE NOT SPECIFIED, UNSPECIFIED HERNIA TYPE: Primary | ICD-10-CM

## 2025-03-31 DIAGNOSIS — K91.89 ILEUS, POSTOPERATIVE (HCC): Primary | ICD-10-CM

## 2025-03-31 PROCEDURE — G8427 DOCREV CUR MEDS BY ELIG CLIN: HCPCS | Performed by: INTERNAL MEDICINE

## 2025-03-31 PROCEDURE — 1123F ACP DISCUSS/DSCN MKR DOCD: CPT | Performed by: INTERNAL MEDICINE

## 2025-03-31 PROCEDURE — 2500000003 HC RX 250 WO HCPCS: Performed by: INTERNAL MEDICINE

## 2025-03-31 PROCEDURE — 99214 OFFICE O/P EST MOD 30 MIN: CPT | Performed by: INTERNAL MEDICINE

## 2025-03-31 PROCEDURE — 6360000002 HC RX W HCPCS: Performed by: INTERNAL MEDICINE

## 2025-03-31 PROCEDURE — 3079F DIAST BP 80-89 MM HG: CPT | Performed by: INTERNAL MEDICINE

## 2025-03-31 PROCEDURE — G2211 COMPLEX E/M VISIT ADD ON: HCPCS | Performed by: INTERNAL MEDICINE

## 2025-03-31 PROCEDURE — 1036F TOBACCO NON-USER: CPT | Performed by: INTERNAL MEDICINE

## 2025-03-31 PROCEDURE — 3075F SYST BP GE 130 - 139MM HG: CPT | Performed by: INTERNAL MEDICINE

## 2025-03-31 PROCEDURE — 3017F COLORECTAL CA SCREEN DOC REV: CPT | Performed by: INTERNAL MEDICINE

## 2025-03-31 PROCEDURE — G8417 CALC BMI ABV UP PARAM F/U: HCPCS | Performed by: INTERNAL MEDICINE

## 2025-03-31 PROCEDURE — 96374 THER/PROPH/DIAG INJ IV PUSH: CPT

## 2025-03-31 RX ORDER — SODIUM CHLORIDE 0.9 % (FLUSH) 0.9 %
5-40 SYRINGE (ML) INJECTION PRN
Status: CANCELLED | OUTPATIENT
Start: 2025-04-01

## 2025-03-31 RX ADMIN — CEFTRIAXONE SODIUM 2000 MG: 2 INJECTION, POWDER, FOR SOLUTION INTRAMUSCULAR; INTRAVENOUS at 13:32

## 2025-03-31 ASSESSMENT — ENCOUNTER SYMPTOMS
APNEA: 0
PHOTOPHOBIA: 0
COLOR CHANGE: 0
EYE REDNESS: 0
ABDOMINAL DISTENTION: 0
EYE DISCHARGE: 0

## 2025-03-31 NOTE — TELEPHONE ENCOUNTER
Pc needs new referral for hernia that he has previously had before referral pended     Dx code k40.90  would not allow writer to input dx code

## 2025-03-31 NOTE — PROGRESS NOTES
[facility] - Initial Consult Note  Today's Date: 3/31/2025    Impression :   Post STV 12/30/24  Urine retention leading to valladares   Indwelling Valladares x 12/14/24-  Had episodes of bleed on off   MRSA valladares related UTI,  Urine culture MRSA 12/29  Urine culture  12/31 negative  Left acute epidydimitis /  complex collections w L hydrocele - tender - new 12/31/2024  DC on zyvox and levaquin  x 2 weeks ( rec d )but await  further surg plans   have outpt plans for re eval and a cysto in 2 weeks  After DC, saw Dr Pressley, planned for a cystoscopy 1/16/25  Office visit 1/13/25   Pain better - edema better left epididyme  about 50% - no tendon pain   Near syncope, possibly related to dehydration  admission 2/15/25  TUPR 2/13 - post op left ADY pulled - prostate abscess found,   Post op fever sirs  Much better on ancef -  UTI - U cx again kleb 2/15/25   Post op ileus, needed few NGT  2/19 ileus better and good gas and BM   Post op left iliac fluid collection 2.5  x  3.1 cm  - seroma vs abscess  2/20 IR to aspirate pus - cx kleb S ancef - same as before  Increased abd drainage from surg abd sites, yellow clear fluid  Swab superf cx 2/18 kleb multiS  2/20 Cystogram no urine leak   Left 2/23 and was till having improved fluid out of the surg abd wound  3/3/25 office visit - fluid much less, tiny  DC d  on ceftriaxone 2 g daily x 6 weeks  to address the prostate abscess till 4/3/25 infusion center daily-   3/3/25  plts increased  530 - feels great - much better - abd fluid minimal- no diarrhea  -  WBC  6 - plts up 530 - CRP 7.2 - creat normal - blood in urine still -wound clean and not infected  Adding CRP to understand the plts  See me 3 weeks  Ancef ceftriaxone 6 weeks till 4/3/25  (Left flank abscess - no drain in it )  CTAP toward 4/3/25 look for full resolution of new left flank abscess   3/25/25 - CT still w left iliac abscess 2.1 cm -  awaiting to talk to Dr Tubbs  in am - real vs scar -

## 2025-03-31 NOTE — CARE COORDINATION
Patient unable to talk, on his way to an appointment.  Will attempt to reach later today.    Attempted to reach patient today for nutrition CC follow up.  M with contact information requesting a return call to [phone].  Will attempt to reach again within 1-2 weeks.  NAVARRO Tapia

## 2025-04-01 ENCOUNTER — HOSPITAL ENCOUNTER (OUTPATIENT)
Dept: OBSERVATION | Age: 65
Discharge: HOME | End: 2025-04-01
Attending: INTERNAL MEDICINE | Admitting: INTERNAL MEDICINE
Payer: MEDICARE

## 2025-04-01 DIAGNOSIS — K65.1 PELVIC ABSCESS IN MALE (HCC): Primary | ICD-10-CM

## 2025-04-01 DIAGNOSIS — K91.89 ILEUS, POSTOPERATIVE (HCC): Primary | ICD-10-CM

## 2025-04-01 DIAGNOSIS — K56.7 ILEUS, POSTOPERATIVE (HCC): Primary | ICD-10-CM

## 2025-04-01 PROCEDURE — 6360000002 HC RX W HCPCS: Performed by: INTERNAL MEDICINE

## 2025-04-01 PROCEDURE — 96374 THER/PROPH/DIAG INJ IV PUSH: CPT

## 2025-04-01 PROCEDURE — 2500000003 HC RX 250 WO HCPCS: Performed by: INTERNAL MEDICINE

## 2025-04-01 RX ORDER — SODIUM CHLORIDE 0.9 % (FLUSH) 0.9 %
5-40 SYRINGE (ML) INJECTION PRN
Status: CANCELLED | OUTPATIENT
Start: 2025-04-02

## 2025-04-01 RX ORDER — CEPHALEXIN 500 MG/1
500 CAPSULE ORAL 4 TIMES DAILY
Qty: 120 CAPSULE | Refills: 0 | Status: SHIPPED | OUTPATIENT
Start: 2025-04-01 | End: 2025-05-01

## 2025-04-01 RX ADMIN — WATER 2000 MG: 1 INJECTION INTRAMUSCULAR; INTRAVENOUS; SUBCUTANEOUS at 13:13

## 2025-04-01 NOTE — PROGRESS NOTES
DISCUSSED W Dr Tubbs IR and the left iliac collection was around 3.5 ( no contrast)  and is now 2 cm roughly ( w contrast)- hence we ll decide for a month of po keflex after which we ll get a repeat CTAP w contrast-    Pt agrees -    Pricilla Tamayo MD. Infectious Diseases

## 2025-04-02 ENCOUNTER — HOSPITAL ENCOUNTER (OUTPATIENT)
Dept: OBSERVATION | Age: 65
Discharge: HOME | End: 2025-04-02
Attending: INTERNAL MEDICINE | Admitting: INTERNAL MEDICINE
Payer: MEDICARE

## 2025-04-02 VITALS
RESPIRATION RATE: 16 BRPM | TEMPERATURE: 97.7 F | SYSTOLIC BLOOD PRESSURE: 121 MMHG | HEART RATE: 91 BPM | OXYGEN SATURATION: 94 % | DIASTOLIC BLOOD PRESSURE: 86 MMHG

## 2025-04-02 DIAGNOSIS — K91.89 ILEUS, POSTOPERATIVE (HCC): Primary | ICD-10-CM

## 2025-04-02 DIAGNOSIS — K56.7 ILEUS, POSTOPERATIVE (HCC): Primary | ICD-10-CM

## 2025-04-02 PROCEDURE — 2500000003 HC RX 250 WO HCPCS: Performed by: INTERNAL MEDICINE

## 2025-04-02 PROCEDURE — 6360000002 HC RX W HCPCS: Performed by: INTERNAL MEDICINE

## 2025-04-02 PROCEDURE — 96374 THER/PROPH/DIAG INJ IV PUSH: CPT

## 2025-04-02 RX ORDER — SODIUM CHLORIDE 0.9 % (FLUSH) 0.9 %
5-40 SYRINGE (ML) INJECTION PRN
OUTPATIENT
Start: 2025-04-03

## 2025-04-02 RX ADMIN — WATER 2000 MG: 1 INJECTION INTRAMUSCULAR; INTRAVENOUS; SUBCUTANEOUS at 13:12

## 2025-04-03 ENCOUNTER — HOSPITAL ENCOUNTER (OUTPATIENT)
Dept: OBSERVATION | Age: 65
Discharge: HOME | End: 2025-04-03
Attending: INTERNAL MEDICINE | Admitting: INTERNAL MEDICINE
Payer: MEDICARE

## 2025-04-03 VITALS
SYSTOLIC BLOOD PRESSURE: 129 MMHG | DIASTOLIC BLOOD PRESSURE: 81 MMHG | RESPIRATION RATE: 18 BRPM | TEMPERATURE: 98.2 F | HEART RATE: 85 BPM | OXYGEN SATURATION: 95 %

## 2025-04-03 DIAGNOSIS — K56.7 ILEUS, POSTOPERATIVE (HCC): Primary | ICD-10-CM

## 2025-04-03 DIAGNOSIS — K91.89 ILEUS, POSTOPERATIVE (HCC): Primary | ICD-10-CM

## 2025-04-03 PROCEDURE — 96374 THER/PROPH/DIAG INJ IV PUSH: CPT

## 2025-04-03 PROCEDURE — 2500000003 HC RX 250 WO HCPCS: Performed by: INTERNAL MEDICINE

## 2025-04-03 PROCEDURE — 6360000002 HC RX W HCPCS: Performed by: INTERNAL MEDICINE

## 2025-04-03 RX ORDER — SODIUM CHLORIDE 0.9 % (FLUSH) 0.9 %
5-40 SYRINGE (ML) INJECTION PRN
OUTPATIENT
Start: 2025-04-03

## 2025-04-03 RX ADMIN — WATER 2000 MG: 1 INJECTION INTRAMUSCULAR; INTRAVENOUS; SUBCUTANEOUS at 13:27

## 2025-04-07 ENCOUNTER — HOSPITAL ENCOUNTER (OUTPATIENT)
Dept: PHYSICAL THERAPY | Facility: CLINIC | Age: 65
Setting detail: THERAPIES SERIES
Discharge: HOME | End: 2025-04-07
Payer: MEDICARE

## 2025-04-07 PROCEDURE — 97161 PT EVAL LOW COMPLEX 20 MIN: CPT

## 2025-04-07 PROCEDURE — 97112 NEUROMUSCULAR REEDUCATION: CPT

## 2025-04-07 PROCEDURE — 97530 THERAPEUTIC ACTIVITIES: CPT

## 2025-04-07 NOTE — PLAN OF CARE
[] [facility]  Outpatient Rehabilitation &  Therapy  [address]  [phone]  [phone] [x] [facility]  Outpatient Rehabilitation &  Therapy  [address]  P: [phone]  F: [phone] [] [facility]  Outpatient Rehabilitation &  Therapy  [address]  P: [phone]  F: [phone] [] [facility]  Outpatient Rehabilitation &  Therapy  [address]  [phone]  [phone] [] [facility]  Outpatient Rehabilitation &  Therapy  [address]   P: [phone]  F: [phone]  [] [facility]  Outpatient Rehabilitation &  Therapy  [address]  P: [phone]  F: [phone] [] [facility]  Outpatient Rehabilitation &  Therapy  [address]  P: [phone]  F: [phone] [] [facility]  Outpatient Rehabilitation &  Therapy  [address]  P: [phone]  F: [phone] [] [facility]  Outpatient Rehabilitation &  Therapy  [address]  P: [phone]  F: [phone]  [] [facility] Outpatient Rehabilitation &  Therapy  [address]  P: [phone]  F: [phone]       Physical Therapy Plan of Care    Date:  2025  Patient: Ivanna Khoury  : 1960  MRN: [id number]  Physician: Mikhail Lemus              Insurance: Excluded: [id number]   1.Medicare-VBMN  2.[employer] Medicaid - follows primary   Medical Diagnosis: R33.9 (ICD-10-CM) - Urinary retention   Rehab Codes: R27.8, R29.3, N39.41, M62.81, R10.2, M99.05  Onset Date: 25   Next 's appt.: 25 hernia consult; 25 infectious disease; 2025 referrring            Subjective:   CC/HPI: Pt is a 64 year old male who presents with complaints of urge urinary incontinence & intermittent

## 2025-04-07 NOTE — FLOWSHEET NOTE
[facility] Fall Risk Assessment    Patient Name:  Ivanna Khoury  : 1960    Risk Factor Scale  Score   History of Falls [] Yes  [x] No 25  0 0   Secondary Diagnosis [] Yes  [x] No 15  0 0   Ambulatory Aid [] Furniture  [] Crutches/cane/walker  [x] None/bedrest/wheelchair/nurse 30  15  0 0   IV/Heparin Lock [] Yes  [x] No 20  0 0   Gait/Transferring [] Impaired  [] Weak  [x] Normal/bedrest/immobile 20  10  0 0   Mental Status [] Forgets limitations  [x] Oriented to own ability 15  0 0      Total:0     Based on the Assessment score: check the appropriate box.    [x]  No intervention needed   Low =   Score of 0-24    []  Use standard prevention interventions Moderate =  Score of 24-44   [] Give patient handout and discuss fall prevention strategies   [] Establish goal of education for patient/family RE: fall prevention strategies    []  Use high risk prevention interventions High = Score of 45 and higher   [] Give patient handout and discuss fall prevention strategies   [] Establish goal of education for patient/family Re: fall prevention strategies   [] Discuss lifeline / other resources    Electronically signed by:   Agnieszka Li PT  Date: 2025

## 2025-04-07 NOTE — CONSULTS
Complexity:  History (Personal factors, comorbidities) [] 0 [] 1-2 [x] 3+   Exam (limitations, restrictions) [] 1-2 [] 3 [x] 4+   Clinical presentation (progression) [x] Stable [] Evolving  [] Unstable   Decision Making [x] Low [] Moderate [] High     [x] Low Complexity [] Moderate Complexity [] High Complexity      Treatment Charges: Mins Units   [x] Evaluation       [x]  Low        []  Moderate       []  High 26 1   []  Modalities       []  Ther Exercise     [x]  Neuromuscular Re-ed 8 1   []  Gait Training     []  Manual Therapy     [x]  Ther Activities 15 1   []  Aquatics     []  Vasocompression     []  Cervical Traction     []  Other     Total Billable time 49min      Time in: 4:00p Time out: 4:50p      Electronically signed by: Agnieszka Li PT, DPT     Physician Signature:________________________________Date:__________________  By signing above or cosigning this note, I have reviewed this plan of care and certify a need for medically necessary rehabilitation services.       *PLEASE SIGN ABOVE AND FAX BACK ALL PAGES*

## 2025-04-08 ENCOUNTER — CARE COORDINATION (OUTPATIENT)
Dept: CARE COORDINATION | Age: 65
End: 2025-04-08

## 2025-04-08 NOTE — CARE COORDINATION
Ambulatory Care Coordination Note     2025 2:34 PM     Patient Current Location:  Home:  [address]  [place] [address]     ACM contacted the patient by telephone. Verified name and  with patient as identifiers.     Patient closed (previously declined) from the High Risk Care Management program on 2025.  Patient verbalizes confidence in the ability to self-manage at this time..  Care management goals have been completed. No further Ambulatory Care Manager follow up scheduled.      ACM: Carroll Hanley RN     Challenges to be reviewed by the provider   Additional needs identified to be addressed with provider No  none               Method of communication with provider: none.    Utilization: Patient has not had any utilization since our last call.     Care Summary Note: Message from PCP requesting assistance with medication cost.  Called patient, Explained that he can get OTC 81 mg at the [facility]. Foe other medications he can go to [facility] and sign up for a discount card. Patient thanked me and will probably go to [employer]      PCP/Specialist follow up:   Future Appointments         Provider Specialty Dept Phone    2025 8:30 AM SCHEDULE, [facility] SURG [facility] General Surgery [phone]    2025 8:30 AM (Arrive by 8:15 AM) [facility] CT ROOM 1 Radiology [phone]    2025 3:45 PM Pricilla Tamayo MD Infectious Diseases [phone]    2025 4:00 PM Mariela Avilez PTA Physical Therapy [phone]    2025 4:00 PM Mariela Avilez PTA Physical Therapy [phone]    2025 3:00 PM Agnieszka Li PT Physical Therapy [phone]    2025 4:00 PM Mariela Avilez PTA Physical Therapy [phone]    2025 12:30 PM Stefano Cole MD Neurology [phone]    2025 1:30 PM Joe Galaviz MD Pulmonology [phone]    2025 3:00 PM Sunny Crews Jr., MD Urology [phone]

## 2025-04-08 NOTE — TELEPHONE ENCOUNTER
Not sure of alternatives. But ASA is available at dollar store as well. Will ask Joon to help maybe with a drug rx program.

## 2025-04-09 ENCOUNTER — OFFICE VISIT (OUTPATIENT)
Dept: SURGERY | Age: 65
End: 2025-04-09
Payer: MEDICARE

## 2025-04-09 VITALS
HEART RATE: 84 BPM | HEIGHT: 66 IN | OXYGEN SATURATION: 98 % | WEIGHT: 184 LBS | TEMPERATURE: 97.7 F | DIASTOLIC BLOOD PRESSURE: 92 MMHG | BODY MASS INDEX: 29.57 KG/M2 | SYSTOLIC BLOOD PRESSURE: 136 MMHG

## 2025-04-09 DIAGNOSIS — K40.90 LEFT INGUINAL HERNIA: Primary | ICD-10-CM

## 2025-04-09 PROCEDURE — 1036F TOBACCO NON-USER: CPT

## 2025-04-09 PROCEDURE — 3080F DIAST BP >= 90 MM HG: CPT

## 2025-04-09 PROCEDURE — G8417 CALC BMI ABV UP PARAM F/U: HCPCS

## 2025-04-09 PROCEDURE — 1123F ACP DISCUSS/DSCN MKR DOCD: CPT

## 2025-04-09 PROCEDURE — 3075F SYST BP GE 130 - 139MM HG: CPT

## 2025-04-09 PROCEDURE — 99214 OFFICE O/P EST MOD 30 MIN: CPT

## 2025-04-09 PROCEDURE — G8427 DOCREV CUR MEDS BY ELIG CLIN: HCPCS

## 2025-04-09 PROCEDURE — 3017F COLORECTAL CA SCREEN DOC REV: CPT

## 2025-04-09 NOTE — PATIENT INSTRUCTIONS
Thank you letting us take care of you today. We hope that all your questions were addressed. If a question was overlooked or something else comes to mind after you return home, please call our office at [phone].    If you need to cancel or change an appointment, surgery or procedure, please contact the office at [phone].

## 2025-04-09 NOTE — PROGRESS NOTES
History and Physical  [facility]    Patient's Name/Date of Birth: Ivanna Khoury / 1960 (64 y.o.)    Date: April 9, 2025     HPI: Pt is a 64 y.o. male with a history of laparoscopic right inguinal hernia repair on 7/27/2021 and laparoscopic simple prostatectomy on 2/13/2025 presents to [facility] for evaluation of incidental left inguinal hernia finding on CT scan. Patient is currently following with Infectious Disease and Urology due to abdominal abscess post prostatectomy. He has been on antibiotic therapy for several weeks following his procedure, and is currently on PO Keflex with plans to see ID in a month for reevaluation. Patient states that he is currently asymptomatic and denies abdominal pain, nausea, vomiting, change to bowel habits or a noticeable bulge in the groin. He endorses the ability to continue his daily activities without concerns of inguinal pain.       Past Medical History:   Diagnosis Date    Acute epididymitis     COVID 2020    no hospitalization    Enlarged prostate     Tovar catheter in place 02/05/2025    Hyperlipidemia     Hypertension     Lung nodules     not changed in 5 years-no longer has to visit with pulmonology    MRSA (methicillin resistant staph aureus) culture positive     states resolved    [facility]    Under care of service provider     Dr Sunny Crews / last visit Dec 2024    Urinary retention     UTI (urinary tract infection)     Sepsis 12/2024       Past Surgical History:   Procedure Laterality Date    CT ABSCESS DRAINAGE SOFT TISSUE  02/21/2025    CT ABSCESS DRAINAGE SOFT TISSUE 2/21/2025 [facility] CT SCAN    CYSTOSCOPY N/A 07/30/2020    CYSTOSCOPY performed by Jayesh Roca MD at [facility] OR    CYSTOSCOPY  01/24/2025    CYSTOSCOPY N/A 01/24/2025    CYSTOSCOPY performed by Sunny Crews Jr., MD at [facility] OR    HERNIA REPAIR Right 07/27/2021    XI ROBOTIC LAPAROSCOPIC INGUINAL HERNIA 
Out    Meningococcal (ACWY) vaccine  Aged Out    Meningococcal B vaccine  Aged Out    A1C test (Diabetic or Prediabetic)  Discontinued       
at [facility] OR    HERNIA REPAIR Right 07/27/2021     XI ROBOTIC LAPAROSCOPIC INGUINAL HERNIA REPAIR WITH MESH performed by Kermit Sotelo IV, DO at [facility] OR    PROSTATECTOMY N/A 02/13/2025     ROBOTIC LAPAROSCOPIC SIMPLE PROSTATECTOMY performed by Sunny Crews Jr., MD at [facility] OR    TURP N/A 08/14/2020     CYSTO, TUR PROSTATE GREENLIGHT XPS performed by Jayesh Roca MD at [facility] OR    VASCULAR SURGERY         varicose vein of right leg            Current Facility-Administered Medications          Current Outpatient Medications   Medication Sig Dispense Refill    cephALEXin (KEFLEX) 500 MG capsule Take 1 capsule by mouth 4 times daily 120 capsule 0    aspirin 81 MG chewable tablet Take 1 tablet by mouth daily 90 tablet 1    finasteride (PROSCAR) 5 MG tablet Take 1 tablet by mouth daily 90 tablet 3    docusate sodium (COLACE) 100 MG capsule Take 1 capsule by mouth 2 times daily 20 capsule 0    docusate sodium (COLACE) 100 MG capsule Take 1 capsule by mouth 2 times daily as needed for Constipation 60 capsule 5    lisinopril (PRINIVIL;ZESTRIL) 5 MG tablet Take 1 tablet by mouth daily 30 tablet 5    potassium chloride (KLOR-CON M) 20 MEQ extended release tablet Take 1 tablet by mouth every other day 15 tablet 5    ketoconazole (NIZORAL) 2 % cream APPLY TO FEET TWICE DAILY        hyoscyamine (LEVSIN/SL) 0.125 MG sublingual tablet Place 1 tablet under the tongue every 8 hours as needed (bladder spasms or stent pain) 30 tablet 3    carvedilol (COREG) 3.125 MG tablet Take 1 tablet by mouth 2 times daily 60 tablet 3    rosuvastatin (CRESTOR) 10 MG tablet Take 1 tablet by mouth nightly 30 tablet 3    sodium chloride (ALTAMIST SPRAY) 0.65 % nasal spray 1 spray by Nasal route as needed for Congestion 1 each 11    docusate sodium (COLACE) 100 MG capsule Take 1 capsule by mouth 2 times daily 20 capsule 0      No current facility-administered medications for this visit.            Allergies   No Known Allergies        Family

## 2025-04-10 ENCOUNTER — CARE COORDINATION (OUTPATIENT)
Dept: CARE COORDINATION | Age: 65
End: 2025-04-10

## 2025-04-10 NOTE — CARE COORDINATION
Patient goals reviewed:  1.Reviewed- Drinking  plenty of water and other fluids throughout the day. Encouraged to Chew food well and eat slowly  2.Reviewed using moist cooking methods like stewing, poaching, or roasting,. Pureed foods may be easier to digest  3. Reviewed Avoiding tough or stringy foods like celery and gristly meat. Encouraged Cooking vegetables well and remove skins, seeds, and stems  4. Encouraged low fiber foods- Choose white breads and cereals instead of whole grains. Eat low-fiber fruits and vegetables like bananas, melons, and applesauce.  5. Reviewed increased protein needs due to surgical wound- eggs, cottage cheese, pureed meats. We discussed using a high protein nutrition supplement daily- will send patient Ensure coupons.  Spoke with patient who relays he is doing well, eating normally and has increase his protein intake. He is also getting a high protein shake from [facility]- 60/month and is drinking them daily. Patient declined further nutrition education. Removed from panel.  NAVARRO Hairston

## 2025-04-20 DIAGNOSIS — I10 ESSENTIAL HYPERTENSION: ICD-10-CM

## 2025-04-21 ENCOUNTER — TELEPHONE (OUTPATIENT)
Dept: INTERNAL MEDICINE | Age: 65
End: 2025-04-21

## 2025-04-21 RX ORDER — LISINOPRIL 5 MG/1
5 TABLET ORAL DAILY
Qty: 30 TABLET | Refills: 5 | OUTPATIENT
Start: 2025-04-21 | End: 2025-10-18

## 2025-04-21 NOTE — TELEPHONE ENCOUNTER
Last visit: 3/5/25  Last Med refill: 1/22/25  Does patient have enough medication for 72 hours: No:     Next Visit Date: Patient on the wiatlist for an appt in September.  Future Appointments   Date Time Provider Department Center   4/24/2025  8:30 AM STV CT ROOM 1 [facility] CT SCAN ST Radiolog   4/28/2025  3:45 PM Pricilla Tamayo MD INFT DISEASE [facility]   4/29/2025  4:00 PM Mariela Avilez, PTA [facility]   5/6/2025  4:00 PM Mariela Avilez, PTA [facility]   5/12/2025  3:00 PM Agnieszka Li, PT [facility]   5/20/2025  4:00 PM Mariela Avilez, PTA [facility]   6/5/2025 12:30 PM Stefano Cole MD Neuro [facility] Neurology -   9/4/2025  1:30 PM Joe Galaviz MD Resp Spec [facility]   9/12/2025  3:00 PM Sunny Crews Jr., MD [facility] Urology [facility]       Health Maintenance   Topic Date Due    DTaP/Tdap/Td vaccine (1 - Tdap) Never done    Pneumococcal 50+ years Vaccine (1 of 1 - PCV) Never done    Respiratory Syncytial Virus (RSV) Pregnant or age 60 yrs+ (1 - Risk 60-74 years 1-dose series) Never done    Annual Wellness Visit (Medicare)  Never done    Lipids  04/26/2025    Shingles vaccine (1 of 2) 03/04/2026 (Originally 6/1/2009)    COVID-19 Vaccine (5 - 2024-25 season) 05/02/2025    Flu vaccine (Season Ended) 08/01/2025    Depression Screen  01/22/2026    Colorectal Cancer Screen  09/14/2026    Diabetes screen  12/06/2027    Hepatitis C screen  Completed    HIV screen  Completed    Hepatitis A vaccine  Aged Out    Hepatitis B vaccine  Aged Out    Hib vaccine  Aged Out    Polio vaccine  Aged Out    Meningococcal (ACWY) vaccine  Aged Out    Meningococcal B vaccine  Aged Out    A1C test (Diabetic or Prediabetic)  Discontinued       Hemoglobin A1C (%)   Date Value   12/06/2024 5.5   04/26/2024 5.8   08/10/2023 5.5             ( goal A1C is < 7)   No components found for: \"LABMICR\"  No components found for: \"LDLCHOLESTEROL\", \"LDLCALC\"    (goal LDL is <100)   AST (U/L)

## 2025-04-21 NOTE — TELEPHONE ENCOUNTER
Writer received VM from pt stating he needs someone to call [employer] so they will send his nutritional supplements.    PC to [facility] at [phone], rep states a prior authorization was sent to insurance on 4/14, they are waiting to hear back. There is nothing needed from our office at this time.    PC to pt to update. Pt states he is having CT scan with IV contrast 4/24, he is wondering if he should have lab work completed prior to then. Please advise.

## 2025-04-22 RX ORDER — CARVEDILOL 3.12 MG/1
3.12 TABLET ORAL 2 TIMES DAILY
Qty: 60 TABLET | Refills: 3 | Status: SHIPPED | OUTPATIENT
Start: 2025-04-22

## 2025-04-24 ENCOUNTER — HOSPITAL ENCOUNTER (OUTPATIENT)
Dept: CT IMAGING | Age: 65
Discharge: HOME | End: 2025-04-26
Attending: INTERNAL MEDICINE
Payer: MEDICARE

## 2025-04-24 ENCOUNTER — HOSPITAL ENCOUNTER (OUTPATIENT)
Age: 65
Discharge: HOME | End: 2025-04-24
Attending: INTERNAL MEDICINE
Payer: MEDICARE

## 2025-04-24 ENCOUNTER — RESULTS FOLLOW-UP (OUTPATIENT)
Dept: PULMONOLOGY | Age: 65
End: 2025-04-24

## 2025-04-24 DIAGNOSIS — K65.1 PELVIC ABSCESS IN MALE (HCC): ICD-10-CM

## 2025-04-24 LAB
ALBUMIN SERPL-MCNC: 4.2 G/DL (ref 3.5–5.2)
ALBUMIN/GLOB SERPL: 1.6 {RATIO} (ref 1–2.5)
ALP SERPL-CCNC: 57 U/L (ref 40–129)
ALT SERPL-CCNC: 24 U/L (ref 10–50)
AST SERPL-CCNC: 25 U/L (ref 10–50)
BASOPHILS # BLD: <0.03 K/UL (ref 0–0.2)
BASOPHILS NFR BLD: 1 % (ref 0–2)
BILIRUB DIRECT SERPL-MCNC: <0.1 MG/DL (ref 0–0.2)
BILIRUB INDIRECT SERPL-MCNC: NORMAL MG/DL (ref 0–1)
BILIRUB SERPL-MCNC: 0.3 MG/DL (ref 0–1.2)
CREAT SERPL-MCNC: 0.8 MG/DL (ref 0.7–1.2)
CRP SERPL HS-MCNC: <3 MG/L (ref 0–5)
EOSINOPHIL # BLD: 0.2 K/UL (ref 0–0.44)
EOSINOPHILS RELATIVE PERCENT: 5 % (ref 1–4)
ERYTHROCYTE [DISTWIDTH] IN BLOOD BY AUTOMATED COUNT: 14.6 % (ref 11.8–14.4)
GFR, ESTIMATED: >90 ML/MIN/1.73M2
GLOBULIN SER CALC-MCNC: 2.6 G/DL
HCT VFR BLD AUTO: 44.6 % (ref 40.7–50.3)
HGB BLD-MCNC: 13.4 G/DL (ref 13–17)
IMM GRANULOCYTES # BLD AUTO: <0.03 K/UL (ref 0–0.3)
IMM GRANULOCYTES NFR BLD: 0 %
LYMPHOCYTES NFR BLD: 1.48 K/UL (ref 1.1–3.7)
LYMPHOCYTES RELATIVE PERCENT: 36 % (ref 24–43)
MCH RBC QN AUTO: 24.3 PG (ref 25.2–33.5)
MCHC RBC AUTO-ENTMCNC: 30 G/DL (ref 28.4–34.8)
MCV RBC AUTO: 80.9 FL (ref 82.6–102.9)
MONOCYTES NFR BLD: 0.32 K/UL (ref 0.1–1.2)
MONOCYTES NFR BLD: 8 % (ref 3–12)
NEUTROPHILS NFR BLD: 50 % (ref 36–65)
NEUTS SEG NFR BLD: 2.13 K/UL (ref 1.5–8.1)
NRBC BLD-RTO: 0 PER 100 WBC
PLATELET # BLD AUTO: 233 K/UL (ref 138–453)
PMV BLD AUTO: 9.8 FL (ref 8.1–13.5)
PROT SERPL-MCNC: 6.8 G/DL (ref 6.6–8.7)
RBC # BLD AUTO: 5.51 M/UL (ref 4.21–5.77)
RBC # BLD: ABNORMAL 10*6/UL
WBC OTHER # BLD: 4.2 K/UL (ref 3.5–11.3)

## 2025-04-24 PROCEDURE — 74177 CT ABD & PELVIS W/CONTRAST: CPT

## 2025-04-24 PROCEDURE — 85025 COMPLETE CBC W/AUTO DIFF WBC: CPT

## 2025-04-24 PROCEDURE — 36415 COLL VENOUS BLD VENIPUNCTURE: CPT

## 2025-04-24 PROCEDURE — 82565 ASSAY OF CREATININE: CPT

## 2025-04-24 PROCEDURE — 6360000004 HC RX CONTRAST MEDICATION: Performed by: INTERNAL MEDICINE

## 2025-04-24 PROCEDURE — 86140 C-REACTIVE PROTEIN: CPT

## 2025-04-24 PROCEDURE — 80076 HEPATIC FUNCTION PANEL: CPT

## 2025-04-24 RX ORDER — IOPAMIDOL 755 MG/ML
75 INJECTION, SOLUTION INTRAVASCULAR
Status: COMPLETED | OUTPATIENT
Start: 2025-04-24 | End: 2025-04-24

## 2025-04-24 RX ADMIN — IOPAMIDOL 75 ML: 755 INJECTION, SOLUTION INTRAVENOUS at 09:08

## 2025-04-25 ENCOUNTER — RESULTS FOLLOW-UP (OUTPATIENT)
Dept: INFECTIOUS DISEASES | Age: 65
End: 2025-04-25

## 2025-04-29 ENCOUNTER — HOSPITAL ENCOUNTER (OUTPATIENT)
Dept: PHYSICAL THERAPY | Facility: CLINIC | Age: 65
Setting detail: THERAPIES SERIES
Discharge: HOME | End: 2025-04-29
Payer: MEDICARE

## 2025-04-29 PROCEDURE — 97110 THERAPEUTIC EXERCISES: CPT

## 2025-04-29 NOTE — FLOWSHEET NOTE
[x] [facility]  Outpatient Rehabilitation &  Therapy  [address]  P: [phone]  F: [phone]     Physical Therapy Daily Treatment Note    Date:  2025  Patient Name:  Ivanna Khoury    :  1960  MRN: [id number]  Physician: Mikhail Lemus              Insurance: Excluded: [id number]   1.Medicare-VBMN  2.[facility] Medicaid - follows primary   Medical Diagnosis: R33.9 (ICD-10-CM) - Urinary retention   Rehab Codes: R27.8, R29.3, N39.41, M62.81, R10.2, M99.05  Onset Date: 25   Next 's appt.: 25 infectious disease; 2025 referrring  Visit# / total visits: 2/10; Progress note for Medicare patient due at visit 5     Cancels/No Shows: 0    Subjective:    Pain:  [] Yes  [x] No Location:  N/A Pain Rating: (0-10 scale) 0/10  Pain altered Tx:  [x] No  [] Yes  Action:    Comments: Pt reports 25% improvement with urgency. Continues to deny leakage.     Objective:  Modalities:   Precautions: UES not covered; h/o R hernia repair & potenital L hernia; h/o Pelvic abscess; conservative management  Exercises: [employer] Access Code: [email or web address]  KT= Kinesiotape  PB= pelvic bracing (DB-exhale+TA contraction + kegel)  DB= diaphragmatic breathing  Exercise Reps/ Time Weight/ Level Comments   Pelvic model explanation & education      Function  3 layers  Analogies - IAP (visual aid & pelvic model, core cannister)  Diaphragm & pelvic floor relationship (visual aid)     exhale with effort     pelvic brace (exhale, core contraction & pelvic floor muscle lift) in high pressure situations     At 8+ weeks we will start scar tissue massage 1-3 min per day small amount of vitamin e oil                Supine            Diaphragmatic breathing  HEP       Transversus Abdominis Bracing with Pelvic Floor Contraction 10x5\"   Significant cuing to avoid breath holding   Quick Flicks 10x   Q2-3hrs  - following endurance holds  5-7 x daily - 10 reps - 1 sec

## 2025-05-01 ENCOUNTER — OFFICE VISIT (OUTPATIENT)
Dept: INFECTIOUS DISEASES | Age: 65
End: 2025-05-01
Payer: MEDICARE

## 2025-05-01 VITALS
HEART RATE: 77 BPM | BODY MASS INDEX: 31.75 KG/M2 | HEIGHT: 65 IN | DIASTOLIC BLOOD PRESSURE: 90 MMHG | WEIGHT: 190.6 LBS | OXYGEN SATURATION: 97 % | TEMPERATURE: 97.5 F | SYSTOLIC BLOOD PRESSURE: 147 MMHG

## 2025-05-01 DIAGNOSIS — E78.5 HYPERLIPIDEMIA, UNSPECIFIED HYPERLIPIDEMIA TYPE: ICD-10-CM

## 2025-05-01 DIAGNOSIS — I10 ESSENTIAL HYPERTENSION: ICD-10-CM

## 2025-05-01 DIAGNOSIS — K65.1 PELVIC ABSCESS IN MALE (HCC): Primary | ICD-10-CM

## 2025-05-01 PROCEDURE — G8427 DOCREV CUR MEDS BY ELIG CLIN: HCPCS | Performed by: INTERNAL MEDICINE

## 2025-05-01 PROCEDURE — G8417 CALC BMI ABV UP PARAM F/U: HCPCS | Performed by: INTERNAL MEDICINE

## 2025-05-01 PROCEDURE — 99214 OFFICE O/P EST MOD 30 MIN: CPT | Performed by: INTERNAL MEDICINE

## 2025-05-01 PROCEDURE — 3080F DIAST BP >= 90 MM HG: CPT | Performed by: INTERNAL MEDICINE

## 2025-05-01 PROCEDURE — 1123F ACP DISCUSS/DSCN MKR DOCD: CPT | Performed by: INTERNAL MEDICINE

## 2025-05-01 PROCEDURE — 3077F SYST BP >= 140 MM HG: CPT | Performed by: INTERNAL MEDICINE

## 2025-05-01 PROCEDURE — 3017F COLORECTAL CA SCREEN DOC REV: CPT | Performed by: INTERNAL MEDICINE

## 2025-05-01 PROCEDURE — 1036F TOBACCO NON-USER: CPT | Performed by: INTERNAL MEDICINE

## 2025-05-01 RX ORDER — CEPHALEXIN 500 MG/1
500 CAPSULE ORAL 4 TIMES DAILY
Qty: 120 CAPSULE | Refills: 0 | Status: SHIPPED | OUTPATIENT
Start: 2025-05-01 | End: 2025-05-31

## 2025-05-01 ASSESSMENT — ENCOUNTER SYMPTOMS
ABDOMINAL DISTENTION: 0
EYE DISCHARGE: 0
COLOR CHANGE: 0
EYE ITCHING: 0
EYE PAIN: 0
APNEA: 0
PHOTOPHOBIA: 0
EYE REDNESS: 0
CHOKING: 0

## 2025-05-01 NOTE — TELEPHONE ENCOUNTER
Meningococcal B vaccine  Aged Out    A1C test (Diabetic or Prediabetic)  Discontinued       Hemoglobin A1C (%)   Date Value   12/06/2024 5.5   04/26/2024 5.8   08/10/2023 5.5             ( goal A1C is < 7)   No components found for: \"LABMICR\"  No components found for: \"LDLCHOLESTEROL\", \"LDLCALC\"    (goal LDL is <100)   AST (U/L)   Date Value   04/24/2025 25     ALT (U/L)   Date Value   04/24/2025 24     BUN (mg/dL)   Date Value   03/21/2025 24 (H)     BP Readings from Last 3 Encounters:   05/01/25 (!) 147/90   04/09/25 (!) 136/92   04/03/25 129/81          (goal 120/80)    All Future Testing planned in [facility]  Lab Frequency Next Occurrence   Culture, Urine Once 01/28/2025   PSA, Diagnostic Once 09/14/2025   CT ABDOMEN PELVIS W IV CONTRAST Additional Contrast? None Once 06/01/2025   CBC with Auto Differential Once 05/01/2025   Creatinine Once 05/01/2025   C-Reactive Protein Once 05/01/2025         Patient Active Problem List:     BPH with urinary obstruction     TIA (transient ischemic attack)     COVID-19     Syncope     Postprocedural urinary retention     Intractable migraine with aura without status migrainosus     Post-op pain     Ileus ([facility])     Chronic venous insufficiency of lower extremity     Venous insufficiency of right leg     Benign paroxysmal positional vertigo due to bilateral vestibular disorder     Complicated UTI (urinary tract infection)     Heart murmur     Near syncope     Urinary retention     BPH with obstruction/lower urinary tract symptoms     Generalized abdominal pain     Ileus, postoperative ([facility])     Status post prostatectomy     Acute cystitis     HTN (hypertension)     Klebsiella infection     UTI due to Klebsiella species     Bandemia     SIRS (systemic inflammatory response syndrome) ([facility])     Elevated procalcitonin     Abscess of left iliac fossa     Prostatitis, acute

## 2025-05-01 NOTE — PROGRESS NOTES
[facility] - Initial Consult Note  Today's Date: 5/1/2025    Impression :   Post STV 12/30/24  Urine retention leading to valladares   Indwelling Valladares x 12/14/24-  Had episodes of bleed on off   MRSA valladares related UTI,  Urine culture MRSA 12/29  Urine culture  12/31 negative  Left acute epidydimitis /  complex collections w L hydrocele - tender - new 12/31/2024  DC on zyvox and levaquin  x 2 weeks ( rec d )but await  further surg plans   have outpt plans for re eval and a cysto in 2 weeks  After DC, saw Dr Pressley, planned for a cystoscopy 1/16/25  Office visit 1/13/25   Pain better - edema better left epididyme  about 50% - no tendon pain   Near syncope, possibly related to dehydration  admission 2/15/25  TUPR 2/13 - post op left ADY pulled - prostate abscess found,   Post op fever sirs  Much better on ancef -  UTI - U cx again kleb 2/15/25   Post op ileus, needed few NGT  2/19 ileus better and good gas and BM   Post op left iliac fluid collection 2.5  x  3.1 cm  - seroma vs abscess  2/20 IR to aspirate pus - cx kleb S ancef - same as before  Increased abd drainage from surg abd sites, yellow clear fluid  Swab superf cx 2/18 kleb multiS  2/20 Cystogram no urine leak   Left 2/23 and was till having improved fluid out of the surg abd wound  3/3/25 office visit - fluid much less, tiny  DC d  on ceftriaxone 2 g daily x 6 weeks  to address the prostate abscess till 4/3/25 infusion center daily-   3/3/25  plts increased  530 - feels great - much better - abd fluid minimal- no diarrhea  -  WBC  6 - plts up 530 - CRP 7.2 - creat normal - blood in urine still -wound clean and not infected  Adding CRP to understand the plts  See me 3 weeks  Ancef ceftriaxone 6 weeks till 4/3/25  (Left flank abscess - no drain in it )  CTAP toward 4/3/25 look for full resolution of new left flank abscess   3/25/25 - CT still w left iliac abscess 2.1 cm -  awaiting to talk to Dr Tubbs  in am - real vs scar -

## 2025-05-06 ENCOUNTER — HOSPITAL ENCOUNTER (OUTPATIENT)
Dept: PHYSICAL THERAPY | Facility: CLINIC | Age: 65
Setting detail: THERAPIES SERIES
Discharge: HOME | End: 2025-05-06
Payer: MEDICARE

## 2025-05-06 PROCEDURE — 97110 THERAPEUTIC EXERCISES: CPT

## 2025-05-06 RX ORDER — ROSUVASTATIN CALCIUM 10 MG/1
10 TABLET, COATED ORAL NIGHTLY
Qty: 90 TABLET | Refills: 1 | Status: SHIPPED | OUTPATIENT
Start: 2025-05-06

## 2025-05-06 RX ORDER — LISINOPRIL 5 MG/1
5 TABLET ORAL DAILY
Qty: 90 TABLET | Refills: 1 | Status: SHIPPED | OUTPATIENT
Start: 2025-05-06 | End: 2025-11-02

## 2025-05-06 RX ORDER — POTASSIUM CHLORIDE 1500 MG/1
20 TABLET, EXTENDED RELEASE ORAL EVERY OTHER DAY
Qty: 45 TABLET | Refills: 1 | Status: SHIPPED | OUTPATIENT
Start: 2025-05-06

## 2025-05-06 NOTE — FLOWSHEET NOTE
[x] [facility]  Outpatient Rehabilitation &  Therapy  [address]  P: [phone]  F: [phone]     Physical Therapy Daily Treatment Note    Date:  2025  Patient Name:  Ivanna Khoury    :  1960  MRN: [id number]  Physician: Mikhail Lemus              Insurance: Excluded: [id number]   1.Medicare-VBMN  2.[facility] Medicaid - follows primary   Medical Diagnosis: R33.9 (ICD-10-CM) - Urinary retention   Rehab Codes: R27.8, R29.3, N39.41, M62.81, R10.2, M99.05  Onset Date: 25   Next 's appt.: 25 infectious disease; 2025 referrring  Visit# / total visits: 3/10; Progress note for Medicare patient due at visit 5     Cancels/No Shows: 0    Subjective:    Pain:  [] Yes  [x] No Location:  N/A  Pain Rating: (0-10 scale) 0/10  Pain altered Tx:  [x] No  [] Yes  Action:    Comments: Pt reports reduction in urgency and states that he has maintained carryover of his HEP.     Objective:  Modalities:   Precautions: UES not covered; h/o R hernia repair & potenital L hernia; h/o Pelvic abscess; conservative management  Exercises: [employer] Access Code: [email or web address]  KT= Kines[employer]ape  PB= pelvic bracing (DB-exhale+TA contraction + kegel)  DB= diaphragmatic breathing  Exercise Reps/ Time Weight/ Level Comments   Pelvic model explanation & education      Function  3 layers  Analogies - IAP (visual aid & pelvic model, core cannister)  Diaphragm & pelvic floor relationship (visual aid)     exhale with effort     pelvic brace (exhale, core contraction & pelvic floor muscle lift) in high pressure situations     At 8+ weeks we will start scar tissue massage 1-3 min per day small amount of vitamin e oil                Supine            Diaphragmatic breathing  HEP       Transversus Abdominis Bracing with Pelvic Floor Contraction 10x6\"   Significant cuing to avoid breath holding   Quick Flicks 15x   Q2-3hrs  - following endurance holds  5-7 x

## 2025-05-12 ENCOUNTER — TELEPHONE (OUTPATIENT)
Dept: ADMINISTRATIVE | Age: 65
End: 2025-05-12

## 2025-05-12 ENCOUNTER — HOSPITAL ENCOUNTER (OUTPATIENT)
Dept: PHYSICAL THERAPY | Facility: CLINIC | Age: 65
Setting detail: THERAPIES SERIES
Discharge: HOME | End: 2025-05-12
Payer: MEDICARE

## 2025-05-12 PROCEDURE — 97110 THERAPEUTIC EXERCISES: CPT

## 2025-05-12 NOTE — FLOWSHEET NOTE
[x] [facility]  Outpatient Rehabilitation &  Therapy  [address]  P: [phone]  F: [phone]     Physical Therapy Daily Treatment Note    Date:  2025  Patient Name:  Ivanna Khoury    :  1960  MRN: [id number]  Physician: Mikhail Lemus              Insurance: Excluded: [id number]   1.Medicare-VBMN  2.[facility] [place] Medicaid - follows primary   Medical Diagnosis: R33.9 (ICD-10-CM) - Urinary retention   Rehab Codes: R27.8, R29.3, N39.41, M62.81, R10.2, M99.05  Onset Date: 25   Next 's appt.: 2025 referrring  Visit# / total visits: 4/10; STGs next visit    Cancels/No Shows: 0    Subjective:    Pain:  [] Yes  [x] No Location:  N/A  Pain Rating: (0-10 scale) 0/10  Pain altered Tx:  [x] No  [] Yes  Action:  Comments: Pt reports steady improvement. No increase in urinary sxs.      Objective:  Modalities:   Precautions: UES not covered; h/o R hernia repair & potenital L hernia; h/o Pelvic abscess; conservative management  Exercises: Wingz Access Code: [email or web address]  KT= Kinesiotape  PB= pelvic bracing (DB-exhale+TA contraction + kegel)  DB= diaphragmatic breathing  Exercise Reps/ Time Weight/ Level Comments   Pelvic model explanation & education      Function  3 layers  Analogies - IAP (visual aid & pelvic model, core cannister)  Diaphragm & pelvic floor relationship (visual aid)     exhale with effort     pelvic brace (exhale, core contraction & pelvic floor muscle lift) in high pressure situations     At 8+ weeks we will start scar tissue massage 1-3 min per day small amount of vitamin e oil                Supine            Diaphragmatic breathing  HEP       LTR with PB 5x  New 5/12   DKTC with PB 10x  New /12   Transversus Abdominis Bracing with Pelvic Floor Contraction 10x7\"   Significant cuing to avoid breath holding   Quick Flicks 15x   Q2-3hrs  - following endurance holds  5-7 x daily - 10 reps - 1 sec hold

## 2025-05-12 NOTE — TELEPHONE ENCOUNTER
Writer contacted patient and advised him to discontinue the Keflex 3 weeks after last visit with Dr. Tamayo. Writer also advised Culturelle over the counter 1 tablet twice daily per recommendation from Dr. Tamayo. Patient verbalized understanding.

## 2025-05-12 NOTE — TELEPHONE ENCOUNTER
Patient started to have diarrhea yesterday 5/11/2025 and is unsure on when to stop the antibiotic    Please contact patient

## 2025-05-20 ENCOUNTER — HOSPITAL ENCOUNTER (OUTPATIENT)
Dept: PHYSICAL THERAPY | Facility: CLINIC | Age: 65
Setting detail: THERAPIES SERIES
Discharge: HOME | End: 2025-05-20
Payer: MEDICARE

## 2025-05-20 PROCEDURE — 97110 THERAPEUTIC EXERCISES: CPT

## 2025-05-20 NOTE — FLOWSHEET NOTE
[x] [facility]  Outpatient Rehabilitation &  Therapy  [address]  P: [phone]  F: [phone]     Physical Therapy Daily Treatment Note    Date:  2025  Patient Name:  Ivanna Khoury    :  1960  MRN: [id number]  Physician: Mikhail Lemus              Insurance: Excluded: [id number]   1.Medicare-VBMN  2.[facility] Medicaid - follows primary   Medical Diagnosis: R33.9 (ICD-10-CM) - Urinary retention   Rehab Codes: R27.8, R29.3, N39.41, M62.81, R10.2, M99.05  Onset Date: 25   Next 's appt.: 2025 referrring  Visit# / total visits: 5/10   Cancels/No Shows: 0    Subjective:    Pain:  [] Yes  [x] No Location:  N/A  Pain Rating: (0-10 scale) 0/10  Pain altered Tx:  [x] No  [] Yes  Action:    Comments: Pt comments that he is better now compared to when he started. Pt states he is able to hold his urges longer.     Objective:  Modalities:   Precautions: UES not covered; h/o R hernia repair & potenital L hernia; h/o Pelvic abscess; conservative management  Exercises: Youmiam Access Code: [email or web address]  KT= Kinesiotape  PB= pelvic bracing (DB-exhale+TA contraction + kegel)  DB= diaphragmatic breathing  Exercise Reps/ Time Weight/ Level Comments   Pelvic model explanation & education      Function  3 layers  Analogies - IAP (visual aid & pelvic model, core cannister)  Diaphragm & pelvic floor relationship (visual aid)     exhale with effort     pelvic brace (exhale, core contraction & pelvic floor muscle lift) in high pressure situations     At 8+ weeks we will start scar tissue massage 1-3 min per day small amount of vitamin e oil                Supine            Diaphragmatic breathing  HEP       LTR with PB 5x  New 5/12   DKTC with PB 10x  New /12   Transversus Abdominis Bracing with Pelvic Floor Contraction 10x8\"   Significant cuing to avoid breath holding   Quick Flicks 15x   Q2-3hrs  - following endurance holds  5-7 x daily

## 2025-05-29 ENCOUNTER — HOSPITAL ENCOUNTER (OUTPATIENT)
Dept: PHYSICAL THERAPY | Facility: CLINIC | Age: 65
Setting detail: THERAPIES SERIES
Discharge: HOME | End: 2025-05-29
Payer: MEDICARE

## 2025-05-29 PROCEDURE — 97110 THERAPEUTIC EXERCISES: CPT

## 2025-05-29 NOTE — FLOWSHEET NOTE
[x] [facility]  Outpatient Rehabilitation &  Therapy  [address]  P: [phone]  F: [phone]     Physical Therapy Daily Treatment Note    Date:  2025  Patient Name:  Ivanna Khoury    :  1960  MRN: [id number]  Physician: Mikhail Lemus              Insurance: Excluded: [id number]   1.Medicare-VBMN  2.[employer] Medicaid - follows primary   Medical Diagnosis: R33.9 (ICD-10-CM) - Urinary retention   Rehab Codes: R27.8, R29.3, N39.41, M62.81, R10.2, M99.05  Onset Date: 25   Next 's appt.: 2025 referrring  Visit# / total visits: 6/10   Cancels/No Shows: 0    Subjective:    Pain:  [] Yes  [x] No Location:  N/A  Pain Rating: (0-10 scale) 0/10  Pain altered Tx:  [x] No  [] Yes  Action:  Comments: Pt reports no new complaints & continued improvement. Pt reports compliance with scar mobility. He also reports he snapped both therabands at home and requests to have greater resistance.    Objective:  Modalities:   Precautions: UES not covered; h/o R hernia repair & potenital L hernia; h/o Pelvic abscess; conservative management  Exercises: Biothera Access Code: [email or web address]  KT= Kinesiotape  PB= pelvic bracing (DB-exhale+TA contraction + kegel)  DB= diaphragmatic breathing  Exercise Reps/ Time Weight/ Level Comments   Pelvic model explanation & education      Function  3 layers  Analogies - IAP (visual aid & pelvic model, core cannister)  Diaphragm & pelvic floor relationship (visual aid)     exhale with effort     pelvic brace (exhale, core contraction & pelvic floor muscle lift) in high pressure situations     At 8+ weeks we will start scar tissue massage 1-3 min per day small amount of vitamin e oil                Supine            Diaphragmatic breathing  HEP       LTR with PB 10x  New    DKTC with PB 10x  New    Bridges on ball 10x red New    SL fig 4 bridges 10xea  New    Transversus Abdominis Bracing with

## 2025-06-05 ENCOUNTER — OFFICE VISIT (OUTPATIENT)
Dept: NEUROLOGY | Age: 65
End: 2025-06-05
Payer: MEDICARE

## 2025-06-05 ENCOUNTER — HOSPITAL ENCOUNTER (OUTPATIENT)
Age: 65
Discharge: HOME | End: 2025-06-05
Payer: MEDICARE

## 2025-06-05 VITALS
BODY MASS INDEX: 31.65 KG/M2 | DIASTOLIC BLOOD PRESSURE: 94 MMHG | SYSTOLIC BLOOD PRESSURE: 135 MMHG | HEART RATE: 76 BPM | WEIGHT: 190 LBS | HEIGHT: 65 IN

## 2025-06-05 DIAGNOSIS — K65.1 PELVIC ABSCESS IN MALE (HCC): ICD-10-CM

## 2025-06-05 DIAGNOSIS — H81.13 BENIGN PAROXYSMAL POSITIONAL VERTIGO DUE TO BILATERAL VESTIBULAR DISORDER: Primary | ICD-10-CM

## 2025-06-05 LAB
BASOPHILS # BLD: <0.03 K/UL (ref 0–0.2)
BASOPHILS NFR BLD: 0 % (ref 0–2)
CREAT SERPL-MCNC: 0.8 MG/DL (ref 0.7–1.2)
CRP SERPL HS-MCNC: <3 MG/L (ref 0–5)
EOSINOPHIL # BLD: 0.21 K/UL (ref 0–0.44)
EOSINOPHILS RELATIVE PERCENT: 5 % (ref 1–4)
ERYTHROCYTE [DISTWIDTH] IN BLOOD BY AUTOMATED COUNT: 14.4 % (ref 11.8–14.4)
GFR, ESTIMATED: >90 ML/MIN/1.73M2
HCT VFR BLD AUTO: 47.2 % (ref 40.7–50.3)
HGB BLD-MCNC: 15.1 G/DL (ref 13–17)
IMM GRANULOCYTES # BLD AUTO: <0.03 K/UL (ref 0–0.3)
IMM GRANULOCYTES NFR BLD: 0 %
LYMPHOCYTES NFR BLD: 1.93 K/UL (ref 1.1–3.7)
LYMPHOCYTES RELATIVE PERCENT: 45 % (ref 24–43)
MCH RBC QN AUTO: 25.1 PG (ref 25.2–33.5)
MCHC RBC AUTO-ENTMCNC: 32 G/DL (ref 28.4–34.8)
MCV RBC AUTO: 78.4 FL (ref 82.6–102.9)
MONOCYTES NFR BLD: 0.32 K/UL (ref 0.1–1.2)
MONOCYTES NFR BLD: 8 % (ref 3–12)
NEUTROPHILS NFR BLD: 42 % (ref 36–65)
NEUTS SEG NFR BLD: 1.79 K/UL (ref 1.5–8.1)
NRBC BLD-RTO: 0 PER 100 WBC
PLATELET # BLD AUTO: 243 K/UL (ref 138–453)
PMV BLD AUTO: 9.5 FL (ref 8.1–13.5)
RBC # BLD AUTO: 6.02 M/UL (ref 4.21–5.77)
RBC # BLD: ABNORMAL 10*6/UL
WBC OTHER # BLD: 4.3 K/UL (ref 3.5–11.3)

## 2025-06-05 PROCEDURE — 99213 OFFICE O/P EST LOW 20 MIN: CPT

## 2025-06-05 PROCEDURE — 82565 ASSAY OF CREATININE: CPT

## 2025-06-05 PROCEDURE — G8427 DOCREV CUR MEDS BY ELIG CLIN: HCPCS

## 2025-06-05 PROCEDURE — 85025 COMPLETE CBC W/AUTO DIFF WBC: CPT

## 2025-06-05 PROCEDURE — 1123F ACP DISCUSS/DSCN MKR DOCD: CPT

## 2025-06-05 PROCEDURE — 3017F COLORECTAL CA SCREEN DOC REV: CPT

## 2025-06-05 PROCEDURE — 1159F MED LIST DOCD IN RCRD: CPT

## 2025-06-05 PROCEDURE — 3075F SYST BP GE 130 - 139MM HG: CPT

## 2025-06-05 PROCEDURE — 86140 C-REACTIVE PROTEIN: CPT

## 2025-06-05 PROCEDURE — 3080F DIAST BP >= 90 MM HG: CPT

## 2025-06-05 PROCEDURE — G8417 CALC BMI ABV UP PARAM F/U: HCPCS

## 2025-06-05 PROCEDURE — 1036F TOBACCO NON-USER: CPT

## 2025-06-05 PROCEDURE — 36415 COLL VENOUS BLD VENIPUNCTURE: CPT

## 2025-06-05 RX ORDER — TAMSULOSIN HYDROCHLORIDE 0.4 MG/1
0.8 CAPSULE ORAL DAILY
COMMUNITY
Start: 2025-05-12

## 2025-06-05 RX ORDER — RAMIPRIL 10 MG/1
CAPSULE ORAL DAILY
COMMUNITY
Start: 2025-03-11

## 2025-06-05 RX ORDER — MECLIZINE HYDROCHLORIDE 25 MG/1
25 TABLET ORAL 3 TIMES DAILY PRN
Qty: 30 TABLET | Refills: 3 | Status: SHIPPED | OUTPATIENT
Start: 2025-06-05 | End: 2028-02-29

## 2025-06-05 NOTE — PROGRESS NOTES
CYSTO, TUR PROSTATE GREENLIGHT XPS performed by Jayesh Roca MD at [facility] OR    VASCULAR SURGERY      varicose vein of right leg        Social History     Socioeconomic History    Marital status:      Spouse name: Not on file    Number of children: Not on file    Years of education: Not on file    Highest education level: Not on file   Occupational History    Not on file   Tobacco Use    Smoking status: Never    Smokeless tobacco: Never   Vaping Use    Vaping status: Never Used   Substance and Sexual Activity    Alcohol use: Never    Drug use: Never    Sexual activity: Yes   Other Topics Concern    Not on file   Social History Narrative    Not on file     Social Drivers of Health     Financial Resource Strain: High Risk (8/10/2023)    Overall Financial Resource Strain (CARDIA)     Difficulty of Paying Living Expenses: Very hard   Food Insecurity: No Food Insecurity (2/16/2025)    Hunger Vital Sign     Worried About Running Out of Food in the Last Year: Never true     Ran Out of Food in the Last Year: Never true   Transportation Needs: No Transportation Needs (2/16/2025)    PRAPARE - Transportation     Lack of Transportation (Medical): No     Lack of Transportation (Non-Medical): No   Physical Activity: Not on file   Stress: Not on file   Social Connections: Not on file   Intimate Partner Violence: Not on file   Housing Stability: Low Risk  (2/16/2025)    Housing Stability Vital Sign     Unable to Pay for Housing in the Last Year: No     Number of Times Moved in the Last Year: 0     Homeless in the Last Year: No        Current Outpatient Medications   Medication Sig Dispense Refill    lisinopril (PRINIVIL;ZESTRIL) 5 MG tablet Take 1 tablet by mouth daily 90 tablet 1    potassium chloride (KLOR-CON M) 20 MEQ extended release tablet Take 1 tablet by mouth every other day 45 tablet 1    rosuvastatin (CRESTOR) 10 MG tablet Take 1 tablet by mouth nightly 90 tablet 1    carvedilol (COREG) 3.125 MG tablet Take 1 tablet

## 2025-06-06 ENCOUNTER — HOSPITAL ENCOUNTER (OUTPATIENT)
Dept: CT IMAGING | Age: 65
Discharge: HOME | End: 2025-06-08
Attending: INTERNAL MEDICINE
Payer: MEDICARE

## 2025-06-06 DIAGNOSIS — K65.1 PELVIC ABSCESS IN MALE (HCC): ICD-10-CM

## 2025-06-06 PROCEDURE — 6360000004 HC RX CONTRAST MEDICATION: Performed by: INTERNAL MEDICINE

## 2025-06-06 PROCEDURE — 74177 CT ABD & PELVIS W/CONTRAST: CPT

## 2025-06-06 RX ORDER — IOPAMIDOL 755 MG/ML
75 INJECTION, SOLUTION INTRAVASCULAR
Status: COMPLETED | OUTPATIENT
Start: 2025-06-06 | End: 2025-06-06

## 2025-06-06 RX ADMIN — IOPAMIDOL 75 ML: 755 INJECTION, SOLUTION INTRAVENOUS at 07:53

## 2025-06-12 ENCOUNTER — HOSPITAL ENCOUNTER (OUTPATIENT)
Dept: PHYSICAL THERAPY | Facility: CLINIC | Age: 65
Setting detail: THERAPIES SERIES
Discharge: HOME | End: 2025-06-12
Payer: MEDICARE

## 2025-06-12 ENCOUNTER — TELEPHONE (OUTPATIENT)
Dept: INFECTIOUS DISEASES | Age: 65
End: 2025-06-12

## 2025-06-12 PROCEDURE — 97110 THERAPEUTIC EXERCISES: CPT

## 2025-06-12 NOTE — TELEPHONE ENCOUNTER
Patient contacted the office to notify the provider that he completed his labs and his CT scan. As of present, the CT is still processing.

## 2025-06-12 NOTE — FLOWSHEET NOTE
Straight Leg Raise with Pelvic Floor Contraction  - 1 x daily - 3-5 x weekly - 10 reps  - Hooklying Shoulder Flexion with Abdominal Bracing and Swiss Ball  - 1 x daily - 3-5 x weekly - 10 reps  - Figure 4 Bridge  - 3-5 x weekly - 10 reps  - Sidelying Clamshell with Pelvic Floor Contraction  - 1 x daily - 3-5 x weekly - 10 reps  - Sidelying Pelvic Floor Contraction with Hip Abduction  - 1 x daily - 3-5 x weekly - 10 reps  - Seated Long Arc Quad with Hip Adduction  - 3-5 x weekly - 15 reps  - Sit to Stand with Pelvic Floor Contraction  - 3-5 x weekly - 10 reps  - Rowing with Pelvic Floor Contraction  - 1 x daily - 3-5 x weekly - 1-2 sets - 10 reps  - Standing Elbow Extension with Anchored Resistance  - 1 x daily - 3-5 x weekly - 10 reps  - Chops with Pelvic Floor Contraction  - 1 x daily - 3-5 x weekly - 2 sets - 10 reps  - Shoulder extension with resistance - Neutral  - 3-5 x weekly - 10 reps  - Mini Squat with Pelvic Floor Contraction  - 3-5 x weekly - 10 reps  - Sitting to Supine Roll     Patient Education  - Urinary Incontinence  - Get To Know Your Pelvic Floor- Male    Comprehension of Education:  [x] Verbalizes understanding.  [x] Demonstrates understanding.  [x] Needs review for proper carryover and breathing/ pacing with exercises.  [x] Demonstrates/verbalizes HEP/Ed previously given.     Plan: [x] Continue current frequency toward long and short term goals.    [x] Specific Instructions for subsequent treatments: ensure proper knack pelvic brace progression, external palpation; pressure management ex; conservative management       Time In:3:05pm            Time Out: 3:47pm    Electronically signed by:  DAVEY WOOD PTA

## 2025-06-16 ENCOUNTER — RESULTS FOLLOW-UP (OUTPATIENT)
Dept: INFECTIOUS DISEASES | Age: 65
End: 2025-06-16

## 2025-06-18 ENCOUNTER — OFFICE VISIT (OUTPATIENT)
Age: 65
End: 2025-06-18
Payer: MEDICARE

## 2025-06-18 VITALS
BODY MASS INDEX: 31.49 KG/M2 | HEART RATE: 80 BPM | HEIGHT: 65 IN | DIASTOLIC BLOOD PRESSURE: 76 MMHG | WEIGHT: 189 LBS | SYSTOLIC BLOOD PRESSURE: 138 MMHG

## 2025-06-18 DIAGNOSIS — R93.89 ABNORMAL FINDING ON CT SCAN: Primary | ICD-10-CM

## 2025-06-18 PROCEDURE — 99212 OFFICE O/P EST SF 10 MIN: CPT

## 2025-06-18 NOTE — PROGRESS NOTES
Reason for visit: concern for left inguinal hernia    Subjective:  64 y/o male presents to be evaluated for left inguinal hernia.  Patient is s/p robotic prostatectomy.  CT on 3/27/25 showed fat containing inguinal hernia.  Patient denies left groin pain.   Patient denies left groin bulge.      Vitals:    06/18/25 1006   BP: 138/76   Pulse:        Exam  General: patient is resting comfortably in good spirits.    Abdomen: soft, nontender, and nondistended.  There is no inguinal hernia on the left.    Narrative & Impression  EXAMINATION:  CT OF THE ABDOMEN AND PELVIS WITH CONTRAST 3/27/2025 8:26 am     TECHNIQUE:  CT of the abdomen and pelvis was performed with the administration of  intravenous contrast. Multiplanar reformatted images are provided for review.  Automated exposure control, iterative reconstruction, and/or weight based  adjustment of the mA/kV was utilized to reduce the radiation dose to as low  as reasonably achievable.     COMPARISON:  Pelvis CT dated 02/21/2025.  Abdominopelvic CT dated 02/19/2025.     HISTORY:  ORDERING SYSTEM PROVIDED HISTORY: UTI due to Klebsiella species  TECHNOLOGIST PROVIDED HISTORY:     STAT Creatinine as needed:->No  look for resolution of the left iliac abscess  Reason for Exam: look for resolution fo the lef tiliac absecess     FINDINGS:  Lower Chest: Mild bibasilar dependent atelectasis.  Right middle lobe  juxtapleural solid nodule, unchanged.  Interval resolution of pleural  effusions.  No pericardial effusion.     Organs: The liver and spleen are unremarkable.  Splenule.  Few dependent  choleliths; no intra or extrahepatic biliary ductal dilation.  Pancreas and  bilateral adrenal glands are unremarkable.  Bilateral fluid attenuating renal  cysts again seen, benign.  Small hyperattenuating foci in the bilateral renal  collecting systems, without definite nephrolithiasis, new from recent  comparison.     GI/Bowel: No enteric contrast was administered.  Stomach

## 2025-06-18 NOTE — PROGRESS NOTES
Visit Information    Have you changed or started any medications since your last visit including any over-the-counter medicines, vitamins, or herbal medicines? no   Have you stopped taking any of your medications? Is so, why? -  no  Are you having any side effects from any of your medications? - no    Have you seen any other physician or provider since your last visit?  no   Have you had any other diagnostic tests since your last visit?  no   Have you been seen in the emergency room and/or had an admission in a hospital since we last saw you?  no   Have you had your routine dental cleaning in the past 6 months?  no     Do you have an active MyChart account? If no, what is the barrier?  Yes    Patient Care Team:  Lisette Strong MD as PCP - General (Internal Medicine)  Lisette Strong MD as PCP - Empaneled Provider  Jorge Eng MD as Consulting Physician (Gastroenterology)  Joe Galaviz MD as Consulting Physician (Pulmonary Disease)  Pricilla Tamayo MD as Consulting Physician (Infectious Diseases)  Kermit Sotelo IV, DO as Consulting Physician (General Surgery)    Medical History Review  Past Medical, Family, and Social History reviewed and does not contribute to the patient presenting condition    Health Maintenance   Topic Date Due    DTaP/Tdap/Td vaccine (1 - Tdap) Never done    Pneumococcal 50+ years Vaccine (1 of 1 - PCV) Never done    Respiratory Syncytial Virus (RSV) Pregnant or age 60 yrs+ (1 - Risk 60-74 years 1-dose series) Never done    Annual Wellness Visit (Medicare)  Never done    Lipids  04/26/2025    COVID-19 Vaccine (5 - 2024-25 season) 05/02/2025    Shingles vaccine (1 of 2) 03/04/2026 (Originally 6/1/2009)    Flu vaccine (Season Ended) 08/01/2025    Depression Screen  01/22/2026    Colorectal Cancer Screen  09/14/2026    Diabetes screen  12/06/2027    Hepatitis C screen  Completed    Hepatitis A vaccine  Aged Out    Hepatitis B vaccine  Aged Out    Hib vaccine  Aged Out    Polio

## 2025-06-18 NOTE — PATIENT INSTRUCTIONS
Thank you letting us take care of you today. We hope that all your questions were addressed. If a question was overlooked or something else comes to mind after you return home, please call our office at [phone].    If you need to cancel or change an appointment, surgery or procedure, please contact the office at [phone].

## 2025-06-19 ENCOUNTER — HOSPITAL ENCOUNTER (OUTPATIENT)
Dept: PHYSICAL THERAPY | Facility: CLINIC | Age: 65
Setting detail: THERAPIES SERIES
Discharge: HOME | End: 2025-06-19
Payer: MEDICARE

## 2025-06-19 PROCEDURE — 97110 THERAPEUTIC EXERCISES: CPT

## 2025-06-19 NOTE — FLOWSHEET NOTE
[x] [facility]  Outpatient Rehabilitation &  Therapy  [address]  P: [phone]  F: [phone]     Physical Therapy Daily Treatment Note    Date:  2025  Patient Name:  Ivanna Khoury    :  1960  MRN: [id number]  Physician: Mikhail Lemus              Insurance: Excluded: [id number]   1.Medicare-VBMN  2.[employer] Medicaid - follows primary   Medical Diagnosis: R33.9 (ICD-10-CM) - Urinary retention   Rehab Codes: R27.8, R29.3, N39.41, M62.81, R10.2, M99.05  Onset Date: 25   Next 's appt.: 2025 referrring  Visit# / total visits: 8/10   Cancels/No Shows: 0    Subjective:    Pain:  [] Yes  [x] No Location:  N/A  Pain Rating: (0-10 scale) 0/10  Pain altered Tx:  [x] No  [] Yes  Action:    Comments: Pt denies complaints, continues to make progress.    Objective:  Modalities:   Precautions: UES not covered; h/o R hernia repair & potenital L hernia; h/o Pelvic abscess; conservative management  Exercises: Langtice Access Code: [email or web address]  KT= Kinesiotape  PB= pelvic bracing (DB-exhale+TA contraction + kegel)  DB= diaphragmatic breathing  Exercise Reps/ Time Weight/ Level Comments   Pelvic model explanation & education      Function  3 layers  Analogies - IAP (visual aid & pelvic model, core cannister)  Diaphragm & pelvic floor relationship (visual aid)     exhale with effort     pelvic brace (exhale, core contraction & pelvic floor muscle lift) in high pressure situations     At 8+ weeks we will start scar tissue massage 1-3 min per day small amount of vitamin e oil                Supine            Diaphragmatic breathing  HEP       LTR with PB 10x  New    DKTC with PB 10x  New    Bridges on ball 10x red New    SL fig 4 bridges 10xea  New    Transversus Abdominis Bracing with Pelvic Floor Contraction 10x10\"   Continued significant cuing to avoid breath holding & increased hold time    Quick Flicks 15x

## 2025-06-25 ENCOUNTER — OFFICE VISIT (OUTPATIENT)
Dept: INFECTIOUS DISEASES | Age: 65
End: 2025-06-25
Payer: MEDICARE

## 2025-06-25 VITALS
WEIGHT: 192 LBS | DIASTOLIC BLOOD PRESSURE: 89 MMHG | SYSTOLIC BLOOD PRESSURE: 131 MMHG | OXYGEN SATURATION: 99 % | RESPIRATION RATE: 18 BRPM | HEART RATE: 79 BPM | BODY MASS INDEX: 31.99 KG/M2 | HEIGHT: 65 IN

## 2025-06-25 DIAGNOSIS — K65.1 PELVIC ABSCESS IN MALE (HCC): Primary | ICD-10-CM

## 2025-06-25 PROCEDURE — G8417 CALC BMI ABV UP PARAM F/U: HCPCS | Performed by: INTERNAL MEDICINE

## 2025-06-25 PROCEDURE — 3017F COLORECTAL CA SCREEN DOC REV: CPT | Performed by: INTERNAL MEDICINE

## 2025-06-25 PROCEDURE — 3079F DIAST BP 80-89 MM HG: CPT | Performed by: INTERNAL MEDICINE

## 2025-06-25 PROCEDURE — G8427 DOCREV CUR MEDS BY ELIG CLIN: HCPCS | Performed by: INTERNAL MEDICINE

## 2025-06-25 PROCEDURE — 1036F TOBACCO NON-USER: CPT | Performed by: INTERNAL MEDICINE

## 2025-06-25 PROCEDURE — 1159F MED LIST DOCD IN RCRD: CPT | Performed by: INTERNAL MEDICINE

## 2025-06-25 PROCEDURE — 3075F SYST BP GE 130 - 139MM HG: CPT | Performed by: INTERNAL MEDICINE

## 2025-06-25 PROCEDURE — 1123F ACP DISCUSS/DSCN MKR DOCD: CPT | Performed by: INTERNAL MEDICINE

## 2025-06-25 PROCEDURE — 99214 OFFICE O/P EST MOD 30 MIN: CPT | Performed by: INTERNAL MEDICINE

## 2025-06-25 ASSESSMENT — ENCOUNTER SYMPTOMS
EYE PAIN: 0
EYE DISCHARGE: 0
COLOR CHANGE: 0
FACIAL SWELLING: 0
APNEA: 0
EYE ITCHING: 0
CHOKING: 0
ABDOMINAL DISTENTION: 0
PHOTOPHOBIA: 0
EYE REDNESS: 0

## 2025-06-25 NOTE — PROGRESS NOTES
accordingly might stop AB vs go to po keflex longer w subsequent CT  Bandemia - resolved  Procal slightly elevated  0.27  Small atelectasis on CTAP     Recommendations   Office visit 1/13/25   Pain better - edema better left epididyme  about 50% - no tendon pain   WBC  6 - plts up 530 - CRP 7.2 - creat normal  AB extended 10 more days till 1/26 w labs 2 x per week  Plan - renew levaquin and zyvox till 1/26  Keep cbc 2 x per week - rest of the labs weekly  See me in  2 weeks   I will ask Evelia Bragg to look at the labs in my absence - if the WBC drops of plts goes out of wack, and if the testicle feels back to normal, just  stop both AB    Office visit 1/28/25  WBC  3 - crp < 3- creat normal- CPK ok -plts 311 - U cx 1/24 neg  Done w AB 1/27/25  Exam - epididymitis resolved - and testicular exam normal   Cystoscopy done 1/24/25 BPH   Planned for  minimal invasive robotic prostatectomy procedure planned -TBD - Dr Eleonora CHAVEZ Suggested to him intermittent cath but pt prefers the valladares -though concerned about infection  Plan - problem resolved for now -keep off AB -  we need to keep valladares clean and avoid a new infection pend the minimal invasive robotic prostatectomy procedure  Call me if the urine seems infected  Suggest a urine cx / change the valladares, 1 week before, in order to treat and prevent surg complications  Office 3/3/25    plts increased  530 - feels great - much better - abd fluid minimal- no diarrhea  -exam benign   WBC  6 - plts up 530 - CRP 7.2 - creat normal - blood in urine still -wound clean and not infected  Adding CRP to understand the plts  See me 3 weeks  Ancef ceftriaxone 6 weeks till 4/3/25  (Left flank abscess - no drain in it )  CTAP toward 4/3/25 look for full resolution of hew left flank abscess   Office 3/25/25  CRP WBC creat all normal -  Feels great and exam neg  CT still w left iliac abscess 2.1 cm -  awaiting to talk to Dr Tubbs  in am - real vs scar - accordingly might stop AB vs go to

## 2025-06-26 ENCOUNTER — HOSPITAL ENCOUNTER (OUTPATIENT)
Dept: PHYSICAL THERAPY | Facility: CLINIC | Age: 65
Setting detail: THERAPIES SERIES
Discharge: HOME | End: 2025-06-26
Payer: MEDICARE

## 2025-06-26 NOTE — FLOWSHEET NOTE
[] [facility]  Outpatient Rehabilitation &  Therapy  [address]  [phone]  [phone] [x] [facility]  Outpatient Rehabilitation &  Therapy  [address]  P: [phone]  F: [phone] [] [facility]  Outpatient Rehabilitation &  Therapy  [address]  P: [phone]  F: [phone] [] [facility]  Outpatient Rehabilitation &  Therapy  [address]  [phone]  [phone] [] [facility]  Outpatient Rehabilitation &  Therapy  [address]   P: [phone]  F: [phone]  [] [facility]  Outpatient Rehabilitation &  Therapy  [address]  P: [phone]  F: [phone] [] [facility]  Outpatient Rehabilitation &  Therapy  [address]  P: [phone]  F: [phone] [] [facility]  Outpatient Rehabilitation &  Therapy  [address]  P: [phone]  F: [phone] [] [facility]  Outpatient Rehabilitation &  Therapy  [address]  P: [phone]  F: [phone]  [] [facility] Outpatient Rehabilitation &  Therapy  [address]  P: [phone]  F: [phone] [] [facility] Outpatient Rehabilitation &  Therapy  [address]  P: [phone]   F: [phone]      Therapy Cancel/No Show note    Date: 2025  Patient: Ivanna Khoury  : 1960  MRN: [id number]    Cancels/No Shows to date:     For today's appointment patient:    [x]  Cancelled    [] Rescheduled appointment    [] No-show     Reason given by patient:    [x]  Patient ill    []  Conflicting appointment    [] No transportation      [] Conflict with work    [] No reason given    [] Weather related    [] COVID-19    [] Other:      Comments:        [x] Next appointment was

## 2025-07-01 DIAGNOSIS — I10 ESSENTIAL HYPERTENSION: ICD-10-CM

## 2025-07-01 RX ORDER — LISINOPRIL 5 MG/1
5 TABLET ORAL DAILY
Qty: 90 TABLET | Refills: 1 | Status: SHIPPED | OUTPATIENT
Start: 2025-07-01 | End: 2025-12-28

## 2025-07-01 RX ORDER — ECHINACEA PURPUREA EXTRACT 125 MG
1 TABLET ORAL PRN
Qty: 1 EACH | Refills: 2 | Status: SHIPPED | OUTPATIENT
Start: 2025-07-01

## 2025-07-01 RX ORDER — CARVEDILOL 3.12 MG/1
3.12 TABLET ORAL 2 TIMES DAILY
Qty: 60 TABLET | Refills: 3 | Status: SHIPPED | OUTPATIENT
Start: 2025-07-01

## 2025-07-01 RX ORDER — FINASTERIDE 5 MG/1
5 TABLET, FILM COATED ORAL DAILY
Qty: 90 TABLET | Refills: 3 | Status: SHIPPED | OUTPATIENT
Start: 2025-07-01

## 2025-07-01 NOTE — TELEPHONE ENCOUNTER
Ivanna Khoury is calling to request a refill on the following medication(s):    Medication Request:  Requested Prescriptions     Pending Prescriptions Disp Refills    finasteride (PROSCAR) 5 MG tablet 90 tablet 3     Sig: Take 1 tablet by mouth daily       Last Visit Date (If Applicable):  Visit date not found    Next Visit Date:    7/1/2025

## 2025-07-01 NOTE — TELEPHONE ENCOUNTER
Ivanna Khoury is calling to request a refill on the following medication(s):    Medication Request:  Requested Prescriptions     Pending Prescriptions Disp Refills    carvedilol (COREG) 3.125 MG tablet 60 tablet 3     Sig: Take 1 tablet by mouth 2 times daily       Last Visit Date (If Applicable):  Visit date not found    Next Visit Date:    Visit date not found

## 2025-07-01 NOTE — TELEPHONE ENCOUNTER
Ivanna Khoury is calling to request a refill on the following medication(s):    Medication Request:  Requested Prescriptions     Pending Prescriptions Disp Refills    lisinopril (PRINIVIL;ZESTRIL) 5 MG tablet 90 tablet 1     Sig: Take 1 tablet by mouth daily       Last Visit Date (If Applicable):  Visit date not found    Next Visit Date:    7/1/2025

## 2025-07-10 ENCOUNTER — HOSPITAL ENCOUNTER (OUTPATIENT)
Dept: PHYSICAL THERAPY | Facility: CLINIC | Age: 65
Setting detail: THERAPIES SERIES
Discharge: HOME | End: 2025-07-10

## 2025-07-10 NOTE — FLOWSHEET NOTE
[] [facility]  Outpatient Rehabilitation &  Therapy  [address]  [phone]  [phone] [x] [facility]  Outpatient Rehabilitation &  Therapy  [address]  P: [phone]  F: [phone] [] [facility]  Outpatient Rehabilitation &  Therapy  [address]  P: [phone]  F: [phone] [] [facility]  Outpatient Rehabilitation &  Therapy  [address]  [phone]  [phone] [] [facility]  Outpatient Rehabilitation &  Therapy  [address]   P: [phone]  F: [phone]  [] [facility]  Outpatient Rehabilitation &  Therapy  [address]  P: [phone]  F: [phone] [] [facility]  Outpatient Rehabilitation &  Therapy  [address]  P: [phone]  F: [phone] [] [facility]  Outpatient Rehabilitation &  Therapy  [address]  P: [phone]  F: [phone] [] [facility]  Outpatient Rehabilitation &  Therapy  [address]  P: [phone]  F: [phone]  [] [facility] Outpatient Rehabilitation &  Therapy  [address]  P: [phone]  F: [phone] [] [facility] Outpatient Rehabilitation &  Therapy  [address]  P: [phone]   F: [phone]      Therapy Cancel/No Show note    Date: 7/10/2025  Patient: Ivanna Khoury  : 1960  MRN: [id number]    Cancels/No Shows to date: 20    For today's appointment patient:    [x]  Cancelled    [] Rescheduled appointment    [] No-show     Reason given by patient:    [x]  Patient ill    []  Conflicting appointment    [] No transportation      [] Conflict with work    [] No reason given    [] Weather related    [] COVID-19    [] Other:      Comments:  no further appts scheduled      [] Next appointment was

## 2025-07-11 ENCOUNTER — OFFICE VISIT (OUTPATIENT)
Age: 65
End: 2025-07-11
Payer: MEDICARE

## 2025-07-11 VITALS
DIASTOLIC BLOOD PRESSURE: 94 MMHG | BODY MASS INDEX: 29.95 KG/M2 | SYSTOLIC BLOOD PRESSURE: 135 MMHG | WEIGHT: 180 LBS | HEART RATE: 82 BPM

## 2025-07-11 DIAGNOSIS — Z12.5 PROSTATE CANCER SCREENING: Primary | ICD-10-CM

## 2025-07-11 PROCEDURE — 99212 OFFICE O/P EST SF 10 MIN: CPT | Performed by: UROLOGY

## 2025-07-11 NOTE — PROGRESS NOTES
Dr. Sunny Crews  [facility] Follow up    Patient:  Ivanna Khoury  YOB: 1960  Date: 7/11/2025  Consult requested from Lisette Strong MD     HISTORY OF PRESENT ILLNESS:   The patient is a 65 y.o. male who presents today for follow-up for the following problem(s): BPH with urinary retention, s/p robo simple 2/13/25  Overall the problem(s) : are improving.  Associated Symptoms: No dysuria, gross hematuria.   Pain Severity: Pain Score:   0 - No pain (only bulling in upper stomach area)    Today visit:   7/11/25  BPH with Urinary retention s/p simple prostatectomy with prostate abscess, 2/2025. Doing better, having some swelling superior to incision with some scar tissue, urinating very well, currently on Flomax and Proscar.      Summary of old records:   Severe BPH, status post robotic simple prostatectomy 2/13, complicated by unroofing prostate abscess, seroma beneath his midline incision.  Did finish course of antibiotics, urinating well.    Last several PSA's:  No results found for: \"PSA\"    Last total testosterone:  No results found for: \"TESTOSTERONE\"    Urinalysis today:  No results found for this visit on 07/11/25.      Last BUN and creatinine:  Lab Results   Component Value Date    BUN 24 (H) 03/21/2025     Lab Results   Component Value Date    CREATININE 0.8 06/05/2025       Imaging Reviewed during this Office Visit:   (results were independently reviewed by physician and radiology report verified)    PAST MEDICAL, FAMILY AND SOCIAL HISTORY:  Past Medical History:   Diagnosis Date    Acute epididymitis     COVID 2020    no hospitalization    Enlarged prostate     Tovar catheter in place 02/05/2025    Hyperlipidemia     Hypertension     Lung nodules     not changed in 5 years-no longer has to visit with pulmonology    MRSA (methicillin resistant staph aureus) culture positive     states resolved    Under care of service provider     Dr. Strong-[facility]- last seen Dec

## 2025-07-30 ENCOUNTER — OFFICE VISIT (OUTPATIENT)
Age: 65
End: 2025-07-30
Payer: MEDICARE

## 2025-07-30 VITALS
HEART RATE: 79 BPM | DIASTOLIC BLOOD PRESSURE: 80 MMHG | HEIGHT: 66 IN | SYSTOLIC BLOOD PRESSURE: 122 MMHG | BODY MASS INDEX: 30.6 KG/M2 | WEIGHT: 190.4 LBS

## 2025-07-30 DIAGNOSIS — R10.33 UMBILICAL PAIN: Primary | ICD-10-CM

## 2025-07-30 PROCEDURE — 99213 OFFICE O/P EST LOW 20 MIN: CPT

## 2025-07-30 PROCEDURE — 1159F MED LIST DOCD IN RCRD: CPT

## 2025-07-30 PROCEDURE — 1123F ACP DISCUSS/DSCN MKR DOCD: CPT

## 2025-07-30 PROCEDURE — 1036F TOBACCO NON-USER: CPT

## 2025-07-30 PROCEDURE — 3079F DIAST BP 80-89 MM HG: CPT

## 2025-07-30 PROCEDURE — 3074F SYST BP LT 130 MM HG: CPT

## 2025-07-30 PROCEDURE — G8427 DOCREV CUR MEDS BY ELIG CLIN: HCPCS

## 2025-07-30 PROCEDURE — 99212 OFFICE O/P EST SF 10 MIN: CPT

## 2025-07-30 PROCEDURE — 3017F COLORECTAL CA SCREEN DOC REV: CPT

## 2025-07-30 PROCEDURE — G8417 CALC BMI ABV UP PARAM F/U: HCPCS

## 2025-08-07 ENCOUNTER — PATIENT MESSAGE (OUTPATIENT)
Dept: PULMONOLOGY | Age: 65
End: 2025-08-07

## 2025-09-04 ENCOUNTER — OFFICE VISIT (OUTPATIENT)
Dept: PULMONOLOGY | Age: 65
End: 2025-09-04
Payer: MEDICARE

## 2025-09-04 ENCOUNTER — HOSPITAL ENCOUNTER (OUTPATIENT)
Dept: LAB | Age: 65
Discharge: HOME | End: 2025-09-04
Payer: MEDICARE

## 2025-09-04 VITALS
HEART RATE: 66 BPM | HEIGHT: 66 IN | SYSTOLIC BLOOD PRESSURE: 127 MMHG | BODY MASS INDEX: 30.7 KG/M2 | OXYGEN SATURATION: 100 % | DIASTOLIC BLOOD PRESSURE: 85 MMHG | RESPIRATION RATE: 16 BRPM | WEIGHT: 191 LBS

## 2025-09-04 DIAGNOSIS — N40.1 BPH WITH URINARY OBSTRUCTION: ICD-10-CM

## 2025-09-04 DIAGNOSIS — Z86.16 HISTORY OF COVID-19: ICD-10-CM

## 2025-09-04 DIAGNOSIS — R91.8 MULTIPLE NODULES OF LUNG: Primary | ICD-10-CM

## 2025-09-04 DIAGNOSIS — N13.8 BPH WITH URINARY OBSTRUCTION: ICD-10-CM

## 2025-09-04 DIAGNOSIS — R09.82 POST-NASAL DRIP: ICD-10-CM

## 2025-09-04 LAB — PSA SERPL-MCNC: 0.09 NG/ML (ref 0–4)

## 2025-09-04 PROCEDURE — 3074F SYST BP LT 130 MM HG: CPT | Performed by: INTERNAL MEDICINE

## 2025-09-04 PROCEDURE — 1036F TOBACCO NON-USER: CPT | Performed by: INTERNAL MEDICINE

## 2025-09-04 PROCEDURE — G8427 DOCREV CUR MEDS BY ELIG CLIN: HCPCS | Performed by: INTERNAL MEDICINE

## 2025-09-04 PROCEDURE — 99213 OFFICE O/P EST LOW 20 MIN: CPT | Performed by: INTERNAL MEDICINE

## 2025-09-04 PROCEDURE — 36415 COLL VENOUS BLD VENIPUNCTURE: CPT

## 2025-09-04 PROCEDURE — 1159F MED LIST DOCD IN RCRD: CPT | Performed by: INTERNAL MEDICINE

## 2025-09-04 PROCEDURE — 3017F COLORECTAL CA SCREEN DOC REV: CPT | Performed by: INTERNAL MEDICINE

## 2025-09-04 PROCEDURE — 3079F DIAST BP 80-89 MM HG: CPT | Performed by: INTERNAL MEDICINE

## 2025-09-04 PROCEDURE — 1123F ACP DISCUSS/DSCN MKR DOCD: CPT | Performed by: INTERNAL MEDICINE

## 2025-09-04 PROCEDURE — G0103 PSA SCREENING: HCPCS

## 2025-09-04 PROCEDURE — G8417 CALC BMI ABV UP PARAM F/U: HCPCS | Performed by: INTERNAL MEDICINE

## (undated) DEVICE — COUNTER NDL 10 COUNT HLD 20 FOAM BLK SGL MAG

## (undated) DEVICE — TIP COVER ACCESSORY

## (undated) DEVICE — GLOVE ORANGE PI 7 1/2   MSG9075

## (undated) DEVICE — APPLICATOR MEDICATED 26 CC SOLUTION HI LT ORNG CHLORAPREP

## (undated) DEVICE — Z INACTIVE USE 2527070 DRAPE SURG W40XL44IN UNDERBUTTOCK SMS POLYPR W/ PCH BK DISP

## (undated) DEVICE — ARM DRAPE

## (undated) DEVICE — CATHETER URETH 24FR BLLN 30CC SIL ALLY W/ SIL HYDRGEL 3 W F

## (undated) DEVICE — RESERVOIR,SUCTION,100CC,SILICONE: Brand: MEDLINE

## (undated) DEVICE — TROCAR: Brand: KII FIOS FIRST ENTRY

## (undated) DEVICE — GOWN,SIRUS,NONRNF,SETINSLV,XL,20/CS: Brand: MEDLINE

## (undated) DEVICE — STRAP,CATHETER,ELASTIC,HOOK&LOOP: Brand: MEDLINE

## (undated) DEVICE — SOLUTION ANTIFOG VIS SYS CLEARIFY LAPSCP

## (undated) DEVICE — PACK PROCEDURE SURG CYSTO SVMMC LF

## (undated) DEVICE — 3M™ IOBAN™ 2 ANTIMICROBIAL INCISE DRAPE 6650EZ: Brand: IOBAN™ 2

## (undated) DEVICE — SYRINGE MED 50ML LUERLOCK TIP

## (undated) DEVICE — CYSTO/BLADDER IRRIGATION SET, REGULATING CLAMP

## (undated) DEVICE — GARMENT,MEDLINE,DVT,INT,CALF,MED, GEN2: Brand: MEDLINE

## (undated) DEVICE — SHEET DRAPE FULL 70X100

## (undated) DEVICE — SYRINGE MED 20ML STD CLR PLAS LUERLOCK TIP N CTRL DISP

## (undated) DEVICE — BLADELESS OBTURATOR: Brand: WECK VISTA

## (undated) DEVICE — RENTAL LASER XPS CASE

## (undated) DEVICE — LIQUIBAND RAPID ADHESIVE 36/CS 0.8ML: Brand: MEDLINE

## (undated) DEVICE — CANNULA SEAL

## (undated) DEVICE — DRAPE,REIN 53X77,STERILE: Brand: MEDLINE

## (undated) DEVICE — TOWEL,OR,DSP,ST,NATURAL,DLX,4/PK,20PK/CS: Brand: MEDLINE

## (undated) DEVICE — CONTAINER,SPECIMEN,4OZ,OR STRL: Brand: MEDLINE

## (undated) DEVICE — PAD PT POS 36 IN SURGYPAD DISP

## (undated) DEVICE — SYRINGE, LUER LOCK, 30ML: Brand: MEDLINE

## (undated) DEVICE — GLOVE ORANGE PI 8 1/2   MSG9085

## (undated) DEVICE — SYRINGE MED 10ML LUERLOCK TIP W/O SFTY DISP

## (undated) DEVICE — STRAP,POSITIONING,KNEE/BODY,FOAM,4X60": Brand: MEDLINE

## (undated) DEVICE — GLOVE SURG SZ 6 THK91MIL LTX FREE SYN POLYISOPRENE ANTI

## (undated) DEVICE — SUTURE DEV SZ 2-0 WND CLSR ABSRB GS-22 VLOC COVIDIEN VLOCM2145

## (undated) DEVICE — SUTURE MONOCRYL SZ 4-0 L18IN ABSRB UD L16MM PC-3 3/8 CIR PRIM Y845G

## (undated) DEVICE — ELECTRODE PT RET AD L9FT HI MOIST COND ADH HYDRGEL CORDED

## (undated) DEVICE — LASER FIBER: Brand: MOXY

## (undated) DEVICE — DRAINBAG,ANTI-REFLUX TOWER,L/F,2000ML,LL: Brand: MEDLINE

## (undated) DEVICE — ADHESIVE SKIN CLSR 0.7ML TOP DERMBND ADV

## (undated) DEVICE — COVER,LIGHT HANDLE,FLX,2/PK: Brand: MEDLINE INDUSTRIES, INC.

## (undated) DEVICE — SEALANT TISS 10 ML FIBRIN VISTASEAL

## (undated) DEVICE — LASER SURG W22XH58IN D36IN 475LB SLD STATE FREQ DOUBLED

## (undated) DEVICE — APPLICATOR LAP 35 CM 2 RIGID VISTASEAL

## (undated) DEVICE — CATHETER,FOLEY,3-WAY,22FR,30ML,100%SILI: Brand: MEDLINE

## (undated) DEVICE — GLOVE ORANGE PI 7   MSG9070

## (undated) DEVICE — METER,URINE,400ML,DRAIN BAG,L/F,LL: Brand: MEDLINE

## (undated) DEVICE — STERILE POLYISOPRENE POWDER-FREE SURGICAL GLOVES WITH EMOLLIENT COATING: Brand: PROTEXIS

## (undated) DEVICE — SUTURE NONABSORBABLE MONOFILAMENT 3-0 PS-1 18 IN BLK ETHILON 1663H

## (undated) DEVICE — DISPOSABLE LAPAROSCOPIC CORDS, 1 PER POUCH: Brand: A&E MEDICAL / DISPOSABLE LAPAROSCOPIC CORDS

## (undated) DEVICE — SHEET,DRAPE,70X100,STERILE: Brand: MEDLINE

## (undated) DEVICE — INSUFFLATION NEEDLE TO ESTABLISH PNEUMOPERITONEUM.: Brand: INSUFFLATION NEEDLE

## (undated) DEVICE — TUBE SET WITH SMOKE EVACUATION

## (undated) DEVICE — MITT SURG PREP L ADH DISPOSABLE

## (undated) DEVICE — Device

## (undated) DEVICE — BLADE CLIPPER GEN PURP NS

## (undated) DEVICE — SEAL

## (undated) DEVICE — FILTER CLP DISP FOR 5513E CLIPVAC

## (undated) DEVICE — TOTAL TRAY, 16FR 10ML SIL FOLEY, URN: Brand: MEDLINE

## (undated) DEVICE — TISSUE RETRIEVAL SYSTEM: Brand: INZII RETRIEVAL SYSTEM

## (undated) DEVICE — TRI-LUMEN FILTERED TUBE SET WITH ACTIVATED CHARCOAL FILTER: Brand: AIRSEAL

## (undated) DEVICE — AIRSEAL 8 MM ACCESS PORT AND LOW PROFILE OBTURATOR WITH BLADELESS OPTICAL TIP, 120 MM LENGTH: Brand: AIRSEAL

## (undated) DEVICE — BAG,LEG,COMFORT-STRAPS,MEDIUM,20OZ: Brand: MEDLINE

## (undated) DEVICE — TUBING, SUCTION, 9/32" X 20', STRAIGHT: Brand: MEDLINE INDUSTRIES, INC.

## (undated) DEVICE — Y-TYPE TUR/BLADDER IRRIGATION SET, REGULATING CLAMP

## (undated) DEVICE — NEEDLE HYPO 25GA L1.5IN BLU POLYPR HUB S STL REG BVL STR

## (undated) DEVICE — SUTURE VICRYL SZ 1 L18IN ABSRB VLT CT-1 L36MM 1/2 CIR J741D

## (undated) DEVICE — GOWN,AURORA,NONREINFORCED,LARGE: Brand: MEDLINE

## (undated) DEVICE — GLOVE ORANGE PI 8   MSG9080

## (undated) DEVICE — NEEDLE SPNL 18GA L3.5IN W/ QNCKE SHARPER BVL DURA CLICK

## (undated) DEVICE — SUTURE STRATAFIX SPRL PDS + SZ 3 0 L6IN ABSRB VLT RB 1 L17MM SXPP1B422

## (undated) DEVICE — DRAIN,WOUND,15FR,3/16,FULL-FLUTED: Brand: MEDLINE

## (undated) DEVICE — PROTECTOR ULN NRV PUR FOAM HK LOOP STRP ANATOMICALLY

## (undated) DEVICE — SYRINGE CATH TIP 50ML

## (undated) DEVICE — CATHETER URETH 18FR BLLN 30CC 2 W F INF CTRL BARDX

## (undated) DEVICE — GLOVE SURG SZ 65 THK91MIL LTX FREE SYN POLYISOPRENE

## (undated) DEVICE — PLUMEPORT SEO LAPAROSCOPIC SMOKE FILTRATION DEVICE: Brand: PLUMEPORT

## (undated) DEVICE — SUTURE STRATAFIX SPRL PDS + VLT 3-0 15CM DISPOSABLE/SNGLE SXPP1B420

## (undated) DEVICE — ELECTRO LUBE IS A SINGLE PATIENT USE DEVICE THAT IS INTENDED TO BE USED ON ELECTROSURGICAL ELECTRODES TO REDUCE STICKING.: Brand: KEY SURGICAL ELECTRO LUBE

## (undated) DEVICE — SUTURE PDS II SZ 0 L27IN ABSRB VLT L36MM CT-1 1/2 CIR Z340H